# Patient Record
Sex: MALE | Race: WHITE | Employment: UNEMPLOYED | URBAN - METROPOLITAN AREA
[De-identification: names, ages, dates, MRNs, and addresses within clinical notes are randomized per-mention and may not be internally consistent; named-entity substitution may affect disease eponyms.]

---

## 2017-01-05 ENCOUNTER — HOSPITAL ENCOUNTER (INPATIENT)
Facility: CLINIC | Age: 77
LOS: 7 days | Discharge: NURSING, REHAB OR LONG-TERM CARE | DRG: 190 | End: 2017-01-12
Attending: EMERGENCY MEDICINE | Admitting: INTERNAL MEDICINE
Payer: MEDICARE

## 2017-01-05 ENCOUNTER — APPOINTMENT (OUTPATIENT)
Dept: GENERAL RADIOLOGY | Facility: CLINIC | Age: 77
DRG: 190 | End: 2017-01-05
Attending: EMERGENCY MEDICINE
Payer: MEDICARE

## 2017-01-05 DIAGNOSIS — R06.02 SOB (SHORTNESS OF BREATH): ICD-10-CM

## 2017-01-05 DIAGNOSIS — F17.210 CIGARETTE NICOTINE DEPENDENCE WITHOUT COMPLICATION: ICD-10-CM

## 2017-01-05 DIAGNOSIS — G89.29 OTHER CHRONIC PAIN: ICD-10-CM

## 2017-01-05 DIAGNOSIS — J44.1 COPD EXACERBATION (H): Primary | ICD-10-CM

## 2017-01-05 LAB
ALBUMIN SERPL-MCNC: 3.4 G/DL (ref 3.4–5)
ALP SERPL-CCNC: 97 U/L (ref 40–150)
ALT SERPL W P-5'-P-CCNC: 38 U/L (ref 0–70)
ANION GAP SERPL CALCULATED.3IONS-SCNC: 10 MMOL/L (ref 3–14)
AST SERPL W P-5'-P-CCNC: 46 U/L (ref 0–45)
BASE EXCESS BLDV CALC-SCNC: 8.9 MMOL/L
BASOPHILS # BLD AUTO: 0 10E9/L (ref 0–0.2)
BASOPHILS NFR BLD AUTO: 0.4 %
BILIRUB SERPL-MCNC: 0.8 MG/DL (ref 0.2–1.3)
BUN SERPL-MCNC: 14 MG/DL (ref 7–30)
CALCIUM SERPL-MCNC: 9 MG/DL (ref 8.5–10.1)
CHLORIDE SERPL-SCNC: 98 MMOL/L (ref 94–109)
CO2 SERPL-SCNC: 28 MMOL/L (ref 20–32)
CREAT SERPL-MCNC: 0.81 MG/DL (ref 0.66–1.25)
DIFFERENTIAL METHOD BLD: ABNORMAL
EOSINOPHIL # BLD AUTO: 0.1 10E9/L (ref 0–0.7)
EOSINOPHIL NFR BLD AUTO: 0.9 %
ERYTHROCYTE [DISTWIDTH] IN BLOOD BY AUTOMATED COUNT: 13.6 % (ref 10–15)
FLUAV+FLUBV AG SPEC QL: NEGATIVE
FLUAV+FLUBV AG SPEC QL: NORMAL
GFR SERPL CREATININE-BSD FRML MDRD: ABNORMAL ML/MIN/1.7M2
GLUCOSE BLDC GLUCOMTR-MCNC: 182 MG/DL (ref 70–99)
GLUCOSE BLDC GLUCOMTR-MCNC: 310 MG/DL (ref 70–99)
GLUCOSE SERPL-MCNC: 151 MG/DL (ref 70–99)
HCO3 BLDV-SCNC: 35 MMOL/L (ref 21–28)
HCT VFR BLD AUTO: 43.6 % (ref 40–53)
HGB BLD-MCNC: 15.6 G/DL (ref 13.3–17.7)
IMM GRANULOCYTES # BLD: 0 10E9/L (ref 0–0.4)
IMM GRANULOCYTES NFR BLD: 0.3 %
INTERPRETATION ECG - MUSE: NORMAL
LACTATE BLD-SCNC: 2.2 MMOL/L (ref 0.7–2.1)
LYMPHOCYTES # BLD AUTO: 1.1 10E9/L (ref 0.8–5.3)
LYMPHOCYTES NFR BLD AUTO: 15.9 %
MCH RBC QN AUTO: 33.3 PG (ref 26.5–33)
MCHC RBC AUTO-ENTMCNC: 35.8 G/DL (ref 31.5–36.5)
MCV RBC AUTO: 93 FL (ref 78–100)
MONOCYTES # BLD AUTO: 1.1 10E9/L (ref 0–1.3)
MONOCYTES NFR BLD AUTO: 16 %
NEUTROPHILS # BLD AUTO: 4.5 10E9/L (ref 1.6–8.3)
NEUTROPHILS NFR BLD AUTO: 66.5 %
NRBC # BLD AUTO: 0 10*3/UL
NRBC BLD AUTO-RTO: 0 /100
NT-PROBNP SERPL-MCNC: 580 PG/ML (ref 0–900)
O2/TOTAL GAS SETTING VFR VENT: ABNORMAL %
PCO2 BLDV: 54 MM HG (ref 40–50)
PH BLDV: 7.42 PH (ref 7.32–7.43)
PLATELET # BLD AUTO: 120 10E9/L (ref 150–450)
PO2 BLDV: 36 MM HG (ref 25–47)
POTASSIUM SERPL-SCNC: 3.4 MMOL/L (ref 3.4–5.3)
PROCALCITONIN SERPL-MCNC: NORMAL NG/ML
PROT SERPL-MCNC: 7.8 G/DL (ref 6.8–8.8)
RBC # BLD AUTO: 4.68 10E12/L (ref 4.4–5.9)
SODIUM SERPL-SCNC: 136 MMOL/L (ref 133–144)
SPECIMEN SOURCE: NORMAL
TROPONIN I SERPL-MCNC: NORMAL UG/L (ref 0–0.04)
WBC # BLD AUTO: 6.8 10E9/L (ref 4–11)

## 2017-01-05 PROCEDURE — 99223 1ST HOSP IP/OBS HIGH 75: CPT | Mod: AI | Performed by: INTERNAL MEDICINE

## 2017-01-05 PROCEDURE — 94640 AIRWAY INHALATION TREATMENT: CPT

## 2017-01-05 PROCEDURE — 40000275 ZZH STATISTIC RCP TIME EA 10 MIN

## 2017-01-05 PROCEDURE — 84145 PROCALCITONIN (PCT): CPT | Performed by: EMERGENCY MEDICINE

## 2017-01-05 PROCEDURE — 25000132 ZZH RX MED GY IP 250 OP 250 PS 637: Mod: GY | Performed by: INTERNAL MEDICINE

## 2017-01-05 PROCEDURE — 25000308 HC RX OP HPI UCR WEL MED 250 IP 250: Performed by: EMERGENCY MEDICINE

## 2017-01-05 PROCEDURE — 87804 INFLUENZA ASSAY W/OPTIC: CPT | Performed by: EMERGENCY MEDICINE

## 2017-01-05 PROCEDURE — 25000125 ZZHC RX 250: Performed by: EMERGENCY MEDICINE

## 2017-01-05 PROCEDURE — 83880 ASSAY OF NATRIURETIC PEPTIDE: CPT | Performed by: EMERGENCY MEDICINE

## 2017-01-05 PROCEDURE — 99285 EMERGENCY DEPT VISIT HI MDM: CPT | Mod: 25

## 2017-01-05 PROCEDURE — A9270 NON-COVERED ITEM OR SERVICE: HCPCS | Mod: GY | Performed by: INTERNAL MEDICINE

## 2017-01-05 PROCEDURE — 25000128 H RX IP 250 OP 636: Performed by: INTERNAL MEDICINE

## 2017-01-05 PROCEDURE — 25000125 ZZHC RX 250: Performed by: INTERNAL MEDICINE

## 2017-01-05 PROCEDURE — 80053 COMPREHEN METABOLIC PANEL: CPT | Performed by: EMERGENCY MEDICINE

## 2017-01-05 PROCEDURE — 83605 ASSAY OF LACTIC ACID: CPT | Performed by: INTERNAL MEDICINE

## 2017-01-05 PROCEDURE — 93005 ELECTROCARDIOGRAM TRACING: CPT

## 2017-01-05 PROCEDURE — 00000146 ZZHCL STATISTIC GLUCOSE BY METER IP

## 2017-01-05 PROCEDURE — 71020 XR CHEST 2 VW: CPT

## 2017-01-05 PROCEDURE — 96374 THER/PROPH/DIAG INJ IV PUSH: CPT

## 2017-01-05 PROCEDURE — 36415 COLL VENOUS BLD VENIPUNCTURE: CPT | Performed by: INTERNAL MEDICINE

## 2017-01-05 PROCEDURE — 94640 AIRWAY INHALATION TREATMENT: CPT | Mod: 76

## 2017-01-05 PROCEDURE — 85025 COMPLETE CBC W/AUTO DIFF WBC: CPT | Performed by: EMERGENCY MEDICINE

## 2017-01-05 PROCEDURE — 12000000 ZZH R&B MED SURG/OB

## 2017-01-05 PROCEDURE — 84484 ASSAY OF TROPONIN QUANT: CPT | Performed by: EMERGENCY MEDICINE

## 2017-01-05 PROCEDURE — 82803 BLOOD GASES ANY COMBINATION: CPT | Performed by: EMERGENCY MEDICINE

## 2017-01-05 RX ORDER — METHYLPREDNISOLONE SODIUM SUCCINATE 125 MG/2ML
60 INJECTION, POWDER, LYOPHILIZED, FOR SOLUTION INTRAMUSCULAR; INTRAVENOUS EVERY 12 HOURS
Status: DISCONTINUED | OUTPATIENT
Start: 2017-01-05 | End: 2017-01-07

## 2017-01-05 RX ORDER — IPRATROPIUM BROMIDE AND ALBUTEROL SULFATE 2.5; .5 MG/3ML; MG/3ML
3 SOLUTION RESPIRATORY (INHALATION) EVERY 4 HOURS
Status: DISCONTINUED | OUTPATIENT
Start: 2017-01-05 | End: 2017-01-10

## 2017-01-05 RX ORDER — IPRATROPIUM BROMIDE AND ALBUTEROL SULFATE 2.5; .5 MG/3ML; MG/3ML
3 SOLUTION RESPIRATORY (INHALATION)
Status: COMPLETED | OUTPATIENT
Start: 2017-01-05 | End: 2017-01-05

## 2017-01-05 RX ORDER — NALOXONE HYDROCHLORIDE 0.4 MG/ML
.1-.4 INJECTION, SOLUTION INTRAMUSCULAR; INTRAVENOUS; SUBCUTANEOUS
Status: DISCONTINUED | OUTPATIENT
Start: 2017-01-05 | End: 2017-01-05

## 2017-01-05 RX ORDER — HYDROCHLOROTHIAZIDE 25 MG/1
25 TABLET ORAL DAILY
Status: DISCONTINUED | OUTPATIENT
Start: 2017-01-06 | End: 2017-01-12 | Stop reason: HOSPADM

## 2017-01-05 RX ORDER — TRAMADOL HYDROCHLORIDE 50 MG/1
50 TABLET ORAL 3 TIMES DAILY PRN
Status: DISCONTINUED | OUTPATIENT
Start: 2017-01-05 | End: 2017-01-05

## 2017-01-05 RX ORDER — NICOTINE POLACRILEX 4 MG
15-30 LOZENGE BUCCAL
Status: DISCONTINUED | OUTPATIENT
Start: 2017-01-05 | End: 2017-01-12 | Stop reason: HOSPADM

## 2017-01-05 RX ORDER — NICOTINE 21 MG/24HR
1 PATCH, TRANSDERMAL 24 HOURS TRANSDERMAL DAILY
Status: DISCONTINUED | OUTPATIENT
Start: 2017-01-05 | End: 2017-01-12 | Stop reason: HOSPADM

## 2017-01-05 RX ORDER — DEXTROSE MONOHYDRATE 25 G/50ML
25-50 INJECTION, SOLUTION INTRAVENOUS
Status: DISCONTINUED | OUTPATIENT
Start: 2017-01-05 | End: 2017-01-12 | Stop reason: HOSPADM

## 2017-01-05 RX ORDER — AZITHROMYCIN 250 MG/1
250 TABLET, FILM COATED ORAL DAILY
Status: COMPLETED | OUTPATIENT
Start: 2017-01-06 | End: 2017-01-09

## 2017-01-05 RX ORDER — ALBUTEROL SULFATE 0.83 MG/ML
3 SOLUTION RESPIRATORY (INHALATION)
Status: DISCONTINUED | OUTPATIENT
Start: 2017-01-05 | End: 2017-01-12 | Stop reason: HOSPADM

## 2017-01-05 RX ORDER — TRAMADOL HYDROCHLORIDE 50 MG/1
50 TABLET ORAL EVERY 4 HOURS PRN
Status: DISCONTINUED | OUTPATIENT
Start: 2017-01-05 | End: 2017-01-12 | Stop reason: HOSPADM

## 2017-01-05 RX ORDER — METHYLPREDNISOLONE SODIUM SUCCINATE 125 MG/2ML
125 INJECTION, POWDER, LYOPHILIZED, FOR SOLUTION INTRAMUSCULAR; INTRAVENOUS ONCE
Status: COMPLETED | OUTPATIENT
Start: 2017-01-05 | End: 2017-01-05

## 2017-01-05 RX ORDER — ONDANSETRON 2 MG/ML
4 INJECTION INTRAMUSCULAR; INTRAVENOUS EVERY 6 HOURS PRN
Status: DISCONTINUED | OUTPATIENT
Start: 2017-01-05 | End: 2017-01-12 | Stop reason: HOSPADM

## 2017-01-05 RX ORDER — ALBUTEROL SULFATE 0.83 MG/ML
5 SOLUTION RESPIRATORY (INHALATION) EVERY 6 HOURS PRN
Status: DISCONTINUED | OUTPATIENT
Start: 2017-01-05 | End: 2017-01-07

## 2017-01-05 RX ORDER — ONDANSETRON 4 MG/1
4 TABLET, ORALLY DISINTEGRATING ORAL EVERY 6 HOURS PRN
Status: DISCONTINUED | OUTPATIENT
Start: 2017-01-05 | End: 2017-01-12 | Stop reason: HOSPADM

## 2017-01-05 RX ORDER — NALOXONE HYDROCHLORIDE 0.4 MG/ML
.1-.4 INJECTION, SOLUTION INTRAMUSCULAR; INTRAVENOUS; SUBCUTANEOUS
Status: DISCONTINUED | OUTPATIENT
Start: 2017-01-05 | End: 2017-01-12 | Stop reason: HOSPADM

## 2017-01-05 RX ORDER — ACETAMINOPHEN 325 MG/1
650 TABLET ORAL EVERY 4 HOURS PRN
Status: DISCONTINUED | OUTPATIENT
Start: 2017-01-05 | End: 2017-01-12 | Stop reason: HOSPADM

## 2017-01-05 RX ORDER — AMOXICILLIN 250 MG
1-2 CAPSULE ORAL 2 TIMES DAILY PRN
Status: DISCONTINUED | OUTPATIENT
Start: 2017-01-05 | End: 2017-01-12 | Stop reason: HOSPADM

## 2017-01-05 RX ORDER — ATENOLOL 25 MG/1
25 TABLET ORAL DAILY
Status: DISCONTINUED | OUTPATIENT
Start: 2017-01-06 | End: 2017-01-12 | Stop reason: HOSPADM

## 2017-01-05 RX ADMIN — METHYLPREDNISOLONE SODIUM SUCCINATE 62.5 MG: 125 INJECTION, POWDER, FOR SOLUTION INTRAMUSCULAR; INTRAVENOUS at 20:01

## 2017-01-05 RX ADMIN — INSULIN ASPART 1 UNITS: 100 INJECTION, SOLUTION INTRAVENOUS; SUBCUTANEOUS at 18:42

## 2017-01-05 RX ADMIN — TRAMADOL HYDROCHLORIDE 50 MG: 50 TABLET, COATED ORAL at 15:47

## 2017-01-05 RX ADMIN — IPRATROPIUM BROMIDE AND ALBUTEROL SULFATE 3 ML: .5; 3 SOLUTION RESPIRATORY (INHALATION) at 19:51

## 2017-01-05 RX ADMIN — TRAMADOL HYDROCHLORIDE 50 MG: 50 TABLET, COATED ORAL at 20:24

## 2017-01-05 RX ADMIN — FLUTICASONE PROPIONATE 1 PUFF: 250 POWDER, METERED RESPIRATORY (INHALATION) at 20:05

## 2017-01-05 RX ADMIN — IPRATROPIUM BROMIDE AND ALBUTEROL SULFATE 3 ML: .5; 3 SOLUTION RESPIRATORY (INHALATION) at 12:21

## 2017-01-05 RX ADMIN — AZITHROMYCIN MONOHYDRATE 500 MG: 500 INJECTION, POWDER, LYOPHILIZED, FOR SOLUTION INTRAVENOUS at 15:47

## 2017-01-05 RX ADMIN — ALBUTEROL SULFATE 5 MG: 2.5 SOLUTION RESPIRATORY (INHALATION) at 13:52

## 2017-01-05 RX ADMIN — IPRATROPIUM BROMIDE AND ALBUTEROL SULFATE 3 ML: .5; 3 SOLUTION RESPIRATORY (INHALATION) at 23:10

## 2017-01-05 RX ADMIN — IPRATROPIUM BROMIDE AND ALBUTEROL SULFATE 3 ML: .5; 3 SOLUTION RESPIRATORY (INHALATION) at 11:39

## 2017-01-05 RX ADMIN — NICOTINE 1 PATCH: 21 PATCH, EXTENDED RELEASE TRANSDERMAL at 18:42

## 2017-01-05 RX ADMIN — IPRATROPIUM BROMIDE AND ALBUTEROL SULFATE 3 ML: .5; 3 SOLUTION RESPIRATORY (INHALATION) at 15:44

## 2017-01-05 RX ADMIN — IPRATROPIUM BROMIDE AND ALBUTEROL SULFATE 3 ML: .5; 3 SOLUTION RESPIRATORY (INHALATION) at 12:55

## 2017-01-05 RX ADMIN — SALINE NASAL SPRAY 2 SPRAY: 1.5 SOLUTION NASAL at 22:51

## 2017-01-05 RX ADMIN — METHYLPREDNISOLONE SODIUM SUCCINATE 125 MG: 125 INJECTION, POWDER, FOR SOLUTION INTRAMUSCULAR; INTRAVENOUS at 11:38

## 2017-01-05 ASSESSMENT — ENCOUNTER SYMPTOMS
SORE THROAT: 0
SHORTNESS OF BREATH: 1
UNEXPECTED WEIGHT CHANGE: 0
FEVER: 0
CHEST TIGHTNESS: 0
WHEEZING: 1
ABDOMINAL PAIN: 0
VOMITING: 0

## 2017-01-05 ASSESSMENT — ACTIVITIES OF DAILY LIVING (ADL)
RETIRED_COMMUNICATION: 0-->UNDERSTANDS/COMMUNICATES WITHOUT DIFFICULTY
RETIRED_EATING: 0-->INDEPENDENT
WHICH_OF_THE_ABOVE_FUNCTIONAL_RISKS_HAD_A_RECENT_ONSET_OR_CHANGE?: AMBULATION
BATHING: 0-->INDEPENDENT
AMBULATION: 0-->INDEPENDENT
FALL_HISTORY_WITHIN_LAST_SIX_MONTHS: YES
DRESS: 0-->INDEPENDENT
TOILETING: 0-->INDEPENDENT
TRANSFERRING: 0-->INDEPENDENT
SWALLOWING: 0-->SWALLOWS FOODS/LIQUIDS WITHOUT DIFFICULTY
COGNITION: 0 - NO COGNITION ISSUES REPORTED
NUMBER_OF_TIMES_PATIENT_HAS_FALLEN_WITHIN_LAST_SIX_MONTHS: 1

## 2017-01-05 NOTE — ED NOTES
"[facility]  ED Nurse Handoff Report    ED Chief complaint: Shortness of Breath      ED Diagnosis:   Final diagnoses:   COPD exacerbation (H)   SOB (shortness of breath)       Code Status: not addressed in ED    Allergies:   Allergies   Allergen Reactions     Ammonium Lactate Other (See Comments)     Burning to skin     Doxycycline Visual Disturbance     Monosodium Glutamate Other (See Comments)     Pt states it feels like his \"head swells, visual disturbances, and he can't think striaght\"     Penicillins Rash       Activity level:  Independent     Needed?: No    Isolation: No  Infection: Not Applicable    Bariatric?: No      Vital Signs:   Filed Vitals:    01/05/17 1144 01/05/17 1215 01/05/17 1230 01/05/17 1245   BP:  123/63 126/57 119/62   Pulse:       Temp:       TempSrc:       Resp: 17 17 19 17   Height:       Weight:       SpO2: 98% 98% 100% 99%       Cardiac Rhythm: ,        Pain level: 0-10 Pain Scale: 0    Is this patient confused?: No    Patient Report: Initial Complaint:Sob, states has been for years but with in lst 2 days increased sputum, unable to do activityies  Of daily living  With out rest periods, can't talk with activity, homeless, lives in a basement and can't climb stairs to BR, does not have Home O2, medications expensive, lives around ill people   Focused Assessment: decreased lung lunds, yellow sputum, SOB with any activity   Tests Performed: labs, cxr, ecg   Abnormal Results: see results   Treatments provided: 3 duonebs O2 2 liter NC     Family Comments: none here     OBS brochure/video discussed/provided to patient: No    ED Medications:   Medications   albuterol neb solution 5 mg (not administered)   methylPREDNISolone sodium succinate (solu-MEDROL) injection 125 mg (125 mg Intravenous Given 1/5/17 1138)   ipratropium - albuterol 0.5 mg/2.5 mg/3 mL (DUONEB) neb solution 3 mL (3 mLs Nebulization Given 1/5/17 1255)       Drips infusing?:  No      ED NURSE PHONE " NUMBER: [phone]

## 2017-01-05 NOTE — H&P
PRIMARY CARE PROVIDER:  Pranay Reid MD      DATE OF ADMISSION:  01/05/2017      CHIEF COMPLAINT:  Shortness of breath.      HISTORY OF PRESENT ILLNESS:  Mr. Chuck Lopez is a 76-year-old male with a known history of COPD, non-oxygen dependent, hypertension, chronic hepatitis C, chronic thrombocytopenia, diabetes mellitus type 2 diet controlled, hypertension, ongoing tobacco use who presents to the emergency room with progressive shortness of breath that started 3 days ago.  The patient is currently homeless and is living in a basement with his friends.  Reportedly, some of his friends had upper respiratory tract symptoms 2 weeks ago and are recovering from that.  About 3 days ago he noticed that he was getting more short of breath than normal.  He has been having increasing cough since that time with production of whitish to gray sputum which has increased in volume.  He denies any fever or chills.  He has obviously been more short of breath with all the symptoms and was really breathing hard earlier this morning.  No nausea, vomiting or diarrhea.  No dysuria, urinary urgency or frequency.  No orthopnea or PND.  Denies any lower extremity swelling.  No prolonged immobilization.  The patient does continue to smoke about half pack of cigarettes per day.      In the emergency room the patient was evaluated by Dr. Stallings.  He was found to be in acute exacerbation of COPD.  Basic lab work was fairly unremarkable.  His venous pH was 7.42 with a pCO2 of 54, pO2 of 36, bicarbonate of 35.  Chest x-ray did not show any pneumonia.  He was started on IV steroids and nebulizers with which he feels slightly better.      PAST MEDICAL HISTORY:   1.  Chronic hepatitis C.   2.  COPD, not on home oxygen.   3.  Tobacco use disorder.   4.  Chronic thrombocytopenia.   5.  Hypertension.   6.  Diet-controlled diabetes mellitus type 2.      ALLERGIES:  Ammonium lactate, doxycycline, penicillin.      SOCIAL AND PERSONAL HISTORY:  He  lives with his friends.  He is currently homeless.  Smokes about half pack cigarettes per day.  Denies alcohol use.      FAMILY HISTORY:  Reviewed and is noncontributory.      HOME MEDICATIONS:   1.  Albuterol 2 puffs every 6 hours as needed.   2.  QVAR 18 mcg 2 puffs 2 times daily.   3.  Tramadol 50 mg 1-2 tablets daily as needed for pain.   4.  Spiriva 1 capsule inhalation daily.   5.  Atenolol 25 mg daily.   6.  Hydrochlorothiazide 25 mg daily.      REVIEW OF SYSTEMS:  A complete review of system was done and was negative for anything else other than that mentioned in the HPI.      PHYSICAL EXAMINATION:   GENERAL:  Mr. Chuck Lopez is a 76-year-old male.  He is not in any obvious distress.  He is tachypneic though.   VITAL SIGNS:  Temperature 98.3, blood pressure 138/95, pulse 76, respiration rate 25, O2 sat is 99% on 3 liters nasal cannula.   HEENT:  Atraumatic, normocephalic, no pallor or icterus.   NECK:  Supple, with good range of motion.   RESPIRATORY:  Diminished air entry bilaterally with expiratory wheezes on all the lung fields, he is tachypneic.   CARDIOVASCULAR:  Regular rate and rhythm.  There is no murmur.   ABDOMEN:  Soft, nontender, nondistended, bowel sounds normoactive.   EXTREMITIES:  No edema, cyanosis or clubbing.   SKIN:  Warm and dry.   NEUROLOGIC:  Awake, alert, oriented.  Cranial nerves II-XII intact.      LABORATORY AND DIAGNOSTIC DATA:  Sodium is 136, potassium is 3.4, normal BUN and creatinine.  BNP is normal.  Troponin is negative.  Blood glucose is 151.  Platelet is 120.  White cell count is normal.  Flu swab is negative.  Chest x-ray does not show any pneumonia.      A 12-lead EKG shows normal sinus rhythm with some nonspecific T-wave changes.      ASSESSMENT AND PLAN:  Mr. Lopez is a 76-year-old male with known history of COPD, hypertension, ongoing tobacco use, chronic hepatitis C, chronic thrombocytopenia, hypertension and diet-controlled diabetes mellitus who presents to the  emergency room with worsening shortness of breath.   1.  Chronic obstructive pulmonary disease acute exacerbation; acute hypoxic respiratory failure due to chronic obstructive pulmonary disease acute exacerbation.  The patient presented with worsening symptoms for the past 3 days.  He has got increased sputum production with change in color and quantity.  Moreover, he had roommates who had respiratory tract infection 2 weeks ago.  At this time, it appears like this is an infectious exacerbation of his chronic obstructive pulmonary disease.  I will start him on azithromycin.  There is no pneumonia but more than likely this is acute bronchitis.  We will continue intravenous Solu-Medrol started in the emergency room along with albuterol and DuoNeb.  Wean oxygen as tolerated.   2.  Chronic hepatitis C with chronic thrombocytopenia.  He does have a history of chronic hepatitis C.  Unsure if he has ever received treatment for that.  He could not tell me that.  His platelets are stable at the moment, just follow it intermittently.   3.  Hypertension.  Prior to admission, he was on atenolol and hydrochlorothiazide.  Will continue both of that.   4.  Diabetes mellitus type 2, diet controlled.  His blood glucose was 151.  I anticipate that will worsen with intravenous Solu-Medrol.  Will put him on a low dose sliding scale for now.   5.  Ongoing tobacco use.  He was counseled about tobacco cessation extensively and its impact on his worsening chronic obstructive pulmonary disease symptoms.      Anticipated length of stay more than 2 midnights.      CODE STATUS:  Full code.         HAO MTZ MD             D: 2017 14:28   T: 2017 14:50   MT:       Name:     TIMI MAK   MRN:      [id number]        Account:      [id number]   :      1940           Admitted:     [id number]      Document: [id number]       cc: Pranay Reid MD

## 2017-01-05 NOTE — PHARMACY-ADMISSION MEDICATION HISTORY
Admission medication history interview status for the 1/5/2017  admission is complete. See EPIC admission navigator for prior to admission medications     Medication history source reliability:Good    Actions taken by pharmacist (provider contacted, etc): Interviewed patient.      Additional medication history information not noted on PTA med list :None    Medication reconciliation/reorder completed by provider prior to medication history? No    Time spent in this activity: 20 minutes    Prior to Admission medications    Medication Sig Last Dose Taking? Auth Provider   albuterol (PROAIR HFA/PROVENTIL HFA/VENTOLIN HFA) 108 (90 BASE) MCG/ACT Inhaler Inhale 2 puffs into the lungs every 6 hours as needed for shortness of breath / dyspnea or wheezing 1/5/2017 at AM Yes Pranay Reid MD   beclomethasone (QVAR) 80 MCG/ACT Inhaler Inhale 2 puffs into the lungs 2 times daily 1/5/2017 at AM Yes Parnay Reid MD   traMADol (ULTRAM) 50 MG tablet Take one or two tabs three times daily maximum  per day as needed for pain 1/5/2017 at AM Yes Pranay Reid MD   albuterol (2.5 MG/3ML) 0.083% neb solution Take 1 vial (2.5 mg) by nebulization every 4 hours as needed for shortness of breath / dyspnea or wheezing 1/5/2017 at 1000 Yes Pranay Reid MD   tiotropium (SPIRIVA HANDIHALER) 18 MCG inhalation capsule Inhale contents of one capsule daily. 1/5/2017 at AM Yes Marley Huerta MD   ATENOLOL PO Take 25 mg by mouth daily 1/5/2017 at AM Yes Unknown, Entered By History   HYDROCHLOROTHIAZIDE PO Take 25 mg by mouth daily 1/5/2017 at AM Yes Unknown, Entered By History   Multiple Vitamins-Minerals (V-C FORTE) CAPS Take 1 capsule by mouth daily 1/5/2017 at AM Yes Pranay Reid MD   order for DME Equipment being ordered: Other: neb machine  Treatment Diagnosis: COPD exacerbation   Marley Huerta MD Sharon Chandler, PharmD, BCPS

## 2017-01-05 NOTE — IP AVS SNAPSHOT
"` `           [place] [address] [facility]: [phone]                                              INTERAGENCY TRANSFER FORM - NURSING   2017                    Hospital Admission Date: 2017  TIMI MAK   : 1940  Sex: Male        Attending Provider: Jean Paul Latif MD     Allergies:  Ammonium Lactate, Doxycycline, Monosodium Glutamate, Penicillins    Infection:  None   Service:  INTERMEDIATE    Ht:  1.651 m (5' 5\")   Wt:  47.265 kg (104 lb 3.2 oz)   Admission Wt:  51.256 kg (113 lb)    BMI:  17.34 kg/m 2   BSA:  1.47 m 2            Patient PCP Information     Provider PCP Type    Pranay Reid MD General      Current Code Status     Date Active Code Status Order ID Comments User Context       Prior      Code Status History     Date Active Date Inactive Code Status Order ID Comments User Context    2017  9:49 AM  Full Code [id number]  Joe Syed MD Outpatient    2017  2:42 PM 2017  9:49 AM Full Code [id number]  Jean Paul Latif MD Inpatient    2016 10:47 AM 2017  2:42 PM Full Code [id number]  Marley Huerta MD Outpatient    2016  4:29 AM 2016 10:47 AM Full Code [id number]  Anthony Rosa MD Inpatient      Advance Directives        Does patient have a scanned Advance Directive/ACP document in EPIC?           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)   2010    CARDIOVASCULAR SCREENING; LDL GOAL LESS THAN 100   2011    CKD (chronic kidney disease) stage 2, GFR 60-89 ml/min   2012    Advanced directives, counseling/discussion   2012    COPD (chronic obstructive pulmonary disease) (H)   Unknown "    Hypertension goal BP (blood pressure) < 140/90 Medium  9/16/2013    Chronic hepatitis C (H) Medium  3/11/2014    Other chronic pain   9/16/2015    Chronic obstructive airway disease with asthma (H) Medium  10/10/2016    Acute hypoxemic respiratory failure (H) Medium  11/22/2016      Immunizations     Name Date      Hepatitis B 05/02/01     Hepatitis B 03/16/00     Hepatitis B 04/22/99     Influenza (H1N1) 03/05/10     Influenza (High Dose) 3 valent vaccine 12/21/16     Influenza (High Dose) 3 valent vaccine 12/13/12     Influenza (IIV3) 09/08/09     Influenza (IIV3) 01/12/07     Influenza (IIV3) 12/22/05     Influenza (IIV3) 12/22/03     Influenza Vaccine IM 3yrs+ 4 Valent IIV4 09/10/14     Influenza Vaccine IM 3yrs+ 4 Valent IIV4 10/15/13     Pneumococcal (PCV 13) 12/21/16     Pneumococcal 23 valent 12/22/05     Pneumococcal 23 valent 03/25/99     TD (ADULT, 7+) 09/08/09     TD (ADULT, 7+) 05/20/99          END      ASSESSMENT     Discharge Profile Flowsheet     EXPECTED DISCHARGE     Passing flatus  yes 01/10/17 2048    Expected Discharge Date  01/11/17 (TCU?) 01/09/17 0858   COMMUNICATION ASSESSMENT      DISCHARGE NEEDS ASSESSMENT     Patient's communication style  spoken language (English or Bilingual) 01/05/17 1101    Concerns To Be Addressed  all concerns addressed in this encounter 11/26/16 1212   SKIN      Equipment Currently Used at Home  none 01/07/17 0945   Inspection  Full 01/12/17 0948    Transportation Available  family or friend will provide 01/07/17 0945   Skin WDL  ex 01/12/17 0948    # of Referrals Placed by CTS  Post Acute Facilities;Transportation 01/12/17 1024   Skin Moisture  dry 01/09/17 0105    FUNCTIONAL LEVEL CURRENT     Skin areas NOT inspected  Sacrum;Coccyx 01/10/17 1034    Change in Functional Status Since Onset of Current Illness/Injury  no 11/26/16 1212   Skin Integrity  bruise(s) 01/12/17 0948    GASTROINTESTINAL (ADULT,PEDIATRIC,OB)     SAFETY      GI WDL  WDL 01/12/17 0948    "Safety WDL  WDL 01/12/17 0948    Last Bowel Movement  11/22/16 11/24/16 0059                      Assessment WDL (Within Defined Limits) Definitions           Safety WDL     Effective: 09/28/15    Row Information: <b>WDL Definition:</b> Bed in low position, wheels locked; call light in reach; upper side rails up x 2; ID band on<br> <font color=\"gray\"><i>Item=AS safety wdl>>List=AS safety wdl>>Version=F14</i></font>      Skin WDL     Effective: 09/28/15    Row Information: <b>WDL Definition:</b> Warm; dry; intact; elastic; without discoloration; pressure points without redness<br> <font color=\"gray\"><i>Item=AS skin wdl>>List=AS skin wdl>>Version=F14</i></font>      Vitals     Vital Signs Flowsheet     VITAL SIGNS     Pain Management Interventions  analgesia administered 01/09/17 0056    Temp  98.3  F (36.8  C) 01/12/17 0803   Pain Intervention(s)  Medication (See eMAR) 01/12/17 0945    Temp src  Oral 01/12/17 0803   Response to Interventions  Relief 01/12/17 0945    Resp  18 01/12/17 0945   ANALGESIA SIDE EFFECTS MONITORING      Pulse  79 01/12/17 0341   Side Effects Monitoring: Respiratory Quality  R 01/12/17 0945    Heart Rate  82 01/12/17 0803   Side Effects Monitoring: Respiratory Depth  N 01/12/17 0945    Pulse/Heart Rate Source  Monitor 01/12/17 0803   Side Effects Monitoring: Sedation Level  1 01/12/17 0945    BP  161/81 mmHg 01/12/17 0803   HEIGHT AND WEIGHT      BP Location  Left arm 01/12/17 0803   Height  1.651 m (5' 5\") 01/05/17 1110    OXYGEN THERAPY     Height Method  Stated 01/05/17 1110    SpO2  92 % 01/12/17 1233   Weight  47.265 kg (104 lb 3.2 oz) 01/12/17 0525    O2 Device  None (Room air) 01/12/17 1233   BSA (Calculated - sq m)  1.53 01/05/17 1110    FiO2 (%)  40 % 01/08/17 0331   BMI (Calculated)  18.84 01/05/17 1110    Oxygen Delivery  1 LPM 01/12/17 0341   POSITIONING      PAIN/COMFORT     Body Position  independently positioning 01/12/17 0304    Patient Currently in Pain  sleeping: patient not " able to self report 01/12/17 0455   Head of Bed (HOB)  HOB at 20-30 degrees 01/10/17 2048    Preferred Pain Scale  number (Numeric Rating Pain Scale) 01/11/17 0431   Positioning/Transfer Devices  pillows 01/10/17 0006    0-10 Pain Scale  1 01/12/17 1306   DAILY CARE      FACES Pain Rating  0-->no hurt 01/05/17 1406   Activity Type  activity encouraged 01/12/17 0948    Pain Location  Back 01/12/17 0945   Activity Level of Assistance  independent 01/12/17 0948    Pain Orientation  Posterior 01/10/17 2047   Activity Assistive Device  gait belt 01/11/17 1702    Pain Descriptors  Aching 01/09/17 0056                 Patient Lines/Drains/Airways Status    Active LINES/DRAINS/AIRWAYS     Name: Placement date: Placement time: Site: Days: Last dressing change:    Peripheral IV 01/10/17 Left;Dorsal Hand;Lower forearm 01/10/17    Hand;Lower forearm  2             Patient Lines/Drains/Airways Status    Active PICC/CVC     **None**            Intake/Output Detail Report     Date Intake     Output Net    Shift P.O. I.V. IV Piggyback Total Urine Total       Noc 01/10/17 2300 - 01/11/17 0659 -- -- -- -- 200 200 -200    Day 01/11/17 0700 - 01/11/17 1459 800 -- -- 800 500 500 300    Marlene 01/11/17 1500 - 01/11/17 2259 -- -- -- -- 275 275 -275    Noc 01/11/17 2300 - 01/12/17 0659 -- -- -- -- -- -- 0    Day 01/12/17 0700 - 01/12/17 1459 480 -- -- 480 -- -- 480      Last Void/BM       Most Recent Value    Urine Occurrence 1 at 01/10/2017 1400    Stool Occurrence       Case Management/Discharge Planning     Case Management/Discharge Planning Flowsheet     REFERRAL INFORMATION     Description of Support System  Supportive;Involved 01/06/17 1601    Did the Initial Social Work Assessment result in a Social Work Case?  Yes 01/06/17 1601   COPING/STRESS      Admission Type  inpatient 01/06/17 1601   Major Change/Loss/Stressor  none 01/05/17 1617    Referral Source  physician 01/06/17 1601   EXPECTED DISCHARGE      # of Referrals Placed by CTS   Post Acute Facilities;Transportation 01/12/17 1024   Expected Discharge Date  01/11/17 (TCU?) 01/09/17 0858    Reason For Consult  discharge planning 01/06/17 1601   ASSESSMENT/CONCERNS TO BE ADDRESSED      Record Reviewed  medical record 01/06/17 1601   Concerns To Be Addressed  all concerns addressed in this encounter 11/26/16 1212     Assigned to Case  Mary Grace Montoya 01/12/17 1024   DISCHARGE PLANNING      Referral Information Comments  [facility] 01/05/17 1424   Transportation Available  family or friend will provide 01/07/17 0945    [facility] Name  [facility] 11/25/16 1145   FINAL RESOURCES      Primary Care MD Name  dr keita 11/25/16 1145   Equipment Currently Used at Home  none 01/07/17 0945    LIVING ENVIRONMENT     ABUSE RISK SCREEN      Lives With  other (see comments) (friend) 01/07/17 0945   QUESTION TO PATIENT:  Has a member of your family or a partner(now or in the past) intimidated, hurt, manipulated, or controlled you in any way?  no 01/05/17 1614    Living Arrangements  house 01/07/17 0945   QUESTION TO PATIENT: Do you feel safe going back to the place where you are living?  yes 01/05/17 1614    Provides Primary Care For  no one 01/06/17 1601   OBSERVATION: Is there reason to believe there has been maltreatment of a vulnerable adult (ie. Physical/Sexual/Emotional abuse, self neglect, lack of adequate food, shelter, medical care, or financial exploitation)?  no 01/05/17 1614    Living Arrangement Comments  Pt lives with his friend, Kwasi 01/06/17 1601   (R) MENTAL HEALTH SUICIDE RISK      ASSESSMENT OF FAMILY/SOCIAL SUPPORT     Are you depressed or being treated for depression?  No 01/05/17 1615    Who is your support system?  Other (specify) 01/06/17 1601

## 2017-01-05 NOTE — PROVIDER NOTIFICATION
3:55 PM Paged Dr. Latif in regards to lactic acid 2.2 after sepsis BPA fired. Will await return call. Respiratory rate triggered BPA. Other VS stable, afebrile. Will await return call.     3:57 PM Spoke with Dr. Latif, patient is on antibiotics. He requests I do not accept BPA again for 24 hours.

## 2017-01-05 NOTE — ED PROVIDER NOTES
History     Chief Complaint:  Shortness of Breath    HPI   Chuck Lopez is a 76 year old, homeless smoker male with a history of COPD, non-O2 dependent, who presents with increased SOB. The patient reports that the symptoms started a few weeks ago and have been increasing, particularly this last couple of days when he also noticed increased sputum.  He reports concomitant wheezing, but denies chest discomfort, vomiting, sore throat, fevers or abdominal pain. He reports having lost >50# although this is over the course of approximately 1 year.  Denies night sweats.  The patient is currently homeless, living in a basement with multiple ppl, some with with upper respiratory infection symtpoms for he last 2 weeks. He denies alcohol or drug use for the last 7 yrs.  He has had prior admissions for COPD.  He reports using his inhalers at home, without significant improvement in symptoms.  He continues to smoke, though notes only 1 cigarette today.    Allergies:  Ammonium lactate  Doxycycline  Monosodium glutamate  Penicillins     Medications:    Ventolin HFA  Beclomethasone inh  Tramadol  Tiotropium inh  nicoderm CQ  Atenolol  HCTz  MVI     Problem List:    Acute hypoxic respiratory failure due to COPD exacerbation  CAP  Tobacco use disorder  Thrombocytopenia  Chronic Hepatitis C  Chronic kidney disease stage 2  Hypertension  Diabetes Mellitus, type 2- diet controlled  Chronic back/neck pain- on chronic Tramadol  Alcohol abuse (non-active)     Past Medical History:    COPD exacerbation  CAP  Thrombocytopenia  Chronic Hepatitis C  Hypertension  Diabetes Mellitus, type 2- diet controlled  Chronic back/neck pain- on chronic Tramadol    Past Surgical History:    Appendectomy  EGD and colonoscopy     Family History:    No family history on file.    Social History:  Relationship status:   Tobacco use: former per patient   Alcohol use: former, per patient   The patient is currently homeless, living in a basement.    "    Review of Systems   Constitutional: Negative for fever and unexpected weight change.        Negative for night sweats.   HENT: Negative for sore throat.    Respiratory: Positive for shortness of breath and wheezing. Negative for chest tightness.         Positive for increased sputum.   Gastrointestinal: Negative for vomiting and abdominal pain.   All other systems reviewed and are negative.      Physical Exam     Patient Vitals for the past 24 hrs:   BP Temp Temp src Pulse Heart Rate Resp SpO2 Height Weight   01/05/17 1551 122/58 mmHg 96  F (35.6  C) Axillary - 89 - 95 % - -   01/05/17 1547 - - - - - 26 - - -   01/05/17 1544 - - - - - - 94 % - -   01/05/17 1506 110/61 mmHg 98  F (36.7  C) - - 99 (!) 32 93 % - -   01/05/17 1437 147/54 mmHg 98.5  F (36.9  C) - - 106 28 91 % - -   01/05/17 1415 148/74 mmHg - - - 99 (!) 34 - - -   01/05/17 1405 (!) 113/95 mmHg - - - 97 25 - - -   01/05/17 1400 (!) 113/95 mmHg - - - 95 (!) 40 - - -   01/05/17 1345 124/77 mmHg - - - 93 (!) 32 - - -   01/05/17 1330 135/61 mmHg - - - - - - - -   01/05/17 1315 (!) 127/109 mmHg - - - 87 (!) 32 - - -   01/05/17 1300 120/72 mmHg - - - 78 30 - - -   01/05/17 1245 119/62 mmHg - - - 73 17 99 % - -   01/05/17 1230 126/57 mmHg - - - 62 19 100 % - -   01/05/17 1215 123/63 mmHg - - - 64 17 98 % - -   01/05/17 1144 - - - - 71 17 98 % - -   01/05/17 1130 - - - - 73 21 96 % - -   01/05/17 1112 150/89 mmHg - - - - - - - -   01/05/17 1109 119/72 mmHg 98.3  F (36.8  C) Oral 76 - 16 93 % 1.651 m (5' 5\") 51.256 kg (113 lb)       Physical Exam  General:   Well-nourished   Speaking in full sentences   Cachectic appearing elderly male  Eyes:   Conjunctiva without injection or scleral icterus   PERRL  ENT:   Moist mucous membranes   Posterior oropharynx clear without erythema or exudate   TM translucent and gray bilaterally w/out air/fluid level or erythema   Nares patent   Pinnae normal  Neck:   Full ROM   No stiffness appreciated  Resp:   Minimal air " movement throughout   Accessory muscle use for respirations   No focal crackles  CV:    Normal rate, regular rhythm   S1 and S2 present   No murmur, gallop or rub  GI:   BS present   Abdomen soft without distention   Non-tender to light and deep palpation   No guarding or rebound tenderness  Skin:   Warm, dry, well perfused   No rashes or open wounds on exposed skin  MSK:   Moves all extremities   No focal deformities or swelling   No calf swelling or tenderness  Neuro:   Alert   Answers questions appropriately   Moves all extremities equally   Gait stable  Psych:   Normal affect, normal mood      Emergency Department Course     ECG (11:33:41):  Indication: SOB.   Rate 72 bpm. MD interval 150 ms. QRS duration 80 ms. QT/QTc 404/442 ms. R axis 130.   Interpretation: Normal sinus rythm. Right axis deviation. Normal ECG.  Agree with computer interpretation.  ECG 11/22/2016: Sinus tachycardia. RA enlargement. Left axis deviation. Pulmonary disease pattern. Nonspecific ST and T wave abnormality. Abnormal ECG[place]  [place] [address] by Dr. Stallings.     Imaging:  Radiology findings were communicated with the patient who voiced understanding of the findings.  Chest XR, PA and LAT, per radiology:   Cardiac silhouette and pulmonary vasculature are within normal limits. No focal airspace disease, pleural effusion or pneumothorax.    Laboratory:  Laboratory findings were communicated with the patient who voiced understanding of the findings.  Venous Blood Gas result:  pH 7.42;  HCO3 35 on 2 L O2.  CBC: , o/w WNL (WBC 6.8, HGB 15.6)  CMP: AST 46, o/w WNL (Creatinine 0.81)  1124: Troponin I: <0.015  BNP: 580  Calcitonin: <0.05  Lactic Acid: 2.2    Interventions:  1138 SoluMedrol 125 IV  1255 Duoneb sol 3 ml neb  1352: Albuterol 5 mg neb    Emergency Department Course:  Nursing notes and vitals reviewed.  I performed an exam of the patient as documented above.   IV was inserted and blood was drawn for laboratory testing,  results above.  The patient was sent for a CXR while in the emergency department, results above.   A nasal swab sample was obtained for Influenza testing, results above.    At 1210 the patient was rechecked, he reported feeling better. On exam he had increaed air movement and audible expiratory wheezing. He updated on the results of his laboratory studies.     At 1310 the patient was rechecked and was updated on the results of his laboratory and imaging studies.     I discussed the treatment plan with the patient. They expressed understanding of this plan and consented to admission. I discussed the patient with Dr. Latif, who will admit the patient to a monitored bed for further evaluation and treatment.    I personally reviewed the laboratory and imaging results with the patient and answered all related questions prior to admission.    Impression & Plan      Medical Decision Making:  Chuck Lopez is a 76 year old male with a past medical history significant for COPD, HTN and hep C, who presents to the ED for evaluation of SOB. Vital signs on presentation: SpO2 at 88% on room air. History exam and ED course as above. Symptoms are most consistent with COPD exacerbation. He notes sick contacts including 2 individuals with upper respiratory infection symptoms at his homeless shelter. Pulmonary exam reveals minimal air movement bilaterally, although following serial neb treatment, increased air movement was noted with audible expiratory wheezing. Evaluation in the ED included laboratory and imaging studies. CXR w/o acute infiltrate. Labs revealed VBG with hypercarbia with a PCO2 of 44, pH of 7.42 (suggests degree of chronicity). CBC is unremarkable, aside from thrombocytopenia which does appear to be chronic. ECG demonstrates sinus rhythm with right axis deviation. Troponin is undetectable. In light of patient improvement with bronchod treatment I have low suspicion for PE and feel further advanced imaging can  be deferred at this time. Nonetheless given patient's ongoing wheezing and diminished air movement, he will be registered to the hospital service under the care of Dr. Latif for further treatment and care. Patient updated and agreeable. All questions answered prior to admission.    Diagnosis:    ICD-10-CM    1. COPD exacerbation (H) J44.1 Influenza A/B antigen   2. SOB (shortness of breath) R06.02        Disposition:   Admit to medical bed    Scribe Disclosure:  Lindsay FLORES, am serving as a scribe at 11:18 AM on 1/5/2017 to document services personally performed by Nayan Stallings MD, based on my observations and the provider's statements to me.          Nayan Stallings MD  01/05/17 1802    Nayan Stallings MD  01/05/17 1800

## 2017-01-05 NOTE — IP AVS SNAPSHOT
MRN:[id number]                      After Visit Summary   1/5/2017    Chuck Lopez    MRN: [id number]           Thank you!     Thank you for choosing [facility] for your care. Our goal is always to provide you with excellent care. Hearing back from our patients is one way we can continue to improve our services. Please take a few minutes to complete the written survey that you may receive in the mail after you visit with us. Thank you!        Patient Information     Date Of Birth          1940        About your hospital stay     You were admitted on:  January 5, 2017 You last received care in the:  [facility] [address] [facility]    You were discharged on:  January 12, 2017        Reason for your hospital stay       You were admitted for shortness of breath due to COPD exacerbation.                  Who to Call     For medical emergencies, please call 911.  For non-urgent questions about your medical care, please call your primary care provider or clinic, [phone]          Attending Provider     Provider    Nayna Stallings MD Bhattarai, Nimesh, MD       Primary Care Provider Office Phone # Fax #    Pranay Frank Reid [phone]       [address] [address]        After Care Instructions     Activity - Up with nursing assistance           Advance Diet as Tolerated       Follow this diet upon discharge:   Snacks/Supplements Adult: Magic Cup; Between Meals  Combination Diet Regular Diet Adult; 4264-8103 Calories: Moderate Consistent CHO (4-6 CHO units/meal)            General info for SNF       Length of Stay Estimate: Short Term Care: Estimated # of Days <30  Condition at Discharge: Improving  Level of care:skilled   Rehabilitation Potential: Good  Admission H&P remains valid and up-to-date: Yes  Recent Chemotherapy: N/A  Use Nursing Home Standing Orders: N/A            Glucose monitor nursing POCT    "    Before meals and at bedtime            Mantoux instructions       Give two-step Mantoux (PPD) Per Facility Policy Yes                  Follow-up Appointments     Follow Up and recommended labs and tests       Follow up with Nursing home physician.  No follow up labs or test are needed.  Follow up with Dr. Torre of [facility] in 4 weeks.  No follow up labs or test are needed.                  Your next 10 appointments already scheduled     Jan 13, 2017  7:30 AM   Nursing Home with NAMRATA Morgan CNP   [facility] ([facility])    [address] [id number] [phone]              Additional Services     Occupational Therapy Adult Consult       Evaluate and treat as clinically indicated.    Reason:  deconditioning            Physical Therapy Adult Consult       Evaluate and treat as clinically indicated.    Reason:  deconditioning                  Further instructions from your care team       Paul [facility] phone is [phone]    Pending Results     No orders found from 1/4/2017 to 1/6/2017.            Statement of Approval     Ordered          01/12/17 0950  I have reviewed and agree with all the recommendations and orders detailed in this document.   EFFECTIVE NOW     Comments:  May discharge once insurance coverage for new inhaler is verified.   Approved and electronically signed by:  Joe Syed MD             Admission Information        Department Dept Phone    1/5/2017  [facility] [phone]      Your Vitals Were     Blood Pressure Pulse Temperature    161/81 mmHg 79 98.3  F (36.8  C) (Oral)    Respirations Height Weight    18 1.651 m (5' 5\") 47.265 kg (104 lb 3.2 oz)    BMI (Body Mass Index) Pulse Oximetry       17.34 kg/m2 92%       MyChart Information     [employer] lets you send messages to your doctor, view your test results, renew your prescriptions, schedule appointments and more. To sign up, go to " "[email or web address] . Click on \"Log in\" on the left side of the screen, which will take you to the Welcome page. Then click on \"Sign up Now\" on the right side of the page.     You will be asked to enter the access code listed below, as well as some personal information. Please follow the directions to create your username and password.     Your access code is: [email or web address]  Expires: 2017 11:41 AM     Your access code will  in 90 days. If you need help or a new code, please call your [place] clinic or [phone].        Care EveryWhere ID     This is your Care EveryWhere ID. This could be used by other organizations to access your [facility] medical records  [id number]           Review of your medicines      START taking        Dose / Directions    acetaminophen 325 MG tablet   Commonly known as:  TYLENOL   Used for:  Other chronic pain        Dose:  650 mg   Take 2 tablets (650 mg) by mouth every 4 hours as needed for mild pain or fever   Quantity:  100 tablet   Refills:  0       fluticasone-salmeterol 500-50 MCG/DOSE diskus inhaler   Commonly known as:  ADVAIR   Used for:  COPD exacerbation (H)        Dose:  1 puff   Inhale 1 puff into the lungs 2 times daily   Quantity:  3 Inhaler   Refills:  1       nicotine 21 MG/24HR 24 hr patch   Commonly known as:  NICODERM CQ   Used for:  Cigarette nicotine dependence without complication        Dose:  1 patch   Place 1 patch onto the skin daily   Quantity:  30 patch   Refills:  0       predniSONE 10 MG tablet   Commonly known as:  DELTASONE   Used for:  COPD exacerbation (H)        Start taking on:  2017   Take 4 tabs daily x 1 day, then 3 tabs daily x 4 days, then 2 tabs daily x 4 days, then 1 tab daily x 4 days, then stop.   Quantity:  25 tablet   Refills:  0         CONTINUE these medicines which have NOT CHANGED        Dose / Directions    * albuterol 108 (90 BASE) MCG/ACT Inhaler   Commonly known as:  PROAIR HFA/PROVENTIL HFA/VENTOLIN HFA   Used " for:  Chronic obstructive airway disease with asthma (H)        Dose:  2 puff   Inhale 2 puffs into the lungs every 6 hours as needed for shortness of breath / dyspnea or wheezing   Quantity:  1 Inhaler   Refills:  0       * albuterol (2.5 MG/3ML) 0.083% neb solution   Used for:  COPD exacerbation (H)        Dose:  3 mL   Take 1 vial (2.5 mg) by nebulization every 4 hours as needed for shortness of breath / dyspnea or wheezing   Quantity:  360 mL   Refills:  3       ATENOLOL PO        Dose:  25 mg   Take 25 mg by mouth daily   Refills:  0       HYDROCHLOROTHIAZIDE PO        Dose:  25 mg   Take 25 mg by mouth daily   Refills:  0       order for DME   Used for:  COPD exacerbation (H)        Equipment being ordered: Other: neb machine Treatment Diagnosis: COPD exacerbation   Quantity:  1 Device   Refills:  0       tiotropium 18 MCG capsule   Commonly known as:  SPIRIVA HANDIHALER   Used for:  Centrilobular emphysema (H)        Inhale contents of one capsule daily.   Quantity:  30 capsule   Refills:  0       V-C FORTE Caps   Used for:  Hepatitis C        Dose:  1 capsule   Take 1 capsule by mouth daily   Quantity:  90 capsule   Refills:  3       * Notice:  This list has 2 medication(s) that are the same as other medications prescribed for you. Read the directions carefully, and ask your doctor or other care provider to review them with you.      STOP taking     beclomethasone 80 MCG/ACT Inhaler   Commonly known as:  QVAR           traMADol 50 MG tablet   Commonly known as:  ULTRAM                Where to get your medicines      Some of these will need a paper prescription and others can be bought over the counter. Ask your nurse if you have questions.     You don't need a prescription for these medications    - acetaminophen 325 MG tablet  - fluticasone-salmeterol 500-50 MCG/DOSE diskus inhaler  - nicotine 21 MG/24HR 24 hr patch  - predniSONE 10 MG tablet             Protect others around you: Learn how to safely use,  store and throw away your medicines at www.disposemymeds.org.             Medication List: This is a list of all your medications and when to take them. Check marks below indicate your daily home schedule. Keep this list as a reference.      Medications           Morning Afternoon Evening Bedtime As Needed    acetaminophen 325 MG tablet   Commonly known as:  TYLENOL   Take 2 tablets (650 mg) by mouth every 4 hours as needed for mild pain or fever                                * albuterol 108 (90 BASE) MCG/ACT Inhaler   Commonly known as:  PROAIR HFA/PROVENTIL HFA/VENTOLIN HFA   Inhale 2 puffs into the lungs every 6 hours as needed for shortness of breath / dyspnea or wheezing                                * albuterol (2.5 MG/3ML) 0.083% neb solution   Take 1 vial (2.5 mg) by nebulization every 4 hours as needed for shortness of breath / dyspnea or wheezing   Last time this was given:  2.5 mg on 1/12/2017 12:30 PM                                ATENOLOL PO   Take 25 mg by mouth daily   Last time this was given:  25 mg on 1/12/2017  8:16 AM                                fluticasone-salmeterol 500-50 MCG/DOSE diskus inhaler   Commonly known as:  ADVAIR   Inhale 1 puff into the lungs 2 times daily                                HYDROCHLOROTHIAZIDE PO   Take 25 mg by mouth daily   Last time this was given:  25 mg on 1/12/2017  8:16 AM                                nicotine 21 MG/24HR 24 hr patch   Commonly known as:  NICODERM CQ   Place 1 patch onto the skin daily   Last time this was given:  1 patch on 1/12/2017  8:16 AM                                order for DME   Equipment being ordered: Other: neb machine Treatment Diagnosis: COPD exacerbation                                predniSONE 10 MG tablet   Commonly known as:  DELTASONE   Take 4 tabs daily x 1 day, then 3 tabs daily x 4 days, then 2 tabs daily x 4 days, then 1 tab daily x 4 days, then stop.   Start taking on:  1/13/2017   Last time this was given:  40  mg on 1/12/2017  8:16 AM                                tiotropium 18 MCG capsule   Commonly known as:  SPIRIVA HANDIHALER   Inhale contents of one capsule daily.                                V-C FORTE Caps   Take 1 capsule by mouth daily                                * Notice:  This list has 2 medication(s) that are the same as other medications prescribed for you. Read the directions carefully, and ask your doctor or other care provider to review them with you.

## 2017-01-05 NOTE — IP AVS SNAPSHOT
` ` Patient Information     Patient Name Sex Chuck Armstrong ([id number]) Male 1940       Room Bed    29 6629-01      Patient Demographics     Address Phone    [address]  [facility] [address] [phone] (Home)  none (Work)  [phone] (Mobile)      Patient Ethnicity & Race     Ethnic Group Patient Race    American White      Emergency Contact(s)     Name Relation Home Work Mobile    Tatyana Kim Roommate [phone]    No name specified Other   [phone]      Documents on File        Status Date Received Description       Documents for the Patient    Face Sheet Received 09     Privacy Notice - [place] Received 09     Insurance Card Received 09     External Medication Information Consent Accepted 09     Face Sheet Received 09     ZOILA Face Sheet Received 09     Privacy Notice - [place]  09     Other  09 medicare questions    Insurance Card Received 09 Medicare A & B    Consent for Services - Hospital/Clinic Received 11 NEW    External Medication Information Consent Accepted 11     External Medication Information Consent Accepted 04/10/12     Privacy Notice - [place] Received 04/10/12     Consent for Services - Hospital/Clinic Received 04/10/12     Consent for EHR Access  13 Copied from existing Consent for services - C/HOD collected on 04/10/2012    [facility] Specified Other       Consent for Services - Hospital/Clinic Received 13     External Medication Information Consent Patient Refused 13     Consent for Services - Hospital/Clinic Received 14     Consent for Services - Hospital/Clinic Received 09/16/15     Consent for Services/Privacy Notice - Hospital/Clinic Received 16     Insurance Card Received 16 Medicare    Patient ID Received 16 [place]    [place] Received 10/06/16 [place]       Documents for the Encounter    CMS IM for Patient  Signature Received 01/07/17 1MM    CMS IM for Patient Signature Received 01/12/17 2MM      Admission Information     Attending Provider Admitting Provider Admission Type Admission Date/Time    Jean Paul Latif MD Bhattarai, Nimesh, MD Emergency 01/05/17  1102    Discharge Date Hospital Service Auth/Cert Status Service Area     [facility] SERVICES    Unit Room/Bed Admission Status     [facility] SPEC UNIT 6629/6629-01 Admission (Confirmed)            Admission     Complaint    Obstructive chronic bronchitis with exacerbation (H)      Hospital Account     Name Acct ID Class Status Primary Coverage    Chuck Lopez [id number] Inpatient Open MEDICARE - MEDICARE            Guarantor Account (for Hospital Account #[id number])     Name Relation to Pt Service Area Active? Acct Type    Chuck Lopez Self FCS Yes Personal/Family    Address Phone          [address]  [place] [address] [phone](H)  none(O)              Coverage Information (for Hospital Account #[id number])     1. MEDICARE/MEDICARE     F/O Payor/Plan Precert #    MEDICARE/MEDICARE     Subscriber Subscriber #    Chuck Lopez [id number]    Address Phone    [employer]  [address]  [place] [address] [phone]          2. [place]     F/[place] Payor/Plan Precert #    [place]     Subscriber Subscriber #    Chuck Lopez [id number]    Address Phone    [address] [address]  [place] [address] [phone]

## 2017-01-05 NOTE — PROGRESS NOTES
"I was asked to talk with this pt's friend Kwasi who this pt lives with.  I asked Kwasi to come into this pt's room and have the pt give the okay for me to talk to Kwasi.  This pt agreed that Kwasi can talk to me in the hallway.  Kwasi is this pt's friend of 20 years and while this pt has family he doesn't have contact with them. He is concerned about this pt returning to his house as there are \"10 steps\" for this pt to climb to come upstairs. Kwasi said this pt is losing weight and has a hard time breathing and he doesn't know what to do.  I gave him the Sr. Housing Directory and I encouraged Kwasi to start looking for LTC arrangements. I explained to Kwasi that there will be a SW and CC on the unit this pt goes to that will also be able to assist this pt with discharge needs.  Kwasi was grateful for the information. I updated the bedside RN of the above.  "

## 2017-01-05 NOTE — IP AVS SNAPSHOT
` `           [place] [address] [facility]: [phone]                 INTERAGENCY TRANSFER FORM - NOTES (H&P, Discharge Summary, Consults, Procedures, Therapies)   2017                    Hospital Admission Date: 2017  CHUCK LOPEZ   : 1940  Sex: Male        Patient PCP Information     Provider PCP Type    Pranay Reid MD General      History & Physicals     No notes of this type exist for this encounter.         Discharge Summaries      Discharge Summaries by Joe Syed MD at 2017  9:50 AM     Author:  Joe Syed MD Service:  Hospitalist Author Type:  Physician    Filed:  2017  9:57 AM Note Time:  2017  9:50 AM Status:  Signed    :  Joe Syed MD (Physician)           [facility]    Discharge Summary  Hospitalist    Date of Admission:  2017  Date of Discharge:  2017  Discharging Provider: Joe Syed  Date of Service (when I saw the patient): 2017    Discharge Diagnoses  Acute hypoxic and hypercapneic respiratory failure  Acute COPD exacerbation  Nicotine dependence  HTN  DM II  Severe malnutrition    History of Present Illness  Chuck Lopez is an 76 year old male who presented with shortness of breath due to COPD exacerbation.    Hospital Course  Chuck Lopez was admitted on 2017.  The following problems were addressed during his hospitalization:    Acute hypoxic and hypercapneic respiratory failure due to COPD exacerbation likely secondary to URI:  Admission CXR clear, no leukocytosis, negative procalcitonin and flu swab.  Recent exposure to sick contacts.  Admission ABG with mild CO2 retention.  Initiated on azithromycin on solumedrol at admission with gradual improvement.  Transiently on Bipap -.  Completed course of azithro .  Pulmonology was consulted and following, recommend triple therapy with Advair and Spiriva, continue prednisone taper and follow up in Pulmonology  clinic in 4 weeks.  He weaned from oxygen prior to discharge.    Nicotine dependence:  Continue nicotine patch.  Has been counseled on importance of tobacco cessation.    HTN:  Continue PTA hctz and atenolol.    Type 2 DM: Diet controlled, A1C 5.3%.  Required very minimal sliding scale insulin while on prednisone and do not feel this is needed going forward.    Severe malnutrition:  Marked subcutaneous fat loss and diffuse muscle wasting.  Reports losing 50 pounds over the past year, BMI 17.    Joe Syed    Significant Results and Procedures  See imaging below    Pending Results  These results will be followed up by: N/A  Unresulted Labs Ordered in the Past 30 Days of this Admission     No orders found from 11/7/2016 to 1/6/2017.          Code Status  Full Code       Primary Care Physician  Pranay Reid    Constitutional: Well developed, cachectic appearing male in no acute distress  Respiratory: poor air movement bilaterally, mild end-expiratory wheezes diffusely, no crackles, no tachypnea  Cardiovascular: Regular rate and rhythm, S1/S2 without murmur, rubs or gallops  GI: Abdomen soft, non-tender, non-distended, normal bowel sounds  Lymph/Hematologic: No edema  Musculoskeletal: Extremities warm and well perfused  Neurologic: Cranial nerves grossly intact, gross motor movements intact, alert and appropriate  Psychiatric: normal affect    Discharge Disposition  Discharged to nursing home  Condition at discharge: Stable    Consultations This Hospital Stay  CARE COORDINATOR IP CONSULT  RESPIRATORY CARE IP CONSULT  SMOKING CESSATION PROGRAM IP CONSULT  PHYSICAL THERAPY ADULT IP CONSULT  OCCUPATIONAL THERAPY ADULT IP CONSULT  PULMONARY IP CONSULT  OCCUPATIONAL THERAPY ADULT IP CONSULT  PHYSICAL THERAPY ADULT IP CONSULT    Time Spent on This Encounter  Joe FLORES, personally saw the patient today and spent greater than 30 minutes discharging this patient.    Discharge Orders    General info for SNF    Length of Stay Estimate: Short Term Care: Estimated # of Days <30  Condition at Discharge: Improving  Level of care:skilled   Rehabilitation Potential: Good  Admission H&P remains valid and up-to-date: Yes  Recent Chemotherapy: N/A  Use Nursing Home Standing Orders: N/A     Mantoux instructions   Give two-step Mantoux (PPD) Per Facility Policy Yes     Reason for your hospital stay   You were admitted for shortness of breath due to COPD exacerbation.     Glucose monitor nursing POCT   Before meals and at bedtime     Follow Up and recommended labs and tests   Follow up with Nursing home physician.  No follow up labs or test are needed.  Follow up with Dr. Torre of [facility] in 4 weeks.  No follow up labs or test are needed.     Activity - Up with nursing assistance     Full Code     Occupational Therapy Adult Consult   Evaluate and treat as clinically indicated.    Reason:  deconditioning     Physical Therapy Adult Consult   Evaluate and treat as clinically indicated.    Reason:  deconditioning     Advance Diet as Tolerated   Follow this diet upon discharge:   Snacks/Supplements Adult: Magic Cup; Between Meals  Combination Diet Regular Diet Adult; 2064-1118 Calories: Moderate Consistent CHO (4-6 CHO units/meal)       Discharge Medications  Current Discharge Medication List      START taking these medications    Details   acetaminophen (TYLENOL) 325 MG tablet Take 2 tablets (650 mg) by mouth every 4 hours as needed for mild pain or fever  Qty: 100 tablet, Refills: 0    Associated Diagnoses: Other chronic pain      nicotine (NICODERM CQ) 21 MG/24HR 24 hr patch Place 1 patch onto the skin daily  Qty: 30 patch    Associated Diagnoses: Cigarette nicotine dependence without complication      predniSONE (DELTASONE) 10 MG tablet Take 4 tabs daily x 1 day, then 3 tabs daily x 4 days, then 2 tabs daily x 4 days, then 1 tab daily x 4 days, then stop.  Qty: 25 tablet, Refills: 0    Associated Diagnoses: COPD  exacerbation (H)      fluticasone-salmeterol (ADVAIR) 500-50 MCG/DOSE diskus inhaler Inhale 1 puff into the lungs 2 times daily  Qty: 3 Inhaler, Refills: 1    Associated Diagnoses: COPD exacerbation (H)         CONTINUE these medications which have CHANGED    Details   traMADol (ULTRAM) 50 MG tablet Take one or two tabs three times daily maximum  per day as needed for pain  Qty: 30 tablet, Refills: 0    Associated Diagnoses: Other chronic pain         CONTINUE these medications which have NOT CHANGED    Details   albuterol (PROAIR HFA/PROVENTIL HFA/VENTOLIN HFA) 108 (90 BASE) MCG/ACT Inhaler Inhale 2 puffs into the lungs every 6 hours as needed for shortness of breath / dyspnea or wheezing  Qty: 1 Inhaler, Refills: 0    Associated Diagnoses: Chronic obstructive airway disease with asthma (H)      albuterol (2.5 MG/3ML) 0.083% neb solution Take 1 vial (2.5 mg) by nebulization every 4 hours as needed for shortness of breath / dyspnea or wheezing  Qty: 360 mL, Refills: 3    Comments: Adding refills to prior script  Associated Diagnoses: COPD exacerbation (H)      tiotropium (SPIRIVA HANDIHALER) 18 MCG inhalation capsule Inhale contents of one capsule daily.  Qty: 30 capsule, Refills: 0    Associated Diagnoses: Centrilobular emphysema (H)      ATENOLOL PO Take 25 mg by mouth daily      HYDROCHLOROTHIAZIDE PO Take 25 mg by mouth daily      Multiple Vitamins-Minerals (V-C FORTE) CAPS Take 1 capsule by mouth daily  Qty: 90 capsule, Refills: 3    Associated Diagnoses: Hepatitis C      order for DME Equipment being ordered: Other: neb machine  Treatment Diagnosis: COPD exacerbation  Qty: 1 Device, Refills: 0    Associated Diagnoses: COPD exacerbation (H)         STOP taking these medications       beclomethasone (QVAR) 80 MCG/ACT Inhaler Comments:   Reason for Stopping:             Allergies  Allergies   Allergen Reactions     Ammonium Lactate Other (See Comments)     Burning to skin     Doxycycline Visual Disturbance      "Monosodium Glutamate Other (See Comments)     Pt states it feels like his \"head swells, visual disturbances, and he can't think striaght\"     Penicillins Rash     Data  Most Recent 3 CBC's:  Recent Labs   Lab Test  01/05/17   1124  11/25/16   0720  11/22/16   0505   WBC  6.8  11.1*  12.2*   HGB  15.6  14.5  14.2   MCV  93  95  94   PLT  120*  117*  54*      Most Recent 3 BMP's:  Recent Labs   Lab Test  01/05/17   1124  11/25/16   0720  11/23/16   0759  11/22/16   2150   11/22/16   0505  11/22/16   0125   NA  136   --    --    --    --   137  135   POTASSIUM  3.4   --   4.7  3.7   < >  3.0*  3.1*   CHLORIDE  98   --    --    --    --   99  95   CO2  28   --    --    --    --   27  30   BUN  14   --    --    --    --   11  12   CR  0.81  0.80   --    --    --   0.70  0.72   ANIONGAP  10   --    --    --    --   11  10   MATTHEW  9.0   --    --    --    --   7.7*  8.7   GLC  151*   --    --    --    --   161*  104*    < > = values in this interval not displayed.     Most Recent 2 LFT's:  Recent Labs   Lab Test  01/05/17   1124  08/16/16   0932   AST  46*  71*   ALT  38  88*   ALKPHOS  97  99   BILITOTAL  0.8  0.7     Most Recent 3 Troponin's:  Recent Labs   Lab Test  01/05/17   1124  11/22/16   1820  11/22/16   1405   TROPI  <0.015  The 99th percentile for upper reference range is 0.045 ug/L.  Troponin values in   the range of 0.045 - 0.120 ug/L may be associated with risks of adverse   clinical events.    0.050*  0.050*     Most Recent TSH, T4 and A1c Labs:  Recent Labs   Lab Test  01/06/17   0701   01/14/13   1127   TSH   --    --   1.26   A1C  5.3   < >   --     < > = values in this interval not displayed.     Results for orders placed or performed during the hospital encounter of 01/05/17   XR Chest 2 Views    Narrative    XR CHEST 2 VW 1/5/2017 11:56 AM    COMPARISON: 11/22/2016    HISTORY: Shortness of breath      Impression    IMPRESSION: Cardiac silhouette and pulmonary vasculature are within  normal limits. No " focal airspace disease, pleural effusion or  pneumothorax.    ECTOR FLOWERS   XR Chest 2 Views    Narrative    CHEST TWO VIEW   1/7/2017 8:12 PM     HISTORY: Worsening of shortness of breath.    COMPARISON: 1/5/2017      Impression    IMPRESSION: Marked hyperinflated lung zones are present consistent  with chronic obstructive pulmonary disease. There is no evidence for  any acute process. No infiltrates are present. Heart size is small due  to the hyperinflated lungs. Pulmonary vasculature appears normal. No  pleural effusions.    KATLYN LOPEZ MD                                   Consult Notes      Consults by Awais Torre MD at 1/9/2017  4:18 PM     Author:  Awais Torre MD Service:  Pulmonology Author Type:  Physician    Filed:  1/9/2017  4:24 PM Note Time:  1/9/2017  4:18 PM Status:  Signed    :  Awais Torre MD (Physician)       Consult Orders:    1. Pulmonology IP Consult: Patient to be seen: Routine - within 24 hours; sever COPD; Consultant may enter orders: Yes [[id number]] ordered by Oneyda Mcdonald MD at 01/09/17 1356                  Pulmonary Medicine Consultation      Date of Admission: 1/5/2017  Primary Attending:  Jean Paul Latif MD  Consulting Physician: Awais Torre MD    History:    Mr. Chuck Lopez is a 76-year-old male with a known history of COPD, non-oxygen dependent, hypertension, chronic hepatitis C, chronic thrombocytopenia, diabetes mellitus type 2 diet controlled, hypertension, ongoing tobacco use who presents to the emergency room with progressive shortness of breath. Continues to smoke, somewhat interested in quitting. Poor exercise tolerance, able to walk to bathroom. Lives in a basement and steps are challenging. Compliant with qvar and spiriva along with rescue inhaler or nebs at least qid. Thinks he has lost ~50 pounds in the last 5 years. No hemoptysis.      Review of Systems - A 10-system ROS is negative except for items mentioned above and  in HPI.       Prior medical history:  Past Medical History   Diagnosis Date     COPD (chronic obstructive pulmonary disease) (H)      Diabetes (H)      Hypertension      Hepatitis C        Past Surgical History   Procedure Laterality Date     Esophagoscopy, gastroscopy, duodenoscopy (egd), combined  4/16/2014     Procedure: ESOPHAGOSCOPY, GASTROSCOPY, DUODENOSCOPY (EGD) & COLONOSCOPY with polypectomy by hot snare;  Surgeon: Dlilon Vázquez MD;  Location:  GI     Appendectomy         Patient Active Problem List   Diagnosis     Pulmonary emphysema (H)     Elevated liver function tests     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)     CARDIOVASCULAR SCREENING; LDL GOAL LESS THAN 100     CKD (chronic kidney disease) stage 2, GFR 60-89 ml/min     Advanced directives, counseling/discussion     COPD (chronic obstructive pulmonary disease) (H)     Hypertension goal BP (blood pressure) < 140/90     Chronic hepatitis C (H)     Other chronic pain     Chronic obstructive airway disease with asthma (H)     Acute hypoxemic respiratory failure (H)     Obstructive chronic bronchitis with exacerbation (H)       Social History     Social History     Social History     Marital Status:      Spouse Name: N/A     Number of Children: N/A     Years of Education: N/A     Occupational History     Not on file.     Social History Main Topics     Smoking status: Current Every Day Smoker -- 0.50 packs/day     Types: Cigarettes     Smokeless tobacco: Never Used      Comment: 10 cigs a day     Alcohol Use: No     Drug Use: No      Comment: quit marijuanna 2010     Sexual Activity: No     Other Topics Concern     Parent/Sibling W/ Cabg, Mi Or Angioplasty Before 65f 55m? No     Social History Narrative         Family History  No family history on file.      Medications  No current outpatient prescriptions on file.     Current Facility-Administered Medications Ordered in Epic   Medication Dose Route Frequency Last Rate Last Dose      methylPREDNISolone sodium succinate (solu-MEDROL) injection 62.5 mg  62.5 mg Intravenous Q8H   62.5 mg at 01/09/17 1310     naloxone (NARCAN) injection 0.1-0.4 mg  0.1-0.4 mg Intravenous Q2 Min PRN         albuterol neb solution 2.5 mg  3 mL Nebulization Q2H PRN   2.5 mg at 01/09/17 1253     ipratropium - albuterol 0.5 mg/2.5 mg/3 mL (DUONEB) neb solution 3 mL  3 mL Nebulization Q4H   3 mL at 01/09/17 1512     acetaminophen (TYLENOL) tablet 650 mg  650 mg Oral Q4H PRN         senna-docusate (SENOKOT-S;PERICOLACE) 8.6-50 MG per tablet 1-2 tablet  1-2 tablet Oral BID PRN         ondansetron (ZOFRAN-ODT) ODT tab 4 mg  4 mg Oral Q6H PRN        Or     ondansetron (ZOFRAN) injection 4 mg  4 mg Intravenous Q6H PRN         glucose 40 % gel 15-30 g  15-30 g Oral Q15 Min PRN        Or     dextrose 50 % injection 25-50 mL  25-50 mL Intravenous Q15 Min PRN        Or     glucagon injection 1 mg  1 mg Subcutaneous Q15 Min PRN         insulin aspart (NovoLOG) inj (RAPID ACTING)  1-3 Units Subcutaneous TID AC   2 Units at 01/09/17 1221     insulin aspart (NovoLOG) inj (RAPID ACTING)  1-3 Units Subcutaneous At Bedtime   1 Units at 01/07/17 2156     atenolol (TENORMIN) tablet 25 mg  25 mg Oral Daily   25 mg at 01/09/17 0902     fluticasone (FLOVENT DISKUS) 250 mcg/puff inhaler 1 puff  1 puff Inhalation BID   1 puff at 01/09/17 0906     hydrochlorothiazide (HYDRODIURIL) tablet 25 mg  25 mg Oral Daily   25 mg at 01/09/17 0902     nicotine Patch in Place   Transdermal Q8H         nicotine patch REMOVAL   Transdermal Daily         nicotine (NICODERM CQ) 21 MG/24HR 24 hr patch 1 patch  1 patch Transdermal Daily   1 patch at 01/09/17 0907     traMADol (ULTRAM) tablet 50 mg  50 mg Oral Q4H PRN   50 mg at 01/09/17 1315     sodium chloride (OCEAN) 0.65 % nasal spray 1-2 spray  1-2 spray Nasal Q1H PRN   2 spray at 01/05/17 9691     No current Epic-ordered outpatient prescriptions on file.       Allergies   Allergen Reactions     Ammonium  "Lactate Other (See Comments)     Burning to skin     Doxycycline Visual Disturbance     Monosodium Glutamate Other (See Comments)     Pt states it feels like his \"head swells, visual disturbances, and he can't think striaght\"     Penicillins Rash         Physical Examination:   Filed Vitals:    17 1253 17 1315 17 1512 17 1556   BP:    163/81   Pulse:       Temp:    98.5  F (36.9  C)   TempSrc:    Oral   Resp:  20  18   Height:       Weight:       SpO2: 96%  96% 94%     Body mass index is 16.95 kg/(m^2).  Temp (24hrs), Av.1  F (36.7  C), Min:97.7  F (36.5  C), Max:98.5  F (36.9  C)        Constitutional:  Appears comfortable. Frail  HENT:  mucous membranes moist.  Eyes: PERRLA, no icterus, no pallor.   Neck: No lymphadenopathy  Cardiovascular: Regular rate and rythym,  Respiratory/Chest: No use of accessory muscle, dec lungs sounds, minimal wheezing.  Gastrointestinal: Abdomen was soft  Musculoskeletal: No cyanosis  Neurological: No focal motor or sensory deficits.  Skin: No skin rash, hives, petechiae, or breakdown.      CMP  Recent Labs  Lab 17  1124      POTASSIUM 3.4   CHLORIDE 98   CO2 28   ANIONGAP 10   *   BUN 14   CR 0.81   GFRESTIMATED >90Non  GFR Calc   GFRESTBLACK >90African American GFR Calc   MATTHEW 9.0   PROTTOTAL 7.8   ALBUMIN 3.4   BILITOTAL 0.8   ALKPHOS 97   AST 46*   ALT 38     CBC  Recent Labs  Lab 17  1124   WBC 6.8   RBC 4.68   HGB 15.6   HCT 43.6   MCV 93   MCH 33.3*   MCHC 35.8   RDW 13.6   *     INRNo lab results found in last 7 days.  Arterial Blood Gas  Recent Labs  Lab 17  1013 17  1137   PH 7.44  --    PCO2 51*  --    PO2 119*  --    HCO3 34*  --    O2PER  --  2L     No results for input(s): CULT in the last 168 hours.    Diagnostic Studies:  Chest Radiology:     CT Chest 16  1. A small region of minimal patchy and linear opacities in the  lingula could represent atelectasis or pneumonia.  2. No " other evidence of active pulmonary disease.  3. Moderate to severe emphysematous changes in the lungs.  4. No visualized pulmonary embolus.    cxr 1/7/17- copd, hyperinflation, no acute infiltrate         Assessment/Plan:     Mr. Chuck Lopez is a 76-year-old male with a known history of COPD, non-oxygen dependent (FEV1 ~40% in 2011) hypertension, chronic hepatitis C, chronic thrombocytopenia, diabetes mellitus type 2 diet controlled, hypertension, ongoing tobacco use who presents to the emergency room with progressive shortness of breath.     Continues to smoke, somewhat interested in quitting. Poor exercise tolerance, able to walk to bathroom. Lives in a basement and steps are challenging. Thinks he has lost ~50 pounds in the last 5 years. No hemoptysis. Improving and may be near his baseline.      Continue bronchodilators, suggest triple inhaler therapy with spiriva and Breo/Advair/Symbicort/Dulera on discharge    Wean o2 as tolerated, may need o2 on discharge    Suggest 40mg/day of prednisone with taper by 10mg every 4 days    Smoking cessation    May benefit from TCU on d/c?    Needs outpt pulmonary followup in 4 weeks    Continue abx    Will follow    Awais Torre MD  Pulmonary, Critical Care and Sleep Medicine  [facility]   Pager: [phone]  Office:[phone]                  Progress Notes - Physician (Notes from 01/09/17 through 01/12/17)      Progress Notes by Mary Grace Montoya LSW at 1/12/2017 10:23 AM     Author:  Mary Grace Montoya LSW Service:  Social Work Author Type:      Filed:  1/12/2017 10:24 AM Note Time:  1/12/2017 10:23 AM Status:  Signed    :  Mary Grace Montoya LSW ()           D: [facility] following for DC planning.   I: Pt will DC today to [facility] via  WC at 1430. Orders faxed and facility updated. Pt updated and in agreement.   P: DC today.     Mary Grace  HUMAIRA Montoya  [phone]         Progress Notes by Ney Cano RT at 1/11/2017  5:19 PM     Author:  Ney Cano RT Service:  Respiratory Therapy Author Type:  Respiratory Therapist    Filed:  1/11/2017  5:21 PM Note Time:  1/11/2017  5:19 PM Status:  Signed    :  Ney Cano RT (Respiratory Therapist)           Patient is on 2L NC and sating 94%. BBS diminished. Scheduled neb tx given and tolerated well. Will continue to follow.  1/11/2017  Ney Cano         Progress Notes by Leroy Rivero at 1/11/2017  2:12 PM     Author:  Leroy Rivero Service:  Social Work Author Type:      Filed:  1/11/2017  2:21 PM Note Time:  1/11/2017  2:12 PM Status:  Signed    :  Leroy Rivero ()           PAS-RR    D: Per DHS regulation, SW completed and submitted PAS-RR to [place] [employer] Direct Connect via the [facility].  PAS-RR confirmation # is : [id number]    I: SW spoke with patient and they are aware a PAS-RR has been submitted.  SW reviewed with patient that they may be contacted for a follow up appointment within 10 days of hospital discharge if their SNF stay is < 30 days.  Contact information for [facility] was also provided.    A: patient verbalized understanding.    P: Further questions may be directed to [facility] at #[phone], option #4 for PAS-RR staff.  Patient has been accepted at [facility] and at [facility]. Offered facility choice to patient and he prefers [facility]. Explained at this facility he must agree NOT to smoke. Patient states he is not going to smoke again.   Patient requests transport. He was informed his MA would lkely cover this charge, though we can not guarantee this.  Accepted the bed at [facility] and cancelled the bed at [facility].  Called [facility] and spoke with Kandy, to update her on the facility choice and the anticipated D/C date.       Progress Notes  by Joe Syed MD at 1/11/2017 11:40 AM     Author:  Joe Syed MD Service:  Hospitalist Author Type:  Physician    Filed:  1/11/2017 11:41 AM Note Time:  1/11/2017 11:40 AM Status:  Signed    :  Joe Syed MD (Physician)           [facility]    Hospitalist Progress Note    Date of Service (when I saw the patient): 01/11/2017    Assessment and Plan  Chuck Lopez is a 76 year old male who was admitted on 1/5/2017.  Past history of COPD, non-oxygen dependent, hypertension, chronic hepatitis C, chronic thrombocytopenia, diabetes mellitus type 2 diet controlled, ongoing tobacco use who presents to the emergency room with progressive shortness of breath.    Acute hypoxic and hypercapneic respiratory failure: Due to COPD exacerbation with suspect URI.   Admission CXR clear, no leukocytosis, negative procalcitonin and flu swab.  Admission ABG with mild CO2 retention.  Initiated on azithromycin on solumedrol at admission.  Transiently on Bipap 1/8-1/9.  Completed course of azithro 1/9.  - continue prednisone 40 mg daily, taper by 10 mg every 4 days  - continue scheduled Duonebs  - resume Spiriva at discharge  - will discuss with discharge pharmacy insurance coverage options for Dulera/Advair/Breo/Symbicort  - follow up with Pulmonology in 4 weeks    Nicotine dependence:  Continue nicotine patch.  Has been counseled on importance of tobacco cessation.    HTN:  Continue PTA hctz and atenolol.    Type 2 DM: Diet controlled, A1C 5.3%.  Continue SSI.    Severe malnutrition:  Marked subcutaneous fat loss and diffuse muscle wasting.  Reports losing 50 pounds over the past year, BMI 17.    DVT Prophylaxis: Pneumatic Compression Devices  Code Status: Full Code    Disposition: Expected discharge to TCU tomorrow.    Joe Syed    Interval History  Feels dyspnea is again better today.  Mild non-productive cough, unchanged.  No fever/chills, no diarrhea or other complaints.    -Data reviewed  today: I reviewed all new labs and imaging results over the last 24 hours. I personally reviewed no images or EKG's today.    Physical Exam  Temp: 98  F (36.7  C) Temp src: Oral BP: 138/75 mmHg   Heart Rate: 87 Resp: 18 SpO2: 99 % O2 Device: Nasal cannula Oxygen Delivery: 2 LPM  Filed Vitals:    01/08/17 0552 01/09/17 0628 01/11/17 0546   Weight: 47.9 kg (105 lb 9.6 oz) 46.2 kg (101 lb 13.6 oz) 49.1 kg (108 lb 3.9 oz)     Vital Signs with Ranges  Temp:  [98  F (36.7  C)-98.4  F (36.9  C)] 98  F (36.7  C)  Heart Rate:  [87-96] 87  Resp:  [18] 18  BP: (138-158)/(75-93) 138/75 mmHg  SpO2:  [93 %-99 %] 99 %  I/O last 3 completed shifts:  In: 1080 [P.O.:1080]  Out: 1450 [Urine:1450]    Constitutional: Well developed, cachectic male in no acute distress  Respiratory: Improved aeration today, no wheezing, no crackles or tachypnea  Cardiovascular: Regular rate and rhythm, S1/S2 without murmur, rubs or gallops  GI: Abdomen soft, normal bowel sounds  Skin/Integumen:   Other:  alert and appropriate, cranial nerves grossly intact      Medications       umeclidinium  62.5 mcg Inhalation Daily     predniSONE  40 mg Oral Daily     albuterol  2.5 mg Nebulization Q4H     insulin aspart  1-3 Units Subcutaneous TID AC     insulin aspart  1-3 Units Subcutaneous At Bedtime     atenolol (TENORMIN) tablet 25 mg  25 mg Oral Daily     fluticasone  1 puff Inhalation BID     hydrochlorothiazide (HYDRODIURIL) tablet 25 mg  25 mg Oral Daily     nicotine   Transdermal Q8H     nicotine   Transdermal Daily     nicotine  1 patch Transdermal Daily       Data    Recent Labs  Lab 01/05/17  1124   WBC 6.8   HGB 15.6   MCV 93   *      POTASSIUM 3.4   CHLORIDE 98   CO2 28   BUN 14   CR 0.81   ANIONGAP 10   MATTHEW 9.0   *   ALBUMIN 3.4   PROTTOTAL 7.8   BILITOTAL 0.8   ALKPHOS 97   ALT 38   AST 46*   TROPI <0.015The 99th percentile for upper reference range is 0.045 ug/L.  Troponin values in the range of 0.045 - 0.120 ug/L may be  associated with risks of adverse clinical events.       No results found for this or any previous visit (from the past 24 hour(s)).             Progress Notes by Sarah Dixon RT at 1/10/2017 11:14 PM     Author:  Sarah Dixon RT Service:  (none) Author Type:  Respiratory Therapist    Filed:  1/10/2017 11:16 PM Note Time:  1/10/2017 11:14 PM Status:  Signed    :  Sarah Dixon RT (Respiratory Therapist)           Pt continues on 2 liters NC. Sat. 96% BS dim t/o. Pt refused Bipap over noc.  Will continue to monitor and treat.       Progress Notes by Joe Syed MD at 1/10/2017  2:09 PM     Author:  Joe Syed MD Service:  Hospitalist Author Type:  Physician    Filed:  1/10/2017  4:21 PM Note Time:  1/10/2017  2:09 PM Status:  Addendum    :  Joe Syed MD (Physician)      Related Notes: Original Note by Joe Syed MD (Physician) filed at 1/10/2017  2:55 PM         [facility]    Hospitalist Progress Note    Date of Service (when I saw the patient): 01/10/2017    Assessment and Plan  Chuck Lopez is a 76 year old male who was admitted on 1/5/2017.  Past history of COPD, non-oxygen dependent, hypertension, chronic hepatitis C, chronic thrombocytopenia, diabetes mellitus type 2 diet controlled, ongoing tobacco use who presents to the emergency room with progressive shortness of breath.    Acute hypoxic and hypercapneic respiratory failure: Due to COPD exacerbation with suspect URI.   Admission CXR clear, no leukocytosis, negative procalcitonin and flu swab.  Admission ABG with mild CO2 retention.  Initiated on azithromycin on solumedrol at admission.  Transiently on Bipap 1/8-1/9.  Completed course of azithro 1/9.  - stop solumedrol, start prednisone 40 mg daily, taper by 10 mg every 4 days  - continue scheduled Duonebs  - resume Spiriva at discharge  - will discuss with discharge pharmacy insurance coverage options for Dulera/Advair/Breo/Symbicort  - follow up with  Pulmonology in 4 weeks    Nicotine dependence:  Continue nicotine patch.  Has been counseled on importance of tobacco cessation.    HTN:  Continue PTA hctz and atenolol.    Type 2 DM: Diet controlled, A1C 5.3%.  Continue SSI.    Severe malnutrition:  Marked subcutaneous fat loss and diffuse muscle wasting.  Reports losing 50 pounds over the past year, BMI 17.    DVT Prophylaxis: Pneumatic Compression Devices  Code Status: Full Code    Disposition: Expected discharge in 2 days once stable on oral prednisone.  Recommendation is TCU.    Joe Yahaira    Interval History  Reports this is the first day where he is not struggling to breathe.  Has a mild non-productive cough.  No fever/chills, diarrhea.  Poor appetite.     -Data reviewed today: I reviewed all new labs and imaging results over the last 24 hours. I personally reviewed no images or EKG's today.    Physical Exam  Temp: 98  F (36.7  C) Temp src: Oral BP: 141/87 mmHg   Heart Rate: 87 Resp: 18 SpO2: 98 % O2 Device: Nasal cannula Oxygen Delivery: 2 LPM  Filed Vitals:    01/07/17 0509 01/08/17 0552 01/09/17 0628   Weight: 48.2 kg (106 lb 4.2 oz) 47.9 kg (105 lb 9.6 oz) 46.2 kg (101 lb 13.6 oz)     Vital Signs with Ranges  Temp:  [98  F (36.7  C)-98.7  F (37.1  C)] 98  F (36.7  C)  Heart Rate:  [86-88] 87  Resp:  [15-20] 18  BP: (141-163)/(81-87) 141/87 mmHg  SpO2:  [94 %-98 %] 98 %  I/O last 3 completed shifts:  In: 200 [P.O.:200]  Out: 1785 [Urine:1785]    Constitutional: Well developed, cachectic male in no acute distress  Respiratory: Poor air movement, scattered end-expiratory wheezing, no crackles  Cardiovascular: Regular rate and rhythm, S1/S2 without murmur, rubs or gallops  GI: Abdomen soft, non-tender, non-distended, normal bowel sounds  Skin/Integumen: scattered bruising over upper extremities  Other:  alert and appropriate, cranial nerves grossly intact      Medications       tiotropium  18 mcg Inhalation Daily     predniSONE  40 mg Oral Daily      ipratropium - albuterol 0.5 mg/2.5 mg/3 mL  3 mL Nebulization Q4H     insulin aspart  1-3 Units Subcutaneous TID AC     insulin aspart  1-3 Units Subcutaneous At Bedtime     atenolol (TENORMIN) tablet 25 mg  25 mg Oral Daily     fluticasone  1 puff Inhalation BID     hydrochlorothiazide (HYDRODIURIL) tablet 25 mg  25 mg Oral Daily     nicotine   Transdermal Q8H     nicotine   Transdermal Daily     nicotine  1 patch Transdermal Daily       Data    Recent Labs  Lab 01/05/17  1124   WBC 6.8   HGB 15.6   MCV 93   *      POTASSIUM 3.4   CHLORIDE 98   CO2 28   BUN 14   CR 0.81   ANIONGAP 10   MATTHEW 9.0   *   ALBUMIN 3.4   PROTTOTAL 7.8   BILITOTAL 0.8   ALKPHOS 97   ALT 38   AST 46*   TROPI <0.015The 99th percentile for upper reference range is 0.045 ug/L.  Troponin values in the range of 0.045 - 0.120 ug/L may be associated with risks of adverse clinical events.       No results found for this or any previous visit (from the past 24 hour(s)).             Progress Notes by Leroy Rivero at 1/10/2017  4:06 PM     Author:  Leroy Rivero Service:  Social Work Author Type:      Filed:  1/10/2017  4:09 PM Note Time:  1/10/2017  4:06 PM Status:  Signed    :  Leroy Rivero ()           SW  D) TCU stay is recommended by therapies. Patient's anticipated D/C is in 1-2 days.  I) Met with patient and reviewed Medicare guidelines for coverage of a TCU. He agrees to referrals as close to [place] as possible, including [facility], [facility] and [facility]. These were sent via the DOD process.   A) Patient has no permanent housing, which may make placement more difficult.   P) Following.       Progress Notes by Awais Torre MD at 1/10/2017 10:12 AM     Author:  Awais Torre MD Service:  Pulmonology Author Type:  Physician    Filed:  1/10/2017 10:15 AM Note Time:  1/10/2017 10:12 AM Status:  Signed    :  Awais Torre MD  "(Physician)           PULMONARY PROGRESS NOTE          Interval History:      Slowly improving, up in chair, remains on o2.         Physical Exam:      Blood pressure 141/87, pulse 78, temperature 98  F (36.7  C), temperature source Oral, resp. rate 16, height 1.651 m (5' 5\"), weight 46.2 kg (101 lb 13.6 oz), SpO2 96 %.  Filed Vitals:    01/07/17 0509 01/08/17 0552 01/09/17 0628   Weight: 48.2 kg (106 lb 4.2 oz) 47.9 kg (105 lb 9.6 oz) 46.2 kg (101 lb 13.6 oz)     Vital Signs with Ranges  Temp:  [98  F (36.7  C)-98.7  F (37.1  C)] 98  F (36.7  C)  Heart Rate:  [86-88] 87  Resp:  [15-20] 16  BP: (141-163)/(81-87) 141/87 mmHg  SpO2:  [94 %-98 %] 96 %  I/O's Last 24 hours  I/O last 3 completed shifts:  In: 200 [P.O.:200]  Out: 1785 [Urine:1785]    Lungs: Dec, minimal wheezing   Cardiovascular: rrr   Other: abd soft               Medications:          methylPREDNISolone  62.5 mg Intravenous Q8H     ipratropium - albuterol 0.5 mg/2.5 mg/3 mL  3 mL Nebulization Q4H     insulin aspart  1-3 Units Subcutaneous TID AC     insulin aspart  1-3 Units Subcutaneous At Bedtime     atenolol (TENORMIN) tablet 25 mg  25 mg Oral Daily     fluticasone  1 puff Inhalation BID     hydrochlorothiazide (HYDRODIURIL) tablet 25 mg  25 mg Oral Daily     nicotine   Transdermal Q8H     nicotine   Transdermal Daily     nicotine  1 patch Transdermal Daily            Data:      All new lab and imaging data was reviewed.   Recent Labs   Lab Test  01/05/17   1124  11/25/16   0720  11/22/16   0505  03/31/14 09/29/09   WBC  6.8  11.1*  12.2*   < >   --    < >   --    HGB  15.6  14.5  14.2   < >   --    < >   --    MCV  93  95  94   < >   --    < >   --    PLT  120*  117*  54*   < >   --    < >   --    INR   --    --    --    --   1.1   --   1.0    < > = values in this interval not displayed.      Recent Labs   Lab Test  01/05/17   1124  11/25/16   0720  11/23/16   0759  11/22/16   2150   11/22/16   0505  11/22/16   0125   NA  136   --    --    --    -- "   137  135   POTASSIUM  3.4   --   4.7  3.7   < >  3.0*  3.1*   CHLORIDE  98   --    --    --    --   99  95   CO2  28   --    --    --    --   27  30   BUN  14   --    --    --    --   11  12   CR  0.81  0.80   --    --    --   0.70  0.72   ANIONGAP  10   --    --    --    --   11  10   MATTHEW  9.0   --    --    --    --   7.7*  8.7   GLC  151*   --    --    --    --   161*  104*    < > = values in this interval not displayed.        I reviewed the patient's new clinical lab test results.       I reviewed the patient's new imaging test results.     CT Chest 11/22/16  1. A small region of minimal patchy and linear opacities in the  lingula could represent atelectasis or pneumonia.  2. No other evidence of active pulmonary disease.  3. Moderate to severe emphysematous changes in the lungs.  4. No visualized pulmonary embolus.    cxr 1/7/17- copd, hyperinflation, no acute infiltrate     Active Problems:    Obstructive chronic bronchitis with exacerbation (H)           Assessment and Plan:      Mr. Chuck Lopez is a 76-year-old male with a known history of COPD, non-oxygen dependent (FEV1 ~40% in 2011) hypertension, chronic hepatitis C, chronic thrombocytopenia, diabetes mellitus type 2 diet controlled, hypertension, ongoing tobacco use who presents to the emergency room with progressive shortness of breath.     Continues to smoke, somewhat interested in quitting. Poor exercise tolerance, able to walk to bathroom. Lives in a basement and steps are challenging. Thinks he has lost ~50 pounds in the last 5 years. No hemoptysis. Improving and may be near his baseline.        Continue bronchodilators, suggest triple inhaler therapy with spiriva and Breo/Advair/Symbicort/Dulera on discharge    Wean o2 as tolerated, suspect that he will need o2 on discharge    Suggest 40mg/day of prednisone with taper by 10mg every 4 days    Smoking cessation    May benefit from TCU on d/c?    Needs outpt pulmonary followup in 4 weeks    No  additional suggestions, will sign off. He should see me in clinic in 4 weeks. [phone].    Awais Torre MD    [facility] / [facility]  [phone] (pager)  [phone] (office)           Progress Notes by Alem Harris RT at 1/10/2017  2:57 AM     Author:  Alem Harris RT Service:  (none) Author Type:  Respiratory Therapist    Filed:  1/10/2017  2:58 AM Note Time:  1/10/2017  2:57 AM Status:  Signed    :  Alem Harris RT (Respiratory Therapist)           Patient is on 2L nasal cannula with SPO2 of 96%, diminished BBS, nebs tx is given, will continue to monitor.    Alem Harris  1/10/2017         Progress Notes by Ney Cano RT at 1/9/2017  6:03 PM     Author:  Ney Cano RT Service:  Respiratory Therapy Author Type:  Respiratory Therapist    Filed:  1/9/2017  6:04 PM Note Time:  1/9/2017  6:03 PM Status:  Signed    :  Ney Cano RT (Respiratory Therapist)           Patient is on 2L NC and sating mid 90`s. BBS diminished. Scheduled and prn neb given. Will continue to follow.  1/9/2017  Ney Cano         Progress Notes by Oneyda Mcdonald MD at 1/9/2017  1:53 PM     Author:  Oneyda Mcdonald MD Service:  Hospitalist Author Type:  Physician    Filed:  1/9/2017  1:56 PM Note Time:  1/9/2017  1:53 PM Status:  Signed    :  Oneyda Mcdonald MD (Physician)           [facility]  Hospitalist Progress Note        Oneyda Mcdonald MD  01/09/2017        Interval History:      Patient seen and assessed. He report no much improvement. Still labored breathing.            Assessment and Plan:      Mr. Chuck Lopez is a 76-year-old male with a known history of COPD, non-oxygen dependent, hypertension, chronic hepatitis C, chronic thrombocytopenia, diabetes mellitus type 2 diet controlled, hypertension, ongoing tobacco use who presents to the emergency room with progressive shortness of  "breath:  Acute hypoxic respiratory failure: Due to COPD exacerbation with suspect URI.   --- Placed in IMC and BiPAP yesterday. Feel better and off Bipap  ---- ABG stat was done and unremarkable.  ---- D/Johnny IMC and BiPAP-->Ok to transfer to the floor.  --- On IV solumedrol 60 mg TID and will change to BID and monitor. Likely to po prednisone tomorrow  --- Continue bronchodilators  --- Continue azithromycin  ---- Supplement oxygen to keep saturation 88-89%  --- strongly advised to stop smoking  --- Continue nicotine patch  --- Pulmonology consult  HTN: PTA hctz and atenolol  --- Continue the same  Type 2 DM: Diet controlled  --- Continue [facility]    Disposition: 2-3 days    Bettie Mcdonald MD  Internal medicine Hospitalist:   Pager [phone]    1/9/2017                     Physical Exam:      Heart Rate: 83, Blood pressure 154/93, pulse 78, temperature 97.7  F (36.5  C), temperature source Oral, resp. rate 20, height 1.651 m (5' 5\"), weight 46.2 kg (101 lb 13.6 oz), SpO2 96 %.  Filed Vitals:    01/07/17 0509 01/08/17 0552 01/09/17 0628   Weight: 48.2 kg (106 lb 4.2 oz) 47.9 kg (105 lb 9.6 oz) 46.2 kg (101 lb 13.6 oz)     Vital Signs with Ranges  Temp:  [97.7  F (36.5  C)-98.7  F (37.1  C)] 97.7  F (36.5  C)  Heart Rate:  [82-90] 83  Resp:  [15-20] 20  BP: (129-170)/(70-93) 154/93 mmHg  SpO2:  [93 %-97 %] 96 %  I/O's Last 24 hours  I/O last 3 completed shifts:  In: 680 [P.O.:680]  Out: 1200 [Urine:1200]    Constitutional:  Labored breathing but not in distress   Psych: Good mood and affect   Neuro: AAOX 3. No focal neurological deficit. CN 11-XII grossly intact.   Eyes: Saige conjunctiva. Pupils RRR.   ENT: Nose and throat normal.   Lymph: Not palpable.   Cardiovascular: HR regular rhythm. S1, S2 normal. No murmur.   Lungs: Poor air entry bilaterally and few wheeze.    Abdomen: Soft. Not tender. +BS.   Skin: Normal. No rash or ulcer.   Musculoskeletal: No deformities   Other: Lower extremities edema: None.           "    Medications:          methylPREDNISolone  62.5 mg Intravenous Q8H     ipratropium - albuterol 0.5 mg/2.5 mg/3 mL  3 mL Nebulization Q4H     insulin aspart  1-3 Units Subcutaneous TID AC     insulin aspart  1-3 Units Subcutaneous At Bedtime     atenolol (TENORMIN) tablet 25 mg  25 mg Oral Daily     fluticasone  1 puff Inhalation BID     hydrochlorothiazide (HYDRODIURIL) tablet 25 mg  25 mg Oral Daily     nicotine   Transdermal Q8H     nicotine   Transdermal Daily     nicotine  1 patch Transdermal Daily     PRN Meds: naloxone, albuterol, acetaminophen, senna-docusate, ondansetron **OR** ondansetron, glucose **OR** dextrose **OR** glucagon, traMADol, sodium chloride         Data:      All new lab and imaging data was reviewed.   Recent Labs   Lab Test  01/05/17   1124  11/25/16   0720  11/22/16   0505  03/31/14 09/29/09   WBC  6.8  11.1*  12.2*   < >   --    < >   --    HGB  15.6  14.5  14.2   < >   --    < >   --    MCV  93  95  94   < >   --    < >   --    PLT  120*  117*  54*   < >   --    < >   --    INR   --    --    --    --   1.1   --   1.0    < > = values in this interval not displayed.      Recent Labs   Lab Test  01/05/17   1124  11/25/16   0720  11/23/16   0759  11/22/16   2150   11/22/16   0505  11/22/16   0125   NA  136   --    --    --    --   137  135   POTASSIUM  3.4   --   4.7  3.7   < >  3.0*  3.1*   CHLORIDE  98   --    --    --    --   99  95   CO2  28   --    --    --    --   27  30   BUN  14   --    --    --    --   11  12   CR  0.81  0.80   --    --    --   0.70  0.72   ANIONGAP  10   --    --    --    --   11  10   MATTHEW  9.0   --    --    --    --   7.7*  8.7   GLC  151*   --    --    --    --   161*  104*    < > = values in this interval not displayed.     Recent Labs   Lab Test  01/05/17   1124  11/22/16   1820  11/22/16   1405   TROPI  <0.015  The 99th percentile for upper reference range is 0.045 ug/L.  Troponin values in   the range of 0.045 - 0.120 ug/L may be associated with risks of  adverse   clinical events.    0.050*  0.050*                   Progress Notes by Summer Nam RD, LD at 1/9/2017 11:53 AM     Author:  Summer Nam RD, LD Service:  Nutrition Author Type:  Registered Dietitian    Filed:  1/9/2017 12:01 PM Note Time:  1/9/2017 11:53 AM Status:  Signed    :  Summer Nam RD, LD (Registered Dietitian)           CLINICAL NUTRITION SERVICES - REASSESSMENT NOTE      EVALUATION OF PROGRESS TOWARD GOALS   Diet:  Mod carb. Sending Plus2 Shakes BID between meals and 4 oz regular shakes with meals as desired.    Intake:  Pt is eating 100% of meals and shakes.   He states he's having some problems with eating/swallowing with breathing, he's ordering soft foods.  Pt likes his sweets, orders a dessert with all meals but since he needs calories, will not discourage.      NEW FINDINGS:   Filed Vitals:    01/05/17 1109 01/06/17 0641 01/07/17 0509 01/08/17 0552   Weight: 51.256 kg (113 lb) 47.9 kg (105 lb 9.6 oz) 48.2 kg (106 lb 4.2 oz) 47.9 kg (105 lb 9.6 oz)    01/09/17 0628   Weight: 46.2 kg (101 lb 13.6 oz)         Previous Goals:   Pt to eat at least 75% of assessed nutrition needs (1260 kcals, 45 g PRO) daily, through meals, snacks, and supplements.  Evaluation: Met    Previous Nutrition Diagnosis:   Inadequate oral intake related to  as evidenced by low BMI (17.6), cachectic appearance  Evaluation: Improving      CURRENT NUTRITION DIAGNOSIS  No nutrition diagnosis identified at this time     INTERVENTIONS  Recommendations / Nutrition Prescription  Continue current diet and supplements    Implementation  Composition of Meals and Snacks - encouraged good intake    Goals  Pt will continue to consume at least 75% of meals and supplements    MONITORING AND EVALUATION:  Progress towards goals will be monitored and evaluated per protocol and Practice Guidelines    Summer Nam RD  Pager [phone] (M-F)            [phone] (W/E & Hol)                     Procedure Notes     No  notes of this type exist for this encounter.         Progress Notes - Therapies (Notes from 01/09/17 through 01/12/17)      Progress Notes by Ney Cano RT at 1/11/2017  5:19 PM     Author:  Ney Cano RT Service:  Respiratory Therapy Author Type:  Respiratory Therapist    Filed:  1/11/2017  5:21 PM Note Time:  1/11/2017  5:19 PM Status:  Signed    :  Ney Cano RT (Respiratory Therapist)           Patient is on 2L NC and sating 94%. BBS diminished. Scheduled neb tx given and tolerated well. Will continue to follow.  1/11/2017  Ney Cano         Progress Notes by Sarah Dixon RT at 1/10/2017 11:14 PM     Author:  Sarah Dixon RT Service:  (none) Author Type:  Respiratory Therapist    Filed:  1/10/2017 11:16 PM Note Time:  1/10/2017 11:14 PM Status:  Signed    :  Sarah Dixon RT (Respiratory Therapist)           Pt continues on 2 liters NC. Sat. 96% BS dim t/o. Pt refused Bipap over noc.  Will continue to monitor and treat.       Progress Notes by Alem Harris RT at 1/10/2017  2:57 AM     Author:  Alem Harris RT Service:  (none) Author Type:  Respiratory Therapist    Filed:  1/10/2017  2:58 AM Note Time:  1/10/2017  2:57 AM Status:  Signed    :  Alem Harris RT (Respiratory Therapist)           Patient is on 2L nasal cannula with SPO2 of 96%, diminished BBS, nebs tx is given, will continue to monitor.    Alem Harris  1/10/2017         Progress Notes by Ney Cano RT at 1/9/2017  6:03 PM     Author:  Ney Cano RT Service:  Respiratory Therapy Author Type:  Respiratory Therapist    Filed:  1/9/2017  6:04 PM Note Time:  1/9/2017  6:03 PM Status:  Signed    :  Ney Cano RT (Respiratory Therapist)           Patient is on 2L NC and sating mid 90`s. BBS diminished. Scheduled and prn neb given. Will continue to follow.  1/9/2017  Ney Cano

## 2017-01-05 NOTE — IP AVS SNAPSHOT
` `     [place] [address] [facility]: [phone]            Medication Administration Report for Chuck Lopez as of 01/12/17 1402   Legend:    Given Hold Not Given Due Canceled Entry Other Actions    Time Time (Time) Time  Time-Action       Inactive    Active    Linked        Medications 01/06/17 01/07/17 01/08/17 01/09/17 01/10/17 01/11/17 01/12/17    acetaminophen (TYLENOL) tablet 650 mg  Dose: 650 mg Freq: EVERY 4 HOURS PRN Route: PO  PRN Reason: mild pain  Start: 01/05/17 1442   Admin Instructions: Alternate ibuprofen (if ordered) with acetaminophen.  Maximum acetaminophen dose from all sources = 75 mg/kg/day not to exceed 4 grams/day.               albuterol neb solution 2.5 mg  Dose: 2.5 mg Freq: EVERY 4 HOURS Route: NEBULIZATION  Start: 01/10/17 1600   Admin Instructions: Use Albuterol for Duonebs for pt on Incruse Ellipta         1618 (2.5 mg)-Given       1945 (2.5 mg)-Given       2310 (2.5 mg)-Given        0544 (2.5 mg)-Given       0850 (2.5 mg)-Given       1259 (2.5 mg)-Given       1557 (2.5 mg)-Given       1856 (2.5 mg)-Given       2235 (2.5 mg)-Given        (0315)-Not Given [C]       [ ] 0400       (0800)-Not Given       1230 (2.5 mg)-Given       [ ] 1500       [ ] 1900       [ ] 2300           albuterol neb solution 2.5 mg  Dose: 3 mL Freq: EVERY 2 HOURS PRN Route: NEBULIZATION  PRN Reason: shortness of breath / dyspnea  Start: 01/05/17 1442     1016 ( )-Given [C]         1253 (2.5 mg)-Given              atenolol (TENORMIN) tablet 25 mg  Dose: 25 mg Freq: DAILY Route: PO  Start: 01/06/17 0900    0855 (25 mg)-Given        0940 (25 mg)-Given        0812 (25 mg)-Given        0902 (25 mg)-Given        0753 (25 mg)-Given               0748 (25 mg)-Given               0816 (25 mg)-Given           fluticasone (FLOVENT DISKUS) 250 mcg/puff inhaler 1 puff  Dose: 1 puff Freq: 2 TIMES DAILY Route: IN  Start: 01/05/17 2100   Admin Instructions: Therapeutic interchange for beclomethasone  (QVAR) 80 MCG/ACT Inhaler 2 puff.       0855 (1 puff)-Given       2126 (1 puff)-Given        0948 (1 puff)-Given       2110 (1 puff)-Given        0824 (1 puff)-Given       2103 (1 puff)-Given        0906 (1 puff)-Given       2145 (1 puff)-Given        0752 (1 puff)-Given              2035 (1 puff)-Given        0747 (1 puff)-Given              2005 (1 puff)-Given        0818 (1 puff)-Given       [ ] 2100           glucose 40 % gel 15-30 g  Dose: 15-30 g Freq: EVERY 15 MIN PRN Route: PO  PRN Reason: low blood sugar  Start: 01/05/17 1442   Admin Instructions: Give 15 g for BG 51 to 69 mg/dL IF patient is conscious and able to swallow. Give 30 g for BG less than or equal to 50 mg/dL IF patient is conscious and able to swallow. Do NOT give glucose gel via enteral tube.  IF patient has enteral tube: give apple juice 120 mL (4 oz or 15 g of CHO) via enteral tube for BG 51 to 69 mg/dL.  Give apple juice 240 mL (8 oz or 30 g of CHO) via enteral tube for BG less than or equal to 50 mg/dL.              Or  dextrose 50 % injection 25-50 mL  Dose: 25-50 mL Freq: EVERY 15 MIN PRN Route: IV  PRN Reason: low blood sugar  Start: 01/05/17 1442   Admin Instructions: Use if have IV access, BG less than 70 mg/dL and meet dose criteria below:  Dose if conscious and alert (or disorientated) and NPO = 25 mL  Dose if unconscious / not alert = 50 mL              Or  glucagon injection 1 mg  Dose: 1 mg Freq: EVERY 15 MIN PRN Route: SC  PRN Reason: low blood sugar  PRN Comment: May repeat x 1 only  Start: 01/05/17 1442   Admin Instructions: May give SQ or IM. IF BG less than or equal to 50 mg/dL and no IV access.  ONLY use glucagon IF patient has NO IV access AND is UNABLE to swallow.               hydrochlorothiazide (HYDRODIURIL) tablet 25 mg  Dose: 25 mg Freq: DAILY Route: PO  Start: 01/06/17 0900    0855 (25 mg)-Given        0940 (25 mg)-Given        0812 (25 mg)-Given        0902 (25 mg)-Given        0752 (25 mg)-Given                       0748 (25 mg)-Given               0816 (25 mg)-Given           insulin aspart (NovoLOG) inj (RAPID ACTING)  Dose: 1-3 Units Freq: AT BEDTIME Route: SC  Start: 01/05/17 2200   Admin Instructions: LOW INSULIN RESISTANCE DOSING    Do Not give Bedtime Correction Insulin if BG less than 200.   For  - 299 give 1 unit.   For  - 399 give 2 units   For BG greater than or equal 400 give 3 units.   Notify provider if glucose greater than or equal to 350 mg/dL after administration of correction dose.  If given at mealtime, must be administered 5 min before meal or immediately after.     (2129)-Not Given        2156 (1 Units)-Given [C]        (2107)-Not Given        (2136)-Not Given        (2216)-Not Given        (2217)-Not Given        [ ] 2200           insulin aspart (NovoLOG) inj (RAPID ACTING)  Dose: 1-3 Units Freq: 3 TIMES DAILY BEFORE MEALS Route: SC  Start: 01/05/17 1700   Admin Instructions: Correction Scale - LOW INSULIN RESISTANCE DOSING     Do Not give Correction Insulin if Pre-Meal BG less than 140.   For Pre-Meal  - 239 give 1 unit.   For Pre-Meal  - 339 give 2 units.   For Pre-Meal BG greater than or equal to 340 give 3 units.   To be given with prandial insulin, and based on pre-meal blood glucose.   Notify provider if glucose greater than or equal to 350 mg/dL after administration of correction dose.  If given at mealtime, must be administered 5 min before meal or immediately after.     (0849)-Not Given       1246 (1 Units)-Given       (1818)-Not Given [C]        (0818)-Not Given       (1300)-Not Given       (1842)-Not Given        (0753)-Not Given       1239 (1 Units)-Given [C]       (1749)-Not Given        (0908)-Not Given [C]       1221 (2 Units)-Given       1732 (1 Units)-Given        (0853)-Not Given       1241 (1 Units)-Given       1837 (1 Units)-Given        (0754)-Not Given       1213 (1 Units)-Given       (1835)-Not Given        (1003)-Not Given       (1306)-Not Given       [ ]  1700           naloxone (NARCAN) injection 0.1-0.4 mg  Dose: 0.1-0.4 mg Freq: EVERY 2 MIN PRN Route: IV  PRN Reason: opioid reversal  Start: 01/05/17 1442   Admin Instructions: For respiratory rate LESS than or EQUAL to 8.  Partial reversal dose:  0.1 mg titrated q 2 minutes for Analgesia Side Effects Monitoring Sedation Level of 3 (frequently drowsy, arousable, drifts to sleep during conversation).Full reversal dose:  0.4 mg bolus for Analgesia Side Effects Monitoring Sedation Level of 4 (somnolent, minimal or no response to stimulation).               nicotine (NICODERM CQ) 21 MG/24HR 24 hr patch 1 patch  Dose: 1 patch Freq: DAILY Route: TD  Start: 01/05/17 1700    0901 (1 patch)-Given        0939 (1 patch)-Given        0812 (1 patch)-Given        0907 (1 patch)-Given        0754 (1 patch)-Given               0748 (1 patch)-Given               0816 (1 patch)-Given           nicotine Patch in Place  Freq: EVERY 8 HOURS Route: TD  Start: 01/05/17 1700   Admin Instructions: Chart every shift, confirming that patch is still in place on patient (no barcode scan needed). See patch order for dose information.     0045 ( )-Patch in Place       0902 ( )-Patch in Place       1722 ( )-Patch in Place        0100 ( )-Patch in Place       0946 ( )-Patch in Place       1843 ( )-Patch in Place        0320 ( )-Patch in Place [C]       1036 ( )-Patch in Place       1749 ( )-Patch in Place        0106 ( )-Patch in Place       0908 ( )-Patch in Place       1616 ( )-Patch in Place [C]        0001 ( )-Patch in Place              1716 ( )-Patch in Place        0224 ( )-Patch in Place       1406 ( )-Patch in Place       1617 ( )-Patch in Place        0128 ( )-Patch in Place       0818 ( )-Given       [ ] 1700           nicotine patch REMOVAL  Freq: DAILY Route: TD  Start: 01/06/17 0900   Admin Instructions: Remove patch when new patch is applied or patch is discontinued.     0857 ( )-Patch Removed        0930 ( )-Patch Removed         0826 ( )-Patch Removed        0908 ( )-Patch Removed        0900 ( )-Patch Removed        0900 ( )-Patch Removed        0818 ( )-Patch Removed           ondansetron (ZOFRAN-ODT) ODT tab 4 mg  Dose: 4 mg Freq: EVERY 6 HOURS PRN Route: PO  PRN Reason: nausea  Start: 01/05/17 1442   Admin Instructions: This is Step 1 of nausea and vomiting management.  If nausea not resolved in 15 minutes, go to Step 2 prochlorperazine (COMPAZINE). Do not push through foil backing. Peel back foil and gently remove. Place on tongue immediately. Administration with liquid unnecessary              Or  ondansetron (ZOFRAN) injection 4 mg  Dose: 4 mg Freq: EVERY 6 HOURS PRN Route: IV  PRN Reasons: nausea,vomiting  Start: 01/05/17 1442   Admin Instructions: This is Step 1 of nausea and vomiting management.  If nausea not resolved in 15 minutes, go to Step 2 prochlorperazine (COMPAZINE).               predniSONE (DELTASONE) tablet 40 mg  Dose: 40 mg Freq: DAILY Route: PO  Start: 01/10/17 1415        1714 (40 mg)-Given        0747 (40 mg)-Given               0816 (40 mg)-Given           senna-docusate (SENOKOT-S;PERICOLACE) 8.6-50 MG per tablet 1-2 tablet  Dose: 1-2 tablet Freq: 2 TIMES DAILY PRN Route: PO  PRN Comment: constipation   Start: 01/05/17 1442   Admin Instructions: If no bowel movement in 24 hours, increase to 2 tablets PO BID.  Hold for loose stools.   This is the first step of a three step constipation treatment protocol.               sodium chloride (OCEAN) 0.65 % nasal spray 1-2 spray  Dose: 1-2 spray Freq: EVERY 1 HOUR PRN Route: NA  PRN Reason: other  PRN Comment: dry nasal passages  Start: 01/05/17 2158   Admin Instructions: To affected nostril(s)               traMADol (ULTRAM) tablet 50 mg  Dose: 50 mg Freq: EVERY 4 HOURS PRN Route: PO  PRN Reason: moderate pain  Start: 01/05/17 2030    0059 (50 mg)-Given       0501 (50 mg)-Given       0907 (50 mg)-Given       1246 (50 mg)-Given       1721 (50 mg)-Given       2117 (50  mg)-Given        0115 (50 mg)-Given       0648 (50 mg)-Given       1147 (50 mg)-Given       1842 (50 mg)-Given       2309 (50 mg)-Given        0318 (50 mg)-Given       0812 (50 mg)-Given       1237 (50 mg)-Given       1701 (50 mg)-Given       2103 (50 mg)-Given        0051 (50 mg)-Given       0516 (50 mg)-Given       0906 (50 mg)-Given       1315 (50 mg)-Given       1731 (50 mg)-Given       2144 (50 mg)-Given        0139 (50 mg)-Given       0541 (50 mg)-Given       0951 (50 mg)-Given       1429 (50 mg)-Given       1842 (50 mg)-Given       2254 (50 mg)-Given        0302 (50 mg)-Given       0747 (50 mg)-Given       1151 (50 mg)-Given       1553 (50 mg)-Given       2004 (50 mg)-Given        0345 (50 mg)-Given       0815 (50 mg)-Given       1306 (50 mg)-Given           umeclidinium (INCRUSE ELLIPTA) oral inhaler 62.5 mcg  Dose: 62.5 mcg Freq: DAILY Route: IN  Start: 01/10/17 1415   Admin Instructions: Use only ONE capsule. To ensure the full dose is administered, TWO inhalations should be performed at a rate sufficient to hear or feel the capsule vibrate.    Formulary alternate for  Spiriva         1837 (62.5 mcg)-Given        0747 (62.5 mcg)-Given        0818 (62.5 mcg)-Given          Completed Medications  Medications 01/06/17 01/07/17 01/08/17 01/09/17 01/10/17 01/11/17 01/12/17         Dose: 500 mL Freq: ONCE Route: IV  Last Dose: 500 mL (01/10/17 1714)  Start: 01/10/17 1500   End: 01/10/17 2114        1714 (500 mL)-New Bag            Discontinued Medications  Medications 01/06/17 01/07/17 01/08/17 01/09/17 01/10/17 01/11/17 01/12/17         Dose: 3 mL Freq: EVERY 4 HOURS Route: NEBULIZATION  Start: 01/05/17 1445   End: 01/10/17 1456    0340 (3 mL)-Given       0734 (3 mL)-Given       1059 (3 mL)-Given       1506 (3 mL)-Given       1936 (3 mL)-Given       2318 (3 mL)-Given        0305 (3 mL)-Given       0752 (3 mL)-Given              1526 (3 mL)-Given       2017 (3 mL)-Given       (2342)-Not Given        0305 (3  mL)-Given       0720 (3 mL)-Given       1152 (3 mL)-Given       1506 (3 mL)-Given       2006 (3 mL)-Given       2311 (3 mL)-Given        (0316)-Not Given       0748 (3 mL)-Given       1037 (3 mL)-Given       1512 (3 mL)-Given       1952 (3 mL)-Given        0057 (3 mL)-Given       0252 (3 mL)-Given       0842 (3 mL)-Given       1132 (3 mL)-Given       1456-Med Discontinued           Dose: 62.5 mg Freq: EVERY 8 HOURS Route: IV  Start: 01/07/17 1739   End: 01/10/17 1408   Admin Instructions: Doses greater than 125 mg need to be in at least 50 mL IVPB      2116 (62.5 mg)-Given        0543 (62.5 mg)-Given       1244 (62.5 mg)-Given       2103 (62.5 mg)-Given        0511 (62.5 mg)-Given       1310 (62.5 mg)-Given       (2145)-Not Given [C]       2350 (62.5 mg)-Given [C]        0752 (62.5 mg)-Given       1408-Med Discontinued

## 2017-01-05 NOTE — IP AVS SNAPSHOT
"    [place] [address] [facility]: [phone]                                              INTERAGENCY TRANSFER FORM - PHYSICIAN ORDERS   2017                    Hospital Admission Date: 2017  TIMI MAK   : 1940  Sex: Male        Attending Provider: Jean Paul Latif MD     Allergies:  Ammonium Lactate, Doxycycline, Monosodium Glutamate, Penicillins    Infection:  None   Service:  INTERMEDIATE    Ht:  1.651 m (5' 5\")   Wt:  47.265 kg (104 lb 3.2 oz)   Admission Wt:  51.256 kg (113 lb)    BMI:  17.34 kg/m 2   BSA:  1.47 m 2            Patient PCP Information     Provider PCP Type    Pranay Reid MD General      ED Clinical Impression     Diagnosis Description Comment Added By Time Added    COPD exacerbation (H) [J44.1] COPD exacerbation (H) [J44.1]  Nayan Stallings MD 2017  1:13 PM    SOB (shortness of breath) [R06.02] SOB (shortness of breath) [R06.02]  Nayan Stallings MD 2017  1:13 PM      Hospital Problems as of 2017              Priority Class Noted POA    Obstructive chronic bronchitis with exacerbation (H) Medium  2017 Yes      Non-Hospital Problems as of 2017              Priority Class Noted    Pulmonary emphysema (H)   2009    Elevated liver function tests   2010    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)   2010    CARDIOVASCULAR SCREENING; LDL GOAL LESS THAN 100   2011    CKD (chronic kidney disease) stage 2, GFR 60-89 ml/min   2012    Advanced directives, counseling/discussion   2012    COPD (chronic obstructive pulmonary disease) (H)   Unknown    Hypertension goal BP (blood pressure) < 140/90 Medium  2013    Chronic hepatitis C (H) Medium  3/11/2014    Other chronic pain   2015    Chronic obstructive airway disease with asthma (H) Medium  10/10/2016    Acute hypoxemic respiratory failure (H) Medium  2016      Code Status History     Date Active Date Inactive Code Status Order ID Comments " User Context    1/12/2017  9:49 AM  Full Code [id number]  Joe Syed MD Outpatient    1/5/2017  2:42 PM 1/12/2017  9:49 AM Full Code [id number]  Jean Paul Latif MD Inpatient    11/26/2016 10:47 AM 1/5/2017  2:42 PM Full Code [id number]  Marley Huerta MD Outpatient    11/22/2016  4:29 AM 11/26/2016 10:47 AM Full Code [id number]  Anthony Rosa MD Inpatient         Medication Review      START taking        Dose / Directions Comments    acetaminophen 325 MG tablet   Commonly known as:  TYLENOL   Used for:  Other chronic pain        Dose:  650 mg   Take 2 tablets (650 mg) by mouth every 4 hours as needed for mild pain or fever   Quantity:  100 tablet   Refills:  0        fluticasone-salmeterol 500-50 MCG/DOSE diskus inhaler   Commonly known as:  ADVAIR   Used for:  COPD exacerbation (H)        Dose:  1 puff   Inhale 1 puff into the lungs 2 times daily   Quantity:  3 Inhaler   Refills:  1        nicotine 21 MG/24HR 24 hr patch   Commonly known as:  NICODERM CQ   Used for:  Cigarette nicotine dependence without complication        Dose:  1 patch   Place 1 patch onto the skin daily   Quantity:  30 patch   Refills:  0        predniSONE 10 MG tablet   Commonly known as:  DELTASONE   Used for:  COPD exacerbation (H)        Start taking on:  1/13/2017   Take 4 tabs daily x 1 day, then 3 tabs daily x 4 days, then 2 tabs daily x 4 days, then 1 tab daily x 4 days, then stop.   Quantity:  25 tablet   Refills:  0          CONTINUE these medications which have NOT CHANGED        Dose / Directions Comments    * albuterol 108 (90 BASE) MCG/ACT Inhaler   Commonly known as:  PROAIR HFA/PROVENTIL HFA/VENTOLIN HFA   Used for:  Chronic obstructive airway disease with asthma (H)        Dose:  2 puff   Inhale 2 puffs into the lungs every 6 hours as needed for shortness of breath / dyspnea or wheezing   Quantity:  1 Inhaler   Refills:  0        * albuterol (2.5 MG/3ML) 0.083% neb solution   Used for:  COPD exacerbation (H)         Dose:  3 mL   Take 1 vial (2.5 mg) by nebulization every 4 hours as needed for shortness of breath / dyspnea or wheezing   Quantity:  360 mL   Refills:  3    Adding refills to prior script       ATENOLOL PO        Dose:  25 mg   Take 25 mg by mouth daily   Refills:  0        HYDROCHLOROTHIAZIDE PO        Dose:  25 mg   Take 25 mg by mouth daily   Refills:  0        order for DME   Used for:  COPD exacerbation (H)        Equipment being ordered: Other: neb machine Treatment Diagnosis: COPD exacerbation   Quantity:  1 Device   Refills:  0        tiotropium 18 MCG capsule   Commonly known as:  SPIRIVA HANDIHALER   Used for:  Centrilobular emphysema (H)        Inhale contents of one capsule daily.   Quantity:  30 capsule   Refills:  0        traMADol 50 MG tablet   Commonly known as:  ULTRAM   Used for:  Other chronic pain        Take one or two tabs three times daily maximum  per day as needed for pain   Quantity:  30 tablet   Refills:  0        V-C FORTE Caps   Used for:  Hepatitis C        Dose:  1 capsule   Take 1 capsule by mouth daily   Quantity:  90 capsule   Refills:  3        * Notice:  This list has 2 medication(s) that are the same as other medications prescribed for you. Read the directions carefully, and ask your doctor or other care provider to review them with you.      STOP taking     beclomethasone 80 MCG/ACT Inhaler   Commonly known as:  QVAR                     Further instructions from your care team       Paul barros Ambric's phone is [phone]    Summary of Visit     Reason for your hospital stay       You were admitted for shortness of breath due to COPD exacerbation.             After Care     Activity - Up with nursing assistance           Advance Diet as Tolerated       Follow this diet upon discharge:   Snacks/Supplements Adult: Magic Cup; Between Meals  Combination Diet Regular Diet Adult; [id number] Calories: Moderate Consistent CHO (4-6 CHO units/meal)       General info for  SNF       Length of Stay Estimate: Short Term Care: Estimated # of Days <30  Condition at Discharge: Improving  Level of care:skilled   Rehabilitation Potential: Good  Admission H&P remains valid and up-to-date: Yes  Recent Chemotherapy: N/A  Use Nursing Home Standing Orders: N/A       Glucose monitor nursing POCT       Before meals and at bedtime       Mantoux instructions       Give two-step Mantoux (PPD) Per Facility Policy Yes             Referrals     Occupational Therapy Adult Consult       Evaluate and treat as clinically indicated.    Reason:  deconditioning       Physical Therapy Adult Consult       Evaluate and treat as clinically indicated.    Reason:  deconditioning             Follow-Up Appointment Instructions     Future Labs/Procedures    Follow Up and recommended labs and tests     Comments:    Follow up with Nursing home physician.  No follow up labs or test are needed.  Follow up with Dr. Torre of [facility] in 4 weeks.  No follow up labs or test are needed.      Follow-Up Appointment Instructions     Follow Up and recommended labs and tests       Follow up with Nursing home physician.  No follow up labs or test are needed.  Follow up with Dr. Torre of [facility] in 4 weeks.  No follow up labs or test are needed.             Statement of Approval     Ordered          01/12/17 0950  I have reviewed and agree with all the recommendations and orders detailed in this document.   EFFECTIVE NOW     Comments:  May discharge once insurance coverage for new inhaler is verified.   Approved and electronically signed by:  Joe Syed MD

## 2017-01-05 NOTE — ED NOTES
Bed: ED05  Expected date:   Expected time:   Means of arrival:   Comments:  Triage sob. Here a month ago

## 2017-01-05 NOTE — PROGRESS NOTES
[facility]    Sepsis Evaluation Progress Note    Date of Service: 01/05/2017    I was called to see Chuck Lopez due to abnormal vital signs triggering the Sepsis SIRS screening alert. He is known to have an infection.     Physical Exam    Vital Signs:  Temp: 96  F (35.6  C) Temp src: Axillary BP: 122/58 mmHg Pulse: 76 Heart Rate: 89 Resp: 26 SpO2: 95 % O2 Device: Nasal cannula Oxygen Delivery: 2 LPM    Lab:  LACTIC ACID   Date Value Ref Range Status   01/05/2017 2.2* 0.7 - 2.1 mmol/L Final       The patient is at baseline mental status.      Assessment and Plan    The SIRS and exam findings are likely due to COPD, there is no sign of sepsis at this time.    Disposition: The patient will remain on the current unit. We will continue to monitor this patient closely.    Jean Paul Latif MD

## 2017-01-05 NOTE — IP AVS SNAPSHOT
"[place] [address] [facility]: [phone]                                              INTERAGENCY TRANSFER FORM - LAB / IMAGING / EKG / EMG RESULTS   2017                    Hospital Admission Date: 2017  TIMI MAK   : 1940  Sex: Male        Attending Provider: Jean Paul Latif MD     Allergies:  Ammonium Lactate, Doxycycline, Monosodium Glutamate, Penicillins    Infection:  None   Service:  INTERMEDIATE    Ht:  1.651 m (5' 5\")   Wt:  47.265 kg (104 lb 3.2 oz)   Admission Wt:  51.256 kg (113 lb)    BMI:  17.34 kg/m 2   BSA:  1.47 m 2            Patient PCP Information     Provider PCP Type    Pranay Reid MD General         Lab Results - 3 Days (17 - 17)      Glucose by meter [[id number]] (Abnormal)  Resulted: 17 1311, Result status: Final result    Ordering provider: Jean Paul Latif MD  17 1220 Resulting lab: POINT OF CARE TEST, GLUCOSE    Specimen Information    Type Source Collected On     17 1220          Components       Value Reference Range Flag Lab   Glucose 147 70 - 99 mg/dL H 170   Comment:  Patient Treated            Glucose by meter [[id number]]  Resulted: 17 1051, Result status: Final result    Ordering provider: Jean Paul Latif MD  17 0800 Resulting lab: POINT OF CARE TEST, GLUCOSE    Specimen Information    Type Source Collected On     17 0800          Components       Value Reference Range Flag Lab   Glucose 84 70 - 99 mg/dL  170            Glucose by meter [[id number]]  Resulted: 17 0211, Result status: Final result    Ordering provider: Jean Paul Latif MD  17 0205 Resulting lab: POINT OF CARE TEST, GLUCOSE    Specimen Information    Type Source Collected On     17 0205          Components       Value Reference Range Flag Lab   Glucose 81 70 - 99 mg/dL  170            Glucose by meter [[id number]] (Abnormal)  Resulted: 17 2336, Result status: Final result    " Ordering provider: Jean Paul Latif MD  01/11/17 2209 Resulting lab: POINT OF CARE TEST, GLUCOSE    Specimen Information    Type Source Collected On     01/11/17 2209          Components       Value Reference Range Flag Lab   Glucose 127 70 - 99 mg/dL H 170            Glucose by meter [[id number]] (Abnormal)  Resulted: 01/11/17 1741, Result status: Final result    Ordering provider: Jean Paul Latif MD  01/11/17 1737 Resulting lab: POINT OF CARE TEST, GLUCOSE    Specimen Information    Type Source Collected On     01/11/17 1737          Components       Value Reference Range Flag Lab   Glucose 101 70 - 99 mg/dL H 170            Glucose by meter [[id number]] (Abnormal)  Resulted: 01/11/17 1126, Result status: Final result    Ordering provider: Jean Paul Latif MD  01/11/17 1118 Resulting lab: POINT OF CARE TEST, GLUCOSE    Specimen Information    Type Source Collected On     01/11/17 1118          Components       Value Reference Range Flag Lab   Glucose 166 70 - 99 mg/dL H 170            Glucose by meter [[id number]]  Resulted: 01/11/17 0801, Result status: Final result    Ordering provider: Jean Paul Latif MD  01/11/17 0753 Resulting lab: POINT OF CARE TEST, GLUCOSE    Specimen Information    Type Source Collected On     01/11/17 0753          Components       Value Reference Range Flag Lab   Glucose 90 70 - 99 mg/dL  170            Glucose by meter [[id number]] (Abnormal)  Resulted: 01/11/17 0206, Result status: Final result    Ordering provider: Jean Paul Latif MD  01/11/17 0200 Resulting lab: POINT OF CARE TEST, GLUCOSE    Specimen Information    Type Source Collected On     01/11/17 0200          Components       Value Reference Range Flag Lab   Glucose 141 70 - 99 mg/dL H 170            Glucose by meter [[id number]] (Abnormal)  Resulted: 01/10/17 2127, Result status: Final result    Ordering provider: Jean Paul Latif MD  01/10/17 2108 Resulting lab: POINT OF CARE TEST, GLUCOSE    Specimen Information     Type Source Collected On     01/10/17 2108          Components       Value Reference Range Flag Lab   Glucose 126 70 - 99 mg/dL H 170            Glucose by meter [[id number]] (Abnormal)  Resulted: 01/10/17 1716, Result status: Final result    Ordering provider: Jean Paul Latif MD  01/10/17 1702 Resulting lab: POINT OF CARE TEST, GLUCOSE    Specimen Information    Type Source Collected On     01/10/17 1702          Components       Value Reference Range Flag Lab   Glucose 185 70 - 99 mg/dL H 170            Glucose by meter [[id number]] (Abnormal)  Resulted: 01/10/17 1227, Result status: Final result    Ordering provider: Jean Paul Latif MD  01/10/17 1207 Resulting lab: POINT OF CARE TEST, GLUCOSE    Specimen Information    Type Source Collected On     01/10/17 1207          Components       Value Reference Range Flag Lab   Glucose 152 70 - 99 mg/dL H 170            Glucose by meter [[id number]] (Abnormal)  Resulted: 01/10/17 0751, Result status: Final result    Ordering provider: Jean Paul Latif MD  01/10/17 0747 Resulting lab: POINT OF CARE TEST, GLUCOSE    Specimen Information    Type Source Collected On     01/10/17 0747          Components       Value Reference Range Flag Lab   Glucose 109 70 - 99 mg/dL H 170            Glucose by meter [[id number]] (Abnormal)  Resulted: 01/10/17 0251, Result status: Final result    Ordering provider: Jean Paul Latif MD  01/10/17 0247 Resulting lab: POINT OF CARE TEST, GLUCOSE    Specimen Information    Type Source Collected On     01/10/17 0247          Components       Value Reference Range Flag Lab   Glucose 128 70 - 99 mg/dL H 170            Glucose by meter [[id number]] (Abnormal)  Resulted: 01/09/17 2121, Result status: Final result    Ordering provider: Jean Paul Latif MD  01/09/17 2113 Resulting lab: POINT OF CARE TEST, GLUCOSE    Specimen Information    Type Source Collected On     01/09/17 2113          Components       Value Reference Range Flag Lab    Glucose 134 70 - 99 mg/dL H 170            Glucose by meter [[id number]] (Abnormal)  Resulted: 01/09/17 1746, Result status: Final result    Ordering provider: Jean Paul Latif MD  01/09/17 1714 Resulting lab: POINT OF CARE TEST, GLUCOSE    Specimen Information    Type Source Collected On     01/09/17 1714          Components       Value Reference Range Flag Lab   Glucose 172 70 - 99 mg/dL H 170            Glucose by meter [[id number]] (Abnormal)  Resulted: 01/09/17 1227, Result status: Final result    Ordering provider: Jean Paul Latif MD  01/09/17 1219 Resulting lab: POINT OF CARE TEST, GLUCOSE    Specimen Information    Type Source Collected On     01/09/17 1219          Components       Value Reference Range Flag Lab   Glucose 299 70 - 99 mg/dL H 170            Glucose by meter [[id number]] (Abnormal)  Resulted: 01/09/17 0311, Result status: Final result    Ordering provider: Jean Paul Latif MD  01/09/17 0251 Resulting lab: POINT OF CARE TEST, GLUCOSE    Specimen Information    Type Source Collected On     01/09/17 0251          Components       Value Reference Range Flag Lab   Glucose 150 70 - 99 mg/dL H 170            Testing Performed By     Lab - Abbreviation Name Director Address Valid Date Range    170 - Unknown POINT OF CARE TEST, GLUCOSE Unknown Unknown 10/31/11 1114 - Present            Unresulted Labs     None      Encounter-Level Documents:     There are no encounter-level documents.      Order-Level Documents:     There are no order-level documents.

## 2017-01-06 LAB
GLUCOSE BLDC GLUCOMTR-MCNC: 123 MG/DL (ref 70–99)
GLUCOSE BLDC GLUCOMTR-MCNC: 129 MG/DL (ref 70–99)
GLUCOSE BLDC GLUCOMTR-MCNC: 133 MG/DL (ref 70–99)
GLUCOSE BLDC GLUCOMTR-MCNC: 144 MG/DL (ref 70–99)
GLUCOSE BLDC GLUCOMTR-MCNC: 177 MG/DL (ref 70–99)
HBA1C MFR BLD: 5.3 % (ref 4.3–6)

## 2017-01-06 PROCEDURE — 25000132 ZZH RX MED GY IP 250 OP 250 PS 637: Mod: GY | Performed by: INTERNAL MEDICINE

## 2017-01-06 PROCEDURE — 94640 AIRWAY INHALATION TREATMENT: CPT | Mod: 76

## 2017-01-06 PROCEDURE — 25000125 ZZHC RX 250: Performed by: INTERNAL MEDICINE

## 2017-01-06 PROCEDURE — 00000146 ZZHCL STATISTIC GLUCOSE BY METER IP

## 2017-01-06 PROCEDURE — A9270 NON-COVERED ITEM OR SERVICE: HCPCS | Mod: GY | Performed by: INTERNAL MEDICINE

## 2017-01-06 PROCEDURE — 12000000 ZZH R&B MED SURG/OB

## 2017-01-06 PROCEDURE — 94640 AIRWAY INHALATION TREATMENT: CPT

## 2017-01-06 PROCEDURE — 99232 SBSQ HOSP IP/OBS MODERATE 35: CPT | Performed by: INTERNAL MEDICINE

## 2017-01-06 PROCEDURE — 83036 HEMOGLOBIN GLYCOSYLATED A1C: CPT | Performed by: INTERNAL MEDICINE

## 2017-01-06 PROCEDURE — 40000275 ZZH STATISTIC RCP TIME EA 10 MIN

## 2017-01-06 PROCEDURE — 36415 COLL VENOUS BLD VENIPUNCTURE: CPT | Performed by: INTERNAL MEDICINE

## 2017-01-06 RX ADMIN — IPRATROPIUM BROMIDE AND ALBUTEROL SULFATE 3 ML: .5; 3 SOLUTION RESPIRATORY (INHALATION) at 07:34

## 2017-01-06 RX ADMIN — TRAMADOL HYDROCHLORIDE 50 MG: 50 TABLET, COATED ORAL at 17:21

## 2017-01-06 RX ADMIN — TRAMADOL HYDROCHLORIDE 50 MG: 50 TABLET, COATED ORAL at 05:01

## 2017-01-06 RX ADMIN — IPRATROPIUM BROMIDE AND ALBUTEROL SULFATE 3 ML: .5; 3 SOLUTION RESPIRATORY (INHALATION) at 15:06

## 2017-01-06 RX ADMIN — IPRATROPIUM BROMIDE AND ALBUTEROL SULFATE 3 ML: .5; 3 SOLUTION RESPIRATORY (INHALATION) at 03:40

## 2017-01-06 RX ADMIN — IPRATROPIUM BROMIDE AND ALBUTEROL SULFATE 3 ML: .5; 3 SOLUTION RESPIRATORY (INHALATION) at 10:59

## 2017-01-06 RX ADMIN — METHYLPREDNISOLONE SODIUM SUCCINATE 62.5 MG: 125 INJECTION, POWDER, FOR SOLUTION INTRAMUSCULAR; INTRAVENOUS at 21:25

## 2017-01-06 RX ADMIN — TRAMADOL HYDROCHLORIDE 50 MG: 50 TABLET, COATED ORAL at 12:46

## 2017-01-06 RX ADMIN — IPRATROPIUM BROMIDE AND ALBUTEROL SULFATE 3 ML: .5; 3 SOLUTION RESPIRATORY (INHALATION) at 19:36

## 2017-01-06 RX ADMIN — TRAMADOL HYDROCHLORIDE 50 MG: 50 TABLET, COATED ORAL at 00:59

## 2017-01-06 RX ADMIN — NICOTINE 1 PATCH: 21 PATCH, EXTENDED RELEASE TRANSDERMAL at 09:01

## 2017-01-06 RX ADMIN — AZITHROMYCIN 250 MG: 250 TABLET, FILM COATED ORAL at 08:55

## 2017-01-06 RX ADMIN — FLUTICASONE PROPIONATE 1 PUFF: 250 POWDER, METERED RESPIRATORY (INHALATION) at 08:55

## 2017-01-06 RX ADMIN — IPRATROPIUM BROMIDE AND ALBUTEROL SULFATE 3 ML: .5; 3 SOLUTION RESPIRATORY (INHALATION) at 23:18

## 2017-01-06 RX ADMIN — INSULIN ASPART 1 UNITS: 100 INJECTION, SOLUTION INTRAVENOUS; SUBCUTANEOUS at 12:46

## 2017-01-06 RX ADMIN — ATENOLOL 25 MG: 25 TABLET ORAL at 08:55

## 2017-01-06 RX ADMIN — FLUTICASONE PROPIONATE 1 PUFF: 250 POWDER, METERED RESPIRATORY (INHALATION) at 21:26

## 2017-01-06 RX ADMIN — HYDROCHLOROTHIAZIDE 25 MG: 25 TABLET ORAL at 08:55

## 2017-01-06 RX ADMIN — TRAMADOL HYDROCHLORIDE 50 MG: 50 TABLET, COATED ORAL at 09:07

## 2017-01-06 RX ADMIN — METHYLPREDNISOLONE SODIUM SUCCINATE 62.5 MG: 125 INJECTION, POWDER, FOR SOLUTION INTRAMUSCULAR; INTRAVENOUS at 08:54

## 2017-01-06 RX ADMIN — TRAMADOL HYDROCHLORIDE 50 MG: 50 TABLET, COATED ORAL at 21:17

## 2017-01-06 NOTE — PROGRESS NOTES
Patient on 2L NC, SpO2 95%.  Lung sounds diminished.  LEVINE.  Neb treatments given as ordered.  Will continue to follow.

## 2017-01-06 NOTE — PROVIDER NOTIFICATION
8:14 PM Paged on call hospitalist Dr. Mclaughlin in regards to pain medication. Will await return call.

## 2017-01-06 NOTE — PLAN OF CARE
Problem: Discharge Planning  Goal: Discharge Planning (Adult, OB, Behavioral, Peds)  CM: Anticipate goal for safe discharge plan -NF

## 2017-01-06 NOTE — PLAN OF CARE
Problem: Goal Outcome Summary  Goal: Goal Outcome Summary  Outcome: No Change  Patient is A&OX4, VSS on 2L of oxygen, RR in mid 20s, CMS intact. Up with SBA, dyspneic on exertion.   Voiding in bathroom or urinal. Regular Diet fair appetite. Generalized Pain controlled with home dose of ultram. Lung sounds diminished.   Will continue to monitor.

## 2017-01-06 NOTE — PROGRESS NOTES
"Care Transition Initial Assessment - [facility]  Reason For Consult: discharge planning  Met with: Patient's roommate, Kwasi   Active Problems:    Obstructive chronic bronchitis with exacerbation (H)         DATA  Lives With: other (see comments): Lives with Kwasi at Kwasi's house in [place]   Living Arrangements: house  Description of Support System: Supportive, Involved  Who is your support system?: Other (specify)  Identified issues/concerns regarding health management: Need for possible rehab therapy prior to returning home.      SW approached by patient's friend, Kwasi, regarding discharge plan. SW reviewed chart as SW had not yet been consulted. Patient is a 76-year-old male admitted on 1/5/17 with Bilateral Pleural Effusion. Per conversation with Kwasi, patient lives with him in a house with many stairs. Kwasi expressed concern regarding patient's ability to move about Kwasi's home and inquired if physical therapy would be assessing pt. SW confirmed that both PT and OT have been consulted to evaluate patient and give their discharge recommendations. SW noted that according to patient's MD's note from this AM, discharge is estimated in \"2-3 days\". Kwasi requests to be updated with any discharge planning, as patient does not have any family or other friends. SW agreed to keep Kwasi updated and confirmed the phone numbers on patient's face sheet are updated; Kwasi works this weekend but is available to return phone calls. Kwasi confirmed patient can move back to his home with him if he is medically stable and strong enough to ambulate without assistance.     ASSESSMENT  Concerns to be addressed: Discharge disposition    PLAN  Patient Goals and Preferences: Unknown at this time  Patient anticipates discharging to:  Unknown at this time    SW awaits discharge recommendations from PT/OT. If TCU is recommended, SW will reach out to both patient and Kwasi to gather referral choices.     KEN Mosquera, LGSW  Ph: " [phone]

## 2017-01-06 NOTE — PROGRESS NOTES
"CLINICAL NUTRITION SERVICES  -  ASSESSMENT NOTE      RECOMMENDATIONS FOR MD/PROVIDER TO ORDER:   Recommend SLP consult, as pt is reporting difficulty swallowing solids.   Recommendations Ordered by Registered Dietitian (RD):   1) Room Service with Assist  2) Plus2 shake BID between meals  3) 4 oz traditional shake with meals TID   Future/Additional Recommendations: None at this time.  Non-nutrition related: Pt told me he is missing his reading glasses, and would appreciate help in replacing them. Unsure of the Rx.   Malnutrition: Severe malnutrition in the context of acute and chronic disease       REASON FOR ASSESSMENT  Chuck Lopez is a 76 year old male seen by Registered Dietitian for Admission Nutrition Risk Screen - Reduced oral intake over the last month      NUTRITION HISTORY  Information obtained from pt:  - Patient is on no special diet at home  - Typical food intake is via grazing, no set meal times  - Diet history: black coffee, crackers, olive crackers, cookies, candy, candy bars, oatmeal, applesauce, cheeseburger x1 daily, turkey; pt states he is allergic to MSG      CURRENT NUTRITION ORDERS  Diet Order:     Regular     Current Intake/Tolerance:  Per nsg flowsheet, pt consuming 75% of meals with a good appetite. Per pt report, he wants to eat/is hungry but is finding it difficult due to choking on solid food; states he is able to handle liquids just fine.      PHYSICAL FINDINGS  Observed  Muscle Wasting: moderate  Subcutaneous fat loss: severe  Difficulty swallowing solid food, per pt; liquids ok  Obtained from Chart/Interdisciplinary Team  Cachectic appearance - per ED provider note, 1/5  No edema noted    ANTHROPOMETRICS  Height: 5' 5\" - 165.1 cm  Weight: 105 lbs 9.61 oz - 47.9 kg  Body mass index is 17.57 kg/(m^2).  Weight Status:  Underweight BMI <18.5  IBW: 62 kg  % IBW: 77%  Weight History: Pt reports having lost >50 pounds although this is over the course of approximately 1 year, per ED " provider note on 1/5/17. Per our data, we have him as having lost about 15 pounds over the last year, though he has lost about 25 pounds since 2014.  Filed Vitals:    01/05/17 1109 01/06/17 0641   Weight: 51.256 kg (113 lb) 47.9 kg (105 lb 9.6 oz)     Wt Readings from Last 15 Encounters:   01/06/17 47.9 kg (105 lb 9.6 oz)   12/21/16 51.256 kg (113 lb)   11/26/16 53.9 kg (118 lb 13.3 oz)   10/06/16 49.442 kg (109 lb)   08/16/16 49.442 kg (109 lb)   07/18/16 51.71 kg (114 lb)   01/05/16 54.885 kg (121 lb)   09/16/15 53.978 kg (119 lb)   02/12/15 50.803 kg (112 lb)   09/02/14 51.483 kg (113 lb 8 oz)   04/16/14 54.432 kg (120 lb)   02/28/14 58.968 kg (130 lb)   12/12/13 55.792 kg (123 lb)   07/08/13 56.246 kg (124 lb)   06/26/13 56.7 kg (125 lb)       LABS  Labs reviewed    MEDICATIONS  Medications reviewed    Dosing Weight 48 kg (actual body weight 1/6/17)    ASSESSED NUTRITION NEEDS:  Estimated Energy Needs: 0426-7295  kcals (35-40 Kcal/Kg)  Justification: repletion and underweight  Estimated Protein Needs: 58-72 grams protein (1.2-1.5 g pro/Kg)  Justification: Repletion and preservation of lean body mass  Estimated Fluid Needs: 1 mL/Kcal  Justification: maintenance and per provider pending fluid status    MALNUTRITION:  % Weight Loss:  11% in 1 month (severe malnutrition)  % Intake:  Decreased intake does not meet criteria for malnutrition, but watch for change (pt endorses difficulty swallowing solid foods)  Subcutaneous Fat Loss: Severe globally  Muscle Loss:  Moderate globally  Fluid Retention:  None noted    Malnutrition Diagnosis: Severe malnutrition  In Context of:  Acute and Chronic illness or disease    NUTRITION DIAGNOSIS:  Inadequate oral intake related to  as evidenced by low BMI (17.6), cachectic appearance    NUTRITION INTERVENTIONS  Recommendations / Nutrition Prescription  Recommend nutritional supplements    Implementation  Nutrition education: Per Provider order if indicated   1) Room Service with  Assist  2) Plus2 shake at 10am and 2pm  3) 4 oz traditional shake with meals    Nutrition Goals  Pt to eat at least 75% of assessed nutrition needs (1260 kcals, 45 g PRO) daily, through meals, snacks, and supplements.    MONITORING AND EVALUATION:  Progress towards goals will be monitored and evaluated per protocol and Practice Guidelines      Non-nutrition Note: Pt told me he is missing his reading glasses, and would appreciate help in replacing them. Unsure of the Rx.    -----------------------------  Sylvie Patiño  Dietetic Intern  [facility]  pager: [phone]

## 2017-01-06 NOTE — PROGRESS NOTES
"[facility]  Hospitalist Progress Note        Luismgary Mcdonald MD  01/06/2017        Interval History:      Patient seen and assessed. He report feeling slightly better, but report no energy.           Assessment and Plan:      Mr. Chuck Lopez is a 76-year-old male with a known history of COPD, non-oxygen dependent, hypertension, chronic hepatitis C, chronic thrombocytopenia, diabetes mellitus type 2 diet controlled, hypertension, ongoing tobacco use who presents to the emergency room with progressive shortness of breath:  Acute hypoxic respiratory failure: Due to COPD exacerbation with suspect URI. Slightly feeling better.   --- Continue IV solumedrol for now  --- Continue bronchodilators  --- Continue azithromycin  ---- Supplement oxygen to keep saturation 88-89%  --- strongly advised to stop smoking  --- Continue nicotine patch  HTN: PTA hctz and atenolol  --- Continue the same  Type 2 DM: Diet controlled  --- Continue [facility]    Disposition: 2-3 days                   Physical Exam:      Heart Rate: 99, Blood pressure 160/72, pulse 95, temperature 96.4  F (35.8  C), temperature source Oral, resp. rate 18, height 1.651 m (5' 5\"), weight 47.9 kg (105 lb 9.6 oz), SpO2 94 %.  Filed Vitals:    01/05/17 1109 01/06/17 0641   Weight: 51.256 kg (113 lb) 47.9 kg (105 lb 9.6 oz)     Vital Signs with Ranges  Temp:  [96  F (35.6  C)-98.5  F (36.9  C)] 96.4  F (35.8  C)  Pulse:  [76-95] 95  Heart Rate:  [] 99  Resp:  [16-40] 18  BP: (110-160)/() 160/72 mmHg  SpO2:  [91 %-100 %] 94 %  I/O's Last 24 hours  I/O last 3 completed shifts:  In: 480 [P.O.:480]  Out: 600 [Urine:600]    Constitutional: Awake and alert not in any distress.   Psych: Good mood and affect   Neuro: AAOX 3. No focal neurological deficit. CN 11-XII grossly intact.   Eyes: Saige conjunctiva. Pupils RRR.   ENT: Nose and throat normal.   Lymph: Not palpable.   Cardiovascular: HR regular rhythm. S1, S2 normal. No murmur. "   Lungs: Decrease air entry bilaterally with scattered wheeze.    Abdomen: Soft. Not tender. +BS.   Skin: Normal. No rash or ulcer.   Musculoskeletal: No deformities   Other: Lower extremities edema: None.              Medications:          ipratropium - albuterol 0.5 mg/2.5 mg/3 mL  3 mL Nebulization Q4H     methylPREDNISolone  62.5 mg Intravenous Q12H     azithromycin  250 mg Oral Daily     insulin aspart  1-3 Units Subcutaneous TID AC     insulin aspart  1-3 Units Subcutaneous At Bedtime     atenolol (TENORMIN) tablet 25 mg  25 mg Oral Daily     fluticasone  1 puff Inhalation BID     hydrochlorothiazide (HYDRODIURIL) tablet 25 mg  25 mg Oral Daily     nicotine   Transdermal Q8H     nicotine   Transdermal Daily     nicotine  1 patch Transdermal Daily     PRN Meds: albuterol, naloxone, albuterol, acetaminophen, senna-docusate, ondansetron **OR** ondansetron, glucose **OR** dextrose **OR** glucagon, traMADol, sodium chloride         Data:      All new lab and imaging data was reviewed.   Recent Labs   Lab Test  01/05/17   1124  11/25/16   0720  11/22/16   0505  03/31/14 09/29/09   WBC  6.8  11.1*  12.2*   < >   --    < >   --    HGB  15.6  14.5  14.2   < >   --    < >   --    MCV  93  95  94   < >   --    < >   --    PLT  120*  117*  54*   < >   --    < >   --    INR   --    --    --    --   1.1   --   1.0    < > = values in this interval not displayed.      Recent Labs   Lab Test  01/05/17   1124  11/25/16   0720  11/23/16   0759  11/22/16   2150   11/22/16   0505  11/22/16   0125   NA  136   --    --    --    --   137  135   POTASSIUM  3.4   --   4.7  3.7   < >  3.0*  3.1*   CHLORIDE  98   --    --    --    --   99  95   CO2  28   --    --    --    --   27  30   BUN  14   --    --    --    --   11  12   CR  0.81  0.80   --    --    --   0.70  0.72   ANIONGAP  10   --    --    --    --   11  10   MATTHEW  9.0   --    --    --    --   7.7*  8.7   GLC  151*   --    --    --    --   161*  104*    < > = values in this  interval not displayed.     Recent Labs   Lab Test  01/05/17   1124  11/22/16   1820  11/22/16   1405   TROPI  <0.015  The 99th percentile for upper reference range is 0.045 ug/L.  Troponin values in   the range of 0.045 - 0.120 ug/L may be associated with risks of adverse   clinical events.    0.050*  0.050*          Bettie Mcdonald MD  Internal medicine Hospitalist:   Pager [phone]    January 6, 2017

## 2017-01-06 NOTE — PLAN OF CARE
Problem: Goal Outcome Summary  Goal: Goal Outcome Summary  Outcome: No Change  VSS on 2 LPM NS, added humidification per request for throat discomfort. LEVINE, LS diminished. Up to BR with SBA. Plan to continue with steroids and nebs.

## 2017-01-07 ENCOUNTER — APPOINTMENT (OUTPATIENT)
Dept: PHYSICAL THERAPY | Facility: CLINIC | Age: 77
DRG: 190 | End: 2017-01-07
Attending: INTERNAL MEDICINE
Payer: MEDICARE

## 2017-01-07 ENCOUNTER — APPOINTMENT (OUTPATIENT)
Dept: GENERAL RADIOLOGY | Facility: CLINIC | Age: 77
DRG: 190 | End: 2017-01-07
Attending: INTERNAL MEDICINE
Payer: MEDICARE

## 2017-01-07 LAB
BASE EXCESS BLDA CALC-SCNC: 8.9 MMOL/L
GLUCOSE BLDC GLUCOMTR-MCNC: 120 MG/DL (ref 70–99)
GLUCOSE BLDC GLUCOMTR-MCNC: 123 MG/DL (ref 70–99)
GLUCOSE BLDC GLUCOMTR-MCNC: 153 MG/DL (ref 70–99)
GLUCOSE BLDC GLUCOMTR-MCNC: 242 MG/DL (ref 70–99)
HCO3 BLD-SCNC: 34 MMOL/L (ref 21–28)
LACTATE BLD-SCNC: 2.6 MMOL/L (ref 0.7–2.1)
OXYHGB MFR BLD: 99 % (ref 92–100)
PCO2 BLD: 51 MM HG (ref 35–45)
PH BLD: 7.44 PH (ref 7.35–7.45)
PO2 BLD: 119 MM HG (ref 80–105)

## 2017-01-07 PROCEDURE — 82805 BLOOD GASES W/O2 SATURATION: CPT | Performed by: INTERNAL MEDICINE

## 2017-01-07 PROCEDURE — 40000885 ZZH STATISTIC STEP DOWN HRS EVENING

## 2017-01-07 PROCEDURE — 25000132 ZZH RX MED GY IP 250 OP 250 PS 637: Mod: GY | Performed by: INTERNAL MEDICINE

## 2017-01-07 PROCEDURE — 94640 AIRWAY INHALATION TREATMENT: CPT

## 2017-01-07 PROCEDURE — 94640 AIRWAY INHALATION TREATMENT: CPT | Mod: 76

## 2017-01-07 PROCEDURE — 27210339 ZZH CIRCUIT HUMIDITY W/CPAP BIP

## 2017-01-07 PROCEDURE — 36600 WITHDRAWAL OF ARTERIAL BLOOD: CPT

## 2017-01-07 PROCEDURE — 40000275 ZZH STATISTIC RCP TIME EA 10 MIN

## 2017-01-07 PROCEDURE — 21400005 ZZH R&B CCU CICU INTERMEDIATE

## 2017-01-07 PROCEDURE — 97530 THERAPEUTIC ACTIVITIES: CPT | Mod: GP | Performed by: PHYSICAL THERAPIST

## 2017-01-07 PROCEDURE — 94660 CPAP INITIATION&MGMT: CPT

## 2017-01-07 PROCEDURE — 27210338 ZZH CIRCUIT HUMID FACE/TRACH MSK

## 2017-01-07 PROCEDURE — 40000886 ZZH STATISTIC STEP DOWN HRS DAY

## 2017-01-07 PROCEDURE — 97162 PT EVAL MOD COMPLEX 30 MIN: CPT | Mod: GP | Performed by: PHYSICAL THERAPIST

## 2017-01-07 PROCEDURE — 83605 ASSAY OF LACTIC ACID: CPT | Performed by: INTERNAL MEDICINE

## 2017-01-07 PROCEDURE — A9270 NON-COVERED ITEM OR SERVICE: HCPCS | Mod: GY | Performed by: INTERNAL MEDICINE

## 2017-01-07 PROCEDURE — 25000308 HC RX OP HPI UCR WEL MED 250 IP 250: Performed by: INTERNAL MEDICINE

## 2017-01-07 PROCEDURE — 40000193 ZZH STATISTIC PT WARD VISIT: Performed by: PHYSICAL THERAPIST

## 2017-01-07 PROCEDURE — 00000146 ZZHCL STATISTIC GLUCOSE BY METER IP

## 2017-01-07 PROCEDURE — 25000125 ZZHC RX 250: Performed by: INTERNAL MEDICINE

## 2017-01-07 PROCEDURE — 99232 SBSQ HOSP IP/OBS MODERATE 35: CPT | Performed by: INTERNAL MEDICINE

## 2017-01-07 PROCEDURE — 71020 XR CHEST 2 VW: CPT

## 2017-01-07 RX ORDER — METHYLPREDNISOLONE SODIUM SUCCINATE 125 MG/2ML
60 INJECTION, POWDER, LYOPHILIZED, FOR SOLUTION INTRAMUSCULAR; INTRAVENOUS EVERY 8 HOURS
Status: DISCONTINUED | OUTPATIENT
Start: 2017-01-07 | End: 2017-01-10

## 2017-01-07 RX ORDER — LIDOCAINE 40 MG/G
CREAM TOPICAL
Status: DISCONTINUED | OUTPATIENT
Start: 2017-01-07 | End: 2017-01-08

## 2017-01-07 RX ADMIN — IPRATROPIUM BROMIDE AND ALBUTEROL SULFATE 3 ML: .5; 3 SOLUTION RESPIRATORY (INHALATION) at 07:52

## 2017-01-07 RX ADMIN — METHYLPREDNISOLONE SODIUM SUCCINATE 62.5 MG: 125 INJECTION, POWDER, FOR SOLUTION INTRAMUSCULAR; INTRAVENOUS at 21:16

## 2017-01-07 RX ADMIN — FLUTICASONE PROPIONATE 1 PUFF: 250 POWDER, METERED RESPIRATORY (INHALATION) at 21:10

## 2017-01-07 RX ADMIN — TRAMADOL HYDROCHLORIDE 50 MG: 50 TABLET, COATED ORAL at 18:42

## 2017-01-07 RX ADMIN — NICOTINE 1 PATCH: 21 PATCH, EXTENDED RELEASE TRANSDERMAL at 09:39

## 2017-01-07 RX ADMIN — HYDROCHLOROTHIAZIDE 25 MG: 25 TABLET ORAL at 09:40

## 2017-01-07 RX ADMIN — IPRATROPIUM BROMIDE AND ALBUTEROL SULFATE 3 ML: .5; 3 SOLUTION RESPIRATORY (INHALATION) at 03:05

## 2017-01-07 RX ADMIN — IPRATROPIUM BROMIDE AND ALBUTEROL SULFATE 3 ML: .5; 3 SOLUTION RESPIRATORY (INHALATION) at 20:17

## 2017-01-07 RX ADMIN — METHYLPREDNISOLONE SODIUM SUCCINATE 62.5 MG: 125 INJECTION, POWDER, FOR SOLUTION INTRAMUSCULAR; INTRAVENOUS at 09:39

## 2017-01-07 RX ADMIN — TRAMADOL HYDROCHLORIDE 50 MG: 50 TABLET, COATED ORAL at 11:47

## 2017-01-07 RX ADMIN — ATENOLOL 25 MG: 25 TABLET ORAL at 09:40

## 2017-01-07 RX ADMIN — AZITHROMYCIN 250 MG: 250 TABLET, FILM COATED ORAL at 09:40

## 2017-01-07 RX ADMIN — TRAMADOL HYDROCHLORIDE 50 MG: 50 TABLET, COATED ORAL at 23:09

## 2017-01-07 RX ADMIN — TRAMADOL HYDROCHLORIDE 50 MG: 50 TABLET, COATED ORAL at 01:15

## 2017-01-07 RX ADMIN — TRAMADOL HYDROCHLORIDE 50 MG: 50 TABLET, COATED ORAL at 06:48

## 2017-01-07 RX ADMIN — IPRATROPIUM BROMIDE AND ALBUTEROL SULFATE 3 ML: .5; 3 SOLUTION RESPIRATORY (INHALATION) at 15:26

## 2017-01-07 RX ADMIN — FLUTICASONE PROPIONATE 1 PUFF: 250 POWDER, METERED RESPIRATORY (INHALATION) at 09:48

## 2017-01-07 RX ADMIN — ALBUTEROL SULFATE: 2.5 SOLUTION RESPIRATORY (INHALATION) at 10:16

## 2017-01-07 ASSESSMENT — PAIN DESCRIPTION - DESCRIPTORS: DESCRIPTORS: ACHING

## 2017-01-07 NOTE — PLAN OF CARE
Problem: Goal Outcome Summary  Goal: Goal Outcome Summary  Outcome: Therapy, progress toward functional goals is gradual  PT: At baseline, pt. Lives is homeless, living in basement of friends home with several other roommates living in the same house. Pt. Lives in basement, accessible with one railing, with bathroom on first floor. Pt. Stated was hosp. With [facility] 1 month ago, though no record of this noted in EPIC documentation this hospitalization. Pt. States he was amb. Within the house without AD with some difficulty due to SOB, reports amb. Up stairs was difficult, with pt. Needing to stop ascending stairs due to SOB. Pt. States had amb. In [employer] twice in the last  Month, leaning on grocery cart for assistance. Pt. Does not cook but states his diet consists of olive crackers, cookies, oatmeal and apple sauce. Pt. Was irritable this PT treatment session, stating his roommate does not want him home until he is stronger. Currently, pt. Amb. 10 ft. Twice using FWW, SBA, becoming mod. SOB post. Amb. Using PLB to decrease RR. O2 sats on 2LNC dropped from 95% in sitting prior to amb. To 89% post. Amb., increasing to 95% in 3 min. Of seated rest break. Pt. Reports he just has no energy and does not see how he will manage at home. Agree with pt. Pt. Does not appear to have much support from roommates at home, has basement in lower level needing to ascend steps to use toilet on main level of home, pt. Has poor nutrition intake at home. Recommend TCU upon DC.

## 2017-01-07 NOTE — PROVIDER NOTIFICATION
MD Notification    Notified Person:  MD    Notified Persons Name: Dr. Mcdonald    Notification Date/Time: 1/7/2017 10:04 AM     Notification Interaction:  Paged Physician    Purpose of Notification: FYI Patient being placed on BiPAP per respiratory recommendation. Lactic being drawn as well.    9:50 AM - Patient increasing SOB, face reddened, extremities reddened, saturation 91-95% 0.5L, tripod position, RT present placing patient on BiPAP    Orders Received: Transfer to C    Comments:

## 2017-01-07 NOTE — PROGRESS NOTES
I found Pt in distress and not able to make full sentences, set him om BiPAP, ABG sent, 2 neb given inline. HR stable. BBS diminished bilateral, Spo2 96. Pt feel better. Symptoms subsides. Will follow up with ABG results.1/7/2017  Hali Emerson

## 2017-01-07 NOTE — PROVIDER NOTIFICATION
MD Notification    Notified Person:  MD    Notified Persons Name: Dr. Mcdonald    Notification Date/Time: 1/7/2017 10:24 AM     Notification Interaction:  Talked with Physician    Purpose of Notification: Lactic at 2.6    Orders Received:

## 2017-01-07 NOTE — PLAN OF CARE
Problem: Goal Outcome Summary  Goal: Goal Outcome Summary  OT: Will hold therapy as pt has been transferred to CICU, reschedule when appropriate.

## 2017-01-07 NOTE — PLAN OF CARE
Problem: Goal Outcome Summary  Goal: Goal Outcome Summary  Outcome: No Change  A/O, VSS on 2L of oxygen, RR in mid 20s, Up with SBA, dyspneic on exertion.   Voiding per urinal. Regular Diet fair appetite. Generalized Pain controlled with home dose of ultram. Lung sounds diminished. BG monitored, insulin as needed.    Will continue to monitor.

## 2017-01-07 NOTE — PROGRESS NOTES
"[facility]  Hospitalist Progress Note        Luismgary Mcdonald MD  01/07/2017        Interval History:      Patient seen and assessed. He report feeling more short of breath since this morning.            Assessment and Plan:      Mr. Chuck Lopez is a 76-year-old male with a known history of COPD, non-oxygen dependent, hypertension, chronic hepatitis C, chronic thrombocytopenia, diabetes mellitus type 2 diet controlled, hypertension, ongoing tobacco use who presents to the emergency room with progressive shortness of breath:  Acute hypoxic respiratory failure: Due to COPD exacerbation with suspect URI.   ---Breathing seems more labored today. Using accessory respiratory muscles.   --- will place him IMC and try BiPAP as needed  ---- ABG stat and bronchodilators  --- Continue IV solumedrol   --- Continue bronchodilators  --- Continue azithromycin  ---- Supplement oxygen to keep saturation 88-89%  --- strongly advised to stop smoking  --- Continue nicotine patch  HTN: PTA hctz and atenolol  --- Continue the same  Type 2 DM: Diet controlled  --- Continue ISSC    Disposition: 2-3 days    Bettie Mcdonald MD  Internal medicine Hospitalist:   Pager [phone]    1/7/2017                     Physical Exam:      Heart Rate: 104, Blood pressure 168/65, pulse 85, temperature 96.8  F (36  C), temperature source Oral, resp. rate 18, height 1.651 m (5' 5\"), weight 48.2 kg (106 lb 4.2 oz), SpO2 93 %.  Filed Vitals:    01/05/17 1109 01/06/17 0641 01/07/17 0509   Weight: 51.256 kg (113 lb) 47.9 kg (105 lb 9.6 oz) 48.2 kg (106 lb 4.2 oz)     Vital Signs with Ranges  Temp:  [96.8  F (36  C)-97.5  F (36.4  C)] 96.8  F (36  C)  Pulse:  [85] 85  Heart Rate:  [] 104  Resp:  [18-24] 18  BP: (129-168)/(63-73) 168/65 mmHg  SpO2:  [93 %-98 %] 93 %  I/O's Last 24 hours  I/O last 3 completed shifts:  In: 520 [P.O.:520]  Out: 1225 [Urine:1225]    Constitutional:  in distress due to Labored breathing   Psych: " Good mood and affect   Neuro: AAOX 3. No focal neurological deficit. CN 11-XII grossly intact.   Eyes: Saige conjunctiva. Pupils RRR.   ENT: Nose and throat normal.   Lymph: Not palpable.   Cardiovascular: HR regular rhythm. S1, S2 normal. No murmur.   Lungs: Decrease air entry bilaterally with scattered wheeze.    Abdomen: Soft. Not tender. +BS.   Skin: Normal. No rash or ulcer.   Musculoskeletal: No deformities   Other: Lower extremities edema: None.              Medications:          ipratropium - albuterol 0.5 mg/2.5 mg/3 mL  3 mL Nebulization Q4H     methylPREDNISolone  62.5 mg Intravenous Q12H     azithromycin  250 mg Oral Daily     insulin aspart  1-3 Units Subcutaneous TID AC     insulin aspart  1-3 Units Subcutaneous At Bedtime     atenolol (TENORMIN) tablet 25 mg  25 mg Oral Daily     fluticasone  1 puff Inhalation BID     hydrochlorothiazide (HYDRODIURIL) tablet 25 mg  25 mg Oral Daily     nicotine   Transdermal Q8H     nicotine   Transdermal Daily     nicotine  1 patch Transdermal Daily     PRN Meds: naloxone, albuterol, acetaminophen, senna-docusate, ondansetron **OR** ondansetron, glucose **OR** dextrose **OR** glucagon, traMADol, sodium chloride         Data:      All new lab and imaging data was reviewed.   Recent Labs   Lab Test  01/05/17   1124  11/25/16   0720  11/22/16   0505  03/31/14 09/29/09   WBC  6.8  11.1*  12.2*   < >   --    < >   --    HGB  15.6  14.5  14.2   < >   --    < >   --    MCV  93  95  94   < >   --    < >   --    PLT  120*  117*  54*   < >   --    < >   --    INR   --    --    --    --   1.1   --   1.0    < > = values in this interval not displayed.      Recent Labs   Lab Test  01/05/17   1124  11/25/16   0720  11/23/16   0759  11/22/16   2150   11/22/16   0505  11/22/16   0125   NA  136   --    --    --    --   137  135   POTASSIUM  3.4   --   4.7  3.7   < >  3.0*  3.1*   CHLORIDE  98   --    --    --    --   99  95   CO2  28   --    --    --    --   27  30   BUN  14   --     --    --    --   11  12   CR  0.81  0.80   --    --    --   0.70  0.72   ANIONGAP  10   --    --    --    --   11  10   MATTHEW  9.0   --    --    --    --   7.7*  8.7   GLC  151*   --    --    --    --   161*  104*    < > = values in this interval not displayed.     Recent Labs   Lab Test  01/05/17   1124  11/22/16   1820  11/22/16   1405   TROPI  <0.015  The 99th percentile for upper reference range is 0.045 ug/L.  Troponin values in   the range of 0.045 - 0.120 ug/L may be associated with risks of adverse   clinical events.    0.050*  0.050*

## 2017-01-07 NOTE — PLAN OF CARE
Problem: Goal Outcome Summary  Goal: Goal Outcome Summary  Outcome: Improving  VSS. On 2LPM NC sating 98%, pt did not want RN to wean overnight. LEVINE. Infrequent cough. LS dim. Tramadol given for neck pain x1. Slept well. Using urinal at bedside. Continues on azithromycin, nebs, and solumedrol. Needs PT/OT evals.

## 2017-01-07 NOTE — PLAN OF CARE
Problem: Goal Outcome Summary  Goal: Goal Outcome Summary  Outcome: Declining  Patient transferred to CICU with RT/aide/flying squad RN in stable condition. VSS. BiPAP removed per patient request, nasal cannula 3L. Report given to RN. Meds sent w/patient.

## 2017-01-08 LAB
GLUCOSE BLDC GLUCOMTR-MCNC: 119 MG/DL (ref 70–99)
GLUCOSE BLDC GLUCOMTR-MCNC: 121 MG/DL (ref 70–99)
GLUCOSE BLDC GLUCOMTR-MCNC: 123 MG/DL (ref 70–99)
GLUCOSE BLDC GLUCOMTR-MCNC: 146 MG/DL (ref 70–99)
GLUCOSE BLDC GLUCOMTR-MCNC: 177 MG/DL (ref 70–99)

## 2017-01-08 PROCEDURE — 25000132 ZZH RX MED GY IP 250 OP 250 PS 637: Mod: GY | Performed by: INTERNAL MEDICINE

## 2017-01-08 PROCEDURE — 25000125 ZZHC RX 250: Performed by: INTERNAL MEDICINE

## 2017-01-08 PROCEDURE — 99232 SBSQ HOSP IP/OBS MODERATE 35: CPT | Performed by: INTERNAL MEDICINE

## 2017-01-08 PROCEDURE — A9270 NON-COVERED ITEM OR SERVICE: HCPCS | Mod: GY | Performed by: INTERNAL MEDICINE

## 2017-01-08 PROCEDURE — 94640 AIRWAY INHALATION TREATMENT: CPT

## 2017-01-08 PROCEDURE — 40000275 ZZH STATISTIC RCP TIME EA 10 MIN

## 2017-01-08 PROCEDURE — 40000886 ZZH STATISTIC STEP DOWN HRS DAY

## 2017-01-08 PROCEDURE — 00000146 ZZHCL STATISTIC GLUCOSE BY METER IP

## 2017-01-08 PROCEDURE — 12000000 ZZH R&B MED SURG/OB

## 2017-01-08 PROCEDURE — 94640 AIRWAY INHALATION TREATMENT: CPT | Mod: 76

## 2017-01-08 RX ADMIN — TRAMADOL HYDROCHLORIDE 50 MG: 50 TABLET, COATED ORAL at 12:37

## 2017-01-08 RX ADMIN — METHYLPREDNISOLONE SODIUM SUCCINATE 62.5 MG: 125 INJECTION, POWDER, FOR SOLUTION INTRAMUSCULAR; INTRAVENOUS at 05:43

## 2017-01-08 RX ADMIN — AZITHROMYCIN 250 MG: 250 TABLET, FILM COATED ORAL at 08:12

## 2017-01-08 RX ADMIN — IPRATROPIUM BROMIDE AND ALBUTEROL SULFATE 3 ML: .5; 3 SOLUTION RESPIRATORY (INHALATION) at 23:11

## 2017-01-08 RX ADMIN — IPRATROPIUM BROMIDE AND ALBUTEROL SULFATE 3 ML: .5; 3 SOLUTION RESPIRATORY (INHALATION) at 20:06

## 2017-01-08 RX ADMIN — IPRATROPIUM BROMIDE AND ALBUTEROL SULFATE 3 ML: .5; 3 SOLUTION RESPIRATORY (INHALATION) at 03:05

## 2017-01-08 RX ADMIN — TRAMADOL HYDROCHLORIDE 50 MG: 50 TABLET, COATED ORAL at 03:18

## 2017-01-08 RX ADMIN — METHYLPREDNISOLONE SODIUM SUCCINATE 62.5 MG: 125 INJECTION, POWDER, FOR SOLUTION INTRAMUSCULAR; INTRAVENOUS at 12:44

## 2017-01-08 RX ADMIN — FLUTICASONE PROPIONATE 1 PUFF: 250 POWDER, METERED RESPIRATORY (INHALATION) at 21:03

## 2017-01-08 RX ADMIN — IPRATROPIUM BROMIDE AND ALBUTEROL SULFATE 3 ML: .5; 3 SOLUTION RESPIRATORY (INHALATION) at 11:52

## 2017-01-08 RX ADMIN — NICOTINE 1 PATCH: 21 PATCH, EXTENDED RELEASE TRANSDERMAL at 08:12

## 2017-01-08 RX ADMIN — FLUTICASONE PROPIONATE 1 PUFF: 250 POWDER, METERED RESPIRATORY (INHALATION) at 08:24

## 2017-01-08 RX ADMIN — HYDROCHLOROTHIAZIDE 25 MG: 25 TABLET ORAL at 08:12

## 2017-01-08 RX ADMIN — INSULIN ASPART 1 UNITS: 100 INJECTION, SOLUTION INTRAVENOUS; SUBCUTANEOUS at 12:39

## 2017-01-08 RX ADMIN — TRAMADOL HYDROCHLORIDE 50 MG: 50 TABLET, COATED ORAL at 08:12

## 2017-01-08 RX ADMIN — IPRATROPIUM BROMIDE AND ALBUTEROL SULFATE 3 ML: .5; 3 SOLUTION RESPIRATORY (INHALATION) at 15:06

## 2017-01-08 RX ADMIN — IPRATROPIUM BROMIDE AND ALBUTEROL SULFATE 3 ML: .5; 3 SOLUTION RESPIRATORY (INHALATION) at 07:20

## 2017-01-08 RX ADMIN — TRAMADOL HYDROCHLORIDE 50 MG: 50 TABLET, COATED ORAL at 21:03

## 2017-01-08 RX ADMIN — ATENOLOL 25 MG: 25 TABLET ORAL at 08:12

## 2017-01-08 RX ADMIN — METHYLPREDNISOLONE SODIUM SUCCINATE 62.5 MG: 125 INJECTION, POWDER, FOR SOLUTION INTRAMUSCULAR; INTRAVENOUS at 21:03

## 2017-01-08 RX ADMIN — TRAMADOL HYDROCHLORIDE 50 MG: 50 TABLET, COATED ORAL at 17:01

## 2017-01-08 ASSESSMENT — PAIN DESCRIPTION - DESCRIPTORS: DESCRIPTORS: ACHING

## 2017-01-08 NOTE — PROGRESS NOTES
"[facility]  Hospitalist Progress Note        Luismgary Mcdonald MD  01/08/2017        Interval History:      Patient seen and assessed. He report feeling much better.            Assessment and Plan:      Mr. Chuck Lopez is a 76-year-old male with a known history of COPD, non-oxygen dependent, hypertension, chronic hepatitis C, chronic thrombocytopenia, diabetes mellitus type 2 diet controlled, hypertension, ongoing tobacco use who presents to the emergency room with progressive shortness of breath:  Acute hypoxic respiratory failure: Due to COPD exacerbation with suspect URI.   --- Placed in IMC and BiPAP yesterday. Feel better and off Bipap  ---- ABG stat was done and unremarkable.  ---- D/C IMC and BiPAP-->Ok to transfer to the floor.  --- On IV solumedrol 60 mg TID and will change to BID and monitor. Likely to po prednisone tomorrow  --- Continue bronchodilators  --- Continue azithromycin  ---- Supplement oxygen to keep saturation 88-89%  --- strongly advised to stop smoking  --- Continue nicotine patch  HTN: PTA hctz and atenolol  --- Continue the same  Type 2 DM: Diet controlled  --- Continue ISSC    Disposition: 2-3 days    Bettie Mcdonald MD  Internal medicine Hospitalist:   Pager [phone]    1/8/2017                     Physical Exam:      Heart Rate: 78, Blood pressure 149/78, pulse 78, temperature 98  F (36.7  C), temperature source Oral, resp. rate 16, height 1.651 m (5' 5\"), weight 47.9 kg (105 lb 9.6 oz), SpO2 98 %.  Filed Vitals:    01/06/17 0641 01/07/17 0509 01/08/17 0552   Weight: 47.9 kg (105 lb 9.6 oz) 48.2 kg (106 lb 4.2 oz) 47.9 kg (105 lb 9.6 oz)     Vital Signs with Ranges  Temp:  [97.9  F (36.6  C)-98.6  F (37  C)] 98  F (36.7  C)  Pulse:  [78] 78  Heart Rate:  [71-91] 78  Resp:  [16-18] 16  BP: (113-168)/(72-92) 149/78 mmHg  FiO2 (%):  [40 %] 40 %  SpO2:  [94 %-98 %] 98 %  I/O's Last 24 hours  I/O last 3 completed shifts:  In: 600 [P.O.:600]  Out: 675 " [Urine:675]    Constitutional:  in distress due to Labored breathing   Psych: Good mood and affect   Neuro: AAOX 3. No focal neurological deficit. CN 11-XII grossly intact.   Eyes: Saige conjunctiva. Pupils RRR.   ENT: Nose and throat normal.   Lymph: Not palpable.   Cardiovascular: HR regular rhythm. S1, S2 normal. No murmur.   Lungs: improved air entry bilaterally and no wheeze.    Abdomen: Soft. Not tender. +BS.   Skin: Normal. No rash or ulcer.   Musculoskeletal: No deformities   Other: Lower extremities edema: None.              Medications:          sodium chloride (PF)  3 mL Intracatheter Q8H     methylPREDNISolone  62.5 mg Intravenous Q8H     ipratropium - albuterol 0.5 mg/2.5 mg/3 mL  3 mL Nebulization Q4H     azithromycin  250 mg Oral Daily     insulin aspart  1-3 Units Subcutaneous TID AC     insulin aspart  1-3 Units Subcutaneous At Bedtime     atenolol (TENORMIN) tablet 25 mg  25 mg Oral Daily     fluticasone  1 puff Inhalation BID     hydrochlorothiazide (HYDRODIURIL) tablet 25 mg  25 mg Oral Daily     nicotine   Transdermal Q8H     nicotine   Transdermal Daily     nicotine  1 patch Transdermal Daily     PRN Meds: lidocaine, lidocaine 4%, sodium chloride (PF), - MEDICATION INSTRUCTIONS -, hypromellose-dextran, HOLD MEDICATION, naloxone, albuterol, acetaminophen, senna-docusate, ondansetron **OR** ondansetron, glucose **OR** dextrose **OR** glucagon, traMADol, sodium chloride         Data:      All new lab and imaging data was reviewed.   Recent Labs   Lab Test  01/05/17   1124  11/25/16   0720  11/22/16   0505  03/31/14 09/29/09   WBC  6.8  11.1*  12.2*   < >   --    < >   --    HGB  15.6  14.5  14.2   < >   --    < >   --    MCV  93  95  94   < >   --    < >   --    PLT  120*  117*  54*   < >   --    < >   --    INR   --    --    --    --   1.1   --   1.0    < > = values in this interval not displayed.      Recent Labs   Lab Test  01/05/17   1124  11/25/16   0720  11/23/16   0759  11/22/16   0345    11/22/16   0505  11/22/16   0125   NA  136   --    --    --    --   137  135   POTASSIUM  3.4   --   4.7  3.7   < >  3.0*  3.1*   CHLORIDE  98   --    --    --    --   99  95   CO2  28   --    --    --    --   27  30   BUN  14   --    --    --    --   11  12   CR  0.81  0.80   --    --    --   0.70  0.72   ANIONGAP  10   --    --    --    --   11  10   MATTHEW  9.0   --    --    --    --   7.7*  8.7   GLC  151*   --    --    --    --   161*  104*    < > = values in this interval not displayed.     Recent Labs   Lab Test  01/05/17   1124  11/22/16   1820  11/22/16   1405   TROPI  <0.015  The 99th percentile for upper reference range is 0.045 ug/L.  Troponin values in   the range of 0.045 - 0.120 ug/L may be associated with risks of adverse   clinical events.    0.050*  0.050*

## 2017-01-08 NOTE — PLAN OF CARE
Problem: Goal Outcome Summary  Goal: Goal Outcome Summary  PT: Attempted PM PT session; pt reports currently being very SOB. Pt with reddened facial skin, struggling to take deeps breaths. Sats on supplemental O2 94%. RN present and aware; will notify RT. Cancel PT this date.

## 2017-01-08 NOTE — PLAN OF CARE
Problem: Goal Outcome Summary  Goal: Goal Outcome Summary  VSS.  Pt requested BiPAP during night, reported increased work of breathing.  O2 sats high 90's.  Pt placed on BiPAP for approximately 2 hours.  Applied O2 at 3 Lpm nc, O2 sats maintaining.  Pt denies SOB at this time.  LS - diminished.  A&O.  Tele SR.  C/o chronic neck pain, relieved with PRN Tramadol.  Up with assist of one.  Will continue to monitor.

## 2017-01-08 NOTE — PLAN OF CARE
Problem: Goal Outcome Summary  Goal: Goal Outcome Summary  Outcome: Improving  Pt doing much better, has been off bipap since 0430 am, on 3 LNC, pain well controlled with PTA Tramadol. He is up with SBA of 1. Voiding well, adequate amts. Plan is to continue current tx, and transfer pt to medical floor. Report called given to JOSHUA Aguilar.

## 2017-01-08 NOTE — PROGRESS NOTES
" 01/07/17 0848   Quick Adds   Type of Visit Initial PT Evaluation       Present no   Living Environment   Lives With other (see comments)  (friend)   Living Arrangements house   Home Accessibility stairs to enter home;stairs within home;bed not on first floor;tub/shower is not walk in   Number of Stairs to Enter Home 2  (without rail)   Number of Stairs Within Home 10   Stair Railings at Home inside, present on right side   Transportation Available family or friend will provide   Living Environment Comment Lives with friends who pt. \"supposes\" would help him if he needed help   Self-Care   Dominant Hand right   Usual Activity Tolerance good   Current Activity Tolerance poor   Regular Exercise no   Equipment Currently Used at Home none   Functional Level Prior   Ambulation 0-->independent   Transferring 0-->independent   Toileting 0-->independent   Bathing 0-->independent   Dressing 0-->independent   Eating 0-->independent   Communication 0-->understands/communicates without difficulty   Swallowing 0-->swallows foods/liquids without difficulty   Cognition 0 - no cognition issues reported   Fall history within last six months yes   Number of times patient has fallen within last six months 1   Which of the above functional risks had a recent onset or change? ambulation   Prior Functional Level Comment Following his hosp. for pneumonia 1 month ago, pt. states was amb. indep. at home without AD. He had been to [employer] twice and leaned on grocery cart for amb. Pt. stated amb. up steps from basement bedroom to first floor was difficult and pt. needed to stop ascending steps due to SOB. Pt. states he eats olive crackers, apple sauce, oatmeal, and cookies. One of his roommates works at a bar so he can order a hamburger from him when he returns from work.   General Information   Onset of Illness/Injury or Date of Surgery - Date 01/05/17   Referring Physician Wily Mcdonald MD   Pertinent History of " Current Problem (include personal factors and/or comorbidities that impact the POC) Pt. is homeless, living in basement of friend's home, with several other people. Likely aquired resp. illness from roommates who have been sick. DX: COPD exacerbation. PMHX: COPD, CAP, diet-controlled DM, tobacco use disorder, chronic hepatitis c with chronic thrombocytopenia, HPTN.   Precautions/Limitations fall precautions;oxygen therapy device and L/min   Weight-Bearing Status - LLE full weight-bearing   Weight-Bearing Status - RLE full weight-bearing   Heart Disease Risk Factors High blood pressure;Diabetes;Smoking;Medical history;Gender;Age   General Observations Pt. is lying in bed, minimally SOB. Pt. is irritable as he feels like he has no energy and his friend who he lives with has just told him he cannot return home until he is stronger.   Cognitive Status Examination   Orientation orientation to person, place and time   Level of Consciousness alert   Follows Commands and Answers Questions 100% of the time   Personal Safety and Judgment intact   Memory intact   Pain Assessment   Patient Currently in Pain No   Integumentary/Edema   Integumentary/Edema no deficits were identifed   Posture    Posture Forward head position;Protracted shoulders   Range of Motion (ROM)   ROM Comment LEs AROMs appear WFLs in sitting.   Strength   Strength Comments 4/5 strenth throughout   Bed Mobility   Bed Mobility Comments Indep. with bed mobility   Transfer Skills   Transfer Comments Sit<>stand with SBA using FWW   Gait   Gait Comments Pt. amb. 10 ft. with FWW, MIN A 1 and another for equipment. O2 sats decreased to 89% on 2LNC post. amb., increasing to 95% post. 3 min.   Balance   Balance no deficits were identified   Sensory Examination   Sensory Perception no deficits were identified   Coordination   Coordination no deficits were identified   Muscle Tone   Muscle Tone no deficits were identified   General Therapy Interventions   Planned  "Therapy Interventions progressive activity/exercise;strengthening;gait training   Clinical Impression   Criteria for Skilled Therapeutic Intervention yes, treatment indicated   PT Diagnosis Pt. becomes very SOB with mobility, decreased O2 saturation following mobility   Influenced by the following impairments COPD exacerbation, CAP one month ago   Functional limitations due to impairments Decreased amb. distance, dypnea upon exertion   Clinical Presentation Evolving/Changing   Clinical Presentation Rationale Worsening dypnea upon exertion with mobility   Clinical Decision Making (Complexity) Moderate complexity   Therapy Frequency` daily   Predicted Duration of Therapy Intervention (days/wks) 3-4 days   Anticipated Equipment Needs at Discharge front wheeled walker   Anticipated Discharge Disposition Transitional Care Facility   Risk & Benefits of therapy have been explained Yes   Patient, Family & other staff in agreement with plan of care Yes   Clinical Impression Comments Pt. will benefit from PT to promote indep. with mobility and ADLs.   [facility] TM \"6 Clicks\"   2016, Trustees of [facility], under license to [email or web address].  All rights reserved.   6 Clicks Short Forms Basic Mobility Inpatient Short Form   [facility]  \"6 Clicks\" V.2 Basic Mobility Inpatient Short Form   1. Turning from your back to your side while in a flat bed without using bedrails? 4 - None   2. Moving from lying on your back to sitting on the side of a flat bed without using bedrails? 4 - None   3. Moving to and from a bed to a chair (including a wheelchair)? 3 - A Little   4. Standing up from a chair using your arms (e.g., wheelchair, or bedside chair)? 3 - A Little   5. To walk in hospital room? 3 - A Little   6. Climbing 3-5 steps with a railing? 2 - A Lot   Basic Mobility Raw Score (Score out of 24.Lower scores equate to lower levels of function) 19   Total Evaluation Time   Total Evaluation Time " (Minutes) 15

## 2017-01-08 NOTE — PLAN OF CARE
Problem: Goal Outcome Summary  Goal: Goal Outcome Summary  Outcome: Improving  Pt transferred from station 88, was on bipap but has been off since about 4pm and tolerated bipap  Off with 3 Lnc. Will cont to monitor, SR on the monitor. Voiding adequately. Occasional arthritic pain per pt. Tramadol prn.

## 2017-01-09 LAB
GLUCOSE BLDC GLUCOMTR-MCNC: 134 MG/DL (ref 70–99)
GLUCOSE BLDC GLUCOMTR-MCNC: 150 MG/DL (ref 70–99)
GLUCOSE BLDC GLUCOMTR-MCNC: 172 MG/DL (ref 70–99)
GLUCOSE BLDC GLUCOMTR-MCNC: 299 MG/DL (ref 70–99)

## 2017-01-09 PROCEDURE — A9270 NON-COVERED ITEM OR SERVICE: HCPCS | Mod: GY | Performed by: INTERNAL MEDICINE

## 2017-01-09 PROCEDURE — 25000125 ZZHC RX 250: Performed by: INTERNAL MEDICINE

## 2017-01-09 PROCEDURE — 25000132 ZZH RX MED GY IP 250 OP 250 PS 637: Mod: GY | Performed by: INTERNAL MEDICINE

## 2017-01-09 PROCEDURE — 25000308 HC RX OP HPI UCR WEL MED 250 IP 250: Performed by: INTERNAL MEDICINE

## 2017-01-09 PROCEDURE — 12000000 ZZH R&B MED SURG/OB

## 2017-01-09 PROCEDURE — 40000894 ZZH STATISTIC OT IP EVAL DEFER

## 2017-01-09 PROCEDURE — 99232 SBSQ HOSP IP/OBS MODERATE 35: CPT | Performed by: INTERNAL MEDICINE

## 2017-01-09 PROCEDURE — 40000275 ZZH STATISTIC RCP TIME EA 10 MIN

## 2017-01-09 PROCEDURE — 94640 AIRWAY INHALATION TREATMENT: CPT

## 2017-01-09 PROCEDURE — 94640 AIRWAY INHALATION TREATMENT: CPT | Mod: 76

## 2017-01-09 PROCEDURE — 00000146 ZZHCL STATISTIC GLUCOSE BY METER IP

## 2017-01-09 RX ADMIN — FLUTICASONE PROPIONATE 1 PUFF: 250 POWDER, METERED RESPIRATORY (INHALATION) at 21:45

## 2017-01-09 RX ADMIN — INSULIN ASPART 1 UNITS: 100 INJECTION, SOLUTION INTRAVENOUS; SUBCUTANEOUS at 17:32

## 2017-01-09 RX ADMIN — TRAMADOL HYDROCHLORIDE 50 MG: 50 TABLET, COATED ORAL at 17:31

## 2017-01-09 RX ADMIN — IPRATROPIUM BROMIDE AND ALBUTEROL SULFATE 3 ML: .5; 3 SOLUTION RESPIRATORY (INHALATION) at 15:12

## 2017-01-09 RX ADMIN — TRAMADOL HYDROCHLORIDE 50 MG: 50 TABLET, COATED ORAL at 13:15

## 2017-01-09 RX ADMIN — METHYLPREDNISOLONE SODIUM SUCCINATE 62.5 MG: 125 INJECTION, POWDER, FOR SOLUTION INTRAMUSCULAR; INTRAVENOUS at 05:11

## 2017-01-09 RX ADMIN — INSULIN ASPART 2 UNITS: 100 INJECTION, SOLUTION INTRAVENOUS; SUBCUTANEOUS at 12:21

## 2017-01-09 RX ADMIN — TRAMADOL HYDROCHLORIDE 50 MG: 50 TABLET, COATED ORAL at 05:16

## 2017-01-09 RX ADMIN — IPRATROPIUM BROMIDE AND ALBUTEROL SULFATE 3 ML: .5; 3 SOLUTION RESPIRATORY (INHALATION) at 19:52

## 2017-01-09 RX ADMIN — NICOTINE 1 PATCH: 21 PATCH, EXTENDED RELEASE TRANSDERMAL at 09:07

## 2017-01-09 RX ADMIN — ALBUTEROL SULFATE 2.5 MG: 2.5 SOLUTION RESPIRATORY (INHALATION) at 12:53

## 2017-01-09 RX ADMIN — HYDROCHLOROTHIAZIDE 25 MG: 25 TABLET ORAL at 09:02

## 2017-01-09 RX ADMIN — METHYLPREDNISOLONE SODIUM SUCCINATE 62.5 MG: 125 INJECTION, POWDER, FOR SOLUTION INTRAMUSCULAR; INTRAVENOUS at 13:10

## 2017-01-09 RX ADMIN — METHYLPREDNISOLONE SODIUM SUCCINATE 62.5 MG: 125 INJECTION, POWDER, FOR SOLUTION INTRAMUSCULAR; INTRAVENOUS at 23:50

## 2017-01-09 RX ADMIN — IPRATROPIUM BROMIDE AND ALBUTEROL SULFATE 3 ML: .5; 3 SOLUTION RESPIRATORY (INHALATION) at 07:48

## 2017-01-09 RX ADMIN — ATENOLOL 25 MG: 25 TABLET ORAL at 09:02

## 2017-01-09 RX ADMIN — TRAMADOL HYDROCHLORIDE 50 MG: 50 TABLET, COATED ORAL at 21:44

## 2017-01-09 RX ADMIN — AZITHROMYCIN 250 MG: 250 TABLET, FILM COATED ORAL at 09:02

## 2017-01-09 RX ADMIN — FLUTICASONE PROPIONATE 1 PUFF: 250 POWDER, METERED RESPIRATORY (INHALATION) at 09:06

## 2017-01-09 RX ADMIN — TRAMADOL HYDROCHLORIDE 50 MG: 50 TABLET, COATED ORAL at 00:51

## 2017-01-09 RX ADMIN — IPRATROPIUM BROMIDE AND ALBUTEROL SULFATE 3 ML: .5; 3 SOLUTION RESPIRATORY (INHALATION) at 10:37

## 2017-01-09 RX ADMIN — TRAMADOL HYDROCHLORIDE 50 MG: 50 TABLET, COATED ORAL at 09:06

## 2017-01-09 NOTE — PROGRESS NOTES
"[facility]  Hospitalist Progress Note        Luismgary Mcdonald MD  01/09/2017        Interval History:      Patient seen and assessed. He report no much improvement. Still labored breathing.            Assessment and Plan:      Mr. Chuck Lopez is a 76-year-old male with a known history of COPD, non-oxygen dependent, hypertension, chronic hepatitis C, chronic thrombocytopenia, diabetes mellitus type 2 diet controlled, hypertension, ongoing tobacco use who presents to the emergency room with progressive shortness of breath:  Acute hypoxic respiratory failure: Due to COPD exacerbation with suspect URI.   --- Placed in IMC and BiPAP yesterday. Feel better and off Bipap  ---- ABG stat was done and unremarkable.  ---- D/Johnny IMC and BiPAP-->Ok to transfer to the floor.  --- On IV solumedrol 60 mg TID and will change to BID and monitor. Likely to po prednisone tomorrow  --- Continue bronchodilators  --- Continue azithromycin  ---- Supplement oxygen to keep saturation 88-89%  --- strongly advised to stop smoking  --- Continue nicotine patch  --- Pulmonology consult  HTN: PTA hctz and atenolol  --- Continue the same  Type 2 DM: Diet controlled  --- Continue [facility]    Disposition: 2-3 days    Bettie Mcdonald MD  Internal medicine Hospitalist:   Pager [phone]    1/9/2017                     Physical Exam:      Heart Rate: 83, Blood pressure 154/93, pulse 78, temperature 97.7  F (36.5  C), temperature source Oral, resp. rate 20, height 1.651 m (5' 5\"), weight 46.2 kg (101 lb 13.6 oz), SpO2 96 %.  Filed Vitals:    01/07/17 0509 01/08/17 0552 01/09/17 0628   Weight: 48.2 kg (106 lb 4.2 oz) 47.9 kg (105 lb 9.6 oz) 46.2 kg (101 lb 13.6 oz)     Vital Signs with Ranges  Temp:  [97.7  F (36.5  C)-98.7  F (37.1  C)] 97.7  F (36.5  C)  Heart Rate:  [82-90] 83  Resp:  [15-20] 20  BP: (129-170)/(70-93) 154/93 mmHg  SpO2:  [93 %-97 %] 96 %  I/O's Last 24 hours  I/O last 3 completed shifts:  In: 680 " [P.O.:680]  Out: 1200 [Urine:1200]    Constitutional:  Labored breathing but not in distress   Psych: Good mood and affect   Neuro: AAOX 3. No focal neurological deficit. CN 11-XII grossly intact.   Eyes: Saige conjunctiva. Pupils RRR.   ENT: Nose and throat normal.   Lymph: Not palpable.   Cardiovascular: HR regular rhythm. S1, S2 normal. No murmur.   Lungs: Poor air entry bilaterally and few wheeze.    Abdomen: Soft. Not tender. +BS.   Skin: Normal. No rash or ulcer.   Musculoskeletal: No deformities   Other: Lower extremities edema: None.              Medications:          methylPREDNISolone  62.5 mg Intravenous Q8H     ipratropium - albuterol 0.5 mg/2.5 mg/3 mL  3 mL Nebulization Q4H     insulin aspart  1-3 Units Subcutaneous TID AC     insulin aspart  1-3 Units Subcutaneous At Bedtime     atenolol (TENORMIN) tablet 25 mg  25 mg Oral Daily     fluticasone  1 puff Inhalation BID     hydrochlorothiazide (HYDRODIURIL) tablet 25 mg  25 mg Oral Daily     nicotine   Transdermal Q8H     nicotine   Transdermal Daily     nicotine  1 patch Transdermal Daily     PRN Meds: naloxone, albuterol, acetaminophen, senna-docusate, ondansetron **OR** ondansetron, glucose **OR** dextrose **OR** glucagon, traMADol, sodium chloride         Data:      All new lab and imaging data was reviewed.   Recent Labs   Lab Test  01/05/17   1124  11/25/16   0720  11/22/16   0505  03/31/14 09/29/09   WBC  6.8  11.1*  12.2*   < >   --    < >   --    HGB  15.6  14.5  14.2   < >   --    < >   --    MCV  93  95  94   < >   --    < >   --    PLT  120*  117*  54*   < >   --    < >   --    INR   --    --    --    --   1.1   --   1.0    < > = values in this interval not displayed.      Recent Labs   Lab Test  01/05/17   1124  11/25/16   0720  11/23/16   0759  11/22/16   2150   11/22/16   0505  11/22/16   0125   NA  136   --    --    --    --   137  135   POTASSIUM  3.4   --   4.7  3.7   < >  3.0*  3.1*   CHLORIDE  98   --    --    --    --   99  95   CO2   28   --    --    --    --   27  30   BUN  14   --    --    --    --   11  12   CR  0.81  0.80   --    --    --   0.70  0.72   ANIONGAP  10   --    --    --    --   11  10   MATTHEW  9.0   --    --    --    --   7.7*  8.7   GLC  151*   --    --    --    --   161*  104*    < > = values in this interval not displayed.     Recent Labs   Lab Test  01/05/17   1124  11/22/16   1820  11/22/16   1405   TROPI  <0.015  The 99th percentile for upper reference range is 0.045 ug/L.  Troponin values in   the range of 0.045 - 0.120 ug/L may be associated with risks of adverse   clinical events.    0.050*  0.050*

## 2017-01-09 NOTE — PLAN OF CARE
"Problem: Goal Outcome Summary  Goal: Goal Outcome Summary  Outcome: No Change  Tramadol given for pain-effecitve. \"I feel like its hard to breath anytime I move\". Voice sounds raspy. On 2L NC in the mid 90's. LEVINE. Incentive spirometer given per pt requested. Patient knowledgeable regarding IS use. Fall duwv-xhy-mrwy urine. Good appetite. Lunch blood sugars was 299-covered.           "

## 2017-01-09 NOTE — PLAN OF CARE
Problem: Goal Outcome Summary  Goal: Goal Outcome Summary  OT: Patient likely DC'ing to TCU soon. OT deferring eval to TCU at this time. Please reconsult if patient is going home and has needs. Per PT, he is SB A with ADLs.

## 2017-01-09 NOTE — PLAN OF CARE
"Problem: Goal Outcome Summary  Goal: Goal Outcome Summary  PT: Cancel. Patient declining PT at this time d/t \"I have to fight to breathe if I even move\" despite max encouragement and education regarding mobility for activity tolerance. Encouraged to continue mobility with nursing.         "

## 2017-01-09 NOTE — PROGRESS NOTES
CLINICAL NUTRITION SERVICES - REASSESSMENT NOTE      EVALUATION OF PROGRESS TOWARD GOALS   Diet:  Mod carb. Sending Plus2 Shakes BID between meals and 4 oz regular shakes with meals as desired.    Intake:  Pt is eating 100% of meals and shakes.   He states he's having some problems with eating/swallowing with breathing, he's ordering soft foods.  Pt likes his sweets, orders a dessert with all meals but since he needs calories, will not discourage.      NEW FINDINGS:   Filed Vitals:    01/05/17 1109 01/06/17 0641 01/07/17 0509 01/08/17 0552   Weight: 51.256 kg (113 lb) 47.9 kg (105 lb 9.6 oz) 48.2 kg (106 lb 4.2 oz) 47.9 kg (105 lb 9.6 oz)    01/09/17 0628   Weight: 46.2 kg (101 lb 13.6 oz)         Previous Goals:   Pt to eat at least 75% of assessed nutrition needs (1260 kcals, 45 g PRO) daily, through meals, snacks, and supplements.  Evaluation: Met    Previous Nutrition Diagnosis:   Inadequate oral intake related to  as evidenced by low BMI (17.6), cachectic appearance  Evaluation: Improving      CURRENT NUTRITION DIAGNOSIS  No nutrition diagnosis identified at this time     INTERVENTIONS  Recommendations / Nutrition Prescription  Continue current diet and supplements    Implementation  Composition of Meals and Snacks - encouraged good intake    Goals  Pt will continue to consume at least 75% of meals and supplements    MONITORING AND EVALUATION:  Progress towards goals will be monitored and evaluated per protocol and Practice Guidelines    Summer Nam RD  Pager [phone] (M-F)            [phone] (W/E & Hol)

## 2017-01-09 NOTE — PLAN OF CARE
Problem: Goal Outcome Summary  Goal: Goal Outcome Summary  Outcome: Improving  Pt is very LEVINE, ok at rest but still has labored breathing even at rest. Oxygen between 2-3L sats usually in the low 90's. Voiding in the urinal.

## 2017-01-09 NOTE — PLAN OF CARE
Problem: Goal Outcome Summary  Goal: Goal Outcome Summary  Pt A&Ox4. SOB and labored breathing. Afibrile. VSS, 2-3 LNC. LS dim. On Iv steroids. C/o pain; tramadol x1. Mod carb diet. . SBA.

## 2017-01-09 NOTE — CONSULTS
Pulmonary Medicine Consultation      Date of Admission: 1/5/2017  Primary Attending:  Jean Paul Latif MD  Consulting Physician: Awais Torre MD    History:    Mr. Chuck Lopez is a 76-year-old male with a known history of COPD, non-oxygen dependent, hypertension, chronic hepatitis C, chronic thrombocytopenia, diabetes mellitus type 2 diet controlled, hypertension, ongoing tobacco use who presents to the emergency room with progressive shortness of breath. Continues to smoke, somewhat interested in quitting. Poor exercise tolerance, able to walk to bathroom. Lives in a basement and steps are challenging. Compliant with qvar and spiriva along with rescue inhaler or nebs at least qid. Thinks he has lost ~50 pounds in the last 5 years. No hemoptysis.      Review of Systems - A 10-system ROS is negative except for items mentioned above and in HPI.       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)     CARDIOVASCULAR SCREENING; LDL GOAL LESS THAN 100     CKD (chronic kidney disease) stage 2, GFR 60-89 ml/min     Advanced directives, counseling/discussion     COPD (chronic obstructive pulmonary disease) (H)     Hypertension goal BP (blood pressure) < 140/90     Chronic hepatitis C (H)     Other chronic pain     Chronic obstructive airway disease with asthma (H)     Acute hypoxemic respiratory failure (H)     Obstructive chronic bronchitis with  exacerbation (H)       Social History     Social History     Social History     Marital Status:      Spouse Name: N/A     Number of Children: N/A     Years of Education: N/A     Occupational History     Not on file.     Social History Main Topics     Smoking status: Current Every Day Smoker -- 0.50 packs/day     Types: Cigarettes     Smokeless tobacco: Never Used      Comment: 10 cigs a day     Alcohol Use: No     Drug Use: No      Comment: quit marijuanna 2010     Sexual Activity: No     Other Topics Concern     Parent/Sibling W/ Cabg, Mi Or Angioplasty Before 65f 55m? No     Social History Narrative         Family History  No family history on file.      Medications  No current outpatient prescriptions on file.     Current Facility-Administered Medications Ordered in Epic   Medication Dose Route Frequency Last Rate Last Dose     methylPREDNISolone sodium succinate (solu-MEDROL) injection 62.5 mg  62.5 mg Intravenous Q8H   62.5 mg at 01/09/17 1310     naloxone (NARCAN) injection 0.1-0.4 mg  0.1-0.4 mg Intravenous Q2 Min PRN         albuterol neb solution 2.5 mg  3 mL Nebulization Q2H PRN   2.5 mg at 01/09/17 1253     ipratropium - albuterol 0.5 mg/2.5 mg/3 mL (DUONEB) neb solution 3 mL  3 mL Nebulization Q4H   3 mL at 01/09/17 1512     acetaminophen (TYLENOL) tablet 650 mg  650 mg Oral Q4H PRN         senna-docusate (SENOKOT-S;PERICOLACE) 8.6-50 MG per tablet 1-2 tablet  1-2 tablet Oral BID PRN         ondansetron (ZOFRAN-ODT) ODT tab 4 mg  4 mg Oral Q6H PRN        Or     ondansetron (ZOFRAN) injection 4 mg  4 mg Intravenous Q6H PRN         glucose 40 % gel 15-30 g  15-30 g Oral Q15 Min PRN        Or     dextrose 50 % injection 25-50 mL  25-50 mL Intravenous Q15 Min PRN        Or     glucagon injection 1 mg  1 mg Subcutaneous Q15 Min PRN         insulin aspart (NovoLOG) inj (RAPID ACTING)  1-3 Units Subcutaneous TID AC   2 Units at 01/09/17 1221     insulin aspart (NovoLOG) inj (RAPID ACTING)  1-3 Units  "Subcutaneous At Bedtime   1 Units at 17 2156     atenolol (TENORMIN) tablet 25 mg  25 mg Oral Daily   25 mg at 17 09     fluticasone (FLOVENT DISKUS) 250 mcg/puff inhaler 1 puff  1 puff Inhalation BID   1 puff at 17 0906     hydrochlorothiazide (HYDRODIURIL) tablet 25 mg  25 mg Oral Daily   25 mg at 17 09     nicotine Patch in Place   Transdermal Q8H         nicotine patch REMOVAL   Transdermal Daily         nicotine (NICODERM CQ) 21 MG/24HR 24 hr patch 1 patch  1 patch Transdermal Daily   1 patch at 17 0907     traMADol (ULTRAM) tablet 50 mg  50 mg Oral Q4H PRN   50 mg at 17 1315     sodium chloride (OCEAN) 0.65 % nasal spray 1-2 spray  1-2 spray Nasal Q1H PRN   2 spray at 17 2251     No current Epic-ordered outpatient prescriptions on file.       Allergies   Allergen Reactions     Ammonium Lactate Other (See Comments)     Burning to skin     Doxycycline Visual Disturbance     Monosodium Glutamate Other (See Comments)     Pt states it feels like his \"head swells, visual disturbances, and he can't think striaght\"     Penicillins Rash         Physical Examination:   Filed Vitals:    17 1253 17 1315 17 1512 17 1556   BP:    163/81   Pulse:       Temp:    98.5  F (36.9  C)   TempSrc:    Oral   Resp:  20  18   Height:       Weight:       SpO2: 96%  96% 94%     Body mass index is 16.95 kg/(m^2).  Temp (24hrs), Av.1  F (36.7  C), Min:97.7  F (36.5  C), Max:98.5  F (36.9  C)        Constitutional:  Appears comfortable. Frail  HENT:  mucous membranes moist.  Eyes: PERRLA, no icterus, no pallor.   Neck: No lymphadenopathy  Cardiovascular: Regular rate and rythym,  Respiratory/Chest: No use of accessory muscle, dec lungs sounds, minimal wheezing.  Gastrointestinal: Abdomen was soft  Musculoskeletal: No cyanosis  Neurological: No focal motor or sensory deficits.  Skin: No skin rash, hives, petechiae, or breakdown.      CMP  Recent Labs  Lab " 01/05/17  1124      POTASSIUM 3.4   CHLORIDE 98   CO2 28   ANIONGAP 10   *   BUN 14   CR 0.81   GFRESTIMATED >90Non  GFR Calc   GFRESTBLACK >90African American GFR Calc   MATTHEW 9.0   PROTTOTAL 7.8   ALBUMIN 3.4   BILITOTAL 0.8   ALKPHOS 97   AST 46*   ALT 38     CBC  Recent Labs  Lab 01/05/17  1124   WBC 6.8   RBC 4.68   HGB 15.6   HCT 43.6   MCV 93   MCH 33.3*   MCHC 35.8   RDW 13.6   *     INRNo lab results found in last 7 days.  Arterial Blood Gas  Recent Labs  Lab 01/07/17  1013 01/05/17  1137   PH 7.44  --    PCO2 51*  --    PO2 119*  --    HCO3 34*  --    O2PER  --  2L     No results for input(s): CULT in the last 168 hours.    Diagnostic Studies:  Chest Radiology:     CT Chest 11/22/16  1. A small region of minimal patchy and linear opacities in the  lingula could represent atelectasis or pneumonia.  2. No other evidence of active pulmonary disease.  3. Moderate to severe emphysematous changes in the lungs.  4. No visualized pulmonary embolus.    cxr 1/7/17- copd, hyperinflation, no acute infiltrate         Assessment/Plan:     Mr. Chuck Lopez is a 76-year-old male with a known history of COPD, non-oxygen dependent (FEV1 ~40% in 2011) hypertension, chronic hepatitis C, chronic thrombocytopenia, diabetes mellitus type 2 diet controlled, hypertension, ongoing tobacco use who presents to the emergency room with progressive shortness of breath.     Continues to smoke, somewhat interested in quitting. Poor exercise tolerance, able to walk to bathroom. Lives in a basement and steps are challenging. Thinks he has lost ~50 pounds in the last 5 years. No hemoptysis. Improving and may be near his baseline.      Continue bronchodilators, suggest triple inhaler therapy with spiriva and Breo/Advair/Symbicort/Dulera on discharge    Wean o2 as tolerated, may need o2 on discharge    Suggest 40mg/day of prednisone with taper by 10mg every 4 days    Smoking cessation    May benefit from  TCU on d/c?    Needs outpt pulmonary followup in 4 weeks    Continue abx    Will follow    Awais Torre MD  Pulmonary, Critical Care and Sleep Medicine  [facility]   Pager: [phone]  Office:[phone]

## 2017-01-10 ENCOUNTER — APPOINTMENT (OUTPATIENT)
Dept: PHYSICAL THERAPY | Facility: CLINIC | Age: 77
DRG: 190 | End: 2017-01-10
Payer: MEDICARE

## 2017-01-10 LAB
GLUCOSE BLDC GLUCOMTR-MCNC: 109 MG/DL (ref 70–99)
GLUCOSE BLDC GLUCOMTR-MCNC: 126 MG/DL (ref 70–99)
GLUCOSE BLDC GLUCOMTR-MCNC: 128 MG/DL (ref 70–99)
GLUCOSE BLDC GLUCOMTR-MCNC: 152 MG/DL (ref 70–99)
GLUCOSE BLDC GLUCOMTR-MCNC: 185 MG/DL (ref 70–99)

## 2017-01-10 PROCEDURE — 40000193 ZZH STATISTIC PT WARD VISIT: Performed by: PHYSICAL THERAPIST

## 2017-01-10 PROCEDURE — 25000125 ZZHC RX 250: Performed by: INTERNAL MEDICINE

## 2017-01-10 PROCEDURE — 25000132 ZZH RX MED GY IP 250 OP 250 PS 637: Mod: GY | Performed by: INTERNAL MEDICINE

## 2017-01-10 PROCEDURE — 97116 GAIT TRAINING THERAPY: CPT | Mod: GP | Performed by: PHYSICAL THERAPIST

## 2017-01-10 PROCEDURE — 25000128 H RX IP 250 OP 636: Performed by: HOSPITALIST

## 2017-01-10 PROCEDURE — 25000125 ZZHC RX 250: Performed by: HOSPITALIST

## 2017-01-10 PROCEDURE — 12000000 ZZH R&B MED SURG/OB

## 2017-01-10 PROCEDURE — 25000308 HC RX OP HPI UCR WEL MED 250 IP 250: Performed by: INTERNAL MEDICINE

## 2017-01-10 PROCEDURE — 94640 AIRWAY INHALATION TREATMENT: CPT | Mod: 76

## 2017-01-10 PROCEDURE — 40000275 ZZH STATISTIC RCP TIME EA 10 MIN

## 2017-01-10 PROCEDURE — 97530 THERAPEUTIC ACTIVITIES: CPT | Mod: GP | Performed by: PHYSICAL THERAPIST

## 2017-01-10 PROCEDURE — 25000132 ZZH RX MED GY IP 250 OP 250 PS 637: Mod: GY | Performed by: HOSPITALIST

## 2017-01-10 PROCEDURE — 94640 AIRWAY INHALATION TREATMENT: CPT

## 2017-01-10 PROCEDURE — A9270 NON-COVERED ITEM OR SERVICE: HCPCS | Mod: GY | Performed by: INTERNAL MEDICINE

## 2017-01-10 PROCEDURE — 99232 SBSQ HOSP IP/OBS MODERATE 35: CPT | Performed by: HOSPITALIST

## 2017-01-10 PROCEDURE — 00000146 ZZHCL STATISTIC GLUCOSE BY METER IP

## 2017-01-10 RX ORDER — ALBUTEROL SULFATE 0.83 MG/ML
2.5 SOLUTION RESPIRATORY (INHALATION) EVERY 4 HOURS
Status: DISCONTINUED | OUTPATIENT
Start: 2017-01-10 | End: 2017-01-12 | Stop reason: HOSPADM

## 2017-01-10 RX ORDER — PREDNISONE 20 MG/1
40 TABLET ORAL DAILY
Status: DISCONTINUED | OUTPATIENT
Start: 2017-01-10 | End: 2017-01-12 | Stop reason: HOSPADM

## 2017-01-10 RX ADMIN — FLUTICASONE PROPIONATE 1 PUFF: 250 POWDER, METERED RESPIRATORY (INHALATION) at 20:35

## 2017-01-10 RX ADMIN — ALBUTEROL SULFATE 2.5 MG: 2.5 SOLUTION RESPIRATORY (INHALATION) at 23:10

## 2017-01-10 RX ADMIN — INSULIN ASPART 1 UNITS: 100 INJECTION, SOLUTION INTRAVENOUS; SUBCUTANEOUS at 12:41

## 2017-01-10 RX ADMIN — IPRATROPIUM BROMIDE AND ALBUTEROL SULFATE 3 ML: .5; 3 SOLUTION RESPIRATORY (INHALATION) at 11:32

## 2017-01-10 RX ADMIN — FLUTICASONE PROPIONATE 1 PUFF: 250 POWDER, METERED RESPIRATORY (INHALATION) at 07:52

## 2017-01-10 RX ADMIN — TRAMADOL HYDROCHLORIDE 50 MG: 50 TABLET, COATED ORAL at 01:39

## 2017-01-10 RX ADMIN — IPRATROPIUM BROMIDE AND ALBUTEROL SULFATE 3 ML: .5; 3 SOLUTION RESPIRATORY (INHALATION) at 00:57

## 2017-01-10 RX ADMIN — HYDROCHLOROTHIAZIDE 25 MG: 25 TABLET ORAL at 07:52

## 2017-01-10 RX ADMIN — IPRATROPIUM BROMIDE AND ALBUTEROL SULFATE 3 ML: .5; 3 SOLUTION RESPIRATORY (INHALATION) at 08:42

## 2017-01-10 RX ADMIN — TRAMADOL HYDROCHLORIDE 50 MG: 50 TABLET, COATED ORAL at 09:51

## 2017-01-10 RX ADMIN — TRAMADOL HYDROCHLORIDE 50 MG: 50 TABLET, COATED ORAL at 05:41

## 2017-01-10 RX ADMIN — TRAMADOL HYDROCHLORIDE 50 MG: 50 TABLET, COATED ORAL at 14:29

## 2017-01-10 RX ADMIN — PREDNISONE 40 MG: 20 TABLET ORAL at 17:14

## 2017-01-10 RX ADMIN — SODIUM CHLORIDE 500 ML: 9 INJECTION, SOLUTION INTRAVENOUS at 17:14

## 2017-01-10 RX ADMIN — ALBUTEROL SULFATE 2.5 MG: 2.5 SOLUTION RESPIRATORY (INHALATION) at 16:18

## 2017-01-10 RX ADMIN — TRAMADOL HYDROCHLORIDE 50 MG: 50 TABLET, COATED ORAL at 22:54

## 2017-01-10 RX ADMIN — ALBUTEROL SULFATE 2.5 MG: 2.5 SOLUTION RESPIRATORY (INHALATION) at 19:45

## 2017-01-10 RX ADMIN — INSULIN ASPART 1 UNITS: 100 INJECTION, SOLUTION INTRAVENOUS; SUBCUTANEOUS at 18:37

## 2017-01-10 RX ADMIN — IPRATROPIUM BROMIDE AND ALBUTEROL SULFATE 3 ML: .5; 3 SOLUTION RESPIRATORY (INHALATION) at 02:52

## 2017-01-10 RX ADMIN — TRAMADOL HYDROCHLORIDE 50 MG: 50 TABLET, COATED ORAL at 18:42

## 2017-01-10 RX ADMIN — ATENOLOL 25 MG: 25 TABLET ORAL at 07:53

## 2017-01-10 RX ADMIN — METHYLPREDNISOLONE SODIUM SUCCINATE 62.5 MG: 125 INJECTION, POWDER, FOR SOLUTION INTRAMUSCULAR; INTRAVENOUS at 07:52

## 2017-01-10 RX ADMIN — UMECLIDINIUM 62.5 MCG: 62.5 AEROSOL, POWDER ORAL at 18:37

## 2017-01-10 RX ADMIN — NICOTINE 1 PATCH: 21 PATCH, EXTENDED RELEASE TRANSDERMAL at 07:54

## 2017-01-10 NOTE — PLAN OF CARE
Problem: Goal Outcome Summary  Goal: Goal Outcome Summary  Outcome: No Change  A&Ox4.  Up with SBA. Up in the chair for dinner.  Very LEVINE.  VSS on 2 L of O2.  LS diminished.  Tramadol given x 2 for back pain.  Tolerating Mod CHO diet; appetite good.  and 134.  Patient is receiving scheduled nebs and IV Solumedrol.  Continue to monitor.

## 2017-01-10 NOTE — PLAN OF CARE
Problem: Goal Outcome Summary  Goal: Goal Outcome Summary  PT: Pt sitting in chair using 2L O2. Pt reports SOB but agreeable to ambulate in room to bathroom. Pt was fitted for walker and educated on benefits of energy conservation and balance using walker. Pt amb to bathroom, stood for a few minutes and walked back while using 2L O2. O2 sats stayed between 93-97%. Pt complained of dyspnea. PT recommendation: TCU

## 2017-01-10 NOTE — PROGRESS NOTES
SW  D) TCU stay is recommended by therapies. Patient's anticipated D/C is in 1-2 days.  I) Met with patient and reviewed Medicare guidelines for coverage of a TCU. He agrees to referrals as close to [place] as possible, including [facility], [facility] and [facility]. These were sent via the DOD process.   A) Patient has no permanent housing, which may make placement more difficult.   P) Following.

## 2017-01-10 NOTE — PLAN OF CARE
Problem: Goal Outcome Summary  Goal: Goal Outcome Summary  Outcome: Improving  Afebrile.tramadol given q 4 hrs per pt request for chronic back pain. Diminished lung sounds, nonproductive cough. LEVINE. On 2 L NC in the mid 90's. Using incentive spirometer frequently. Receiving nebs and steroids. Steady with transfers to bed and chair. Uses urinal. Good appetite. Blood sugar 188 at lunch-covered. Fall risk-sba.

## 2017-01-10 NOTE — PROGRESS NOTES
[facility]    Hospitalist Progress Note    Date of Service (when I saw the patient): 01/10/2017    Assessment and Plan  Chuck Lopez is a 76 year old male who was admitted on 1/5/2017.  Past history of COPD, non-oxygen dependent, hypertension, chronic hepatitis C, chronic thrombocytopenia, diabetes mellitus type 2 diet controlled, ongoing tobacco use who presents to the emergency room with progressive shortness of breath.    Acute hypoxic and hypercapneic respiratory failure: Due to COPD exacerbation with suspect URI.   Admission CXR clear, no leukocytosis, negative procalcitonin and flu swab.  Admission ABG with mild CO2 retention.  Initiated on azithromycin on solumedrol at admission.  Transiently on Bipap 1/8-1/9.  Completed course of azithro 1/9.  - stop solumedrol, start prednisone 40 mg daily, taper by 10 mg every 4 days  - continue scheduled Duonebs  - resume Spiriva at discharge  - will discuss with discharge pharmacy insurance coverage options for Dulera/Advair/Breo/Symbicort  - follow up with Pulmonology in 4 weeks    Nicotine dependence:  Continue nicotine patch.  Has been counseled on importance of tobacco cessation.    HTN:  Continue PTA hctz and atenolol.    Type 2 DM: Diet controlled, A1C 5.3%.  Continue SSI.    Severe malnutrition:  Marked subcutaneous fat loss and diffuse muscle wasting.  Reports losing 50 pounds over the past year, BMI 17.    DVT Prophylaxis: Pneumatic Compression Devices  Code Status: Full Code    Disposition: Expected discharge in 2 days once stable on oral prednisone.  Recommendation is TCU.    Joe Syed    Interval History  Reports this is the first day where he is not struggling to breathe.  Has a mild non-productive cough.  No fever/chills, diarrhea.  Poor appetite.     -Data reviewed today: I reviewed all new labs and imaging results over the last 24 hours. I personally reviewed no images or EKG's today.    Physical Exam  Temp: 98  F (36.7  C) Temp  src: Oral BP: 141/87 mmHg   Heart Rate: 87 Resp: 18 SpO2: 98 % O2 Device: Nasal cannula Oxygen Delivery: 2 LPM  Filed Vitals:    01/07/17 0509 01/08/17 0552 01/09/17 0628   Weight: 48.2 kg (106 lb 4.2 oz) 47.9 kg (105 lb 9.6 oz) 46.2 kg (101 lb 13.6 oz)     Vital Signs with Ranges  Temp:  [98  F (36.7  C)-98.7  F (37.1  C)] 98  F (36.7  C)  Heart Rate:  [86-88] 87  Resp:  [15-20] 18  BP: (141-163)/(81-87) 141/87 mmHg  SpO2:  [94 %-98 %] 98 %  I/O last 3 completed shifts:  In: 200 [P.O.:200]  Out: 1785 [Urine:1785]    Constitutional: Well developed, cachectic male in no acute distress  Respiratory: Poor air movement, scattered end-expiratory wheezing, no crackles  Cardiovascular: Regular rate and rhythm, S1/S2 without murmur, rubs or gallops  GI: Abdomen soft, non-tender, non-distended, normal bowel sounds  Skin/Integumen: scattered bruising over upper extremities  Other:  alert and appropriate, cranial nerves grossly intact      Medications       tiotropium  18 mcg Inhalation Daily     predniSONE  40 mg Oral Daily     ipratropium - albuterol 0.5 mg/2.5 mg/3 mL  3 mL Nebulization Q4H     insulin aspart  1-3 Units Subcutaneous TID AC     insulin aspart  1-3 Units Subcutaneous At Bedtime     atenolol (TENORMIN) tablet 25 mg  25 mg Oral Daily     fluticasone  1 puff Inhalation BID     hydrochlorothiazide (HYDRODIURIL) tablet 25 mg  25 mg Oral Daily     nicotine   Transdermal Q8H     nicotine   Transdermal Daily     nicotine  1 patch Transdermal Daily       Data    Recent Labs  Lab 01/05/17  1124   WBC 6.8   HGB 15.6   MCV 93   *      POTASSIUM 3.4   CHLORIDE 98   CO2 28   BUN 14   CR 0.81   ANIONGAP 10   MATTHEW 9.0   *   ALBUMIN 3.4   PROTTOTAL 7.8   BILITOTAL 0.8   ALKPHOS 97   ALT 38   AST 46*   TROPI <0.015The 99th percentile for upper reference range is 0.045 ug/L.  Troponin values in the range of 0.045 - 0.120 ug/L may be associated with risks of adverse clinical events.       No results found for  this or any previous visit (from the past 24 hour(s)).

## 2017-01-10 NOTE — PROGRESS NOTES
Patient is on 2L nasal cannula with SPO2 of 96%, diminished BBS, nebs tx is given, will continue to monitor.    Alem Harris  1/10/2017

## 2017-01-10 NOTE — PROGRESS NOTES
"PULMONARY PROGRESS NOTE          Interval History:      Slowly improving, up in chair, remains on o2.         Physical Exam:      Blood pressure 141/87, pulse 78, temperature 98  F (36.7  C), temperature source Oral, resp. rate 16, height 1.651 m (5' 5\"), weight 46.2 kg (101 lb 13.6 oz), SpO2 96 %.  Filed Vitals:    01/07/17 0509 01/08/17 0552 01/09/17 0628   Weight: 48.2 kg (106 lb 4.2 oz) 47.9 kg (105 lb 9.6 oz) 46.2 kg (101 lb 13.6 oz)     Vital Signs with Ranges  Temp:  [98  F (36.7  C)-98.7  F (37.1  C)] 98  F (36.7  C)  Heart Rate:  [86-88] 87  Resp:  [15-20] 16  BP: (141-163)/(81-87) 141/87 mmHg  SpO2:  [94 %-98 %] 96 %  I/O's Last 24 hours  I/O last 3 completed shifts:  In: 200 [P.O.:200]  Out: 1785 [Urine:1785]    Lungs: Dec, minimal wheezing   Cardiovascular: rrr   Other: abd soft               Medications:          methylPREDNISolone  62.5 mg Intravenous Q8H     ipratropium - albuterol 0.5 mg/2.5 mg/3 mL  3 mL Nebulization Q4H     insulin aspart  1-3 Units Subcutaneous TID AC     insulin aspart  1-3 Units Subcutaneous At Bedtime     atenolol (TENORMIN) tablet 25 mg  25 mg Oral Daily     fluticasone  1 puff Inhalation BID     hydrochlorothiazide (HYDRODIURIL) tablet 25 mg  25 mg Oral Daily     nicotine   Transdermal Q8H     nicotine   Transdermal Daily     nicotine  1 patch Transdermal Daily            Data:      All new lab and imaging data was reviewed.   Recent Labs   Lab Test  01/05/17   1124  11/25/16   0720  11/22/16   0505  03/31/14 09/29/09   WBC  6.8  11.1*  12.2*   < >   --    < >   --    HGB  15.6  14.5  14.2   < >   --    < >   --    MCV  93  95  94   < >   --    < >   --    PLT  120*  117*  54*   < >   --    < >   --    INR   --    --    --    --   1.1   --   1.0    < > = values in this interval not displayed.      Recent Labs   Lab Test  01/05/17   1124  11/25/16   0720  11/23/16   0759  11/22/16   2150   11/22/16   0505  11/22/16   0125   NA  136   --    --    --    --   137  135 "   POTASSIUM  3.4   --   4.7  3.7   < >  3.0*  3.1*   CHLORIDE  98   --    --    --    --   99  95   CO2  28   --    --    --    --   27  30   BUN  14   --    --    --    --   11  12   CR  0.81  0.80   --    --    --   0.70  0.72   ANIONGAP  10   --    --    --    --   11  10   MATTHEW  9.0   --    --    --    --   7.7*  8.7   GLC  151*   --    --    --    --   161*  104*    < > = values in this interval not displayed.        I reviewed the patient's new clinical lab test results.       I reviewed the patient's new imaging test results.     CT Chest 11/22/16  1. A small region of minimal patchy and linear opacities in the  lingula could represent atelectasis or pneumonia.  2. No other evidence of active pulmonary disease.  3. Moderate to severe emphysematous changes in the lungs.  4. No visualized pulmonary embolus.    cxr 1/7/17- copd, hyperinflation, no acute infiltrate     Active Problems:    Obstructive chronic bronchitis with exacerbation (H)           Assessment and Plan:      Mr. Chuck Lopez is a 76-year-old male with a known history of COPD, non-oxygen dependent (FEV1 ~40% in 2011) hypertension, chronic hepatitis C, chronic thrombocytopenia, diabetes mellitus type 2 diet controlled, hypertension, ongoing tobacco use who presents to the emergency room with progressive shortness of breath.     Continues to smoke, somewhat interested in quitting. Poor exercise tolerance, able to walk to bathroom. Lives in a basement and steps are challenging. Thinks he has lost ~50 pounds in the last 5 years. No hemoptysis. Improving and may be near his baseline.        Continue bronchodilators, suggest triple inhaler therapy with spiriva and Breo/Advair/Symbicort/Dulera on discharge    Wean o2 as tolerated, suspect that he will need o2 on discharge    Suggest 40mg/day of prednisone with taper by 10mg every 4 days    Smoking cessation    May benefit from TCU on d/c?    Needs outpt pulmonary followup in 4 weeks    No additional  suggestions, will sign off. He should see me in clinic in 4 weeks. [phone].    Awais Torre MD    [facility]  [phone] (pager)  [phone] (office)

## 2017-01-10 NOTE — PROGRESS NOTES
Patient is on 2L NC and sating mid 90`s. BBS diminished. Scheduled and prn neb given. Will continue to follow.  1/9/2017  Ney Cano

## 2017-01-10 NOTE — PLAN OF CARE
Problem: Goal Outcome Summary  Goal: Goal Outcome Summary  Pt A&O x4. SOB and labored breathing, at times. 2-3 LNC. LS dim. C/o pain; tramadol given. On scheduled nebs and IV Solumedrol. Mod carb diet. . SBA. Frequent use of urinal.

## 2017-01-11 LAB
GLUCOSE BLDC GLUCOMTR-MCNC: 101 MG/DL (ref 70–99)
GLUCOSE BLDC GLUCOMTR-MCNC: 127 MG/DL (ref 70–99)
GLUCOSE BLDC GLUCOMTR-MCNC: 141 MG/DL (ref 70–99)
GLUCOSE BLDC GLUCOMTR-MCNC: 166 MG/DL (ref 70–99)
GLUCOSE BLDC GLUCOMTR-MCNC: 90 MG/DL (ref 70–99)

## 2017-01-11 PROCEDURE — 40000275 ZZH STATISTIC RCP TIME EA 10 MIN

## 2017-01-11 PROCEDURE — 25000308 HC RX OP HPI UCR WEL MED 250 IP 250: Performed by: INTERNAL MEDICINE

## 2017-01-11 PROCEDURE — 99231 SBSQ HOSP IP/OBS SF/LOW 25: CPT | Performed by: HOSPITALIST

## 2017-01-11 PROCEDURE — 94640 AIRWAY INHALATION TREATMENT: CPT

## 2017-01-11 PROCEDURE — 25000132 ZZH RX MED GY IP 250 OP 250 PS 637: Mod: GY | Performed by: INTERNAL MEDICINE

## 2017-01-11 PROCEDURE — 25000125 ZZHC RX 250: Performed by: HOSPITALIST

## 2017-01-11 PROCEDURE — 94640 AIRWAY INHALATION TREATMENT: CPT | Mod: 76

## 2017-01-11 PROCEDURE — A9270 NON-COVERED ITEM OR SERVICE: HCPCS | Mod: GY | Performed by: INTERNAL MEDICINE

## 2017-01-11 PROCEDURE — 12000000 ZZH R&B MED SURG/OB

## 2017-01-11 PROCEDURE — 00000146 ZZHCL STATISTIC GLUCOSE BY METER IP

## 2017-01-11 RX ADMIN — HYDROCHLOROTHIAZIDE 25 MG: 25 TABLET ORAL at 07:48

## 2017-01-11 RX ADMIN — ALBUTEROL SULFATE 2.5 MG: 2.5 SOLUTION RESPIRATORY (INHALATION) at 22:35

## 2017-01-11 RX ADMIN — PREDNISONE 40 MG: 20 TABLET ORAL at 07:47

## 2017-01-11 RX ADMIN — TRAMADOL HYDROCHLORIDE 50 MG: 50 TABLET, COATED ORAL at 15:53

## 2017-01-11 RX ADMIN — UMECLIDINIUM 62.5 MCG: 62.5 AEROSOL, POWDER ORAL at 07:47

## 2017-01-11 RX ADMIN — FLUTICASONE PROPIONATE 1 PUFF: 250 POWDER, METERED RESPIRATORY (INHALATION) at 20:05

## 2017-01-11 RX ADMIN — ATENOLOL 25 MG: 25 TABLET ORAL at 07:48

## 2017-01-11 RX ADMIN — INSULIN ASPART 1 UNITS: 100 INJECTION, SOLUTION INTRAVENOUS; SUBCUTANEOUS at 12:13

## 2017-01-11 RX ADMIN — TRAMADOL HYDROCHLORIDE 50 MG: 50 TABLET, COATED ORAL at 07:47

## 2017-01-11 RX ADMIN — TRAMADOL HYDROCHLORIDE 50 MG: 50 TABLET, COATED ORAL at 20:04

## 2017-01-11 RX ADMIN — FLUTICASONE PROPIONATE 1 PUFF: 250 POWDER, METERED RESPIRATORY (INHALATION) at 07:47

## 2017-01-11 RX ADMIN — NICOTINE 1 PATCH: 21 PATCH, EXTENDED RELEASE TRANSDERMAL at 07:48

## 2017-01-11 RX ADMIN — TRAMADOL HYDROCHLORIDE 50 MG: 50 TABLET, COATED ORAL at 11:51

## 2017-01-11 RX ADMIN — ALBUTEROL SULFATE 2.5 MG: 2.5 SOLUTION RESPIRATORY (INHALATION) at 05:44

## 2017-01-11 RX ADMIN — ALBUTEROL SULFATE 2.5 MG: 2.5 SOLUTION RESPIRATORY (INHALATION) at 08:50

## 2017-01-11 RX ADMIN — ALBUTEROL SULFATE 2.5 MG: 2.5 SOLUTION RESPIRATORY (INHALATION) at 12:59

## 2017-01-11 RX ADMIN — ALBUTEROL SULFATE 2.5 MG: 2.5 SOLUTION RESPIRATORY (INHALATION) at 18:56

## 2017-01-11 RX ADMIN — ALBUTEROL SULFATE 2.5 MG: 2.5 SOLUTION RESPIRATORY (INHALATION) at 15:57

## 2017-01-11 RX ADMIN — TRAMADOL HYDROCHLORIDE 50 MG: 50 TABLET, COATED ORAL at 03:02

## 2017-01-11 NOTE — PROGRESS NOTES
Pt continues on 2 liters NC. Sat. 96% BS dim t/o. Pt refused Bipap over noc.  Will continue to monitor and treat.

## 2017-01-11 NOTE — PROGRESS NOTES
Patient is on 2L NC and sating 94%. BBS diminished. Scheduled neb tx given and tolerated well. Will continue to follow.  1/11/2017  Ney Cano

## 2017-01-11 NOTE — PROGRESS NOTES
PAS-RR    D: Per DHS regulation, SW completed and submitted PAS-RR to [place] [employer] via the Senior [employer] Line.  PAS-RR confirmation # is : [id number]    I: SW spoke with patient and they are aware a PAS-RR has been submitted.  SW reviewed with patient that they may be contacted for a follow up appointment within 10 days of hospital discharge if their SNF stay is < 30 days.  Contact information for Senior [employer] Line was also provided.    A: patient verbalized understanding.    P: Further questions may be directed to [facility] at #[phone], option #4 for PAS-RR staff.  Patient has been accepted at [facility] and at [facility]. Offered facility choice to patient and he prefers [facility]. Explained at this facility he must agree NOT to smoke. Patient states he is not going to smoke again.   Patient requests transport. He was informed his MA would lkely cover this charge, though we can not guarantee this.  Accepted the bed at [facility] and cancelled the bed at [facility].  Called [facility] and spoke with Kandy, to update her on the facility choice and the anticipated D/C date.

## 2017-01-11 NOTE — PLAN OF CARE
Problem: Goal Outcome Summary  Goal: Goal Outcome Summary  Outcome: Improving  Pt A/Ox4. SBA, fall risk. VSS on 2L NC. Complaints of chronic back pain, relief with Tramadol. Lung sounds diminished with infrequent nonproductive cough. Using urinal at bedside. /141. D/C pending progress.

## 2017-01-11 NOTE — PROGRESS NOTES
[facility]    Hospitalist Progress Note    Date of Service (when I saw the patient): 01/11/2017    Assessment and Plan  Chuck Lopez is a 76 year old male who was admitted on 1/5/2017.  Past history of COPD, non-oxygen dependent, hypertension, chronic hepatitis C, chronic thrombocytopenia, diabetes mellitus type 2 diet controlled, ongoing tobacco use who presents to the emergency room with progressive shortness of breath.    Acute hypoxic and hypercapneic respiratory failure: Due to COPD exacerbation with suspect URI.   Admission CXR clear, no leukocytosis, negative procalcitonin and flu swab.  Admission ABG with mild CO2 retention.  Initiated on azithromycin on solumedrol at admission.  Transiently on Bipap 1/8-1/9.  Completed course of azithro 1/9.  - continue prednisone 40 mg daily, taper by 10 mg every 4 days  - continue scheduled Duonebs  - resume Spiriva at discharge  - will discuss with discharge pharmacy insurance coverage options for Dulera/Advair/Breo/Symbicort  - follow up with Pulmonology in 4 weeks    Nicotine dependence:  Continue nicotine patch.  Has been counseled on importance of tobacco cessation.    HTN:  Continue PTA hctz and atenolol.    Type 2 DM: Diet controlled, A1C 5.3%.  Continue SSI.    Severe malnutrition:  Marked subcutaneous fat loss and diffuse muscle wasting.  Reports losing 50 pounds over the past year, BMI 17.    DVT Prophylaxis: Pneumatic Compression Devices  Code Status: Full Code    Disposition: Expected discharge to TCU tomorrow.    Joe Syed    Interval History  Feels dyspnea is again better today.  Mild non-productive cough, unchanged.  No fever/chills, no diarrhea or other complaints.    -Data reviewed today: I reviewed all new labs and imaging results over the last 24 hours. I personally reviewed no images or EKG's today.    Physical Exam  Temp: 98  F (36.7  C) Temp src: Oral BP: 138/75 mmHg   Heart Rate: 87 Resp: 18 SpO2: 99 % O2 Device: Nasal  cannula Oxygen Delivery: 2 LPM  Filed Vitals:    01/08/17 0552 01/09/17 0628 01/11/17 0546   Weight: 47.9 kg (105 lb 9.6 oz) 46.2 kg (101 lb 13.6 oz) 49.1 kg (108 lb 3.9 oz)     Vital Signs with Ranges  Temp:  [98  F (36.7  C)-98.4  F (36.9  C)] 98  F (36.7  C)  Heart Rate:  [87-96] 87  Resp:  [18] 18  BP: (138-158)/(75-93) 138/75 mmHg  SpO2:  [93 %-99 %] 99 %  I/O last 3 completed shifts:  In: 1080 [P.O.:1080]  Out: 1450 [Urine:1450]    Constitutional: Well developed, cachectic male in no acute distress  Respiratory: Improved aeration today, no wheezing, no crackles or tachypnea  Cardiovascular: Regular rate and rhythm, S1/S2 without murmur, rubs or gallops  GI: Abdomen soft, normal bowel sounds  Skin/Integumen:   Other:  alert and appropriate, cranial nerves grossly intact      Medications       umeclidinium  62.5 mcg Inhalation Daily     predniSONE  40 mg Oral Daily     albuterol  2.5 mg Nebulization Q4H     insulin aspart  1-3 Units Subcutaneous TID AC     insulin aspart  1-3 Units Subcutaneous At Bedtime     atenolol (TENORMIN) tablet 25 mg  25 mg Oral Daily     fluticasone  1 puff Inhalation BID     hydrochlorothiazide (HYDRODIURIL) tablet 25 mg  25 mg Oral Daily     nicotine   Transdermal Q8H     nicotine   Transdermal Daily     nicotine  1 patch Transdermal Daily       Data    Recent Labs  Lab 01/05/17  1124   WBC 6.8   HGB 15.6   MCV 93   *      POTASSIUM 3.4   CHLORIDE 98   CO2 28   BUN 14   CR 0.81   ANIONGAP 10   MATTHEW 9.0   *   ALBUMIN 3.4   PROTTOTAL 7.8   BILITOTAL 0.8   ALKPHOS 97   ALT 38   AST 46*   TROPI <0.015The 99th percentile for upper reference range is 0.045 ug/L.  Troponin values in the range of 0.045 - 0.120 ug/L may be associated with risks of adverse clinical events.       No results found for this or any previous visit (from the past 24 hour(s)).

## 2017-01-11 NOTE — PLAN OF CARE
Problem: Goal Outcome Summary  Goal: Goal Outcome Summary  PT: Attempted AM PT session; pt reports feeling better today, however, requesting a little more time to rest prior to participating with PT. Asks that PT return later as able; educated pt that if PT unable to return or when pt ready, he can request assist from nursing staff to mobilize. Pt in agreement.

## 2017-01-12 VITALS
DIASTOLIC BLOOD PRESSURE: 81 MMHG | SYSTOLIC BLOOD PRESSURE: 161 MMHG | WEIGHT: 104.2 LBS | BODY MASS INDEX: 17.36 KG/M2 | HEART RATE: 79 BPM | OXYGEN SATURATION: 92 % | RESPIRATION RATE: 18 BRPM | HEIGHT: 65 IN | TEMPERATURE: 98.3 F

## 2017-01-12 LAB
GLUCOSE BLDC GLUCOMTR-MCNC: 147 MG/DL (ref 70–99)
GLUCOSE BLDC GLUCOMTR-MCNC: 81 MG/DL (ref 70–99)
GLUCOSE BLDC GLUCOMTR-MCNC: 84 MG/DL (ref 70–99)

## 2017-01-12 PROCEDURE — 40000275 ZZH STATISTIC RCP TIME EA 10 MIN

## 2017-01-12 PROCEDURE — A9270 NON-COVERED ITEM OR SERVICE: HCPCS | Mod: GY | Performed by: INTERNAL MEDICINE

## 2017-01-12 PROCEDURE — 25000125 ZZHC RX 250: Performed by: HOSPITALIST

## 2017-01-12 PROCEDURE — 25000308 HC RX OP HPI UCR WEL MED 250 IP 250: Performed by: INTERNAL MEDICINE

## 2017-01-12 PROCEDURE — 94640 AIRWAY INHALATION TREATMENT: CPT

## 2017-01-12 PROCEDURE — 99239 HOSP IP/OBS DSCHRG MGMT >30: CPT | Performed by: HOSPITALIST

## 2017-01-12 PROCEDURE — 25000132 ZZH RX MED GY IP 250 OP 250 PS 637: Mod: GY | Performed by: INTERNAL MEDICINE

## 2017-01-12 PROCEDURE — 00000146 ZZHCL STATISTIC GLUCOSE BY METER IP

## 2017-01-12 RX ORDER — TRAMADOL HYDROCHLORIDE 50 MG/1
TABLET ORAL
Qty: 30 TABLET | Refills: 0 | Status: SHIPPED | DISCHARGE
Start: 2017-01-12 | End: 2017-01-12

## 2017-01-12 RX ORDER — TRAMADOL HYDROCHLORIDE 50 MG/1
50-100 TABLET ORAL 3 TIMES DAILY PRN
Qty: 20 TABLET | Refills: 0 | Status: SHIPPED | OUTPATIENT
Start: 2017-01-12 | End: 2017-01-17 | Stop reason: DRUGHIGH

## 2017-01-12 RX ORDER — PREDNISONE 10 MG/1
TABLET ORAL
Qty: 25 TABLET | Refills: 0 | Status: ON HOLD | DISCHARGE
Start: 2017-01-13 | End: 2017-03-28

## 2017-01-12 RX ORDER — ACETAMINOPHEN 325 MG/1
650 TABLET ORAL EVERY 4 HOURS PRN
Qty: 100 TABLET | Refills: 0 | DISCHARGE
Start: 2017-01-12 | End: 2017-01-17

## 2017-01-12 RX ORDER — NICOTINE 21 MG/24HR
1 PATCH, TRANSDERMAL 24 HOURS TRANSDERMAL DAILY
Qty: 30 PATCH | Status: ON HOLD | DISCHARGE
Start: 2017-01-12 | End: 2017-03-28

## 2017-01-12 RX ADMIN — FLUTICASONE PROPIONATE 1 PUFF: 250 POWDER, METERED RESPIRATORY (INHALATION) at 08:18

## 2017-01-12 RX ADMIN — ATENOLOL 25 MG: 25 TABLET ORAL at 08:16

## 2017-01-12 RX ADMIN — TRAMADOL HYDROCHLORIDE 50 MG: 50 TABLET, COATED ORAL at 03:45

## 2017-01-12 RX ADMIN — TRAMADOL HYDROCHLORIDE 50 MG: 50 TABLET, COATED ORAL at 08:15

## 2017-01-12 RX ADMIN — HYDROCHLOROTHIAZIDE 25 MG: 25 TABLET ORAL at 08:16

## 2017-01-12 RX ADMIN — NICOTINE 1 PATCH: 21 PATCH, EXTENDED RELEASE TRANSDERMAL at 08:16

## 2017-01-12 RX ADMIN — TRAMADOL HYDROCHLORIDE 50 MG: 50 TABLET, COATED ORAL at 13:06

## 2017-01-12 RX ADMIN — PREDNISONE 40 MG: 20 TABLET ORAL at 08:16

## 2017-01-12 RX ADMIN — UMECLIDINIUM 62.5 MCG: 62.5 AEROSOL, POWDER ORAL at 08:18

## 2017-01-12 RX ADMIN — ALBUTEROL SULFATE 2.5 MG: 2.5 SOLUTION RESPIRATORY (INHALATION) at 12:30

## 2017-01-12 NOTE — PLAN OF CARE
Problem: Goal Outcome Summary  Goal: Goal Outcome Summary  PT: Pt discharged to TCU prior to being seen by PT 1/12.    PT goals not met.

## 2017-01-12 NOTE — PLAN OF CARE
Problem: Goal Outcome Summary  Goal: Goal Outcome Summary  Outcome: Improving  A&O x4. VSS on O2 @ 1L per nasal cannula. Up independently in room. Voiding adequately per urinal. Chronic back pain managed with PRN tramadol. Lung sounds diminished, dyspnea on exertion. Plan to discharge to TCU today.

## 2017-01-12 NOTE — DISCHARGE SUMMARY
[facility]    Discharge Summary  Hospitalist    Date of Admission:  1/5/2017  Date of Discharge:  1/12/2017  Discharging Provider: Joe Syed  Date of Service (when I saw the patient): 01/12/2017    Discharge Diagnoses  Acute hypoxic and hypercapneic respiratory failure  Acute COPD exacerbation  Nicotine dependence  HTN  DM II  Severe malnutrition    History of Present Illness  Chuck Lopez is an 76 year old male who presented with shortness of breath due to COPD exacerbation.    Hospital Course  Chuck Lopez was admitted on 1/5/2017.  The following problems were addressed during his hospitalization:    Acute hypoxic and hypercapneic respiratory failure due to COPD exacerbation likely secondary to URI:  Admission CXR clear, no leukocytosis, negative procalcitonin and flu swab.  Recent exposure to sick contacts.  Admission ABG with mild CO2 retention.  Initiated on azithromycin on solumedrol at admission with gradual improvement.  Transiently on Bipap 1/8-1/9.  Completed course of azithro 1/9.  Pulmonology was consulted and following, recommend triple therapy with Advair and Spiriva, continue prednisone taper and follow up in Pulmonology clinic in 4 weeks.  He weaned from oxygen prior to discharge.    Nicotine dependence:  Continue nicotine patch.  Has been counseled on importance of tobacco cessation.    HTN:  Continue PTA hctz and atenolol.    Type 2 DM: Diet controlled, A1C 5.3%.  Required very minimal sliding scale insulin while on prednisone and do not feel this is needed going forward.    Severe malnutrition:  Marked subcutaneous fat loss and diffuse muscle wasting.  Reports losing 50 pounds over the past year, BMI 17.    Joe Syed    Significant Results and Procedures  See imaging below    Pending Results  These results will be followed up by: N/A  Unresulted Labs Ordered in the Past 30 Days of this Admission     No orders found from 11/7/2016 to 1/6/2017.          Code  Status  Full Code       Primary Care Physician  Pranay Reid    Constitutional: Well developed, cachectic appearing male in no acute distress  Respiratory: poor air movement bilaterally, mild end-expiratory wheezes diffusely, no crackles, no tachypnea  Cardiovascular: Regular rate and rhythm, S1/S2 without murmur, rubs or gallops  GI: Abdomen soft, non-tender, non-distended, normal bowel sounds  Lymph/Hematologic: No edema  Musculoskeletal: Extremities warm and well perfused  Neurologic: Cranial nerves grossly intact, gross motor movements intact, alert and appropriate  Psychiatric: normal affect    Discharge Disposition  Discharged to nursing home  Condition at discharge: Stable    Consultations This Hospital Stay  CARE COORDINATOR IP CONSULT  RESPIRATORY CARE IP CONSULT  SMOKING CESSATION PROGRAM IP CONSULT  PHYSICAL THERAPY ADULT IP CONSULT  OCCUPATIONAL THERAPY ADULT IP CONSULT  PULMONARY IP CONSULT  OCCUPATIONAL THERAPY ADULT IP CONSULT  PHYSICAL THERAPY ADULT IP CONSULT    Time Spent on This Encounter  IJoe, personally saw the patient today and spent greater than 30 minutes discharging this patient.    Discharge Orders    General info for SNF   Length of Stay Estimate: Short Term Care: Estimated # of Days <30  Condition at Discharge: Improving  Level of care:skilled   Rehabilitation Potential: Good  Admission H&P remains valid and up-to-date: Yes  Recent Chemotherapy: N/A  Use Nursing Home Standing Orders: N/A     Mantoux instructions   Give two-step Mantoux (PPD) Per Facility Policy Yes     Reason for your hospital stay   You were admitted for shortness of breath due to COPD exacerbation.     Glucose monitor nursing POCT   Before meals and at bedtime     Follow Up and recommended labs and tests   Follow up with Nursing home physician.  No follow up labs or test are needed.  Follow up with Dr. Torre of [facility] in 4 weeks.  No follow up labs or test are needed.     Activity -  Up with nursing assistance     Full Code     Occupational Therapy Adult Consult   Evaluate and treat as clinically indicated.    Reason:  deconditioning     Physical Therapy Adult Consult   Evaluate and treat as clinically indicated.    Reason:  deconditioning     Advance Diet as Tolerated   Follow this diet upon discharge:   Snacks/Supplements Adult: Magic Cup; Between Meals  Combination Diet Regular Diet Adult; 5977-5042 Calories: Moderate Consistent CHO (4-6 CHO units/meal)       Discharge Medications  Current Discharge Medication List      START taking these medications    Details   acetaminophen (TYLENOL) 325 MG tablet Take 2 tablets (650 mg) by mouth every 4 hours as needed for mild pain or fever  Qty: 100 tablet, Refills: 0    Associated Diagnoses: Other chronic pain      nicotine (NICODERM CQ) 21 MG/24HR 24 hr patch Place 1 patch onto the skin daily  Qty: 30 patch    Associated Diagnoses: Cigarette nicotine dependence without complication      predniSONE (DELTASONE) 10 MG tablet Take 4 tabs daily x 1 day, then 3 tabs daily x 4 days, then 2 tabs daily x 4 days, then 1 tab daily x 4 days, then stop.  Qty: 25 tablet, Refills: 0    Associated Diagnoses: COPD exacerbation (H)      fluticasone-salmeterol (ADVAIR) 500-50 MCG/DOSE diskus inhaler Inhale 1 puff into the lungs 2 times daily  Qty: 3 Inhaler, Refills: 1    Associated Diagnoses: COPD exacerbation (H)         CONTINUE these medications which have CHANGED    Details   traMADol (ULTRAM) 50 MG tablet Take one or two tabs three times daily maximum  per day as needed for pain  Qty: 30 tablet, Refills: 0    Associated Diagnoses: Other chronic pain         CONTINUE these medications which have NOT CHANGED    Details   albuterol (PROAIR HFA/PROVENTIL HFA/VENTOLIN HFA) 108 (90 BASE) MCG/ACT Inhaler Inhale 2 puffs into the lungs every 6 hours as needed for shortness of breath / dyspnea or wheezing  Qty: 1 Inhaler, Refills: 0    Associated Diagnoses: Chronic  "obstructive airway disease with asthma (H)      albuterol (2.5 MG/3ML) 0.083% neb solution Take 1 vial (2.5 mg) by nebulization every 4 hours as needed for shortness of breath / dyspnea or wheezing  Qty: 360 mL, Refills: 3    Comments: Adding refills to prior script  Associated Diagnoses: COPD exacerbation (H)      tiotropium (SPIRIVA HANDIHALER) 18 MCG inhalation capsule Inhale contents of one capsule daily.  Qty: 30 capsule, Refills: 0    Associated Diagnoses: Centrilobular emphysema (H)      ATENOLOL PO Take 25 mg by mouth daily      HYDROCHLOROTHIAZIDE PO Take 25 mg by mouth daily      Multiple Vitamins-Minerals (V-C FORTE) CAPS Take 1 capsule by mouth daily  Qty: 90 capsule, Refills: 3    Associated Diagnoses: Hepatitis C      order for DME Equipment being ordered: Other: neb machine  Treatment Diagnosis: COPD exacerbation  Qty: 1 Device, Refills: 0    Associated Diagnoses: COPD exacerbation (H)         STOP taking these medications       beclomethasone (QVAR) 80 MCG/ACT Inhaler Comments:   Reason for Stopping:             Allergies  Allergies   Allergen Reactions     Ammonium Lactate Other (See Comments)     Burning to skin     Doxycycline Visual Disturbance     Monosodium Glutamate Other (See Comments)     Pt states it feels like his \"head swells, visual disturbances, and he can't think striaght\"     Penicillins Rash     Data  Most Recent 3 CBC's:  Recent Labs   Lab Test  01/05/17   1124  11/25/16   0720  11/22/16   0505   WBC  6.8  11.1*  12.2*   HGB  15.6  14.5  14.2   MCV  93  95  94   PLT  120*  117*  54*      Most Recent 3 BMP's:  Recent Labs   Lab Test  01/05/17   1124  11/25/16   0720  11/23/16   0759  11/22/16   2150   11/22/16   0505  11/22/16   0125   NA  136   --    --    --    --   137  135   POTASSIUM  3.4   --   4.7  3.7   < >  3.0*  3.1*   CHLORIDE  98   --    --    --    --   99  95   CO2  28   --    --    --    --   27  30   BUN  14   --    --    --    --   11  12   CR  0.81  0.80   --    --  "   --   0.70  0.72   ANIONGAP  10   --    --    --    --   11  10   MATTHEW  9.0   --    --    --    --   7.7*  8.7   GLC  151*   --    --    --    --   161*  104*    < > = values in this interval not displayed.     Most Recent 2 LFT's:  Recent Labs   Lab Test  01/05/17   1124  08/16/16   0932   AST  46*  71*   ALT  38  88*   ALKPHOS  97  99   BILITOTAL  0.8  0.7     Most Recent 3 Troponin's:  Recent Labs   Lab Test  01/05/17   1124  11/22/16   1820  11/22/16   1405   TROPI  <0.015  The 99th percentile for upper reference range is 0.045 ug/L.  Troponin values in   the range of 0.045 - 0.120 ug/L may be associated with risks of adverse   clinical events.    0.050*  0.050*     Most Recent TSH, T4 and A1c Labs:  Recent Labs   Lab Test  01/06/17   0701   01/14/13   1127   TSH   --    --   1.26   A1C  5.3   < >   --     < > = values in this interval not displayed.     Results for orders placed or performed during the hospital encounter of 01/05/17   XR Chest 2 Views    Narrative    XR CHEST 2 VW 1/5/2017 11:56 AM    COMPARISON: 11/22/2016    HISTORY: Shortness of breath      Impression    IMPRESSION: Cardiac silhouette and pulmonary vasculature are within  normal limits. No focal airspace disease, pleural effusion or  pneumothorax.    ECTOR FLOWERS   XR Chest 2 Views    Narrative    CHEST TWO VIEW   1/7/2017 8:12 PM     HISTORY: Worsening of shortness of breath.    COMPARISON: 1/5/2017      Impression    IMPRESSION: Marked hyperinflated lung zones are present consistent  with chronic obstructive pulmonary disease. There is no evidence for  any acute process. No infiltrates are present. Heart size is small due  to the hyperinflated lungs. Pulmonary vasculature appears normal. No  pleural effusions.    KATLYN LOPEZ MD

## 2017-01-12 NOTE — PLAN OF CARE
Problem: Goal Outcome Summary  Goal: Goal Outcome Summary  Outcome: Adequate for Discharge Date Met:  01/12/17  Discharged to TCU via [facility]. On room air. Belongings accounted for. Packet sent with HE.

## 2017-01-12 NOTE — PROGRESS NOTES
D: ARNOL following for DC planning.   I: Pt will DC today to [facility] via  [facility] at 1430. Orders faxed and facility updated. Pt updated and in agreement.   P: DC today.     HUMAIRA Garcia  [id number]

## 2017-01-12 NOTE — PHARMACY
Patient has Part D  Qvar too soon - can fill 1-13-17  Advair $3.70  Ventolin $3.70    Thank you,  Ludin Silverio CPhT  [facility] Liaison  [phone]

## 2017-01-13 ENCOUNTER — NURSING HOME VISIT (OUTPATIENT)
Dept: GERIATRICS | Facility: CLINIC | Age: 77
End: 2017-01-13
Payer: MEDICARE

## 2017-01-13 VITALS
SYSTOLIC BLOOD PRESSURE: 120 MMHG | OXYGEN SATURATION: 93 % | TEMPERATURE: 98.8 F | RESPIRATION RATE: 14 BRPM | HEART RATE: 80 BPM | DIASTOLIC BLOOD PRESSURE: 69 MMHG

## 2017-01-13 DIAGNOSIS — N18.2 CKD (CHRONIC KIDNEY DISEASE) STAGE 2, GFR 60-89 ML/MIN: ICD-10-CM

## 2017-01-13 DIAGNOSIS — F10.21 ALCOHOLISM IN REMISSION (H): ICD-10-CM

## 2017-01-13 DIAGNOSIS — F17.219 CIGARETTE NICOTINE DEPENDENCE WITH NICOTINE-INDUCED DISORDER: ICD-10-CM

## 2017-01-13 DIAGNOSIS — J44.1 OBSTRUCTIVE CHRONIC BRONCHITIS WITH EXACERBATION (H): Primary | ICD-10-CM

## 2017-01-13 DIAGNOSIS — I10 HYPERTENSION GOAL BP (BLOOD PRESSURE) < 140/90: ICD-10-CM

## 2017-01-13 DIAGNOSIS — B18.2 CHRONIC HEPATITIS C WITHOUT HEPATIC COMA (H): ICD-10-CM

## 2017-01-13 DIAGNOSIS — J96.01 ACUTE HYPOXEMIC RESPIRATORY FAILURE (H): ICD-10-CM

## 2017-01-13 DIAGNOSIS — E46 MALNUTRITION (H): ICD-10-CM

## 2017-01-13 DIAGNOSIS — G89.29 OTHER CHRONIC PAIN: ICD-10-CM

## 2017-01-13 DIAGNOSIS — R53.81 PHYSICAL DECONDITIONING: ICD-10-CM

## 2017-01-13 DIAGNOSIS — E11.8 TYPE 2 DIABETES MELLITUS WITH COMPLICATION, WITHOUT LONG-TERM CURRENT USE OF INSULIN (H): ICD-10-CM

## 2017-01-13 PROCEDURE — 99310 SBSQ NF CARE HIGH MDM 45: CPT | Performed by: NURSE PRACTITIONER

## 2017-01-13 PROCEDURE — 99207 ZZC CDG-CORRECTLY CODED, REVIEWED AND AGREE: CPT | Performed by: NURSE PRACTITIONER
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
## 2017-01-14 PROBLEM — R53.81 PHYSICAL DECONDITIONING: Status: ACTIVE | Noted: 2017-01-14

## 2017-01-14 PROBLEM — E46 MALNUTRITION (H): Status: ACTIVE | Noted: 2017-01-14

## 2017-01-17 ENCOUNTER — DISCHARGE SUMMARY NURSING HOME (OUTPATIENT)
Dept: GERIATRICS | Facility: CLINIC | Age: 77
End: 2017-01-17
Payer: MEDICARE

## 2017-01-17 ENCOUNTER — TRANSFERRED RECORDS (OUTPATIENT)
Dept: HEALTH INFORMATION MANAGEMENT | Facility: CLINIC | Age: 77
End: 2017-01-17

## 2017-01-17 VITALS
DIASTOLIC BLOOD PRESSURE: 78 MMHG | HEIGHT: 65 IN | TEMPERATURE: 99.7 F | HEART RATE: 73 BPM | RESPIRATION RATE: 16 BRPM | BODY MASS INDEX: 18.19 KG/M2 | WEIGHT: 109.2 LBS | SYSTOLIC BLOOD PRESSURE: 154 MMHG | OXYGEN SATURATION: 94 %

## 2017-01-17 DIAGNOSIS — R53.81 PHYSICAL DECONDITIONING: ICD-10-CM

## 2017-01-17 DIAGNOSIS — J96.01 ACUTE HYPOXEMIC RESPIRATORY FAILURE (H): ICD-10-CM

## 2017-01-17 DIAGNOSIS — I10 HYPERTENSION GOAL BP (BLOOD PRESSURE) < 140/90: ICD-10-CM

## 2017-01-17 DIAGNOSIS — E46 MALNUTRITION (H): ICD-10-CM

## 2017-01-17 DIAGNOSIS — F17.219 CIGARETTE NICOTINE DEPENDENCE WITH NICOTINE-INDUCED DISORDER: ICD-10-CM

## 2017-01-17 DIAGNOSIS — E11.9 CONTROLLED TYPE 2 DIABETES MELLITUS WITHOUT COMPLICATION, WITHOUT LONG-TERM CURRENT USE OF INSULIN (H): ICD-10-CM

## 2017-01-17 DIAGNOSIS — B18.2 CHRONIC HEPATITIS C WITHOUT HEPATIC COMA (H): ICD-10-CM

## 2017-01-17 DIAGNOSIS — N18.2 CKD (CHRONIC KIDNEY DISEASE) STAGE 2, GFR 60-89 ML/MIN: ICD-10-CM

## 2017-01-17 DIAGNOSIS — J44.89 CHRONIC OBSTRUCTIVE AIRWAY DISEASE WITH ASTHMA (H): Primary | ICD-10-CM

## 2017-01-17 DIAGNOSIS — G89.29 OTHER CHRONIC PAIN: ICD-10-CM

## 2017-01-17 LAB
ANION GAP SERPL CALCULATED.3IONS-SCNC: 10 MMOL/L (ref 5–18)
BUN SERPL-MCNC: 26 MG/DL (ref 8–28)
CALCIUM SERPL-MCNC: 8.9 MG/DL (ref 8.5–10.5)
CHLORIDE SERPLBLD-SCNC: 98 MMOL/L (ref 98–107)
CO2 SERPL-SCNC: 33 MMOL/L (ref 22–31)
CREAT SERPL-MCNC: 0.76 MG/DL (ref 0.7–1.3)
ERYTHROCYTE [DISTWIDTH] IN BLOOD BY AUTOMATED COUNT: 13.8 % (ref 11–14.5)
GFR SERPL CREATININE-BSD FRML MDRD: >60 ML/MIN/1.73M2
GLUCOSE SERPL-MCNC: 78 MG/DL (ref 70–125)
HCT VFR BLD AUTO: 47.6 % (ref 40–54)
HEMOGLOBIN: 16.4 G/DL (ref 14–18)
MCH RBC QN AUTO: 33.1 PG (ref 27–34)
MCHC RBC AUTO-ENTMCNC: 34.5 G/DL (ref 32–36)
MCV RBC AUTO: 96 FL (ref 80–100)
PLATELET # BLD AUTO: ABNORMAL 10^9/L
POTASSIUM SERPL-SCNC: 3.4 MMOL/L (ref 3.5–5)
RBC # BLD AUTO: 4.95 MILL/UL (ref 4.4–6.2)
SODIUM SERPL-SCNC: 141 MMOL/L (ref 136–145)
WBC # BLD AUTO: 16.2 THOU/UL (ref 4–11)

## 2017-01-17 PROCEDURE — 99207 ZZC CDG-CORRECTLY CODED, REVIEWED AND AGREE: CPT | Performed by: NURSE PRACTITIONER

## 2017-01-17 PROCEDURE — 99316 NF DSCHRG MGMT 30 MIN+: CPT | Performed by: NURSE PRACTITIONER

## 2017-01-17 RX ORDER — ALBUTEROL SULFATE 0.83 MG/ML
1 SOLUTION RESPIRATORY (INHALATION) 4 TIMES DAILY
COMMUNITY
End: 2017-04-18

## 2017-01-17 RX ORDER — TRAMADOL HYDROCHLORIDE 50 MG/1
50 TABLET ORAL EVERY 4 HOURS PRN
COMMUNITY
End: 2017-03-14
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
## 2017-01-20 ENCOUNTER — TELEPHONE (OUTPATIENT)
Dept: FAMILY MEDICINE | Facility: CLINIC | Age: 77
End: 2017-01-20

## 2017-01-20 NOTE — TELEPHONE ENCOUNTER
Chief Complaint   Patient presents with     ER F/U     TCU Discharge on 1/19/2017 Chronic Obstructive Airway Disease With Asthma (H)       Yanira Richard/

## 2017-01-23 NOTE — TELEPHONE ENCOUNTER
"ED / Discharge Outreach Protocol    Patient Contact    Attempt # 2    Was call answered?  No.  Unable to leave message, was \"suites number\"    DISCHARGE PLAN:  None, patient refused  Patient instructed to follow-up with:  PCP in 7 days    Follow up with pulmonology in 3-4 weeks    Juana Montano RN, BSN  Message handled by Nurse Triage.    "

## 2017-01-24 ENCOUNTER — OFFICE VISIT (OUTPATIENT)
Dept: FAMILY MEDICINE | Facility: CLINIC | Age: 77
End: 2017-01-24
Payer: MEDICARE

## 2017-01-24 VITALS
BODY MASS INDEX: 18.77 KG/M2 | TEMPERATURE: 98.3 F | HEART RATE: 80 BPM | DIASTOLIC BLOOD PRESSURE: 64 MMHG | WEIGHT: 112.8 LBS | OXYGEN SATURATION: 95 % | SYSTOLIC BLOOD PRESSURE: 111 MMHG | RESPIRATION RATE: 12 BRPM

## 2017-01-24 DIAGNOSIS — M54.40 ACUTE MIDLINE LOW BACK PAIN WITH SCIATICA, SCIATICA LATERALITY UNSPECIFIED: Primary | ICD-10-CM

## 2017-01-24 PROCEDURE — 99214 OFFICE O/P EST MOD 30 MIN: CPT | Performed by: FAMILY MEDICINE

## 2017-01-24 RX ORDER — TRAMADOL HYDROCHLORIDE 50 MG/1
50 TABLET ORAL EVERY 6 HOURS PRN
Qty: 130 TABLET | Refills: 0 | Status: SHIPPED | OUTPATIENT
Start: 2017-01-24 | End: 2017-02-14

## 2017-01-24 NOTE — NURSING NOTE
"Chief Complaint   Patient presents with     Hospital F/U       Initial /64 mmHg  Pulse 80  Temp(Src) 98.3  F (36.8  C) (Oral)  Resp 12  Wt 112 lb 12.8 oz (51.166 kg)  SpO2 95% Estimated body mass index is 18.77 kg/(m^2) as calculated from the following:    Height as of 1/17/17: 5' 5\" (1.651 m).    Weight as of this encounter: 112 lb 12.8 oz (51.166 kg).  BP completed using cuff size: regular  Michael Be CMA       "

## 2017-01-24 NOTE — PROGRESS NOTES
SUBJECTIVE:                                                    Chuck Lopez is a 76 year old male who presents to clinic today for the following health issues:          Hospital Follow-up Visit:    Hospital/Nursing Home/IP Rehab Facility: [facility]  Date of Admission: 1/5/17  Date of Discharge: 1/12/17  Reason(s) for Admission: COPD            Problems taking medications regularly:  None       Medication changes since discharge: None       Problems adhering to non-medication therapy:  None    Summary of hospitalization:  [facility] discharge summary reviewed  Diagnostic Tests/Treatments reviewed.  Follow up needed: none  Other Healthcare Providers Involved in Patient s Care:         Care Coordination  Update since discharge: feels he breathes and moves better     Post Discharge Medication Reconciliation: discharge medications reconciled, continue medications without change.  Plan of care communicated with patient     Coding guidelines for this visit:  Type of Medical   Decision Making Face-to-Face Visit       within 7 Days of discharge Face-to-Face Visit        within 14 days of discharge   Moderate Complexity [id number]   High Complexity [id number]          Past Medical History   Diagnosis Date     COPD (chronic obstructive pulmonary disease) (H)      Diabetes (H)      Hypertension      Hepatitis C        Past Surgical History   Procedure Laterality Date     Esophagoscopy, gastroscopy, duodenoscopy (egd), combined  4/16/2014     Procedure: ESOPHAGOSCOPY, GASTROSCOPY, DUODENOSCOPY (EGD) & COLONOSCOPY with polypectomy by hot snare;  Surgeon: Dillon Vázquez MD;  Location:  GI     Appendectomy         History reviewed. No pertinent family history.    Social History   Substance Use Topics     Smoking status: Current Every Day Smoker -- 0.50 packs/day     Types: Cigarettes     Smokeless tobacco: Never Used      Comment: 10 cigs a day     Alcohol Use: No      REVIEW OF  "SYSTEMS    Generally has been started  feeling well until this episode. No problems with vision, hearing, dental or neck pain.Has hph tobacco and  airborne or ingestion allergy  No chest pain, palpitations, dyspnea, change in bowel habits, blood  in stool or dyspepsia.  No rashes, changing moles, weakness, lassitude or back problems.  No chronic issues . No dysuria  Patient no longer  a smoker. No problems with significant headaches.\    On exam the vital signs are stable  Weight is Body mass index is 18.77 kg/(m^2).per Eyes show bryant  No neck masses or thyromegaly.Ear nose and throat shows normal   No bruits, murmers, rubs or extrasounds. No cardiomegaly or chest wall tenderness. Lungs clear, no abdominal masses or organomegaly. No CVA tenderness.  Skin eval no rash  No hernias, good range of motion neck, back and extremities. No abnormal skin lesions. Normal genitalia. Good peripheral pulses. No adenopathy.  Normal gait and stance. Neck is supple.  Back exam shows good rom     (M54.40) Acute midline low back pain with sciatica, sciatica laterality unspecified  (primary encounter diagnosis)  Comment:   Plan: traMADol (ULTRAM) 50 MG tablet          COPD back on inhalers again   \"I didn't dream I could feel this good\" has insurance challenges and is currently homeless.     Discussed resources and care coordination.          "

## 2017-02-09 DIAGNOSIS — J43.2 CENTRILOBULAR EMPHYSEMA (H): Primary | ICD-10-CM

## 2017-02-09 RX ORDER — TIOTROPIUM BROMIDE 18 UG/1
CAPSULE ORAL; RESPIRATORY (INHALATION)
Qty: 30 CAPSULE | Refills: 0 | Status: SHIPPED | OUTPATIENT
Start: 2017-02-09 | End: 2017-03-14

## 2017-02-09 NOTE — TELEPHONE ENCOUNTER
Last prescribed from Dr. Huerta (hospitalist) would you like to continue therapy?       Last Written Prescription Date: 11/26/16  Last Fill Quantity: 30, # refills: 0    Last Office Visit with G, P or [facility] prescribing provider:  1/24/17   Future Office Visit:       Date of Last Asthma Action Plan Letter:   There are no preventive care reminders to display for this patient.   Asthma Control Test: No flowsheet data found.    Date of Last Spirometry Test:   No results found for this or any previous visit.    Charlotte Allen, Pharmacy [facility]

## 2017-02-14 DIAGNOSIS — J44.1 COPD EXACERBATION (H): ICD-10-CM

## 2017-02-14 DIAGNOSIS — M54.40 ACUTE MIDLINE LOW BACK PAIN WITH SCIATICA, SCIATICA LATERALITY UNSPECIFIED: ICD-10-CM

## 2017-02-14 RX ORDER — TRAMADOL HYDROCHLORIDE 50 MG/1
50 TABLET ORAL EVERY 6 HOURS PRN
Qty: 130 TABLET | Refills: 0 | Status: ON HOLD | OUTPATIENT
Start: 2017-02-14 | End: 2017-03-28

## 2017-02-14 NOTE — TELEPHONE ENCOUNTER
Pt got Advair from his last hospital visit and has been using the ones they gave him; he is now out.  Would you like to continue therapy with the Advair?    Controlled Substance Refill Request for Tramadol  Problem List Complete:  Yes  Patient is followed by JENNA PADILLA for ongoing prescription of pain medication.  All refills should be approved by this provider, or covering partner.    Medication(s): traMADol (ULTRAM) 50 MG tablet.   Maximum quantity per month: 150  Clinic visit frequency required: Q 4 months     Controlled substance agreement on file: Yes       Date(s): 9/16/15    Pain Clinic evaluation in the past: No    DIRE Total Score(s):  No flowsheet data found.    Last [facility] website verification:  4/28/16  MP checked in past 6 months?  No, route to JOSHUA Allen, Pharmacy [facility]

## 2017-03-14 DIAGNOSIS — J44.0 CHRONIC OBSTRUCTIVE PULMONARY DISEASE WITH ACUTE LOWER RESPIRATORY INFECTION (H): ICD-10-CM

## 2017-03-14 DIAGNOSIS — G89.29 OTHER CHRONIC PAIN: ICD-10-CM

## 2017-03-14 DIAGNOSIS — J43.2 CENTRILOBULAR EMPHYSEMA (H): ICD-10-CM

## 2017-03-14 RX ORDER — TIOTROPIUM BROMIDE 18 UG/1
CAPSULE ORAL; RESPIRATORY (INHALATION)
Qty: 30 CAPSULE | Refills: 11 | Status: ON HOLD | OUTPATIENT
Start: 2017-03-14 | End: 2017-04-24

## 2017-03-14 RX ORDER — TRAMADOL HYDROCHLORIDE 50 MG/1
50 TABLET ORAL EVERY 4 HOURS PRN
Qty: 28 TABLET | Refills: 1 | Status: ON HOLD | OUTPATIENT
Start: 2017-03-14 | End: 2017-03-28

## 2017-03-14 NOTE — TELEPHONE ENCOUNTER
Pharmacy informs pt coming today at 1, has to arrange transportation, advise if needs f/u appointment, ok for extra refills on spiriva and qvar?    Last OV: 1/241/7  Date last filled: 2/17/17    Controlled Substance Refill Request for Tramadol  Problem List Complete: Yes  Patient is followed by JENNA PADILLA for ongoing prescription of pain medication. All refills should be approved by this provider, or covering partner.     Medication(s): traMADol (ULTRAM) 50 MG tablet.   Maximum quantity per month: 150  Clinic visit frequency required: Q 4 months      Controlled substance agreement on file: Yes  Date(s): 9/16/15     Pain Clinic evaluation in the past: No     DIRE Total Score(s):  No flowsheet data found.     Last [facility] website verification: 4/28/16   checked in past 6 months? 3/14/17 at [facility]

## 2017-03-27 ENCOUNTER — APPOINTMENT (OUTPATIENT)
Dept: GENERAL RADIOLOGY | Facility: CLINIC | Age: 77
DRG: 190 | End: 2017-03-27
Attending: EMERGENCY MEDICINE
Payer: MEDICARE

## 2017-03-27 ENCOUNTER — HOSPITAL ENCOUNTER (INPATIENT)
Facility: CLINIC | Age: 77
LOS: 7 days | Discharge: HOME | DRG: 190 | End: 2017-04-04
Attending: EMERGENCY MEDICINE | Admitting: INTERNAL MEDICINE
Payer: MEDICARE

## 2017-03-27 DIAGNOSIS — G47.00 INSOMNIA, UNSPECIFIED TYPE: Primary | ICD-10-CM

## 2017-03-27 DIAGNOSIS — J44.1 CHRONIC OBSTRUCTIVE PULMONARY DISEASE WITH ACUTE EXACERBATION (H): ICD-10-CM

## 2017-03-27 DIAGNOSIS — G43.919 INTRACTABLE MIGRAINE, UNSPECIFIED MIGRAINE TYPE: ICD-10-CM

## 2017-03-27 DIAGNOSIS — F41.1 GENERALIZED ANXIETY DISORDER: ICD-10-CM

## 2017-03-27 PROCEDURE — 87081 CULTURE SCREEN ONLY: CPT | Performed by: EMERGENCY MEDICINE

## 2017-03-27 PROCEDURE — A9270 NON-COVERED ITEM OR SERVICE: HCPCS | Mod: GY | Performed by: EMERGENCY MEDICINE

## 2017-03-27 PROCEDURE — 85025 COMPLETE CBC W/AUTO DIFF WBC: CPT | Performed by: EMERGENCY MEDICINE

## 2017-03-27 PROCEDURE — 83880 ASSAY OF NATRIURETIC PEPTIDE: CPT | Performed by: EMERGENCY MEDICINE

## 2017-03-27 PROCEDURE — 94640 AIRWAY INHALATION TREATMENT: CPT

## 2017-03-27 PROCEDURE — 99285 EMERGENCY DEPT VISIT HI MDM: CPT | Mod: 25

## 2017-03-27 PROCEDURE — 82803 BLOOD GASES ANY COMBINATION: CPT | Performed by: EMERGENCY MEDICINE

## 2017-03-27 PROCEDURE — 71020 XR CHEST 2 VW: CPT

## 2017-03-27 PROCEDURE — 93005 ELECTROCARDIOGRAM TRACING: CPT

## 2017-03-27 PROCEDURE — 83605 ASSAY OF LACTIC ACID: CPT | Performed by: EMERGENCY MEDICINE

## 2017-03-27 PROCEDURE — 87804 INFLUENZA ASSAY W/OPTIC: CPT | Performed by: EMERGENCY MEDICINE

## 2017-03-27 PROCEDURE — 25000125 ZZHC RX 250: Performed by: EMERGENCY MEDICINE

## 2017-03-27 PROCEDURE — 87880 STREP A ASSAY W/OPTIC: CPT | Performed by: EMERGENCY MEDICINE

## 2017-03-27 PROCEDURE — 80048 BASIC METABOLIC PNL TOTAL CA: CPT | Performed by: EMERGENCY MEDICINE

## 2017-03-27 PROCEDURE — 84484 ASSAY OF TROPONIN QUANT: CPT | Performed by: EMERGENCY MEDICINE

## 2017-03-27 PROCEDURE — 25000132 ZZH RX MED GY IP 250 OP 250 PS 637: Mod: GY | Performed by: EMERGENCY MEDICINE

## 2017-03-27 RX ORDER — METHYLPREDNISOLONE SODIUM SUCCINATE 125 MG/2ML
125 INJECTION, POWDER, LYOPHILIZED, FOR SOLUTION INTRAMUSCULAR; INTRAVENOUS ONCE
Status: COMPLETED | OUTPATIENT
Start: 2017-03-27 | End: 2017-03-28

## 2017-03-27 RX ORDER — IPRATROPIUM BROMIDE AND ALBUTEROL SULFATE 2.5; .5 MG/3ML; MG/3ML
3 SOLUTION RESPIRATORY (INHALATION)
Status: COMPLETED | OUTPATIENT
Start: 2017-03-27 | End: 2017-03-28

## 2017-03-27 RX ORDER — IBUPROFEN 600 MG/1
600 TABLET, FILM COATED ORAL ONCE
Status: COMPLETED | OUTPATIENT
Start: 2017-03-27 | End: 2017-03-27

## 2017-03-27 RX ADMIN — IPRATROPIUM BROMIDE AND ALBUTEROL SULFATE 3 ML: .5; 3 SOLUTION RESPIRATORY (INHALATION) at 23:59

## 2017-03-27 RX ADMIN — IBUPROFEN 600 MG: 600 TABLET ORAL at 23:57

## 2017-03-27 NOTE — IP AVS SNAPSHOT
[address], [address]    [facility] [address]    Phone:  [phone]                                       After Visit Summary   3/27/2017    Chuck Lopez    MRN: [id number]           After Visit Summary Signature Page     I have received my discharge instructions, and my questions have been answered. I have discussed any challenges I see with this plan with the nurse or doctor.    ..........................................................................................................................................  Patient/Patient Representative Signature      ..........................................................................................................................................  Patient Representative Print Name and Relationship to Patient    ..................................................               ................................................  Date                                            Time    ..........................................................................................................................................  Reviewed by Signature/Title    ...................................................              ..............................................  Date                                                            Time

## 2017-03-27 NOTE — IP AVS SNAPSHOT
` [place] [address] ONCOLOGY: [phone]            Medication Administration Report for Chuck Lopez as of 04/02/17 0476   Legend:    Given Hold Not Given Due Canceled Entry Other Actions    Time Time (Time) Time  Time-Action       Inactive    Active    Linked        Medications 03/27/17 03/28/17 03/29/17 03/30/17 03/31/17 04/01/17 04/02/17    acetaminophen (TYLENOL) Suppository 650 mg  Dose: 650 mg Freq: EVERY 4 HOURS PRN Route: RE  PRN Reason: mild pain  Start: 03/28/17 0335   Admin Instructions: Alternate ibuprofen (if ordered) with acetaminophen.  Maximum acetaminophen dose from all sources = 75 mg/kg/day not to exceed 4 grams/day.               acetaminophen (TYLENOL) tablet 650 mg  Dose: 650 mg Freq: EVERY 4 HOURS PRN Route: PO  PRN Reason: mild pain  Start: 03/28/17 0335   Admin Instructions: Alternate ibuprofen (if ordered) with acetaminophen.  Maximum acetaminophen dose from all sources = 75 mg/kg/day not to exceed 4 grams/day.      1301 (650 mg)-Given       1943 (650 mg)-Given         2003 (650 mg)-Given          0007 (650 mg)-Given           atenolol (TENORMIN) tablet 25 mg  Dose: 25 mg Freq: DAILY Route: PO  Start: 03/29/17 0945      1035 (25 mg)-Given        0809 (25 mg)-Given        0808 (25 mg)-Given        0841 (25 mg)-Given        0813 (25 mg)-Given           bisacodyl (DULCOLAX) Suppository 10 mg  Dose: 10 mg Freq: DAILY PRN Route: RE  PRN Reason: constipation  Start: 03/28/17 0335   Admin Instructions: Hold for loose stools.  This is the third step of a three step constipation treatment protocol.               enoxaparin (LOVENOX) injection 40 mg  Dose: 40 mg Freq: EVERY 24 HOURS Route: SC  Start: 03/29/17 1800      1819 (40 mg)-Given        1837 (40 mg)-Given        1851 (40 mg)-Given        1842 (40 mg)-Given        [ ] 1800           HOLD: All Oral Medications  Freq: HOLD Route: XX  Start: 03/29/17 1323   Admin Instructions: I (provider) have reviewed/adjusted the medications  and it is okay to hold all oral medication doses while on BIPAP until patient is able to be off BIPAP for 2 hours with patient off BIPAP for at least 30 minutes before and 30 minutes after taking the medication.  No lozenges or gum should be given while patient on BIPAP.               hydrALAZINE (APRESOLINE) injection 10 mg  Dose: 10 mg Freq: EVERY 4 HOURS PRN Route: IV  PRN Reason: high blood pressure  PRN Comment: give for SBP > 180  Start: 03/31/17 1219        1256 (10 mg)-Given             hydrochlorothiazide (HYDRODIURIL) tablet 25 mg  Dose: 25 mg Freq: DAILY Route: PO  Start: 03/29/17 0945      1035 (25 mg)-Given        0809 (25 mg)-Given        0808 (25 mg)-Given        0841 (25 mg)-Given        0813 (25 mg)-Given           hypromellose-dextran (ARTIFICAL TEARS) ophthalmic solution 1 drop  Dose: 1 drop Freq: EVERY 1 HOUR PRN Route: Both Eyes  PRN Reason: dry eyes  Start: 03/29/17 1322              ipratropium - albuterol 0.5 mg/2.5 mg/3 mL (DUONEB) neb solution 3 mL  Dose: 3 mL Freq: EVERY 2 HOURS PRN Route: NEBULIZATION  PRN Reason: wheezing  Start: 03/28/17 0335      1305 (3 mL)-Given               ipratropium - albuterol 0.5 mg/2.5 mg/3 mL (DUONEB) neb solution 3 mL  Dose: 3 mL Freq: EVERY 4 HOURS WHILE AWAKE Route: NEBULIZATION  Start: 03/28/17 0600     [ ] 0600       1023 (3 mL)-Given       1546 (3 mL)-Given       1907 (3 mL)-Given        0020 (3 mL)-Given       0735 (3 mL)-Given       (1131)-Not Given       1503 (3 mL)-Given       1840 (3 mL)-Given       2205 (3 mL)-Given        0706 (3 mL)-Given       1140 (3 mL)-Given       1505 (3 mL)-Given       1902 (3 mL)-Given       2309 (3 mL)-Given        0724 (3 mL)-Given       1103 (3 mL)-Given       1433 (3 mL)-Given       1856 (3 mL)-Given        0003 (3 mL)-Given       0659 (3 mL)-Given       1020 (3 mL)-Given       1406 (3 mL)-Given       1929 (3 mL)-Given       2342 (3 mL)-Given        0712 (3 mL)-Given       1041 (3 mL)-Given       [ ] 1500       [ ]  "1900       [ ] 2300           lidocaine (LMX4) cream  Freq: EVERY 1 HOUR PRN Route: Top  PRN Reason: mild pain  PRN Comment: with VAD insertion or accessing implanted port,  Start: 03/30/17 1532   Admin Instructions: Do NOT give if patient has a history of allergy to any local anesthetic or any \"julissa\" product.   Apply 30 min prior to VAD insertion or port access.  MAX Dose:  2.5 gm (  of 5 gm tube)               lidocaine 1 % 1 mL  Dose: 1 mL Freq: EVERY 1 HOUR PRN Route: OTHER  PRN Comment: mild pain with VAD insertion or accessing implanted port,  Start: 03/30/17 1532   Admin Instructions: Do NOT give if patient has a history of allergy to any local anesthetic or any \"julissa\" product. MAX dose 1 mL subcutaneous OR intradermal in divided doses.               LORazepam (ATIVAN) tablet 0.5 mg  Dose: 0.5 mg Freq: EVERY 4 HOURS PRN Route: PO  PRN Reason: anxiety  Start: 03/31/17 1607        1642 (0.5 mg)-Given       2130 (0.5 mg)-Given        1011 (0.5 mg)-Given       1440 (0.5 mg)-Given            magnesium sulfate 4 g in 100 mL sterile water (premade)  Dose: 4 g Freq: EVERY 4 HOURS PRN Route: IV  PRN Reason: magnesium supplementation  Start: 03/28/17 0335   Admin Instructions: For serum Mg++ less than 1.6 mg/dL  Give 4 g and recheck magnesium level 2 hours after dose, and next AM.               melatonin tablet 3 mg  Dose: 3 mg Freq: AT BEDTIME PRN Route: PO  PRN Reasons: sleep,Insomnia  Start: 03/28/17 2056     2201 (3 mg)-Given        2325 (3 mg)-Given        2240 (3 mg)-Given        2218 (3 mg)-Given             multivitamin, therapeutic with minerals (THERA-VIT-M) tablet 1 tablet  Dose: 1 tablet Freq: DAILY Route: PO  Start: 03/28/17 0900     1000 (1 tablet)-Given        0804 (1 tablet)-Given        0809 (1 tablet)-Given        0808 (1 tablet)-Given        0841 (1 tablet)-Given        0813 (1 tablet)-Given           naloxone (NARCAN) injection 0.1-0.4 mg  Dose: 0.1-0.4 mg Freq: EVERY 2 MIN PRN Route: IV  PRN " Reason: opioid reversal  Start: 03/28/17 0335   Admin Instructions: For respiratory rate LESS than or EQUAL to 8.  Partial reversal dose:  0.1 mg titrated q 2 minutes for Analgesia Side Effects Monitoring Sedation Level of 3 (frequently drowsy, arousable, drifts to sleep during conversation).Full reversal dose:  0.4 mg bolus for Analgesia Side Effects Monitoring Sedation Level of 4 (somnolent, minimal or no response to stimulation).               nicotine (NICODERM CQ) 14 MG/24HR 24 hr patch 1 patch  Dose: 1 patch Freq: DAILY Route: TD  Start: 03/28/17 1615     1701 (1 patch)-Given        0808 (1 patch)-Given        0817 (1 patch)-Given        0903 (1 patch)-Given        0846 (1 patch)-Given        0812 (1 patch)-Given           nicotine Patch in Place  Freq: EVERY 8 HOURS Route: TD  Start: 03/28/17 1615   Admin Instructions: Chart every shift, confirming that patch is still in place on patient (no barcode scan needed). See patch order for dose information.      1702 ( )-Given        0059 ( )-Patch in Place       0758 ( )-Given       1800 ( )-Patch in Place       2325 ( )-Patch in Place        0758 ( )-Given       1628 ( )-Patch in Place        0451 ( )-Given       0748 ( )-Patch in Place       1623 ( )-Patch in Place       2357 ( )-Patch in Place        0848 ( )-Patch in Place       1819 ( )-Patch in Place        0018 ( )-Patch in Place       0814 ( )-Patch in Place       [ ] 1615           nicotine patch REMOVAL  Freq: DAILY Route: TD  Start: 03/29/17 0900   Admin Instructions: Remove patch when new patch is applied or patch is discontinued.       0805 ( )-Patch Removed        0810 ( )-Patch Removed        0900 ( )-Patch Removed        0848 ( )-Patch Removed        0811 ( )-Patch Removed           nitroglycerin (NITROSTAT) sublingual tablet 0.4 mg  Dose: 0.4 mg Freq: EVERY 5 MIN PRN Route: SL  PRN Reason: chest pain  Start: 03/30/17 1532   Admin Instructions: Maximum 3 doses in 15 minutes.  Notify provider if no  relief after 3 doses.    Do NOT give nitroglycerin SLIF the patient has taken avanafil (STENDRA), sildenafil (VIAGRA) or (REVATIO) within the last 8 hours, vardenafil (LEVITRA) or (STAXYN) within the last 18 hours, tadalafil (CIALIS) or (ADCIRCA) within the last 36 hours. Inform provider if patient has taken one of these medications.  If patient is still having acute angina requiring treatment, an alternative treatment option may be used such as: IV beta-blocker [2.5 mg - 5 mg metoprolol (LOPRESSOR)] if ordered by a provider.               No lozenges or gum should be given while patient on BIPAP  Freq: CONTINUOUS PRN Route: XX  Start: 03/29/17 1322              ondansetron (ZOFRAN-ODT) ODT tab 4 mg  Dose: 4 mg Freq: EVERY 6 HOURS PRN Route: PO  PRN Reason: nausea  Start: 03/28/17 0335   Admin Instructions: This is Step 1 of nausea and vomiting management.  If nausea not resolved in 15 minutes, go to Step 2 prochlorperazine (COMPAZINE). Do not push through foil backing. Peel back foil and gently remove. Place on tongue immediately. Administration with liquid unnecessary              Or  ondansetron (ZOFRAN) injection 4 mg  Dose: 4 mg Freq: EVERY 6 HOURS PRN Route: IV  PRN Reasons: nausea,vomiting  Start: 03/28/17 0335   Admin Instructions: This is Step 1 of nausea and vomiting management.  If nausea not resolved in 15 minutes, go to Step 2 prochlorperazine (COMPAZINE).  Irritant.               polyethylene glycol (MIRALAX/GLYCOLAX) Packet 17 g  Dose: 17 g Freq: DAILY PRN Route: PO  PRN Reason: constipation  Start: 03/28/17 0335   Admin Instructions: Give in 8oz of  water, juice, or soda. Hold for loose stools.  This is the second step of a three step constipation treatment protocol.  1 Packet = 17 grams. Mixed prescribed dose in 8 ounces of water. Follow with 8 oz. of water.               potassium chloride (KLOR-CON) Packet 20-40 mEq  Dose: 20-40 mEq Freq: EVERY 2 HOURS PRN Route: ORAL OR FEED  PRN Reason:  potassium supplementation  Start: 03/28/17 0335   Admin Instructions: Use if unable to tolerate tablets.  If Serum K+ 3.0-3.3, dose = 60 mEq po total dose (40 mEq x1 followed in 2 hours by 20 mEq x1). Recheck K+ level 4 hours after dose and the next AM.  If Serum K+ 2.5-2.9, dose = 80 mEq po total dose (40 mEq Q2H x2). Recheck K+ level 4 hours after dose and the next AM.  If Serum K+ less than 2.5, See IV order.  Dissolve packet contents in 4-8 ounces of cold water or juice.      0407 (20 mEq)-Given                potassium chloride 10 mEq in 100 mL intermittent infusion  Dose: 10 mEq Freq: EVERY 1 HOUR PRN Route: IV  PRN Reason: potassium supplementation  Start: 03/28/17 0335   Admin Instructions: Infuse via PERIPHERAL LINE or CENTRAL LINE. Use for central line replacement if patient weight less than 65 kg, if patient is on TPN with high potassium content or if unit does not stock 20 mEq bags.   If Serum K+ 3.0-3.3, dose = 10 mEq/hr x4 doses (40 mEq IV total dose). Recheck K+ level 2 hours after dose and the next AM.   If Serum K+ less than 3.0, dose = 10 mEq/hr x6 doses (60 mEq IV total dose). Recheck K+ level 2 hours after dose and the next AM.               potassium chloride 10 mEq in 100 mL intermittent infusion with 10 mg lidocaine  Dose: 10 mEq Freq: EVERY 1 HOUR PRN Route: IV  PRN Reason: potassium supplementation  Start: 03/28/17 0335   Admin Instructions: Infuse via PERIPHERAL LINE. Use potassium with lidocaine for pain with peripheral administration.  If Serum K+ 3.0-3.3, dose = 10 mEq/hr x4 doses (40 mEq IV total dose). Recheck K+ level 2 hours after dose and the next AM.  If Serum K+ less than 3.0, dose = 10 mEq/hr x6 doses (60 mEq IV total dose). Recheck K+ level 2 hours after dose and the next AM.               potassium chloride 20 mEq in 50 mL intermittent infusion  Dose: 20 mEq Freq: EVERY 2 HOURS PRN Route: IV  PRN Reason: potassium supplementation  Start: 03/28/17 0338   Admin Instructions:  Infuse via CENTRAL LINE Only. May need EKG if less than 65 kg or on TPN - Max rate is 0.3 mEq/kg/hr for patients not on EKG monitoring.   If Serum K+ 3.0-3.3, dose = 20 mEq/hr x2 doses (40 mEq IV total dose). Recheck K+ level 2 hours after dose and the next AM.  If Serum K+ less than 3.0, dose = 20 mEq/hr x3 doses (60 mEq IV total dose). Recheck K+ level 2 hours after dose and the next AM.               potassium chloride SA (K-DUR/KLOR-CON M) CR tablet 20-40 mEq  Dose: 20-40 mEq Freq: EVERY 2 HOURS PRN Route: PO  PRN Reason: potassium supplementation  Start: 03/28/17 0335   Admin Instructions: Use if able to take PO.   If Serum K+ 3.0-3.3, dose = 60 mEq po total dose (40 mEq x1 followed in 2 hours by 20 mEq x1). Recheck K+ level 4 hours after dose and the next AM.  If Serum K+ 2.5-2.9, dose = 80 mEq po total dose (40 mEq Q2H x2). Recheck K+ level 4 hours after dose and the next AM.  If Serum K+ less than 2.5, See IV order.  DO NOT CRUSH      0403 (20 mEq)-Given [C]                predniSONE (DELTASONE) tablet 40 mg  Dose: 40 mg Freq: DAILY Route: PO  Start: 04/01/17 0900         (0911)-Not Given [C]        0813 (40 mg)-Given           prochlorperazine (COMPAZINE) injection 5 mg  Dose: 5 mg Freq: EVERY 6 HOURS PRN Route: IV  PRN Reasons: nausea,vomiting  Start: 03/28/17 0335   Admin Instructions: This is Step 2 of nausea and vomiting management.   If nausea not resolved in 15 minutes, give metoclopramide (REGLAN) if ordered (step 3 of nausea and vomiting management)              Or  prochlorperazine (COMPAZINE) tablet 5 mg  Dose: 5 mg Freq: EVERY 6 HOURS PRN Route: PO  PRN Reason: vomiting  Start: 03/28/17 0335   Admin Instructions: This is Step 2 of nausea and vomiting management.   If nausea not resolved in 15 minutes, give metoclopramide (REGLAN) if ordered (step 3 of nausea and vomiting management)              Or  prochlorperazine (COMPAZINE) Suppository 12.5 mg  Dose: 12.5 mg Freq: EVERY 12 HOURS PRN Route:  RE  PRN Reasons: nausea,vomiting  Start: 03/28/17 0335   Admin Instructions: This is Step 2 of nausea and vomiting management.   If nausea not resolved in 15 minutes, give metoclopramide (REGLAN) if ordered (step 3 of nausea and vomiting management)               senna-docusate (SENOKOT-S;PERICOLACE) 8.6-50 MG per tablet 1-2 tablet  Dose: 1-2 tablet Freq: 2 TIMES DAILY PRN Route: PO  PRN Comment: constipation   Start: 03/28/17 0335   Admin Instructions: If no bowel movement in 24 hours, increase to 2 tablets PO BID.  Hold for loose stools.   This is the first step of a three step constipation treatment protocol.               sodium chloride (PF) 0.9% PF flush 3 mL  Dose: 3 mL Freq: EVERY 8 HOURS Route: IK  Start: 03/30/17 1532   Admin Instructions: And Q1H PRN, to lock peripheral IV dormant line.        1628 (3 mL)-Given       2241 (3 mL)-Given               0808 (3 mL)-Given       1701 (3 mL)-Given       2357 (3 mL)-Given        0849 (3 mL)-Given       1843 (3 mL)-Given        0008 (3 mL)-Given       0812 (3 mL)-Given       [ ] 1545       [ ] 2345           sodium chloride (PF) 0.9% PF flush 3 mL  Dose: 3 mL Freq: EVERY 1 HOUR PRN Route: IK  PRN Reasons: line flush,post meds or blood draw  Start: 03/30/17 1532   Admin Instructions: for peripheral IV flush post IV meds               sulfamethoxazole-trimethoprim (BACTRIM DS/SEPTRA DS) 800-160 MG per tablet 1 tablet  Dose: 1 tablet Freq: 2 TIMES DAILY Route: PO  Indications Comment: bronchitis  Start: 04/01/17 0930         1440 (1 tablet)-Given       2216 (1 tablet)-Given        0813 (1 tablet)-Given       [ ] 2100           traMADol (ULTRAM) tablet 50 mg  Dose: 50 mg Freq: EVERY 6 HOURS PRN Route: PO  PRN Reasons: moderate pain,headaches  Start: 03/28/17 0335     0403 (50 mg)-Given       1004 (50 mg)-Given       1539 (50 mg)-Given       2139 (50 mg)-Given        0509 (50 mg)-Given       1250 (50 mg)-Given       1956 (50 mg)-Given        0340 (50 mg)-Given      "  1027 (50 mg)-Given       1628 (50 mg)-Given       2240 (50 mg)-Given        0451 (50 mg)-Given [C]       1132 (50 mg)-Given       1909 (50 mg)-Given        0121 (50 mg)-Given       0841 (50 mg)-Given       1440 (50 mg)-Given       2124 (50 mg)-Given        0324 (50 mg)-Given       1018 (50 mg)-Given          Completed Medications  Medications 03/27/17 03/28/17 03/29/17 03/30/17 03/31/17 04/01/17 04/02/17         Dose: 250 mg Freq: AT BEDTIME Route: PO  Indications of Use: COMMUNITY ACQUIRED PNEUMONIA  Start: 03/28/17 2200   End: 03/31/17 2130     2139 (250 mg)-Given        2107 (250 mg)-Given        2238 (250 mg)-Given        2130 (250 mg)-Given            Discontinued Medications  Medications 03/27/17 03/28/17 03/29/17 03/30/17 03/31/17 04/01/17 04/02/17         Dose: 2 g Freq: EVERY 24 HOURS Route: IV  Indications of Use: COMMUNITY ACQUIRED PNEUMONIA  Last Dose: 2 g (04/01/17 0858)  Start: 03/29/17 0945   End: 04/01/17 0915      1044 (2 g)-New Bag        1036 (2 g)-New Bag        0903 (2 g)-New Bag        0858 (2 g)-New Bag       0915-Med Discontinued          Freq: EVERY 1 HOUR PRN Route: Top  PRN Reason: pain  PRN Comment: with VAD insertion or accessing implanted port.  Start: 03/28/17 0335   End: 03/30/17 1831   Admin Instructions: Do NOT give if patient has a history of allergy to any local anesthetic or any \"julissa\" product.   Apply 30 minutes prior to VAD insertion or port access.  MAX Dose:  2.5 g (  of 5 g tube)        1831-Med Discontinued            Dose: 1 mL Freq: EVERY 1 HOUR PRN Route: OTHER  PRN Comment: mild pain with VAD insertion or accessing implanted port  Start: 03/28/17 0335   End: 03/30/17 1831   Admin Instructions: Do NOT give if patient has a history of allergy to any local anesthetic or any \"julissa\" product. MAX dose 1 mL subcutaneous OR intradermal in divided doses.        1831-Med Discontinued            Dose: 62.5 mg Freq: EVERY 12 HOURS Route: IV  Start: 03/29/17 2000   End: 04/01/17 " 0854   Admin Instructions: Doses greater than 125 mg need to be in at least 50 mL IVPB       2107 (62.5 mg)-Given        0809 (62.5 mg)-Given       2001 (62.5 mg)-Given        0807 (62.5 mg)-Given       2054 (62.5 mg)-Given        0842 (62.5 mg)-Given       0854-Med Discontinued          Dose: 3 mL Freq: EVERY 8 HOURS Route: IK  Start: 03/28/17 0345   End: 03/30/17 1831   Admin Instructions: And Q1H PRN, to lock peripheral IV dormant line.      0409 (3 mL)-Given       1220 (3 mL)-Given       1938 (3 mL)-Given        0504 (3 mL)-Given       1054 (3 mL)-Given       1822 (3 mL)-Given               0340 (3 mL)-Given       1219 (3 mL)-Given       1831-Med Discontinued            Dose: 3 mL Freq: EVERY 1 HOUR PRN Route: IK  PRN Reason: line flush  PRN Comment: for peripheral IV flush post IV meds  Start: 03/28/17 0335   End: 03/30/17 1831      1043 (3 mL)-Given        0813 (3 mL)-Given       1034 (3 mL)-Given       1831-Med Discontinued       Medications 03/27/17 03/28/17 03/29/17 03/30/17 03/31/17 04/01/17 04/02/17

## 2017-03-27 NOTE — IP AVS SNAPSHOT
MRN:[id number]                      After Visit Summary   3/27/2017    Chuck Lopez    MRN: [id number]           Thank you!     Thank you for choosing [facility] for your care. Our goal is always to provide you with excellent care. Hearing back from our patients is one way we can continue to improve our services. Please take a few minutes to complete the written survey that you may receive in the mail after you visit with us. Thank you!        Patient Information     Date Of Birth          1940        About your hospital stay     You were admitted on:  March 28, 2017 You last received care in the:  [facility] [address] [facility]    You were discharged on:  April 4, 2017        Reason for your hospital stay       Acute respiratory failure                  Who to Call     For medical emergencies, please call 911.  For non-urgent questions about your medical care, please call your primary care provider or clinic, [phone]          Attending Provider     Provider Specialty    Nayan Stallings MD Emergency Medicine    Deyvi, Alice Mejias MD Internal Medicine       Primary Care Provider Office Phone # Fax #    Pranay Reid [phone]       [address] [address]        After Care Instructions     Activity       Your activity upon discharge: activity as tolerated            Diet       Follow this diet upon discharge: Orders Placed This Encounter      Snacks/Supplements Adult: Other - Please comment; PLUS2 shake at 10-2 (silva-straw)   RD; Between Meals      Regular Diet Adult                  Follow-up Appointments     Follow-up and recommended labs and tests        Follow up with primary care provider, Pranay Reid, within 7 days for hospital follow- up.  No follow up labs or test are needed.                  Your next 10 appointments already scheduled     Apr 06, 2017 11:00 AM CDT   Office Visit with  "[facility]   [facility]    [address] [facility] [address] [phone]           Bring a current list of meds and any records pertaining to this visit.  For Physicals, please bring immunization records and any forms needing to be filled out.  Please arrive 10 minutes early to complete paperwork.              Additional Services     MED THERAPY MANAGE REFERRAL       Patient has diagnosis of Diabetes, Heart Failure, COPD, or AMI and is on more than 5 medications            Pulmonary Rehab Referral                 Pending Results     No orders found from 3/25/2017 to 3/28/2017.            Statement of Approval     Ordered          04/04/17 0851  I have reviewed and agree with all the recommendations and orders detailed in this document.  EFFECTIVE NOW     Approved and electronically signed by:  Oneyda Mcdonald MD             Admission Information     Date & Time Provider Department Dept. Phone    3/27/2017 Alice Carnes MD [place] [address] [facility] [phone]      Your Vitals Were     Blood Pressure Pulse Temperature Respirations Height Weight    126/68 (BP Location: Right arm) 98 97.9  F (36.6  C) (Oral) 18 1.651 m (5' 5\") 50.8 kg (111 lb 15.9 oz)    Pulse Oximetry BMI (Body Mass Index)                90% 18.64 kg/m2          MyChart Information     [facility] lets you send messages to your doctor, view your test results, renew your prescriptions, schedule appointments and more. To sign up, go to [email or web address] . Click on \"Log in\" on the left side of the screen, which will take you to the Welcome page. Then click on \"Sign up Now\" on the right side of the page.     You will be asked to enter the access code listed below, as well as some personal information. Please follow the directions to create your username and password.     Your access code is: [email or web address]  Expires: 7/1/2017  3:56 PM     Your " access code will  in 90 days. If you need help or a new code, please call your [facility] clinic or [phone].        Care EveryWhere ID     This is your Care EveryWhere ID. This could be used by other organizations to access your [facility] medical records  [id number]           Review of your medicines      START taking        Dose / Directions    butalbital-APAP-caffeine-codeine -07-30 MG per capsule   Commonly known as:  FIORICET WITH codeine   Used for:  Intractable migraine, unspecified migraine type        Dose:  1 capsule   Take 1 capsule by mouth every 4 hours as needed for headaches   Quantity:  30 capsule   Refills:  0       LORazepam 0.5 MG tablet   Commonly known as:  ATIVAN   Used for:  Generalized anxiety disorder        Dose:  0.5 mg   Take 1 tablet (0.5 mg) by mouth every 4 hours as needed for anxiety   Quantity:  30 tablet   Refills:  0       melatonin 3 MG tablet   Used for:  Insomnia, unspecified type        Dose:  3 mg   Take 1 tablet (3 mg) by mouth nightly as needed   Quantity:  30 tablet   Refills:  0       predniSONE 10 MG tablet   Commonly known as:  DELTASONE   Used for:  Chronic obstructive pulmonary disease with acute exacerbation (H)        Prednisone in AM 10mg 2 tabs daily x 4 days then one tab daily   Quantity:  60 tablet   Refills:  0       sulfamethoxazole-trimethoprim 800-160 MG per tablet   Commonly known as:  BACTRIM DS/SEPTRA DS   Indication:  bronchitis   Used for:  Chronic obstructive pulmonary disease with acute exacerbation (H)        Dose:  1 tablet   Take 1 tablet by mouth 2 times daily for 5 days   Quantity:  10 tablet   Refills:  0         CONTINUE these medicines which may have CHANGED, or have new prescriptions. If we are uncertain of the size of tablets/capsules you have at home, strength may be listed as something that might have changed.        Dose / Directions    * order for DME   This may have changed:  Another medication with the same name was  added. Make sure you understand how and when to take each.   Used for:  COPD exacerbation (H)        Equipment being ordered: Other: neb machine Treatment Diagnosis: COPD exacerbation   Quantity:  1 Device   Refills:  0       * order for DME   This may have changed:  You were already taking a medication with the same name, and this prescription was added. Make sure you understand how and when to take each.   Used for:  Chronic obstructive pulmonary disease with acute exacerbation (H)        Equipment being ordered: Oxygen   Quantity:  1 Container   Refills:  0       * Notice:  This list has 2 medication(s) that are the same as other medications prescribed for you. Read the directions carefully, and ask your doctor or other care provider to review them with you.      CONTINUE these medicines which have NOT CHANGED        Dose / Directions    * albuterol (2.5 MG/3ML) 0.083% neb solution        Dose:  1 vial   Take 1 vial by nebulization 4 times daily   Refills:  0       * albuterol 108 (90 BASE) MCG/ACT Inhaler   Commonly known as:  PROAIR HFA/PROVENTIL HFA/VENTOLIN HFA   Used for:  Chronic obstructive airway disease with asthma (H)        Dose:  2 puff   Inhale 2 puffs into the lungs every 6 hours as needed for shortness of breath / dyspnea or wheezing   Quantity:  1 Inhaler   Refills:  0       * albuterol (2.5 MG/3ML) 0.083% neb solution   Used for:  COPD exacerbation (H)        Dose:  3 mL   Take 1 vial (2.5 mg) by nebulization every 4 hours as needed for shortness of breath / dyspnea or wheezing   Quantity:  360 mL   Refills:  3       ATENOLOL PO        Dose:  25 mg   Take 25 mg by mouth daily   Refills:  0       fluticasone-salmeterol 500-50 MCG/DOSE diskus inhaler   Commonly known as:  ADVAIR   Used for:  COPD exacerbation (H)        Dose:  1 puff   Inhale 1 puff into the lungs 2 times daily   Quantity:  3 Inhaler   Refills:  1       HYDROCHLOROTHIAZIDE PO        Dose:  25 mg   Take 25 mg by mouth daily    Refills:  0       QVAR 80 MCG/ACT Inhaler   Used for:  Chronic obstructive pulmonary disease with acute lower respiratory infection (H)   Generic drug:  beclomethasone        INHALE TWO PUFFS BY MOUTH TWICE A DAY   Quantity:  8.7 g   Refills:  11       SPIRIVA HANDIHALER 18 MCG capsule   Used for:  Centrilobular emphysema (H)   Generic drug:  tiotropium        INHALE ONE CAPSULE BY MOUTH EVERY DAY   Quantity:  30 capsule   Refills:  11       TRAMADOL HCL PO        Dose:  50 mg   Take 50 mg by mouth 4 times daily   Refills:  0       V-C FORTE Caps   Used for:  Hepatitis C        Dose:  1 capsule   Take 1 capsule by mouth daily   Quantity:  90 capsule   Refills:  3       * Notice:  This list has 3 medication(s) that are the same as other medications prescribed for you. Read the directions carefully, and ask your doctor or other care provider to review them with you.         Where to get your medicines      These medications were sent to [facility] - [address]  [address], [place] [address]     Phone:  [phone]     melatonin 3 MG tablet    predniSONE 10 MG tablet    sulfamethoxazole-trimethoprim 800-160 MG per tablet         Some of these will need a paper prescription and others can be bought over the counter. Ask your nurse if you have questions.     Bring a paper prescription for each of these medications     butalbital-APAP-caffeine-codeine -86-30 MG per capsule    LORazepam 0.5 MG tablet    order for DME                Protect others around you: Learn how to safely use, store and throw away your medicines at www.disposemymeds.org.             Medication List: This is a list of all your medications and when to take them. Check marks below indicate your daily home schedule. Keep this list as a reference.      Medications           Morning Afternoon Evening Bedtime As Needed    * albuterol (2.5 MG/3ML) 0.083% neb solution   Take 1 vial by nebulization 4  times daily   Next Dose Due:  Due this evening                                            * albuterol 108 (90 BASE) MCG/ACT Inhaler   Commonly known as:  PROAIR HFA/PROVENTIL HFA/VENTOLIN HFA   Inhale 2 puffs into the lungs every 6 hours as needed for shortness of breath / dyspnea or wheezing   Next Dose Due:  Available when needed                                     * albuterol (2.5 MG/3ML) 0.083% neb solution   Take 1 vial (2.5 mg) by nebulization every 4 hours as needed for shortness of breath / dyspnea or wheezing   Next Dose Due:  Available when needed                                   ATENOLOL PO   Take 25 mg by mouth daily   Last time this was given:  25 mg on 4/4/2017  9:23 AM   Next Dose Due:  Due tomorrow 4/5                                   butalbital-APAP-caffeine-codeine -61-30 MG per capsule   Commonly known as:  FIORICET WITH codeine   Take 1 capsule by mouth every 4 hours as needed for headaches   Last time this was given:  1 capsule on 4/4/2017  9:28 AM   Last time this was given:  Available after 1:30pm                                     fluticasone-salmeterol 500-50 MCG/DOSE diskus inhaler   Commonly known as:  ADVAIR   Inhale 1 puff into the lungs 2 times daily   Next Dose Due:  Due tonight 4/4                                      HYDROCHLOROTHIAZIDE PO   Take 25 mg by mouth daily   Last time this was given:  25 mg on 4/4/2017  9:23 AM   Next Dose Due:  Due tomorrow 4/5                                   LORazepam 0.5 MG tablet   Commonly known as:  ATIVAN   Take 1 tablet (0.5 mg) by mouth every 4 hours as needed for anxiety   Last time this was given:  0.5 mg on 4/1/2017  2:40 PM   Next Dose Due:  Available when needed                                   melatonin 3 MG tablet   Take 1 tablet (3 mg) by mouth nightly as needed   Last time this was given:  3 mg on 4/3/2017 10:24 PM   Next Dose Due:  Available at night when needed                                   * order for DME   Equipment  being ordered: Other: neb machine Treatment Diagnosis: COPD exacerbation                                * order for DME   Equipment being ordered: Oxygen                                predniSONE 10 MG tablet   Commonly known as:  DELTASONE   Prednisone in AM 10mg 2 tabs daily x 4 days then one tab daily   Last time this was given:  40 mg on 4/4/2017  9:23 AM   Next Dose Due:  Due tomorrow 4/5                                   QVAR 80 MCG/ACT Inhaler   INHALE TWO PUFFS BY MOUTH TWICE A DAY   Generic drug:  beclomethasone   Next Dose Due:  Due tonight 4/4                                      SPIRIVA HANDIHALER 18 MCG capsule   INHALE ONE CAPSULE BY MOUTH EVERY DAY   Generic drug:  tiotropium   Next Dose Due:  Due tomorrow 4/5                                   sulfamethoxazole-trimethoprim 800-160 MG per tablet   Commonly known as:  BACTRIM DS/SEPTRA DS   Take 1 tablet by mouth 2 times daily for 5 days   Last time this was given:  1 tablet on 4/4/2017  9:23 AM   Next Dose Due:  Due tonight 4/5                                      TRAMADOL HCL PO   Take 50 mg by mouth 4 times daily   Last time this was given:  50 mg on 4/4/2017  1:09 PM   Next Dose Due:  Available after 1pm                                   V-C FORTE Caps   Take 1 capsule by mouth daily   Next Dose Due:  Due tomorrow 4/5                                   * Notice:  This list has 5 medication(s) that are the same as other medications prescribed for you. Read the directions carefully, and ask your doctor or other care provider to review them with you.

## 2017-03-27 NOTE — LETTER
Transition Communication Hand-off for Care Transitions to Next Level of Care Provider    Name: Chuck Lopez  MRN #: [id number]  Primary Care Provider: Pranay Reid  Primary Care MD Name: Pranay Reid  Primary Clinic: [facility] [address] [facility] [address]  Primary Care Clinic Name: [facility]  Reason for Hospitalization:  Chronic obstructive pulmonary disease with acute exacerbation (H) [J44.1]  Admit Date/Time: 3/27/2017 11:21 PM  Discharge Date: 4/4/17         Reason for Communication Hand-off Referral: Fragility  No support or lacking a support system    Discharge Plan:       Concern for non-adherence with plan of care:   Y/N y    Discharge Needs Assessment:  Needs       Most Recent Value    Equipment Currently Used at Home none    Transportation Available family or friend will provide [has used public transportation in past, to ED by EMS]    # of Referrals Placed by CTS MTM, Homecare, Durable Medical Equipment (DME), Scheduled Follow-up appointments          Follow-up plan:  Future Appointments  Date Time Provider Department Center   4/6/2017 11:00 AM Pranay Reid MD [facility]       Any outstanding tests or procedures:        Referrals     Future Labs/Procedures    MED THERAPY MANAGE REFERRAL     Comments:    Patient has diagnosis of Diabetes, Heart Failure, COPD, or AMI and is on more than 5 medications    Pulmonary Rehab Referral             Key Recommendations:      Lauren Crowder    AVS/Discharge Summary is the source of truth; this is a helpful guide for improved communication of patient story

## 2017-03-28 PROBLEM — J44.1 ACUTE EXACERBATION OF COPD WITH ASTHMA (H): Status: ACTIVE | Noted: 2017-03-28

## 2017-03-28 LAB
ANION GAP SERPL CALCULATED.3IONS-SCNC: 10 MMOL/L (ref 3–14)
ANION GAP SERPL CALCULATED.3IONS-SCNC: 9 MMOL/L (ref 3–14)
BASE EXCESS BLDV CALC-SCNC: 7.6 MMOL/L
BASOPHILS # BLD AUTO: 0.1 10E9/L (ref 0–0.2)
BASOPHILS NFR BLD AUTO: 0.6 %
BUN SERPL-MCNC: 14 MG/DL (ref 7–30)
BUN SERPL-MCNC: 19 MG/DL (ref 7–30)
CALCIUM SERPL-MCNC: 8.7 MG/DL (ref 8.5–10.1)
CALCIUM SERPL-MCNC: 8.9 MG/DL (ref 8.5–10.1)
CHLORIDE SERPL-SCNC: 96 MMOL/L (ref 94–109)
CHLORIDE SERPL-SCNC: 97 MMOL/L (ref 94–109)
CO2 SERPL-SCNC: 29 MMOL/L (ref 20–32)
CO2 SERPL-SCNC: 32 MMOL/L (ref 20–32)
CREAT SERPL-MCNC: 0.74 MG/DL (ref 0.66–1.25)
CREAT SERPL-MCNC: 0.92 MG/DL (ref 0.66–1.25)
DEPRECATED S PYO AG THROAT QL EIA: NORMAL
DIFFERENTIAL METHOD BLD: ABNORMAL
EOSINOPHIL # BLD AUTO: 0.3 10E9/L (ref 0–0.7)
EOSINOPHIL NFR BLD AUTO: 2.9 %
ERYTHROCYTE [DISTWIDTH] IN BLOOD BY AUTOMATED COUNT: 14 % (ref 10–15)
ERYTHROCYTE [DISTWIDTH] IN BLOOD BY AUTOMATED COUNT: 14.2 % (ref 10–15)
FLUAV+FLUBV AG SPEC QL: NEGATIVE
FLUAV+FLUBV AG SPEC QL: NORMAL
GFR SERPL CREATININE-BSD FRML MDRD: 80 ML/MIN/1.7M2
GFR SERPL CREATININE-BSD FRML MDRD: ABNORMAL ML/MIN/1.7M2
GLUCOSE SERPL-MCNC: 101 MG/DL (ref 70–99)
GLUCOSE SERPL-MCNC: 162 MG/DL (ref 70–99)
HCO3 BLDV-SCNC: 34 MMOL/L (ref 21–28)
HCT VFR BLD AUTO: 43.8 % (ref 40–53)
HCT VFR BLD AUTO: 46.7 % (ref 40–53)
HGB BLD-MCNC: 15.4 G/DL (ref 13.3–17.7)
HGB BLD-MCNC: 16.5 G/DL (ref 13.3–17.7)
IMM GRANULOCYTES # BLD: 0 10E9/L (ref 0–0.4)
IMM GRANULOCYTES NFR BLD: 0.5 %
INTERPRETATION ECG - MUSE: NORMAL
LACTATE BLD-SCNC: 1.5 MMOL/L (ref 0.7–2.1)
LYMPHOCYTES # BLD AUTO: 0.9 10E9/L (ref 0.8–5.3)
LYMPHOCYTES NFR BLD AUTO: 10.1 %
MAGNESIUM SERPL-MCNC: 2.3 MG/DL (ref 1.6–2.3)
MCH RBC QN AUTO: 32.9 PG (ref 26.5–33)
MCH RBC QN AUTO: 33.1 PG (ref 26.5–33)
MCHC RBC AUTO-ENTMCNC: 35.2 G/DL (ref 31.5–36.5)
MCHC RBC AUTO-ENTMCNC: 35.3 G/DL (ref 31.5–36.5)
MCV RBC AUTO: 94 FL (ref 78–100)
MCV RBC AUTO: 94 FL (ref 78–100)
MICRO REPORT STATUS: NORMAL
MONOCYTES # BLD AUTO: 1.3 10E9/L (ref 0–1.3)
MONOCYTES NFR BLD AUTO: 14.9 %
NEUTROPHILS # BLD AUTO: 6 10E9/L (ref 1.6–8.3)
NEUTROPHILS NFR BLD AUTO: 71 %
NRBC # BLD AUTO: 0 10*3/UL
NRBC BLD AUTO-RTO: 0 /100
NT-PROBNP SERPL-MCNC: 340 PG/ML (ref 0–900)
PCO2 BLDV: 52 MM HG (ref 40–50)
PH BLDV: 7.42 PH (ref 7.32–7.43)
PLATELET # BLD AUTO: 63 10E9/L (ref 150–450)
PLATELET # BLD AUTO: NORMAL 10E9/L (ref 150–450)
PO2 BLDV: 27 MM HG (ref 25–47)
POTASSIUM SERPL-SCNC: 3.2 MMOL/L (ref 3.4–5.3)
POTASSIUM SERPL-SCNC: 3.8 MMOL/L (ref 3.4–5.3)
RBC # BLD AUTO: 4.68 10E12/L (ref 4.4–5.9)
RBC # BLD AUTO: 4.99 10E12/L (ref 4.4–5.9)
SODIUM SERPL-SCNC: 135 MMOL/L (ref 133–144)
SODIUM SERPL-SCNC: 138 MMOL/L (ref 133–144)
SPECIMEN SOURCE: NORMAL
SPECIMEN SOURCE: NORMAL
TROPONIN I SERPL-MCNC: NORMAL UG/L (ref 0–0.04)
WBC # BLD AUTO: 6 10E9/L (ref 4–11)
WBC # BLD AUTO: 8.5 10E9/L (ref 4–11)

## 2017-03-28 PROCEDURE — 94640 AIRWAY INHALATION TREATMENT: CPT

## 2017-03-28 PROCEDURE — 94640 AIRWAY INHALATION TREATMENT: CPT | Mod: 76

## 2017-03-28 PROCEDURE — 36415 COLL VENOUS BLD VENIPUNCTURE: CPT | Performed by: INTERNAL MEDICINE

## 2017-03-28 PROCEDURE — 25000132 ZZH RX MED GY IP 250 OP 250 PS 637: Mod: GY | Performed by: HOSPITALIST

## 2017-03-28 PROCEDURE — 83735 ASSAY OF MAGNESIUM: CPT | Performed by: INTERNAL MEDICINE

## 2017-03-28 PROCEDURE — 96365 THER/PROPH/DIAG IV INF INIT: CPT

## 2017-03-28 PROCEDURE — 25000132 ZZH RX MED GY IP 250 OP 250 PS 637: Mod: GY | Performed by: INTERNAL MEDICINE

## 2017-03-28 PROCEDURE — A9270 NON-COVERED ITEM OR SERVICE: HCPCS | Mod: GY | Performed by: HOSPITALIST

## 2017-03-28 PROCEDURE — 80048 BASIC METABOLIC PNL TOTAL CA: CPT | Performed by: INTERNAL MEDICINE

## 2017-03-28 PROCEDURE — 25000125 ZZHC RX 250: Performed by: INTERNAL MEDICINE

## 2017-03-28 PROCEDURE — 12000000 ZZH R&B MED SURG/OB

## 2017-03-28 PROCEDURE — A9270 NON-COVERED ITEM OR SERVICE: HCPCS | Mod: GY | Performed by: INTERNAL MEDICINE

## 2017-03-28 PROCEDURE — 25000125 ZZHC RX 250: Performed by: EMERGENCY MEDICINE

## 2017-03-28 PROCEDURE — 40000275 ZZH STATISTIC RCP TIME EA 10 MIN

## 2017-03-28 PROCEDURE — 96375 TX/PRO/DX INJ NEW DRUG ADDON: CPT

## 2017-03-28 PROCEDURE — 99223 1ST HOSP IP/OBS HIGH 75: CPT | Mod: AI | Performed by: INTERNAL MEDICINE

## 2017-03-28 PROCEDURE — 25000128 H RX IP 250 OP 636: Performed by: EMERGENCY MEDICINE

## 2017-03-28 PROCEDURE — 85027 COMPLETE CBC AUTOMATED: CPT | Performed by: INTERNAL MEDICINE

## 2017-03-28 PROCEDURE — 87040 BLOOD CULTURE FOR BACTERIA: CPT | Performed by: EMERGENCY MEDICINE

## 2017-03-28 RX ORDER — ACETAMINOPHEN 325 MG/1
650 TABLET ORAL EVERY 4 HOURS PRN
Status: DISCONTINUED | OUTPATIENT
Start: 2017-03-28 | End: 2017-04-04 | Stop reason: HOSPADM

## 2017-03-28 RX ORDER — AZITHROMYCIN 250 MG/1
250 TABLET, FILM COATED ORAL AT BEDTIME
Status: COMPLETED | OUTPATIENT
Start: 2017-03-28 | End: 2017-03-31

## 2017-03-28 RX ORDER — IPRATROPIUM BROMIDE AND ALBUTEROL SULFATE 2.5; .5 MG/3ML; MG/3ML
3 SOLUTION RESPIRATORY (INHALATION)
Status: DISCONTINUED | OUTPATIENT
Start: 2017-03-28 | End: 2017-04-04 | Stop reason: HOSPADM

## 2017-03-28 RX ORDER — MAGNESIUM SULFATE HEPTAHYDRATE 40 MG/ML
4 INJECTION, SOLUTION INTRAVENOUS EVERY 4 HOURS PRN
Status: DISCONTINUED | OUTPATIENT
Start: 2017-03-28 | End: 2017-04-04 | Stop reason: HOSPADM

## 2017-03-28 RX ORDER — POTASSIUM CHLORIDE 29.8 MG/ML
20 INJECTION INTRAVENOUS
Status: DISCONTINUED | OUTPATIENT
Start: 2017-03-28 | End: 2017-04-04 | Stop reason: HOSPADM

## 2017-03-28 RX ORDER — LIDOCAINE 40 MG/G
CREAM TOPICAL
Status: DISCONTINUED | OUTPATIENT
Start: 2017-03-28 | End: 2017-03-30

## 2017-03-28 RX ORDER — PREDNISONE 20 MG/1
40 TABLET ORAL DAILY
Status: DISCONTINUED | OUTPATIENT
Start: 2017-03-28 | End: 2017-03-29

## 2017-03-28 RX ORDER — AZITHROMYCIN 250 MG/1
250 TABLET, FILM COATED ORAL AT BEDTIME
Status: DISCONTINUED | OUTPATIENT
Start: 2017-03-28 | End: 2017-03-28

## 2017-03-28 RX ORDER — PROCHLORPERAZINE 25 MG
12.5 SUPPOSITORY, RECTAL RECTAL EVERY 12 HOURS PRN
Status: DISCONTINUED | OUTPATIENT
Start: 2017-03-28 | End: 2017-04-04 | Stop reason: HOSPADM

## 2017-03-28 RX ORDER — ONDANSETRON 2 MG/ML
4 INJECTION INTRAMUSCULAR; INTRAVENOUS EVERY 6 HOURS PRN
Status: DISCONTINUED | OUTPATIENT
Start: 2017-03-28 | End: 2017-04-04 | Stop reason: HOSPADM

## 2017-03-28 RX ORDER — POTASSIUM CHLORIDE 7.45 MG/ML
10 INJECTION INTRAVENOUS
Status: DISCONTINUED | OUTPATIENT
Start: 2017-03-28 | End: 2017-04-04 | Stop reason: HOSPADM

## 2017-03-28 RX ORDER — ACETAMINOPHEN 650 MG/1
650 SUPPOSITORY RECTAL EVERY 4 HOURS PRN
Status: DISCONTINUED | OUTPATIENT
Start: 2017-03-28 | End: 2017-04-04 | Stop reason: HOSPADM

## 2017-03-28 RX ORDER — POTASSIUM CHLORIDE 1500 MG/1
20-40 TABLET, EXTENDED RELEASE ORAL
Status: DISCONTINUED | OUTPATIENT
Start: 2017-03-28 | End: 2017-04-04 | Stop reason: HOSPADM

## 2017-03-28 RX ORDER — BISACODYL 10 MG
10 SUPPOSITORY, RECTAL RECTAL DAILY PRN
Status: DISCONTINUED | OUTPATIENT
Start: 2017-03-28 | End: 2017-04-04 | Stop reason: HOSPADM

## 2017-03-28 RX ORDER — NALOXONE HYDROCHLORIDE 0.4 MG/ML
.1-.4 INJECTION, SOLUTION INTRAMUSCULAR; INTRAVENOUS; SUBCUTANEOUS
Status: DISCONTINUED | OUTPATIENT
Start: 2017-03-28 | End: 2017-04-04 | Stop reason: HOSPADM

## 2017-03-28 RX ORDER — ONDANSETRON 4 MG/1
4 TABLET, ORALLY DISINTEGRATING ORAL EVERY 6 HOURS PRN
Status: DISCONTINUED | OUTPATIENT
Start: 2017-03-28 | End: 2017-04-04 | Stop reason: HOSPADM

## 2017-03-28 RX ORDER — MULTIPLE VITAMINS W/ MINERALS TAB 9MG-400MCG
1 TAB ORAL DAILY
Status: DISCONTINUED | OUTPATIENT
Start: 2017-03-28 | End: 2017-04-04 | Stop reason: HOSPADM

## 2017-03-28 RX ORDER — AMOXICILLIN 250 MG
1-2 CAPSULE ORAL 2 TIMES DAILY PRN
Status: DISCONTINUED | OUTPATIENT
Start: 2017-03-28 | End: 2017-04-04 | Stop reason: HOSPADM

## 2017-03-28 RX ORDER — TRAMADOL HYDROCHLORIDE 50 MG/1
50 TABLET ORAL EVERY 6 HOURS PRN
Status: DISCONTINUED | OUTPATIENT
Start: 2017-03-28 | End: 2017-04-04 | Stop reason: HOSPADM

## 2017-03-28 RX ORDER — POLYETHYLENE GLYCOL 3350 17 G/17G
17 POWDER, FOR SOLUTION ORAL DAILY PRN
Status: DISCONTINUED | OUTPATIENT
Start: 2017-03-28 | End: 2017-04-04 | Stop reason: HOSPADM

## 2017-03-28 RX ORDER — POTASSIUM CHLORIDE 1.5 G/1.58G
20-40 POWDER, FOR SOLUTION ORAL
Status: DISCONTINUED | OUTPATIENT
Start: 2017-03-28 | End: 2017-04-04 | Stop reason: HOSPADM

## 2017-03-28 RX ORDER — PROCHLORPERAZINE MALEATE 5 MG
5 TABLET ORAL EVERY 6 HOURS PRN
Status: DISCONTINUED | OUTPATIENT
Start: 2017-03-28 | End: 2017-04-04 | Stop reason: HOSPADM

## 2017-03-28 RX ORDER — NICOTINE 21 MG/24HR
1 PATCH, TRANSDERMAL 24 HOURS TRANSDERMAL DAILY
Status: DISCONTINUED | OUTPATIENT
Start: 2017-03-28 | End: 2017-04-04 | Stop reason: HOSPADM

## 2017-03-28 RX ADMIN — TRAMADOL HYDROCHLORIDE 50 MG: 50 TABLET, COATED ORAL at 15:39

## 2017-03-28 RX ADMIN — IPRATROPIUM BROMIDE AND ALBUTEROL SULFATE 3 ML: .5; 3 SOLUTION RESPIRATORY (INHALATION) at 00:33

## 2017-03-28 RX ADMIN — TRAMADOL HYDROCHLORIDE 50 MG: 50 TABLET, COATED ORAL at 10:04

## 2017-03-28 RX ADMIN — Medication 3 MG: at 22:01

## 2017-03-28 RX ADMIN — TRAMADOL HYDROCHLORIDE 50 MG: 50 TABLET, COATED ORAL at 21:39

## 2017-03-28 RX ADMIN — ACETAMINOPHEN 650 MG: 325 TABLET, FILM COATED ORAL at 13:01

## 2017-03-28 RX ADMIN — AZITHROMYCIN 250 MG: 250 TABLET, FILM COATED ORAL at 21:39

## 2017-03-28 RX ADMIN — NICOTINE 1 PATCH: 14 PATCH, EXTENDED RELEASE TRANSDERMAL at 17:01

## 2017-03-28 RX ADMIN — TRAMADOL HYDROCHLORIDE 50 MG: 50 TABLET, COATED ORAL at 04:03

## 2017-03-28 RX ADMIN — POTASSIUM CHLORIDE 20 MEQ: 1500 TABLET, EXTENDED RELEASE ORAL at 04:03

## 2017-03-28 RX ADMIN — ACETAMINOPHEN 650 MG: 325 TABLET, FILM COATED ORAL at 19:43

## 2017-03-28 RX ADMIN — PREDNISONE 40 MG: 20 TABLET ORAL at 10:00

## 2017-03-28 RX ADMIN — IPRATROPIUM BROMIDE AND ALBUTEROL SULFATE 3 ML: .5; 3 SOLUTION RESPIRATORY (INHALATION) at 01:13

## 2017-03-28 RX ADMIN — IPRATROPIUM BROMIDE AND ALBUTEROL SULFATE 3 ML: .5; 3 SOLUTION RESPIRATORY (INHALATION) at 19:07

## 2017-03-28 RX ADMIN — POTASSIUM CHLORIDE 20 MEQ: 1.5 POWDER, FOR SOLUTION ORAL at 04:07

## 2017-03-28 RX ADMIN — IPRATROPIUM BROMIDE AND ALBUTEROL SULFATE 3 ML: .5; 3 SOLUTION RESPIRATORY (INHALATION) at 10:23

## 2017-03-28 RX ADMIN — MULTIPLE VITAMINS W/ MINERALS TAB 1 TABLET: TAB at 10:00

## 2017-03-28 RX ADMIN — AZITHROMYCIN MONOHYDRATE 500 MG: 500 INJECTION, POWDER, LYOPHILIZED, FOR SOLUTION INTRAVENOUS at 02:03

## 2017-03-28 RX ADMIN — METHYLPREDNISOLONE SODIUM SUCCINATE 125 MG: 125 INJECTION, POWDER, FOR SOLUTION INTRAMUSCULAR; INTRAVENOUS at 00:09

## 2017-03-28 RX ADMIN — IPRATROPIUM BROMIDE AND ALBUTEROL SULFATE 3 ML: .5; 3 SOLUTION RESPIRATORY (INHALATION) at 15:46

## 2017-03-28 ASSESSMENT — ENCOUNTER SYMPTOMS
WHEEZING: 0
ABDOMINAL PAIN: 0
FEVER: 1
LIGHT-HEADEDNESS: 0
SORE THROAT: 1
COUGH: 1
SHORTNESS OF BREATH: 1

## 2017-03-28 ASSESSMENT — PAIN DESCRIPTION - DESCRIPTORS
DESCRIPTORS: ACHING

## 2017-03-28 NOTE — ED NOTES
Bed: ED23  Expected date:   Expected time:   Means of arrival:   Comments:  Jarrett 427 SOB COPD 76 male

## 2017-03-28 NOTE — PROGRESS NOTES
Pt stable on 3L NC, SpO2 >92%. BBS diminished. Neb treatment given as ordered. RT will continue to follow.  3/28/2017  Rayray Mcdonald

## 2017-03-28 NOTE — PLAN OF CARE
Problem: Goal Outcome Summary  Goal: Goal Outcome Summary  Outcome: No Change  Admit from ED at 0330. VSS. Sats 95% on 3L O2. A&O x4. C/o chronic neck pain, tramadol given with relief. Pt currently resting. Lungs diminished. Started on zithromax, prednisone and scheduled nebs. Up with assist of 1. K+ replaced per protocol.

## 2017-03-28 NOTE — PROGRESS NOTES
Brief Hospitalist Progress Note    No charge note, admitted early this AM by Dr. Carnes.  Patient reports improvement in respiratory status since admission.  No other complaints.  Lungs with diminished air sounds but no significant wheezing or crackles.  Will continue with plan as documented in H&P.

## 2017-03-28 NOTE — PLAN OF CARE
"Problem: Goal Outcome Summary  Goal: Goal Outcome Summary  Outcome: Improving  Reason for Admission: Chronic obstructive pulmonary disease with acute exacerbation (H) [J44.1]  Pertinent Medical History:      Plans for DC: lives with friends     DO NOT REMOVE ANY FIELDS BELOW - TYPE N/A OR WNL IF NO INFO TO ADD  Procedure/POD: NA  Neuro/Psych/Sleep: A&O x4  CV/Tele/Abnormal VS: /65 (BP Location: Right arm)  Temp 97.8  F (36.6  C) (Oral)  Resp 16  Ht 1.651 m (5' 5\")  Wt 52.5 kg (115 lb 11.9 oz)  SpO2 95%  BMI 19.26 kg/m2  +1 edema in ankles  Resp: diminished, RA   Skin/Wound: NA  /GI/Diet: regular diet  IV: SL  Pain: chronic neck pain, tramadol q6hr  Chemo/Radiation: NA  Abnormal Labs/Glucose: K+ 3.8  Activity/Safety: up SBA/ FR  Consults: NA  Education:NA             "

## 2017-03-28 NOTE — ED NOTES
Road test for patient. Was able to walk half way down the otero, then became short of breath. After a couple minutes was able to walk back to room. His o2 was 88% on room air.

## 2017-03-28 NOTE — ED NOTES
"[facility]  ED Nurse Handoff Report    ED Chief complaint: Shortness of Breath (c/o SOB, sore throat, and productive cough. Sats 88% on RA with hx of COPD)      ED Diagnosis:   Final diagnoses:   Chronic obstructive pulmonary disease with acute exacerbation (H)       Code Status: Full Code    Allergies:   Allergies   Allergen Reactions     Ammonium Lactate Other (See Comments)     Burning to skin     Doxycycline Visual Disturbance     Monosodium Glutamate Other (See Comments)     Pt states it feels like his \"head swells, visual disturbances, and he can't think striaght\"     Penicillins Rash       Activity level:  Independent     Needed?: No    Isolation: No  Infection: Not Applicable    Bariatric?: No      Vital Signs:   Vitals:    03/28/17 0105 03/28/17 0125 03/28/17 0139 03/28/17 0205   BP:  104/64  115/90   Resp: 21 22 19   Temp:   98.9  F (37.2  C)    TempSrc:   Oral    SpO2: 91% 94%  92%   Weight:       Height:           Cardiac Rhythm: ,        Pain level: 0-10 Pain Scale: 0    Is this patient confused?: No    Patient Report: Initial Complaint: Pt presents to ED with SOB, productive cough, sore throat, and left thigh pain. Pt tried nebs at home w/o relief. Pt became SOB when walking up stairs at home. Sats 88% when EMS came to  pt. Pt feels improved breathing on O2. Placed on 3L NC. Given 3 nebs with relief. Pt ambulates in hallway and becomes SOB, sats 86-88% when returning to bed with noticeable SOB. Resting comfortably at this time on 3L NC.   Focused Assessment: diminished breath sounds jt. AOx3. Skin w/d. tachypnic on arrival, but wnl at this time.  Tests Performed: CXR, labs  Abnormal Results: elevated bicarb, K 3.2  Treatments provided: 3 duonebs, 125 solumedrol IV, 600mg Ibuprofen    Family Comments: no family at bedside    OBS brochure/video discussed/provided to patient: NO    ED Medications:   Medications   azithromycin (ZITHROMAX) 500 mg in NaCl 0.9 % 250 mL " intermittent infusion (500 mg Intravenous New Bag 3/28/17 0203)   ipratropium - albuterol 0.5 mg/2.5 mg/3 mL (DUONEB) neb solution 3 mL (3 mLs Nebulization Given 3/28/17 0113)   methylPREDNISolone sodium succinate (solu-MEDROL) injection 125 mg (125 mg Intravenous Given 3/28/17 0009)   ibuprofen (ADVIL/MOTRIN) tablet 600 mg (600 mg Oral Given 3/27/17 4489)       Drips infusing?:  Yes      ED NURSE PHONE NUMBER: [phone]

## 2017-03-28 NOTE — ED PROVIDER NOTES
History     Chief Complaint:  Shortness of Breath    HPI   Chuck Lopez is a 76 year old homeless smoker male with a history of COPD, non-O2 dependent, who presents to the emergency department today for evaluation of shortness of breath. EMS reports that the patient is living in his friend's basement and called EMS after feeling extremely short of breath while sitting down. He continues to smoke cigarettes daily and notes that he has been feeling more short of breath for the past few days and also notes a sore throat and on/off chest pain a few days ago. EMS reports that he was sitting up when they arrived and had O2 sats around 88% on room air. He was brought into the ambulance and was put on 3 liters of O2 and had immediate improvement to 95%. He states that this does feel similar to his COPD, but states the productive cough and sore throat are relatively new. He also notes a subjective fever as well. He denies any wheezing, lightheadedness, abdominal pain, or other symptoms at this time.      Allergies:  Ammonium lactate  Doxycycline  Monosodium glutamate  Penicillins      Medications:    Ventolin HFA  Beclomethasone inh  Tramadol  Tiotropium inh  Nicoderm CQ  Atenolol  HCTz  MVI      Problem List:   Acute hypoxic respiratory failure due to COPD exacerbation  CAP  Tobacco use disorder  Thrombocytopenia  Chronic Hepatitis C  Chronic kidney disease stage 2  Hypertension  Diabetes Mellitus, type 2- diet controlled  Chronic back/neck pain- on chronic Tramadol  Alcohol abuse (non-active)      Past Medical History:    COPD exacerbation  CAP  Thrombocytopenia  Chronic Hepatitis C  Hypertension  Diabetes Mellitus, type 2- diet controlled  Chronic back/neck pain- on chronic Tramadol     Past Surgical History:   Appendectomy  EGD and colonoscopy      Family History:   No family history on file.     Social History:  Relationship status:   Tobacco use: former per patient   Alcohol use: former, per patient  "  The patient is currently homeless, living in a basement.      Review of Systems   Constitutional: Positive for fever.   HENT: Positive for congestion and sore throat.    Respiratory: Positive for cough and shortness of breath. Negative for wheezing.    Cardiovascular: Positive for chest pain.   Gastrointestinal: Negative for abdominal pain.   Neurological: Negative for light-headedness.   All other systems reviewed and are negative.    Physical Exam   Vitals:  Patient Vitals for the past 24 hrs:   BP Temp Temp src Heart Rate Resp SpO2 Height Weight   03/28/17 0234 - - - 105 - - - -   03/28/17 0205 115/90 - - 115 19 92 % - -   03/28/17 0139 - 98.9  F (37.2  C) Oral - - - - -   03/28/17 0125 104/64 - - 115 22 94 % - -   03/28/17 0105 - - - 109 21 91 % - -   03/28/17 0040 112/74 - - 100 20 97 % - -   03/28/17 0035 112/74 - - 99 17 95 % - -   03/28/17 0015 133/78 - - 104 22 96 % - -   03/27/17 2329 (!) 174/100 101.5  F (38.6  C) Oral 106 22 94 % 1.651 m (5' 5\") 52.2 kg (115 lb)         Physical Exam  General:  Well-nourished  Speaking in full sentences  Eyes:  Conjunctiva without injection or scleral icterus  PERRL  ENT:  Moist mucous membranes  Posterior oropharynx clear without erythema or exudate  Nares patent  Pinnae normal  Neck:  Full ROM  No stiffness appreciated  Resp:  Accessory muscles of respiration  Diminished air movement throughout  CV:   Tachycardic rate, regular rhythm  S1 and S2 present  No murmur, gallop or rub  GI:  BS present  Abdomen soft without distention  Non-tender to light and deep palpation  No guarding or rebound tenderness  Skin:  Warm, dry, well perfused  No rashes or open wounds on exposed skin  MSK:  Moves all extremities  No focal deformities or swelling  No calf tenderness or asymmetry  Neuro:  Alert  Answers questions appropriately  Moves all extremities equally  Gait stable  Psych:  Normal affect, normal mood    Emergency Department Course     ECG:  ECG taken at 0007, ECG read at " 0007  Sinus rhythm  Possible Left atrial enlargement  Left axis deviation  Anterior infarct , age undetermined  Abnormal ECG  Rate 98 bpm. MA interval 144. QRS duration 76. QT/QTc 336/428. P-R-T axes 84 -71 67.    Imaging:  Radiology findings were communicated with the patient who voiced understanding of the findings.    Chest x-ray, 2 views:  1. No evidence of active cardiopulmonary disease.  2. Prominent hyperinflated and hyperlucent lungs again noted,  consistent with chronic obstructive pulmonary disease.  Reading per radiology    Laboratory:  Laboratory findings were communicated with the patient and her family who voiced understanding of the findings.    Lactic acid: 1.5  Influenza A/B antigen: Negative  Rapid strep screen: Negative  CBC: PLT: 63 (L) o/w WNL (WBC 8.5, HGB 16.5)   BMP: Glucose: 101 (H), Potassium: 3.2 (L) (Creatinine 0.74)  Troponin (Collected 2344): <0.015  BNP: 340    Venous Blood Gas    Recent Labs  Lab 03/27/17  2344   PHV 7.42   PCO2V 52*   PO2V 27   HCO3V 34*   NIKKI 7.6       Beta strep group A culture: Pending  Blood Cultures x2: Pending    Interventions:  2357 Ibuprofen 600 mg PO  2359 Duoneb 3 mL Nebulization  0009 Solu-medrol 125 mg IV  0033 Duoneb 3 mL Nebulization   0113 Duoneb 3 mL Nebulization   0203 Azithromycin 500 mg IV    Emergency Department Course:  Nursing notes and vitals reviewed.  I performed an exam of the patient as documented above.   The patient was sent for a chest x-ray while in the emergency department, results above.   IV was inserted and blood was drawn for laboratory testing, results above.  At 0034 the patient was rechecked and was updated on the results of his laboratory and imaging studies. He reports feeling improved after the 1st nebulizer treatment.  At 0145 the patient was rechecked. He was quite dyspneic with a short trial of ambulation.   0157: I spoke with Dr. Carnes of the Hospitalist service regarding patient's presentation, findings, and plan of  care.  I discussed the treatment plan with the patient. They expressed understanding of this plan and consented to admission. I discussed the patient with Dr. Carnes, who will admit the patient to a monitored bed for further evaluation and treatment.  I personally reviewed the laboratory and imaging results with the patient and answered all related questions prior to admission.    Impression & Plan      Medical Decision Making:  Chuck Lopez is a 76 year old male with a past medical history significant for COPD who presents to the emergency department via EMS today for evaluation of shortness of breath. Vital signs on presentation reveal a temperature of temperature of 101.5, elevated blood pressure and heart rate, as well as SPO2 of 90% on room air. History, exam, and ED course as above. Symptoms are most consistent with acute COPD exacerbation. He does exhibit minimal air movement on initial exam which improved following 3 nebulizer treatments and Solu-Medrol. He describes this as similar to prior flares of COPD and acknowledges 2 known sick contacts with whom he is living as well as a cough productive of sputum. His chest x-ray is negative for clear infiltrate, although given the changes and increase in sputum character, we will begin Azithromycin. On attempt at ambulation the patient became quite dyspneic. I considered other causes for shortness of breath including PE; although presently feel this to be unlikely. Patient notes his symptoms feel similar to prior COPD exacerbations with associated symptoms of fever and pharyngitis. Influenza and rapid strep testing are negative. Labs reveal evidence of CO2 retention with a PCO2 of 52. Lactate is normal at 1.5. Blood cultures x2 are obtained and presently pending. Patient will be admitted to the Hospitalist service under the care of Dr. Carnes. All questions answered prior to admission.     Diagnosis:    ICD-10-CM    1. Chronic obstructive pulmonary  disease with acute exacerbation (H) J44.1        Scribe Disclosure:  I, Ash Mo, am serving as a scribe at 11:26 PM on 3/27/2017 to document services personally performed by Nayan Stallings MD, based on my observations and the provider's statements to me.    3/27/2017    EMERGENCY DEPARTMENT       Nayan Stallings MD  03/28/17 0339

## 2017-03-28 NOTE — H&P
[facility]    History and Physical  Hospitalist       Date of Admission:  3/27/2017  Date of Service (when I saw the patient): 03/28/17    Assessment & Plan   Chuck Lopez is a 76 year old homeless male with hx of COPD, ongoing tobacco use, HTN, and chronic Hep C who presents with progressive shortness of breath and URI symptoms, and is being admitted for acute COPD exacerbation    Acute COPD exacerbation with acute hypoxic respiratory failure:   Presented with progressive shortness of breath in the setting of viral URI. On arrival to the ED, he was noted to be febrile to 101.5, tachycardic to 110s, and hypoxic to mid 80s on room air. CXR on arrival showed no acute infiltrates. In the ED, he was started on IV steroids, duonebs, and azithromycin with improvement  - On 3 lpm/NC, wean as tolerated  - Continues on azithromycin to complete 5 day course  - Continues on prednisone 40 mg daily  - Continues on duonebs q4h while awake and prn  - Hold PTA inhalers while on scheduled nebs    Tobacco use disorder  Longstanding history of tobacco use. Currently smoking 0.5 ppd   - Nicotine replacement was offered and declined    Essential hypertension  [PTA: atenolol 25 mg daily, HCTZ 25 mg daily]  - -115 systolic. Hold PTA atenolol and HCTZ for now, resume if/when indicated    Chronic Hep C  LFTs normal. No active issues.    Chronic pain syndrome  Continues on home Tramadol prn    FEN: Regular diet  DVT Prophylaxis: Pneumatic Compression Devices  Code Status: Full Code    Disposition: Expected discharge in 2-3 days once on room air.    Alice Carnes    Primary Care Physician   Pranay Reid    Chief Complaint   Shortness of breath    History is obtained from the patient and medical records    History of Present Illness   Chuck Lopez is a 76 year old homeless male with hx of COPD, ongoing tobacco use, HTN, and chronic Hep C who presents with progressive shortness of breath. The  patient reports 4-5 day history of fevers/chills, sore throat, and non-productive cough. He has been using his maintenance inhalers and scheduled bronchodilators as prescribed, but his short of breath continued to worsen, prompting him to go to the ED today. He endorses numerous sick contacts who have also had URI symptoms. He also continues to smoke. He endorses associated diffuse chest pain with coughing, and denies cp currently. He denies dizziness/lightheadedness, weakness, or recent falls. He has been eating and drinking well. He is homeless, but has been staying with friends in [place].    In the ED, he was noted to be hypoxic to mid 80s on room air as well as tachycardic to 110s on arrival. CXR on arrival showed no acute infiltrates. He was placed on oxygen, given IV methylprednisolone and a duoneb with improvement in his shortness of breath. His only complaint at this time is neck pain which is chronic    Past Medical History    I have reviewed this patient's medical history and updated the medical record  Past Medical History:   Diagnosis Date     Chronic neck pain      COPD (chronic obstructive pulmonary disease) (H)      Diabetes (H)      Hepatitis C      Hypertension        Past Surgical History   I have reviewed this patient's surgical history and updated the medical record  Past Surgical History:   Procedure Laterality Date     APPENDECTOMY       ESOPHAGOSCOPY, GASTROSCOPY, DUODENOSCOPY (EGD), COMBINED  4/16/2014    Procedure: ESOPHAGOSCOPY, GASTROSCOPY, DUODENOSCOPY (EGD) & COLONOSCOPY with polypectomy by hot snare;  Surgeon: Dillon Vázquez MD;  Location:  GI       Prior to Admission Medications   Prior to Admission Medications   Prescriptions Last Dose Informant Patient Reported? Taking?   ATENOLOL PO  Self Yes No   Sig: Take 25 mg by mouth daily   HYDROCHLOROTHIAZIDE PO  Self Yes No   Sig: Take 25 mg by mouth daily   Multiple Vitamins-Minerals (V-C FORTE) CAPS  Self No No   Sig: Take 1  "capsule by mouth daily   QVAR 80 MCG/ACT Inhaler   No No   Sig: INHALE TWO PUFFS BY MOUTH TWICE A DAY   SPIRIVA HANDIHALER 18 MCG capsule   No No   Sig: INHALE ONE CAPSULE BY MOUTH EVERY DAY   albuterol (2.5 MG/3ML) 0.083% neb solution  Self No No   Sig: Take 1 vial (2.5 mg) by nebulization every 4 hours as needed for shortness of breath / dyspnea or wheezing   albuterol (2.5 MG/3ML) 0.083% neb solution   Yes No   Sig: Take 1 vial by nebulization 4 times daily   albuterol (PROAIR HFA/PROVENTIL HFA/VENTOLIN HFA) 108 (90 BASE) MCG/ACT Inhaler  Self No No   Sig: Inhale 2 puffs into the lungs every 6 hours as needed for shortness of breath / dyspnea or wheezing   fluticasone-salmeterol (ADVAIR) 500-50 MCG/DOSE diskus inhaler   No No   Sig: Inhale 1 puff into the lungs 2 times daily   nicotine (NICODERM CQ) 21 MG/24HR 24 hr patch   No No   Sig: Place 1 patch onto the skin daily   order for DME   No No   Sig: Equipment being ordered: Other: neb machine  Treatment Diagnosis: COPD exacerbation   predniSONE (DELTASONE) 10 MG tablet   No No   Sig: Take 4 tabs daily x 1 day, then 3 tabs daily x 4 days, then 2 tabs daily x 4 days, then 1 tab daily x 4 days, then stop.   traMADol (ULTRAM) 50 MG tablet   No No   Sig: Take 1 tablet (50 mg) by mouth every 6 hours as needed for pain maximum 5 tablet(s) per day   traMADol (ULTRAM) 50 MG tablet   No No   Sig: Take 1 tablet (50 mg) by mouth every 4 hours as needed for moderate pain      Facility-Administered Medications: None     Allergies   Allergies   Allergen Reactions     Ammonium Lactate Other (See Comments)     Burning to skin     Doxycycline Visual Disturbance     Monosodium Glutamate Other (See Comments)     Pt states it feels like his \"head swells, visual disturbances, and he can't think striaght\"     Penicillins Rash       Social History   - Longstanding history of smoking, continues to smoke 0.5 ppd  - He quit drinking alcohol 8 years ago  - He is homeless, but has been " staying in [place] with some friends    Family History   Family history was reviewed and non-contributory    Review of Systems   The 10 point Review of Systems is negative other than noted in the HPI    Physical Exam   Temp: 98.9  F (37.2  C) Temp src: Oral BP: 115/90   Heart Rate: 115 Resp: 19 SpO2: 92 % O2 Device: Nasal cannula Oxygen Delivery: 3 LPM  Vital Signs with Ranges  Temp:  [98.9  F (37.2  C)-101.5  F (38.6  C)] 98.9  F (37.2  C)  Heart Rate:  [] 115  Resp:  [17-22] 19  BP: (104-174)/() 115/90  SpO2:  [91 %-97 %] 92 %  115 lbs 0 oz    Constitutional: Chronically ill-appearing, NAD  HEENT: NC/AT, sclera white, conjunctiva clear, EOMI, MMM  Respiratory: Breathing non-labored. Diminished breath sounds bilaterally  Cardiovascular: Heart sounds distant, tachycardic. No pedal edema.   GI: +BS. Abd soft/NT  Skin: Warm and dry. Jesus hands  Musculoskeletal: Normal muscle bulk and tone  Neurologic: Alert and appropriate. RENAE  Psychiatric: Normal affect    Data   Data reviewed today:  I personally reviewed the chest x-ray image(s) showing no acute infiltrates.    Recent Labs  Lab 03/27/17  2344   WBC 8.5   HGB 16.5   MCV 94   PLT 63*      POTASSIUM 3.2*   CHLORIDE 97   CO2 32   BUN 14   CR 0.74   ANIONGAP 9   MATTHEW 8.9   *   TROPI <0.015The 99th percentile for upper reference range is 0.045 ug/L.  Troponin values in the range of 0.045 - 0.120 ug/L may be associated with risks of adverse clinical events.       Recent Results (from the past 24 hour(s))   XR Chest 2 Views    Narrative    CHEST 2 VIEWS  3/27/2017 11:51 PM     HISTORY: Shortness of breath.    COMPARISON: 1/7/2017.    FINDINGS: Prominent hyperinflated and hyperlucent lungs again noted.  The lungs are clear. Normal-sized cardiac silhouette. Atherosclerotic  calcification in the thoracic aorta.      Impression    IMPRESSION:   1. No evidence of active cardiopulmonary disease.  2. Prominent hyperinflated and hyperlucent lungs again  noted,  consistent with chronic obstructive pulmonary disease.    BRET SOLANO MD

## 2017-03-29 ENCOUNTER — ANESTHESIA EVENT (OUTPATIENT)
Dept: ONCOLOGY | Facility: CLINIC | Age: 77
DRG: 190 | End: 2017-03-29
Payer: MEDICARE

## 2017-03-29 ENCOUNTER — APPOINTMENT (OUTPATIENT)
Dept: CT IMAGING | Facility: CLINIC | Age: 77
DRG: 190 | End: 2017-03-29
Attending: HOSPITALIST
Payer: MEDICARE

## 2017-03-29 ENCOUNTER — APPOINTMENT (OUTPATIENT)
Dept: GENERAL RADIOLOGY | Facility: CLINIC | Age: 77
DRG: 190 | End: 2017-03-29
Attending: HOSPITALIST
Payer: MEDICARE

## 2017-03-29 ENCOUNTER — ANESTHESIA (OUTPATIENT)
Dept: ONCOLOGY | Facility: CLINIC | Age: 77
DRG: 190 | End: 2017-03-29
Payer: MEDICARE

## 2017-03-29 LAB
ANION GAP SERPL CALCULATED.3IONS-SCNC: 5 MMOL/L (ref 3–14)
BASE EXCESS BLDA CALC-SCNC: 6.8 MMOL/L
BUN SERPL-MCNC: 18 MG/DL (ref 7–30)
CALCIUM SERPL-MCNC: 8.9 MG/DL (ref 8.5–10.1)
CHLORIDE SERPL-SCNC: 101 MMOL/L (ref 94–109)
CO2 SERPL-SCNC: 33 MMOL/L (ref 20–32)
CREAT SERPL-MCNC: 0.77 MG/DL (ref 0.66–1.25)
D DIMER PPP FEU-MCNC: 1.6 UG/ML FEU (ref 0–0.5)
ERYTHROCYTE [DISTWIDTH] IN BLOOD BY AUTOMATED COUNT: 14.2 % (ref 10–15)
ERYTHROCYTE [DISTWIDTH] IN BLOOD BY AUTOMATED COUNT: NORMAL % (ref 10–15)
GFR SERPL CREATININE-BSD FRML MDRD: ABNORMAL ML/MIN/1.7M2
GLUCOSE SERPL-MCNC: 122 MG/DL (ref 70–99)
HCO3 BLD-SCNC: 31 MMOL/L (ref 21–28)
HCT VFR BLD AUTO: 46.4 % (ref 40–53)
HCT VFR BLD AUTO: NORMAL % (ref 40–53)
HGB BLD-MCNC: 16.2 G/DL (ref 13.3–17.7)
HGB BLD-MCNC: NORMAL G/DL (ref 13.3–17.7)
LACTATE BLD-SCNC: 1.6 MMOL/L (ref 0.7–2.1)
MCH RBC QN AUTO: 32.8 PG (ref 26.5–33)
MCH RBC QN AUTO: NORMAL PG (ref 26.5–33)
MCHC RBC AUTO-ENTMCNC: 34.9 G/DL (ref 31.5–36.5)
MCHC RBC AUTO-ENTMCNC: NORMAL G/DL (ref 31.5–36.5)
MCV RBC AUTO: 94 FL (ref 78–100)
MCV RBC AUTO: NORMAL FL (ref 78–100)
NT-PROBNP SERPL-MCNC: 485 PG/ML (ref 0–900)
O2/TOTAL GAS SETTING VFR VENT: ABNORMAL %
OXYHGB MFR BLD: 97 % (ref 92–100)
PCO2 BLD: 42 MM HG (ref 35–45)
PH BLD: 7.47 PH (ref 7.35–7.45)
PLATELET # BLD AUTO: 90 10E9/L (ref 150–450)
PLATELET # BLD AUTO: NORMAL 10E9/L (ref 150–450)
PO2 BLD: 81 MM HG (ref 80–105)
POTASSIUM SERPL-SCNC: 3.7 MMOL/L (ref 3.4–5.3)
POTASSIUM SERPL-SCNC: 4.2 MMOL/L (ref 3.4–5.3)
PROCALCITONIN SERPL-MCNC: 0.13 NG/ML
RBC # BLD AUTO: 4.94 10E12/L (ref 4.4–5.9)
RBC # BLD AUTO: NORMAL 10E12/L (ref 4.4–5.9)
SODIUM SERPL-SCNC: 139 MMOL/L (ref 133–144)
TROPONIN I SERPL-MCNC: 0.02 UG/L (ref 0–0.04)
WBC # BLD AUTO: 14.1 10E9/L (ref 4–11)
WBC # BLD AUTO: NORMAL 10E9/L (ref 4–11)

## 2017-03-29 PROCEDURE — A9270 NON-COVERED ITEM OR SERVICE: HCPCS | Mod: GY | Performed by: INTERNAL MEDICINE

## 2017-03-29 PROCEDURE — 85027 COMPLETE CBC AUTOMATED: CPT | Performed by: INTERNAL MEDICINE

## 2017-03-29 PROCEDURE — 36415 COLL VENOUS BLD VENIPUNCTURE: CPT | Performed by: HOSPITALIST

## 2017-03-29 PROCEDURE — 82805 BLOOD GASES W/O2 SATURATION: CPT | Performed by: HOSPITALIST

## 2017-03-29 PROCEDURE — 84484 ASSAY OF TROPONIN QUANT: CPT | Performed by: INTERNAL MEDICINE

## 2017-03-29 PROCEDURE — A9270 NON-COVERED ITEM OR SERVICE: HCPCS | Mod: GY | Performed by: HOSPITALIST

## 2017-03-29 PROCEDURE — 25000125 ZZHC RX 250: Performed by: INTERNAL MEDICINE

## 2017-03-29 PROCEDURE — 25000128 H RX IP 250 OP 636: Performed by: INTERNAL MEDICINE

## 2017-03-29 PROCEDURE — 40000275 ZZH STATISTIC RCP TIME EA 10 MIN

## 2017-03-29 PROCEDURE — 37000011 ZZH ANESTHESIA WARD SERVICE: Performed by: REGISTERED NURSE

## 2017-03-29 PROCEDURE — 94640 AIRWAY INHALATION TREATMENT: CPT | Mod: 76

## 2017-03-29 PROCEDURE — 99233 SBSQ HOSP IP/OBS HIGH 50: CPT | Performed by: HOSPITALIST

## 2017-03-29 PROCEDURE — 25000132 ZZH RX MED GY IP 250 OP 250 PS 637: Mod: GY | Performed by: INTERNAL MEDICINE

## 2017-03-29 PROCEDURE — 83880 ASSAY OF NATRIURETIC PEPTIDE: CPT | Performed by: INTERNAL MEDICINE

## 2017-03-29 PROCEDURE — 40000671 ZZH STATISTIC ANESTHESIA CASE

## 2017-03-29 PROCEDURE — 94660 CPAP INITIATION&MGMT: CPT

## 2017-03-29 PROCEDURE — 84132 ASSAY OF SERUM POTASSIUM: CPT | Performed by: HOSPITALIST

## 2017-03-29 PROCEDURE — 83605 ASSAY OF LACTIC ACID: CPT | Performed by: HOSPITALIST

## 2017-03-29 PROCEDURE — 93005 ELECTROCARDIOGRAM TRACING: CPT

## 2017-03-29 PROCEDURE — 71260 CT THORAX DX C+: CPT

## 2017-03-29 PROCEDURE — 25500064 ZZH RX 255 OP 636: Performed by: INTERNAL MEDICINE

## 2017-03-29 PROCEDURE — 71010 XR CHEST PORT 1 VW: CPT

## 2017-03-29 PROCEDURE — 25000128 H RX IP 250 OP 636: Performed by: HOSPITALIST

## 2017-03-29 PROCEDURE — 12000000 ZZH R&B MED SURG/OB

## 2017-03-29 PROCEDURE — 84145 PROCALCITONIN (PCT): CPT | Performed by: HOSPITALIST

## 2017-03-29 PROCEDURE — 80048 BASIC METABOLIC PNL TOTAL CA: CPT | Performed by: INTERNAL MEDICINE

## 2017-03-29 PROCEDURE — 94640 AIRWAY INHALATION TREATMENT: CPT

## 2017-03-29 PROCEDURE — 93010 ELECTROCARDIOGRAM REPORT: CPT | Performed by: INTERNAL MEDICINE

## 2017-03-29 PROCEDURE — 85379 FIBRIN DEGRADATION QUANT: CPT | Performed by: INTERNAL MEDICINE

## 2017-03-29 PROCEDURE — 36415 COLL VENOUS BLD VENIPUNCTURE: CPT | Performed by: INTERNAL MEDICINE

## 2017-03-29 PROCEDURE — 87633 RESP VIRUS 12-25 TARGETS: CPT | Performed by: HOSPITALIST

## 2017-03-29 PROCEDURE — 25000132 ZZH RX MED GY IP 250 OP 250 PS 637: Mod: GY | Performed by: HOSPITALIST

## 2017-03-29 RX ORDER — ATENOLOL 25 MG/1
25 TABLET ORAL DAILY
Status: DISCONTINUED | OUTPATIENT
Start: 2017-03-29 | End: 2017-04-04 | Stop reason: HOSPADM

## 2017-03-29 RX ORDER — METHYLPREDNISOLONE SODIUM SUCCINATE 125 MG/2ML
60 INJECTION, POWDER, LYOPHILIZED, FOR SOLUTION INTRAMUSCULAR; INTRAVENOUS EVERY 12 HOURS
Status: DISCONTINUED | OUTPATIENT
Start: 2017-03-29 | End: 2017-04-01

## 2017-03-29 RX ORDER — IOPAMIDOL 755 MG/ML
56 INJECTION, SOLUTION INTRAVASCULAR ONCE
Status: COMPLETED | OUTPATIENT
Start: 2017-03-29 | End: 2017-03-29

## 2017-03-29 RX ORDER — CEFTRIAXONE 2 G/1
2 INJECTION, POWDER, FOR SOLUTION INTRAMUSCULAR; INTRAVENOUS EVERY 24 HOURS
Status: DISCONTINUED | OUTPATIENT
Start: 2017-03-29 | End: 2017-04-01

## 2017-03-29 RX ORDER — METHYLPREDNISOLONE SODIUM SUCCINATE 125 MG/2ML
60 INJECTION, POWDER, LYOPHILIZED, FOR SOLUTION INTRAMUSCULAR; INTRAVENOUS EVERY 12 HOURS
Status: DISCONTINUED | OUTPATIENT
Start: 2017-03-29 | End: 2017-03-29

## 2017-03-29 RX ORDER — HYDROCHLOROTHIAZIDE 25 MG/1
25 TABLET ORAL DAILY
Status: DISCONTINUED | OUTPATIENT
Start: 2017-03-29 | End: 2017-04-04 | Stop reason: HOSPADM

## 2017-03-29 RX ADMIN — TRAMADOL HYDROCHLORIDE 50 MG: 50 TABLET, COATED ORAL at 12:50

## 2017-03-29 RX ADMIN — IPRATROPIUM BROMIDE AND ALBUTEROL SULFATE 3 ML: .5; 3 SOLUTION RESPIRATORY (INHALATION) at 00:20

## 2017-03-29 RX ADMIN — CEFTRIAXONE 2 G: 2 INJECTION, POWDER, FOR SOLUTION INTRAMUSCULAR; INTRAVENOUS at 10:44

## 2017-03-29 RX ADMIN — PREDNISONE 40 MG: 20 TABLET ORAL at 08:04

## 2017-03-29 RX ADMIN — IPRATROPIUM BROMIDE AND ALBUTEROL SULFATE 3 ML: .5; 3 SOLUTION RESPIRATORY (INHALATION) at 22:05

## 2017-03-29 RX ADMIN — ATENOLOL 25 MG: 25 TABLET ORAL at 10:35

## 2017-03-29 RX ADMIN — IPRATROPIUM BROMIDE AND ALBUTEROL SULFATE 3 ML: .5; 3 SOLUTION RESPIRATORY (INHALATION) at 07:35

## 2017-03-29 RX ADMIN — ENOXAPARIN SODIUM 40 MG: 40 INJECTION SUBCUTANEOUS at 18:19

## 2017-03-29 RX ADMIN — SODIUM CHLORIDE 83 ML: 9 INJECTION, SOLUTION INTRAVENOUS at 19:20

## 2017-03-29 RX ADMIN — Medication 3 MG: at 23:25

## 2017-03-29 RX ADMIN — MULTIPLE VITAMINS W/ MINERALS TAB 1 TABLET: TAB at 08:04

## 2017-03-29 RX ADMIN — IPRATROPIUM BROMIDE AND ALBUTEROL SULFATE 3 ML: .5; 3 SOLUTION RESPIRATORY (INHALATION) at 15:03

## 2017-03-29 RX ADMIN — NICOTINE 1 PATCH: 14 PATCH, EXTENDED RELEASE TRANSDERMAL at 08:08

## 2017-03-29 RX ADMIN — TRAMADOL HYDROCHLORIDE 50 MG: 50 TABLET, COATED ORAL at 19:56

## 2017-03-29 RX ADMIN — HYDROCHLOROTHIAZIDE 25 MG: 25 TABLET ORAL at 10:35

## 2017-03-29 RX ADMIN — METHYLPREDNISOLONE SODIUM SUCCINATE 62.5 MG: 125 INJECTION, POWDER, FOR SOLUTION INTRAMUSCULAR; INTRAVENOUS at 21:07

## 2017-03-29 RX ADMIN — IPRATROPIUM BROMIDE AND ALBUTEROL SULFATE 3 ML: .5; 3 SOLUTION RESPIRATORY (INHALATION) at 18:40

## 2017-03-29 RX ADMIN — AZITHROMYCIN 250 MG: 250 TABLET, FILM COATED ORAL at 21:07

## 2017-03-29 RX ADMIN — TRAMADOL HYDROCHLORIDE 50 MG: 50 TABLET, COATED ORAL at 05:09

## 2017-03-29 RX ADMIN — IOPAMIDOL 56 ML: 755 INJECTION, SOLUTION INTRAVENOUS at 19:20

## 2017-03-29 RX ADMIN — IPRATROPIUM BROMIDE AND ALBUTEROL SULFATE 3 ML: .5; 3 SOLUTION RESPIRATORY (INHALATION) at 13:05

## 2017-03-29 NOTE — PLAN OF CARE
Problem: Goal Outcome Summary  Goal: Goal Outcome Summary  Outcome: No Change  Reason for Admission: Chronic obstructive pulmonary disease with acute exacerbation (H) [J44.1]  Pertinent Medical History:      Plans for DC: lives with friends     DO NOT REMOVE ANY FIELDS BELOW - TYPE N/A OR WNL IF NO INFO TO ADD  Procedure/POD: NA  Neuro/Psych/Sleep: A&O x4  CV/Tele/Abnormal VS: VSS on 2.5L O2, sating at 93-95%  Resp: diminished, 2.5L O2  Skin/Wound: generalized: red, ariana, bruising  /GI/Diet: regular diet  IV: SL  Pain: chronic neck pain, tramadol x1 given  Chemo/Radiation: NA  Abnormal Labs/Glucose:   Activity/Safety: up SBA  Consults: CHRIS  Education:NA     Other:  Nicotine patch right arm

## 2017-03-29 NOTE — PROGRESS NOTES
Patient is on a 3L NC with SpO2 96%. BS diminished. All nebs were given as ordered.  Will cont to follow.  3/29/2017  Maya Berumen RRT

## 2017-03-29 NOTE — PLAN OF CARE
Problem: Goal Outcome Summary  Goal: Goal Outcome Summary  Outcome: Declining  Reason for Admission: Chronic obstructive pulmonary disease with acute exacerbation (H) [J44.1]  Procedure/POD: NA  Neuro/Psych/Sleep: A&O x4  CV/Tele/Abnormal VS: Tachypnic, tachycardic.  Tele SR.  Resp: diminished, expiratory wheezes. Placed on BiPAP at 1400, will transfer to [facility]  Skin/Wound: generalized: red, ariana, bruising, flushed  /GI/Diet: regular diet  IV: SL  Pain: chronic neck pain, tramadol x1 given.  Chemo/Radiation: NA  Abnormal Labs/Glucose:   Activity/Safety: up SBA  Consults: NA  Education:NA  Plan: Transfer to [facility] d/t biPAP Dependence     Other:  Nicotine patch right arm

## 2017-03-29 NOTE — PROVIDER NOTIFICATION
MD Notification    Person notified: Primary hospitalist    Person Name: Dr. Syed    Date/Time: 3/29/17 at 1608    Interaction: web-based page    Purpose of Notification: Please call reguarding IMC status and improved resp status.  Pt is now (and has been for 20 mins) on 3L NC with sats at 95% and RR of 16-20.  Would you still like pt to tx to IMC?    Orders Received:    Comments:

## 2017-03-29 NOTE — PROGRESS NOTES
[facility]    Hospitalist Progress Note      Assessment & Plan   Chuck Lopez is a 76 year old male who was admitted on 3/27/2017.  Past hx of COPD, ongoing tobacco use, HTN, and chronic Hep C who presents with progressive shortness of breath and URI symptoms, and is being admitted for acute COPD exacerbation     Acute COPD exacerbation with acute hypoxic respiratory failure:   Presented with progressive shortness of breath in the setting of viral URI. On arrival to the ED, he was noted to be febrile to 101.5, tachycardic to 110s, and hypoxic to mid 80s on room air. CXR on arrival showed no acute infiltrates.  - reports increased dyspnea today with subjective chills and sweats overnight  - will add ceftriaxone to azithromycin, stop prednisone and start solumdrol 60 mg IV bid  - reporting productive cough now, will check sputum gram stain and culture  - check CBC and procalcitonin  - stable on 3L, wean as tolerated   - Hold PTA inhalers while on scheduled nebs    ADDENDUM:  Paged this afternoon regarding acute respiratory decompensation, patient requiring Bipap.  He was assessed and reported he had gradual progression of dyspnea over the morning, but RN reports a more sudden change.  He denies any chest pain/pressure, lightheadedness, nausea.  No fever/chills.  STAT CXR was clear, EKG with NSR, troponin negative, Pro-BNP wnl (though slightly up from admission), procal low.  Has new leukocytosis - ?steroids.  D-dimer is elevated and ordered CT pulm angio, but having difficulty obtaining adequate IV access.  If unable to obtain, will get non-con CT to assess for infiltrates and V/Q scan in the AM.  He was weaned from Bipap after about 1 hour, and is currently stable.  Will not transfer to [facility] as initially planned.     Tobacco use disorder  Longstanding history of tobacco use. Currently smoking 0.5 ppd   - nicotine patch     Essential hypertension  [PTA: atenolol 25 mg daily, HCTZ 25 mg daily]  -  now hypertensive, will resume PTA atenolol and HCTZ 3/29     Chronic Hep C  LFTs normal. No active issues.     Chronic pain syndrome  Continues on home Tramadol prn    DVT Prophylaxis: Enoxaprain (Lovenox) SQ  Code Status: Full Code    Disposition: Expected discharge in 3 days once respiratory status improved.    Joe Syed    Interval History   Reports increased dyspnea with subjective chills and sweats overnight.  Now producing sputum.  No significant increase in wheezing.  No chest pain/pressure.  No diarrhea or other complaints.    -Data reviewed today: I reviewed all new labs and imaging results over the last 24 hours. I personally reviewed no images or EKG's today.    Physical Exam   Temp: 96.5  F (35.8  C) Temp src: Oral BP: 169/76 Pulse: 99 Heart Rate: 78 Resp: 26 SpO2: 96 % O2 Device: Nasal cannula Oxygen Delivery: 3 LPM  Vitals:    03/27/17 2329 03/28/17 0334 03/29/17 0617   Weight: 52.2 kg (115 lb) 52.5 kg (115 lb 11.9 oz) 54.6 kg (120 lb 5.9 oz)     Vital Signs with Ranges  Temp:  [96.5  F (35.8  C)-97.8  F (36.6  C)] 96.5  F (35.8  C)  Pulse:  [84-99] 99  Heart Rate:  [70-93] 78  Resp:  [12-26] 26  BP: (127-169)/(57-76) 169/76  SpO2:  [93 %-97 %] 96 %  I/O last 3 completed shifts:  In: 240 [P.O.:240]  Out: 1000 [Urine:1000]    Constitutional: Well developed, well nourished male in no acute distress  Respiratory: Poor air movement bilaterally, no crackles or wheezes, no tachypnea  Cardiovascular: regular rate and rhythm, normal S1/S2 without murmur, rubs or gallops, no peripheral edema  GI: abdomen soft, reports diffuse tenderness to palpation (chronic per patient), non-distended, normal bowel sounds  Skin/Integumen:  No rash or bruising  Other:  alert and appropriate, cranial nerves grossly intact    Medications        cefTRIAXone  2 g Intravenous Q24H     methylPREDNISolone  62.5 mg Intravenous Q12H     multivitamin, therapeutic with minerals  1 tablet Oral Daily     sodium chloride (PF)  3 mL  Intracatheter Q8H     ipratropium - albuterol 0.5 mg/2.5 mg/3 mL  3 mL Nebulization Q4H While awake     azithromycin  250 mg Oral At Bedtime     nicotine   Transdermal Q8H     nicotine   Transdermal Daily     nicotine  1 patch Transdermal Daily       Data     Recent Labs  Lab 03/29/17  0745 03/28/17  0715 03/27/17  2344   WBC  --  6.0 8.5   HGB  --  15.4 16.5   MCV  --  94 94   PLT  --  Platelets clumped, platelet count unavailable 63*   NA  --  135 138   POTASSIUM 3.7 3.8 3.2*   CHLORIDE  --  96 97   CO2  --  29 32   BUN  --  19 14   CR  --  0.92 0.74   ANIONGAP  --  10 9   MATTHEW  --  8.7 8.9   GLC  --  162* 101*   TROPI  --   --  <0.015The 99th percentile for upper reference range is 0.045 ug/L.  Troponin values in the range of 0.045 - 0.120 ug/L may be associated with risks of adverse clinical events.       No results found for this or any previous visit (from the past 24 hour(s)).

## 2017-03-29 NOTE — PLAN OF CARE
"Problem: Goal Outcome Summary  Goal: Goal Outcome Summary  Outcome: Improving  Reason for Admission: Chronic obstructive pulmonary disease with acute exacerbation (H) [J44.1]  Pertinent Medical History:      Plans for DC: lives with friends     DO NOT REMOVE ANY FIELDS BELOW - TYPE N/A OR WNL IF NO INFO TO ADD  Procedure/POD: NA  Neuro/Psych/Sleep: A&O x4  CV/Tele/Abnormal VS: /61 (BP Location: Left arm)  Temp 97.4  F (36.3  C) (Oral)  Resp 16  Ht 1.651 m (5' 5\")  Wt 52.5 kg (115 lb 11.9 oz)  SpO2 93%  BMI 19.26 kg/m2  +1 edema in ankles  Resp: diminished, 3L O2  Skin/Wound: generalized: red, ariana, bruising  /GI/Diet: regular diet  IV: SL  Pain: chronic neck pain, tramadol q6h, Tylenol q4h  Chemo/Radiation: NA  Abnormal Labs/Glucose: K+ 3.8  Activity/Safety: up SBA  Consults: NA  Education:NA     Other:  Nicotine patch right arm        "

## 2017-03-30 LAB
ANION GAP SERPL CALCULATED.3IONS-SCNC: 6 MMOL/L (ref 3–14)
BACTERIA SPEC CULT: NORMAL
BUN SERPL-MCNC: 18 MG/DL (ref 7–30)
CALCIUM SERPL-MCNC: 9 MG/DL (ref 8.5–10.1)
CHLORIDE SERPL-SCNC: 99 MMOL/L (ref 94–109)
CO2 SERPL-SCNC: 35 MMOL/L (ref 20–32)
CREAT SERPL-MCNC: 0.72 MG/DL (ref 0.66–1.25)
ERYTHROCYTE [DISTWIDTH] IN BLOOD BY AUTOMATED COUNT: 14.5 % (ref 10–15)
FLUAV H1 2009 PAND RNA SPEC QL NAA+PROBE: NEGATIVE
FLUAV H1 RNA SPEC QL NAA+PROBE: NEGATIVE
FLUAV H3 RNA SPEC QL NAA+PROBE: NEGATIVE
FLUAV RNA SPEC QL NAA+PROBE: NEGATIVE
FLUBV RNA SPEC QL NAA+PROBE: NEGATIVE
GFR SERPL CREATININE-BSD FRML MDRD: ABNORMAL ML/MIN/1.7M2
GLUCOSE SERPL-MCNC: 126 MG/DL (ref 70–99)
GRAM STN SPEC: ABNORMAL
HADV DNA SPEC QL NAA+PROBE: NEGATIVE
HADV DNA SPEC QL NAA+PROBE: NEGATIVE
HCT VFR BLD AUTO: 46.9 % (ref 40–53)
HGB BLD-MCNC: 15.9 G/DL (ref 13.3–17.7)
HMPV RNA SPEC QL NAA+PROBE: NEGATIVE
HPIV1 RNA SPEC QL NAA+PROBE: NEGATIVE
HPIV2 RNA SPEC QL NAA+PROBE: NEGATIVE
HPIV3 RNA SPEC QL NAA+PROBE: NEGATIVE
INTERPRETATION ECG - MUSE: NORMAL
Lab: ABNORMAL
MCH RBC QN AUTO: 32.3 PG (ref 26.5–33)
MCHC RBC AUTO-ENTMCNC: 33.9 G/DL (ref 31.5–36.5)
MCV RBC AUTO: 95 FL (ref 78–100)
MICRO REPORT STATUS: ABNORMAL
MICRO REPORT STATUS: NORMAL
MICROBIOLOGIST REVIEW: NORMAL
PLATELET # BLD AUTO: 139 10E9/L (ref 150–450)
POTASSIUM SERPL-SCNC: 4 MMOL/L (ref 3.4–5.3)
RBC # BLD AUTO: 4.92 10E12/L (ref 4.4–5.9)
RHINOVIRUS RNA SPEC QL NAA+PROBE: NEGATIVE
RSV RNA SPEC QL NAA+PROBE: NEGATIVE
RSV RNA SPEC QL NAA+PROBE: NEGATIVE
SODIUM SERPL-SCNC: 140 MMOL/L (ref 133–144)
SPECIMEN SOURCE: ABNORMAL
SPECIMEN SOURCE: NORMAL
SPECIMEN SOURCE: NORMAL
WBC # BLD AUTO: 11.4 10E9/L (ref 4–11)

## 2017-03-30 PROCEDURE — 25000125 ZZHC RX 250: Performed by: INTERNAL MEDICINE

## 2017-03-30 PROCEDURE — 99232 SBSQ HOSP IP/OBS MODERATE 35: CPT | Performed by: INTERNAL MEDICINE

## 2017-03-30 PROCEDURE — 40000275 ZZH STATISTIC RCP TIME EA 10 MIN

## 2017-03-30 PROCEDURE — 87186 SC STD MICRODIL/AGAR DIL: CPT | Performed by: HOSPITALIST

## 2017-03-30 PROCEDURE — 25000132 ZZH RX MED GY IP 250 OP 250 PS 637: Mod: GY | Performed by: INTERNAL MEDICINE

## 2017-03-30 PROCEDURE — 87077 CULTURE AEROBIC IDENTIFY: CPT | Performed by: HOSPITALIST

## 2017-03-30 PROCEDURE — A9270 NON-COVERED ITEM OR SERVICE: HCPCS | Mod: GY | Performed by: INTERNAL MEDICINE

## 2017-03-30 PROCEDURE — 12000000 ZZH R&B MED SURG/OB

## 2017-03-30 PROCEDURE — 94640 AIRWAY INHALATION TREATMENT: CPT

## 2017-03-30 PROCEDURE — 25000132 ZZH RX MED GY IP 250 OP 250 PS 637: Mod: GY | Performed by: HOSPITALIST

## 2017-03-30 PROCEDURE — 36415 COLL VENOUS BLD VENIPUNCTURE: CPT | Performed by: HOSPITALIST

## 2017-03-30 PROCEDURE — 87070 CULTURE OTHR SPECIMN AEROBIC: CPT | Performed by: HOSPITALIST

## 2017-03-30 PROCEDURE — 80048 BASIC METABOLIC PNL TOTAL CA: CPT | Performed by: HOSPITALIST

## 2017-03-30 PROCEDURE — 94640 AIRWAY INHALATION TREATMENT: CPT | Mod: 76

## 2017-03-30 PROCEDURE — 87205 SMEAR GRAM STAIN: CPT | Performed by: HOSPITALIST

## 2017-03-30 PROCEDURE — A9270 NON-COVERED ITEM OR SERVICE: HCPCS | Mod: GY | Performed by: HOSPITALIST

## 2017-03-30 PROCEDURE — 25000128 H RX IP 250 OP 636: Performed by: INTERNAL MEDICINE

## 2017-03-30 PROCEDURE — 85027 COMPLETE CBC AUTOMATED: CPT | Performed by: HOSPITALIST

## 2017-03-30 PROCEDURE — 25000128 H RX IP 250 OP 636: Performed by: HOSPITALIST

## 2017-03-30 RX ORDER — LIDOCAINE 40 MG/G
CREAM TOPICAL
Status: DISCONTINUED | OUTPATIENT
Start: 2017-03-30 | End: 2017-04-04 | Stop reason: HOSPADM

## 2017-03-30 RX ORDER — NITROGLYCERIN 0.4 MG/1
0.4 TABLET SUBLINGUAL EVERY 5 MIN PRN
Status: DISCONTINUED | OUTPATIENT
Start: 2017-03-30 | End: 2017-04-04 | Stop reason: HOSPADM

## 2017-03-30 RX ADMIN — ATENOLOL 25 MG: 25 TABLET ORAL at 08:09

## 2017-03-30 RX ADMIN — METHYLPREDNISOLONE SODIUM SUCCINATE 62.5 MG: 125 INJECTION, POWDER, FOR SOLUTION INTRAMUSCULAR; INTRAVENOUS at 20:01

## 2017-03-30 RX ADMIN — TRAMADOL HYDROCHLORIDE 50 MG: 50 TABLET, COATED ORAL at 03:40

## 2017-03-30 RX ADMIN — Medication 3 MG: at 22:40

## 2017-03-30 RX ADMIN — IPRATROPIUM BROMIDE AND ALBUTEROL SULFATE 3 ML: .5; 3 SOLUTION RESPIRATORY (INHALATION) at 15:05

## 2017-03-30 RX ADMIN — IPRATROPIUM BROMIDE AND ALBUTEROL SULFATE 3 ML: .5; 3 SOLUTION RESPIRATORY (INHALATION) at 11:40

## 2017-03-30 RX ADMIN — IPRATROPIUM BROMIDE AND ALBUTEROL SULFATE 3 ML: .5; 3 SOLUTION RESPIRATORY (INHALATION) at 07:06

## 2017-03-30 RX ADMIN — METHYLPREDNISOLONE SODIUM SUCCINATE 62.5 MG: 125 INJECTION, POWDER, FOR SOLUTION INTRAMUSCULAR; INTRAVENOUS at 08:09

## 2017-03-30 RX ADMIN — IPRATROPIUM BROMIDE AND ALBUTEROL SULFATE 3 ML: .5; 3 SOLUTION RESPIRATORY (INHALATION) at 19:02

## 2017-03-30 RX ADMIN — ACETAMINOPHEN 650 MG: 325 TABLET, FILM COATED ORAL at 20:03

## 2017-03-30 RX ADMIN — TRAMADOL HYDROCHLORIDE 50 MG: 50 TABLET, COATED ORAL at 10:27

## 2017-03-30 RX ADMIN — HYDROCHLOROTHIAZIDE 25 MG: 25 TABLET ORAL at 08:09

## 2017-03-30 RX ADMIN — TRAMADOL HYDROCHLORIDE 50 MG: 50 TABLET, COATED ORAL at 16:28

## 2017-03-30 RX ADMIN — TRAMADOL HYDROCHLORIDE 50 MG: 50 TABLET, COATED ORAL at 22:40

## 2017-03-30 RX ADMIN — AZITHROMYCIN 250 MG: 250 TABLET, FILM COATED ORAL at 22:38

## 2017-03-30 RX ADMIN — NICOTINE 1 PATCH: 14 PATCH, EXTENDED RELEASE TRANSDERMAL at 08:17

## 2017-03-30 RX ADMIN — IPRATROPIUM BROMIDE AND ALBUTEROL SULFATE 3 ML: .5; 3 SOLUTION RESPIRATORY (INHALATION) at 23:09

## 2017-03-30 RX ADMIN — CEFTRIAXONE 2 G: 2 INJECTION, POWDER, FOR SOLUTION INTRAMUSCULAR; INTRAVENOUS at 10:36

## 2017-03-30 RX ADMIN — MULTIPLE VITAMINS W/ MINERALS TAB 1 TABLET: TAB at 08:09

## 2017-03-30 RX ADMIN — ENOXAPARIN SODIUM 40 MG: 40 INJECTION SUBCUTANEOUS at 18:37

## 2017-03-30 ASSESSMENT — PAIN DESCRIPTION - DESCRIPTORS: DESCRIPTORS: ACHING

## 2017-03-30 NOTE — PROGRESS NOTES
Patient on 3L NC. LS diminished. Spo2 in the mid 90's. Neb tx given as ordered. Will continue to follow.    Nakia SALGADO

## 2017-03-30 NOTE — PLAN OF CARE
Problem: Goal Outcome Summary  Goal: Goal Outcome Summary  Outcome: Improving  Reason for Admission: Chronic obstructive pulmonary disease with acute exacerbation (H) [J44.1]  Pertinent Medical History: chronic neck pain, COPD, DM, Hep C, HTN     Plans for DC: lives with friends     DO NOT REMOVE ANY FIELDS BELOW - TYPE N/A OR WNL IF NO INFO TO ADD  Procedure/POD: NA  Neuro/Psych/Sleep: A&O x4  CV/Tele/Abnormal VS: WDL on 3L NC.  Tele SR.  Resp: diminished, expiratory wheezes. Previously on BiPAP at 1400, then stabilized and pt remained on unit  Skin/Wound: generalized: red, ariana, bruising, flushed  /GI/Diet: regular diet  IV: SL  Pain: chronic neck pain, tramadol x1 given.  Chemo/Radiation: NA  Abnormal Labs/Glucose: CT to rule out PE: prelimonary results neg  Activity/Safety: up SBA  Consults: NA  Education:NA  Plan: Continue to monitor on unit for about 3 days- dc pending resp status     Other:  Nicotine patch right arm       [facility] [address], [place], [place]    Authorization for Surgical Operation and Procedure                               I hereby authorize Yrn Das MD, my physician and his/her assistants (if applicable), which may include medical students, residents, and/or fellows, to perform the following surgical operation/ procedure and administer such anesthesia as may be determined necessary by my physician: Operation/Procedure name (s) ESOPHAGOGASTRODUODENOSCOPY  WITH POSSIBLE DILATION on Mackenzie Dogar   2.   I recognize that during the surgical operation/procedure, unforeseen conditions may necessitate additional or different procedures than those listed above.  I, therefore, further authorize and request that the above-named surgeon, assistants, or designees perform such procedures as are, in their judgment, necessary and desirable.    3.   My surgeon/physician has discussed prior to my surgery the potential benefits, risks and side effects of this procedure; the likelihood of achieving goals; and potential problems that might occur during recuperation.  They also discussed reasonable alternatives to the procedure, including risks, benefits, and side effects related to the alternatives and risks related to not receiving this procedure.  I have had all my questions answered and I acknowledge that no guarantee has been made as to the result that may be obtained.    4.   Should the need arise during my operation/procedure, which includes change of level of care prior to discharge, I also consent to the administration of blood and/or blood products.  Further, I understand that despite careful testing and screening of blood or blood products by collecting agencies, I may still be subject to ill effects as a result of receiving a blood transfusion and/or blood products.  The following are some, but not all, of the potential risks that can occur: fever and allergic reactions, hemolytic reactions,  transmission of diseases such as Hepatitis, AIDS and Cytomegalovirus (CMV) and fluid overload.  In the event that I wish to have an autologous transfusion of my own blood, or a directed donor transfusion, I will discuss this with my physician.  Check only if Refusing Blood or Blood Products  I understand refusal of blood or blood products as deemed necessary by my physician may have serious consequences to my condition to include possible death. I hereby assume responsibility for my refusal and release the hospital, its personnel, and my physicians from any responsibility for the consequences of my refusal.    o  Refuse   5.   I authorize the use of any specimen, organs, tissues, body parts or foreign objects that may be removed from my body during the operation/procedure for diagnosis, research or teaching purposes and their subsequent disposal by hospital authorities.  I also authorize the release of specimen test results and/or written reports to my treating physician on the hospital medical staff or other referring or consulting physicians involved in my care, at the discretion of the Pathologist or my treating physician.    6.   I consent to the photographing or videotaping of the operations or procedures to be performed, including appropriate portions of my body for medical, scientific, or educational purposes, provided my identity is not revealed by the pictures or by descriptive texts accompanying them.  If the procedure has been photographed/videotaped, the surgeon will obtain the original picture, image, videotape or CD.  The hospital will not be responsible for storage, release or maintenance of the picture, image, tape or CD.    7.   I consent to the presence of a  or observers in the operating room as deemed necessary by my physician or their designees.    8.   I recognize that in the event my procedure results in extended X-Ray/fluoroscopy time, I may develop a skin reaction.    9. If  I have a Do Not Attempt Resuscitation (DNAR) order in place, that status will be suspended while in the operating room, procedural suite, and during the recovery period unless otherwise explicitly stated by me (or a person authorized to consent on my behalf). The surgeon or my attending physician will determine when the applicable recovery period ends for purposes of reinstating the DNAR order.  10. Patients having a sterilization procedure: I understand that if the procedure is successful the results will be permanent and it will therefore be impossible for me to inseminate, conceive, or bear children.  I also understand that the procedure is intended to result in sterility, although the result has not been guaranteed.   11. I acknowledge that my physician has explained sedation/analgesia administration to me including the risk and benefits I consent to the administration of sedation/analgesia as may be necessary or desirable in the judgment of my physician.    I CERTIFY THAT I HAVE READ AND FULLY UNDERSTAND THE ABOVE CONSENT TO OPERATION and/or OTHER PROCEDURE.     ____________________________________  _________________________________        ______________________________  Signature of Patient    Signature of Responsible Person                Printed Name of Responsible Person                                      ____________________________________  _____________________________                ________________________________  Signature of Witness        Date  Time         Relationship to Patient    STATEMENT OF PHYSICIAN My signature below affirms that prior to the time of the procedure; I have explained to the patient and/or his/her legal representative, the risks and benefits involved in the proposed treatment and any reasonable alternative to the proposed treatment. I have also explained the risks and benefits involved in refusal of the proposed treatment and alternatives to the proposed treatment and have  answered the patient's questions. If I have a significant financial interest in a co-management agreement or a significant financial interest in any product or implant, or other significant relationship used in this procedure/surgery, I have disclosed this and had a discussion with my patient.     _____________________________________________________              _____________________________  (Signature of Physician)                                                                                         (Date)                                   (Time)  Patient Name: Mackenzie Nelson      : 3/9/1954      Printed: 10/30/2024     Medical Record #: [id number]                                      Page 1 of 1

## 2017-03-30 NOTE — PROVIDER NOTIFICATION
MD Notification    Notified Person:  MD    Notified Persons Name: Harry     Notification Date/Time: 3/30 10:20 a.m.    Notification Interaction:  Talked with Physician    Purpose of Notification: Asking for PT consult for disposition planning     Orders Received:    Comments:

## 2017-03-30 NOTE — PLAN OF CARE
Problem: Goal Outcome Summary  Goal: Goal Outcome Summary  Outcome: No Change  Reason for Admission: Chronic obstructive pulmonary disease with acute exacerbation (H) [J44.1]  Pertinent Medical History: chronic neck pain, COPD, DM, Hep C, HTN     Procedure/POD: NA  Neuro/Psych/Sleep: A&O x4  CV/Tele/Abnormal VS: WDL on 2L NC.  Tele NSR.   Resp: diminished, expiratory wheezes. Previously on BiPAP at 1400 3/29, then stabilized and pt remained on unit. Pt stable on 2L O2  Skin/Wound: generalized: red, ariana, bruising, flushed  /GI/Diet: regular diet, voiding at bedside per urinal, BM x 1 today  IV: SL  Pain: chronic neck pain, tramadol x1 given.  Chemo/Radiation: NA  Abnormal Labs/Glucose:   Activity/Safety: up SBA  Consults: NA  Education: Smoking cessation  Plan: Continue to monitor on unit for about 3 days- dc pending resp status  Other:  Nicotine patch right arm

## 2017-03-30 NOTE — PROGRESS NOTES
[facility]    Hospitalist Progress Note      Assessment & Plan   Chuck Lopez is a 76 year old male who was admitted on 3/27/2017.  Past hx of COPD, ongoing tobacco use, HTN, and chronic Hep C who presents with progressive shortness of breath and URI symptoms, and is being admitted for acute COPD exacerbation     Acute COPD exacerbation with acute hypoxic respiratory failure:   Presented with progressive shortness of breath in the setting of viral URI. On arrival to the ED, he was noted to be febrile to 101.5, tachycardic to 110s, and hypoxic to mid 80s on room air. CXR on arrival showed no acute infiltrates.  - worsening of work of breathing  - CT pulmonary angio negative for PE  -- On solumedrol and will continue the same  - On ceftriaxone and azithromycin  - Keep oxygen saturation 88-89% avoid overcorrection    Tobacco use disorder  Longstanding history of tobacco use. Currently smoking 0.5 ppd   - nicotine patch  - Re discussed about smoking cessation      Essential hypertension  [PTA: atenolol 25 mg daily, HCTZ 25 mg daily]  - resume PTA atenolol and HCTZ 3/29     Chronic Hep C  LFTs normal. No active issues.     Chronic pain syndrome  Continues on home Tramadol prn    DVT Prophylaxis: Enoxaprain (Lovenox) SQ  Code Status: Full Code    Disposition: Expected discharge in 3 days once respiratory status improved.    Oneyda Mcdonald    Interval History   Patient seen and assessed. Still having labored breathing. Off BiPAP and saturating 98% on 4L NC    -Data reviewed today: I reviewed all new labs and imaging results over the last 24 hours. I personally reviewed no images or EKG's today.    Physical Exam   Temp: 97.3  F (36.3  C) Temp src: Oral BP: 154/70   Heart Rate: 82 Resp: 24 SpO2: 97 % O2 Device: Nasal cannula Oxygen Delivery: 3 LPM  Vitals:    03/28/17 0334 03/29/17 0617 03/30/17 0420   Weight: 52.5 kg (115 lb 11.9 oz) 54.6 kg (120 lb 5.9 oz) 54.9 kg (121 lb 1.6 oz)     Vital  Signs with Ranges  Temp:  [97.3  F (36.3  C)-99.1  F (37.3  C)] 97.3  F (36.3  C)  Heart Rate:  [82-97] 82  Resp:  [16-28] 24  BP: (129-156)/(69-84) 154/70  FiO2 (%):  [30 %] 30 %  SpO2:  [94 %-97 %] 97 %  I/O last 3 completed shifts:  In: 400 [P.O.:400]  Out: 1205 [Urine:1205]    Constitutional: Well developed, well nourished male in no acute distress  Respiratory: Poor air movement bilaterally, no crackles or wheezes, no tachypnea  Cardiovascular: regular rate and rhythm, normal S1/S2 without murmur, rubs or gallops, no peripheral edema  GI: abdomen soft, reports diffuse tenderness to palpation (chronic per patient), non-distended, normal bowel sounds  Skin/Integumen:  No rash or bruising  Other:  alert and appropriate, cranial nerves grossly intact    Medications     - MEDICATION INSTRUCTIONS -         cefTRIAXone  2 g Intravenous Q24H     atenolol (TENORMIN) tablet 25 mg  25 mg Oral Daily     hydrochlorothiazide (HYDRODIURIL) tablet 25 mg  25 mg Oral Daily     enoxaparin  40 mg Subcutaneous Q24H     methylPREDNISolone  62.5 mg Intravenous Q12H     multivitamin, therapeutic with minerals  1 tablet Oral Daily     sodium chloride (PF)  3 mL Intracatheter Q8H     ipratropium - albuterol 0.5 mg/2.5 mg/3 mL  3 mL Nebulization Q4H While awake     azithromycin  250 mg Oral At Bedtime     nicotine   Transdermal Q8H     nicotine   Transdermal Daily     nicotine  1 patch Transdermal Daily       Data     Recent Labs  Lab 03/30/17  0700 03/29/17  1400 03/29/17  1305 03/29/17  0745 03/28/17  0715 03/27/17  2344   WBC 11.4* 14.1* Canceled, Test credited Unsatisfactory specimen - tube underfilled  --  6.0 8.5   HGB 15.9 16.2 Canceled, Test credited Unsatisfactory specimen - tube underfilled  --  15.4 16.5   MCV 95 94 Canceled, Test credited Unsatisfactory specimen - tube underfilled  --  94 94   * 90* Canceled, Test credited Unsatisfactory specimen - tube underfilled  --  Platelets clumped, platelet count unavailable 63*     139  --   --  135 138   POTASSIUM 4.0 4.2  --  3.7 3.8 3.2*   CHLORIDE 99 101  --   --  96 97   CO2 35* 33*  --   --  29 32   BUN 18 18  --   --  19 14   CR 0.72 0.77  --   --  0.92 0.74   ANIONGAP 6 5  --   --  10 9   MATTHEW 9.0 8.9  --   --  8.7 8.9   * 122*  --   --  162* 101*   TROPI  --  0.016  --   --   --  <0.015The 99th percentile for upper reference range is 0.045 ug/L.  Troponin values in the range of 0.045 - 0.120 ug/L may be associated with risks of adverse clinical events.       Recent Results (from the past 24 hour(s))   CT Chest Pulmonary Embolism w Contrast    Narrative    CT CHEST PULMONARY EMBOLISM WITH CONTRAST 3/29/2017 7:25 PM     HISTORY: Rule out pulmonary embolism.    CONTRAST DOSE: 56 mL [id number].    COMPARISON: 1/22/2016    Radiation dose for this scan was reduced using automated exposure  control, adjustment of the mA and/or kV according to patient size, or  iterative reconstruction technique. There are no pulmonary arterial  filling defects to indicate pulmonary embolism. Contrast enhancement  within the aorta is less optimal. While there is scattered  atherosclerotic disease, no aortic dissection is demonstrated.  Coronary artery calcification is noted. The mediastinum and lyndsey  appear within normal limits. Pulmonary emphysematous changes are  noted. However, the lungs otherwise appear essentially clear. No  pleural effusion or pneumothorax is demonstrated.      Impression    IMPRESSION: No CT evidence of pulmonary embolism. COPD.    ANAYA SINGH MD

## 2017-03-30 NOTE — PLAN OF CARE
Problem: Goal Outcome Summary  Goal: Goal Outcome Summary  Outcome: No Change  Reason for Admission: Chronic obstructive pulmonary disease with acute exacerbation (H) [J44.1]  Pertinent Medical History: chronic neck pain, COPD, DM, Hep C, HTN     Plans for DC: lives with friends     DO NOT REMOVE ANY FIELDS BELOW - TYPE N/A OR WNL IF NO INFO TO ADD  Procedure/POD: NA  Neuro/Psych/Sleep: A&O x4  CV/Tele/Abnormal VS: WDL on 3L NC.  Tele NSR.  Resp: diminished, expiratory wheezes. Previously on BiPAP at 1400 3/29, then stabilized and pt remained on unit. Pt stable on 3L O2 overnight, sating at 94%  Skin/Wound: generalized: red, ariana, bruising, flushed  /GI/Diet: regular diet, voiding at bedside per urinal.  IV: SL  Pain: chronic neck pain, tramadol x1 given.  Chemo/Radiation: NA  Abnormal Labs/Glucose: CT to rule out PE: prelimonary results neg  Activity/Safety: up SBA  Consults: NA  Education:NA  Plan: Continue to monitor on unit for about 3 days- dc pending resp status     Other:  Nicotine patch left arm

## 2017-03-31 PROCEDURE — A9270 NON-COVERED ITEM OR SERVICE: HCPCS | Mod: GY | Performed by: HOSPITALIST

## 2017-03-31 PROCEDURE — 94640 AIRWAY INHALATION TREATMENT: CPT | Mod: 76

## 2017-03-31 PROCEDURE — A9270 NON-COVERED ITEM OR SERVICE: HCPCS | Mod: GY | Performed by: INTERNAL MEDICINE

## 2017-03-31 PROCEDURE — 25000128 H RX IP 250 OP 636: Performed by: INTERNAL MEDICINE

## 2017-03-31 PROCEDURE — 25000125 ZZHC RX 250: Performed by: INTERNAL MEDICINE

## 2017-03-31 PROCEDURE — 99232 SBSQ HOSP IP/OBS MODERATE 35: CPT | Performed by: INTERNAL MEDICINE

## 2017-03-31 PROCEDURE — 25000132 ZZH RX MED GY IP 250 OP 250 PS 637: Mod: GY | Performed by: HOSPITALIST

## 2017-03-31 PROCEDURE — 25000132 ZZH RX MED GY IP 250 OP 250 PS 637: Mod: GY | Performed by: INTERNAL MEDICINE

## 2017-03-31 PROCEDURE — 25000128 H RX IP 250 OP 636: Performed by: HOSPITALIST

## 2017-03-31 PROCEDURE — 40000275 ZZH STATISTIC RCP TIME EA 10 MIN

## 2017-03-31 PROCEDURE — 12000000 ZZH R&B MED SURG/OB

## 2017-03-31 RX ORDER — HYDRALAZINE HYDROCHLORIDE 20 MG/ML
10 INJECTION INTRAMUSCULAR; INTRAVENOUS EVERY 4 HOURS PRN
Status: DISCONTINUED | OUTPATIENT
Start: 2017-03-31 | End: 2017-04-04 | Stop reason: HOSPADM

## 2017-03-31 RX ORDER — LORAZEPAM 0.5 MG/1
0.5 TABLET ORAL EVERY 4 HOURS PRN
Status: DISCONTINUED | OUTPATIENT
Start: 2017-03-31 | End: 2017-04-04 | Stop reason: HOSPADM

## 2017-03-31 RX ADMIN — METHYLPREDNISOLONE SODIUM SUCCINATE 62.5 MG: 125 INJECTION, POWDER, FOR SOLUTION INTRAMUSCULAR; INTRAVENOUS at 20:54

## 2017-03-31 RX ADMIN — TRAMADOL HYDROCHLORIDE 50 MG: 50 TABLET, COATED ORAL at 11:32

## 2017-03-31 RX ADMIN — IPRATROPIUM BROMIDE AND ALBUTEROL SULFATE 3 ML: .5; 3 SOLUTION RESPIRATORY (INHALATION) at 11:03

## 2017-03-31 RX ADMIN — IPRATROPIUM BROMIDE AND ALBUTEROL SULFATE 3 ML: .5; 3 SOLUTION RESPIRATORY (INHALATION) at 18:56

## 2017-03-31 RX ADMIN — AZITHROMYCIN 250 MG: 250 TABLET, FILM COATED ORAL at 21:30

## 2017-03-31 RX ADMIN — HYDRALAZINE HYDROCHLORIDE 10 MG: 20 INJECTION INTRAMUSCULAR; INTRAVENOUS at 12:56

## 2017-03-31 RX ADMIN — IPRATROPIUM BROMIDE AND ALBUTEROL SULFATE 3 ML: .5; 3 SOLUTION RESPIRATORY (INHALATION) at 07:24

## 2017-03-31 RX ADMIN — HYDROCHLOROTHIAZIDE 25 MG: 25 TABLET ORAL at 08:08

## 2017-03-31 RX ADMIN — TRAMADOL HYDROCHLORIDE 50 MG: 50 TABLET, COATED ORAL at 19:09

## 2017-03-31 RX ADMIN — NICOTINE 1 PATCH: 14 PATCH, EXTENDED RELEASE TRANSDERMAL at 09:03

## 2017-03-31 RX ADMIN — LORAZEPAM 0.5 MG: 0.5 TABLET ORAL at 16:42

## 2017-03-31 RX ADMIN — ATENOLOL 25 MG: 25 TABLET ORAL at 08:08

## 2017-03-31 RX ADMIN — METHYLPREDNISOLONE SODIUM SUCCINATE 62.5 MG: 125 INJECTION, POWDER, FOR SOLUTION INTRAMUSCULAR; INTRAVENOUS at 08:07

## 2017-03-31 RX ADMIN — TRAMADOL HYDROCHLORIDE 50 MG: 50 TABLET, COATED ORAL at 04:51

## 2017-03-31 RX ADMIN — LORAZEPAM 0.5 MG: 0.5 TABLET ORAL at 21:30

## 2017-03-31 RX ADMIN — Medication 3 MG: at 22:18

## 2017-03-31 RX ADMIN — CEFTRIAXONE 2 G: 2 INJECTION, POWDER, FOR SOLUTION INTRAMUSCULAR; INTRAVENOUS at 09:03

## 2017-03-31 RX ADMIN — IPRATROPIUM BROMIDE AND ALBUTEROL SULFATE 3 ML: .5; 3 SOLUTION RESPIRATORY (INHALATION) at 14:33

## 2017-03-31 RX ADMIN — MULTIPLE VITAMINS W/ MINERALS TAB 1 TABLET: TAB at 08:08

## 2017-03-31 RX ADMIN — ENOXAPARIN SODIUM 40 MG: 40 INJECTION SUBCUTANEOUS at 18:51

## 2017-03-31 NOTE — PROGRESS NOTES
Patient on 1L NC. LS clear and diminished. Spo2 in the mid 90's. Neb tx given as ordered. BiPAP in the room. Will continue to follow.    Nakia Wasserman RT

## 2017-03-31 NOTE — PLAN OF CARE
Problem: Goal Outcome Summary  Goal: Goal Outcome Summary  PT: Orders received, chart reviewed. Patient adamantly refused to work with PT. Education on role of PT in the hospital and in discharge planning. Continued to decline.

## 2017-03-31 NOTE — PLAN OF CARE
Problem: Goal Outcome Summary  Goal: Goal Outcome Summary  Outcome: No Change  A&O. VSS on 2LPM NC. Up SBA, ambulated in otero today. Regular diet. Reported feeling SOB at times, sats between 90-92. Tramadol given x2 for chronic neck pain. Nicotine patch on right arm. 2 IV's SL, one in each arm. Will continue to monitor.

## 2017-03-31 NOTE — PROGRESS NOTES
[facility]    Hospitalist Progress Note      Assessment & Plan   Chuck Lopez is a 76 year old male who was admitted on 3/27/2017.  Past hx of COPD, ongoing tobacco use, HTN, and chronic Hep C who presents with progressive shortness of breath and URI symptoms, and is being admitted for acute COPD exacerbation     Acute COPD exacerbation with acute hypoxic respiratory failure:   Presented with progressive shortness of breath in the setting of viral URI. On arrival to the ED, he was noted to be febrile to 101.5, tachycardic to 110s, and hypoxic to mid 80s on room air. CXR on arrival showed no acute infiltrates.  - worsening of work of breathing  - CT pulmonary angio negative for PE  -- On solumedrol and will continue the same  - On ceftriaxone and azithromycin  - Keep oxygen saturation 88-89% avoid overcorrection    Tobacco use disorder  Longstanding history of tobacco use. Currently smoking 0.5 ppd   - nicotine patch  - Re discussed about smoking cessation      Essential hypertension  [PTA: atenolol 25 mg daily, HCTZ 25 mg daily]  - resume PTA atenolol and HCTZ 3/29     Chronic Hep C  LFTs normal. No active issues.     Chronic pain syndrome  Continues on home Tramadol prn    DVT Prophylaxis: Enoxaprain (Lovenox) SQ  Code Status: Full Code    Disposition: Expected discharge in 2-3 days once respiratory status improved.    Oneyda Mcdonald    Interval History   Patient seen and assessed. Report slight improvement in his SOB. Less labored breathing today    -Data reviewed today: I reviewed all new labs and imaging results over the last 24 hours. I personally reviewed no images or EKG's today.    Physical Exam   Temp: 97.4  F (36.3  C) Temp src: Oral BP: 196/84   Heart Rate: 87 Resp: 20 SpO2: 92 % O2 Device: Nasal cannula Oxygen Delivery: 1 LPM  Vitals:    03/29/17 0617 03/30/17 0420 03/31/17 0634   Weight: 54.6 kg (120 lb 5.9 oz) 54.9 kg (121 lb 1.6 oz) 53.6 kg (118 lb 2.7 oz)     Vital  Signs with Ranges  Temp:  [97.4  F (36.3  C)-98.2  F (36.8  C)] 97.4  F (36.3  C)  Heart Rate:  [69-99] 87  Resp:  [16-20] 20  BP: (129-196)/(59-88) 196/84  SpO2:  [87 %-95 %] 92 %  I/O last 3 completed shifts:  In: -   Out: 650 [Urine:650]    Constitutional: Well developed, well nourished male in no acute distress  Respiratory: Poor air movement bilaterally, no crackles or wheezes, no tachypnea  Cardiovascular: regular rate and rhythm, normal S1/S2 without murmur, rubs or gallops, no peripheral edema  GI: abdomen soft, reports diffuse tenderness to palpation (chronic per patient), non-distended, normal bowel sounds  Skin/Integumen:  No rash or bruising  Other:  alert and appropriate, cranial nerves grossly intact    Medications     - MEDICATION INSTRUCTIONS -         sodium chloride (PF)  3 mL Intracatheter Q8H     cefTRIAXone  2 g Intravenous Q24H     atenolol (TENORMIN) tablet 25 mg  25 mg Oral Daily     hydrochlorothiazide (HYDRODIURIL) tablet 25 mg  25 mg Oral Daily     enoxaparin  40 mg Subcutaneous Q24H     methylPREDNISolone  62.5 mg Intravenous Q12H     multivitamin, therapeutic with minerals  1 tablet Oral Daily     ipratropium - albuterol 0.5 mg/2.5 mg/3 mL  3 mL Nebulization Q4H While awake     azithromycin  250 mg Oral At Bedtime     nicotine   Transdermal Q8H     nicotine   Transdermal Daily     nicotine  1 patch Transdermal Daily       Data     Recent Labs  Lab 03/30/17  0700 03/29/17  1400 03/29/17  1305 03/29/17  0745 03/28/17  0715 03/27/17  2344   WBC 11.4* 14.1* Canceled, Test credited Unsatisfactory specimen - tube underfilled  --  6.0 8.5   HGB 15.9 16.2 Canceled, Test credited Unsatisfactory specimen - tube underfilled  --  15.4 16.5   MCV 95 94 Canceled, Test credited Unsatisfactory specimen - tube underfilled  --  94 94   * 90* Canceled, Test credited Unsatisfactory specimen - tube underfilled  --  Platelets clumped, platelet count unavailable 63*    139  --   --  135 138    POTASSIUM 4.0 4.2  --  3.7 3.8 3.2*   CHLORIDE 99 101  --   --  96 97   CO2 35* 33*  --   --  29 32   BUN 18 18  --   --  19 14   CR 0.72 0.77  --   --  0.92 0.74   ANIONGAP 6 5  --   --  10 9   MATTHEW 9.0 8.9  --   --  8.7 8.9   * 122*  --   --  162* 101*   TROPI  --  0.016  --   --   --  <0.015The 99th percentile for upper reference range is 0.045 ug/L.  Troponin values in the range of 0.045 - 0.120 ug/L may be associated with risks of adverse clinical events.       No results found for this or any previous visit (from the past 24 hour(s)).

## 2017-03-31 NOTE — PLAN OF CARE
Problem: Goal Outcome Summary  Goal: Goal Outcome Summary  Outcome: No Change  Pt A/O. Elevated BP, given prn hydralazine. Other VSS on 1LPM NC. C/o neck pain, given prn tramadol. Up SBA, ambulated in otero this shift x2. Regular diet, states poor appetite. Nicotine patch on left arm. 2 PIV's SL, one in each arm. Continue to monitor respiratory status.

## 2017-03-31 NOTE — PROGRESS NOTES
Met with the patient at the bedside regarding d/c planning.  Patient currently is staying with a friend in the basement with 10 stairs.  He has been ambulating with bedside RN and still feeling short of breath.  He feels comfortable with d/c plan going forward to continue with O2 support and f/up with PCP.  PT still planning on consulting for d/c planning as the patient was at a TCU in 1/17 for previous COPD exacerbation.  -scheduled PCP f/up in [place].

## 2017-03-31 NOTE — PLAN OF CARE
"Problem: Goal Outcome Summary  Goal: Goal Outcome Summary  Outcome: No Change  Neuro/Psych/Sleep: A&O x4  CV/Tele/Abnormal VS: WDL on 2L NC.  Tele NSR. MD says ok to let O2 sat in 88-90's./65 (BP Location: Right arm)  Pulse 99  Temp 98.1  F (36.7  C) (Oral)  Resp 20  Ht 1.651 m (5' 5\")  Wt 54.9 kg (121 lb 1.6 oz)  SpO2 93%  BMI 20.15 kg/m2  Resp: diminished, expiratory/inspiratory wheezes. 93% 1LPM  Skin/Wound: generalized: red, ariana, bruising, flushed  /GI/Diet: regular diet, voiding at bedside per urinal  IV: SL  Pain: chronic neck pain, tramadol x1  Chemo/Radiation: NA  Abnormal Labs/Glucose:   Activity/Safety: up SBA  Consults: NA  Education:NA  Plan: Continue to monitor on unit for about 3 days- dc pending resp status     Other:  Nicotine patch right arm          "

## 2017-04-01 ENCOUNTER — APPOINTMENT (OUTPATIENT)
Dept: PHYSICAL THERAPY | Facility: CLINIC | Age: 77
DRG: 190 | End: 2017-04-01
Attending: INTERNAL MEDICINE
Payer: MEDICARE

## 2017-04-01 PROCEDURE — 12000000 ZZH R&B MED SURG/OB

## 2017-04-01 PROCEDURE — 25000128 H RX IP 250 OP 636: Performed by: INTERNAL MEDICINE

## 2017-04-01 PROCEDURE — 97161 PT EVAL LOW COMPLEX 20 MIN: CPT | Mod: GP

## 2017-04-01 PROCEDURE — 25000132 ZZH RX MED GY IP 250 OP 250 PS 637: Mod: GY | Performed by: HOSPITALIST

## 2017-04-01 PROCEDURE — A9270 NON-COVERED ITEM OR SERVICE: HCPCS | Mod: GY | Performed by: INTERNAL MEDICINE

## 2017-04-01 PROCEDURE — 25000128 H RX IP 250 OP 636: Performed by: HOSPITALIST

## 2017-04-01 PROCEDURE — 25000125 ZZHC RX 250: Performed by: INTERNAL MEDICINE

## 2017-04-01 PROCEDURE — 25000132 ZZH RX MED GY IP 250 OP 250 PS 637: Mod: GY | Performed by: INTERNAL MEDICINE

## 2017-04-01 PROCEDURE — 94640 AIRWAY INHALATION TREATMENT: CPT

## 2017-04-01 PROCEDURE — 40000275 ZZH STATISTIC RCP TIME EA 10 MIN

## 2017-04-01 PROCEDURE — 99232 SBSQ HOSP IP/OBS MODERATE 35: CPT | Performed by: INTERNAL MEDICINE

## 2017-04-01 PROCEDURE — 97116 GAIT TRAINING THERAPY: CPT | Mod: GP

## 2017-04-01 PROCEDURE — 40000193 ZZH STATISTIC PT WARD VISIT

## 2017-04-01 PROCEDURE — A9270 NON-COVERED ITEM OR SERVICE: HCPCS | Mod: GY | Performed by: HOSPITALIST

## 2017-04-01 PROCEDURE — 94640 AIRWAY INHALATION TREATMENT: CPT | Mod: 76

## 2017-04-01 RX ORDER — PREDNISONE 20 MG/1
40 TABLET ORAL DAILY
Status: DISCONTINUED | OUTPATIENT
Start: 2017-04-01 | End: 2017-04-04 | Stop reason: HOSPADM

## 2017-04-01 RX ORDER — SULFAMETHOXAZOLE/TRIMETHOPRIM 800-160 MG
1 TABLET ORAL 2 TIMES DAILY
Status: DISCONTINUED | OUTPATIENT
Start: 2017-04-01 | End: 2017-04-04 | Stop reason: HOSPADM

## 2017-04-01 RX ADMIN — HYDROCHLOROTHIAZIDE 25 MG: 25 TABLET ORAL at 08:41

## 2017-04-01 RX ADMIN — NICOTINE 1 PATCH: 14 PATCH, EXTENDED RELEASE TRANSDERMAL at 08:46

## 2017-04-01 RX ADMIN — ATENOLOL 25 MG: 25 TABLET ORAL at 08:41

## 2017-04-01 RX ADMIN — IPRATROPIUM BROMIDE AND ALBUTEROL SULFATE 3 ML: .5; 3 SOLUTION RESPIRATORY (INHALATION) at 10:20

## 2017-04-01 RX ADMIN — TRAMADOL HYDROCHLORIDE 50 MG: 50 TABLET, COATED ORAL at 08:41

## 2017-04-01 RX ADMIN — LORAZEPAM 0.5 MG: 0.5 TABLET ORAL at 10:11

## 2017-04-01 RX ADMIN — TRAMADOL HYDROCHLORIDE 50 MG: 50 TABLET, COATED ORAL at 01:21

## 2017-04-01 RX ADMIN — CEFTRIAXONE 2 G: 2 INJECTION, POWDER, FOR SOLUTION INTRAMUSCULAR; INTRAVENOUS at 08:58

## 2017-04-01 RX ADMIN — METHYLPREDNISOLONE SODIUM SUCCINATE 62.5 MG: 125 INJECTION, POWDER, FOR SOLUTION INTRAMUSCULAR; INTRAVENOUS at 08:42

## 2017-04-01 RX ADMIN — SULFAMETHOXAZOLE AND TRIMETHOPRIM 1 TABLET: 800; 160 TABLET ORAL at 22:16

## 2017-04-01 RX ADMIN — LORAZEPAM 0.5 MG: 0.5 TABLET ORAL at 14:40

## 2017-04-01 RX ADMIN — TRAMADOL HYDROCHLORIDE 50 MG: 50 TABLET, COATED ORAL at 14:40

## 2017-04-01 RX ADMIN — SULFAMETHOXAZOLE AND TRIMETHOPRIM 1 TABLET: 800; 160 TABLET ORAL at 14:40

## 2017-04-01 RX ADMIN — IPRATROPIUM BROMIDE AND ALBUTEROL SULFATE 3 ML: .5; 3 SOLUTION RESPIRATORY (INHALATION) at 14:06

## 2017-04-01 RX ADMIN — MULTIPLE VITAMINS W/ MINERALS TAB 1 TABLET: TAB at 08:41

## 2017-04-01 RX ADMIN — IPRATROPIUM BROMIDE AND ALBUTEROL SULFATE 3 ML: .5; 3 SOLUTION RESPIRATORY (INHALATION) at 00:03

## 2017-04-01 RX ADMIN — IPRATROPIUM BROMIDE AND ALBUTEROL SULFATE 3 ML: .5; 3 SOLUTION RESPIRATORY (INHALATION) at 23:42

## 2017-04-01 RX ADMIN — ENOXAPARIN SODIUM 40 MG: 40 INJECTION SUBCUTANEOUS at 18:42

## 2017-04-01 RX ADMIN — TRAMADOL HYDROCHLORIDE 50 MG: 50 TABLET, COATED ORAL at 21:24

## 2017-04-01 RX ADMIN — IPRATROPIUM BROMIDE AND ALBUTEROL SULFATE 3 ML: .5; 3 SOLUTION RESPIRATORY (INHALATION) at 06:59

## 2017-04-01 RX ADMIN — IPRATROPIUM BROMIDE AND ALBUTEROL SULFATE 3 ML: .5; 3 SOLUTION RESPIRATORY (INHALATION) at 19:29

## 2017-04-01 NOTE — PROGRESS NOTES
04/01/17 1100   Quick Adds   Type of Visit Initial PT Evaluation   Living Environment   Lives With other (see comments);friend(s)  (currently living with friends; homeless)   Living Arrangements house;homeless   Home Accessibility stairs to enter home;stairs within home;bed not on first floor;tub/shower is not walk in   Number of Stairs to Enter Home 2  (no rail)   Number of Stairs Within Home 10  (to basement)   Stair Railings at Home inside, present on right side   Transportation Available family or friend will provide  (has used public transportation in past, to ED by EMS)   Living Environment Comment Pt currently living in a friend's basement with 10 stairs descending to access, pt also reports being homeless. Unclear if pt will be able to return to friend's house at discharge or will need to look for another place to stay.   Self-Care   Dominant Hand right   Usual Activity Tolerance good   Current Activity Tolerance fair   Regular Exercise no   Equipment Currently Used at Home none   Activity/Exercise/Self-Care Comment Previous TCU stay 1/2017 at [facility] for similar hospitalization and symptoms, was using FWW for energy conservation technique   Functional Level Prior   Ambulation 0-->independent   Transferring 0-->independent   Toileting 0-->independent   Bathing 0-->independent   Dressing 0-->independent   Eating 0-->independent   Communication 0-->understands/communicates without difficulty   Swallowing 0-->swallows foods/liquids without difficulty   Cognition 0 - no cognition issues reported   Fall history within last six months yes   Number of times patient has fallen within last six months 1   Which of the above functional risks had a recent onset or change? none   Prior Functional Level Comment Following his hosp. for pneumonia 1 month ago, pt. states was amb. indep. at home without AD. He had been to [employer] twice and leaned on grocery cart for amb. Pt. stated amb. up steps from  basement bedroom to first floor was difficult and pt. needed to stop ascending steps due to SOB. Pt. states he eats olive crackers, apple sauce, oatmeal, and cookies. One of his roommates works at a bar so he can order a hamburger from him when he returns from work.   General Information   Onset of Illness/Injury or Date of Surgery - Date 03/27/17  (PT order received 3/31/17)   Referring Physician Oneyda Mcdonald MD   Patient/Family Goals Statement pt wanting to return to friends, somewhat resistant to TCU at this time   Pertinent History of Current Problem (include personal factors and/or comorbidities that impact the POC) 77 y/o male, presented to the ED 3/27/17 for evaluation of shortness of breath, admitted for acute COPD exacerbation. Pt is a daily smoker (0.5ppd) with PMH significant for COPD and chronic Hep C, is non-O2 dependent at baseline.    Precautions/Limitations fall precautions;oxygen therapy device and L/min  (1L O2)   General Observations Pt is pleasant and cooperative, requesting to ambulate with PT   General Info Comments Activity: up ad roya   Cognitive Status Examination   Orientation orientation to person, place and time   Level of Consciousness alert   Follows Commands and Answers Questions 100% of the time;able to follow multistep instructions   Personal Safety and Judgment intact   Memory intact   Pain Assessment   Patient Currently in Pain No   Integumentary/Edema   Integumentary/Edema Comments pt has bruising/skin color changes to B UEs/LEs; using nicotine patch   Posture    Posture Forward head position;Protracted shoulders  (hunched shoulders)   Posture Comments strong accessory muscle use demonstration with breathing   Range of Motion (ROM)   ROM Quick Adds No deficits were identified   ROM Comment B LEs WFL against ravity   Strength   Manual Muscle Testing Quick Adds No deficits were identified   Strength Comments pt with decreased activity tolerance, B LE WFL with functional  "mobility   Bed Mobility   Bed Mobility Comments pt IND with supine <>sit   Transfer Skills   Transfer Comments pt IND with sit<>stand   Gait   Gait Comments SBA x10' with assist for O2 tank   Balance   Balance Comments Pt with good static standing balance, passed Romberg EO/EC, fair static balance no overt LOB with gait   Sensory Examination   Sensory Perception no deficits were identified   Sensory Perception Comments pt reports no numbness or tingling in B hands or feet   General Therapy Interventions   Planned Therapy Interventions balance training;gait training;home program guidelines;progressive activity/exercise   Clinical Impression   Criteria for Skilled Therapeutic Intervention yes, treatment indicated   PT Diagnosis difficulty with gait   Influenced by the following impairments decreased dynamic balance, decreased respiratory status   Functional limitations due to impairments difficulty with gait and stairs   Clinical Presentation Evolving/Changing   Clinical Presentation Rationale weaning O2, acute COPD exacerbation   Clinical Decision Making (Complexity) Low complexity   Therapy Frequency` 5 times/week   Predicted Duration of Therapy Intervention (days/wks) 1 week   Anticipated Discharge Disposition Transitional Care Facility  (vs home with OP pulm)   Risk & Benefits of therapy have been explained Yes   Patient, Family & other staff in agreement with plan of care Yes   [facility] [employer] TM \"6 Clicks\"   2016, Trustees of [facility], under license to [employer].  All rights reserved.   6 Clicks Short Forms Basic Mobility Inpatient Short Form   [facility]  \"6 Clicks\" V.2 Basic Mobility Inpatient Short Form   1. Turning from your back to your side while in a flat bed without using bedrails? 4 - None   2. Moving from lying on your back to sitting on the side of a flat bed without using bedrails? 4 - None   3. Moving to and from a bed to a chair (including a wheelchair)? 4 - None "   4. Standing up from a chair using your arms (e.g., wheelchair, or bedside chair)? 4 - None   5. To walk in hospital room? 3 - A Little   6. Climbing 3-5 steps with a railing? 3 - A Little   Basic Mobility Raw Score (Score out of 24.Lower scores equate to lower levels of function) 22   Total Evaluation Time   Total Evaluation Time (Minutes) 10

## 2017-04-01 NOTE — PLAN OF CARE
"Problem: Goal Outcome Summary  Goal: Goal Outcome Summary  PT: Orders received, chart reviewed, eval completed and treatment initiated. Pt is a 75 y/o male, presented to the ED 3/27/17 for evaluation of shortness of breath, admitted for acute COPD exacerbation. Pt is a daily smoker (0.5ppd) with PMH significant for COPD and chronic Hep C, is non-O2 dependent at baseline. Per discussion with RN/chart review pt is homeless/in-between living in a friend's basement. At baseline pt is IND with mobility and daily activities. Currently pt is on 1L O2, O2 stats 95% at baseline, 88-89% after ambulation returning to>90% after seated rest 30-45 seconds. Pt IND with supine<>sit and sit<>stand, declined trial of stairs at this time. Pt is most limited by decreased activity tolerance 2/2 decreased respiratory status/increasing SOB with exertion. Pt participated in ~15 minutes of mobility, ambulated x83' and required a 6 minute seated rest break. Pt declined trial of stairs at this time. Discussed increasing progressive mobility and discharge recommendations. Pt would likely benefit from daily therapy and discharge to TCU, however pt not agreeable at this time reports \"last time all I did was lay in bed for a few weeks, then go home\". Also discussed with pt that Pulmonary Rehab as an OP at discharge would be beneficial d/t current respiratory status, pt agreeable and demonstrated understanding. Discussed above with pt's RN. Pt remains appropriate for IP PT during stay to assess stairs and progress mobility. PT recommendation: TCU with daily therapy for progressive mobility, if pt continues to decline unclear discharge destination unclear if able to return to friend's basement or not. However pt would benefit from OP Pulm Rehab at discharge as well for progressive activity tolerance and education.      Recommend ambulation in otero with nursing staff 4-5x/day during rest of acute stay.           "

## 2017-04-01 NOTE — PLAN OF CARE
Problem: Goal Outcome Summary  Goal: Goal Outcome Summary  Outcome: No Change  A&O. VSS on 1LPM NC. Anxious at beginning of shift, isatu MAXWELL, PRN ativan ordered. Effective. C/o SOB and LEVINE. Showered this evening. Tele NSR. Ambulated x1 this evening. Nicotine patch on left arm. Using urinal at bedside. Will continue to monitor.

## 2017-04-01 NOTE — PLAN OF CARE
Reason for Admission: Chronic obstructive pulmonary disease with acute exacerbation (H) [J44.1]  Pertinent Medical History: chronic neck pain, COPD, DM, Hep C, HTN     Plans for DC: lives with friends     DO NOT REMOVE ANY FIELDS BELOW - TYPE N/A OR WNL IF NO INFO TO ADD  Procedure/POD: NA  Neuro/Psych/Sleep: A&O x4, some restlessness with pain.  CV/Tele/Abnormal VS: /76. Tele NSR. MD says ok to let O2 sat in 88-90's.   Resp: diminished. RR labored w/pain  Skin/Wound: generalized: red, ariana, bruising, flushed  /GI/Diet: regular diet, using bedside urinal  IV: PIV x2 - SL  Pain: chronic neck pain, tramadol x1.  Chemo/Radiation: NA  Abnormal Labs/Glucose: N/A  Activity/Safety: Up SBA. Pt calls appropriately  Consults: NA  Education:NA  Plan: Continue to monitor, DC pending resp status improvement.      Other:  Nicotine patch left arm

## 2017-04-01 NOTE — PROGRESS NOTES
[facility]    Hospitalist Progress Note      Assessment & Plan   Chuck Lopez is a 76 year old male who was admitted on 3/27/2017.  Past hx of COPD, ongoing tobacco use, HTN, and chronic Hep C who presents with progressive shortness of breath and URI symptoms, and is being admitted for acute COPD exacerbation     Acute COPD exacerbation with acute hypoxic respiratory failure:   Presented with progressive shortness of breath in the setting of viral URI. On arrival to the ED, he was noted to be febrile to 101.5, tachycardic to 110s, and hypoxic to mid 80s on room air. CXR on arrival showed no acute infiltrates.  - worsening of work of breathing  - CT pulmonary angio negative for PE  -- On solumedrol and will change to po starting tomorrow  - On ceftriaxone and completed course of azithromycin. Will discontinue IV antibiotics.  - PO Doxy  - Keep oxygen saturation 88-89% avoid overcorrection    Tobacco use disorder  Longstanding history of tobacco use. Currently smoking 0.5 ppd   - nicotine patch  - Re discussed about smoking cessation      Essential hypertension  [PTA: atenolol 25 mg daily, HCTZ 25 mg daily]  - resume PTA atenolol and HCTZ 3/29     Chronic Hep C  LFTs normal. No active issues.     Chronic pain syndrome  Continues on home Tramadol prn    DVT Prophylaxis: Enoxaprain (Lovenox) SQ  Code Status: Full Code    Disposition: Very slow improvement.  Expected discharge in 2-3 days once respiratory status improved.    Luisgary Roca [facility]    Interval History   Patient seen and assessed. Report slight improvement in his SOB. Still labored breathing    -Data reviewed today: I reviewed all new labs and imaging results over the last 24 hours. I personally reviewed no images or EKG's today.    Physical Exam   Temp: 97.6  F (36.4  C) Temp src: Oral BP: 138/78   Heart Rate: 93 Resp: 16 SpO2: 96 % O2 Device: Nasal cannula Oxygen Delivery: 1 LPM  Vitals:    03/30/17 0420 03/31/17 0634 04/01/17 0659    Weight: 54.9 kg (121 lb 1.6 oz) 53.6 kg (118 lb 2.7 oz) 51.7 kg (113 lb 15.7 oz)     Vital Signs with Ranges  Temp:  [97  F (36.1  C)-97.6  F (36.4  C)] 97.6  F (36.4  C)  Heart Rate:  [] 93  Resp:  [16-20] 16  BP: (123-196)/(58-86) 138/78  SpO2:  [87 %-97 %] 96 %  I/O last 3 completed shifts:  In: -   Out: 725 [Urine:725]    Constitutional:  NAD  Respiratory: Poor air movement bilaterally, no crackles or wheezes, no tachypnea  Cardiovascular: regular rate and rhythm, normal S1/S2 without murmur, rubs or gallops, no peripheral edema  GI: abdomen soft, reports diffuse tenderness to palpation (chronic per patient), non-distended, normal bowel sounds  Skin/Integumen:  Multiple reena's and ecchymosis on his hands  Other:  alert and appropriate, cranial nerves grossly intact    Medications     - MEDICATION INSTRUCTIONS -         predniSONE  40 mg Oral Daily     sodium chloride (PF)  3 mL Intracatheter Q8H     cefTRIAXone  2 g Intravenous Q24H     atenolol (TENORMIN) tablet 25 mg  25 mg Oral Daily     hydrochlorothiazide (HYDRODIURIL) tablet 25 mg  25 mg Oral Daily     enoxaparin  40 mg Subcutaneous Q24H     multivitamin, therapeutic with minerals  1 tablet Oral Daily     ipratropium - albuterol 0.5 mg/2.5 mg/3 mL  3 mL Nebulization Q4H While awake     nicotine   Transdermal Q8H     nicotine   Transdermal Daily     nicotine  1 patch Transdermal Daily       Data     Recent Labs  Lab 03/30/17  0700 03/29/17  1400 03/29/17  1305 03/29/17  0745 03/28/17  0715 03/27/17  2344   WBC 11.4* 14.1* Canceled, Test credited Unsatisfactory specimen - tube underfilled  --  6.0 8.5   HGB 15.9 16.2 Canceled, Test credited Unsatisfactory specimen - tube underfilled  --  15.4 16.5   MCV 95 94 Canceled, Test credited Unsatisfactory specimen - tube underfilled  --  94 94   * 90* Canceled, Test credited Unsatisfactory specimen - tube underfilled  --  Platelets clumped, platelet count unavailable 63*    139  --   --  135  138   POTASSIUM 4.0 4.2  --  3.7 3.8 3.2*   CHLORIDE 99 101  --   --  96 97   CO2 35* 33*  --   --  29 32   BUN 18 18  --   --  19 14   CR 0.72 0.77  --   --  0.92 0.74   ANIONGAP 6 5  --   --  10 9   MATTHEW 9.0 8.9  --   --  8.7 8.9   * 122*  --   --  162* 101*   TROPI  --  0.016  --   --   --  <0.015The 99th percentile for upper reference range is 0.045 ug/L.  Troponin values in the range of 0.045 - 0.120 ug/L may be associated with risks of adverse clinical events.       No results found for this or any previous visit (from the past 24 hour(s)).

## 2017-04-02 LAB
BACTERIA SPEC CULT: ABNORMAL
CREAT SERPL-MCNC: 0.83 MG/DL (ref 0.66–1.25)
GFR SERPL CREATININE-BSD FRML MDRD: NORMAL ML/MIN/1.7M2
MAGNESIUM SERPL-MCNC: 2.6 MG/DL (ref 1.6–2.3)
MICRO REPORT STATUS: ABNORMAL
MICROORGANISM SPEC CULT: ABNORMAL
PLATELET # BLD AUTO: 111 10E9/L (ref 150–450)
SPECIMEN SOURCE: ABNORMAL

## 2017-04-02 PROCEDURE — A9270 NON-COVERED ITEM OR SERVICE: HCPCS | Mod: GY | Performed by: INTERNAL MEDICINE

## 2017-04-02 PROCEDURE — 94640 AIRWAY INHALATION TREATMENT: CPT | Mod: 76

## 2017-04-02 PROCEDURE — 40000275 ZZH STATISTIC RCP TIME EA 10 MIN

## 2017-04-02 PROCEDURE — 85049 AUTOMATED PLATELET COUNT: CPT | Performed by: INTERNAL MEDICINE

## 2017-04-02 PROCEDURE — 25000132 ZZH RX MED GY IP 250 OP 250 PS 637: Mod: GY | Performed by: HOSPITALIST

## 2017-04-02 PROCEDURE — 83735 ASSAY OF MAGNESIUM: CPT | Performed by: INTERNAL MEDICINE

## 2017-04-02 PROCEDURE — 99232 SBSQ HOSP IP/OBS MODERATE 35: CPT | Performed by: INTERNAL MEDICINE

## 2017-04-02 PROCEDURE — 12000000 ZZH R&B MED SURG/OB

## 2017-04-02 PROCEDURE — 25000132 ZZH RX MED GY IP 250 OP 250 PS 637: Mod: GY | Performed by: INTERNAL MEDICINE

## 2017-04-02 PROCEDURE — 25000128 H RX IP 250 OP 636: Performed by: HOSPITALIST

## 2017-04-02 PROCEDURE — 25000125 ZZHC RX 250: Performed by: INTERNAL MEDICINE

## 2017-04-02 PROCEDURE — 82565 ASSAY OF CREATININE: CPT | Performed by: INTERNAL MEDICINE

## 2017-04-02 PROCEDURE — 36415 COLL VENOUS BLD VENIPUNCTURE: CPT | Performed by: INTERNAL MEDICINE

## 2017-04-02 PROCEDURE — A9270 NON-COVERED ITEM OR SERVICE: HCPCS | Mod: GY | Performed by: HOSPITALIST

## 2017-04-02 PROCEDURE — 94640 AIRWAY INHALATION TREATMENT: CPT

## 2017-04-02 RX ADMIN — NICOTINE 1 PATCH: 14 PATCH, EXTENDED RELEASE TRANSDERMAL at 08:12

## 2017-04-02 RX ADMIN — TRAMADOL HYDROCHLORIDE 50 MG: 50 TABLET, COATED ORAL at 10:18

## 2017-04-02 RX ADMIN — PREDNISONE 40 MG: 20 TABLET ORAL at 08:13

## 2017-04-02 RX ADMIN — SULFAMETHOXAZOLE AND TRIMETHOPRIM 1 TABLET: 800; 160 TABLET ORAL at 08:13

## 2017-04-02 RX ADMIN — TRAMADOL HYDROCHLORIDE 50 MG: 50 TABLET, COATED ORAL at 03:24

## 2017-04-02 RX ADMIN — ATENOLOL 25 MG: 25 TABLET ORAL at 08:13

## 2017-04-02 RX ADMIN — IPRATROPIUM BROMIDE AND ALBUTEROL SULFATE 3 ML: .5; 3 SOLUTION RESPIRATORY (INHALATION) at 19:38

## 2017-04-02 RX ADMIN — MULTIPLE VITAMINS W/ MINERALS TAB 1 TABLET: TAB at 08:13

## 2017-04-02 RX ADMIN — IPRATROPIUM BROMIDE AND ALBUTEROL SULFATE 3 ML: .5; 3 SOLUTION RESPIRATORY (INHALATION) at 07:12

## 2017-04-02 RX ADMIN — HYDROCHLOROTHIAZIDE 25 MG: 25 TABLET ORAL at 08:13

## 2017-04-02 RX ADMIN — ACETAMINOPHEN 650 MG: 325 TABLET, FILM COATED ORAL at 00:07

## 2017-04-02 RX ADMIN — TRAMADOL HYDROCHLORIDE 50 MG: 50 TABLET, COATED ORAL at 22:27

## 2017-04-02 RX ADMIN — IPRATROPIUM BROMIDE AND ALBUTEROL SULFATE 3 ML: .5; 3 SOLUTION RESPIRATORY (INHALATION) at 10:41

## 2017-04-02 RX ADMIN — SULFAMETHOXAZOLE AND TRIMETHOPRIM 1 TABLET: 800; 160 TABLET ORAL at 21:49

## 2017-04-02 RX ADMIN — TRAMADOL HYDROCHLORIDE 50 MG: 50 TABLET, COATED ORAL at 16:19

## 2017-04-02 RX ADMIN — ENOXAPARIN SODIUM 40 MG: 40 INJECTION SUBCUTANEOUS at 18:00

## 2017-04-02 NOTE — PROGRESS NOTES
[facility]    Hospitalist Progress Note      Assessment & Plan   Chuck Lopez is a 76 year old male who was admitted on 3/27/2017.  Past hx of COPD, ongoing tobacco use, HTN, and chronic Hep C who presents with progressive shortness of breath and URI symptoms, and is being admitted for acute COPD exacerbation     Acute COPD exacerbation with acute hypoxic respiratory failure:   Presented with progressive shortness of breath in the setting of viral URI. On arrival to the ED, he was noted to be febrile to 101.5, tachycardic to 110s, and hypoxic to mid 80s on room air. CXR on arrival showed no acute infiltrates.  - worsening of work of breathing  - CT pulmonary angio negative for PE  -- On solumedrol and changed to po prednisone 40 mg daily  - On ceftriaxone and completed course of azithromycin. Will discontinue IV antibiotics.  - PO Bactrim  - Keep oxygen saturation 88-89% avoid overcorrection    Tobacco use disorder  Longstanding history of tobacco use. Currently smoking 0.5 ppd   - nicotine patch  - Re discussed about smoking cessation      Essential hypertension  [PTA: atenolol 25 mg daily, HCTZ 25 mg daily]  - resume PTA atenolol and HCTZ 3/29     Chronic Hep C  LFTs normal. No active issues.     Chronic pain syndrome  Continues on home Tramadol prn    DVT Prophylaxis: Enoxaprain (Lovenox) SQ  Code Status: Full Code    Disposition: Very slow improvement.  Expected discharge in 2-3 days once respiratory status improved.    Oneyda Marquezu    Interval History   Patient seen and assessed. Report slight improvement in his SOB.     -Data reviewed today: I reviewed all new labs and imaging results over the last 24 hours. I personally reviewed no images or EKG's today.    Physical Exam   Temp: 97.8  F (36.6  C) Temp src: Oral BP: 161/87 Pulse: 98 Heart Rate: 99 Resp: 20 SpO2: 95 % O2 Device: Nasal cannula Oxygen Delivery: 1 LPM  Vitals:    03/31/17 0634 04/01/17 0659 04/02/17 0734   Weight:  53.6 kg (118 lb 2.7 oz) 51.7 kg (113 lb 15.7 oz) 53.1 kg (117 lb 1 oz)     Vital Signs with Ranges  Temp:  [97  F (36.1  C)-97.9  F (36.6  C)] 97.8  F (36.6  C)  Pulse:  [98] 98  Heart Rate:  [90-99] 99  Resp:  [18-20] 20  BP: (145-169)/(73-88) 161/87  SpO2:  [93 %-97 %] 95 %  I/O last 3 completed shifts:  In: -   Out: 725 [Urine:725]    Constitutional:  NAD  Respiratory: Poor air movement bilaterally, no crackles or wheezes, no tachypnea  Cardiovascular: regular rate and rhythm, normal S1/S2 without murmur, rubs or gallops, no peripheral edema  GI: abdomen soft, reports diffuse tenderness to palpation (chronic per patient), non-distended, normal bowel sounds  Skin/Integumen:  Multiple reena's and ecchymosis on his hands  Other:  alert and appropriate, cranial nerves grossly intact    Medications     - MEDICATION INSTRUCTIONS -         predniSONE  40 mg Oral Daily     sulfamethoxazole-trimethoprim  1 tablet Oral BID     sodium chloride (PF)  3 mL Intracatheter Q8H     atenolol (TENORMIN) tablet 25 mg  25 mg Oral Daily     hydrochlorothiazide (HYDRODIURIL) tablet 25 mg  25 mg Oral Daily     enoxaparin  40 mg Subcutaneous Q24H     multivitamin, therapeutic with minerals  1 tablet Oral Daily     ipratropium - albuterol 0.5 mg/2.5 mg/3 mL  3 mL Nebulization Q4H While awake     nicotine   Transdermal Q8H     nicotine   Transdermal Daily     nicotine  1 patch Transdermal Daily       Data     Recent Labs  Lab 04/02/17  0730 03/30/17  0700 03/29/17  1400 03/29/17  1305 03/29/17  0745 03/28/17  0715 03/27/17  2344   WBC  --  11.4* 14.1* Canceled, Test credited Unsatisfactory specimen - tube underfilled  --  6.0 8.5   HGB  --  15.9 16.2 Canceled, Test credited Unsatisfactory specimen - tube underfilled  --  15.4 16.5   MCV  --  95 94 Canceled, Test credited Unsatisfactory specimen - tube underfilled  --  94 94   * 139* 90* Canceled, Test credited Unsatisfactory specimen - tube underfilled  --  Platelets clumped,  platelet count unavailable 63*   NA  --  140 139  --   --  135 138   POTASSIUM  --  4.0 4.2  --  3.7 3.8 3.2*   CHLORIDE  --  99 101  --   --  96 97   CO2  --  35* 33*  --   --  29 32   BUN  --  18 18  --   --  19 14   CR 0.83 0.72 0.77  --   --  0.92 0.74   ANIONGAP  --  6 5  --   --  10 9   MATTHEW  --  9.0 8.9  --   --  8.7 8.9   GLC  --  126* 122*  --   --  162* 101*   TROPI  --   --  0.016  --   --   --  <0.015The 99th percentile for upper reference range is 0.045 ug/L.  Troponin values in the range of 0.045 - 0.120 ug/L may be associated with risks of adverse clinical events.       No results found for this or any previous visit (from the past 24 hour(s)).

## 2017-04-02 NOTE — PLAN OF CARE
Problem: Goal Outcome Summary  Goal: Goal Outcome Summary  Outcome: No Change  Plans for DC: lives with friends; will need SW assist to ensure safe d/c     Procedure/POD: NA  Neuro/Psych/Sleep: A&O x4  CV/Tele/Abnormal VS: Sats 96 on 2LNC. All other VSS. Tele NSR  Resp: diminished. LEVINE.  Skin/Wound: generalized: red, ariana, bruising, flushed  /GI/Diet: regular diet, using bedside urinal. Voiding adequately.  IV: PIV x2 - SL   Pain: chronic neck pain, tramadol x1. Effective  Chemo/Radiation: NA  Abnormal Labs/Glucose: N/A  Activity/Safety: Up SBA.   Consults: NA  Education: Plan to wean oxygen as able  Plan: Continue to monitor, DC pending resp status improvement.    Other:  Nicotine patch left arm

## 2017-04-02 NOTE — PLAN OF CARE
Problem: Goal Outcome Summary  Goal: Goal Outcome Summary  Outcome: No Change  Pt A&O x4. VSS on 1LPM NC. ffective. Pain managed with tramadol,  Using urinal at bedside. Will continue to monitor.

## 2017-04-02 NOTE — PROGRESS NOTES
"CLINICAL NUTRITION SERVICES  -  ASSESSMENT NOTE      Recommendations Ordered by Registered Dietitian (RD):     PLUS2 shake at 10am and 2pm (625 yeni and 19 gm pro each)     Malnutrition:     Non-severe malnutrition in the context of chronic dz          REASON FOR ASSESSMENT  Chuck Lopez is a 76 year old male seen by Registered Dietitian for LOS      NUTRITION HISTORY  Visited with pt this afternoon  He tells me that he has been eating well at home  Loves sweets - \"bags of those things as home\"  Eats hamburgers, fruit, peanut butter  Avoids oranges - \"heartburn\"  Needs softer foods (missing teeth)  Drinks water or coffee    Pt is known from previous admit - loved the PLUS2 milkshakes (~600 cals, 19 gm pro)      CURRENT NUTRITION ORDERS  Diet Order:     Regular     Current Intake/Tolerance:  Pt is eating % meals  Lunch today - applesauce, milk, prune atif, 1/2 beans/rice  He would love to get shakes between his meals      PHYSICAL FINDINGS  Observed  Muscle Wasting  - arms, clavicle  Subcutaneous fat loss  - upper arms    Obtained from Chart/Interdisciplinary Team  Chronically ill-appearing  Normal muscle bulk and tone    ANTHROPOMETRICS  Height: 5' 5\"  Weight:(4/2) 53.1 kg / 117 lbs 1.03 oz  Body mass index is 19.48 kg/(m^2).  Weight Status:  Normal BMI  IBW: 61.8 kg  % IBW: 86%  Weight History: Pt states wt has been stable.  Feels muscle in legs is unchanged.  Wt Readings from Last 10 Encounters:   04/02/17 53.1 kg (117 lb 1 oz)   01/24/17 51.2 kg (112 lb 12.8 oz)   01/17/17 49.5 kg (109 lb 3.2 oz)   01/12/17 47.3 kg (104 lb 3.2 oz)   12/21/16 51.3 kg (113 lb)   11/26/16 53.9 kg (118 lb 13.3 oz)   10/06/16 49.4 kg (109 lb)   08/16/16 49.4 kg (109 lb)   07/18/16 51.7 kg (114 lb)   01/05/16 54.9 kg (121 lb)         LABS  Labs reviewed    MEDICATIONS  Medications reviewed    Dosing Weight:53.1 kg (4/2)    ASSESSED NUTRITION NEEDS:  Estimated Energy Needs: 0691-2609 kcals (30-35 Kcal/Kg)  Justification: " underweight  Estimated Protein Needs: 64-80 grams protein (1.2-1.5 g pro/Kg)  Justification: preservation of lean body mass      MALNUTRITION:  % Weight Loss:  None noted  % Intake:  Decreased intake does not meet criteria for malnutrition   Subcutaneous Fat Loss:  Upper arm region  - mild depletion  Muscle Loss:  Arms - mild and Clavicle bone region  - mild depletion  Fluid Retention:  None noted    Malnutrition Diagnosis: Non-Severe malnutrition  In Context of:  Chronic illness or disease    NUTRITION DIAGNOSIS:  No nutrition diagnosis identified at this time r    NUTRITION INTERVENTIONS  Recommendations / Nutrition Prescription  Regular diet  Nutrition supplements  .      Implementation  Nutrition education: Per Provider order if indicated   Reviewed current diet order  Will send a PLUS2 shake at 10am and 2pm (each provides 625 yeni and 19 gm pro)  .      Nutrition Goals  Pt to consume 100% shakes and 50% meals  .      MONITORING AND EVALUATION:  Progress towards goals will be monitored and evaluated per protocol and Practice Guidelines

## 2017-04-02 NOTE — PLAN OF CARE
Problem: Goal Outcome Summary  Goal: Goal Outcome Summary  PT: Pt just got breakfast, requests to hold on PT at this time. States he is feeling much better. Has been walking with nursing and plans to walk more today.

## 2017-04-02 NOTE — PLAN OF CARE
Reason for Admission: Chronic obstructive pulmonary disease with acute exacerbation (H) [J44.1]  Pertinent Medical History: chronic neck pain, COPD, DM, Hep C, HTN     Plans for DC: lives with friends     DO NOT REMOVE ANY FIELDS BELOW - TYPE N/A OR WNL IF NO INFO TO ADD  Procedure/POD: NA  Neuro/Psych/Sleep: A&O x4, slept most of the night.  CV/Tele/Abnormal VS: BP high of 169/85. Pt still on 3L of O2  Resp: diminished. RR labored w/pain  Skin/Wound: generalized: red, ariana, bruising, flushed  /GI/Diet: regular diet, using bedside urinal  IV: PIV x2 - SL   Pain: chronic neck pain, tramadol x1. tyelenol 1x fir headache  Chemo/Radiation: NA  Abnormal Labs/Glucose: N/A  Activity/Safety: Up SBA. Pt calls appropriately  Consults: NA  Education: Medications reviewed. Pain management  Plan: Continue to monitor, DC pending resp status improvement.      Other:  Nicotine patch left arm

## 2017-04-03 ENCOUNTER — APPOINTMENT (OUTPATIENT)
Dept: PHYSICAL THERAPY | Facility: CLINIC | Age: 77
DRG: 190 | End: 2017-04-03
Payer: MEDICARE

## 2017-04-03 LAB
BACTERIA SPEC CULT: NO GROWTH
BACTERIA SPEC CULT: NO GROWTH
Lab: NORMAL
Lab: NORMAL
MAGNESIUM SERPL-MCNC: 2.5 MG/DL (ref 1.6–2.3)
MICRO REPORT STATUS: NORMAL
MICRO REPORT STATUS: NORMAL
POTASSIUM SERPL-SCNC: 4 MMOL/L (ref 3.4–5.3)
SPECIMEN SOURCE: NORMAL
SPECIMEN SOURCE: NORMAL

## 2017-04-03 PROCEDURE — A9270 NON-COVERED ITEM OR SERVICE: HCPCS | Mod: GY | Performed by: INTERNAL MEDICINE

## 2017-04-03 PROCEDURE — 97530 THERAPEUTIC ACTIVITIES: CPT | Mod: GP

## 2017-04-03 PROCEDURE — 84132 ASSAY OF SERUM POTASSIUM: CPT | Performed by: INTERNAL MEDICINE

## 2017-04-03 PROCEDURE — 25000132 ZZH RX MED GY IP 250 OP 250 PS 637: Mod: GY | Performed by: INTERNAL MEDICINE

## 2017-04-03 PROCEDURE — 12000000 ZZH R&B MED SURG/OB

## 2017-04-03 PROCEDURE — 25000132 ZZH RX MED GY IP 250 OP 250 PS 637: Mod: GY | Performed by: HOSPITALIST

## 2017-04-03 PROCEDURE — 99232 SBSQ HOSP IP/OBS MODERATE 35: CPT | Performed by: INTERNAL MEDICINE

## 2017-04-03 PROCEDURE — A9270 NON-COVERED ITEM OR SERVICE: HCPCS | Mod: GY | Performed by: HOSPITALIST

## 2017-04-03 PROCEDURE — 25000125 ZZHC RX 250: Performed by: INTERNAL MEDICINE

## 2017-04-03 PROCEDURE — 36415 COLL VENOUS BLD VENIPUNCTURE: CPT | Performed by: INTERNAL MEDICINE

## 2017-04-03 PROCEDURE — 97116 GAIT TRAINING THERAPY: CPT | Mod: GP

## 2017-04-03 PROCEDURE — 94640 AIRWAY INHALATION TREATMENT: CPT | Mod: 76

## 2017-04-03 PROCEDURE — 40000193 ZZH STATISTIC PT WARD VISIT

## 2017-04-03 PROCEDURE — 40000275 ZZH STATISTIC RCP TIME EA 10 MIN

## 2017-04-03 PROCEDURE — 25000128 H RX IP 250 OP 636: Performed by: HOSPITALIST

## 2017-04-03 PROCEDURE — 83735 ASSAY OF MAGNESIUM: CPT | Performed by: INTERNAL MEDICINE

## 2017-04-03 PROCEDURE — 94640 AIRWAY INHALATION TREATMENT: CPT

## 2017-04-03 RX ORDER — BUTALBITAL, ACETAMINOPHEN, CAFFEINE AND CODEINE PHOSPHATE 50; 325; 40; 30 MG/1; MG/1; MG/1; MG/1
1 CAPSULE ORAL EVERY 4 HOURS PRN
Status: DISCONTINUED | OUTPATIENT
Start: 2017-04-03 | End: 2017-04-04 | Stop reason: HOSPADM

## 2017-04-03 RX ADMIN — IPRATROPIUM BROMIDE AND ALBUTEROL SULFATE 3 ML: .5; 3 SOLUTION RESPIRATORY (INHALATION) at 11:50

## 2017-04-03 RX ADMIN — BUTALBITAL, ACETAMINOPHEN, CAFFEINE, AND CODEINE PHOSPHATE 1 CAPSULE: 30; 50; 40; 325 CAPSULE ORAL at 10:18

## 2017-04-03 RX ADMIN — ATENOLOL 25 MG: 25 TABLET ORAL at 08:27

## 2017-04-03 RX ADMIN — IPRATROPIUM BROMIDE AND ALBUTEROL SULFATE 3 ML: .5; 3 SOLUTION RESPIRATORY (INHALATION) at 19:35

## 2017-04-03 RX ADMIN — BUTALBITAL, ACETAMINOPHEN, CAFFEINE, AND CODEINE PHOSPHATE 1 CAPSULE: 30; 50; 40; 325 CAPSULE ORAL at 13:39

## 2017-04-03 RX ADMIN — SULFAMETHOXAZOLE AND TRIMETHOPRIM 1 TABLET: 800; 160 TABLET ORAL at 08:27

## 2017-04-03 RX ADMIN — MULTIPLE VITAMINS W/ MINERALS TAB 1 TABLET: TAB at 08:27

## 2017-04-03 RX ADMIN — Medication 3 MG: at 22:24

## 2017-04-03 RX ADMIN — PREDNISONE 40 MG: 20 TABLET ORAL at 08:27

## 2017-04-03 RX ADMIN — HYDROCHLOROTHIAZIDE 25 MG: 25 TABLET ORAL at 08:27

## 2017-04-03 RX ADMIN — SULFAMETHOXAZOLE AND TRIMETHOPRIM 1 TABLET: 800; 160 TABLET ORAL at 21:23

## 2017-04-03 RX ADMIN — TRAMADOL HYDROCHLORIDE 50 MG: 50 TABLET, COATED ORAL at 05:57

## 2017-04-03 RX ADMIN — HYDRALAZINE HYDROCHLORIDE 10 MG: 20 INJECTION INTRAMUSCULAR; INTRAVENOUS at 01:22

## 2017-04-03 RX ADMIN — IPRATROPIUM BROMIDE AND ALBUTEROL SULFATE 3 ML: .5; 3 SOLUTION RESPIRATORY (INHALATION) at 07:24

## 2017-04-03 RX ADMIN — NICOTINE 1 PATCH: 14 PATCH, EXTENDED RELEASE TRANSDERMAL at 08:27

## 2017-04-03 RX ADMIN — BUTALBITAL, ACETAMINOPHEN, CAFFEINE, AND CODEINE PHOSPHATE 1 CAPSULE: 30; 50; 40; 325 CAPSULE ORAL at 18:10

## 2017-04-03 RX ADMIN — IPRATROPIUM BROMIDE AND ALBUTEROL SULFATE 3 ML: .5; 3 SOLUTION RESPIRATORY (INHALATION) at 02:36

## 2017-04-03 RX ADMIN — Medication 3 MG: at 01:22

## 2017-04-03 RX ADMIN — ENOXAPARIN SODIUM 40 MG: 40 INJECTION SUBCUTANEOUS at 18:20

## 2017-04-03 RX ADMIN — IPRATROPIUM BROMIDE AND ALBUTEROL SULFATE 3 ML: .5; 3 SOLUTION RESPIRATORY (INHALATION) at 16:01

## 2017-04-03 NOTE — PLAN OF CARE
Problem: Goal Outcome Summary  Goal: Goal Outcome Summary  Outcome: Improving  Plans for DC: Pending on improvement in respiratory status, probably 1-2 days.  Lives with friends, will need SW assist to ensure safe d/c.     Neuro/Psych/Sleep: A&O x4.  PRN Melatonin effective for sleep.  CV/Tele/Abnormal VS: O2 91% on .5L  Hypertensive, PRN hydralazine effective.  Other VSS.  Tele SR with occasional PVC's  Resp: LS clear, but diminished.  LEVINE.  Scheduled nebs effective.  Skin/Wound: Generalized redness, ariana, bruising, flushed  /GI/Diet: Regular diet.  Voiding adequately per urinal at bedside.  IV: PIV x2 - SL   Pain: Tramadol given x2 for headache/neck pain, effective  Abnormal Labs/Glucose: N/A  Activity/Safety: Up SBA  Education: IS  Other:  Nicotine patch left arm

## 2017-04-03 NOTE — PROGRESS NOTES
[facility]  Progress Note  [facility] 88    Pt declined SH services at this time.  He was trying to eat and also appears that talking is difficult for him and causes more shortness of breath.  SH will follow if pt has a request.          Karuna Dye  Chaplain Resident  Pager [phone]

## 2017-04-03 NOTE — PROGRESS NOTES
[facility]    Hospitalist Progress Note      Assessment & Plan   Chuck Lopez is a 76 year old male who was admitted on 3/27/2017.  Past hx of COPD, ongoing tobacco use, HTN, and chronic Hep C who presents with progressive shortness of breath and URI symptoms, and is being admitted for acute COPD exacerbation     Acute COPD exacerbation with acute hypoxic respiratory failure:   Presented with progressive shortness of breath in the setting of viral URI. On arrival to the ED, he was noted to be febrile to 101.5, tachycardic to 110s, and hypoxic to mid 80s on room air. CXR on arrival showed no acute infiltrates.  - worsening of work of breathing  - CT pulmonary angio negative for PE  -- On solumedrol and will change to po starting tomorrow  - On ceftriaxone and completed course of azithromycin. Will discontinue IV antibiotics.  - PO Bactrim for bronchitis  - Keep oxygen saturation 88-89% avoid overcorrection    Tobacco use disorder  Longstanding history of tobacco use. Currently smoking 0.5 ppd   - nicotine patch  - Re discussed about smoking cessation      Essential hypertension  [PTA: atenolol 25 mg daily, HCTZ 25 mg daily]  - resume PTA atenolol and HCTZ 3/29     Chronic Hep C  LFTs normal. No active issues.     Chronic pain syndrome  Continues on home Tramadol prn    DVT Prophylaxis: Enoxaprain (Lovenox) SQ  Code Status: Full Code    Disposition: Likely home tomorrow with oxygen.     [facility]    Interval History   Patient seen and assessed. Report sever headache. Breathing much better today    -Data reviewed today: I reviewed all new labs and imaging results over the last 24 hours. I personally reviewed no images or EKG's today.    Physical Exam   Temp: 97.7  F (36.5  C) Temp src: Oral BP: 135/74   Heart Rate: 90 Resp: 18 SpO2: 93 % O2 Device: Nasal cannula Oxygen Delivery: 1 LPM  Vitals:    04/01/17 0659 04/02/17 0734 04/03/17 0536   Weight: 51.7 kg (113 lb 15.7 oz) 53.1 kg  (117 lb 1 oz) 51.6 kg (113 lb 12.1 oz)     Vital Signs with Ranges  Temp:  [97.7  F (36.5  C)-99.3  F (37.4  C)] 97.7  F (36.5  C)  Heart Rate:  [85-95] 90  Resp:  [18] 18  BP: (113-180)/(64-91) 135/74  SpO2:  [92 %-96 %] 93 %  I/O last 3 completed shifts:  In: 240 [P.O.:240]  Out: 1950 [Urine:1950]    Constitutional:  NAD  Respiratory: Poor air movement bilaterally, no crackles or wheezes, no tachypnea  Cardiovascular: regular rate and rhythm, normal S1/S2 without murmur, rubs or gallops, no peripheral edema  GI: abdomen soft, reports diffuse tenderness to palpation (chronic per patient), non-distended, normal bowel sounds  Skin/Integumen:  Multiple reena's and ecchymosis on his hands  Other:  alert and appropriate, cranial nerves grossly intact    Medications     - MEDICATION INSTRUCTIONS -         predniSONE  40 mg Oral Daily     sulfamethoxazole-trimethoprim  1 tablet Oral BID     sodium chloride (PF)  3 mL Intracatheter Q8H     atenolol (TENORMIN) tablet 25 mg  25 mg Oral Daily     hydrochlorothiazide (HYDRODIURIL) tablet 25 mg  25 mg Oral Daily     enoxaparin  40 mg Subcutaneous Q24H     multivitamin, therapeutic with minerals  1 tablet Oral Daily     ipratropium - albuterol 0.5 mg/2.5 mg/3 mL  3 mL Nebulization Q4H While awake     nicotine   Transdermal Q8H     nicotine   Transdermal Daily     nicotine  1 patch Transdermal Daily       Data     Recent Labs  Lab 04/03/17  0657 04/02/17  0730 03/30/17  0700 03/29/17  1400 03/29/17  1305  03/28/17  0715 03/27/17  2344   WBC  --   --  11.4* 14.1* Canceled, Test credited Unsatisfactory specimen - tube underfilled  --  6.0 8.5   HGB  --   --  15.9 16.2 Canceled, Test credited Unsatisfactory specimen - tube underfilled  --  15.4 16.5   MCV  --   --  95 94 Canceled, Test credited Unsatisfactory specimen - tube underfilled  --  94 94   PLT  --  111* 139* 90* Canceled, Test credited Unsatisfactory specimen - tube underfilled  --  Platelets clumped, platelet count  unavailable 63*   NA  --   --  140 139  --   --  135 138   POTASSIUM 4.0  --  4.0 4.2  --   < > 3.8 3.2*   CHLORIDE  --   --  99 101  --   --  96 97   CO2  --   --  35* 33*  --   --  29 32   BUN  --   --  18 18  --   --  19 14   CR  --  0.83 0.72 0.77  --   --  0.92 0.74   ANIONGAP  --   --  6 5  --   --  10 9   MATTHEW  --   --  9.0 8.9  --   --  8.7 8.9   GLC  --   --  126* 122*  --   --  162* 101*   TROPI  --   --   --  0.016  --   --   --  <0.015The 99th percentile for upper reference range is 0.045 ug/L.  Troponin values in the range of 0.045 - 0.120 ug/L may be associated with risks of adverse clinical events.   < > = values in this interval not displayed.    No results found for this or any previous visit (from the past 24 hour(s)).

## 2017-04-03 NOTE — DOWNTIME EVENT NOTE
The EMR was down for 4 hours on 4/2/2017.    Milana Hernández was responsible for completing the paper charting during this time period.     The following information was re-entered into the system by Milana Hernández: notes, meds     The following information will remain in the paper chart: nothing    Milana Hernández  4/2/2017

## 2017-04-03 NOTE — PLAN OF CARE
Problem: Goal Outcome Summary  Goal: Goal Outcome Summary  PT: Patient ambulates ~200' with no device and SBA. Ambulates ~100' with straight cane and SBA. Improved gait speed with straight cane. SpO2 88-89% on RA with ambulation. Performs 3 stairs with unilateral rail, straight cane and CGA. Anticipate return to friends home with OP pulmonary PT, assist from friend on the stairs and use of straight cane with mobility.

## 2017-04-03 NOTE — PROVIDER NOTIFICATION
MD Notification    Notified Person:  MD    Notified Persons Name: Harry    Notification Date/Time:4/3/2017 at 0830    Notification Interaction:  Text paged physician    Purpose of Notification:Pt is c/o 7/10 sinus pain/headache. Tramadol not effective. Can you please order something stronger.    Orders Received: Fioricet ordered.     Comments:

## 2017-04-03 NOTE — PROGRESS NOTES
Pt A&O. LS diminished with inspiratory wheezes. Weaned to 0.5 LPM NC. Shallow, labored breathing. VSS. Tramadol given for chronic neck pain. Regular diet. SBA. Nicotine patch to L arm. Tele NSR. BUE red/blotching with bruising. D/c pending progress.

## 2017-04-04 VITALS
TEMPERATURE: 97.9 F | HEART RATE: 98 BPM | OXYGEN SATURATION: 90 % | RESPIRATION RATE: 18 BRPM | SYSTOLIC BLOOD PRESSURE: 126 MMHG | WEIGHT: 111.99 LBS | HEIGHT: 65 IN | DIASTOLIC BLOOD PRESSURE: 68 MMHG | BODY MASS INDEX: 18.66 KG/M2

## 2017-04-04 LAB
MAGNESIUM SERPL-MCNC: 2.6 MG/DL (ref 1.6–2.3)
POTASSIUM SERPL-SCNC: 4.5 MMOL/L (ref 3.4–5.3)

## 2017-04-04 PROCEDURE — A9270 NON-COVERED ITEM OR SERVICE: HCPCS | Mod: GY | Performed by: INTERNAL MEDICINE

## 2017-04-04 PROCEDURE — 94640 AIRWAY INHALATION TREATMENT: CPT

## 2017-04-04 PROCEDURE — 83735 ASSAY OF MAGNESIUM: CPT | Performed by: INTERNAL MEDICINE

## 2017-04-04 PROCEDURE — 94640 AIRWAY INHALATION TREATMENT: CPT | Mod: 76

## 2017-04-04 PROCEDURE — 40000275 ZZH STATISTIC RCP TIME EA 10 MIN

## 2017-04-04 PROCEDURE — 25000132 ZZH RX MED GY IP 250 OP 250 PS 637: Mod: GY | Performed by: INTERNAL MEDICINE

## 2017-04-04 PROCEDURE — 25000132 ZZH RX MED GY IP 250 OP 250 PS 637: Mod: GY | Performed by: HOSPITALIST

## 2017-04-04 PROCEDURE — 99239 HOSP IP/OBS DSCHRG MGMT >30: CPT | Performed by: INTERNAL MEDICINE

## 2017-04-04 PROCEDURE — 25000125 ZZHC RX 250: Performed by: INTERNAL MEDICINE

## 2017-04-04 PROCEDURE — A9270 NON-COVERED ITEM OR SERVICE: HCPCS | Mod: GY | Performed by: HOSPITALIST

## 2017-04-04 PROCEDURE — 36415 COLL VENOUS BLD VENIPUNCTURE: CPT | Performed by: INTERNAL MEDICINE

## 2017-04-04 PROCEDURE — 84132 ASSAY OF SERUM POTASSIUM: CPT | Performed by: INTERNAL MEDICINE

## 2017-04-04 RX ORDER — BUTALBITAL, ACETAMINOPHEN, CAFFEINE AND CODEINE PHOSPHATE 50; 325; 40; 30 MG/1; MG/1; MG/1; MG/1
1 CAPSULE ORAL EVERY 4 HOURS PRN
Qty: 30 CAPSULE | Refills: 0 | Status: ON HOLD | OUTPATIENT
Start: 2017-04-04 | End: 2017-04-24

## 2017-04-04 RX ORDER — SULFAMETHOXAZOLE/TRIMETHOPRIM 800-160 MG
1 TABLET ORAL 2 TIMES DAILY
Qty: 10 TABLET | Refills: 0 | Status: SHIPPED | OUTPATIENT
Start: 2017-04-04 | End: 2017-04-09

## 2017-04-04 RX ORDER — LANOLIN ALCOHOL/MO/W.PET/CERES
3 CREAM (GRAM) TOPICAL
Qty: 30 TABLET | Refills: 0 | Status: ON HOLD | OUTPATIENT
Start: 2017-04-04 | End: 2019-01-10

## 2017-04-04 RX ORDER — LORAZEPAM 0.5 MG/1
0.5 TABLET ORAL EVERY 4 HOURS PRN
Qty: 30 TABLET | Refills: 0 | Status: ON HOLD | OUTPATIENT
Start: 2017-04-04 | End: 2017-05-13

## 2017-04-04 RX ORDER — PREDNISONE 10 MG/1
TABLET ORAL
Qty: 60 TABLET | Refills: 0 | Status: SHIPPED | OUTPATIENT
Start: 2017-04-04 | End: 2017-04-18

## 2017-04-04 RX ADMIN — BUTALBITAL, ACETAMINOPHEN, CAFFEINE, AND CODEINE PHOSPHATE 1 CAPSULE: 30; 50; 40; 325 CAPSULE ORAL at 01:59

## 2017-04-04 RX ADMIN — MULTIPLE VITAMINS W/ MINERALS TAB 1 TABLET: TAB at 09:23

## 2017-04-04 RX ADMIN — SULFAMETHOXAZOLE AND TRIMETHOPRIM 1 TABLET: 800; 160 TABLET ORAL at 09:23

## 2017-04-04 RX ADMIN — HYDROCHLOROTHIAZIDE 25 MG: 25 TABLET ORAL at 09:23

## 2017-04-04 RX ADMIN — IPRATROPIUM BROMIDE AND ALBUTEROL SULFATE 3 ML: .5; 3 SOLUTION RESPIRATORY (INHALATION) at 10:40

## 2017-04-04 RX ADMIN — TRAMADOL HYDROCHLORIDE 50 MG: 50 TABLET, COATED ORAL at 07:01

## 2017-04-04 RX ADMIN — IPRATROPIUM BROMIDE AND ALBUTEROL SULFATE 3 ML: .5; 3 SOLUTION RESPIRATORY (INHALATION) at 07:54

## 2017-04-04 RX ADMIN — TRAMADOL HYDROCHLORIDE 50 MG: 50 TABLET, COATED ORAL at 13:09

## 2017-04-04 RX ADMIN — IPRATROPIUM BROMIDE AND ALBUTEROL SULFATE 3 ML: .5; 3 SOLUTION RESPIRATORY (INHALATION) at 00:10

## 2017-04-04 RX ADMIN — NICOTINE 1 PATCH: 14 PATCH, EXTENDED RELEASE TRANSDERMAL at 09:28

## 2017-04-04 RX ADMIN — ATENOLOL 25 MG: 25 TABLET ORAL at 09:23

## 2017-04-04 RX ADMIN — BUTALBITAL, ACETAMINOPHEN, CAFFEINE, AND CODEINE PHOSPHATE 1 CAPSULE: 30; 50; 40; 325 CAPSULE ORAL at 14:40

## 2017-04-04 RX ADMIN — PREDNISONE 40 MG: 20 TABLET ORAL at 09:23

## 2017-04-04 RX ADMIN — TRAMADOL HYDROCHLORIDE 50 MG: 50 TABLET, COATED ORAL at 00:26

## 2017-04-04 RX ADMIN — BUTALBITAL, ACETAMINOPHEN, CAFFEINE, AND CODEINE PHOSPHATE 1 CAPSULE: 30; 50; 40; 325 CAPSULE ORAL at 09:28

## 2017-04-04 NOTE — PLAN OF CARE
Problem: Goal Outcome Summary  Goal: Goal Outcome Summary  Outcome: Improving  Plans for DC: Probably today.  Lives with friends, will need SW assist to ensure safe d/c.     Neuro/Psych/Sleep: A&O x4, anxious at times  CV/Tele/Abnormal VS: O2 92% on room air.  VSS.  Tele NSR.  Resp: LS clear, but diminished.  Dyspneic at times.  Skin/Wound: generalized: red, ariana, bruising, flushed  /GI/Diet: Regular diet.  Voiding adequately per urinal at bedside.  IV: RPIV SL  Pain: Headache - Tramadol given x1 with no relief.  Fioricet x1, effective.  Activity/Safety: Up SBA.   Education: IS  Other:  Nicotine patch right arm

## 2017-04-04 NOTE — PLAN OF CARE
Problem: Goal Outcome Summary  Goal: Goal Outcome Summary  Outcome: Improving  Pt alert and oriented x4, complain pain on face and head sinus pressure rating it 8, Fioricet given, pt stated effectiveness,  stayed off oxygen this shift with O2 89-90% on room air. Nicotine patch on right arm,melatonin given at bed time, Planning to DC to friends [place] tomorrow, .

## 2017-04-04 NOTE — DISCHARGE SUMMARY
[facility]  Discharge Summary        Chuck Lopez MRN# [id number]   YOB: 1940 Age: 76 year old     Date of Admission:  3/27/2017  Date of Discharge:  4/4/2017  Admitting Physician:  Alice Carnes MD  Discharge Physician: Oneyda Mcdonald MD  Discharging Service: Hospitalist     Primary Provider: Pranay Hilliard  Primary Care Physician Phone Number: [phone]         Discharge Diagnoses:         Chronic obstructive pulmonary disease with acute exacerbation (H)  Insomnia, unspecified type  Intractable migraine, unspecified migraine type  Generalized anxiety disorder  Smoking cessation         Problem Oriented Hospital Course (Providers):    Chuck Lopez was admitted on 3/27/2017 by Alice Carnes MD and I would refer you to their history and physical.  The following problems were addressed during his hospitalization:  Acute COPD exacerbation with acute hypoxic respiratory failure:   Presented with progressive shortness of breath in the setting of viral URI. On arrival to the ED, he was noted to be febrile to 101.5, tachycardic to 110s, and hypoxic to mid 80s on room air. CXR on arrival showed no acute infiltrates.  - worsening of work of breathing  - CT pulmonary angio negative for PE  -- started on solumedrol and changed to prednisone and will taper down and advised to continue 10 mg daily until seen by pulmonologist/PCP  - Started on ceftriaxone and completed course of azithromycin.   - PO Bactrim for bronchitis  - Keep oxygen saturation 88-89% avoid overcorrection  - he was able to be weaned off oxygen.  - He was strongly encouraged not to smoke     Tobacco use disorder  Longstanding history of tobacco use. Currently smoking 0.5 ppd   - nicotine patch  - Re discussed about smoking cessation       Essential hypertension  [PTA: atenolol 25 mg daily, HCTZ 25 mg daily]  - resume PTA atenolol and HCTZ 3/29      Chronic Hep C  LFTs normal. No  active issues.      Chronic pain syndrome  Continues on home Tramadol prn    Patient was seen prior to discharge and he was found to be stable. He was advised to follow with pulmonary rehab and PCP        Code Status:      Full Code        Brief Hospital Stay Summary Sent Home With Patient in AVS:        Reason for your hospital stay       Acute respiratory failure                        Important Results:      See below.        Pending Results:        Unresulted Labs Ordered in the Past 30 Days of this Admission     No orders found from 1/26/2017 to 3/28/2017.            Discharge Instructions and Follow-Up:      Follow-up Appointments     Follow-up and recommended labs and tests        Follow up with primary care provider, Pranay Reid, within 7   days for hospital follow- up.  No follow up labs or test are needed.                      Discharge Disposition:      Discharged to home        Discharge Medications:        Current Discharge Medication List      START taking these medications    Details   melatonin 3 MG tablet Take 1 tablet (3 mg) by mouth nightly as needed  Qty: 30 tablet, Refills: 0    Associated Diagnoses: Insomnia, unspecified type      butalbital-APAP-caffeine-codeine (FIORICET WITH CODEINE) -28-30 MG per capsule Take 1 capsule by mouth every 4 hours as needed for headaches  Qty: 30 capsule, Refills: 0    Associated Diagnoses: Intractable migraine, unspecified migraine type      LORazepam (ATIVAN) 0.5 MG tablet Take 1 tablet (0.5 mg) by mouth every 4 hours as needed for anxiety  Qty: 30 tablet, Refills: 0    Associated Diagnoses: Generalized anxiety disorder      predniSONE (DELTASONE) 10 MG tablet Prednisone in AM 10mg 2 tabs daily x 4 days then one tab daily  Qty: 60 tablet, Refills: 0    Associated Diagnoses: Chronic obstructive pulmonary disease with acute exacerbation (H)      sulfamethoxazole-trimethoprim (BACTRIM DS/SEPTRA DS) 800-160 MG per tablet Take 1 tablet by mouth 2  times daily for 5 days  Qty: 10 tablet, Refills: 0    Associated Diagnoses: Chronic obstructive pulmonary disease with acute exacerbation (H)      !! order for DME Equipment being ordered: Oxygen  Qty: 1 Container, Refills: 0    Associated Diagnoses: Chronic obstructive pulmonary disease with acute exacerbation (H)       !! - Potential duplicate medications found. Please discuss with provider.      CONTINUE these medications which have NOT CHANGED    Details   TRAMADOL HCL PO Take 50 mg by mouth 4 times daily      SPIRIVA HANDIHALER 18 MCG capsule INHALE ONE CAPSULE BY MOUTH EVERY DAY  Qty: 30 capsule, Refills: 11    Associated Diagnoses: Centrilobular emphysema (H)      QVAR 80 MCG/ACT Inhaler INHALE TWO PUFFS BY MOUTH TWICE A DAY  Qty: 8.7 g, Refills: 11    Associated Diagnoses: Chronic obstructive pulmonary disease with acute lower respiratory infection (H)      fluticasone-salmeterol (ADVAIR) 500-50 MCG/DOSE diskus inhaler Inhale 1 puff into the lungs 2 times daily  Qty: 3 Inhaler, Refills: 1    Associated Diagnoses: COPD exacerbation (H)      !! albuterol (2.5 MG/3ML) 0.083% neb solution Take 1 vial by nebulization 4 times daily      albuterol (PROAIR HFA/PROVENTIL HFA/VENTOLIN HFA) 108 (90 BASE) MCG/ACT Inhaler Inhale 2 puffs into the lungs every 6 hours as needed for shortness of breath / dyspnea or wheezing  Qty: 1 Inhaler, Refills: 0    Associated Diagnoses: Chronic obstructive airway disease with asthma (H)      !! albuterol (2.5 MG/3ML) 0.083% neb solution Take 1 vial (2.5 mg) by nebulization every 4 hours as needed for shortness of breath / dyspnea or wheezing  Qty: 360 mL, Refills: 3    Comments: Adding refills to prior script  Associated Diagnoses: COPD exacerbation (H)      ATENOLOL PO Take 25 mg by mouth daily      HYDROCHLOROTHIAZIDE PO Take 25 mg by mouth daily      Multiple Vitamins-Minerals (V-C FORTE) CAPS Take 1 capsule by mouth daily  Qty: 90 capsule, Refills: 3    Associated Diagnoses:  "Hepatitis C      !! order for DME Equipment being ordered: Other: neb machine  Treatment Diagnosis: COPD exacerbation  Qty: 1 Device, Refills: 0    Associated Diagnoses: COPD exacerbation (H)       !! - Potential duplicate medications found. Please discuss with provider.            Allergies:         Allergies   Allergen Reactions     Ammonium Lactate Other (See Comments)     Burning to skin     Doxycycline Visual Disturbance     Monosodium Glutamate Other (See Comments)     Pt states it feels like his \"head swells, visual disturbances, and he can't think striaght\"     Penicillins Rash           Consultations This Hospital Stay:      NO Consultation during this admission received        Condition and Physical on Discharge:      Discharge condition: Stable   Vitals: Heart Rate: 79, Blood pressure 141/70, pulse 98, temperature 97.7  F (36.5  C), temperature source Oral, resp. rate 18, height 1.651 m (5' 5\"), weight 50.8 kg (111 lb 15.9 oz), SpO2 92 %.     Constitutional: Awake, alert. No apparent distress   Lungs: Decreased air entry at the base of both lungs without wheezing or crepitations.   Cardiovascular: Regular HR, normal S1 and S2, and no murmur noted   Abdomen: Normal bowel sounds, soft, non-distended, non-tender   Skin: No rashes, no cyanosis.   Other: LE:                  Discharge Time:      Greater than 30 minutes.        Image Results From This Hospital Stay (For Non-EPIC Providers):        Results for orders placed or performed during the hospital encounter of 03/27/17   XR Chest 2 Views    Narrative    CHEST 2 VIEWS  3/27/2017 11:51 PM     HISTORY: Shortness of breath.    COMPARISON: 1/7/2017.    FINDINGS: Prominent hyperinflated and hyperlucent lungs again noted.  The lungs are clear. Normal-sized cardiac silhouette. Atherosclerotic  calcification in the thoracic aorta.      Impression    IMPRESSION:   1. No evidence of active cardiopulmonary disease.  2. Prominent hyperinflated and hyperlucent lungs " again noted,  consistent with chronic obstructive pulmonary disease.    BRET SOLANO MD   XR Chest Port 1 View    Narrative    CHEST PORTABLE ONE VIEW   3/29/2017 1:51 PM     HISTORY: Assess for pulmonary edema.    COMPARISON: 3/27/2017.    FINDINGS: Hyperinflation due to COPD. Nothing acute. No interval  change.      Impression    IMPRESSION: COPD. Nothing acute.    ESTELA SHEARER MD   CT Chest Pulmonary Embolism w Contrast    Narrative    CT CHEST PULMONARY EMBOLISM WITH CONTRAST 3/29/2017 7:25 PM     HISTORY: Rule out pulmonary embolism.    CONTRAST DOSE: 56 mL [id number].    COMPARISON: 1/22/2016    Radiation dose for this scan was reduced using automated exposure  control, adjustment of the mA and/or kV according to patient size, or  iterative reconstruction technique. There are no pulmonary arterial  filling defects to indicate pulmonary embolism. Contrast enhancement  within the aorta is less optimal. While there is scattered  atherosclerotic disease, no aortic dissection is demonstrated.  Coronary artery calcification is noted. The mediastinum and lyndsey  appear within normal limits. Pulmonary emphysematous changes are  noted. However, the lungs otherwise appear essentially clear. No  pleural effusion or pneumothorax is demonstrated.      Impression    IMPRESSION: No CT evidence of pulmonary embolism. COPD.    ANAYA SINGH MD           Most Recent Lab Results In EPIC (For Non-EPIC Providers):    Most Recent 3 CBC's:  Recent Labs   Lab Test  04/02/17   0730  03/30/17   0700  03/29/17   1400  03/29/17   1305   WBC   --   11.4*  14.1*  Canceled, Test credited   Unsatisfactory specimen - tube underfilled     HGB   --   15.9  16.2  Canceled, Test credited   Unsatisfactory specimen - tube underfilled     MCV   --   95  94  Canceled, Test credited   Unsatisfactory specimen - tube underfilled     PLT  111*  139*  90*  Canceled, Test credited   Unsatisfactory specimen - tube underfilled        Most Recent 3  BMP's:  Recent Labs   Lab Test  04/04/17   0656  04/03/17   0657  04/02/17   0730  03/30/17   0700  03/29/17   1400   03/28/17   0715   NA   --    --    --   140  139   --   135   POTASSIUM  4.5  4.0   --   4.0  4.2   < >  3.8   CHLORIDE   --    --    --   99  101   --   96   CO2   --    --    --   35*  33*   --   29   BUN   --    --    --   18  18   --   19   CR   --    --   0.83  0.72  0.77   --   0.92   ANIONGAP   --    --    --   6  5   --   10   MATTHEW   --    --    --   9.0  8.9   --   8.7   GLC   --    --    --   126*  122*   --   162*    < > = values in this interval not displayed.     Most Recent 3 Troponin's:  Recent Labs   Lab Test  03/29/17   1400  03/27/17   2344  01/05/17   1124   TROPI  0.016  <0.015  The 99th percentile for upper reference range is 0.045 ug/L.  Troponin values in   the range of 0.045 - 0.120 ug/L may be associated with risks of adverse   clinical events.    <0.015  The 99th percentile for upper reference range is 0.045 ug/L.  Troponin values in   the range of 0.045 - 0.120 ug/L may be associated with risks of adverse   clinical events.       Most Recent 3 INR's:  Recent Labs   Lab Test 03/31/14 09/29/09   INR  1.1  1.0     Most Recent 2 LFT's:  Recent Labs   Lab Test  01/05/17   1124  08/16/16   0932   AST  46*  71*   ALT  38  88*   ALKPHOS  97  99   BILITOTAL  0.8  0.7     Most Recent Cholesterol Panel:  Recent Labs   Lab Test  08/16/16   0932  06/26/13   1403   CHOL   --   146   LDL  93  86   HDL   --   38*   TRIG   --   115     Most Recent 6 Bacteria Isolates From Any Culture (See EPIC Reports for Culture Details):  Recent Labs   Lab Test  03/30/17   0800  03/28/17   0037  03/28/17   0011  03/27/17   2344   CULT  Moderate growth Normal edith  Light growth Serratia marcescens  *  No growth  No growth  No Beta Streptococcus isolated     Most Recent TSH, T4 and HgbA1c:   Recent Labs   Lab Test  01/06/17   0701   01/14/13   1127   TSH   --    --   1.26   A1C  5.3   < >   --     < > =  values in this interval not displayed.            Bettie Mcdonald MD  Internal medicine Hospitalist:   Pager [phone]    4/4/2017

## 2017-04-04 NOTE — PROGRESS NOTES
Patient discharged at 2:45 PM to home.  IV was discontinued. Pain at time of discharge was rated 2/10, Fioricet given. Belongings returned to patient.  Discharge instructions and medications reviewed with patient.  Pt stated he does not read so instructions and medications were reviewed in great detail verbally. Patient verbalized understanding and all questions were answered.  Prescriptions given to patient.  At time of discharge, patient condition was stable and left the unit in a wheelchair escorted by transport aide.

## 2017-04-04 NOTE — PROVIDER NOTIFICATION
MD Notification    Notified Person:  MD    Notified Persons Name: Harry    Notification Date/Time: 4/4/17 10:25 a.m.    Notification Interaction:  Talked with Physician    Purpose of Notification: Asked if O2 order should remain patient on RA 92-94 88-89 with ambulation (COPD) also asked for Outpatient pulmonary rehab order.    Orders Received: O2 order removed and pulmonary rehab ordered    Comments:

## 2017-04-05 ENCOUNTER — TELEPHONE (OUTPATIENT)
Dept: FAMILY MEDICINE | Facility: CLINIC | Age: 77
End: 2017-04-05

## 2017-04-05 ENCOUNTER — TELEPHONE (OUTPATIENT)
Dept: PHARMACY | Facility: OTHER | Age: 77
End: 2017-04-05

## 2017-04-05 ENCOUNTER — CARE COORDINATION (OUTPATIENT)
Dept: CARE COORDINATION | Facility: CLINIC | Age: 77
End: 2017-04-05

## 2017-04-05 NOTE — TELEPHONE ENCOUNTER
ED / Discharge Outreach Protocol    Patient Contact    Attempt # 1    Was call answered?  No.  Unable to leave message.    Halima Lieberman RN, BS  Clinical Nurse Triage.

## 2017-04-05 NOTE — TELEPHONE ENCOUNTER
Chief Complaint   Patient presents with     Hospital F/U     Inpatient discharge from [facility] on 4/4/2017 Insomnia, Unspecified Type, Chronic Obstructive Pulmonary Disease With Acute Exacerbation (H)       Yanira Richard/

## 2017-04-05 NOTE — PROGRESS NOTES
Clinic Care Coordination Contact     Received CTS handoff from inpatient staff   Clinical Data: Care Coordinator Outreach Post Hospital follow up - Patient admitted from 3/27 - 4/4 with COPD exacerbation   MTM and pulmonary rehab ordered at discharge   Pcp f/u appt 4/6/17     Plan: Care Coordinator will reach out to patient later today     Maryam Amador Care Coordinator RN  [facility]  [phone]  April 5, 2017

## 2017-04-05 NOTE — PROGRESS NOTES
"Clinic Care Coordination Contact  [facility]/Voicemail    Referral Source: [facility]  Clinical Data: Care Coordinator Outreach Post Hospital Follow Up - admission for COPD exacerbation 3/27-4/4    Outreach attempted x 1.  Home/Cell numbers listed on chart state that it is \"in house suites and that you need to have room number to leave message for patient if he is still there\"?   Per hospital notes patient is living with roommate in his basement   Plan: Care Coordinator will try to reach patient again in 1-2 business days.    Patient has appt. In clinic with pcp tomorrow 4/6/17 CCRN placed note on chart to verify address/phone     Maryam Amador Care Coordinator RN  [facility] and [facility]  [phone]  April 5, 2017             "

## 2017-04-05 NOTE — LETTER
[place] CARE COORDINATION  [address] [address]  [phone]      April 7, 2017      Chuck Lopez  [address]   [facility] [address]    Dear Chuck,  I am the Clinic Care Coordinator that works with your primary care provider's clinic. I wanted to introduce myself and provide you with my contact information for you to be able to call me with any questions or concerns. I wanted to thank you for spending the time to talk with me.  I have been trying to reach you recently to introduce Clinic Care Coordination and to see if there was anything I could assist you with.  Below is a description of what Clinic Care Coordination is and how I can further assist you.     The Clinic Care Coordinator role is a Registered Nurse and/or  who understands the health care system. The goal of Clinic Care Coordination is to help you manage your health and improve access to the [facility] system in the most efficient manner.  The Registered Nurse can assist you in meeting your health care goals by providing education, coordinating services, and strengthening the communication among your providers. The  can assist you with financial, behavioral, psychosocial, and chemical dependency and counseling/psychiatric resources.    Please feel free to keep this letter and contact information to contact me at [phone] with any further questions or concerns that may arise. We at [facility] are focused on providing you with the highest-quality healthcare experience possible and that all starts with you.     Sincerely,     Maryam Amador Care Coordinator RN  [facility]  [phone]  April 7, 2017

## 2017-04-05 NOTE — TELEPHONE ENCOUNTER
MTM referral from: Transitions of Care (recent hospital discharge or ED visit)    MTM referral outreach attempt #1 on April 5, 2017 at 10:51 AM      Outcome: Unable to leave message for patient at number, tried calling the  and they arent open till11 will try calling back after 11. Currently set up apt for tomorrow with Lukas haney to MD montejo, will verify before day is over    Mckayla Larson, MTM Coordinator

## 2017-04-06 NOTE — PROGRESS NOTES
Clinic Care Coordination Contact  [facility]/Voicemail    Referral Source: [facility]  Clinical Data: Care Coordinator Outreach - post hospital follow up COPD exacerbation   Outreach attempted x 2.  Both numbers listed are for inhouse suites in [facility] unable to leave message   Patient has an appt. Scheduled with pcp today at 11:00   Plan: . Care Coordinator will try to reach patient again in 1-2 business days.    Maryam Amador Care Coordinator RN  [facility]  [phone]  April 6, 2017

## 2017-04-07 NOTE — PROGRESS NOTES
Clinic Care Coordination Contact    Referral Source: [facility]  Clinical Data: Care Coordinator Outreach - Post Hospital follow up COPD exacerbation     Per chart review patient did not attend appointments in clinic with MTM or MD yesterday as scheduled - MTM has also been unable to get ahold of patient    Plan: Care Coordinator will mail out care coordination introduction letter with care coordinator contact information and explanation of care coordination services. Care Coordinator will mail unable to contact letter to address on file - uncertain if patient will obtain at that address. CCRN will f/u in 7-10 days after letter mailed     Maryam Amador Care Coordinator RN  [facility]  [phone]  April 7, 2017

## 2017-04-07 NOTE — TELEPHONE ENCOUNTER
ED / Discharge Outreach Protocol    Patient Contact    Attempt # 2    Was call answered?  No.  Unable to leave message.  Encounter closed after second attempt  Juana Montano RN, BSN  Message handled by Nurse Triage.

## 2017-04-11 ENCOUNTER — TELEPHONE (OUTPATIENT)
Dept: FAMILY MEDICINE | Facility: CLINIC | Age: 77
End: 2017-04-11

## 2017-04-11 DIAGNOSIS — G89.29 CHRONIC MIDLINE LOW BACK PAIN WITH SCIATICA, SCIATICA LATERALITY UNSPECIFIED: Primary | ICD-10-CM

## 2017-04-11 DIAGNOSIS — M54.40 CHRONIC MIDLINE LOW BACK PAIN WITH SCIATICA, SCIATICA LATERALITY UNSPECIFIED: Primary | ICD-10-CM

## 2017-04-11 RX ORDER — TRAMADOL HYDROCHLORIDE 50 MG/1
50-100 TABLET ORAL EVERY 6 HOURS PRN
Qty: 60 TABLET | Refills: 0 | Status: ON HOLD | OUTPATIENT
Start: 2017-04-11 | End: 2017-04-24

## 2017-04-11 NOTE — TELEPHONE ENCOUNTER
Chuck is here today looking for a refill of Tramadol, currently on med list as pt reported.  He is hoping to get it while he waits.  Last filled here 3/24; qty 28 for 5 days    Charlotte Allen, Pharmacy [facility]

## 2017-04-18 ENCOUNTER — TELEPHONE (OUTPATIENT)
Dept: FAMILY MEDICINE | Facility: CLINIC | Age: 77
End: 2017-04-18

## 2017-04-18 ENCOUNTER — HOSPITAL ENCOUNTER (INPATIENT)
Facility: CLINIC | Age: 77
LOS: 6 days | Discharge: HOME | DRG: 190 | End: 2017-04-24
Attending: EMERGENCY MEDICINE | Admitting: INTERNAL MEDICINE
Payer: MEDICARE

## 2017-04-18 ENCOUNTER — APPOINTMENT (OUTPATIENT)
Dept: GENERAL RADIOLOGY | Facility: CLINIC | Age: 77
DRG: 190 | End: 2017-04-18
Attending: EMERGENCY MEDICINE
Payer: MEDICARE

## 2017-04-18 DIAGNOSIS — G89.29 CHRONIC MIDLINE LOW BACK PAIN WITH SCIATICA, SCIATICA LATERALITY UNSPECIFIED: ICD-10-CM

## 2017-04-18 DIAGNOSIS — G43.919 INTRACTABLE MIGRAINE, UNSPECIFIED MIGRAINE TYPE: ICD-10-CM

## 2017-04-18 DIAGNOSIS — M54.40 CHRONIC MIDLINE LOW BACK PAIN WITH SCIATICA, SCIATICA LATERALITY UNSPECIFIED: ICD-10-CM

## 2017-04-18 DIAGNOSIS — J96.01 ACUTE HYPOXEMIC RESPIRATORY FAILURE (H): ICD-10-CM

## 2017-04-18 DIAGNOSIS — F17.200 TOBACCO USE DISORDER: Primary | ICD-10-CM

## 2017-04-18 DIAGNOSIS — J44.0 CHRONIC OBSTRUCTIVE PULMONARY DISEASE WITH ACUTE LOWER RESPIRATORY INFECTION (H): ICD-10-CM

## 2017-04-18 DIAGNOSIS — J43.2 CENTRILOBULAR EMPHYSEMA (H): ICD-10-CM

## 2017-04-18 DIAGNOSIS — J44.1 COPD EXACERBATION (H): ICD-10-CM

## 2017-04-18 DIAGNOSIS — J44.89 CHRONIC OBSTRUCTIVE AIRWAY DISEASE WITH ASTHMA (H): ICD-10-CM

## 2017-04-18 PROBLEM — J96.00 ACUTE RESPIRATORY FAILURE (H): Status: ACTIVE | Noted: 2017-04-18

## 2017-04-18 LAB
ALBUMIN SERPL-MCNC: 3.2 G/DL (ref 3.4–5)
ALP SERPL-CCNC: 90 U/L (ref 40–150)
ALT SERPL W P-5'-P-CCNC: 280 U/L (ref 0–70)
ANION GAP SERPL CALCULATED.3IONS-SCNC: 7 MMOL/L (ref 3–14)
AST SERPL W P-5'-P-CCNC: 155 U/L (ref 0–45)
BASOPHILS # BLD AUTO: 0 10E9/L (ref 0–0.2)
BASOPHILS NFR BLD AUTO: 0.2 %
BILIRUB SERPL-MCNC: 0.7 MG/DL (ref 0.2–1.3)
BUN SERPL-MCNC: 15 MG/DL (ref 7–30)
CALCIUM SERPL-MCNC: 8.9 MG/DL (ref 8.5–10.1)
CHLORIDE SERPL-SCNC: 100 MMOL/L (ref 94–109)
CO2 SERPL-SCNC: 31 MMOL/L (ref 20–32)
CREAT SERPL-MCNC: 0.67 MG/DL (ref 0.66–1.25)
DIFFERENTIAL METHOD BLD: ABNORMAL
EOSINOPHIL # BLD AUTO: 0 10E9/L (ref 0–0.7)
EOSINOPHIL NFR BLD AUTO: 0.4 %
ERYTHROCYTE [DISTWIDTH] IN BLOOD BY AUTOMATED COUNT: 13.9 % (ref 10–15)
FLUAV+FLUBV AG SPEC QL: NEGATIVE
FLUAV+FLUBV AG SPEC QL: NORMAL
GFR SERPL CREATININE-BSD FRML MDRD: ABNORMAL ML/MIN/1.7M2
GLUCOSE BLDC GLUCOMTR-MCNC: 212 MG/DL (ref 70–99)
GLUCOSE SERPL-MCNC: 100 MG/DL (ref 70–99)
HCT VFR BLD AUTO: 44.9 % (ref 40–53)
HGB BLD-MCNC: 15.1 G/DL (ref 13.3–17.7)
IMM GRANULOCYTES # BLD: 0.1 10E9/L (ref 0–0.4)
IMM GRANULOCYTES NFR BLD: 0.7 %
LACTATE SERPL-SCNC: 1 MMOL/L (ref 0.4–2)
LYMPHOCYTES # BLD AUTO: 0.6 10E9/L (ref 0.8–5.3)
LYMPHOCYTES NFR BLD AUTO: 7.4 %
MCH RBC QN AUTO: 31.7 PG (ref 26.5–33)
MCHC RBC AUTO-ENTMCNC: 33.6 G/DL (ref 31.5–36.5)
MCV RBC AUTO: 94 FL (ref 78–100)
MONOCYTES # BLD AUTO: 0.7 10E9/L (ref 0–1.3)
MONOCYTES NFR BLD AUTO: 8.2 %
NEUTROPHILS # BLD AUTO: 7 10E9/L (ref 1.6–8.3)
NEUTROPHILS NFR BLD AUTO: 83.1 %
NRBC # BLD AUTO: 0 10*3/UL
NRBC BLD AUTO-RTO: 0 /100
NT-PROBNP SERPL-MCNC: 728 PG/ML (ref 0–900)
PLATELET # BLD AUTO: 92 10E9/L (ref 150–450)
POTASSIUM SERPL-SCNC: 3.7 MMOL/L (ref 3.4–5.3)
PROT SERPL-MCNC: 6.8 G/DL (ref 6.8–8.8)
RBC # BLD AUTO: 4.76 10E12/L (ref 4.4–5.9)
SODIUM SERPL-SCNC: 138 MMOL/L (ref 133–144)
SPECIMEN SOURCE: NORMAL
WBC # BLD AUTO: 8.4 10E9/L (ref 4–11)

## 2017-04-18 PROCEDURE — 94640 AIRWAY INHALATION TREATMENT: CPT

## 2017-04-18 PROCEDURE — 87804 INFLUENZA ASSAY W/OPTIC: CPT | Performed by: EMERGENCY MEDICINE

## 2017-04-18 PROCEDURE — 99207 ZZC CDG-MDM COMPONENT: MEETS MODERATE - UP CODED: CPT | Performed by: INTERNAL MEDICINE

## 2017-04-18 PROCEDURE — 25000131 ZZH RX MED GY IP 250 OP 636 PS 637: Mod: GY | Performed by: INTERNAL MEDICINE

## 2017-04-18 PROCEDURE — 94640 AIRWAY INHALATION TREATMENT: CPT | Mod: 76

## 2017-04-18 PROCEDURE — 40000275 ZZH STATISTIC RCP TIME EA 10 MIN

## 2017-04-18 PROCEDURE — 25000128 H RX IP 250 OP 636: Performed by: EMERGENCY MEDICINE

## 2017-04-18 PROCEDURE — 85025 COMPLETE CBC W/AUTO DIFF WBC: CPT | Performed by: EMERGENCY MEDICINE

## 2017-04-18 PROCEDURE — 71020 XR CHEST 2 VW: CPT

## 2017-04-18 PROCEDURE — 40000274 ZZH STATISTIC RCP CONSULT EA 30 MIN

## 2017-04-18 PROCEDURE — 80053 COMPREHEN METABOLIC PANEL: CPT | Performed by: EMERGENCY MEDICINE

## 2017-04-18 PROCEDURE — 25000132 ZZH RX MED GY IP 250 OP 250 PS 637: Mod: GY | Performed by: EMERGENCY MEDICINE

## 2017-04-18 PROCEDURE — A9270 NON-COVERED ITEM OR SERVICE: HCPCS | Mod: GY | Performed by: INTERNAL MEDICINE

## 2017-04-18 PROCEDURE — 83605 ASSAY OF LACTIC ACID: CPT | Performed by: EMERGENCY MEDICINE

## 2017-04-18 PROCEDURE — 83880 ASSAY OF NATRIURETIC PEPTIDE: CPT | Performed by: EMERGENCY MEDICINE

## 2017-04-18 PROCEDURE — 25000125 ZZHC RX 250: Performed by: INTERNAL MEDICINE

## 2017-04-18 PROCEDURE — 25000125 ZZHC RX 250: Performed by: EMERGENCY MEDICINE

## 2017-04-18 PROCEDURE — A9270 NON-COVERED ITEM OR SERVICE: HCPCS | Mod: GY | Performed by: EMERGENCY MEDICINE

## 2017-04-18 PROCEDURE — 12000000 ZZH R&B MED SURG/OB

## 2017-04-18 PROCEDURE — 99223 1ST HOSP IP/OBS HIGH 75: CPT | Mod: AI | Performed by: INTERNAL MEDICINE

## 2017-04-18 PROCEDURE — 96374 THER/PROPH/DIAG INJ IV PUSH: CPT

## 2017-04-18 PROCEDURE — 25000132 ZZH RX MED GY IP 250 OP 250 PS 637: Mod: GY | Performed by: INTERNAL MEDICINE

## 2017-04-18 PROCEDURE — 93005 ELECTROCARDIOGRAM TRACING: CPT

## 2017-04-18 PROCEDURE — 99285 EMERGENCY DEPT VISIT HI MDM: CPT | Mod: 25

## 2017-04-18 PROCEDURE — 00000146 ZZHCL STATISTIC GLUCOSE BY METER IP

## 2017-04-18 PROCEDURE — 96361 HYDRATE IV INFUSION ADD-ON: CPT

## 2017-04-18 RX ORDER — ALBUTEROL SULFATE 0.83 MG/ML
2.5 SOLUTION RESPIRATORY (INHALATION) EVERY 4 HOURS PRN
Status: DISCONTINUED | OUTPATIENT
Start: 2017-04-18 | End: 2017-04-24 | Stop reason: HOSPADM

## 2017-04-18 RX ORDER — AZITHROMYCIN 250 MG/1
500 TABLET, FILM COATED ORAL ONCE
Status: COMPLETED | OUTPATIENT
Start: 2017-04-18 | End: 2017-04-18

## 2017-04-18 RX ORDER — METHYLPREDNISOLONE SODIUM SUCCINATE 125 MG/2ML
125 INJECTION, POWDER, LYOPHILIZED, FOR SOLUTION INTRAMUSCULAR; INTRAVENOUS ONCE
Status: COMPLETED | OUTPATIENT
Start: 2017-04-18 | End: 2017-04-18

## 2017-04-18 RX ORDER — TRAMADOL HYDROCHLORIDE 50 MG/1
50 TABLET ORAL ONCE
Status: COMPLETED | OUTPATIENT
Start: 2017-04-18 | End: 2017-04-18

## 2017-04-18 RX ORDER — METHYLPREDNISOLONE SODIUM SUCCINATE 40 MG/ML
40 INJECTION, POWDER, LYOPHILIZED, FOR SOLUTION INTRAMUSCULAR; INTRAVENOUS EVERY 24 HOURS
Status: DISCONTINUED | OUTPATIENT
Start: 2017-04-19 | End: 2017-04-20

## 2017-04-18 RX ORDER — ATENOLOL 25 MG/1
25 TABLET ORAL DAILY
Status: DISCONTINUED | OUTPATIENT
Start: 2017-04-19 | End: 2017-04-24 | Stop reason: HOSPADM

## 2017-04-18 RX ORDER — IPRATROPIUM BROMIDE AND ALBUTEROL SULFATE 2.5; .5 MG/3ML; MG/3ML
3 SOLUTION RESPIRATORY (INHALATION) ONCE
Status: COMPLETED | OUTPATIENT
Start: 2017-04-18 | End: 2017-04-18

## 2017-04-18 RX ORDER — HYDROCHLOROTHIAZIDE 25 MG/1
25 TABLET ORAL DAILY
Status: DISCONTINUED | OUTPATIENT
Start: 2017-04-19 | End: 2017-04-24 | Stop reason: HOSPADM

## 2017-04-18 RX ORDER — TRAMADOL HYDROCHLORIDE 50 MG/1
50-100 TABLET ORAL EVERY 6 HOURS PRN
Status: DISCONTINUED | OUTPATIENT
Start: 2017-04-18 | End: 2017-04-24 | Stop reason: HOSPADM

## 2017-04-18 RX ORDER — LORAZEPAM 0.5 MG/1
0.5 TABLET ORAL EVERY 4 HOURS PRN
Status: DISCONTINUED | OUTPATIENT
Start: 2017-04-18 | End: 2017-04-24 | Stop reason: HOSPADM

## 2017-04-18 RX ORDER — IPRATROPIUM BROMIDE AND ALBUTEROL SULFATE 2.5; .5 MG/3ML; MG/3ML
3 SOLUTION RESPIRATORY (INHALATION) EVERY 4 HOURS
Status: DISCONTINUED | OUTPATIENT
Start: 2017-04-18 | End: 2017-04-18

## 2017-04-18 RX ORDER — PREDNISONE 20 MG/1
TABLET ORAL
Qty: 20 TABLET | Refills: 0 | Status: SHIPPED | OUTPATIENT
Start: 2017-04-18 | End: 2017-04-24

## 2017-04-18 RX ORDER — IPRATROPIUM BROMIDE AND ALBUTEROL SULFATE 2.5; .5 MG/3ML; MG/3ML
3 SOLUTION RESPIRATORY (INHALATION)
Status: DISCONTINUED | OUTPATIENT
Start: 2017-04-19 | End: 2017-04-24 | Stop reason: HOSPADM

## 2017-04-18 RX ORDER — ONDANSETRON 4 MG/1
4 TABLET, ORALLY DISINTEGRATING ORAL EVERY 6 HOURS PRN
Status: DISCONTINUED | OUTPATIENT
Start: 2017-04-18 | End: 2017-04-24 | Stop reason: HOSPADM

## 2017-04-18 RX ORDER — ONDANSETRON 2 MG/ML
4 INJECTION INTRAMUSCULAR; INTRAVENOUS EVERY 6 HOURS PRN
Status: DISCONTINUED | OUTPATIENT
Start: 2017-04-18 | End: 2017-04-24 | Stop reason: HOSPADM

## 2017-04-18 RX ORDER — IPRATROPIUM BROMIDE AND ALBUTEROL SULFATE 2.5; .5 MG/3ML; MG/3ML
6 SOLUTION RESPIRATORY (INHALATION) ONCE
Status: COMPLETED | OUTPATIENT
Start: 2017-04-18 | End: 2017-04-18

## 2017-04-18 RX ORDER — LANOLIN ALCOHOL/MO/W.PET/CERES
3 CREAM (GRAM) TOPICAL
Status: DISCONTINUED | OUTPATIENT
Start: 2017-04-18 | End: 2017-04-24 | Stop reason: HOSPADM

## 2017-04-18 RX ORDER — NALOXONE HYDROCHLORIDE 0.4 MG/ML
.1-.4 INJECTION, SOLUTION INTRAMUSCULAR; INTRAVENOUS; SUBCUTANEOUS
Status: DISCONTINUED | OUTPATIENT
Start: 2017-04-18 | End: 2017-04-24 | Stop reason: HOSPADM

## 2017-04-18 RX ORDER — DEXTROSE MONOHYDRATE 25 G/50ML
25-50 INJECTION, SOLUTION INTRAVENOUS
Status: DISCONTINUED | OUTPATIENT
Start: 2017-04-18 | End: 2017-04-24 | Stop reason: HOSPADM

## 2017-04-18 RX ORDER — MULTIPLE VITAMINS W/ MINERALS TAB 9MG-400MCG
1 TAB ORAL DAILY
Status: DISCONTINUED | OUTPATIENT
Start: 2017-04-19 | End: 2017-04-24 | Stop reason: HOSPADM

## 2017-04-18 RX ORDER — BUTALBITAL, ACETAMINOPHEN, CAFFEINE AND CODEINE PHOSPHATE 50; 325; 40; 30 MG/1; MG/1; MG/1; MG/1
1 CAPSULE ORAL EVERY 4 HOURS PRN
Status: DISCONTINUED | OUTPATIENT
Start: 2017-04-18 | End: 2017-04-24 | Stop reason: HOSPADM

## 2017-04-18 RX ORDER — NICOTINE POLACRILEX 4 MG
15-30 LOZENGE BUCCAL
Status: DISCONTINUED | OUTPATIENT
Start: 2017-04-18 | End: 2017-04-24 | Stop reason: HOSPADM

## 2017-04-18 RX ORDER — AZITHROMYCIN 250 MG/1
TABLET, FILM COATED ORAL
Qty: 6 TABLET | Refills: 0 | Status: SHIPPED | OUTPATIENT
Start: 2017-04-18 | End: 2017-04-24

## 2017-04-18 RX ADMIN — IPRATROPIUM BROMIDE AND ALBUTEROL SULFATE 6 ML: .5; 3 SOLUTION RESPIRATORY (INHALATION) at 14:42

## 2017-04-18 RX ADMIN — AZITHROMYCIN 500 MG: 250 TABLET, FILM COATED ORAL at 18:23

## 2017-04-18 RX ADMIN — SODIUM CHLORIDE 1000 ML: 9 INJECTION, SOLUTION INTRAVENOUS at 13:56

## 2017-04-18 RX ADMIN — TRAMADOL HYDROCHLORIDE 50 MG: 50 TABLET, COATED ORAL at 16:43

## 2017-04-18 RX ADMIN — BUTALBITAL, ACETAMINOPHEN, CAFFEINE, AND CODEINE PHOSPHATE 1 CAPSULE: 30; 50; 40; 325 CAPSULE ORAL at 20:50

## 2017-04-18 RX ADMIN — METHYLPREDNISOLONE SODIUM SUCCINATE 125 MG: 125 INJECTION, POWDER, FOR SOLUTION INTRAMUSCULAR; INTRAVENOUS at 18:24

## 2017-04-18 RX ADMIN — INSULIN ASPART 1 UNITS: 100 INJECTION, SOLUTION INTRAVENOUS; SUBCUTANEOUS at 21:42

## 2017-04-18 RX ADMIN — IPRATROPIUM BROMIDE AND ALBUTEROL SULFATE 3 ML: .5; 3 SOLUTION RESPIRATORY (INHALATION) at 17:48

## 2017-04-18 RX ADMIN — IPRATROPIUM BROMIDE AND ALBUTEROL SULFATE 3 ML: .5; 3 SOLUTION RESPIRATORY (INHALATION) at 21:32

## 2017-04-18 ASSESSMENT — ENCOUNTER SYMPTOMS
SHORTNESS OF BREATH: 1
COUGH: 1

## 2017-04-18 NOTE — ED NOTES
Pt reports [facility] had a bag of his home medications including prednisone and multiple inhalers.  [facility] contacted [facility] and [facility] states no medications in Ambulance

## 2017-04-18 NOTE — ED NOTES
Bed: ED29  Expected date: 4/18/17  Expected time: 1:06 PM  Means of arrival: Ambulance  Comments:  [facility] 435

## 2017-04-18 NOTE — TELEPHONE ENCOUNTER
"Pt. Is at a friends house, [place]; Calling he is concerned pt. Is not able to make it to his PCP OV this afternoon. ([phone]); ([phone]).    Per friend he reports pt. \"does not look good at all\", \"pale and ill\", he can't breath. Has tried his albuterol inhaler but it doesn't seem to work and not sure what to do\", \"He looks like he is not able to get a full breath in\".  Triage was able to hear pt. In [place] confirm his address in a soft tone. When asked to speak with the patient, Pt. Is overheard as very SOB with little ability to speak very few words. Pt. Reported he is 'unable to stand without becoming dizzy and weak'.     Triage asked for friend to get back on the phone. Advised pt. Friend to call 911 to have him evaluated sooner (Friend had wanted to take pt. To [facility]).     Per pt. Friend he will call 911.    Shaina Rosenberg, RN, BSN, PHN      "

## 2017-04-18 NOTE — ED AVS SNAPSHOT
[facility] Emergency Department    [address]    [facility] [address]    Phone:  [phone]    Fax:  [phone]                                       Chuck Lopez   MRN: [id number]    Department:  [facility] Emergency Department   Date of Visit:  4/18/2017           Patient Information     Date Of Birth          1940        Your diagnoses for this visit were:     COPD exacerbation (H)        You were seen by Greg Noble MD.      Follow-up Information     Follow up with Pranay Reid MD.    Specialty:  Family Practice    Contact information:    [facility]  [id number] [place] [address] [phone]          Follow up with [facility] Emergency Department.    Specialty:  EMERGENCY MEDICINE    Why:  As needed, If symptoms worsen    Contact information:    [address] [facility] [id number] [phone]      Discharge References/Attachments     COPD FLARE (ENGLISH)      24 Hour Appointment Hotline       To make an appointment at any [facility], call [phone] ([phone]). If you don't have a family doctor or clinic, we will help you find one. [place] clinics are conveniently located to serve the needs of you and your family.             Review of your medicines      START taking        Dose / Directions Last dose taken    azithromycin 250 MG tablet   Commonly known as:  ZITHROMAX   Quantity:  6 tablet        Two tablets first day, then one tablet daily for four days.   Refills:  0          CONTINUE these medicines which may have CHANGED, or have new prescriptions. If we are uncertain of the size of tablets/capsules you have at home, strength may be listed as something that might have changed.        Dose / Directions Last dose taken    predniSONE 20 MG tablet   Commonly known as:  DELTASONE   What changed:    - medication strength  - additional instructions   Quantity:   20 tablet        3 tab daily for 3 days, then 2 tab daily for 3 days, then 1 tab daily for 3 days, then 0.5 tab daily for 4 days.   Refills:  0          Our records show that you are taking the medicines listed below. If these are incorrect, please call your family doctor or clinic.        Dose / Directions Last dose taken    * albuterol 108 (90 BASE) MCG/ACT Inhaler   Commonly known as:  PROAIR HFA/PROVENTIL HFA/VENTOLIN HFA   Dose:  2 puff   Quantity:  1 Inhaler        Inhale 2 puffs into the lungs every 6 hours as needed for shortness of breath / dyspnea or wheezing   Refills:  0        * albuterol (2.5 MG/3ML) 0.083% neb solution   Dose:  3 mL   Quantity:  360 mL        Take 1 vial (2.5 mg) by nebulization every 4 hours as needed for shortness of breath / dyspnea or wheezing   Refills:  3        ATENOLOL PO   Dose:  25 mg        Take 25 mg by mouth daily   Refills:  0        butalbital-APAP-caffeine-codeine -55-30 MG per capsule   Commonly known as:  FIORICET WITH codeine   Dose:  1 capsule   Quantity:  30 capsule        Take 1 capsule by mouth every 4 hours as needed for headaches   Refills:  0        fluticasone-salmeterol 500-50 MCG/DOSE diskus inhaler   Commonly known as:  ADVAIR   Dose:  1 puff   Quantity:  3 Inhaler        Inhale 1 puff into the lungs 2 times daily   Refills:  1        HYDROCHLOROTHIAZIDE PO   Dose:  25 mg        Take 25 mg by mouth daily   Refills:  0        LORazepam 0.5 MG tablet   Commonly known as:  ATIVAN   Dose:  0.5 mg   Quantity:  30 tablet        Take 1 tablet (0.5 mg) by mouth every 4 hours as needed for anxiety   Refills:  0        melatonin 3 MG tablet   Dose:  3 mg   Quantity:  30 tablet        Take 1 tablet (3 mg) by mouth nightly as needed   Refills:  0        QVAR 80 MCG/ACT Inhaler   Quantity:  8.7 g   Generic drug:  beclomethasone        INHALE TWO PUFFS BY MOUTH TWICE A DAY   Refills:  11        SPIRIVA HANDIHALER 18 MCG capsule   Quantity:  30 capsule   Generic  drug:  tiotropium        INHALE ONE CAPSULE BY MOUTH EVERY DAY   Refills:  11        traMADol 50 MG tablet   Commonly known as:  ULTRAM   Dose:   mg   Quantity:  60 tablet        Take 1-2 tablets ( mg) by mouth every 6 hours as needed for pain maximum 4 tablet(s) per day SEE DR 2 WEEKS   Refills:  0        V-C FORTE Caps   Dose:  1 capsule   Quantity:  90 capsule        Take 1 capsule by mouth daily   Refills:  3        * Notice:  This list has 2 medication(s) that are the same as other medications prescribed for you. Read the directions carefully, and ask your doctor or other care provider to review them with you.            Prescriptions were sent or printed at these locations (2 Prescriptions)                   Other Prescriptions                Printed at Department/Unit printer (2 of 2)         predniSONE (DELTASONE) 20 MG tablet               azithromycin (ZITHROMAX) 250 MG tablet                Procedures and tests performed during your visit     BNP    CBC with platelets differential    Chest XR,  PA & LAT    Comprehensive metabolic panel    EKG 12 lead    Influenza A/B antigen    Lactic acid    Peripheral IV catheter      Orders Needing Specimen Collection     None      Pending Results     Date and Time Order Name Status Description    4/18/2017 1325 EKG 12 lead Preliminary             Pending Culture Results     No orders found from 4/16/2017 to 4/19/2017.            Test Results From Your Hospital Stay        4/18/2017  2:30 PM      Component Results     Component Value Ref Range & Units Status    WBC 8.4 4.0 - 11.0 10e9/L Final    RBC Count 4.76 4.4 - 5.9 10e12/L Final    Hemoglobin 15.1 13.3 - 17.7 g/dL Final    Hematocrit 44.9 40.0 - 53.0 % Final    MCV 94 78 - 100 fl Final    MCH 31.7 26.5 - 33.0 pg Final    MCHC 33.6 31.5 - 36.5 g/dL Final    RDW 13.9 10.0 - 15.0 % Final    Platelet Count 92 (L) 150 - 450 10e9/L Final    Diff Method Automated Method  Final    % Neutrophils 83.1 % Final     % Lymphocytes 7.4 % Final    % Monocytes 8.2 % Final    % Eosinophils 0.4 % Final    % Basophils 0.2 % Final    % Immature Granulocytes 0.7 % Final    Nucleated RBCs 0 0 /100 Final    Absolute Neutrophil 7.0 1.6 - 8.3 10e9/L Final    Absolute Lymphocytes 0.6 (L) 0.8 - 5.3 10e9/L Final    Absolute Monocytes 0.7 0.0 - 1.3 10e9/L Final    Absolute Eosinophils 0.0 0.0 - 0.7 10e9/L Final    Absolute Basophils 0.0 0.0 - 0.2 10e9/L Final    Abs Immature Granulocytes 0.1 0 - 0.4 10e9/L Final    Absolute Nucleated RBC 0.0  Final         4/18/2017  2:39 PM      Component Results     Component Value Ref Range & Units Status    Sodium 138 133 - 144 mmol/L Final    Potassium 3.7 3.4 - 5.3 mmol/L Final    Chloride 100 94 - 109 mmol/L Final    Carbon Dioxide 31 20 - 32 mmol/L Final    Anion Gap 7 3 - 14 mmol/L Final    Glucose 100 (H) 70 - 99 mg/dL Final    Urea Nitrogen 15 7 - 30 mg/dL Final    Creatinine 0.67 0.66 - 1.25 mg/dL Final    GFR Estimate >90  Non  GFR Calc   >60 mL/min/1.7m2 Final    GFR Estimate If Black >90   GFR Calc   >60 mL/min/1.7m2 Final    Calcium 8.9 8.5 - 10.1 mg/dL Final    Bilirubin Total 0.7 0.2 - 1.3 mg/dL Final    Albumin 3.2 (L) 3.4 - 5.0 g/dL Final    Protein Total 6.8 6.8 - 8.8 g/dL Final    Alkaline Phosphatase 90 40 - 150 U/L Final     (H) 0 - 70 U/L Final     (H) 0 - 45 U/L Final         4/18/2017  2:39 PM      Component Results     Component Value Ref Range & Units Status    N-Terminal Pro BNP Inpatient 728 0 - 900 pg/mL Final    Reference range shown and results flagged as abnormal are suggested inpatient   cut points for confirming diagnosis if CHF in an acute setting. Establishing   a   baseline value for each individual patient is useful for follow-up. An   inpatient or emergency department NT-proPBNP <300 pg/mL effectively rules out   acute CHF, with 99% negative predictive value.  The outpatient non-acute reference range for ruling out CHF is:    0-125 pg/mL (age 18 to less than 75)   0-450 pg/mL (age 75 yrs and older)           4/18/2017  2:18 PM      Narrative     XR CHEST 2 VW  4/18/2017 2:11 PM    HISTORY:  dyspnea, cough    COMPARISON:  3/29/2017        Impression     IMPRESSION:  Hyperinflation consistent with emphysematous changes. No  infiltrates to suggest pneumonia. Mild stable vertebral body height  loss of a midthoracic vertebral body. Otherwise negative.     MADISON BLANC MD         4/18/2017  2:47 PM      Component Results     Component Value Ref Range & Units Status    Influenza A/B Agn Specimen Nasopharyngeal  Final    Influenza A Negative NEG Final    Influenza B  NEG Final    Negative   Test results must be correlated with clinical data. If necessary, results   should be confirmed by a molecular assay or viral culture.           4/18/2017  2:39 PM      Component Results     Component Value Ref Range & Units Status    Lactic Acid 1.0 0.4 - 2.0 mmol/L Final                Clinical Quality Measure: Blood Pressure Screening     Your blood pressure was checked while you were in the emergency department today. The last reading we obtained was  BP: 133/62 . Please read the guidelines below about what these numbers mean and what you should do about them.  If your systolic blood pressure (the top number) is less than 120 and your diastolic blood pressure (the bottom number) is less than 80, then your blood pressure is normal. There is nothing more that you need to do about it.  If your systolic blood pressure (the top number) is 120-139 or your diastolic blood pressure (the bottom number) is 80-89, your blood pressure may be higher than it should be. You should have your blood pressure rechecked within a year by a primary care provider.  If your systolic blood pressure (the top number) is 140 or greater or your diastolic blood pressure (the bottom number) is 90 or greater, you may have high blood pressure. High blood pressure is treatable, but if  "left untreated over time it can put you at risk for heart attack, stroke, or kidney failure. You should have your blood pressure rechecked by a primary care provider within the next 4 weeks.  If your provider in the emergency department today gave you specific instructions to follow-up with your doctor or provider even sooner than that, you should follow that instruction and not wait for up to 4 weeks for your follow-up visit.        Thank you for choosing [facility]       Thank you for choosing [facility] for your care. Our goal is always to provide you with excellent care. Hearing back from our patients is one way we can continue to improve our services. Please take a few minutes to complete the written survey that you may receive in the mail after you visit with us. Thank you!        [employer] Information     [employer] lets you send messages to your doctor, view your test results, renew your prescriptions, schedule appointments and more. To sign up, go to [email or web address] . Click on \"Log in\" on the left side of the screen, which will take you to the Welcome page. Then click on \"Sign up Now\" on the right side of the page.     You will be asked to enter the access code listed below, as well as some personal information. Please follow the directions to create your username and password.     Your access code is: [email or web address]  Expires: 2017  3:56 PM     Your access code will  in 90 days. If you need help or a new code, please call your [facility] clinic or [phone].        Care EveryWhere ID     This is your Care EveryWhere ID. This could be used by other organizations to access your [facility] medical records  [id number]        After Visit Summary       This is your record. Keep this with you and show to your community pharmacist(s) and doctor(s) at your next visit.                  "

## 2017-04-18 NOTE — ED NOTES
A/O. VSS. PIV removed. Pt verbalized understanding of d/c instructions and taken to lobby in w/c. Homeless resources given.   Script rx x2 given to pt at time of d/c

## 2017-04-18 NOTE — LETTER
Transition Communication Hand-off for Care Transitions to Next Level of Care Provider    Name: Chuck Lopez  : 1940  MRN #: [id number]  Reason for Hospitalization:  COPD exacerbation (H) [J44.1]  Acute hypoxemic respiratory failure (H) [J96.01]  Admit Date/Time: 2017  1:23 PM  Discharge Date:  2017    Reason for Communication Hand-off Referral: Admission diagnoses: COPD    Discharge Plan:  Discharged to: Home with support                   Patient agreeable to post-hospital support suggestions:  Yes  Discharge Plan:  Home with support           Patient is on new medications:   Yes           MTM follow up recommended: No           Tel-Ass[employer] program:  Ineligible           Follow-up specialty is recommended: No      Follow-up plan:  No future appointments.    Any outstanding tests or procedures:  No.      Key Recommendations:  Patient was discharged on Prednisone taper. Please assess compliance with completing medication. He was still a smoker at time of admission. He would greatly benefit from ongoing recommendations to quit smoking. Prescription at discharge for Nicotrol inhaler to assist in this. He told CM that he needs a cane but declined therapy evaluation during admission.     Communicated handoff via EPIC Comm Mgt to Dr. Pranay Reid's CC at [facility].      Elina Vázquez RN, BSN, CTS  [facility]  [phone]    AVS/Discharge Summary is the source of truth; this is a helpful guide for improved communication of patient story

## 2017-04-18 NOTE — ED AVS SNAPSHOT
[facility] Emergency Department    [address]    [facility] [address]    Phone:  [phone]    Fax:  [phone]                                       Chuck Lopez   MRN: [id number]    Department:  [facility] Emergency Department   Date of Visit:  4/18/2017           After Visit Summary Signature Page     I have received my discharge instructions, and my questions have been answered. I have discussed any challenges I see with this plan with the nurse or doctor.    ..........................................................................................................................................  Patient/Patient Representative Signature      ..........................................................................................................................................  Patient Representative Print Name and Relationship to Patient    ..................................................               ................................................  Date                                            Time    ..........................................................................................................................................  Reviewed by Signature/Title    ...................................................              ..............................................  Date                                                            Time

## 2017-04-18 NOTE — ED NOTES
Pt reports recent hospitalization for PNA. Pt states increased SOB and loose cough 2-3 days. EMS state 88% RA. 2LPM initiated. Denies CP. 20 PIV R arm. BS 92. Pt homeless.

## 2017-04-18 NOTE — IP AVS SNAPSHOT
[place] [address] [facility]    [address]    [facility] [address]    Phone:  [phone]    Fax:  [phone]                                       After Visit Summary   4/18/2017    Chuck Lopez    MRN: [id number]           After Visit Summary Signature Page     I have received my discharge instructions, and my questions have been answered. I have discussed any challenges I see with this plan with the nurse or doctor.    ..........................................................................................................................................  Patient/Patient Representative Signature      ..........................................................................................................................................  Patient Representative Print Name and Relationship to Patient    ..................................................               ................................................  Date                                            Time    ..........................................................................................................................................  Reviewed by Signature/Title    ...................................................              ..............................................  Date                                                            Time

## 2017-04-18 NOTE — ED NOTES
Pt got dressed and ambulated in room. Pt called nurse due to complains of difficulty breathing. Pt O2 83% RA. 2 LPM initiated. MD Noble notified.

## 2017-04-18 NOTE — H&P
[facility]    History and Physical  Hospitalist       Date of Admission:  4/18/2017  Date of Service (when I saw the patient): 04/18/17    Assessment & Plan   Chuck Lopez is a 76 year old male patient with past medical history of chronic neck pain, COPD, diabetes mellitus type 2, hepatitis C, hypertension, came to emergency room for evaluation for shortness of breath. Patient stated that his shortness of breath started yesterday and gradually got worse. He has associated wheezing.In the ER, his O2 sat dropped to low 80's while he was awaiting discharge. He was admitted for further management.     1. Acute hypoxic respiratory failure secondary to COPD exacerbation: He is not oxygen or steroid dependent. He has no fever. CXR  Shows emphysematous changes but no acute infiltrates. White cell count is normal.  --Will put him on scheduled  duoNeb q4 hours.   --He was given Solumedrol 125 mg IV in the ER. Will put him on solumedrol 40 mg IV q12 hours. Will resume Spiriva.   --Continue oxygen supplement. Needs evaluation for need for home oxygen prior to discharge.     2. Tobacco use disorder. He is actively smoking. Counseled for smoking cessation. Will put him on nicotine patch    3. Hypertension: Blood pressure is stable. Will resume atenolol and hydrochlorothiazide at home dose.     4. Diabetes mellitus type 2, diet controlled. BS controlled. Will put him on insulin slding scale while on steroids.     5. History of hepatitis C with elevated LFT's : needs Outpatient follow up. No further workup here.     Will admit to medical floor as inpatient      DVT Prophylaxis: Pneumatic Compression Devices    Code Status: Full Code    Disposition: Expected discharge in 1-2 days    Kyleigh Dey MD    Primary Care Physician   Pranay Reid    Chief Complaint   Chest pain     History is obtained from the patient    History of Present Illness   Chuck Lopez is a 76 year old male patient with past  medical history of chronic neck pain, COPD, diabetes mellitus type 2, hepatitis C, hypertension, came to emergency room for evaluation for shortness of breath. Patient stated that his shortness of breath started yesterday and gradually got worse. He has associated wheezing. He denies cough or fever. He also denies chest pain. He has no leg swelling. He has history of COPD for which he takes inhalers. He is active smoker. He denies history of CHF. He stated that he was recently admitted to [facility] for COPD exacerbation.   On arrival to emergency room, his vital were blood pressure 128/74, pulse 85, temperature 98.9. Lab workup showed normal white cell count. Chest x-ray showed Hyperinflation consistent with emphysematous changes no evidence of acute infiltrates. He was given neb treatment and IV solumedrol. His oxygen saturation dropped to low 80's while he was in emergency room. He was put on oxygen and admitted for further management.     Past Medical History    I have reviewed this patient's medical history and updated it with pertinent information if needed.   Past Medical History:   Diagnosis Date     Chronic neck pain      COPD (chronic obstructive pulmonary disease) (H)      Diabetes (H)      Hepatitis C      Hypertension        Past Surgical History   I have reviewed this patient's surgical history and updated it with pertinent information if needed.  Past Surgical History:   Procedure Laterality Date     APPENDECTOMY       ESOPHAGOSCOPY, GASTROSCOPY, DUODENOSCOPY (EGD), COMBINED  4/16/2014    Procedure: ESOPHAGOSCOPY, GASTROSCOPY, DUODENOSCOPY (EGD) & COLONOSCOPY with polypectomy by hot snare;  Surgeon: Dillon Vázquez MD;  Location:  GI       Prior to Admission Medications   Prior to Admission Medications   Prescriptions Last Dose Informant Patient Reported? Taking?   ATENOLOL PO 4/18/2017 at Unknown time Self Yes Yes   Sig: Take 25 mg by mouth daily   HYDROCHLOROTHIAZIDE PO 4/18/2017 at Unknown time  "Self Yes Yes   Sig: Take 25 mg by mouth daily   LORazepam (ATIVAN) 0.5 MG tablet 4/18/2017 at Unknown time  No Yes   Sig: Take 1 tablet (0.5 mg) by mouth every 4 hours as needed for anxiety   Multiple Vitamins-Minerals (V-C FORTE) CAPS 4/18/2017 at Unknown time Self No Yes   Sig: Take 1 capsule by mouth daily   QVAR 80 MCG/ACT Inhaler 4/18/2017 at Unknown time  No Yes   Sig: INHALE TWO PUFFS BY MOUTH TWICE A DAY   SPIRIVA HANDIHALER 18 MCG capsule 4/18/2017 at Unknown time  No Yes   Sig: INHALE ONE CAPSULE BY MOUTH EVERY DAY   albuterol (2.5 MG/3ML) 0.083% neb solution 4/18/2017 at Unknown time Self No Yes   Sig: Take 1 vial (2.5 mg) by nebulization every 4 hours as needed for shortness of breath / dyspnea or wheezing   albuterol (PROAIR HFA/PROVENTIL HFA/VENTOLIN HFA) 108 (90 BASE) MCG/ACT Inhaler 4/18/2017 at Unknown time Self No Yes   Sig: Inhale 2 puffs into the lungs every 6 hours as needed for shortness of breath / dyspnea or wheezing   butalbital-APAP-caffeine-codeine (FIORICET WITH CODEINE) -92-30 MG per capsule 4/18/2017 at Unknown time  No Yes   Sig: Take 1 capsule by mouth every 4 hours as needed for headaches   fluticasone-salmeterol (ADVAIR) 500-50 MCG/DOSE diskus inhaler 4/18/2017 at Unknown time  No Yes   Sig: Inhale 1 puff into the lungs 2 times daily   melatonin 3 MG tablet More than a month at Unknown time  No No   Sig: Take 1 tablet (3 mg) by mouth nightly as needed   traMADol (ULTRAM) 50 MG tablet 4/18/2017 at Unknown time  No Yes   Sig: Take 1-2 tablets ( mg) by mouth every 6 hours as needed for pain maximum 4 tablet(s) per day SEE  2 WEEKS      Facility-Administered Medications: None     Allergies   Allergies   Allergen Reactions     Ammonium Lactate Other (See Comments)     Burning to skin     Doxycycline Visual Disturbance     Monosodium Glutamate Other (See Comments)     Pt states it feels like his \"head swells, visual disturbances, and he can't think striaght\"     Penicillins " Rash       Social History   I have reviewed this patient's social history and updated it with pertinent information if needed. Chuck Lopez  reports that he has been smoking Cigarettes.  He has been smoking about 0.25 packs per day. He has never used smokeless tobacco. He reports that he does not drink alcohol or use illicit drugs.    Family History   I have reviewed this patient's family history and updated it with pertinent information if needed.   No family history on file.    Review of Systems   The 10 point Review of Systems is negative other than noted in the HPI or here. Shortness of breath and wheezing    Physical Exam   Temp: 98.9  F (37.2  C) Temp src: Oral BP: 133/62 Pulse: 85 Heart Rate: 78 Resp: 16 SpO2: 98 % O2 Device: Nasal cannula Oxygen Delivery: 2 LPM  Vital Signs with Ranges  Temp:  [98.9  F (37.2  C)] 98.9  F (37.2  C)  Pulse:  [85] 85  Heart Rate:  [72-85] 78  Resp:  [16-18] 16  BP: (121-139)/(51-75) 133/62  SpO2:  [83 %-99 %] 98 %  110 lbs 0 oz    GEN:  Alert, oriented x 3, uncomfortable, NAD.  HEENT:  Normocephalic/atraumatic, no scleral icterus, no nasal discharge, mouth moist.  CV:  Regular rate and rhythm, no murmur or JVD.  S1 + S2 noted, no S3 or S4.  LUNGS: has bilateral wheezes, no crackles.  Symmetric chest rise on inhalation noted.  ABD:  Active bowel sounds, soft, non-tender/non-distended.  No rebound/guarding/rigidity.  EXT:  No edema or cyanosis.  Hands/feet warm to touch with good signs of peripheral perfusion.  No joint synovitis noted.  SKIN:  Dry to touch, no exanthems noted in the visualized areas.  NEURO:  Symmetric muscle strength, sensation to touch grossly intact.  No new focal deficits appreciated.    Data   Data reviewed today:  I personally reviewed no images or EKG's today.    Recent Labs  Lab 04/18/17  1353   WBC 8.4   HGB 15.1   MCV 94   PLT 92*      POTASSIUM 3.7   CHLORIDE 100   CO2 31   BUN 15   CR 0.67   ANIONGAP 7   MATTHEW 8.9   *   ALBUMIN 3.2*    PROTTOTAL 6.8   BILITOTAL 0.7   ALKPHOS 90   *   *       Recent Results (from the past 24 hour(s))   Chest XR,  PA & LAT    Narrative    XR CHEST 2 VW  4/18/2017 2:11 PM    HISTORY:  dyspnea, cough    COMPARISON:  3/29/2017      Impression    IMPRESSION:  Hyperinflation consistent with emphysematous changes. No  infiltrates to suggest pneumonia. Mild stable vertebral body height  loss of a midthoracic vertebral body. Otherwise negative.     MADISON BLANC MD

## 2017-04-18 NOTE — IP AVS SNAPSHOT
MRN:[id number]                      After Visit Summary   4/18/2017    Chuck Lopez    MRN: [id number]           Thank you!     Thank you for choosing [facility] for your care. Our goal is always to provide you with excellent care. Hearing back from our patients is one way we can continue to improve our services. Please take a few minutes to complete the written survey that you may receive in the mail after you visit. If you would like to speak to someone directly about your visit please contact Patient Relations at [phone]. Thank you!          Patient Information     Date Of Birth          1940        Designated Caregiver       Most Recent Value    Caregiver    Will someone help with your care after discharge? no      About your hospital stay     You were admitted on:  April 18, 2017 You last received care in the:  [place] [address] [facility]    You were discharged on:  April 24, 2017       Who to Call     For medical emergencies, please call 911.  For non-urgent questions about your medical care, please call your primary care provider or clinic, [phone]          Attending Provider     Provider Specialty    Greg Noble MD Emergency Medicine    [facility], Kyleigh Glass MD Internal Medicine       Primary Care Provider Office Phone # Fax #    Pranay Frank Reid [phone]       [address] [address]        After Care Instructions     Activity       Your activity upon discharge: activity as tolerated            Diet       Follow this diet upon discharge: regular                  Follow-up Appointments     Follow-up and recommended labs and tests        See primary MD within 5-7 days for routine follow up after hospitalization                  Pending Results     No orders found from 4/16/2017 to 4/19/2017.            Statement of Approval     Ordered          04/24/17 0850  I have  "reviewed and agree with all the recommendations and orders detailed in this document.  EFFECTIVE NOW     Approved and electronically signed by:  Erik Fish MD             Admission Information     Date & Time Provider Department Dept. Phone    2017 Kyleigh Dey MD [place] [address] [facility] [phone]      Your Vitals Were     Blood Pressure Pulse Temperature Respirations Height Weight    149/53 (BP Location: Right arm) 87 98.1  F (36.7  C) (Oral) 16 1.651 m (5' 5\") 51.8 kg (114 lb 2 oz)    Pulse Oximetry BMI (Body Mass Index)                91% 18.99 kg/m2          MyChart Information     [employer] lets you send messages to your doctor, view your test results, renew your prescriptions, schedule appointments and more. To sign up, go to [email or web address] . Click on \"Log in\" on the left side of the screen, which will take you to the Welcome page. Then click on \"Sign up Now\" on the right side of the page.     You will be asked to enter the access code listed below, as well as some personal information. Please follow the directions to create your username and password.     Your access code is: [email or web address]  Expires: 2017  3:56 PM     Your access code will  in 90 days. If you need help or a new code, please call your [facility] clinic or [phone].        Care EveryWhere ID     This is your Care EveryWhere ID. This could be used by other organizations to access your [facility] medical records  [id number]           Review of your medicines      START taking        Dose / Directions    nicotine 10 MG Inhaler   Commonly known as:  NICOTROL   Used for:  Tobacco use disorder        Dose:  6-16 cartridge   Inhale 6-16 cartridge into the lungs daily as needed for smoking cessation   Quantity:  180 each   Refills:  1         CONTINUE these medicines which may have CHANGED, or have new prescriptions. If we are uncertain of the size of tablets/capsules you have at home, strength " may be listed as something that might have changed.        Dose / Directions    * albuterol (2.5 MG/3ML) 0.083% neb solution   This may have changed:  Another medication with the same name was changed. Make sure you understand how and when to take each.   Used for:  COPD exacerbation (H)        Dose:  3 mL   Take 1 vial (2.5 mg) by nebulization every 4 hours as needed for shortness of breath / dyspnea or wheezing   Quantity:  360 mL   Refills:  3       * albuterol 108 (90 BASE) MCG/ACT Inhaler   Commonly known as:  PROAIR HFA/PROVENTIL HFA/VENTOLIN HFA   This may have changed:  how much to take   Used for:  Chronic obstructive airway disease with asthma (H)        Dose:  1-2 puff   Inhale 1-2 puffs into the lungs every 6 hours as needed for shortness of breath / dyspnea or wheezing   Quantity:  2 Inhaler   Refills:  3       beclomethasone 80 MCG/ACT Inhaler   Commonly known as:  QVAR   This may have changed:  See the new instructions.   Used for:  Chronic obstructive pulmonary disease with acute lower respiratory infection (H)        Dose:  2 puff   Inhale 2 puffs into the lungs 2 times daily   Quantity:  8.7 g   Refills:  11       predniSONE 20 MG tablet   Commonly known as:  DELTASONE   This may have changed:    - medication strength  - additional instructions   Used for:  COPD exacerbation (H)   Notes to Patient:  Tues, Wednesday and Thurs (40 mg)   Friday, Saturday, Sunday (20mg)  Monday, Tuesday, Wednesday (10mg= 1/2 TABLET)          Take 2 tablets (40 mg) daily for 3 days, then 1 tablet (20 mg) daily for 3 days, then 0.5 tablet (10 mg) daily for 3 days, then stop   Quantity:  11 tablet   Refills:  0       tiotropium 18 MCG capsule   Commonly known as:  SPIRIVA HANDIHALER   This may have changed:  See the new instructions.   Used for:  Centrilobular emphysema (H)        INHALE ONE CAPSULE BY MOUTH EVERY DAY   Quantity:  30 capsule   Refills:  11       * Notice:  This list has 2 medication(s) that are the same  as other medications prescribed for you. Read the directions carefully, and ask your doctor or other care provider to review them with you.      CONTINUE these medicines which have NOT CHANGED        Dose / Directions    ATENOLOL PO        Dose:  25 mg   Take 25 mg by mouth daily   Refills:  0       butalbital-APAP-caffeine-codeine -75-30 MG per capsule   Commonly known as:  FIORICET WITH codeine   Used for:  Intractable migraine, unspecified migraine type        Dose:  1 capsule   Take 1 capsule by mouth every 4 hours as needed for headaches   Quantity:  10 capsule   Refills:  0       fluticasone-salmeterol 500-50 MCG/DOSE diskus inhaler   Commonly known as:  ADVAIR   Used for:  COPD exacerbation (H)        Dose:  1 puff   Inhale 1 puff into the lungs 2 times daily   Quantity:  3 Inhaler   Refills:  1       HYDROCHLOROTHIAZIDE PO        Dose:  25 mg   Take 25 mg by mouth daily   Refills:  0       LORazepam 0.5 MG tablet   Commonly known as:  ATIVAN   Used for:  Generalized anxiety disorder        Dose:  0.5 mg   Take 1 tablet (0.5 mg) by mouth every 4 hours as needed for anxiety   Quantity:  30 tablet   Refills:  0       melatonin 3 MG tablet   Used for:  Insomnia, unspecified type        Dose:  3 mg   Take 1 tablet (3 mg) by mouth nightly as needed   Quantity:  30 tablet   Refills:  0       traMADol 50 MG tablet   Commonly known as:  ULTRAM   Used for:  Chronic midline low back pain with sciatica, sciatica laterality unspecified        Dose:   mg   Take 1-2 tablets ( mg) by mouth every 6 hours as needed for pain maximum 4 tablet(s) per day SEE  2 WEEKS   Quantity:  10 tablet   Refills:  0       V-C FORTE Caps   Used for:  Hepatitis C        Dose:  1 capsule   Take 1 capsule by mouth daily   Quantity:  90 capsule   Refills:  3            Where to get your medicines      These medications were sent to [place], [place] - [address] [facility]  [address] [address],  [facility] [address]     Phone:  [phone]     albuterol (2.5 MG/3ML) 0.083% neb solution    albuterol 108 (90 BASE) MCG/ACT Inhaler    beclomethasone 80 MCG/ACT Inhaler    fluticasone-salmeterol 500-50 MCG/DOSE diskus inhaler    nicotine 10 MG Inhaler    predniSONE 20 MG tablet    tiotropium 18 MCG capsule         Some of these will need a paper prescription and others can be bought over the counter. Ask your nurse if you have questions.     Bring a paper prescription for each of these medications     butalbital-APAP-caffeine-codeine -95-30 MG per capsule    traMADol 50 MG tablet                Protect others around you: Learn how to safely use, store and throw away your medicines at www.disposemymeds.org.             Medication List: This is a list of all your medications and when to take them. Check marks below indicate your daily home schedule. Keep this list as a reference.      Medications           Morning Afternoon Evening Bedtime As Needed    * albuterol (2.5 MG/3ML) 0.083% neb solution   Take 1 vial (2.5 mg) by nebulization every 4 hours as needed for shortness of breath / dyspnea or wheezing   Last time this was given:  2.5 mg on 4/19/2017  5:45 AM                                   * albuterol 108 (90 BASE) MCG/ACT Inhaler   Commonly known as:  PROAIR HFA/PROVENTIL HFA/VENTOLIN HFA   Inhale 1-2 puffs into the lungs every 6 hours as needed for shortness of breath / dyspnea or wheezing                                   ATENOLOL PO   Take 25 mg by mouth daily   Last time this was given:  25 mg on 4/24/2017  7:53 AM   Next Dose Due:  Tomorrow 4/25                                   beclomethasone 80 MCG/ACT Inhaler   Commonly known as:  QVAR   Inhale 2 puffs into the lungs 2 times daily                                butalbital-APAP-caffeine-codeine -40-30 MG per capsule   Commonly known as:  FIORICET WITH codeine   Take 1 capsule by mouth every 4 hours as needed for headaches   Last time this  was given:  1 capsule on 4/24/2017  9:51 AM                                   fluticasone-salmeterol 500-50 MCG/DOSE diskus inhaler   Commonly known as:  ADVAIR   Inhale 1 puff into the lungs 2 times daily                                HYDROCHLOROTHIAZIDE PO   Take 25 mg by mouth daily   Last time this was given:  25 mg on 4/24/2017  7:53 AM   Next Dose Due:  Tomorrow 4/25                                LORazepam 0.5 MG tablet   Commonly known as:  ATIVAN   Take 1 tablet (0.5 mg) by mouth every 4 hours as needed for anxiety                                   melatonin 3 MG tablet   Take 1 tablet (3 mg) by mouth nightly as needed                                   nicotine 10 MG Inhaler   Commonly known as:  NICOTROL   Inhale 6-16 cartridge into the lungs daily as needed for smoking cessation   Last time this was given:  1 cartridge on 4/21/2017  5:55 PM                                   predniSONE 20 MG tablet   Commonly known as:  DELTASONE   Take 2 tablets (40 mg) daily for 3 days, then 1 tablet (20 mg) daily for 3 days, then 0.5 tablet (10 mg) daily for 3 days, then stop   Last time this was given:  40 mg on 4/24/2017  7:53 AM   Next Dose Due:  Tomorrow 4/25   Notes to Patient:  Tues, Wednesday and Thurs (40 mg)   Friday, Saturday, Sunday (20mg)  Monday, Tuesday, Wednesday (10mg= 1/2 TABLET)                                     tiotropium 18 MCG capsule   Commonly known as:  SPIRIVA HANDIHALER   INHALE ONE CAPSULE BY MOUTH EVERY DAY                                traMADol 50 MG tablet   Commonly known as:  ULTRAM   Take 1-2 tablets ( mg) by mouth every 6 hours as needed for pain maximum 4 tablet(s) per day SEE  2 WEEKS   Last time this was given:  100 mg on 4/24/2017  9:51 AM                                V-C FORTE Caps   Take 1 capsule by mouth daily                                * Notice:  This list has 2 medication(s) that are the same as other medications prescribed for you. Read the directions  carefully, and ask your doctor or other care provider to review them with you.              More Information        COPD Flare    You have had a flare-up of your COPD.  COPD, or chronic obstructive pulmonary disease, is a common lung disease. It causes your airways to become irritated and narrower. This makes it harder for you to breathe. Emphysema and chronic bronchitis are both types of COPD. This is a chronic condition, which means you always have it. Sometimes it gets worse. When this happens, it is called a flare-up.  Symptoms of COPD  People with COPD may have symptoms most of the time. In a flare-up, your symptoms get worse. These symptoms may mean you are having a flare-up:    Shortness of breath, shallow or rapid breathing, or wheezing that gets worse    Lung infection    Cough that gets worse    More mucus, thicker mucus or mucus of a different color    Tiredness, decreased energy, or trouble doing your usual activities    Fever    Chest tightness    Your symptoms don t get better even when you use your usual medicines, inhalers, and nebulizer    Trouble talking    You feel confused  Causes of flare-ups  Unfortunately, a flare-up can happen even though you did everything right, and you followed your doctor s instructions. Some causes of flare-ups are:    Smoking or secondhand smoke    Colds, the flu, or respiratory infections    Air pollution    Sudden change in the weather    Dust, irritating chemicals, or strong fumes    Not taking your medicines as prescribed  Home care  Here are some things you can do at home to treat a flare-up:    Try not to panic. This makes it harder to breathe, and keeps you from doing the right things.    Don t smoke or be around others who are smoking.    Try to drink more fluids than usual during a flare-up, unless your doctor has told you not to because of heart and kidney problems. More fluids can help loosen the mucus.    Use your inhalers and nebulizer, if you have one, as  you have been told to.    If you were given antibiotics, take them until they are used up or your doctor tells you to stop. It s important to finish the antibiotics, even though you feel better. This will make sure the infection has cleared.    If you were given prednisone or another steroid, finish it even if you feel better.  Preventing a flare-up  Even though flare-ups happen, the best way to treat one is to prevent it before it starts. Here are some pointers:    Don t smoke or be around others who are smoking.    Take your medicines as you have been told.    Talk with your doctor about getting a flu shot every year. Also find out if you need a pneumonia shot.    If there is a weather advisory warning to stay indoors, try to stay inside when possible.    Try to eat healthy and get plenty of sleep.    Try to avoid things that usually set you off, like dust, chemical fumes, hairsprays, or strong perfumes.  Follow-up care  Follow up with your healthcare provider.  If a culture was done, you will be told if your treatment needs to be changed. You can call as directed for the results.  If X-rays were done, and a radiologist had not seen them while you were there, they will be reviewed. You will be told if there is a change in the reading, especially if it affects your treatment.  Call 911  Call 911 if any of these occur:    You have trouble breathing    You feel confused or it s difficult to wake you up    You faint or lose consciousness    You have a rapid heart rate    You have new pain in your chest, arm, shoulder, neck or upper back  When to seek medical advice  Call your healthcare provider right away if any of these occur:    Wheezing or shortness of breath gets worse    You need to use your inhalers more often than usual without relief    Fever of 100.4 F (38 C) or higher, or as directed    Coughing up lots of dark-colored or bloody sputum (mucus)    Chest pain with each breath    You do not start to get better  within 24 hours    Swelling or your ankles gets worse    Dizziness or weakness       7541-1492 [employer]. [address] [place] [address], [place] [address]. All rights reserved. This information is not intended as a substitute for professional medical care. Always follow your healthcare professional's instructions.                Chronic Lung Disease: Tips for Quitting Smoking  Cigarette smoke damages lung tissue and irritates airways, which makes breathing harder. Smoking also damages cilia (tiny hairs) in the airways, so the cilia can t do their job of clearing mucus, dirt, and germs from the lungs. It s never too late to quit smoking. Your health will start to improve on the same day you stub out your last cigarette.    You don t have to quit alone  You may be more likely to quit for good if you seek support from others.    Talk with your doctor about your plans to quit. Ask about medicines that can help. Some contain nicotine and some do not. Some are available by prescription. You can buy others over-the-counter. These medicines help control the desire to use tobacco and the uncomfortable symptoms people have when they try to quit. Others gradually lessen the level of nicotine in the body. Your healthcare provider can tell you about all the choices available including:    Oral medicines such as bupropion or varenicline    Nicotine replacement therapy such as gum, lozenge, a patch, inhaler, or nasal spray    Join a support group or get advice from an ex-smoker.    Ask other smokers in your household to quit with you.   Tips for quitting smoking  There isn t one right way to stop smoking. Everyone quits in his or her own way. Some of these tips may help:    Make a list of reasons you want to quit. Keep this list and read it often.    Pick a date to quit smoking. Then stick to it.    List the things that make you want to smoke. Think of ways to avoid these triggers.    Set goals for yourself, such as going  for a week without smoking. Reward yourself when you meet your goals.    If you don t quit the first time, keep trying! Many people have to try more than once before they stop smoking for good.     For more information    smokefree.gov/bxtp-qi-gi-expert    [employer] [place] Smoking Quitline: 877-44U-QUIT (232-423-4454)     3263-2783 [employer]. [address] [place] [address], [place] [address]. All rights reserved. This information is not intended as a substitute for professional medical care. Always follow your healthcare professional's instructions.

## 2017-04-18 NOTE — ED PROVIDER NOTES
"  History     Chief Complaint:  Cough    HPI   Chuck Lopez is a 76 year old male who presents with a cough. The patient has been hospitalized for both pneumonia and URI's over the past few months. He currently feels heavily congested, and says he is constantly short of breath. Walking down stairs today caused bad shortness of breath. He was at 88% on room air when EMS arrived. He states he used an inhaler and nebulizer today, which did not help. He has been on steroids since being discharged.     Cardiac Risk Factors:  CAD:    Neg  Hypertension:   Pos  Hyperlipidemia:  Neg  Diabetes:   Neg  Tobacco use:   Pos  Gender:   M  Age:    76  Familial Hx of CAD:  Neg    Allergies:  Ammonium lactate  Doxycycline  Monosodium glutamate  Penicillins     Medications:    Ultram  Fioricet with codeine  Ativan  Deltasone  Spiriva  Qvar  Advair  Albuterol  Atenolol  Hydrochlorothiazide  Melatonin     Past Medical History:    Chronic neck pain  COPD  Diabetes  Hepatitis C  HTN  CKD  Pulmonary emphysema    Past Surgical History:    Appendectomy  EGD    Family History:    History reviewed.  No significant family history.    Social History:  Relationship status:   Tobacco use: Positive (0.25 ppd)  Alcohol use: Negative  The patient presents via EMS.     Review of Systems   HENT: Positive for congestion.    Respiratory: Positive for cough and shortness of breath.    All other systems reviewed and are negative.      Physical Exam   First Vitals:  BP: 129/51  Pulse: 85  Heart Rate: 85  Temp: 98.9  F (37.2  C)  Resp: 18  Height: 165.1 cm (5' 5\")  Weight: 49.9 kg (110 lb)  SpO2: 93 %    Physical Exam  Nursing note and vitals reviewed.  Constitutional: Cooperative.   HENT:   Mouth/Throat: Moist mucous membranes.   Eyes: EOMI, nonicteric sclera  Cardiovascular: Normal rate, regular rhythm, no murmurs, rubs, or gallops  Pulmonary/Chest: Effort normal. Bilateral wheezing. No respiratory distress. No rales.   Abdominal: Soft. " Nontender, nondistended, no guarding or rigidity. BS present.   Musculoskeletal: Normal range of motion.   Neurological: Alert. Moves all extremities spontaneously.   Skin: Skin is warm and dry. No rash noted.   Psychiatric: Normal mood and affect.       Emergency Department Course   ECG (13:27:38):  Indication: Cough.   Rate 86 bpm. AZ interval 144. QRS duration 74. QT/QTc 366/437. P-R-T axes -86.   Interpretation: Normal sinus rhythm. Left axis deviation. Pulmonary disease pattern. Septal infarct, age undetermined.  Agree with computer interpretation.  No significant change compared to EKG dated 3/29/17.   Interpreted at 1420 by Dr. Noble.    Imaging:  Radiographic findings were communicated with the patient who voiced understanding of the findings.    Chest XR, PA and LAT, per radiology:   Hyperinflation consistent with emphysematous changes. No infiltrates to suggest pneumonia. Mild stable vertebral body height loss of a midthoracic vertebral body. Otherwise negative.    Laboratory:  CBC: WBC 8.4, HGB 15.1, PLT 92 (L)  CMP: Glucose 100 (H), Albumin 3.2 (L),  (H),  (H), o/w WNL (Creatinine 0.67)  BNP: 728  Lactate: 1.0  Influenza A/B antigen: A:Negative B:Negative     Interventions:  1356: Normal Saline, 1000 mL, IV  1442: Duoneb, 6 mL, Nebulization     Emergency Department Course:  Nursing notes and vitals reviewed.  I performed an exam of the patient as documented above.  The above workup was undertaken.  1630: I rechecked the patient and discussed results.  1850: Pt desatted to 83% on room air. Contacting hospitalist for admission.       Impression & Plan      Medical Decision Making:  Chuck Lopez is a 76 year old male with a history of COPD who presents for evaluation of shortness of breath.   Signs and symptoms are consistent with COPD exacerbation.  A broad differential was considered including foreign body, COPD, viral induced reactive airway disease, pneumothorax, cardiac  equivalent, allergic phenomena, pneumonia, bronchitis, etc.  Patient feels improved after interventions here in ED.   No indication for hospitalization at this time including no hypoxia, no marked increase in respiratory rate, minimal to no retractions.   Supportive outpatient management is indicated, medications for discharge noted above.  Close followup with primary care physician.  Return if increased wheezing, progressive shortness of breath, develops fever greater than 102.  Given increased sputum production, antibiotics indicated at this time.      Addendum:   Following discharge, pt called nursing into room complaining of dyspnea and gasping for air. His O2 sats were checked and pt was found to be hypoxic at 83% with a good waveform. Sats didn't come up with deep breathing. NC O2 applied with improvement in O2 sats. Called hospitalist who accepts pt for observation status given hypoxic resp failure.     Diagnosis:    ICD-10-CM   1. COPD exacerbation (H) J44.1   2. Hypoxic resp failure.     Disposition:  Admit to Alfonso Roberts, nick serving as a scribe on 4/18/2017 at 1:39 PM to personally document services performed by Greg Noble MD, based on my observations and the provider's statements to me.    [facility] EMERGENCY DEPARTMENT       Greg Noble MD  04/18/17 4845

## 2017-04-19 LAB
ANION GAP SERPL CALCULATED.3IONS-SCNC: 7 MMOL/L (ref 3–14)
BASOPHILS # BLD AUTO: 0 10E9/L (ref 0–0.2)
BASOPHILS NFR BLD AUTO: 0.1 %
BUN SERPL-MCNC: 20 MG/DL (ref 7–30)
CALCIUM SERPL-MCNC: 8.7 MG/DL (ref 8.5–10.1)
CHLORIDE SERPL-SCNC: 101 MMOL/L (ref 94–109)
CO2 SERPL-SCNC: 32 MMOL/L (ref 20–32)
CREAT SERPL-MCNC: 0.67 MG/DL (ref 0.66–1.25)
DIFFERENTIAL METHOD BLD: ABNORMAL
EOSINOPHIL # BLD AUTO: 0 10E9/L (ref 0–0.7)
EOSINOPHIL NFR BLD AUTO: 0 %
ERYTHROCYTE [DISTWIDTH] IN BLOOD BY AUTOMATED COUNT: 14 % (ref 10–15)
GFR SERPL CREATININE-BSD FRML MDRD: ABNORMAL ML/MIN/1.7M2
GLUCOSE BLDC GLUCOMTR-MCNC: 103 MG/DL (ref 70–99)
GLUCOSE BLDC GLUCOMTR-MCNC: 104 MG/DL (ref 70–99)
GLUCOSE BLDC GLUCOMTR-MCNC: 124 MG/DL (ref 70–99)
GLUCOSE BLDC GLUCOMTR-MCNC: 80 MG/DL (ref 70–99)
GLUCOSE SERPL-MCNC: 119 MG/DL (ref 70–99)
HBA1C MFR BLD: 5.7 % (ref 4.3–6)
HCT VFR BLD AUTO: 45.9 % (ref 40–53)
HGB BLD-MCNC: 15.3 G/DL (ref 13.3–17.7)
IMM GRANULOCYTES # BLD: 0 10E9/L (ref 0–0.4)
IMM GRANULOCYTES NFR BLD: 0.5 %
INTERPRETATION ECG - MUSE: NORMAL
LYMPHOCYTES # BLD AUTO: 0.6 10E9/L (ref 0.8–5.3)
LYMPHOCYTES NFR BLD AUTO: 8.3 %
MCH RBC QN AUTO: 31.7 PG (ref 26.5–33)
MCHC RBC AUTO-ENTMCNC: 33.3 G/DL (ref 31.5–36.5)
MCV RBC AUTO: 95 FL (ref 78–100)
MONOCYTES # BLD AUTO: 0.5 10E9/L (ref 0–1.3)
MONOCYTES NFR BLD AUTO: 6.8 %
NEUTROPHILS # BLD AUTO: 6.4 10E9/L (ref 1.6–8.3)
NEUTROPHILS NFR BLD AUTO: 84.3 %
NRBC # BLD AUTO: 0 10*3/UL
NRBC BLD AUTO-RTO: 0 /100
PLATELET # BLD AUTO: 101 10E9/L (ref 150–450)
POTASSIUM SERPL-SCNC: 4 MMOL/L (ref 3.4–5.3)
RBC # BLD AUTO: 4.82 10E12/L (ref 4.4–5.9)
SODIUM SERPL-SCNC: 140 MMOL/L (ref 133–144)
WBC # BLD AUTO: 7.6 10E9/L (ref 4–11)

## 2017-04-19 PROCEDURE — 83036 HEMOGLOBIN GLYCOSYLATED A1C: CPT | Performed by: INTERNAL MEDICINE

## 2017-04-19 PROCEDURE — 25900017 H RX MED GY IP 259 OP 259 PS 637: Mod: GY | Performed by: INTERNAL MEDICINE

## 2017-04-19 PROCEDURE — 00000146 ZZHCL STATISTIC GLUCOSE BY METER IP

## 2017-04-19 PROCEDURE — 36415 COLL VENOUS BLD VENIPUNCTURE: CPT | Performed by: INTERNAL MEDICINE

## 2017-04-19 PROCEDURE — 25000125 ZZHC RX 250: Performed by: INTERNAL MEDICINE

## 2017-04-19 PROCEDURE — 94640 AIRWAY INHALATION TREATMENT: CPT | Mod: 76

## 2017-04-19 PROCEDURE — 94640 AIRWAY INHALATION TREATMENT: CPT

## 2017-04-19 PROCEDURE — 25000128 H RX IP 250 OP 636: Performed by: INTERNAL MEDICINE

## 2017-04-19 PROCEDURE — 25000132 ZZH RX MED GY IP 250 OP 250 PS 637: Mod: GY | Performed by: INTERNAL MEDICINE

## 2017-04-19 PROCEDURE — 85025 COMPLETE CBC W/AUTO DIFF WBC: CPT | Performed by: INTERNAL MEDICINE

## 2017-04-19 PROCEDURE — 40000275 ZZH STATISTIC RCP TIME EA 10 MIN

## 2017-04-19 PROCEDURE — A9270 NON-COVERED ITEM OR SERVICE: HCPCS | Mod: GY | Performed by: INTERNAL MEDICINE

## 2017-04-19 PROCEDURE — 80048 BASIC METABOLIC PNL TOTAL CA: CPT | Performed by: INTERNAL MEDICINE

## 2017-04-19 PROCEDURE — 99233 SBSQ HOSP IP/OBS HIGH 50: CPT | Performed by: INTERNAL MEDICINE

## 2017-04-19 PROCEDURE — 12000000 ZZH R&B MED SURG/OB

## 2017-04-19 RX ORDER — AZITHROMYCIN 250 MG/1
250 TABLET, FILM COATED ORAL DAILY
Status: COMPLETED | OUTPATIENT
Start: 2017-04-19 | End: 2017-04-22

## 2017-04-19 RX ORDER — METHOCARBAMOL 500 MG/1
500 TABLET, FILM COATED ORAL 3 TIMES DAILY PRN
Status: DISCONTINUED | OUTPATIENT
Start: 2017-04-19 | End: 2017-04-24 | Stop reason: HOSPADM

## 2017-04-19 RX ADMIN — MULTIPLE VITAMINS W/ MINERALS TAB 1 TABLET: TAB at 08:15

## 2017-04-19 RX ADMIN — METHYLPREDNISOLONE SODIUM SUCCINATE 40 MG: 40 INJECTION, POWDER, FOR SOLUTION INTRAMUSCULAR; INTRAVENOUS at 18:13

## 2017-04-19 RX ADMIN — BUTALBITAL, ACETAMINOPHEN, CAFFEINE, AND CODEINE PHOSPHATE 1 CAPSULE: 30; 50; 40; 325 CAPSULE ORAL at 03:53

## 2017-04-19 RX ADMIN — IPRATROPIUM BROMIDE AND ALBUTEROL SULFATE 3 ML: .5; 3 SOLUTION RESPIRATORY (INHALATION) at 19:15

## 2017-04-19 RX ADMIN — ATENOLOL 25 MG: 25 TABLET ORAL at 08:15

## 2017-04-19 RX ADMIN — IPRATROPIUM BROMIDE AND ALBUTEROL SULFATE 3 ML: .5; 3 SOLUTION RESPIRATORY (INHALATION) at 15:25

## 2017-04-19 RX ADMIN — TRAMADOL HYDROCHLORIDE 100 MG: 50 TABLET, COATED ORAL at 11:53

## 2017-04-19 RX ADMIN — BUTALBITAL, ACETAMINOPHEN, CAFFEINE, AND CODEINE PHOSPHATE 1 CAPSULE: 30; 50; 40; 325 CAPSULE ORAL at 16:12

## 2017-04-19 RX ADMIN — ALBUTEROL SULFATE 2.5 MG: 2.5 SOLUTION RESPIRATORY (INHALATION) at 05:45

## 2017-04-19 RX ADMIN — HYDROCHLOROTHIAZIDE 25 MG: 25 TABLET ORAL at 08:15

## 2017-04-19 RX ADMIN — BUTALBITAL, ACETAMINOPHEN, CAFFEINE, AND CODEINE PHOSPHATE 1 CAPSULE: 30; 50; 40; 325 CAPSULE ORAL at 08:15

## 2017-04-19 RX ADMIN — FLUTICASONE FUROATE AND VILANTEROL TRIFENATATE 1 PUFF: 200; 25 POWDER RESPIRATORY (INHALATION) at 07:17

## 2017-04-19 RX ADMIN — TRAMADOL HYDROCHLORIDE 100 MG: 50 TABLET, COATED ORAL at 20:13

## 2017-04-19 RX ADMIN — IPRATROPIUM BROMIDE AND ALBUTEROL SULFATE 3 ML: .5; 3 SOLUTION RESPIRATORY (INHALATION) at 07:17

## 2017-04-19 RX ADMIN — AZITHROMYCIN 250 MG: 250 TABLET, FILM COATED ORAL at 11:27

## 2017-04-19 RX ADMIN — UMECLIDINIUM 62.5 MCG: 62.5 AEROSOL, POWDER ORAL at 07:17

## 2017-04-19 RX ADMIN — FLUTICASONE FUROATE 1 PUFF: 100 POWDER RESPIRATORY (INHALATION) at 07:18

## 2017-04-19 RX ADMIN — IPRATROPIUM BROMIDE AND ALBUTEROL SULFATE 3 ML: .5; 3 SOLUTION RESPIRATORY (INHALATION) at 11:24

## 2017-04-19 RX ADMIN — METHOCARBAMOL 500 MG: 500 TABLET ORAL at 20:13

## 2017-04-19 NOTE — CONSULTS
"Care Transition Initial Assessment - RN    Reason For Consult: care coordination/care conference, discharge planning   Met with: Patient.    DATA   Active Problems:    Acute respiratory failure (H)       Cognitive Status: awake, alert and oriented.  Primary Care Clinic Name: [facility]  Primary Care MD Name: Dr. Pranay Reid  Contact information and PCP information verified: Yes    Lives With: friend(s)  Living Arrangements: house         Who is your support system?: Other (specify) (friends)         Insurance concerns: No Insurance issues identified    ASSESSMENT  Patient currently receives the following services:  None. He currently lives with friends on a \"very temporary nature\". He has not had home oxygen due to this situation.         Identified issues/concerns regarding health management: Patient needs home oxygen. He lives in friends' basement and has to climb 10+ steps. This is an issue for him. He does not have a cane but states he needs one. Explained that PT could evaluate him for assistive equipment needs. He refused stating \"that would be too expensive. I can  a cane at the store\".    PLAN  Patient given options and choices for discharge No. Discussed housing needs; he is currently receiving social security $900/month w/a food card for $20/month.  He no longer has a car and traveling by bus is hard with his oxygen needs.   Patient/family is agreeable to the plan?  Yes: Further resources to be supplied re: housing, EW or CADI waiver applications.     Patient anticipates discharging back to friend's house.     Patient anticipates needs for home equipment: Yes, he would like a cane.    Plan/Disposition: Discharge back to friend's house pending additional applications for assistance.      Appointments: CM offered to make f/u appointment. Patient refused stating he would need to coodinate with friends for transportation.     CM will continue to follow patient until discharge for any " additional needs.     Elina Vázquez RN, BSN, CTS  [facility]  [phone]

## 2017-04-19 NOTE — PHARMACY-ADMISSION MEDICATION HISTORY
Admission medication history interview status for this patient is complete. See [facility] admission navigator for allergy information, prior to admission medications and immunization status.     Medication history interview source(s):Patient  Medication history resources (including written lists, pill bottles, clinic record):[facility] med list  Primary pharmacy[facility]    Changes made to [facility] medication list:  Added: none  Deleted: none  Changed: none    Actions taken by pharmacist (provider contacted, etc):None     Additional medication history information:None    Medication reconciliation/reorder completed by provider prior to medication history? No    For patients on insulin therapy: NO (Yes/No)  Lantus/levemir/NPH/Mix 70/30 dose:  _____   in AM/PM  or twice daily   Sliding scale Novolog Y/N  If Yes, do you have a baseline novolog pre-meal dose:  ______units with meals   Patients eat three meals a day:   Y/N     Any Barriers to therapy:  cost of medications/comfortable with giving injections (if applicable)/ comfortable and confident with current diabetes regimen       Prior to Admission medications    Medication Sig Last Dose Taking? Auth Provider   predniSONE (DELTASONE) 20 MG tablet 3 tab daily for 3 days, then 2 tab daily for 3 days, then 1 tab daily for 3 days, then 0.5 tab daily for 4 days.  Yes Greg Noble MD   azithromycin (ZITHROMAX) 250 MG tablet Two tablets first day, then one tablet daily for four days.  Yes Greg Noble MD   traMADol (ULTRAM) 50 MG tablet Take 1-2 tablets ( mg) by mouth every 6 hours as needed for pain maximum 4 tablet(s) per day SEE  2 WEEKS 4/17/2017 at pm Yes Pranay Reid MD   melatonin 3 MG tablet Take 1 tablet (3 mg) by mouth nightly as needed 4/18/2017 at Unknown time Yes Oneyda Mcdonald MD   butalbital-APAP-caffeine-codeine (FIORICET WITH CODEINE) -65-30 MG per capsule Take 1 capsule by mouth every 4 hours as needed for headaches  4/16/2017 at Unknown time Yes Oneyda Mcdonald MD   LORazepam (ATIVAN) 0.5 MG tablet Take 1 tablet (0.5 mg) by mouth every 4 hours as needed for anxiety Past Month at Unknown time Yes Oneyda Mcdonald MD   SPIRIVA HANDIHALER 18 MCG capsule INHALE ONE CAPSULE BY MOUTH EVERY DAY 4/17/2017 at pm Yes Pranay Reid MD   QVAR 80 MCG/ACT Inhaler INHALE TWO PUFFS BY MOUTH TWICE A DAY 4/18/2017 at am Yes Pranay Reid MD   fluticasone-salmeterol (ADVAIR) 500-50 MCG/DOSE diskus inhaler Inhale 1 puff into the lungs 2 times daily 4/18/2017 at am Yes Pranay Reid MD   albuterol (PROAIR HFA/PROVENTIL HFA/VENTOLIN HFA) 108 (90 BASE) MCG/ACT Inhaler Inhale 2 puffs into the lungs every 6 hours as needed for shortness of breath / dyspnea or wheezing 4/18/2017 at 1200 Yes Pranay Reid MD   albuterol (2.5 MG/3ML) 0.083% neb solution Take 1 vial (2.5 mg) by nebulization every 4 hours as needed for shortness of breath / dyspnea or wheezing 4/18/2017 at 2 times Yes Pranay Reid MD   ATENOLOL PO Take 25 mg by mouth daily 4/18/2017 at am Yes Unknown, Entered By History   HYDROCHLOROTHIAZIDE PO Take 25 mg by mouth daily 4/18/2017 at am Yes Unknown, Entered By History   Multiple Vitamins-Minerals (V-C FORTE) CAPS Take 1 capsule by mouth daily 4/18/2017 at am Yes Pranay Reid MD

## 2017-04-19 NOTE — PLAN OF CARE
Problem: Goal Outcome Summary  Goal: Goal Outcome Summary  Outcome: Improving  VSS, afebrile. 95% on 2L. AAOx4, forgetful at times. Reports headache/neck pain 3/10, Fioricet given x2 this shift with relief. Up SBA, generalized weakness. Tolerates diet, denies nausea.  Non tele.  BG-212,103.  PIV SL.  Voiding well, continent. Active bowel sounds.  LS- diminished, dyspnea on exertion.  Infrequent loose cough.  Skin intact, multiple bruises.  Blanchable redness to hands, toes, and heels.  Pt reports tingling to toes.  Pt is homeless, upon admission the pt stated he brought his medications and a wallet.  Per ED the belongings never arrived to the hospital with him as they contacted EMS services to confirm and they were not located.  Continue to monitor POC.

## 2017-04-19 NOTE — PROGRESS NOTES
"Formerly [facility], [facility] RCAT     Date: 4/18/17    Admission Dx: 4/18/17    Pulmonary History: COPD    Home Nebulizer/MDI Use: Advair, Spiriva, Qvar, Albuterol    Home Oxygen: None    Acuity Level (RCAT flow sheet): 3    Aerosol Therapy initiated: Duoneb QID, Albuterol prn     Pulmonary Hygiene initiated: cough and deep breathing techniques     Volume Expansion initiated: Incentive Spirometry     Current Oxygen Requirements: 2 LPM nasal cannula    Current SpO2: 96%    Re-evaluation date: 4/21/17    Patient Education: Patient educated on medications. Does nebulizers and inhalers at home.     See \"RT Assessments\" flow sheet for patient assessment scoring and Acuity Level Details.                    "

## 2017-04-19 NOTE — PROGRESS NOTES
"[facility]  Hospitalist Progress Note  Gee Gaona MD, MD 04/19/2017  (Text Page)  Reason for Stay (Diagnosis): COPD         Assessment and Plan:      Summary of Stay: Chuck Lopez is a 76 year old male known active smoker, COPD emphysema type, DM type 2, hypertension, recently discharged from [facility] earlier this month from similar clinical presentation and was  admitted on 4/18/2017 with increasing SOB    Problem List:   1. Acute hypoxic respiratory failure secondary to COPD emphysema type in exacerbation  2. Active smoker, room mate also a heavy smoker  3. Hypertension  4. DM type 2, diet controlled PTA  5. Hx of hepatitis C    Continue inpatient care.  On IV steroids for today, taper to oral in AM if clinical picture permitting, RTC duonebs and prn albuterol.  Continue with zithromax, allergy to doxycycline  Resumed home HCTZ, atenolol.  PT/OT for deconditioning  Complete smoking cessation    DVT Prophylaxis: Pneumatic Compression Devices  Code Status: Full Code  Discharge Dispo: home  Estimated Disch Date / # of Days until Disch: likely 2 days        Interval History (Subjective):      Assumed care today  Seen and examined.  Nhan is sitting comfortably in the bed.  He is awake, cooperative and interactive.  Stated that he still feels short of breath during ambulation. Still with intermittent cough but happy that this is getting more productive.                  Physical Exam:      Last Vital Signs:  /66  Pulse 85  Temp 99.3  F (37.4  C) (Oral)  Resp 16  Ht 1.651 m (5' 5\")  Wt 51.8 kg (114 lb 2 oz)  SpO2 94%  BMI 18.99 kg/m2    I/O last 3 completed shifts:  In: -   Out: 200 [Urine:200]  Wt Readings from Last 1 Encounters:   04/18/17 51.8 kg (114 lb 2 oz)     Vitals:    04/18/17 1325 04/18/17 1928   Weight: 49.9 kg (110 lb) 51.8 kg (114 lb 2 oz)       Constitutional: Awake, alert, cooperative, no apparent distress   Respiratory: tight toFair air entry, mild scattered wheezes, " no crackles   Cardiovascular: Regular rate and rhythm, normal S1 and S2, and no murmur noted   Abdomen: Normal bowel sounds, soft, non-distended, non-tender   Skin: No rashes, no cyanosis, dry to touch   Neuro: Alert and oriented x3, no weakness, spontaneous and coherent speech   Extremities: No edema, normal range of motion   Other(s): Euthymic mood, not agitated       All other systems: Negative          Medications:      All current medications were reviewed with changes reflected in problem list.         Data:      All new lab and imaging data was reviewed.   Labs:  No results for input(s): CULT in the last 168 hours.  No results for input(s): PH, PHARTERIAL, PO2, [id number], SAT, PCO2, HCO3, BASEEXCESS, KRISTEN, BEB in the last 168 hours.    Invalid input(s): [id number]    Recent Labs  Lab 04/19/17  0714 04/18/17  1353    138   POTASSIUM 4.0 3.7   CHLORIDE 101 100   CO2 32 31   ANIONGAP 7 7   * 100*   BUN 20 15   CR 0.67 0.67   GFRESTIMATED >90Non  GFR Calc >90Non  GFR Calc   GFRESTBLACK >90African American GFR Calc >90African American GFR Calc   MATTHEW 8.7 8.9       Recent Labs  Lab 04/19/17  0714 04/18/17  1353   WBC 7.6 8.4   HGB 15.3 15.1   HCT 45.9 44.9   MCV 95 94   * 92*       Recent Labs  Lab 04/19/17  0714 04/19/17  0328 04/18/17  2141 04/18/17  1353   *  --   --  100*   BGM  --  103* 212*  --      No results for input(s): INR in the last 168 hours.  No results for input(s): TROPONIN, TROPI, TROPR in the last 168 hours.    Invalid input(s): TROP, TROPONINIES   Imaging:   Recent Results (from the past 48 hour(s))   Chest XR,  PA & LAT    Narrative    XR CHEST 2 VW  4/18/2017 2:11 PM    HISTORY:  dyspnea, cough    COMPARISON:  3/29/2017      Impression    IMPRESSION:  Hyperinflation consistent with emphysematous changes. No  infiltrates to suggest pneumonia. Mild stable vertebral body height  loss of a midthoracic vertebral body. Otherwise negative.      MADISON BLANC MD

## 2017-04-19 NOTE — PLAN OF CARE
"Problem: Goal Outcome Summary  Goal: Goal Outcome Summary  Outcome: Improving  Pt. Reports, \" I feel much better today.  Still have shortness of breath, but not as bad as yesterday.\"  Pt. Unable to tolerate activity longer than just in the room.  Gets short of breath and HR increase.  Now on 2L.  Lungs are diminished throughout, but clear.  Up in chair for meals.  Eating and voiding without difficulty.  OT, PT, CC.      "

## 2017-04-19 NOTE — PROGRESS NOTES
Provided patient with information on senior living. Patient is currently staying with friend in [place], [place] which is [facility]. Patient has his mail sent to the government center in [place], [place], which is [facility]. Provided patient with information on elderly waiver for both [facility]. Instructed him to contact [facility] first since his mail is sent there. He stated he couldn't read any information since he doesn't have his glasses here. Another patient care unit has extra reading glasses for patients without theirs. Provided pt w/a pair of reading glasses. Gave him all previous information (Action Plan, senior housing & waiver info for both [facility]) off of shelf so he could read and ask questions while here. Patient requested some coffee, saltines & salt water to gargle with. Asked primary RN for permission. Gave patient items. No additional needs at this time.     CM will continue to follow patient until discharge for any additional needs.     Elina Vázquez, RN, BSN, CTS  [facility]  [phone]

## 2017-04-20 ENCOUNTER — APPOINTMENT (OUTPATIENT)
Dept: OCCUPATIONAL THERAPY | Facility: CLINIC | Age: 77
DRG: 190 | End: 2017-04-20
Attending: INTERNAL MEDICINE
Payer: MEDICARE

## 2017-04-20 ENCOUNTER — APPOINTMENT (OUTPATIENT)
Dept: PHYSICAL THERAPY | Facility: CLINIC | Age: 77
DRG: 190 | End: 2017-04-20
Attending: INTERNAL MEDICINE
Payer: MEDICARE

## 2017-04-20 LAB
GLUCOSE BLDC GLUCOMTR-MCNC: 125 MG/DL (ref 70–99)
GLUCOSE BLDC GLUCOMTR-MCNC: 127 MG/DL (ref 70–99)
GLUCOSE BLDC GLUCOMTR-MCNC: 149 MG/DL (ref 70–99)
GLUCOSE BLDC GLUCOMTR-MCNC: 209 MG/DL (ref 70–99)
GLUCOSE BLDC GLUCOMTR-MCNC: 79 MG/DL (ref 70–99)

## 2017-04-20 PROCEDURE — A9270 NON-COVERED ITEM OR SERVICE: HCPCS | Mod: GY | Performed by: INTERNAL MEDICINE

## 2017-04-20 PROCEDURE — 40000193 ZZH STATISTIC PT WARD VISIT: Performed by: PHYSICAL THERAPIST

## 2017-04-20 PROCEDURE — 40000275 ZZH STATISTIC RCP TIME EA 10 MIN

## 2017-04-20 PROCEDURE — 97535 SELF CARE MNGMENT TRAINING: CPT | Mod: GO | Performed by: REHABILITATION PRACTITIONER

## 2017-04-20 PROCEDURE — 40000894 ZZH STATISTIC OT IP EVAL DEFER: Performed by: REHABILITATION PRACTITIONER

## 2017-04-20 PROCEDURE — 25000125 ZZHC RX 250: Performed by: INTERNAL MEDICINE

## 2017-04-20 PROCEDURE — 25000132 ZZH RX MED GY IP 250 OP 250 PS 637: Mod: GY | Performed by: INTERNAL MEDICINE

## 2017-04-20 PROCEDURE — 40000133 ZZH STATISTIC OT WARD VISIT: Performed by: REHABILITATION PRACTITIONER

## 2017-04-20 PROCEDURE — 97165 OT EVAL LOW COMPLEX 30 MIN: CPT | Mod: GO | Performed by: REHABILITATION PRACTITIONER

## 2017-04-20 PROCEDURE — 12000000 ZZH R&B MED SURG/OB

## 2017-04-20 PROCEDURE — 94640 AIRWAY INHALATION TREATMENT: CPT | Mod: 76

## 2017-04-20 PROCEDURE — 00000146 ZZHCL STATISTIC GLUCOSE BY METER IP

## 2017-04-20 PROCEDURE — 97116 GAIT TRAINING THERAPY: CPT | Mod: GP | Performed by: PHYSICAL THERAPIST

## 2017-04-20 PROCEDURE — 94640 AIRWAY INHALATION TREATMENT: CPT

## 2017-04-20 PROCEDURE — 99232 SBSQ HOSP IP/OBS MODERATE 35: CPT | Performed by: INTERNAL MEDICINE

## 2017-04-20 PROCEDURE — 97161 PT EVAL LOW COMPLEX 20 MIN: CPT | Mod: GP | Performed by: PHYSICAL THERAPIST

## 2017-04-20 RX ORDER — PREDNISONE 20 MG/1
40 TABLET ORAL DAILY
Status: DISCONTINUED | OUTPATIENT
Start: 2017-04-20 | End: 2017-04-24 | Stop reason: HOSPADM

## 2017-04-20 RX ORDER — NICOTINE 21 MG/24HR
1 PATCH, TRANSDERMAL 24 HOURS TRANSDERMAL DAILY
Status: DISCONTINUED | OUTPATIENT
Start: 2017-04-20 | End: 2017-04-24 | Stop reason: HOSPADM

## 2017-04-20 RX ADMIN — BUTALBITAL, ACETAMINOPHEN, CAFFEINE, AND CODEINE PHOSPHATE 1 CAPSULE: 30; 50; 40; 325 CAPSULE ORAL at 21:03

## 2017-04-20 RX ADMIN — AZITHROMYCIN 250 MG: 250 TABLET, FILM COATED ORAL at 08:28

## 2017-04-20 RX ADMIN — IPRATROPIUM BROMIDE AND ALBUTEROL SULFATE 3 ML: .5; 3 SOLUTION RESPIRATORY (INHALATION) at 19:05

## 2017-04-20 RX ADMIN — PREDNISONE 40 MG: 20 TABLET ORAL at 08:28

## 2017-04-20 RX ADMIN — TRAMADOL HYDROCHLORIDE 100 MG: 50 TABLET, COATED ORAL at 17:04

## 2017-04-20 RX ADMIN — FLUTICASONE FUROATE 1 PUFF: 100 POWDER RESPIRATORY (INHALATION) at 07:54

## 2017-04-20 RX ADMIN — UMECLIDINIUM 62.5 MCG: 62.5 AEROSOL, POWDER ORAL at 07:54

## 2017-04-20 RX ADMIN — ATENOLOL 25 MG: 25 TABLET ORAL at 08:28

## 2017-04-20 RX ADMIN — HYDROCHLOROTHIAZIDE 25 MG: 25 TABLET ORAL at 08:28

## 2017-04-20 RX ADMIN — NICOTINE 1 CARTRIDGE.: 4 INHALANT RESPIRATORY (INHALATION) at 21:14

## 2017-04-20 RX ADMIN — IPRATROPIUM BROMIDE AND ALBUTEROL SULFATE 3 ML: .5; 3 SOLUTION RESPIRATORY (INHALATION) at 12:07

## 2017-04-20 RX ADMIN — BUTALBITAL, ACETAMINOPHEN, CAFFEINE, AND CODEINE PHOSPHATE 1 CAPSULE: 30; 50; 40; 325 CAPSULE ORAL at 04:06

## 2017-04-20 RX ADMIN — BUTALBITAL, ACETAMINOPHEN, CAFFEINE, AND CODEINE PHOSPHATE 1 CAPSULE: 30; 50; 40; 325 CAPSULE ORAL at 12:50

## 2017-04-20 RX ADMIN — IPRATROPIUM BROMIDE AND ALBUTEROL SULFATE 3 ML: .5; 3 SOLUTION RESPIRATORY (INHALATION) at 15:14

## 2017-04-20 RX ADMIN — FLUTICASONE FUROATE AND VILANTEROL TRIFENATATE 1 PUFF: 200; 25 POWDER RESPIRATORY (INHALATION) at 07:54

## 2017-04-20 RX ADMIN — MULTIPLE VITAMINS W/ MINERALS TAB 1 TABLET: TAB at 08:28

## 2017-04-20 RX ADMIN — IPRATROPIUM BROMIDE AND ALBUTEROL SULFATE 3 ML: .5; 3 SOLUTION RESPIRATORY (INHALATION) at 07:54

## 2017-04-20 RX ADMIN — BUTALBITAL, ACETAMINOPHEN, CAFFEINE, AND CODEINE PHOSPHATE 1 CAPSULE: 30; 50; 40; 325 CAPSULE ORAL at 00:06

## 2017-04-20 RX ADMIN — BUTALBITAL, ACETAMINOPHEN, CAFFEINE, AND CODEINE PHOSPHATE 1 CAPSULE: 30; 50; 40; 325 CAPSULE ORAL at 08:34

## 2017-04-20 ASSESSMENT — ACTIVITIES OF DAILY LIVING (ADL): PREVIOUS_RESPONSIBILITIES: MEAL PREP;MEDICATION MANAGEMENT;FINANCES

## 2017-04-20 NOTE — PROGRESS NOTES
[facility]  Hospitalist Progress Note  Rosy Caba MD     Date of Service (when I saw the patient): 04/20/2017    Reason for Stay (Diagnosis): Hypoxic Respiratory Distress         Assessment and Plan:      Summary of Stay: Chuck Lopez is a 76 year old male with past medical history of chronic neck pain, COPD (not on oxygen), DM2, HCV and HTN who was admitted on 4/18/2017 with progressive SOB and cough and was found to have acute hypoxic respiratory failure due to COPD exacerbation.    Problem List/Assessment and Plan:     1. Acute Hypoxic Respiratory Failure 2/2 COPD Exacerbation: Presented with worsening SOB and cough and was found to have hypoxia.  He had never been on oxygen before and is not steroid dependent.  CXR showed emphysematous changes without infiltrates.  He was started on IV Solumedrol and Azithromycin.  He has been improving, will transition to PO steroids today.  Encouraged patient to ambulate today as he has not really been out of bed yet.  - Stop Solumedrol and start Prednisone 40 mg QD  - Continue nebs  - Continue Spiriva  - Continue supplemental oxygen    2. Tobacco Use Disorder: Had been smoking PTA.  Nicotine patch ordered and cessation encouraged.    3. HTN: Stable, continue PTA Atenolol and HCTZ.    4. DM2: Diet controlled PTA.  SSI while here due to steroid induced hyperglycemia.    5. HCV with Transaminitis: Needs outpatient follow up.    6. Chronic Neck Pain: Continue PRN Tramadol.    DVT Prophylaxis: Pneumatic Compression Devices  Code Status: Full Code  Discharge Dispo: Home  Estimated Disch Date / # of Days until Disch: 1-2 days pending stable respiratory status, needs to ambulate today        Interval History (Subjective):      Patient is overall feeling better than admission.  He feels that he can take a deep breath and could not do this before.  Able to cough more and cough is productive of tan sputum.  Had chest pain when he was having difficulty breathing  "but this has resolved.  Eating well.  Has not been ambulating other than to the bathroom and is agreeable to doing this today.                  Physical Exam:      Last Vital Signs:  /60  Pulse 87  Temp 96.3  F (35.7  C) (Oral)  Resp 18  Ht 1.651 m (5' 5\")  Wt 51.8 kg (114 lb 2 oz)  SpO2 94%  BMI 18.99 kg/m2    General: Alert, awake, no acute distress.  Sitting up in bed eating breakfast  HEENT: Normocephalic and atraumatic, eyes anicteric and without scleral injection, EOMI, face symmetric, MMM.  Cardiac: RRR, normal S1, S2. No m/g/r, no LE edema.  Pulmonary: Normal chest rise, normal work of breathing.  Decreased breath sounds diffusely, faint expiratory wheezing but no crackles  Abdomen: soft, non-tender, non-distended.  Normoactive bowel sounds, no guarding or rebound tenderness.  Extremities: no deformities.  Warm, well perfused.  Skin: no rashes or lesions.  Warm and Dry.  Neuro: No focal deficits.  Speech clear.  Spontaneously moving all extremities  Psych: Alert and oriented x3. Appropriate affect.         Medications:      All current medications were reviewed with changes reflected in problem list.         Data:      All new lab and imaging data was reviewed.   Labs:    Recent Labs  Lab 04/19/17  0714 04/18/17  1353    138   POTASSIUM 4.0 3.7   CHLORIDE 101 100   CO2 32 31   ANIONGAP 7 7   * 100*   BUN 20 15   CR 0.67 0.67   GFRESTIMATED >90Non  GFR Calc >90Non  GFR Calc   GFRESTBLACK >90African American GFR Calc >90African American GFR Calc   MATTHEW 8.7 8.9       Recent Labs  Lab 04/19/17  0714 04/18/17  1353   WBC 7.6 8.4   HGB 15.3 15.1   HCT 45.9 44.9   MCV 95 94   * 92*      Imaging:   No results found for this or any previous visit (from the past 24 hour(s)).    Rosy Caba MD.           "

## 2017-04-20 NOTE — PLAN OF CARE
Problem: Goal Outcome Summary  Goal: Goal Outcome Summary     PT- Eval completed.  Pt currently is below baseline with activity tolerance.  Pt walked 100 feet, 130 feet with WW and CGA/SBA.  Pt walked on RA with O2 sats as low as 87%.  Pt encouraged to try to walk 3x/day.  Anticipate pt will be able to return home at discharge.  Barriers to home included flight of stairs, currently limited activity tolerance on RA.

## 2017-04-20 NOTE — PROGRESS NOTES
Pt is a/o x4. VSS, afebrile, O2 stats mid 90's on 2 liters via NC., c/o chronic headache and pain in neck. PRN meds given with relief-See MAR. Up with assist of 1. Frequent productive cough with small amount of tan colored secretions. Lung sounds diminished. All questions and concerns addressed.

## 2017-04-20 NOTE — PLAN OF CARE
Problem: Goal Outcome Summary  Goal: Goal Outcome Summary  OT:  eval complete and treatment initiated.  Pt is a 76 year old male admitted with acute hypoxic respiratory failure secondary to COPD emphysema type exacerbation, HTN, DM, hepatitis C, heavy smoker.  He reported to be independent in ADLs and mobility while living in lower level of house with 4 other roommates PTA.  Activity greatly limited to climbing stairs 2x/day secondary to SOB.  Friends bring him deli food or meat to make sandwiches.  On this date Pt alert, oriented and able to follow commands.  Pt talkative with delayed processing at times.  He completed supine<>sit with SBA.  Sit<>stand, functional mobility in room and toilet transfer completed with CGA.  Pt able to tolerate standing at sink for grooming/hygiene with set-up and CGA.  Pt with complaints of increased SOB, but able to maintain O2 sats at 94-95% on 2L.  OT provided education in importance of EC/WS and PLB techniques.  Pt able to verbalize good understanding.  Anticipate he will be able to D/C home with assist.  May benefit from use of shower chair to maximize safety during bathing.

## 2017-04-20 NOTE — PROGRESS NOTES
04/20/17 1200   Quick Adds   Type of Visit Initial PT Evaluation   Living Environment   Lives With friend(s)   Living Arrangements house   Home Accessibility stairs to enter home;stairs within home;bed and bath on same level   Number of Stairs to Enter Home 2   Number of Stairs Within Home 12   Stair Railings at Home inside, present on right side   Transportation Available public transportation   Living Environment Comment Pt reported to rent a room in lower level of house with 4 other roommates.   Self-Care   Dominant Hand right   Usual Activity Tolerance moderate   Current Activity Tolerance fair   Regular Exercise no   Equipment Currently Used at Home none   Activity/Exercise/Self-Care Comment Pt reported to be independent in ADLs at baseline. Activity limited by SOB. Climbs stairs 2x/day to use the bathroom. Friends bring him groceries.   Pt does not have AD- states he won't get one from hospital because he can buy it cheaper elsewhere   Functional Level Prior   Ambulation 0-->independent   Transferring 0-->independent   Toileting 0-->independent   Bathing 0-->independent   Dressing 0-->independent   Eating 0-->independent   Communication 0-->understands/communicates without difficulty   Swallowing 0-->swallows foods/liquids without difficulty   Cognition 0 - no cognition issues reported   Fall history within last six months no   Prior Functional Level Comment pt has difficulty with flight of stairs at home due to SOB   General Information   Onset of Illness/Injury or Date of Surgery - Date 04/18/17   Referring Physician Dr. Gaona   Patient/Family Goals Statement return home   Pertinent History of Current Problem (include personal factors and/or comorbidities that impact the POC) Chuck Lopez is a 76 year old male with past medical history of chronic neck pain, COPD (not on oxygen), DM2, HCV and HTN who was admitted on 4/18/2017 with progressive SOB and cough and was found to have acute hypoxic  respiratory failure due to COPD exacerbation.   Precautions/Limitations oxygen therapy device and L/min  (RA - 2 L O2 during session)   General Observations pt up in chair, agreeable to PT   General Info Comments pt states he needs to be tested out in the elements (outside with humidity) to know how he is really doing   Cognitive Status Examination   Orientation orientation to person, place and time   Level of Consciousness alert   Follows Commands and Answers Questions 100% of the time   Personal Safety and Judgment intact   Memory intact   Pain Assessment   Patient Currently in Pain Yes, see Vital Sign flowsheet   Integumentary/Edema   Integumentary/Edema no deficits were identifed   Posture    Posture Forward head position   Range of Motion (ROM)   ROM Comment LE ROM WFL   Strength   Strength Comments anti-gravity LE strength   Bed Mobility   Bed Mobility Comments not tested   Transfer Skills   Transfer Comments SBA   Gait   Gait Comments pt walked 100 feet, 130 feet with WW and CGA/SBA.  Pt on RA with gait, O2 sats as low as 87% on RA.  Pt educated in benefit of walker for energy conservation, use of pursed lip breathing with activity.     Balance   Balance Comments UE support of walker as pt tends to furniture walk in room per OT report   Sensory Examination   Sensory Perception no deficits were identified   Coordination   Coordination no deficits were identified   Muscle Tone   Muscle Tone no deficits were identified   General Therapy Interventions   Planned Therapy Interventions balance training;gait training;strengthening;transfer training   Clinical Impression   Criteria for Skilled Therapeutic Intervention yes, treatment indicated   PT Diagnosis decreased activity tolerance   Influenced by the following impairments shortness of breath, hypoxia, impaired balance   Functional limitations due to impairments impaired gait, decreased activity tolerance   Clinical Presentation Stable/Uncomplicated   Clinical  "Presentation Rationale pt with no major complications with current COPD exacerbation   Clinical Decision Making (Complexity) Low complexity   Therapy Frequency` daily   Predicted Duration of Therapy Intervention (days/wks) 4 days   Anticipated Equipment Needs at Discharge walker   Anticipated Discharge Disposition Home;Home with Home Therapy   Risk & Benefits of therapy have been explained Yes   Patient, Family & other staff in agreement with plan of care Yes   [facility] TM \"6 Clicks\"   2016, Trustees of [facility], under license to [employer].  All rights reserved.   6 Clicks Short Forms Basic Mobility Inpatient Short Form   [facility]  \"6 Clicks\" V.2 Basic Mobility Inpatient Short Form   1. Turning from your back to your side while in a flat bed without using bedrails? 4 - None   2. Moving from lying on your back to sitting on the side of a flat bed without using bedrails? 4 - None   3. Moving to and from a bed to a chair (including a wheelchair)? 3 - A Little   4. Standing up from a chair using your arms (e.g., wheelchair, or bedside chair)? 4 - None   5. To walk in hospital room? 3 - A Little   6. Climbing 3-5 steps with a railing? 3 - A Little   Basic Mobility Raw Score (Score out of 24.Lower scores equate to lower levels of function) 21   Total Evaluation Time   Total Evaluation Time (Minutes) 5     "

## 2017-04-20 NOTE — CONSULTS
Care Transition Initial Assessment -   Reason For Consult: care coordination/care conference, discharge planning  Met with: Patient    Active Problems:    Acute respiratory failure (H)         DATA  Lives With: friend(s)- Pt has been living with his friend since he became homeless.  Pt does need to ambulate step from the basement to the upper level of the home.   Living Arrangements: house. PT stated he is looking for a place to live but able to return to his current situation.      Who is your support system?: Other (specify) (friends)    Identified issues/concerns regarding health management: PT admitted due to Hypoxia. Pt has NOT been on home o2  In the past but open to his if needed.    Patient feels that they have adequate support @ home?  Yes:               Transportation Available: car, family or friend will provide  PT does not drive at all. He depends on his friend Julio     ASSESSMENT  Cognitive Status:  awake, alert and oriented  Concerns to be addressed: PT has been trying for several years to try and find housing. PT has a limited income . He has $700 month in  income.  $180 Cash support from the [facility] and $20 month in food stamps.  PT does not have a cell phone and used the [facility] to collect his mail..   SS just received phone call Melanie who is CM through [facility] [phone]. She just called pt in his room to try and set up an eval for EW support of PCA services. SWS will speak with pt to encourage this assessment and will reach out to friend to see if we can give out his cell phone for [employer].        PLAN  Following for possible new home oxygen and possible HC support ???

## 2017-04-20 NOTE — PLAN OF CARE
Problem: COPD, Chronic Bronchitis/Emphysema (Adult)  Goal: Signs and Symptoms of Listed Potential Problems Will be Absent or Manageable (COPD, Chronic Bronchitis/Emphysema)  Signs and symptoms of listed potential problems will be absent or manageable by discharge/transition of care (reference COPD, Chronic Bronchitis/Emphysema (Adult) CPG).  Outcome: No Change  VSS, afebrile and O2 sats low-mid 90's on 2L/nc.  Chronic neck/back pain, prn pain meds given with some relief.  Up to BR with a of 1.  Frequent cough, prod of sm amts tan secretions.  Doing well with IS, Lungs diminished throughout.  POC reviewed with pt, questions answered.

## 2017-04-20 NOTE — PROGRESS NOTES
04/20/17 1053   Quick Adds   Type of Visit Initial Occupational Therapy Evaluation   Living Environment   Lives With friend(s)   Living Arrangements house   Home Accessibility stairs to enter home;stairs within home;bed and bath on same level   Number of Stairs to Enter Home 2   Number of Stairs Within Home 12   Stair Railings at Home inside, present on right side   Transportation Available public transportation   Living Environment Comment Pt reported to rent a room in lower level of house with 4 other roommates.   Self-Care   Dominant Hand right   Usual Activity Tolerance moderate   Current Activity Tolerance fair   Regular Exercise no   Equipment Currently Used at Home none   Activity/Exercise/Self-Care Comment Pt reported to be independent in ADLs at baseline.  Activity limited by SOB.  Climbs stairs 2x/day to use the bathroom.  Friends bring him groceries.   Functional Level Prior   Ambulation 0-->independent   Transferring 0-->independent   Toileting 0-->independent   Bathing 0-->independent   Dressing 0-->independent   Eating 0-->independent   Communication 0-->understands/communicates without difficulty   Swallowing 0-->swallows foods/liquids without difficulty   Cognition 0 - no cognition issues reported   Fall history within last six months no   Which of the above functional risks had a recent onset or change? none   Prior Functional Level Comment Following his hosp. for pneumonia 1 month ago, pt. states was amb. indep. at home without AD. He had been to [employer] twice and leaned on grocery cart for amb. Pt. stated amb. up steps from basement bedroom to first floor was difficult and pt. needed to stop ascending steps due to SOB. Pt. states he eats olive crackers, apple sauce, oatmeal, and cookies. One of his roommates works at a bar so he can order a hamburger from him when he returns from work.       Present no   General Information   Onset of Illness/Injury or Date of Surgery -  Date 04/18/17   Referring Physician Dr. Ríos   Patient/Family Goals Statement Return to home with decreased SOB   Additional Occupational Profile Info/Pertinent History of Current Problem Pt is a 76 year old male admitted with acute hypoxic respiratory failure secondary to COPD emphysema type exacerbation, HTN, DM, hepatitis C, heavy smoker.   Precautions/Limitations oxygen therapy device and L/min;fall precautions   General Info Comments activity:  up ad roya   Cognitive Status Examination   Orientation orientation to person, place and time   Level of Consciousness alert   Able to Follow Commands WNL/WFL   Personal Safety (Cognitive) WNL/WFL   Memory intact   Attention No deficits were identified   Organization/Problem Solving No deficits were identified   Executive Function No deficits were identified   Cognitive Comment delayed processing   Visual Perception   Visual Perception No deficits were identified   Sensory Examination   Sensory Quick Adds No deficits were identified   Pain Assessment   Patient Currently in Pain Yes, see Vital Sign flowsheet  (chronic neck, upper back and shoulder pain)   Integumentary/Edema   Integumentary/Edema no deficits were identifed   Posture   Posture forward head position   Range of Motion (ROM)   ROM Quick Adds No deficits were identified   ROM Comment B UE AROM WFL   Strength   Manual Muscle Testing Quick Adds Other   Strength Comments B UE strength grossly 4/5   Hand Strength   Hand Strength Comments WFL   Muscle Tone Assessment   Muscle Tone Quick Adds No deficits were identified   Coordination   Upper Extremity Coordination No deficits were identified   Mobility   Bed Mobility Comments SBA   Transfer Skill: Bed to Chair/Chair to Bed   Level of [place]: Bed to Chair contact guard   Physical Assist/Nonphysical Assist: Bed to Chair verbal cues   Transfer Skill: Sit to Stand   Level of [place]: Sit/Stand contact guard   Physical Assist/Nonphysical Assist:  "Sit/Stand verbal cues   Upper Body Dressing   Level of [place]: Dress Upper Body independent   Physical Assist/Nonphysical Assist: Dress Upper Body set-up required   Lower Body Dressing   Level of [place]: Dress Lower Body minimum assist (75% patients effort)   Physical Assist/Nonphysical Assist: Dress Lower Body set-up required   Grooming   Level of [place]: Grooming contact guard   Physical Assist/Nonphysical Assist: Grooming set-up required   Eating/Self Feeding   Level of [place]: Eating independent   Physical Assist/Nonphysical Assist: Eating set-up required   Instrumental Activities of Daily Living (IADL)   Previous Responsibilities meal prep;medication management;finances   Activities of Daily Living Analysis   Impairments Contributing to Impaired Activities of Daily Living balance impaired;pain;strength decreased   General Therapy Interventions   Planned Therapy Interventions ADL retraining;transfer training;strengthening   Clinical Impression   Criteria for Skilled Therapeutic Interventions Met yes, treatment indicated   OT Diagnosis decreased ADL performance   Influenced by the following impairments Pt with decreased strength/endurance and increased SOB with minimal exertion.   Assessment of Occupational Performance 3-5 Performance Deficits   Identified Performance Deficits Pt with decreased ability to manage dressing, toileting, bathing, simple meal prep, household activity and community mobility   Clinical Decision Making (Complexity) Low complexity   Therapy Frequency daily   Predicted Duration of Therapy Intervention (days/wks) 1 week   Anticipated Equipment Needs at Discharge shower chair   Anticipated Discharge Disposition Home with Assist   Risks and Benefits of Treatment have been explained. Yes   Patient, Family & other staff in agreement with plan of care Yes   [facility] AM-PAC TM \"6 Clicks\"   2016, Trustees of [facility], under license to [employer].  All " "rights reserved.   6 Clicks Short Forms Daily Activity Inpatient Short Form   [facility] AM-PAC  \"6 Clicks\" Daily Activity Inpatient Short Form   1. Putting on and taking off regular lower body clothing? 3 - A Little   2. Bathing (including washing, rinsing, drying)? 3 - A Little   3. Toileting, which includes using toilet, bedpan or urinal? 3 - A Little   4. Putting on and taking off regular upper body clothing? 4 - None   5. Taking care of personal grooming such as brushing teeth? 3 - A Little   6. Eating meals? 4 - None   Daily Activity Raw Score (Score out of 24.Lower scores equate to lower levels of function) 20   Total Evaluation Time   Total Evaluation Time (Minutes) 10     "

## 2017-04-20 NOTE — PLAN OF CARE
Problem: COPD, Chronic Bronchitis/Emphysema (Adult)  Goal: Signs and Symptoms of Listed Potential Problems Will be Absent or Manageable (COPD, Chronic Bronchitis/Emphysema)  Signs and symptoms of listed potential problems will be absent or manageable by discharge/transition of care (reference COPD, Chronic Bronchitis/Emphysema (Adult) CPG).   Outcome: Improving  ls dim. Coarse productive cough. On oral prednisone. Vss. sats stable on 2LNC. Ind. Calls for assist. Good appetite. bs 79 and 125. No insulin required.

## 2017-04-21 ENCOUNTER — APPOINTMENT (OUTPATIENT)
Dept: OCCUPATIONAL THERAPY | Facility: CLINIC | Age: 77
DRG: 190 | End: 2017-04-21
Payer: MEDICARE

## 2017-04-21 ENCOUNTER — APPOINTMENT (OUTPATIENT)
Dept: PHYSICAL THERAPY | Facility: CLINIC | Age: 77
DRG: 190 | End: 2017-04-21
Payer: MEDICARE

## 2017-04-21 LAB
GLUCOSE BLDC GLUCOMTR-MCNC: 115 MG/DL (ref 70–99)
GLUCOSE BLDC GLUCOMTR-MCNC: 126 MG/DL (ref 70–99)
GLUCOSE BLDC GLUCOMTR-MCNC: 130 MG/DL (ref 70–99)
GLUCOSE BLDC GLUCOMTR-MCNC: 79 MG/DL (ref 70–99)
GLUCOSE BLDC GLUCOMTR-MCNC: 85 MG/DL (ref 70–99)

## 2017-04-21 PROCEDURE — 97110 THERAPEUTIC EXERCISES: CPT | Mod: GP | Performed by: PHYSICAL THERAPIST

## 2017-04-21 PROCEDURE — 94640 AIRWAY INHALATION TREATMENT: CPT

## 2017-04-21 PROCEDURE — 00000146 ZZHCL STATISTIC GLUCOSE BY METER IP

## 2017-04-21 PROCEDURE — 25000132 ZZH RX MED GY IP 250 OP 250 PS 637: Mod: GY | Performed by: INTERNAL MEDICINE

## 2017-04-21 PROCEDURE — 25000125 ZZHC RX 250: Performed by: INTERNAL MEDICINE

## 2017-04-21 PROCEDURE — 99207 ZZC CDG-MDM COMPONENT: MEETS LOW - DOWN CODED: CPT | Performed by: INTERNAL MEDICINE

## 2017-04-21 PROCEDURE — 12000000 ZZH R&B MED SURG/OB

## 2017-04-21 PROCEDURE — 94640 AIRWAY INHALATION TREATMENT: CPT | Mod: 76

## 2017-04-21 PROCEDURE — A9270 NON-COVERED ITEM OR SERVICE: HCPCS | Mod: GY | Performed by: INTERNAL MEDICINE

## 2017-04-21 PROCEDURE — 40000133 ZZH STATISTIC OT WARD VISIT

## 2017-04-21 PROCEDURE — 40000275 ZZH STATISTIC RCP TIME EA 10 MIN

## 2017-04-21 PROCEDURE — 97110 THERAPEUTIC EXERCISES: CPT | Mod: GO

## 2017-04-21 PROCEDURE — 99232 SBSQ HOSP IP/OBS MODERATE 35: CPT | Performed by: INTERNAL MEDICINE

## 2017-04-21 PROCEDURE — 40000193 ZZH STATISTIC PT WARD VISIT: Performed by: PHYSICAL THERAPIST

## 2017-04-21 PROCEDURE — 97535 SELF CARE MNGMENT TRAINING: CPT | Mod: GO

## 2017-04-21 RX ADMIN — PREDNISONE 40 MG: 20 TABLET ORAL at 08:25

## 2017-04-21 RX ADMIN — BUTALBITAL, ACETAMINOPHEN, CAFFEINE, AND CODEINE PHOSPHATE 1 CAPSULE: 30; 50; 40; 325 CAPSULE ORAL at 01:38

## 2017-04-21 RX ADMIN — IPRATROPIUM BROMIDE AND ALBUTEROL SULFATE 3 ML: .5; 3 SOLUTION RESPIRATORY (INHALATION) at 07:40

## 2017-04-21 RX ADMIN — BUTALBITAL, ACETAMINOPHEN, CAFFEINE, AND CODEINE PHOSPHATE 1 CAPSULE: 30; 50; 40; 325 CAPSULE ORAL at 11:20

## 2017-04-21 RX ADMIN — BUTALBITAL, ACETAMINOPHEN, CAFFEINE, AND CODEINE PHOSPHATE 1 CAPSULE: 30; 50; 40; 325 CAPSULE ORAL at 20:35

## 2017-04-21 RX ADMIN — IPRATROPIUM BROMIDE AND ALBUTEROL SULFATE 3 ML: .5; 3 SOLUTION RESPIRATORY (INHALATION) at 16:18

## 2017-04-21 RX ADMIN — UMECLIDINIUM 62.5 MCG: 62.5 AEROSOL, POWDER ORAL at 07:40

## 2017-04-21 RX ADMIN — AZITHROMYCIN 250 MG: 250 TABLET, FILM COATED ORAL at 08:24

## 2017-04-21 RX ADMIN — BUTALBITAL, ACETAMINOPHEN, CAFFEINE, AND CODEINE PHOSPHATE 1 CAPSULE: 30; 50; 40; 325 CAPSULE ORAL at 15:44

## 2017-04-21 RX ADMIN — FLUTICASONE FUROATE AND VILANTEROL TRIFENATATE 1 PUFF: 200; 25 POWDER RESPIRATORY (INHALATION) at 07:40

## 2017-04-21 RX ADMIN — TRAMADOL HYDROCHLORIDE 100 MG: 50 TABLET, COATED ORAL at 17:06

## 2017-04-21 RX ADMIN — MULTIPLE VITAMINS W/ MINERALS TAB 1 TABLET: TAB at 08:24

## 2017-04-21 RX ADMIN — NICOTINE 1 CARTRIDGE.: 4 INHALANT RESPIRATORY (INHALATION) at 17:55

## 2017-04-21 RX ADMIN — HYDROCHLOROTHIAZIDE 25 MG: 25 TABLET ORAL at 08:24

## 2017-04-21 RX ADMIN — FLUTICASONE FUROATE 1 PUFF: 100 POWDER RESPIRATORY (INHALATION) at 07:40

## 2017-04-21 RX ADMIN — BUTALBITAL, ACETAMINOPHEN, CAFFEINE, AND CODEINE PHOSPHATE 1 CAPSULE: 30; 50; 40; 325 CAPSULE ORAL at 07:00

## 2017-04-21 RX ADMIN — ATENOLOL 25 MG: 25 TABLET ORAL at 08:24

## 2017-04-21 RX ADMIN — IPRATROPIUM BROMIDE AND ALBUTEROL SULFATE 3 ML: .5; 3 SOLUTION RESPIRATORY (INHALATION) at 19:32

## 2017-04-21 RX ADMIN — IPRATROPIUM BROMIDE AND ALBUTEROL SULFATE 3 ML: .5; 3 SOLUTION RESPIRATORY (INHALATION) at 12:19

## 2017-04-21 RX ADMIN — NICOTINE 1 CARTRIDGE.: 4 INHALANT RESPIRATORY (INHALATION) at 08:26

## 2017-04-21 ASSESSMENT — PAIN DESCRIPTION - DESCRIPTORS: DESCRIPTORS: ACHING

## 2017-04-21 NOTE — PLAN OF CARE
Problem: Goal Outcome Summary  Goal: Goal Outcome Summary  Outcome: No Change     Pt is ambulating in room independently, ambulating halls x2 with SBA. O2 desats to 85% on RA with ambulation, currently on 1L O2 - see flowsheet. Low grade temp 99.3. Lungs dim, productive cough with thick, tan sputum. Fioricet given for headache. Will continue to monitor.

## 2017-04-21 NOTE — PLAN OF CARE
Problem: Goal Outcome Summary  Goal: Goal Outcome Summary  Outcome: No Change  VS stable. 93% on 1L 02. LEVINE. Diminished LS with bases being course and a productive cough. Complained of neck pain and pain med's given. Jesus extremities. Slept well throughout the night.

## 2017-04-21 NOTE — PLAN OF CARE
Problem: Goal Outcome Summary  Goal: Goal Outcome Summary  OT: Pt on 1L, 92% at rest. Pt reports being I in room, declined ADLs with OT. Pt reports that he does not recall education on EC/WS, reviewed and provided with handouts. Pt a bit frustrated but became increasingly receptive to OT. Pt does report that he is homeless and has no money to be used for shower chair or AE. Educated on CVC exercises and purpose. Pt reports them being frivolous, but then reported that his arms were begining to fatigue and that he understood the purpose. Pt able to maintain good respiratory rate, no reports of feeling SOB. Pt did have productive cough. Pt able to complete 4 exercises x 1 min in length. Educated on PLB with exercises.  Anticipate back to friend's home. Recommend shower chair and reacher/bath sponge.

## 2017-04-21 NOTE — PROGRESS NOTES
[facility]  Hospitalist Progress Note  Erik Fish MD 04/21/2017    Reason for Stay (Diagnosis): COPD exacerbation         Assessment and Plan:      Summary of Stay: Chuck Lopez is a 76 year old male with past medical history of chronic neck pain, COPD (not on oxygen), DM2, HCV and HTN who was admitted on 4/18/2017 with progressive SOB and cough and was found to have acute hypoxic respiratory failure due to COPD exacerbation.     Problem List/Assessment and Plan:      1. Acute Hypoxic Respiratory Failure 2/2 COPD Exacerbation: Presented with worsening SOB and cough and was found to have hypoxia. He had never been on oxygen before and is not steroid dependent. CXR showed emphysematous changes without infiltrates. He was started on IV Solumedrol and Azithromycin. He has been improving, transitioned to PO steroids yesterday. Encouraged patient to ambulate today tid as has had minimal activity to date.  - continue Prednisone 40 mg QD  - Continue nebs  - Continue Spiriva  - Continue supplemental oxygen - may need home O2 on discharge  - does not yet feel comfortable for discharge      2. Tobacco Use Disorder: Had been smoking PTA. Nicotine patch ordered and cessation encouraged.     3. HTN: Stable, continue PTA Atenolol and HCTZ.     4. DM2: Diet controlled PTA. SSI while here due to steroid induced hyperglycemia.     5. HCV with Transaminitis: Needs outpatient follow up.     6. Chronic Neck Pain: Continue PRN Tramadol.     DVT Prophylaxis: Pneumatic Compression Devices  Code Status: Full Code  Discharge Dispo: Home  Estimated Disch Date / # of Days until Disch: 1-2 days pending stable respiratory status, needs to ambulate today        Interval History (Subjective):      Feels much better, but still does not yet feel ready for discharge.  Has not mobilized in halls                   Physical Exam:      Last Vital Signs:  /54 (BP Location: Right arm)  Pulse 87  Temp 96.6  F (35.9  C)  "(Axillary)  Resp 18  Ht 1.651 m (5' 5\")  Wt 51.8 kg (114 lb 2 oz)  SpO2 94%  BMI 18.99 kg/m2      Intake/Output Summary (Last 24 hours) at 04/21/17 1735  Last data filed at 04/21/17 0900   Gross per 24 hour   Intake             1140 ml   Output             1600 ml   Net             -460 ml       Constitutional: Awake, alert, cooperative, no apparent distress   Respiratory: Distant BS, no crackles or wheezing   Cardiovascular: Regular rate and rhythm, normal S1 and S2, and no murmur noted   Abdomen: Normal bowel sounds, soft, non-distended, non-tender   Skin: No rashes, no cyanosis, dry to touch   Neuro: Alert and oriented x3, no weakness, numbness, memory loss   Extremities: No edema, normal range of motion   Other(s):        All other systems: Negative          Medications:      All current medications were reviewed with changes reflected in problem list.         Data:      All new lab and imaging data was reviewed.   Labs:    Recent Labs  Lab 04/19/17  0714   WBC 7.6   HGB 15.3   HCT 45.9   MCV 95   *      Imaging:   No results found for this or any previous visit (from the past 24 hour(s)).   "

## 2017-04-21 NOTE — PLAN OF CARE
Problem: Goal Outcome Summary  Goal: Goal Outcome Summary  PT: ambulated indep with walker, 86% on RA, 94% on 2L. Pt very adament that he will not discharge today and he will not discharge without O2, pt moving well, he declined to walk without the walker today and refused to do the stairs again today. Reports headache/pain.

## 2017-04-21 NOTE — PLAN OF CARE
Problem: COPD, Chronic Bronchitis/Emphysema (Adult)  Goal: Signs and Symptoms of Listed Potential Problems Will be Absent or Manageable (COPD, Chronic Bronchitis/Emphysema)  Signs and symptoms of listed potential problems will be absent or manageable by discharge/transition of care (reference COPD, Chronic Bronchitis/Emphysema (Adult) CPG).   Outcome: Improving    04/20/17 2053   COPD, Chronic Bronchitis/Emphysema   Problems Assessed (COPD, Chronic Bronchitis/Emphysema) all   Problems Present (COPD, Chronic Bronchitis/Emphysema) dyspnea      VS- 95% on 1L NC, 87% on RA during ambulation. PRN tramadol and Fioricet given for chronic head/neck pain. Continues Zithromax, prednisone. LS dim/coarse. Ambulated in halls x2 this shift. Will continue POC and monitoring.

## 2017-04-22 ENCOUNTER — APPOINTMENT (OUTPATIENT)
Dept: PHYSICAL THERAPY | Facility: CLINIC | Age: 77
DRG: 190 | End: 2017-04-22
Payer: MEDICARE

## 2017-04-22 LAB
BACTERIA SPEC CULT: ABNORMAL
GLUCOSE BLDC GLUCOMTR-MCNC: 124 MG/DL (ref 70–99)
GLUCOSE BLDC GLUCOMTR-MCNC: 141 MG/DL (ref 70–99)
GLUCOSE BLDC GLUCOMTR-MCNC: 146 MG/DL (ref 70–99)
GLUCOSE BLDC GLUCOMTR-MCNC: 78 MG/DL (ref 70–99)
GLUCOSE BLDC GLUCOMTR-MCNC: 98 MG/DL (ref 70–99)
GRAM STN SPEC: ABNORMAL
MICRO REPORT STATUS: ABNORMAL
MICRO REPORT STATUS: ABNORMAL
SPECIMEN SOURCE: ABNORMAL
SPECIMEN SOURCE: ABNORMAL

## 2017-04-22 PROCEDURE — 94640 AIRWAY INHALATION TREATMENT: CPT

## 2017-04-22 PROCEDURE — 25000125 ZZHC RX 250: Performed by: INTERNAL MEDICINE

## 2017-04-22 PROCEDURE — 25000132 ZZH RX MED GY IP 250 OP 250 PS 637: Mod: GY | Performed by: INTERNAL MEDICINE

## 2017-04-22 PROCEDURE — 99232 SBSQ HOSP IP/OBS MODERATE 35: CPT | Performed by: INTERNAL MEDICINE

## 2017-04-22 PROCEDURE — 94640 AIRWAY INHALATION TREATMENT: CPT | Mod: 76

## 2017-04-22 PROCEDURE — 99207 ZZC CDG-MDM COMPONENT: MEETS LOW - DOWN CODED: CPT | Performed by: INTERNAL MEDICINE

## 2017-04-22 PROCEDURE — A9270 NON-COVERED ITEM OR SERVICE: HCPCS | Mod: GY | Performed by: INTERNAL MEDICINE

## 2017-04-22 PROCEDURE — 40000274 ZZH STATISTIC RCP CONSULT EA 30 MIN

## 2017-04-22 PROCEDURE — 97116 GAIT TRAINING THERAPY: CPT | Mod: GP

## 2017-04-22 PROCEDURE — 87205 SMEAR GRAM STAIN: CPT | Performed by: INTERNAL MEDICINE

## 2017-04-22 PROCEDURE — 00000146 ZZHCL STATISTIC GLUCOSE BY METER IP

## 2017-04-22 PROCEDURE — 12000000 ZZH R&B MED SURG/OB

## 2017-04-22 PROCEDURE — 40000193 ZZH STATISTIC PT WARD VISIT

## 2017-04-22 PROCEDURE — 97110 THERAPEUTIC EXERCISES: CPT | Mod: GP

## 2017-04-22 PROCEDURE — 40000275 ZZH STATISTIC RCP TIME EA 10 MIN

## 2017-04-22 RX ORDER — POLYETHYLENE GLYCOL 3350 17 G/17G
17 POWDER, FOR SOLUTION ORAL 2 TIMES DAILY PRN
Status: DISCONTINUED | OUTPATIENT
Start: 2017-04-22 | End: 2017-04-24 | Stop reason: HOSPADM

## 2017-04-22 RX ADMIN — IPRATROPIUM BROMIDE AND ALBUTEROL SULFATE 3 ML: .5; 3 SOLUTION RESPIRATORY (INHALATION) at 15:09

## 2017-04-22 RX ADMIN — TRAMADOL HYDROCHLORIDE 100 MG: 50 TABLET, COATED ORAL at 19:12

## 2017-04-22 RX ADMIN — AZITHROMYCIN 250 MG: 250 TABLET, FILM COATED ORAL at 08:35

## 2017-04-22 RX ADMIN — IPRATROPIUM BROMIDE AND ALBUTEROL SULFATE 3 ML: .5; 3 SOLUTION RESPIRATORY (INHALATION) at 19:05

## 2017-04-22 RX ADMIN — FLUTICASONE FUROATE AND VILANTEROL TRIFENATATE 1 PUFF: 200; 25 POWDER RESPIRATORY (INHALATION) at 09:09

## 2017-04-22 RX ADMIN — PREDNISONE 40 MG: 20 TABLET ORAL at 08:35

## 2017-04-22 RX ADMIN — HYDROCHLOROTHIAZIDE 25 MG: 25 TABLET ORAL at 08:35

## 2017-04-22 RX ADMIN — IPRATROPIUM BROMIDE AND ALBUTEROL SULFATE 3 ML: .5; 3 SOLUTION RESPIRATORY (INHALATION) at 12:30

## 2017-04-22 RX ADMIN — IPRATROPIUM BROMIDE AND ALBUTEROL SULFATE 3 ML: .5; 3 SOLUTION RESPIRATORY (INHALATION) at 09:03

## 2017-04-22 RX ADMIN — MULTIPLE VITAMINS W/ MINERALS TAB 1 TABLET: TAB at 08:35

## 2017-04-22 RX ADMIN — FLUTICASONE FUROATE 1 PUFF: 100 POWDER RESPIRATORY (INHALATION) at 09:07

## 2017-04-22 RX ADMIN — UMECLIDINIUM 62.5 MCG: 62.5 AEROSOL, POWDER ORAL at 09:09

## 2017-04-22 RX ADMIN — BUTALBITAL, ACETAMINOPHEN, CAFFEINE, AND CODEINE PHOSPHATE 1 CAPSULE: 30; 50; 40; 325 CAPSULE ORAL at 06:25

## 2017-04-22 RX ADMIN — POLYETHYLENE GLYCOL 3350 17 G: 17 POWDER, FOR SOLUTION ORAL at 03:24

## 2017-04-22 RX ADMIN — BUTALBITAL, ACETAMINOPHEN, CAFFEINE, AND CODEINE PHOSPHATE 1 CAPSULE: 30; 50; 40; 325 CAPSULE ORAL at 01:56

## 2017-04-22 RX ADMIN — ATENOLOL 25 MG: 25 TABLET ORAL at 08:35

## 2017-04-22 RX ADMIN — BUTALBITAL, ACETAMINOPHEN, CAFFEINE, AND CODEINE PHOSPHATE 1 CAPSULE: 30; 50; 40; 325 CAPSULE ORAL at 10:35

## 2017-04-22 RX ADMIN — BUTALBITAL, ACETAMINOPHEN, CAFFEINE, AND CODEINE PHOSPHATE 1 CAPSULE: 30; 50; 40; 325 CAPSULE ORAL at 14:44

## 2017-04-22 RX ADMIN — BUTALBITAL, ACETAMINOPHEN, CAFFEINE, AND CODEINE PHOSPHATE 1 CAPSULE: 30; 50; 40; 325 CAPSULE ORAL at 19:12

## 2017-04-22 NOTE — PLAN OF CARE
Problem: Goal Outcome Summary  Goal: Goal Outcome Summary  OT: Attempted to see pt this afternoon, pt out of room working with PT. Will check back as schedule allows/ reschedule.

## 2017-04-22 NOTE — PLAN OF CARE
Problem: Goal Outcome Summary  Goal: Goal Outcome Summary  Outcome: Improving     VSS, afebrile. Pt c/o chronic/constant neck pain, improved to tolerable level with PRN Fioricet x2. A/Ox4, ind in room, sba in halls with O2 tank. Ambulated in halls, up to chair for meals. PT/OT following. Pt does not feel ready to d/c, MD aware. Possible d/c 1-2 days. BG 78 and 141

## 2017-04-22 NOTE — PROGRESS NOTES
"  [facility]  Hospitalist Progress Note  Erik Fish MD 04/22/2017    Reason for Stay (Diagnosis): COPD exacerbation         Assessment and Plan:      Summary of Stay: Chuck Lopez is a 76 year old male with past medical history of chronic neck pain, COPD (not on oxygen), DM2, HCV and HTN who was admitted on 4/18/2017 with progressive SOB and cough and was found to have acute hypoxic respiratory failure due to COPD exacerbation.      Problem List/Assessment and Plan:       1. Acute Hypoxic Respiratory Failure 2/2 COPD Exacerbation: Presented with worsening SOB and cough and was found to have hypoxia. He had never been on oxygen before and is not steroid dependent. CXR showed emphysematous changes without infiltrates. He was started on IV Solumedrol and Azithromycin. He has been improving, transitioned to PO steroids. Encouraged patient to ambulate.  - continue Prednisone 40 mg QD  - Continue nebs  - Continue Spiriva  - Continue supplemental oxygen - may need home O2 on discharge pending clinical improvement  - does not yet feel comfortable for discharge and adamant about not leaving \"until I feel ready\"      2. Tobacco Use Disorder: Had been smoking PTA. Nicotine patch ordered and cessation encouraged.      3. HTN: Stable, continue PTA Atenolol and HCTZ.      4. DM2: Diet controlled PTA. SSI while here due to steroid induced hyperglycemia.      5. HCV with Transaminitis: Needs outpatient follow up.      6. Chronic Neck Pain: Continue PRN Tramadol.      DVT Prophylaxis: Pneumatic Compression Devices  Code Status: Full Code  Discharge Dispo: Home  Estimated Disch Date / # of Days until Disch: 1-2 days pending stable respiratory status, needs to ambulate more today        Interval History (Subjective):      Does not feel as good today as he did yesterday                  Physical Exam:      Last Vital Signs:  /44 (BP Location: Left arm)  Pulse 87  Temp 97.1  F (36.2  C) (Oral)  Resp " "20  Ht 1.651 m (5' 5\")  Wt 51.8 kg (114 lb 2 oz)  SpO2 95%  BMI 18.99 kg/m2      Intake/Output Summary (Last 24 hours) at 04/22/17 1414  Last data filed at 04/22/17 1300   Gross per 24 hour   Intake              840 ml   Output                0 ml   Net              840 ml       Constitutional: Awake, alert, cooperative, no apparent distress   Respiratory: Distant BS, no crackles or wheezing   Cardiovascular: Regular rate and rhythm, normal S1 and S2, and no murmur noted   Abdomen: Normal bowel sounds, soft, non-distended, non-tender   Skin: No rashes, no cyanosis, dry to touch   Neuro: Alert and oriented x3, no weakness, numbness, memory loss   Extremities: No edema, normal range of motion   Other(s):        All other systems: Negative          Medications:      All current medications were reviewed with changes reflected in problem list.         Data:      All new lab and imaging data was reviewed.   Labs:    Recent Labs  Lab 04/19/17  0714 04/18/17  1353   WBC 7.6 8.4   HGB 15.3 15.1   HCT 45.9 44.9   MCV 95 94   * 92*      Imaging:   No results found for this or any previous visit (from the past 24 hour(s)).   "

## 2017-04-22 NOTE — PLAN OF CARE
Problem: Goal Outcome Summary  Goal: Goal Outcome Summary  Outcome: No Change  Pt. A&O, VSS, afebrile, O2 1lpm via NC. PRN fioriat administered x2, c/o headache/neck pain. Transfers independently in room. BG 98. Receiving PO Prednisone. Abx Zithromax. Pt. Stated he hasn't had BM since Monday, MD paged and received order for PRN miralax; administered x1. Plan for d/c 1-2 days.

## 2017-04-22 NOTE — PLAN OF CARE
Problem: Goal Outcome Summary  Goal: Goal Outcome Summary  Physical Therapy Discharge Summary     Reason for therapy discharge:    All goals and outcomes met, no further needs identified.     Progress towards therapy goal(s). See goals on Care Plan in [facility] electronic health record for goal details.  Goals met     Therapy recommendation(s):    Continued therapy is recommended.  Rationale/Recommendations:  continue with HEP and outpatient PT as needed.     PT: pt ambulated 130 feet on 2 L of oxygen with no AD. Pt ascended and descended 12 steps with 1 HR (SBA). Pt's oxygen did not drop below 94%. PT goals met and will be D/C from PT. Recommend discharge to home (Pt may required supplemental oxygen at home).

## 2017-04-22 NOTE — PLAN OF CARE
Problem: COPD, Chronic Bronchitis/Emphysema (Adult)  Goal: Signs and Symptoms of Listed Potential Problems Will be Absent or Manageable (COPD, Chronic Bronchitis/Emphysema)  Signs and symptoms of listed potential problems will be absent or manageable by discharge/transition of care (reference COPD, Chronic Bronchitis/Emphysema (Adult) CPG).   Outcome: Improving  VSS, afeb., denies pain. LS are coarse and dim. 94% on 1L. Pt up indep in room, sba x1 in halls. Pt requires 2L 02 to ambulate in halls, able to keep sats at 94% while walking. A & O. Good appetite, eboni mod carb diet. BG's were 126 & 130. No tele.

## 2017-04-23 ENCOUNTER — APPOINTMENT (OUTPATIENT)
Dept: OCCUPATIONAL THERAPY | Facility: CLINIC | Age: 77
DRG: 190 | End: 2017-04-23
Payer: MEDICARE

## 2017-04-23 LAB
GLUCOSE BLDC GLUCOMTR-MCNC: 102 MG/DL (ref 70–99)
GLUCOSE BLDC GLUCOMTR-MCNC: 117 MG/DL (ref 70–99)
GLUCOSE BLDC GLUCOMTR-MCNC: 119 MG/DL (ref 70–99)
GLUCOSE BLDC GLUCOMTR-MCNC: 129 MG/DL (ref 70–99)
GLUCOSE BLDC GLUCOMTR-MCNC: 200 MG/DL (ref 70–99)

## 2017-04-23 PROCEDURE — 94640 AIRWAY INHALATION TREATMENT: CPT

## 2017-04-23 PROCEDURE — 25000132 ZZH RX MED GY IP 250 OP 250 PS 637: Mod: GY | Performed by: INTERNAL MEDICINE

## 2017-04-23 PROCEDURE — 25000125 ZZHC RX 250: Performed by: INTERNAL MEDICINE

## 2017-04-23 PROCEDURE — 40000275 ZZH STATISTIC RCP TIME EA 10 MIN

## 2017-04-23 PROCEDURE — 00000146 ZZHCL STATISTIC GLUCOSE BY METER IP

## 2017-04-23 PROCEDURE — 99232 SBSQ HOSP IP/OBS MODERATE 35: CPT | Performed by: INTERNAL MEDICINE

## 2017-04-23 PROCEDURE — 97535 SELF CARE MNGMENT TRAINING: CPT | Mod: GO

## 2017-04-23 PROCEDURE — 94640 AIRWAY INHALATION TREATMENT: CPT | Mod: 76

## 2017-04-23 PROCEDURE — A9270 NON-COVERED ITEM OR SERVICE: HCPCS | Mod: GY | Performed by: INTERNAL MEDICINE

## 2017-04-23 PROCEDURE — 12000000 ZZH R&B MED SURG/OB

## 2017-04-23 PROCEDURE — 40000133 ZZH STATISTIC OT WARD VISIT

## 2017-04-23 PROCEDURE — 97110 THERAPEUTIC EXERCISES: CPT | Mod: GO

## 2017-04-23 RX ADMIN — BUTALBITAL, ACETAMINOPHEN, CAFFEINE, AND CODEINE PHOSPHATE 1 CAPSULE: 30; 50; 40; 325 CAPSULE ORAL at 18:04

## 2017-04-23 RX ADMIN — IPRATROPIUM BROMIDE AND ALBUTEROL SULFATE 3 ML: .5; 3 SOLUTION RESPIRATORY (INHALATION) at 08:00

## 2017-04-23 RX ADMIN — INSULIN ASPART 1 UNITS: 100 INJECTION, SOLUTION INTRAVENOUS; SUBCUTANEOUS at 21:38

## 2017-04-23 RX ADMIN — BUTALBITAL, ACETAMINOPHEN, CAFFEINE, AND CODEINE PHOSPHATE 1 CAPSULE: 30; 50; 40; 325 CAPSULE ORAL at 05:48

## 2017-04-23 RX ADMIN — ATENOLOL 25 MG: 25 TABLET ORAL at 08:35

## 2017-04-23 RX ADMIN — BUTALBITAL, ACETAMINOPHEN, CAFFEINE, AND CODEINE PHOSPHATE 1 CAPSULE: 30; 50; 40; 325 CAPSULE ORAL at 21:48

## 2017-04-23 RX ADMIN — BUTALBITAL, ACETAMINOPHEN, CAFFEINE, AND CODEINE PHOSPHATE 1 CAPSULE: 30; 50; 40; 325 CAPSULE ORAL at 14:07

## 2017-04-23 RX ADMIN — FLUTICASONE FUROATE 1 PUFF: 100 POWDER RESPIRATORY (INHALATION) at 08:04

## 2017-04-23 RX ADMIN — BUTALBITAL, ACETAMINOPHEN, CAFFEINE, AND CODEINE PHOSPHATE 1 CAPSULE: 30; 50; 40; 325 CAPSULE ORAL at 00:42

## 2017-04-23 RX ADMIN — MULTIPLE VITAMINS W/ MINERALS TAB 1 TABLET: TAB at 08:35

## 2017-04-23 RX ADMIN — FLUTICASONE FUROATE AND VILANTEROL TRIFENATATE 1 PUFF: 200; 25 POWDER RESPIRATORY (INHALATION) at 08:07

## 2017-04-23 RX ADMIN — IPRATROPIUM BROMIDE AND ALBUTEROL SULFATE 3 ML: .5; 3 SOLUTION RESPIRATORY (INHALATION) at 15:14

## 2017-04-23 RX ADMIN — TRAMADOL HYDROCHLORIDE 100 MG: 50 TABLET, COATED ORAL at 10:41

## 2017-04-23 RX ADMIN — PREDNISONE 40 MG: 20 TABLET ORAL at 08:35

## 2017-04-23 RX ADMIN — UMECLIDINIUM 62.5 MCG: 62.5 AEROSOL, POWDER ORAL at 08:09

## 2017-04-23 RX ADMIN — IPRATROPIUM BROMIDE AND ALBUTEROL SULFATE 3 ML: .5; 3 SOLUTION RESPIRATORY (INHALATION) at 19:12

## 2017-04-23 RX ADMIN — HYDROCHLOROTHIAZIDE 25 MG: 25 TABLET ORAL at 08:35

## 2017-04-23 RX ADMIN — IPRATROPIUM BROMIDE AND ALBUTEROL SULFATE 3 ML: .5; 3 SOLUTION RESPIRATORY (INHALATION) at 12:00

## 2017-04-23 NOTE — PLAN OF CARE
"Problem: Goal Outcome Summary  Goal: Goal Outcome Summary  Outcome: No Change  Pt. A&O, VSS, afebrile on RA. PRN fioriat administered x2, c/o headache/neck pain. Transfers independently in room. . Receiving PO Prednisone. Pt. States he's been up ambulating frequently, and \"feeling much, much better.\" Plan for d/c 1-2 days.       "

## 2017-04-23 NOTE — PLAN OF CARE
Problem: Goal Outcome Summary  Goal: Goal Outcome Summary  OT: Pt partcipated in BUE CVC exercises while seated and in stance x4 exercises, pt continues to be limited by fatigue and SOB, O2 sats 86-88% on RA at rest, cold hands, sats decreased to 85-86% with exercise. Pt with noted productive cough. Pt re-educated on PLB technique and EC/WS strategies as pt does not recall from previous session's education. Anticipate back to friend's home. Recommend shower chair and reacher/bath sponge.

## 2017-04-23 NOTE — PROGRESS NOTES
"  [facility]  Hospitalist Progress Note  Erik Fish MD 04/23/2017    Reason for Stay (Diagnosis): COPD exacerbation         Assessment and Plan:      Summary of Stay: Chuck Lopez is a 76 year old male with past medical history of chronic neck pain, COPD (not on oxygen), DM2, HCV and HTN who was admitted on 4/18/2017 with progressive SOB and cough and was found to have acute hypoxic respiratory failure due to COPD exacerbation.  Slowly improving      Problem List/Assessment and Plan:       1. Acute Hypoxic Respiratory Failure 2/2 COPD Exacerbation: Presented with worsening SOB and cough and was found to have hypoxia. He had never been on oxygen before and is not steroid dependent. CXR showed emphysematous changes without infiltrates. He was started on IV Solumedrol and Azithromycin. He has been improving, transitioned to PO steroids. Encouraged patient to ambulate.  - continue Prednisone 40 mg QD  - Continue nebs  - Continue Spiriva  -- does not yet feel comfortable for discharge and adamant about not leaving \"until I feel ready\".  Plans are for discharge tomorrow - is ambulating better, needing less oxygen, and appears to be improving      2. Tobacco Use Disorder: Had been smoking PTA. Nicotine patch ordered and cessation encouraged.      3. HTN: Stable, continue PTA Atenolol and HCTZ.      4. DM2: Diet controlled PTA. SSI while here due to steroid induced hyperglycemia.      5. HCV with Transaminitis: Needs outpatient follow up.      6. Chronic Neck Pain: Continue PRN Tramadol.      DVT Prophylaxis: Pneumatic Compression Devices  Code Status: Full Code  Discharge Dispo: Home  Estimated Disch Date / # of Days until Disch:  tomorrow        Interval History (Subjective):      Ambulated more yesterday.  Coughing up sputum.  Breathing better.  \"I feel better than I've felt in 2 months\"                  Physical Exam:      Last Vital Signs:  /54 (BP Location: Right arm)  Pulse 87  " "Temp 97.3  F (36.3  C) (Axillary)  Resp 20  Ht 1.651 m (5' 5\")  Wt 51.8 kg (114 lb 2 oz)  SpO2 91%  BMI 18.99 kg/m2      Intake/Output Summary (Last 24 hours) at 04/23/17 1104  Last data filed at 04/22/17 1920   Gross per 24 hour   Intake              780 ml   Output                0 ml   Net              780 ml       Constitutional: Awake, alert, cooperative, no apparent distress   Respiratory: Distant BS.  No wheezing.  Sitting up in chair   Cardiovascular: Regular rate and rhythm, normal S1 and S2, and no murmur noted   Abdomen: Normal bowel sounds, soft, non-distended, non-tender   Skin: No rashes, no cyanosis, dry to touch   Neuro: Alert and oriented x3, no weakness, numbness, memory loss   Extremities: No edema, normal range of motion   Other(s):        All other systems: Negative          Medications:      All current medications were reviewed with changes reflected in problem list.         Data:      All new lab and imaging data was reviewed.   Labs:    Recent Labs  Lab 04/19/17  0714 04/18/17  1353   WBC 7.6 8.4   HGB 15.3 15.1   HCT 45.9 44.9   MCV 95 94   * 92*      Imaging:   No results found for this or any previous visit (from the past 24 hour(s)).   "

## 2017-04-23 NOTE — PLAN OF CARE
Problem: Individualization  Goal: Patient Preferences  Outcome: Improving  Lungs diminished and coarse. Oxygen saturation on room air is low 90's, patient's hands cold, need to check sats on ears for accurate results. Complains of pain in head, pain medication given x 2. Up ind.

## 2017-04-23 NOTE — PLAN OF CARE
Problem: COPD, Chronic Bronchitis/Emphysema (Adult)  Goal: Signs and Symptoms of Listed Potential Problems Will be Absent or Manageable (COPD, Chronic Bronchitis/Emphysema)  Signs and symptoms of listed potential problems will be absent or manageable by discharge/transition of care (reference COPD, Chronic Bronchitis/Emphysema (Adult) CPG).   Outcome: Improving  VSS, afeb., denies pain.  LS are dim, 96% on 1L, weaned off 02 this shift, and able to ambulate on RA, with sats staying above 88%. Good appetite, eboni mod carb diet. BG was 124, No Tele.

## 2017-04-24 VITALS
RESPIRATION RATE: 16 BRPM | OXYGEN SATURATION: 91 % | SYSTOLIC BLOOD PRESSURE: 149 MMHG | HEIGHT: 65 IN | TEMPERATURE: 98.1 F | WEIGHT: 114.13 LBS | DIASTOLIC BLOOD PRESSURE: 53 MMHG | BODY MASS INDEX: 19.02 KG/M2 | HEART RATE: 87 BPM

## 2017-04-24 LAB
GLUCOSE BLDC GLUCOMTR-MCNC: 121 MG/DL (ref 70–99)
GLUCOSE BLDC GLUCOMTR-MCNC: 128 MG/DL (ref 70–99)
GLUCOSE BLDC GLUCOMTR-MCNC: 84 MG/DL (ref 70–99)

## 2017-04-24 PROCEDURE — 40000275 ZZH STATISTIC RCP TIME EA 10 MIN

## 2017-04-24 PROCEDURE — 00000146 ZZHCL STATISTIC GLUCOSE BY METER IP

## 2017-04-24 PROCEDURE — A9270 NON-COVERED ITEM OR SERVICE: HCPCS | Mod: GY | Performed by: INTERNAL MEDICINE

## 2017-04-24 PROCEDURE — 25000132 ZZH RX MED GY IP 250 OP 250 PS 637: Mod: GY | Performed by: INTERNAL MEDICINE

## 2017-04-24 PROCEDURE — 25000125 ZZHC RX 250: Performed by: INTERNAL MEDICINE

## 2017-04-24 PROCEDURE — 94640 AIRWAY INHALATION TREATMENT: CPT

## 2017-04-24 PROCEDURE — 99239 HOSP IP/OBS DSCHRG MGMT >30: CPT | Performed by: INTERNAL MEDICINE

## 2017-04-24 RX ORDER — ALBUTEROL SULFATE 90 UG/1
1-2 AEROSOL, METERED RESPIRATORY (INHALATION) EVERY 6 HOURS PRN
Qty: 2 INHALER | Refills: 3 | Status: ON HOLD | OUTPATIENT
Start: 2017-04-24 | End: 2017-09-11

## 2017-04-24 RX ORDER — TIOTROPIUM BROMIDE 18 UG/1
CAPSULE ORAL; RESPIRATORY (INHALATION)
Qty: 30 CAPSULE | Refills: 11 | Status: ON HOLD | OUTPATIENT
Start: 2017-04-24 | End: 2019-01-10

## 2017-04-24 RX ORDER — PREDNISONE 20 MG/1
TABLET ORAL
Qty: 11 TABLET | Refills: 0 | Status: ON HOLD | OUTPATIENT
Start: 2017-04-24 | End: 2017-05-13

## 2017-04-24 RX ORDER — BUTALBITAL, ACETAMINOPHEN, CAFFEINE AND CODEINE PHOSPHATE 50; 325; 40; 30 MG/1; MG/1; MG/1; MG/1
1 CAPSULE ORAL EVERY 4 HOURS PRN
Qty: 10 CAPSULE | Refills: 0 | Status: ON HOLD | OUTPATIENT
Start: 2017-04-24 | End: 2017-05-13

## 2017-04-24 RX ORDER — TRAMADOL HYDROCHLORIDE 50 MG/1
50-100 TABLET ORAL EVERY 6 HOURS PRN
Qty: 10 TABLET | Refills: 0 | Status: SHIPPED | OUTPATIENT
Start: 2017-04-24 | End: 2017-04-28

## 2017-04-24 RX ORDER — ALBUTEROL SULFATE 0.83 MG/ML
3 SOLUTION RESPIRATORY (INHALATION) EVERY 4 HOURS PRN
Qty: 360 ML | Refills: 3 | Status: ON HOLD | OUTPATIENT
Start: 2017-04-24 | End: 2017-09-11

## 2017-04-24 RX ADMIN — TRAMADOL HYDROCHLORIDE 100 MG: 50 TABLET, COATED ORAL at 09:51

## 2017-04-24 RX ADMIN — FLUTICASONE FUROATE AND VILANTEROL TRIFENATATE 1 PUFF: 200; 25 POWDER RESPIRATORY (INHALATION) at 07:58

## 2017-04-24 RX ADMIN — MULTIPLE VITAMINS W/ MINERALS TAB 1 TABLET: TAB at 07:53

## 2017-04-24 RX ADMIN — BUTALBITAL, ACETAMINOPHEN, CAFFEINE, AND CODEINE PHOSPHATE 1 CAPSULE: 30; 50; 40; 325 CAPSULE ORAL at 09:51

## 2017-04-24 RX ADMIN — HYDROCHLOROTHIAZIDE 25 MG: 25 TABLET ORAL at 07:53

## 2017-04-24 RX ADMIN — PREDNISONE 40 MG: 20 TABLET ORAL at 07:53

## 2017-04-24 RX ADMIN — ATENOLOL 25 MG: 25 TABLET ORAL at 07:53

## 2017-04-24 RX ADMIN — UMECLIDINIUM 62.5 MCG: 62.5 AEROSOL, POWDER ORAL at 07:58

## 2017-04-24 RX ADMIN — BUTALBITAL, ACETAMINOPHEN, CAFFEINE, AND CODEINE PHOSPHATE 1 CAPSULE: 30; 50; 40; 325 CAPSULE ORAL at 01:42

## 2017-04-24 RX ADMIN — IPRATROPIUM BROMIDE AND ALBUTEROL SULFATE 3 ML: .5; 3 SOLUTION RESPIRATORY (INHALATION) at 07:58

## 2017-04-24 RX ADMIN — BUTALBITAL, ACETAMINOPHEN, CAFFEINE, AND CODEINE PHOSPHATE 1 CAPSULE: 30; 50; 40; 325 CAPSULE ORAL at 05:55

## 2017-04-24 RX ADMIN — FLUTICASONE FUROATE 1 PUFF: 100 POWDER RESPIRATORY (INHALATION) at 07:58

## 2017-04-24 NOTE — PLAN OF CARE
Problem: Goal Outcome Summary  Goal: Goal Outcome Summary  OT: Met with pt, discussed remaining OT goals, pt stated he has been able to complete all self cares without any concern or difficulty, at this time pt declines a need for OT and states he has discussed with MD, he is discharging home today, will complete OT order     Occupational Therapy Discharge Summary     Reason for therapy discharge:    Discharged to home.     Progress towards therapy goal(s). See goals on Care Plan in [facility] electronic health record for goal details.  Goals met     Therapy recommendation(s):    Recommend shower chair and reacher/bath sponge

## 2017-04-24 NOTE — PLAN OF CARE
Problem: COPD, Chronic Bronchitis/Emphysema (Adult)  Goal: Signs and Symptoms of Listed Potential Problems Will be Absent or Manageable (COPD, Chronic Bronchitis/Emphysema)  Signs and symptoms of listed potential problems will be absent or manageable by discharge/transition of care (reference COPD, Chronic Bronchitis/Emphysema (Adult) CPG).   Outcome: Improving  VSS, afeb. LS are dim, exp wheezes bilat. 90% on RA. Pt up indep in room, and ambulating halls indep. Good appetite, tl mod carb diet, ate 100% for dinner. Receives Fiorecet Q4H for chronic neck pain. BG this shift was 119, no ss insulin.  DC home tomorrow.

## 2017-04-24 NOTE — PLAN OF CARE
"Problem: Goal Outcome Summary  Goal: Goal Outcome Summary  Outcome: No Change  Pt. A&O, VSS, afebrile on RA. PRN fioriat administered x2, c/o headache/neck pain. Transfers independently in room. . Receiving PO Prednisone. Pt. States he's \"feeling better by the minute.\" Plan for d/c today.      "

## 2017-04-24 NOTE — PROGRESS NOTES
"Feels well.  Wants to go home today.  \"I feel better than I have in months\".  Able to walk stairs yesterday      Last 24 hours Vitals signs,imaging,microbiology;laboratory results were reviewed by me in EPIC  GENERAL:  Comfortable.  PSYCH: pleasant, oriented, No acute distress.  HEART:  Normal S1, S2 with no edema.  LUNGS:  Clear to auscultation, normal Respiratory effort.  ABDOMEN:  Soft, no hepatosplenomegaly, normal bowel sounds.  SKIN:  Dry to touch, No rash.     Last Basic Metabolic Panel:  Lab Results   Component Value Date     04/19/2017      Lab Results   Component Value Date    POTASSIUM 4.0 04/19/2017     Lab Results   Component Value Date    CHLORIDE 101 04/19/2017     Lab Results   Component Value Date    MATTHEW 8.7 04/19/2017     Lab Results   Component Value Date    CO2 32 04/19/2017     Lab Results   Component Value Date    BUN 20 04/19/2017     Lab Results   Component Value Date    CR 0.67 04/19/2017     Lab Results   Component Value Date     04/19/2017          A/p:  COPD exacerbation - improved    Home today.  >30 min spent on discharge today  "

## 2017-04-24 NOTE — PROGRESS NOTES
Pt discharging home with transportation provided by friend. IV removed, dressing c/d/i. Discharge instructions reviewed and questions answered. Available medications send home with patient from Discharge pharmacy.     [address] [place] [address] pharmacy did not have Fioricet. Spoke with [facility], pt will be provided w/c transportation to [facility] pharmacy to have prescription filled.

## 2017-04-25 ENCOUNTER — TELEPHONE (OUTPATIENT)
Dept: FAMILY MEDICINE | Facility: CLINIC | Age: 77
End: 2017-04-25

## 2017-04-25 ENCOUNTER — CARE COORDINATION (OUTPATIENT)
Dept: CARE COORDINATION | Facility: CLINIC | Age: 77
End: 2017-04-25

## 2017-04-25 NOTE — TELEPHONE ENCOUNTER
Clinic Care Coordination Contact  Care Team Conversations    See Care Coordination notes.     Ronda Rain, RN  [facility]  Clinic Care Coordinator - [place] and [place] Locations   Direct:  [phone] (voicemail available)   (Covering for BRIAN GargN - Tuesday 04/25/2017)

## 2017-04-25 NOTE — TELEPHONE ENCOUNTER
Chief Complaint   Patient presents with     Hospital F/U     Inpatient discharge from [facility] on 4/24/2017 Copd Exacerbation (H), Tobacco Use Disorder     Yanira Richard/

## 2017-04-26 ENCOUNTER — TELEPHONE (OUTPATIENT)
Dept: FAMILY MEDICINE | Facility: CLINIC | Age: 77
End: 2017-04-26

## 2017-04-26 DIAGNOSIS — J43.1 PANLOBULAR EMPHYSEMA (H): Primary | ICD-10-CM

## 2017-04-26 NOTE — PROGRESS NOTES
Hand-off for Care Transitions to Next Level of Care Provider  Name: Chuck Lopez  : 1940  MRN #: [id number]  Reason for Hospitalization:  COPD exacerbation (H) [J44.1]  Acute hypoxemic respiratory failure (H) [J96.01]  Admit Date/Time: 2017  1:23 PM  Discharge Date:  2017    Reason for Communication Hand-off Referral: Admission diagnoses: COPD    Discharge Plan:  Discharged to: Home with support                   Patient agreeable to post-hospital support suggestions:  Yes  Discharge Plan:  Home with support           Patient is on new medications:   Yes           MTM follow up recommended: No           Tel-Assurance program:  Ineligible           Follow-up specialty is recommended: No      Follow-up plan:  No future appointments.    Any outstanding tests or procedures:  No.      Key Recommendations:  Patient was discharged on Prednisone taper. Please assess compliance with completing medication. He was still a smoker at time of admission. He would greatly benefit from ongoing recommendations to quit smoking. Prescription at discharge for Nicotrol inhaler to assist in this. He told CM that he needs a cane but declined therapy evaluation during admission.     Communicated handoff via EPIC Comm Mgt to Dr. Pranay Reid's CC at  Care Coord.      Elina Vázquez RN, BSN, CTS  [facility]  [phone]

## 2017-04-26 NOTE — PROGRESS NOTES
Clinic Care Coordination Contact  Care Team Conversations    ADDENDUM:   CCRN received late entry CTS today, 04/26/2017, from Care Transitions Team - see letters.     Ronda Rain, RN  [facility]  Clinic Care Coordinator - [place] and [place] Locations   Direct:  [phone] (voicemail available)

## 2017-04-26 NOTE — TELEPHONE ENCOUNTER
Please enter a spirometry test.     Andrea from  outpatient rehab services calling ([phone]).     This will need to be completed prior to pt. participating in pulmonary rehab. Rehab would like to be notified when pt. has actually done these.     Please advise    Shaina Rosenberg RN, BSN, PHN

## 2017-04-26 NOTE — DISCHARGE SUMMARY
DATE OF ADMISSION:  04/18/2017      DATE OF DISCHARGE:  04/24/2017      PRINCIPAL FINAL DIAGNOSES:   1.  Acute hypoxic respiratory failure on chronic respiratory failure secondary to chronic obstructive pulmonary disease exacerbation.  The patient is currently not on chronic prednisone or chronic home oxygen therapy.   2.  Tobacco use, active smoker at time of admission.      PAST MEDICAL HISTORY:   1.  Hypertension.   2.  Diabetes mellitus type 2.   3.  Hepatitis C.   4.  Chronic neck pain.   5.  Chronic obstructive pulmonary disease.   6.  Homelessness, currently staying with a friend.   7.  Appendectomy history.      PRINCIPAL PROCEDURES THIS ADMISSION:   1.  Chest x-ray showing no acute findings.   2.  Sputum culture which showed greater than 10 squamous epithelial cells indicating oral contamination.      REASON FOR ADMISSION:  Please see dictated history and physical by Dr. Dey.  In brief, Mr. Chuck Lopez is a 76-year-old male with a history of COPD, smoker, who presented to the hospital with increasing shortness of breath.      HOSPITAL COURSE:   1.  Chronic obstructive pulmonary disease exacerbation:  The patient's course was one of gradual slow improvement while hospitalized.  He was treated with a Z-JUSTIN which he completed prior to discharge.  He was also treated with steroid therapy.  He will be discharged on oral prednisone taper to complete his therapy.  While hospitalized his shortness of breath did improve considerably.  Initially he was requiring oxygen but by day of discharge he was able to titrate off oxygen and is not requiring oxygen on discharge from the hospital.  He is not currently oxygen or prednisone dependent at baseline.      The patient worked with rehabilitation therapy prior to discharge and was able to ambulate in the hallways and climb stairs adequately prior to discharge from the hospital.      DISCHARGE MEDICATIONS:   1.  Nicotrol inhaler as needed.   2.  Albuterol every 4  hours as needed for shortness of breath.   3.  Qvar 2 puffs inhaled twice a day.   4.  Prednisone taper as follows:  40 mg daily for 3 days, then 20 mg daily for 3 days, then 10 mg daily for 3 days, then stop.   5.  Spiriva inhaler 1 capsule daily.   6.  Atenolol 25 mg daily.   7.  Fioricet as needed for headaches.   8.  Advair 1 puff twice a day.   9.  Hydrochlorothiazide 25 mg daily.   10.  Lorazepam 0.5 mg every 4 hours as needed for anxiety.   11.  Melatonin 3 mg nightly as needed.   12.  Tramadol  mg every 6 hours as needed.   13.  V-C Forte capsules, 1 capsule daily.      FOLLOWUP INSTRUCTIONS:   1.  See primary within 5-7 days for routine followup after hospitalization.      I saw and evaluated this patient on day of discharge.  I spent greater than 30 minutes facilitating discharge of this patient on 2017.         GARCIA MONTAGUE MD             D: 2017 18:51   T: 2017 22:12   MT: SHIRIN#[id number]      Name:     TIMI MAK   MRN:      [id number]        Account:        [id number]   :      1940           Admit Date:     [id number]                                  Discharge Date: 2017      Document: [id number]       cc: Pranay Reid MD

## 2017-04-26 NOTE — PROGRESS NOTES
Clinic Care Coordination Contact  [facility]/Voicemail    Referral Source: IP/TCU Report  Clinical Data: Care Coordinator Outreach  Outreach attempted x 2. Unable to speak with patient - see below for details.   Plan:  Care Coordinator tried x 2 in primary CCRN absence.  [facility] letter was mailed a few weeks ago, 04/07/2017.  Patient currently on ACTIVE status.     Will forward to primary CCRN, Maryam Amador RN for further review and determine how to proceed on this patient.    Ronda Rain RN  [facility] Care Coordinator - [place] and [place] Locations   Direct:  [phone] (voicemail available)   (Covering for ANTONY Garg- Wednesday 04/26/2017)

## 2017-04-28 DIAGNOSIS — M54.40 CHRONIC MIDLINE LOW BACK PAIN WITH SCIATICA, SCIATICA LATERALITY UNSPECIFIED: ICD-10-CM

## 2017-04-28 DIAGNOSIS — G89.29 CHRONIC MIDLINE LOW BACK PAIN WITH SCIATICA, SCIATICA LATERALITY UNSPECIFIED: ICD-10-CM

## 2017-04-28 RX ORDER — TRAMADOL HYDROCHLORIDE 50 MG/1
50-100 TABLET ORAL EVERY 6 HOURS PRN
Qty: 10 TABLET | Refills: 0 | Status: SHIPPED | OUTPATIENT
Start: 2017-04-28 | End: 2017-05-01

## 2017-04-28 NOTE — TELEPHONE ENCOUNTER
Controlled Substance Refill Request for Ultram 50mg  Problem List Complete:  Yes    Patient is followed by JENNA PADILLA for ongoing prescription of pain medication.  All refills should be approved by this provider, or covering partner.    Medication(s): traMADol (ULTRAM) 50 MG tablet.   Maximum quantity per month: 150  Clinic visit frequency required: Q 4 months     Controlled substance agreement on file: Yes       Date(s): 9/16/15    Pain Clinic evaluation in the past: No    DIRE Total Score(s):  No flowsheet data found.    Last [facility] website verification: 3/14/17     checked in past 6 months?  Yes 03/14/2017     Please walk signed prescription to pharmacy.  Thanks!  Yanira Rapp CPhT  [facility]  [phone]

## 2017-05-01 ENCOUNTER — OFFICE VISIT (OUTPATIENT)
Dept: FAMILY MEDICINE | Facility: CLINIC | Age: 77
End: 2017-05-01
Payer: MEDICARE

## 2017-05-01 VITALS
TEMPERATURE: 98.1 F | BODY MASS INDEX: 19.13 KG/M2 | OXYGEN SATURATION: 92 % | SYSTOLIC BLOOD PRESSURE: 138 MMHG | HEART RATE: 70 BPM | WEIGHT: 114.8 LBS | DIASTOLIC BLOOD PRESSURE: 81 MMHG | HEIGHT: 65 IN | RESPIRATION RATE: 18 BRPM

## 2017-05-01 DIAGNOSIS — E11.00 TYPE 2 DIABETES MELLITUS WITH HYPEROSMOLARITY WITHOUT COMA, UNSPECIFIED LONG TERM INSULIN USE STATUS: ICD-10-CM

## 2017-05-01 DIAGNOSIS — M54.40 CHRONIC MIDLINE LOW BACK PAIN WITH SCIATICA, SCIATICA LATERALITY UNSPECIFIED: ICD-10-CM

## 2017-05-01 DIAGNOSIS — I10 ESSENTIAL HYPERTENSION: ICD-10-CM

## 2017-05-01 DIAGNOSIS — G89.29 CHRONIC MIDLINE LOW BACK PAIN WITH SCIATICA, SCIATICA LATERALITY UNSPECIFIED: ICD-10-CM

## 2017-05-01 DIAGNOSIS — R30.0 DYSURIA: Primary | ICD-10-CM

## 2017-05-01 LAB
ALBUMIN UR-MCNC: NEGATIVE MG/DL
APPEARANCE UR: CLEAR
BILIRUB UR QL STRIP: NEGATIVE
COLOR UR AUTO: YELLOW
GLUCOSE UR STRIP-MCNC: NEGATIVE MG/DL
HBA1C MFR BLD: 5.7 % (ref 4.3–6)
HGB UR QL STRIP: NEGATIVE
KETONES UR STRIP-MCNC: NEGATIVE MG/DL
LEUKOCYTE ESTERASE UR QL STRIP: NEGATIVE
NITRATE UR QL: NEGATIVE
PH UR STRIP: 7 PH (ref 5–7)
SP GR UR STRIP: 1.01 (ref 1–1.03)
URN SPEC COLLECT METH UR: NORMAL
UROBILINOGEN UR STRIP-ACNC: 0.2 EU/DL (ref 0.2–1)

## 2017-05-01 PROCEDURE — 82043 UR ALBUMIN QUANTITATIVE: CPT | Performed by: FAMILY MEDICINE

## 2017-05-01 PROCEDURE — 81003 URINALYSIS AUTO W/O SCOPE: CPT | Mod: 59 | Performed by: FAMILY MEDICINE

## 2017-05-01 PROCEDURE — 84443 ASSAY THYROID STIM HORMONE: CPT | Performed by: FAMILY MEDICINE

## 2017-05-01 PROCEDURE — 99000 SPECIMEN HANDLING OFFICE-LAB: CPT | Performed by: FAMILY MEDICINE

## 2017-05-01 PROCEDURE — 83036 HEMOGLOBIN GLYCOSYLATED A1C: CPT | Performed by: FAMILY MEDICINE

## 2017-05-01 PROCEDURE — 99214 OFFICE O/P EST MOD 30 MIN: CPT | Performed by: FAMILY MEDICINE

## 2017-05-01 PROCEDURE — 83721 ASSAY OF BLOOD LIPOPROTEIN: CPT | Performed by: FAMILY MEDICINE

## 2017-05-01 PROCEDURE — 80307 DRUG TEST PRSMV CHEM ANLYZR: CPT | Mod: 90 | Performed by: FAMILY MEDICINE

## 2017-05-01 PROCEDURE — 36415 COLL VENOUS BLD VENIPUNCTURE: CPT | Performed by: FAMILY MEDICINE

## 2017-05-01 RX ORDER — TRAMADOL HYDROCHLORIDE 50 MG/1
50-100 TABLET ORAL EVERY 6 HOURS PRN
Qty: 120 TABLET | Refills: 1 | Status: SHIPPED | OUTPATIENT
Start: 2017-05-01 | End: 2017-06-14

## 2017-05-01 RX ORDER — LOSARTAN POTASSIUM 25 MG/1
25 TABLET ORAL DAILY
Qty: 30 TABLET | Refills: 1 | Status: ON HOLD | OUTPATIENT
Start: 2017-05-01 | End: 2017-05-13

## 2017-05-01 RX ORDER — HYDROCHLOROTHIAZIDE 25 MG/1
25 TABLET ORAL DAILY
Qty: 30 TABLET | Refills: 1 | Status: SHIPPED | OUTPATIENT
Start: 2017-05-01 | End: 2017-07-24

## 2017-05-01 RX ORDER — BUTALBITAL, ACETAMINOPHEN AND CAFFEINE 50; 325; 40 MG/1; MG/1; MG/1
1 TABLET ORAL EVERY 4 HOURS PRN
Qty: 20 TABLET | Refills: 1 | Status: ON HOLD | OUTPATIENT
Start: 2017-05-01 | End: 2017-05-13

## 2017-05-01 RX ORDER — ATENOLOL 25 MG/1
25 TABLET ORAL DAILY
Qty: 30 TABLET | Refills: 11 | Status: ON HOLD | OUTPATIENT
Start: 2017-05-01 | End: 2019-01-10

## 2017-05-01 ASSESSMENT — ANXIETY QUESTIONNAIRES
3. WORRYING TOO MUCH ABOUT DIFFERENT THINGS: NOT AT ALL
5. BEING SO RESTLESS THAT IT IS HARD TO SIT STILL: NOT AT ALL
7. FEELING AFRAID AS IF SOMETHING AWFUL MIGHT HAPPEN: NOT AT ALL
GAD7 TOTAL SCORE: 0
6. BECOMING EASILY ANNOYED OR IRRITABLE: NOT AT ALL
2. NOT BEING ABLE TO STOP OR CONTROL WORRYING: NOT AT ALL
1. FEELING NERVOUS, ANXIOUS, OR ON EDGE: NOT AT ALL
IF YOU CHECKED OFF ANY PROBLEMS ON THIS QUESTIONNAIRE, HOW DIFFICULT HAVE THESE PROBLEMS MADE IT FOR YOU TO DO YOUR WORK, TAKE CARE OF THINGS AT HOME, OR GET ALONG WITH OTHER PEOPLE: NOT DIFFICULT AT ALL

## 2017-05-01 ASSESSMENT — PATIENT HEALTH QUESTIONNAIRE - PHQ9: 5. POOR APPETITE OR OVEREATING: NOT AT ALL

## 2017-05-01 NOTE — LETTER
My Asthma Action Plan  Name: Chuck Lopez   YOB: 1940  Date: 5/1/2017   My doctor: Pranay Reid MD   My clinic: [facility]        My Control Medicine: ADVAIR    RESCUE MEDICATION: ALBUTEROL  My Oral Steroid Medicine: PREDNISONE My Asthma Severity: moderate persistent  Avoid your asthma triggers: mold, strong odors and fumes, exercise or sports and cold air               GREEN ZONE     Good Control    I feel good    No cough or wheeze    Can work, sleep and play without asthma symptoms       Take your asthma control medicine every day.     1. If exercise triggers your asthma, take your rescue medication    15 minutes before exercise or sports, and    During exercise if you have asthma symptoms  2. Spacer to use with inhaler: If you have a spacer, make sure to use it with your inhaler             YELLOW ZONE     Getting Worse  I have ANY of these:    I do not feel good    Cough or wheeze    Chest feels tight    Wake up at night   1. Keep taking your Green Zone medications  2. Start taking your rescue medicine:    every 20 minutes for up to 1 hour. Then every 4 hours for 24-48 hours.  3. If you stay in the Yellow Zone for more than 12-24 hours, contact your doctor.  4. If you do not return to the Green Zone in 12-24 hours or you get worse, start taking your oral steroid medicine if prescribed by your provider.           RED ZONE     Medical Alert - Get Help  I have ANY of these:    I feel awful    Medicine is not helping    Breathing getting harder    Trouble walking or talking    Nose opens wide to breathe       1. Take your rescue medicine NOW  2. If your provider has prescribed an oral steroid medicine, start taking it NOW  3. Call your doctor NOW  4. If you are still in the Red Zone after 20 minutes and you have not reached your doctor:    Take your rescue medicine again and    Call 911 or go to the emergency room right away    See your regular doctor within 2  weeks of an Emergency Room or Urgent Care visit for follow-up treatment.        Electronically signed by: Pranay Reid, May 1, 2017    Annual Reminders:  Meet with Asthma Educator,  Flu Shot in the Fall, consider Pneumonia Vaccination for patients with asthma (aged 19 and older).    Pharmacy:    [id number] [place], [place] - [address]  [place], [place] - [address] [facility] [address] [place] [address] - [address] [place], [place] [address]ACY [facility] [place], [place] - [address]                    Asthma Triggers  How To Control Things That Make Your Asthma Worse    Triggers are things that make your asthma worse.  Look at the list below to help you find your triggers and what you can do about them.  You can help prevent asthma flare-ups by staying away from your triggers.      Trigger                                                          What you can do   Cigarette Smoke  Tobacco smoke can make asthma worse. Do not allow smoking in your home, car or around you.  Be sure no one smokes at a child s day care or school.  If you smoke, ask your health care provider for ways to help you quit.  Ask family members to quit too.  Ask your health care provider for a referral to Quit Plan to help you quit smoking, or call 8-612-000-PLAN.     Colds, Flu, Bronchitis  These are common triggers of asthma. Wash your hands often.  Don t touch your eyes, nose or mouth.  Get a flu shot every year.     Dust Mites  These are tiny bugs that live in cloth or carpet. They are too small to see. Wash sheets and blankets in hot water every week.   Encase pillows and mattress in dust mite proof covers.  Avoid having carpet if you can. If you have carpet, vacuum weekly.   Use a dust mask and HEPA vacuum.   Pollen and Outdoor Mold  Some people are allergic to trees, grass, or weed pollen, or molds. Try to keep your windows closed.  Limit time out doors when pollen  count is high.   Ask you health care provider about taking medicine during allergy season.     Animal Dander  Some people are allergic to skin flakes, urine or saliva from pets with fur or feathers. Keep pets with fur or feathers out of your home.    If you can t keep the pet outdoors, then keep the pet out of your bedroom.  Keep the bedroom door closed.  Keep pets off cloth furniture and away from stuffed toys.     Mice, Rats, and Cockroaches  Some people are allergic to the waste from these pests.   Cover food and garbage.  Clean up spills and food crumbs.  Store grease in the refrigerator.   Keep food out of the bedroom.   Indoor Mold  This can be a trigger if your home has high moisture. Fix leaking faucets, pipes, or other sources of water.   Clean moldy surfaces.  Dehumidify basement if it is damp and smelly.   Smoke, Strong Odors, and Sprays  These can reduce air quality. Stay away from strong odors and sprays, such as perfume, powder, hair spray, paints, smoke incense, paint, cleaning products, candles and new carpet.   Exercise or Sports  Some people with asthma have this trigger. Be active!  Ask your doctor about taking medicine before sports or exercise to prevent symptoms.    Warm up for 5-10 minutes before and after sports or exercise.     Other Triggers of Asthma  Cold air:  Cover your nose and mouth with a scarf.  Sometimes laughing or crying can be a trigger.  Some medicines and food can trigger asthma.

## 2017-05-01 NOTE — NURSING NOTE
"Chief Complaint   Patient presents with     UTI       Initial /81 (BP Location: Left arm, Patient Position: Chair, Cuff Size: Adult Regular)  Pulse 70  Temp 98.1  F (36.7  C) (Oral)  Resp 18  Ht 5' 5\" (1.651 m)  Wt 114 lb 12.8 oz (52.1 kg)  SpO2 92%  BMI 19.1 kg/m2 Estimated body mass index is 19.1 kg/(m^2) as calculated from the following:    Height as of this encounter: 5' 5\" (1.651 m).    Weight as of this encounter: 114 lb 12.8 oz (52.1 kg).  Medication Reconciliation: complete   Michael Be CMA       "

## 2017-05-01 NOTE — MR AVS SNAPSHOT
"              After Visit Summary   5/1/2017    Chuck Lopez    MRN: [id number]           Patient Information     Date Of Birth          1940        Visit Information        Provider Department      5/1/2017 11:00 AM Pranay Reid MD [facility]        Today's Diagnoses     Dysuria    -  1    Chronic midline low back pain with sciatica, sciatica laterality unspecified        Type 2 diabetes mellitus with hyperosmolarity without coma, unspecified long term insulin use status (H)        Essential hypertension           Follow-ups after your visit        Who to contact     If you have questions or need follow up information about today's clinic visit or your schedule please contact [facility] directly at [phone].  Normal or non-critical lab and imaging results will be communicated to you by MyChart, letter or phone within 4 business days after the clinic has received the results. If you do not hear from us within 7 days, please contact the clinic through MyChart or phone. If you have a critical or abnormal lab result, we will notify you by phone as soon as possible.  Submit refill requests through [employer] or call your pharmacy and they will forward the refill request to us. Please allow 3 business days for your refill to be completed.          Additional Information About Your Visit        MyChart Information     [employer] lets you send messages to your doctor, view your test results, renew your prescriptions, schedule appointments and more. To sign up, go to [email or web address] . Click on \"Log in\" on the left side of the screen, which will take you to the Welcome page. Then click on \"Sign up Now\" on the right side of the page.     You will be asked to enter the access code listed below, as well as some personal information. Please follow the directions to create your username and password.     Your access code is: [email or web address]  Expires: 7/1/2017  3:56 " "PM     Your access code will  in 90 days. If you need help or a new code, please call your [facility] clinic or [phone].        Care EveryWhere ID     This is your Care EveryWhere ID. This could be used by other organizations to access your [facility] medical records  [id number]        Your Vitals Were     Pulse Temperature Respirations Height Pulse Oximetry BMI (Body Mass Index)    70 98.1  F (36.7  C) (Oral) 18 5' 5\" (1.651 m) 92% 19.1 kg/m2       Blood Pressure from Last 3 Encounters:   17 138/81   17 149/53   17 126/68    Weight from Last 3 Encounters:   17 114 lb 12.8 oz (52.1 kg)   17 114 lb 2 oz (51.8 kg)   17 111 lb 15.9 oz (50.8 kg)              We Performed the Following     ** Albumin Random Urine Quant FUTURE 1yr     Drug  Screen Comprehensive, Urine w/o Reported Meds (Pain Care Package)     Hemoglobin A1c     LDL cholesterol direct     TSH with free T4 reflex     UA reflex to Microscopic and Culture          Today's Medication Changes          These changes are accurate as of: 17  4:28 PM.  If you have any questions, ask your nurse or doctor.               Start taking these medicines.        Dose/Directions    butalbital-acetaminophen-caffeine -40 MG per tablet   Commonly known as:  FIORICET/ESGIC   Used for:  Chronic midline low back pain with sciatica, sciatica laterality unspecified   Started by:  Pranay Reid MD        Dose:  1 tablet   Take 1 tablet by mouth every 4 hours as needed   Quantity:  20 tablet   Refills:  1       losartan 25 MG tablet   Commonly known as:  COZAAR   Used for:  Type 2 diabetes mellitus with hyperosmolarity without coma, unspecified long term insulin use status (H), Essential hypertension   Started by:  Pranay Reid MD        Dose:  25 mg   Take 1 tablet (25 mg) by mouth daily   Quantity:  30 tablet   Refills:  1         These medicines have changed or have updated prescriptions.        " Dose/Directions    * ATENOLOL PO   This may have changed:  Another medication with the same name was added. Make sure you understand how and when to take each.        Dose:  25 mg   Take 25 mg by mouth daily   Refills:  0       * atenolol 25 MG tablet   Commonly known as:  TENORMIN   This may have changed:  You were already taking a medication with the same name, and this prescription was added. Make sure you understand how and when to take each.   Used for:  Type 2 diabetes mellitus with hyperosmolarity without coma, unspecified long term insulin use status (H)   Changed by:  Pranay Reid MD        Dose:  25 mg   Take 1 tablet (25 mg) by mouth daily   Quantity:  30 tablet   Refills:  11       * HYDROCHLOROTHIAZIDE PO   This may have changed:  Another medication with the same name was added. Make sure you understand how and when to take each.        Dose:  25 mg   Take 25 mg by mouth daily   Refills:  0       * hydrochlorothiazide 25 MG tablet   Commonly known as:  HYDRODIURIL   This may have changed:  You were already taking a medication with the same name, and this prescription was added. Make sure you understand how and when to take each.   Used for:  Type 2 diabetes mellitus with hyperosmolarity without coma, unspecified long term insulin use status (H)   Changed by:  Pranay Reid MD        Dose:  25 mg   Take 1 tablet (25 mg) by mouth daily   Quantity:  30 tablet   Refills:  1       traMADol 50 MG tablet   Commonly known as:  ULTRAM   This may have changed:  additional instructions   Used for:  Chronic midline low back pain with sciatica, sciatica laterality unspecified   Changed by:  Pranay Reid MD        Dose:   mg   Take 1-2 tablets ( mg) by mouth every 6 hours as needed for pain maximum 4 tablet(s) per day   Quantity:  120 tablet   Refills:  1       * Notice:  This list has 4 medication(s) that are the same as other medications prescribed for you. Read the  directions carefully, and ask your doctor or other care provider to review them with you.         Where to get your medicines      These medications were sent to [facility] [id number] [place] [address]  [id number] [place] [address]     Phone:  [phone]     atenolol 25 MG tablet    hydrochlorothiazide 25 MG tablet    losartan 25 MG tablet         Some of these will need a paper prescription and others can be bought over the counter.  Ask your nurse if you have questions.     Bring a paper prescription for each of these medications     butalbital-acetaminophen-caffeine -40 MG per tablet    traMADol 50 MG tablet                Primary Care Provider Office Phone # Fax #    Pranay Reid [phone]       [facility] [id number] [place] [address]        Thank you!     Thank you for choosing [facility]  for your care. Our goal is always to provide you with excellent care. Hearing back from our patients is one way we can continue to improve our services. Please take a few minutes to complete the written survey that you may receive in the mail after your visit with us. Thank you!             Your Updated Medication List - Protect others around you: Learn how to safely use, store and throw away your medicines at www.disposemymeds.org.          This list is accurate as of: 5/1/17  4:28 PM.  Always use your most recent med list.                   Brand Name Dispense Instructions for use    * albuterol (2.5 MG/3ML) 0.083% neb solution     360 mL    Take 1 vial (2.5 mg) by nebulization every 4 hours as needed for shortness of breath / dyspnea or wheezing       * albuterol 108 (90 BASE) MCG/ACT Inhaler    PROAIR HFA/PROVENTIL HFA/VENTOLIN HFA    2 Inhaler    Inhale 1-2 puffs into the lungs every 6 hours as needed for shortness of breath / dyspnea or wheezing       * ATENOLOL PO      Take 25 mg by mouth daily        * atenolol 25 MG tablet    TENORMIN    30 tablet    Take 1 tablet (25 mg) by mouth daily       beclomethasone 80 MCG/ACT Inhaler    QVAR    8.7 g    Inhale 2 puffs into the lungs 2 times daily       butalbital-acetaminophen-caffeine -40 MG per tablet    FIORICET/ESGIC    20 tablet    Take 1 tablet by mouth every 4 hours as needed       butalbital-APAP-caffeine-codeine -41-30 MG per capsule    FIORICET WITH codeine    10 capsule    Take 1 capsule by mouth every 4 hours as needed for headaches       fluticasone-salmeterol 500-50 MCG/DOSE diskus inhaler    ADVAIR    3 Inhaler    Inhale 1 puff into the lungs 2 times daily       * HYDROCHLOROTHIAZIDE PO      Take 25 mg by mouth daily       * hydrochlorothiazide 25 MG tablet    HYDRODIURIL    30 tablet    Take 1 tablet (25 mg) by mouth daily       LORazepam 0.5 MG tablet    ATIVAN    30 tablet    Take 1 tablet (0.5 mg) by mouth every 4 hours as needed for anxiety       losartan 25 MG tablet    COZAAR    30 tablet    Take 1 tablet (25 mg) by mouth daily       melatonin 3 MG tablet     30 tablet    Take 1 tablet (3 mg) by mouth nightly as needed       nicotine 10 MG Inhaler    NICOTROL    180 each    Inhale 6-16 cartridge into the lungs daily as needed for smoking cessation       predniSONE 20 MG tablet    DELTASONE    11 tablet    Take 2 tablets (40 mg) daily for 3 days, then 1 tablet (20 mg) daily for 3 days, then 0.5 tablet (10 mg) daily for 3 days, then stop       tiotropium 18 MCG capsule    SPIRIVA HANDIHALER    30 capsule    INHALE ONE CAPSULE BY MOUTH EVERY DAY       traMADol 50 MG tablet    ULTRAM    120 tablet    Take 1-2 tablets ( mg) by mouth every 6 hours as needed for pain maximum 4 tablet(s) per day       V-C FORTE Caps     90 capsule    Take 1 capsule by mouth daily       * Notice:  This list has 6 medication(s) that are the same as other medications prescribed for you. Read the directions carefully, and ask your doctor or other care  provider to review them with you.

## 2017-05-01 NOTE — PROGRESS NOTES
SUBJECTIVE:                                                    Chuck Lopez is a 76 year old male who presents to clinic today for the following health issues:    SUBJECTIVE:copd recheck and medicare annual wellness exam     Fall risk -none current  Med reconciliation: faithful to his meds now  Cognitive screen 3 objects, fully oriented   Body mass index is 19.1 kg/(m^2).  Add protein to diet     CC: Chuck Lopez is a 76 year old male who presents for copd flare, had [facility] admit, stabilzaion     HPI: years of smoking, has hep c, difficult social life on the street         PROBLEM LIST:                   Patient Active Problem List   Diagnosis     Pulmonary emphysema (H)     Elevated liver function tests     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)     CARDIOVASCULAR SCREENING; LDL GOAL LESS THAN 100     CKD (chronic kidney disease) stage 2, GFR 60-89 ml/min     Advanced directives, counseling/discussion     COPD (chronic obstructive pulmonary disease) (H)     Hypertension goal BP (blood pressure) < 140/90     Chronic hepatitis C (H)     Other chronic pain     Chronic obstructive airway disease with asthma (H)     Acute hypoxemic respiratory failure (H)     Obstructive chronic bronchitis with exacerbation (H)     Physical deconditioning     Malnutrition (H)     Acute exacerbation of COPD with asthma (H)     Acute respiratory failure (H)       PAST MEDICAL HISTORY:                  Past Medical History:   Diagnosis Date     Chronic neck pain      COPD (chronic obstructive pulmonary disease) (H)      Diabetes (H)      Hepatitis C      Hypertension        PAST SURGICAL HISTORY:                  Past Surgical History:   Procedure Laterality Date     APPENDECTOMY       ESOPHAGOSCOPY, GASTROSCOPY, DUODENOSCOPY (EGD), COMBINED  4/16/2014    Procedure: ESOPHAGOSCOPY, GASTROSCOPY, DUODENOSCOPY (EGD) & COLONOSCOPY with polypectomy by hot snare;  Surgeon: Dillon Vázquez MD;  Location:  GI       CURRENT MEDICATIONS:                   Current Outpatient Prescriptions   Medication Sig Dispense Refill     traMADol (ULTRAM) 50 MG tablet Take 1-2 tablets ( mg) by mouth every 6 hours as needed for pain maximum 4 tablet(s) per day SEE  2 WEEKS 10 tablet 0     butalbital-APAP-caffeine-codeine (FIORICET WITH CODEINE) -63-30 MG per capsule Take 1 capsule by mouth every 4 hours as needed for headaches 10 capsule 0     albuterol (2.5 MG/3ML) 0.083% neb solution Take 1 vial (2.5 mg) by nebulization every 4 hours as needed for shortness of breath / dyspnea or wheezing 360 mL 3     albuterol (PROAIR HFA/PROVENTIL HFA/VENTOLIN HFA) 108 (90 BASE) MCG/ACT Inhaler Inhale 1-2 puffs into the lungs every 6 hours as needed for shortness of breath / dyspnea or wheezing 2 Inhaler 3     fluticasone-salmeterol (ADVAIR) 500-50 MCG/DOSE diskus inhaler Inhale 1 puff into the lungs 2 times daily 3 Inhaler 1     beclomethasone (QVAR) 80 MCG/ACT Inhaler Inhale 2 puffs into the lungs 2 times daily 8.7 g 11     tiotropium (SPIRIVA HANDIHALER) 18 MCG capsule INHALE ONE CAPSULE BY MOUTH EVERY DAY 30 capsule 11     predniSONE (DELTASONE) 20 MG tablet Take 2 tablets (40 mg) daily for 3 days, then 1 tablet (20 mg) daily for 3 days, then 0.5 tablet (10 mg) daily for 3 days, then stop 11 tablet 0     nicotine (NICOTROL) 10 MG Inhaler Inhale 6-16 cartridge into the lungs daily as needed for smoking cessation 180 each 1     melatonin 3 MG tablet Take 1 tablet (3 mg) by mouth nightly as needed 30 tablet 0     LORazepam (ATIVAN) 0.5 MG tablet Take 1 tablet (0.5 mg) by mouth every 4 hours as needed for anxiety 30 tablet 0     ATENOLOL PO Take 25 mg by mouth daily       HYDROCHLOROTHIAZIDE PO Take 25 mg by mouth daily       Multiple Vitamins-Minerals (V-C FORTE) CAPS Take 1 capsule by mouth daily 90 capsule 3             FAMILY HISTORY:                 History reviewed. No pertinent family history.    HEALTH MAINTENANCE:                    REVIEW OF OUTSIDE RECORDS:  "NO    REVIEW OF SYSTEMS:  C: NEGATIVE for fever, chills  I: NEGATIVE for worrisome rashes, moles or lesions  E: NEGATIVE for vision changes   R: NEGATIVE for significant cough or SOB  CV: NEGATIVE for chest pain, palpitations   GI: NEGATIVE for nausea, abdominal pain, heartburn, or change in bowel habits  : NEGATIVE for frequency, dysuria, or hematuria  M: NEGATIVE for significant arthralgias or myalgia  N: NEGATIVE for weakness, dizziness or paresthesias or headache  NVS:no headache or balance issus  INTEG:no moles or new rashes  LYMPH:no nodes or night sweats    EXAM:    /81 (BP Location: Left arm, Patient Position: Chair, Cuff Size: Adult Regular)  Pulse 70  Temp 98.1  F (36.7  C) (Oral)  Resp 18  Ht 5' 5\" (1.651 m)  Wt 114 lb 12.8 oz (52.1 kg)  SpO2 92%  BMI 19.1 kg/m2  GENERAL APPEARANCE: healthy, alert and no distress   EXAM:  GENERAL APPEARANCE: healthy, alert and no distress  EYES: EOMI,  PERRL  HENT: ear canals and TM's normal and nose and mouth without ulcers or lesions  RESP: lungs clear to auscultation - no rales, rhonchi or wheezes  CV: regular rates and rhythm, normal S1 S2, no S3 or S4 and no murmur, click or rub -  ABDOMEN:  soft, nontender, no HSM or masses and bowel sounds normal  BACK:no tenderness or pain on straight let raise           ASSESSMENT/PLAN  1. Dysuria         copd stabilzed : on multiple inhalers per Discharge meds   Patient is followed by Pranay Reid MD for ongoing prescription of pain medication.  All refills should only be approved by this provider, or covering partner.    Medication(s): tramadol, esgic.   Maximum quantity per month: 120  Clinic visit frequency required: Q 3 months     Controlled substance agreement:  Encounter-Level CSA - 09/16/2015:                 Controlled Substance Agreement - Scan on 9/17/2015  9:14 AM : CONTROLLED SUBSTANCE AGREEMENT (below)            Pain Clinic evaluation in the past: inpatient    DIRE Total Score(s):  No " flowsheet data found.    Last [facility] website verification:  none   [email or web address]                               I have discussed with patient the risks, benefits, medications, treatment options and modalities.   I have instructed the patient to call or schedule a follow-up appointment if any problems or failure to improve.                         Genitourinary - Male     Onset: 5 years ago    Description:   Dysuria (painful urination): no   Hematuria (blood in urine): no   Frequency: YES  Are you urinating at night : YES  Hesitancy (delay in urine): no   Retention (unable to empty): no   Decrease in urinary flow: no   Incontinence: no     Progression of Symptoms:  worsening    Accompanying Signs & Symptoms:  Fever: no   Back/Flank pain: no   Urethral discharge: no   Testicle lumps/masses/pain: no   Nausea and/or vomiting: no   Abdominal pain: no    History:   History of frequent UTI's: no   History of kidney stones: YES  History of hernias: YES  Personal or Family history of Prostate problems: no  Sexually active: no     Precipitating factors:   no    Alleviating factors:  no         Therapies Tried and outcome: none; Outcome - none

## 2017-05-01 NOTE — LETTER
[facility]    05/01/17    Patient: Chuck Lopez  YOB: 1940  Medical Record Number: [id number]                                                                  Controlled Substance Agreement  I understand that my care provider has prescribed controlled substances (narcotics, tranquilizers, and/or stimulants) to help manage my condition(s).  I am taking this medicine to help me function or work.  I know that this is strong medicine.  It could have serious side effects and even cause a dependency on the drug.  If I stop these medicines suddenly, I could have severe withdrawal symptoms.    The risks, benefits, and side effects of these medication(s) were explained to me.  I agree that:  1. I will take part in other treatments as advised by my provider.  This may be psychiatry or counseling, physical therapy, behavioral therapy, group treatment, or a referral to a pain clinic.  I will reduce or stop my medicine when my provider tells me to do so.   2. I will take my medicines as prescribed.  I will not change the dose or schedule unless my provider tells me to.  There will be no refills if I  run out early.   I may be contacted at any time without warning and asked to complete a drug test or pill count.   3. I will keep all my appointments at the clinic.  If I miss appointments or fail to follow instructions, my provider may stop my medicine.  4. I will not ask other providers to prescribe controlled substances. And I will not accept controlled substances from other people. If I need another prescribed controlled substance for a new reason, I will notify my provider within one business day.  5. If I enroll in the [place] Medical Marijuana program, I will tell my provider.  I will also sign an agreement to share my medical records with my provider.  6. I will use one pharmacy to fill all of my controlled substance prescriptions.  If my prescription is mailed to my pharmacy, it may  take 5 to 7 days for my medicine to be ready.  7. I understand that my provider, clinic care team, and pharmacy can track controlled substance prescriptions from other providers through a central database (prescription monitoring program).  8. I will bring in my list of medications (or my medicine bottles) each time I come to the clinic.  REV- 04/2016                                                                                                                                            Page 1 of 2      [facility]    05/01/17    Patient: Chuck Lopez  YOB: 1940  Medical Record Number: [id number]    9. Refills of controlled substances will be made only during office hours.  It is up to me to make sure that I do not run out of my medicines on weekends or holidays.    10. I am responsible for my prescriptions.  If the medicine is lost or stolen, it will not be replaced.   I also agree not to share these medicines with anyone.  11. I agree to not use ANY illegal or recreational drugs.  This includes marijuana, cocaine, bath salts or other drugs.  I agree not to use alcohol unless my provider says I may.  I agree to give urine samples whenever asked.  If I fail to give a urine sample, the provider may stop my medicine.     12. I will tell my nurse or provider right away if I become pregnant or have a new medical problem treated outside of [facility].  13. I understand that this medicine can affect my thinking and judgment.  It may be unsafe for me to drive, use machinery and do dangerous tasks.  I will not do any of these things until I know how the medicine affects me.  If my dose changes, I will wait to see how it affects me.  I will contact my provider if I have concerns about medicine side effects.  I understand that if I do not follow any of the conditions above, my prescriptions or treatment may be stopped.    I agree that my provider, clinic care team, and pharmacy may  work with any city, state or federal law enforcement agency that investigates the misuse, sale, or other diversion of my controlled medicine. I will allow my provider to discuss my care with or share a copy of this agreement with any other treating provider, pharmacy or emergency room where I receive care.  I agree to give up (waive) any right of privacy or confidentiality with respect to these authorizations.   I have read this agreement and have asked questions about anything I did not understand.   ___________________________________    ___________________________  Patient Signature                                                           Date and Time  ___________________________________     ____________________________  Witness                                                                            Date and Time  ___________________________________  Pranay Reid MD  REV-  04/2016                                                                                                                                                                 Page 2 of 2

## 2017-05-02 LAB
CREAT UR-MCNC: 34 MG/DL
LDLC SERPL DIRECT ASSAY-MCNC: 139 MG/DL
MICROALBUMIN UR-MCNC: 31 MG/L
MICROALBUMIN/CREAT UR: 90 MG/G CR (ref 0–17)
TSH SERPL DL<=0.005 MIU/L-ACNC: 0.68 MU/L (ref 0.4–4)

## 2017-05-02 ASSESSMENT — ANXIETY QUESTIONNAIRES: GAD7 TOTAL SCORE: 0

## 2017-05-04 ENCOUNTER — CARE COORDINATION (OUTPATIENT)
Dept: CARE COORDINATION | Facility: CLINIC | Age: 77
End: 2017-05-04

## 2017-05-04 NOTE — PROGRESS NOTES
Clinic Care Coordination Contact  [facility]/Voicemail    Referral Source: IP/TCU Report  Clinical Data: Care Coordinator Outreach - CCRN was unable to reach patient via phone - unable to contact letter mailed - no contact from patient     Plan: Care Coordinator will close care coordination at this time as unable to contact patient. Care Coordinator will do no further outreaches at this time.    Maryam Amador Care Coordinator RN  [facility]  [phone]  May 4, 2017

## 2017-05-06 LAB — COMPREHEN DRUG ANALYSIS UR: NORMAL

## 2017-05-13 ENCOUNTER — HOSPITAL ENCOUNTER (OUTPATIENT)
Facility: CLINIC | Age: 77
Setting detail: OBSERVATION
Discharge: HOME | End: 2017-05-14
Attending: EMERGENCY MEDICINE | Admitting: INTERNAL MEDICINE
Payer: MEDICARE

## 2017-05-13 DIAGNOSIS — J44.1 COPD EXACERBATION (H): ICD-10-CM

## 2017-05-13 DIAGNOSIS — G43.919 INTRACTABLE MIGRAINE, UNSPECIFIED MIGRAINE TYPE: ICD-10-CM

## 2017-05-13 DIAGNOSIS — J96.01 ACUTE RESPIRATORY FAILURE WITH HYPOXIA (H): ICD-10-CM

## 2017-05-13 DIAGNOSIS — J30.2 SEASONAL ALLERGIC RHINITIS, UNSPECIFIED ALLERGIC RHINITIS TRIGGER: Primary | ICD-10-CM

## 2017-05-13 PROBLEM — J44.9 COPD WITH HYPOXIA (H): Status: ACTIVE | Noted: 2017-05-13

## 2017-05-13 LAB
GLUCOSE BLDC GLUCOMTR-MCNC: 132 MG/DL (ref 70–99)
GLUCOSE BLDC GLUCOMTR-MCNC: 176 MG/DL (ref 70–99)
INTERPRETATION ECG - MUSE: NORMAL
TROPONIN I BLD-MCNC: 0.01 UG/L (ref 0–0.1)

## 2017-05-13 PROCEDURE — 99285 EMERGENCY DEPT VISIT HI MDM: CPT | Mod: 25

## 2017-05-13 PROCEDURE — 25000132 ZZH RX MED GY IP 250 OP 250 PS 637: Mod: GY | Performed by: HOSPITALIST

## 2017-05-13 PROCEDURE — 84484 ASSAY OF TROPONIN QUANT: CPT

## 2017-05-13 PROCEDURE — G0378 HOSPITAL OBSERVATION PER HR: HCPCS

## 2017-05-13 PROCEDURE — 96361 HYDRATE IV INFUSION ADD-ON: CPT

## 2017-05-13 PROCEDURE — 40000274 ZZH STATISTIC RCP CONSULT EA 30 MIN

## 2017-05-13 PROCEDURE — 25000128 H RX IP 250 OP 636: Performed by: HOSPITALIST

## 2017-05-13 PROCEDURE — 40000275 ZZH STATISTIC RCP TIME EA 10 MIN

## 2017-05-13 PROCEDURE — 93005 ELECTROCARDIOGRAM TRACING: CPT

## 2017-05-13 PROCEDURE — A9270 NON-COVERED ITEM OR SERVICE: HCPCS | Mod: GY | Performed by: HOSPITALIST

## 2017-05-13 PROCEDURE — 99220 ZZC INITIAL OBSERVATION CARE,LEVL III: CPT | Mod: AI | Performed by: INTERNAL MEDICINE

## 2017-05-13 PROCEDURE — A9270 NON-COVERED ITEM OR SERVICE: HCPCS | Mod: GY | Performed by: INTERNAL MEDICINE

## 2017-05-13 PROCEDURE — 40000809 ZZH STATISTIC NO DOCUMENTATION TO SUPPORT CHARGE

## 2017-05-13 PROCEDURE — 99207 ZZC APP CREDIT; MD BILLING SHARED VISIT: CPT | Performed by: HOSPITALIST

## 2017-05-13 PROCEDURE — 25000132 ZZH RX MED GY IP 250 OP 250 PS 637: Mod: GY | Performed by: INTERNAL MEDICINE

## 2017-05-13 PROCEDURE — 94640 AIRWAY INHALATION TREATMENT: CPT | Mod: 76

## 2017-05-13 PROCEDURE — 25000125 ZZHC RX 250: Performed by: INTERNAL MEDICINE

## 2017-05-13 PROCEDURE — 25000125 ZZHC RX 250: Performed by: EMERGENCY MEDICINE

## 2017-05-13 PROCEDURE — 96360 HYDRATION IV INFUSION INIT: CPT

## 2017-05-13 PROCEDURE — 94640 AIRWAY INHALATION TREATMENT: CPT

## 2017-05-13 PROCEDURE — 00000146 ZZHCL STATISTIC GLUCOSE BY METER IP

## 2017-05-13 RX ORDER — BISACODYL 5 MG
5-15 TABLET, DELAYED RELEASE (ENTERIC COATED) ORAL DAILY PRN
Status: DISCONTINUED | OUTPATIENT
Start: 2017-05-13 | End: 2017-05-14 | Stop reason: HOSPADM

## 2017-05-13 RX ORDER — NICOTINE 21 MG/24HR
1 PATCH, TRANSDERMAL 24 HOURS TRANSDERMAL DAILY PRN
Status: DISCONTINUED | OUTPATIENT
Start: 2017-05-13 | End: 2017-05-14 | Stop reason: HOSPADM

## 2017-05-13 RX ORDER — ATENOLOL 25 MG/1
25 TABLET ORAL DAILY
Status: DISCONTINUED | OUTPATIENT
Start: 2017-05-13 | End: 2017-05-13

## 2017-05-13 RX ORDER — HYDROCHLOROTHIAZIDE 25 MG/1
25 TABLET ORAL DAILY
Status: DISCONTINUED | OUTPATIENT
Start: 2017-05-13 | End: 2017-05-14 | Stop reason: HOSPADM

## 2017-05-13 RX ORDER — NALOXONE HYDROCHLORIDE 0.4 MG/ML
.1-.4 INJECTION, SOLUTION INTRAMUSCULAR; INTRAVENOUS; SUBCUTANEOUS
Status: DISCONTINUED | OUTPATIENT
Start: 2017-05-13 | End: 2017-05-14 | Stop reason: HOSPADM

## 2017-05-13 RX ORDER — TIOTROPIUM BROMIDE 18 UG/1
18 CAPSULE ORAL; RESPIRATORY (INHALATION)
Status: DISCONTINUED | OUTPATIENT
Start: 2017-05-13 | End: 2017-05-14 | Stop reason: HOSPADM

## 2017-05-13 RX ORDER — BUTALBITAL, ACETAMINOPHEN AND CAFFEINE 50; 325; 40 MG/1; MG/1; MG/1
1 TABLET ORAL EVERY 4 HOURS PRN
Status: DISCONTINUED | OUTPATIENT
Start: 2017-05-13 | End: 2017-05-13

## 2017-05-13 RX ORDER — ATENOLOL 25 MG/1
25 TABLET ORAL DAILY
Status: DISCONTINUED | OUTPATIENT
Start: 2017-05-13 | End: 2017-05-14 | Stop reason: HOSPADM

## 2017-05-13 RX ORDER — LORAZEPAM 0.5 MG/1
0.5 TABLET ORAL EVERY 4 HOURS PRN
Status: DISCONTINUED | OUTPATIENT
Start: 2017-05-13 | End: 2017-05-14 | Stop reason: HOSPADM

## 2017-05-13 RX ORDER — PROCHLORPERAZINE MALEATE 5 MG
5 TABLET ORAL EVERY 6 HOURS PRN
Status: DISCONTINUED | OUTPATIENT
Start: 2017-05-13 | End: 2017-05-14 | Stop reason: HOSPADM

## 2017-05-13 RX ORDER — PREDNISONE 20 MG/1
40 TABLET ORAL ONCE
Status: COMPLETED | OUTPATIENT
Start: 2017-05-13 | End: 2017-05-13

## 2017-05-13 RX ORDER — IPRATROPIUM BROMIDE AND ALBUTEROL SULFATE 2.5; .5 MG/3ML; MG/3ML
3 SOLUTION RESPIRATORY (INHALATION)
Status: DISCONTINUED | OUTPATIENT
Start: 2017-05-13 | End: 2017-05-14 | Stop reason: HOSPADM

## 2017-05-13 RX ORDER — ONDANSETRON 4 MG/1
4 TABLET, ORALLY DISINTEGRATING ORAL EVERY 6 HOURS PRN
Status: DISCONTINUED | OUTPATIENT
Start: 2017-05-13 | End: 2017-05-14 | Stop reason: HOSPADM

## 2017-05-13 RX ORDER — AMOXICILLIN 250 MG
1-2 CAPSULE ORAL 2 TIMES DAILY
Status: DISCONTINUED | OUTPATIENT
Start: 2017-05-13 | End: 2017-05-14 | Stop reason: HOSPADM

## 2017-05-13 RX ORDER — METHYLPREDNISOLONE SODIUM SUCCINATE 125 MG/2ML
60 INJECTION, POWDER, LYOPHILIZED, FOR SOLUTION INTRAMUSCULAR; INTRAVENOUS EVERY 12 HOURS
Status: DISCONTINUED | OUTPATIENT
Start: 2017-05-13 | End: 2017-05-14 | Stop reason: HOSPADM

## 2017-05-13 RX ORDER — PREDNISONE 20 MG/1
40 TABLET ORAL DAILY
Status: DISCONTINUED | OUTPATIENT
Start: 2017-05-13 | End: 2017-05-13

## 2017-05-13 RX ORDER — BUTALBITAL, ACETAMINOPHEN, CAFFEINE AND CODEINE PHOSPHATE 50; 325; 40; 30 MG/1; MG/1; MG/1; MG/1
1 CAPSULE ORAL EVERY 4 HOURS PRN
Status: DISCONTINUED | OUTPATIENT
Start: 2017-05-13 | End: 2017-05-14 | Stop reason: HOSPADM

## 2017-05-13 RX ORDER — TRAMADOL HYDROCHLORIDE 50 MG/1
50-100 TABLET ORAL EVERY 6 HOURS PRN
Status: DISCONTINUED | OUTPATIENT
Start: 2017-05-13 | End: 2017-05-14 | Stop reason: HOSPADM

## 2017-05-13 RX ORDER — FEXOFENADINE HCL 60 MG/1
60 TABLET, FILM COATED ORAL DAILY
Status: DISCONTINUED | OUTPATIENT
Start: 2017-05-13 | End: 2017-05-14 | Stop reason: HOSPADM

## 2017-05-13 RX ORDER — IPRATROPIUM BROMIDE AND ALBUTEROL SULFATE 2.5; .5 MG/3ML; MG/3ML
3 SOLUTION RESPIRATORY (INHALATION)
Status: COMPLETED | OUTPATIENT
Start: 2017-05-13 | End: 2017-05-13

## 2017-05-13 RX ORDER — BUTALBITAL, ACETAMINOPHEN, CAFFEINE AND CODEINE PHOSPHATE 50; 325; 40; 30 MG/1; MG/1; MG/1; MG/1
1 CAPSULE ORAL EVERY 6 HOURS PRN
Qty: 30 CAPSULE | Refills: 0 | Status: SHIPPED | OUTPATIENT
Start: 2017-05-13 | End: 2017-07-13

## 2017-05-13 RX ORDER — PROCHLORPERAZINE 25 MG
12.5 SUPPOSITORY, RECTAL RECTAL EVERY 12 HOURS PRN
Status: DISCONTINUED | OUTPATIENT
Start: 2017-05-13 | End: 2017-05-14 | Stop reason: HOSPADM

## 2017-05-13 RX ORDER — ALBUTEROL SULFATE 90 UG/1
1-2 AEROSOL, METERED RESPIRATORY (INHALATION) EVERY 6 HOURS PRN
Status: DISCONTINUED | OUTPATIENT
Start: 2017-05-13 | End: 2017-05-14 | Stop reason: HOSPADM

## 2017-05-13 RX ORDER — ONDANSETRON 2 MG/ML
4 INJECTION INTRAMUSCULAR; INTRAVENOUS EVERY 6 HOURS PRN
Status: DISCONTINUED | OUTPATIENT
Start: 2017-05-13 | End: 2017-05-14 | Stop reason: HOSPADM

## 2017-05-13 RX ORDER — BISACODYL 10 MG
10 SUPPOSITORY, RECTAL RECTAL DAILY PRN
Status: DISCONTINUED | OUTPATIENT
Start: 2017-05-13 | End: 2017-05-14 | Stop reason: HOSPADM

## 2017-05-13 RX ORDER — ALBUTEROL SULFATE 0.83 MG/ML
3 SOLUTION RESPIRATORY (INHALATION) EVERY 4 HOURS PRN
Status: DISCONTINUED | OUTPATIENT
Start: 2017-05-13 | End: 2017-05-14 | Stop reason: HOSPADM

## 2017-05-13 RX ORDER — ACETAMINOPHEN 325 MG/1
650 TABLET ORAL EVERY 4 HOURS PRN
Status: DISCONTINUED | OUTPATIENT
Start: 2017-05-13 | End: 2017-05-14 | Stop reason: HOSPADM

## 2017-05-13 RX ORDER — PREDNISONE 10 MG/1
TABLET ORAL
Qty: 30 TABLET | Refills: 0 | Status: SHIPPED | OUTPATIENT
Start: 2017-05-13 | End: 2017-09-05

## 2017-05-13 RX ORDER — LOSARTAN POTASSIUM 25 MG/1
25 TABLET ORAL DAILY
Status: DISCONTINUED | OUTPATIENT
Start: 2017-05-13 | End: 2017-05-14 | Stop reason: HOSPADM

## 2017-05-13 RX ADMIN — METHYLPREDNISOLONE SODIUM SUCCINATE 62.5 MG: 125 INJECTION, POWDER, FOR SOLUTION INTRAMUSCULAR; INTRAVENOUS at 12:11

## 2017-05-13 RX ADMIN — FEXOFENADINE 60 MG: 60 TABLET, FILM COATED ORAL at 10:48

## 2017-05-13 RX ADMIN — IPRATROPIUM BROMIDE AND ALBUTEROL SULFATE 3 ML: .5; 3 SOLUTION RESPIRATORY (INHALATION) at 11:14

## 2017-05-13 RX ADMIN — PREDNISONE 40 MG: 20 TABLET ORAL at 01:26

## 2017-05-13 RX ADMIN — SENNOSIDES AND DOCUSATE SODIUM 2 TABLET: 8.6; 5 TABLET ORAL at 10:52

## 2017-05-13 RX ADMIN — BUTALBITAL, ACETAMINOPHEN, CAFFEINE, AND CODEINE PHOSPHATE 1 CAPSULE: 30; 50; 40; 325 CAPSULE ORAL at 20:34

## 2017-05-13 RX ADMIN — IPRATROPIUM BROMIDE AND ALBUTEROL SULFATE 3 ML: .5; 3 SOLUTION RESPIRATORY (INHALATION) at 00:51

## 2017-05-13 RX ADMIN — IPRATROPIUM BROMIDE AND ALBUTEROL SULFATE 3 ML: .5; 3 SOLUTION RESPIRATORY (INHALATION) at 19:47

## 2017-05-13 RX ADMIN — BUTALBITAL, ACETAMINOPHEN, CAFFEINE, AND CODEINE PHOSPHATE 1 CAPSULE: 30; 50; 40; 325 CAPSULE ORAL at 12:11

## 2017-05-13 RX ADMIN — ALBUTEROL SULFATE 2 PUFF: 90 INHALANT RESPIRATORY (INHALATION) at 07:00

## 2017-05-13 RX ADMIN — LOSARTAN POTASSIUM 25 MG: 25 TABLET, FILM COATED ORAL at 10:51

## 2017-05-13 RX ADMIN — BUTALBITAL, ACETAMINOPHEN, CAFFEINE, AND CODEINE PHOSPHATE 1 CAPSULE: 30; 50; 40; 325 CAPSULE ORAL at 16:09

## 2017-05-13 RX ADMIN — METHYLPREDNISOLONE SODIUM SUCCINATE 62.5 MG: 125 INJECTION, POWDER, FOR SOLUTION INTRAMUSCULAR; INTRAVENOUS at 22:46

## 2017-05-13 RX ADMIN — IPRATROPIUM BROMIDE AND ALBUTEROL SULFATE 3 ML: .5; 3 SOLUTION RESPIRATORY (INHALATION) at 15:08

## 2017-05-13 RX ADMIN — AZITHROMYCIN MONOHYDRATE 500 MG: 500 INJECTION, POWDER, LYOPHILIZED, FOR SOLUTION INTRAVENOUS at 10:45

## 2017-05-13 RX ADMIN — ATENOLOL 25 MG: 25 TABLET ORAL at 10:51

## 2017-05-13 RX ADMIN — TRAMADOL HYDROCHLORIDE 100 MG: 50 TABLET, COATED ORAL at 18:32

## 2017-05-13 RX ADMIN — HYDROCHLOROTHIAZIDE 25 MG: 25 TABLET ORAL at 12:11

## 2017-05-13 RX ADMIN — TRAMADOL HYDROCHLORIDE 100 MG: 50 TABLET, COATED ORAL at 06:58

## 2017-05-13 RX ADMIN — IPRATROPIUM BROMIDE AND ALBUTEROL SULFATE 3 ML: .5; 3 SOLUTION RESPIRATORY (INHALATION) at 00:56

## 2017-05-13 RX ADMIN — SENNOSIDES AND DOCUSATE SODIUM 1 TABLET: 8.6; 5 TABLET ORAL at 22:45

## 2017-05-13 RX ADMIN — IPRATROPIUM BROMIDE AND ALBUTEROL SULFATE 3 ML: .5; 3 SOLUTION RESPIRATORY (INHALATION) at 08:46

## 2017-05-13 RX ADMIN — IPRATROPIUM BROMIDE AND ALBUTEROL SULFATE 3 ML: .5; 3 SOLUTION RESPIRATORY (INHALATION) at 01:01

## 2017-05-13 ASSESSMENT — ENCOUNTER SYMPTOMS
CHILLS: 0
COUGH: 0
SHORTNESS OF BREATH: 1
WHEEZING: 1
FEVER: 0

## 2017-05-13 ASSESSMENT — PAIN DESCRIPTION - DESCRIPTORS
DESCRIPTORS: PRESSURE
DESCRIPTORS: HEADACHE

## 2017-05-13 NOTE — CONSULTS
..Care Transition Initial Assessment - [facility]  Reason For Consult: discharge planning  Met with pt to discuss discharge planning. Pt reported that he is currently homeless. He lives at a friend's basement. Pt is currently  and does not have any family around. Pt stated that he plans to live in one of the senior apartments. SW talked to pt about getting into either the Elderly waiver or CADI waiver. Pt reported that he currently receives SSI. Pt will call/go to [facility] office to see if he is qualified.     SW provided list of Shelters in [facility] and [facility] EW/CADI waiver information.    Active Problems:    COPD with hypoxia (H)         DATA  Lives With: friend(s)  Living Arrangements: friends home, pt is currently homeless  Description of Support System: (P) Uninvolved  Who is your support system?: (P) Other (specify) (Friend)  Support Assessment: (P) Lacks adequate emotional support, Lacks adequate physical care.   Identified issues/concerns regarding health management: Homeless           Quality Of Family Relationships: (P) unable to assess. Pt stated that he is disconnected from his family.   Transportation Available: (P) public transportation/ friend    ASSESSMENT  Cognitive Status:  Pt is alert and engaged  Concerns to be addressed: None     PLAN  Financial costs for the patient includes: None  Patient given options and choices for discharge:   Patient/family is agreeable to the plan?  YES  Patient Goals and Preferences: Dc back to home (friends) with O2. Patient anticipates discharging to:  Friends home

## 2017-05-13 NOTE — PLAN OF CARE
"Problem: Discharge Planning  Goal: Discharge Planning (Adult, OB, Behavioral, Peds)  Outcome: No Change  PRIMARY DIAGNOSIS: PULMONARY  OUTPATIENT/OBSERVATION GOALS TO BE MET BEFORE DISCHARGE:     1. Vital signs stable?  YES  2. Labs stable? YES  3. Oxygen saturations at baseline or >/= 92% or at previous home O2 therapy level?  No. Pt not on home 02. Sats at 85% on RA at 0300 in ED before coming to Obs.  4. Short term supplemental O2 needed for use with activity at home?  To be determined  5. Improvement in clinical condition?  Pt still requiring supplemental 02.  6. At or near baseline ADLs?  YES  7. Tolerating adequate PO diet and medications?  YES     Sp02 92% on 2 LPM NC. Denies shortness of breath at rest o/w vitals stable. Pt c/o 3/10 neck pain (chronic). Requesting home med tramadol to be re-ordered. MD paged. Pt states he is currently homeless and staying in a friend's basement where \"the air quality isn't good because we all smoke.\"Pt states he smokes 7-14 cigarettes/day.  Social work to consult. Will continue to monitor and provide supportive cares.  Home meds sent to pharmacy. Yellow copy on chart. Albuterol and Spirva inhalers verified and in pt's home med box.             "

## 2017-05-13 NOTE — PROGRESS NOTES
"Date: 5/13/2017  Admission Dx: COPD with hypoxia  Pulmonary History:COPD  Home Nebulizer/MDI Use: Advair, Spiriva, Qvar, Albuterol   Home Oxygen: None    Acuity Level (RCAT flow sheet): 3    Aerosol Therapy initiated: duoneb QID, Q4 prn    Pulmonary Hygiene initiated: deep breathe and cough    Volume Expansion initiated: IS    Current Oxygen Requirements: 2LPM Nc    Current SpO2: 95%    Re-evaluation date: 16 May    Patient Education: Patient informed of medication benefits and possible side effects.    See \"RT Assessments\" flow sheet for patient assessment scoring and Acuity Level Details.           "

## 2017-05-13 NOTE — PLAN OF CARE
Problem: Discharge Planning  Goal: Discharge Planning (Adult, OB, Behavioral, Peds)  Outcome: No Change  PRIMARY DIAGNOSIS: PULMONARY  OUTPATIENT/OBSERVATION GOALS TO BE MET BEFORE DISCHARGE:      1. Vital signs stable? YES  2. Labs stable? YES  3. Oxygen saturations at baseline or >/= 92% or at previous home O2 therapy level? No  4. Short term supplemental O2 needed for use with activity at home? To be determined  5. Improvement in clinical condition? Pt still requiring supplemental 02.  6. At or near baseline ADLs? YES  7. Tolerating adequate PO diet and medications? YES      VSS, pt up independently in room, tolerating regular diet, LS clear, 1LPM NC, NPC, denies SOB, no nicotine patch in place, IV solumedrol and scheduled nebs, reinforcing IS use and will ambulate halls on RA and with O2 later this evening, will continue to monitor and provide supportive cares.

## 2017-05-13 NOTE — IP AVS SNAPSHOT
[facility] Observation Department    [address]    [facility] [address]    Phone:  [phone]                                       After Visit Summary   5/13/2017    Chuck Lopez    MRN: [id number]           After Visit Summary Signature Page     I have received my discharge instructions, and my questions have been answered. I have discussed any challenges I see with this plan with the nurse or doctor.    ..........................................................................................................................................  Patient/Patient Representative Signature      ..........................................................................................................................................  Patient Representative Print Name and Relationship to Patient    ..................................................               ................................................  Date                                            Time    ..........................................................................................................................................  Reviewed by Signature/Title    ...................................................              ..............................................  Date                                                            Time

## 2017-05-13 NOTE — PROGRESS NOTES
ROOM # 225    Living Situation (if not independent, order SW consult): Homeless . Currently staying in basement of friend's house.  Facility name:  : [facility] [phone]    Activity level at baseline: Ind  Activity level on admit: Ind      Patient registered to observation; given Patient Bill of Rights; given the opportunity to ask questions about observation status and their plan of care.  Patient has been oriented to the observation room, bathroom and call light is in place.    Discussed discharge goals and expectations with patient/family.

## 2017-05-13 NOTE — ED PROVIDER NOTES
History     Chief Complaint:  Shortness of Breath      HPI   Chuck Lopez is a 76 year old male with history of COPD, not normally on oxygen at home but still smoking about 7-14 cigarettes per day, as well as Type II Diabetes, and HTN who presents with increasing shortness of breath over the past three days, worse with exertion.  The patient reports tonight his friends came by to say miley and he was wheezing and severely short of breath with just getting out of bed, noting it took him five minutes to accomplish this, and therefore presents for evaluation.  He states he tried a double nebulizer prior to leaving which slightly improved his breathing.  He states he takes his medications faithfully and is not currently on prednisone.  He denies recent fever, chills, cough, cold, chest pain, or other illnesses.        Allergies:  Ammonium Lactate - burning to skin   Doxycycline - visual disturbance  Monosodium Glutamate - head swelling, confusion  Penicillins - rash      Medications:    Tenormin  Hydrodiuril  Fioricet  Cozaar  Albuterol nebulizer and inhaler  Advair  QVAR  Spiriva  Prednisone  Nicotine  Melatonin  Ativan  Atenolol  Hydrochlorothiazide     Past Medical History:    Chronic neck pain  COPD  Diabetes  Hepatitis C  HTN  Emphysema  Alcoholism  CKD  Acute hypoxemic respiratory failure     Past Surgical History:    Appendectomy  EGD    Family History:  History reviewed.  No significant family history.     Social History:  Relationship status:   The patient is a current smoker. 0.25 pack/day  The patient denies alcohol use.   The patient presents with two male friends.     Review of Systems   Constitutional: Negative for chills and fever.   Respiratory: Positive for shortness of breath and wheezing. Negative for cough.    Cardiovascular: Negative for chest pain.   All other systems reviewed and are negative.      Physical Exam     Patient Vitals for the past 24 hrs:   BP Temp Temp src Pulse Heart  "Rate Resp SpO2 Height Weight   05/13/17 0051 - - - - - - 94 % - -   05/13/17 0020 137/85 98.6  F (37  C) Oral 94 96 20 95 % 1.651 m (5' 5\") 49.4 kg (109 lb)   Initial O2 sats 87%. The above sats reflect patient on oxygen.     Physical Exam  General: Patient is alert and interactive when I enter the room  Head:  The scalp, face, and head appear normal  Eyes:  The pupils are equal, round, and reactive to light    Conjunctivae and sclerae are normal  ENT:    External acoustic canals are normal    The oropharynx is normal without erythema.     Uvula is in the midline  Neck:  Normal range of motion  CV:  Regular rate. S1/S2. No murmurs.   Resp:  Prolonged expiratory phase.  Diffuse fine expiratory wheezes. No distress  GI:  Abdomen is soft, no rigidity, guarding, or rebound    No distension. No tenderness to palpation in any quadrant.     MS:  Normal tone. Joints grossly normal without effusions.     No asymmetric leg swelling, calf or thigh tenderness.      Normal motor assessment of all extremities.  Skin:  No rash or lesions noted. Normal capillary refill noted  Neuro: Speech is normal and fluent. Face is symmetric.     Moving all extremities well.   Psych:  Awake. Alert.  Normal affect.  Appropriate interactions.  Lymph: No anterior cervical lymphadenopathy noted      Emergency Department Course     ECG @ 0112  Indication: Shortness of Breath  Rate 91 bpm.   AZ interval 146 ms.   QRS duration 78 ms.   QT/QTc 360/442 ms.   P-R-T axes -85.  Notes: Sinus rhythm with premature atrial complexes with aberrant conduction.  Left axis deviation.  Pulmonary disease pattern.  Time read 0115    Laboratory:  009: Troponin POCT: 0.01 (WNL)    Interventions:  0101 Duoneb, 3 mL, Nebulization  0126 Prednisone, 40 mg, PO    ED Course:  Nursing notes and past medical history reviewed.   I performed a physical examination of the patient as documented above.  I explained the plan with the patient who consents to this.   The patient was " placed on oxygen per nasal canula.   The patient underwent the workup as described above.   0256 Discussed the patient with Dr. John from the Hospitalist Service.   I personally reviewed the laboratory and imaging results with the Patient and answered all related questions prior to admission.  Findings and plan explained to the Patient who consents to admission. Discussed the patient with Dr. John, who will admit the patient for further monitoring, evaluation, and treatment.     Impression & Plan      Medical Decision Making:  Chuck Lopez is a 76 year old male who presents for evaluation of shortness of breath and wheezing.  Signs and symptoms are consistent with COPD exacerbation.  A broad differential was considered including foreign body, reactive airway disease, pneumothorax, cardiac equivalent/ACS, viral induced wheezing, allergic phenomena, pneumonia, etc.  Patient feels improved after interventions here in ED but continues to have impairment in respiratory function including shortness of breath on oxygen and hypoxia.  Given this, I will hospitalize for further intervention.      There are no signs at this point of any other serious etiologies including those mentioned above especially acute coronary syndrome. I doubt this is ACS given the classic story of COPD exacerbation given by the patient, the marked wheezing without rales and a nonspecific EKG despite three days of symptoms.  No signs of pneumonia.      Diagnosis:    ICD-10-CM   1. COPD exacerbation (H) J44.1   2. Acute respiratory failure with hypoxia (H) J96.01         Disposition:   Admit under the care of Dr. John.       Steven FLORES, am serving as a scribe on 5/13/2017 at 12:31 AM to personally document services performed by Erik Patel MD, based on my observations and the provider's statements to me.       Erik Patel MD  05/13/17 0351

## 2017-05-13 NOTE — ED NOTES
IN TRIAGE airway,breathing and circulation intact, without need for intervention . Alert and interacting appropriately for age and situation. Sob for 3 days took 2 nebs and feels better also came over in airconditioning

## 2017-05-13 NOTE — PROGRESS NOTES
[facility]    Hospitalist Progress Note    Date of Service (when I saw the patient): 05/13/2017    Assessment & Plan   Chuck Lopez is a 76 year old male with COPD (still smokes), HTN, DM (? Diagnosis), Hep C (untreated), chronic neck pain who was admitted on 5/13/2017 with COPD exacerbation    1. Neuro- has chronic neck pain. Requesting Fioricet. Will order and also fill script for him  2. [facility]- HTN- cont home meds  3. Pulm- COPD exacerbation. Not on home oxygen. Symptoms improved today. He lives with friends who get scared when he is SOB and has told him he can't come back unless he has oxygen. No medical indication for oxygen. Will try and treat him more aggressively so that he can improve and go home tomorrow. I will explain to his house-mates. Will change oral steroids to IV (60mg BID) for today. Then will DC with slow taper. Start azithromycin for bronchitis in setting of COPD exacerbation. Cont nebs/inhalers  4. GI- regular diet  5. ID- azith for COPD exacerbation  6. Renal- no issues  7. Endo- has diagnosis of DM. Normal HbA1c.  8. Heme/onc- no issues  9. Musculoskel- ambulating  10. Prophylaxis- start if has prolonged stay  11. Dispo- likely DC to friend's house tomorrow      Code Status: Full Code    Disposition: Expected discharge in hopefully in one day  Ruperto Banks    Interval History   Patient feels a little better. Still very SOB with ambulation. No chest pain, abdo pain, n/v/d    -Data reviewed today: I reviewed all new labs and imaging results over the last 24 hours. I personally reviewed no images or EKG's today.    Physical Exam   Temp: 96  F (35.6  C) Temp src: Axillary BP: 130/65 Pulse: 94 Heart Rate: 98 Resp: 14 SpO2: 91 % O2 Device: None (Room air) Oxygen Delivery: 2 LPM  Vitals:    05/13/17 0020 05/13/17 0411   Weight: 49.4 kg (109 lb) 50 kg (110 lb 3.7 oz)     Vital Signs with Ranges  Temp:  [96  F (35.6  C)-98.6  F (37  C)] 96  F (35.6  C)  Pulse:  [94] 94  Heart Rate:   [92-98] 98  Resp:  [14-20] 14  BP: (130-161)/(65-85) 130/65  SpO2:  [86 %-95 %] 91 %       Constitutional: Awake, alert, cooperative, no apparent distress   Respiratory: Poor-mod air movement. I do not hear any wheezing at this time   Cardiovascular: Regular rate and rhythm, normal S1 and S2, and no murmur noted   Abdomen: Normal bowel sounds, soft, non-distended, non-tender   Skin: No rashes, no cyanosis, dry to touch   Neuro: Alert and oriented x3, no weakness, numbness, memory loss   Extremities: No edema, normal range of motion   Other(s):        All other systems: Negative     Medications        senna-docusate  1-2 tablet Oral BID     ipratropium - albuterol 0.5 mg/2.5 mg/3 mL  3 mL Nebulization 4x daily     fexofenadine  60 mg Oral Daily     beclomethasone  2 puff Inhalation BID     atenolol (TENORMIN) tablet 25 mg  25 mg Oral Daily     losartan  25 mg Oral Daily     hydrochlorothiazide  25 mg Oral Daily     azithromycin  500 mg Intravenous Once     methylPREDNISolone  62.5 mg Intravenous Q12H       Data     Recent Labs  Lab 05/13/17  0049   TROPONIN 0.01       No results found for this or any previous visit (from the past 24 hour(s)).

## 2017-05-13 NOTE — PLAN OF CARE
Problem: Discharge Planning  Goal: Discharge Planning (Adult, OB, Behavioral, Peds)  Outcome: No Change  PRIMARY DIAGNOSIS: PULMONARY  OUTPATIENT/OBSERVATION GOALS TO BE MET BEFORE DISCHARGE:     1. Vital signs stable?  YES  2. Labs stable? YES  3. Oxygen saturations at baseline or >/= 92% or at previous home O2 therapy level?  No  4. Short term supplemental O2 needed for use with activity at home?  To be determined  5. Improvement in clinical condition?  Pt still requiring supplemental 02.  6. At or near baseline ADLs?  YES  7. Tolerating adequate PO diet and medications?  YES     Pt is alert and oriented x4. Vital signs are stable. Patient ambulated in hallway and 02 sats dropped to 88%RA. Pt still requiring supplemental oxygen. Will continue to monitor.

## 2017-05-13 NOTE — PHARMACY-ADMISSION MEDICATION HISTORY
"Admission medication history interview status for this patient is complete. See [facility] admission navigator for allergy information, prior to admission medications and immunization status.     Medication history interview source(s):Patient  Medication history resources (including written lists, pill bottles, clinic record):None  Primary pharmacy: [facility]     Changes made to PTA medication list:  Added: none  Deleted: lorazepam, losartan, nicotine inhaler, prednisone   Changed: none   Patient has been using albuterol inhaler four times a day and albuterol nebulizer 5 times a day regularly, even if he didn't have shortness of breath. He reported \" never miss doses for his nebulizer and inhalers\". Proper use of his inhalers need to be counseled.     Actions taken by pharmacist (provider contacted, etc):None     Additional medication history information:None    Medication reconciliation/reorder completed by provider prior to medication history? No    For patients on insulin therapy: No (Yes/No)  Lantus/levemir/NPH/Mix 70/30 dose:  _____   in AM/PM  or twice daily   Sliding scale Novolog Y/N  If Yes, do you have a baseline novolog pre-meal dose:  ______units with meals   Patients eat three meals a day:   Y/N     Any Barriers to therapy:  cost of medications/comfortable with giving injections (if applicable)/ comfortable and confident with current diabetes regimen       Prior to Admission medications    Medication Sig Last Dose Taking? Auth Provider   atenolol (TENORMIN) 25 MG tablet Take 1 tablet (25 mg) by mouth daily 5/12/2017 at Unknown time Yes Pranay Reid MD   hydrochlorothiazide (HYDRODIURIL) 25 MG tablet Take 1 tablet (25 mg) by mouth daily 5/12/2017 at Unknown time Yes Pranay Reid MD   traMADol (ULTRAM) 50 MG tablet Take 1-2 tablets ( mg) by mouth every 6 hours as needed for pain maximum 4 tablet(s) per day 5/12/2017 at Unknown time Yes Pranay Reid MD "   butalbital-APAP-caffeine-codeine (FIORICET WITH CODEINE) -86-30 MG per capsule Take 1 capsule by mouth every 4 hours as needed for headaches 5/12/2017 at Unknown time Yes Erik Fish MD   albuterol (2.5 MG/3ML) 0.083% neb solution Take 1 vial (2.5 mg) by nebulization every 4 hours as needed for shortness of breath / dyspnea or wheezing 5/12/2017 at Unknown time Yes Erik Fish MD   albuterol (PROAIR HFA/PROVENTIL HFA/VENTOLIN HFA) 108 (90 BASE) MCG/ACT Inhaler Inhale 1-2 puffs into the lungs every 6 hours as needed for shortness of breath / dyspnea or wheezing 5/12/2017 at Unknown time Yes Erik Fish MD   fluticasone-salmeterol (ADVAIR) 500-50 MCG/DOSE diskus inhaler Inhale 1 puff into the lungs 2 times daily 5/12/2017 at Unknown time Yes Erik Fish MD   beclomethasone (QVAR) 80 MCG/ACT Inhaler Inhale 2 puffs into the lungs 2 times daily 5/12/2017 at Unknown time Yes Erik Fish MD   tiotropium (SPIRIVA HANDIHALER) 18 MCG capsule INHALE ONE CAPSULE BY MOUTH EVERY DAY 5/12/2017 at Unknown time Yes Erik Fish MD   melatonin 3 MG tablet Take 1 tablet (3 mg) by mouth nightly as needed Past Month at Unknown time Yes Oneyda Mcdonald MD   Multiple Vitamins-Minerals (V-C FORTE) CAPS Take 1 capsule by mouth daily 5/12/2017 at Unknown time Yes Pranay Reid MD
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
## 2017-05-13 NOTE — PLAN OF CARE
Problem: Discharge Planning  Goal: Discharge Planning (Adult, OB, Behavioral, Peds)  Outcome: No Change  PRIMARY DIAGNOSIS: PULMONARY  OUTPATIENT/OBSERVATION GOALS TO BE MET BEFORE DISCHARGE:     1. Vital signs stable?  YES  2. Labs stable? YES  3. Oxygen saturations at baseline or >/= 92% or at previous home O2 therapy level?  No  4. Short term supplemental O2 needed for use with activity at home?  To be determined  5. Improvement in clinical condition?  Pt still requiring supplemental 02.  6. At or near baseline ADLs?  YES  7. Tolerating adequate PO diet and medications?  YES     Patient to stay for further treatment and possibly discharge tomorrow as per MD.
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
## 2017-05-13 NOTE — ED NOTES
"[facility]  ED Nurse Handoff Report    Chuck Lopez is a 76 year old male   ED Chief complaint: Shortness of Breath  . ED Diagnosis:   Final diagnoses:   COPD exacerbation (H)   Acute respiratory failure with hypoxia (H)     Allergies:   Allergies   Allergen Reactions     Ammonium Lactate Other (See Comments)     Burning to skin     Doxycycline Visual Disturbance     Monosodium Glutamate Other (See Comments)     Pt states it feels like his \"head swells, visual disturbances, and he can't think striaght\"     Penicillins Rash       Code Status: Full Code  Activity level - Baseline/Home:  Stand with Assist. Activity Level - Current:   Stand with Assist. Lift room needed: No. Bariatric: No   Needed: No   Isolation: No. Infection: Not Applicable.     Vital Signs:   Vitals:    05/13/17 0020 05/13/17 0051   BP: 137/85    Pulse: 94    Resp: 20    Temp: 98.6  F (37  C)    TempSrc: Oral    SpO2: 95% 94%   Weight: 49.4 kg (109 lb)    Height: 1.651 m (5' 5\")      Cardiac Rhythm:  ,      Pain level:    Patient confused: No. Patient Falls Risk: Yes.   Elimination Status: Has voided   Patient Report - Initial Complaint: Pt to ER with increased SOB . Focused Assessment: *Pt with low sats in the 80s without out o2 pt doesn't use o2 at home   Tests Performed:  labs. Abnormal Resultssee epic.   Treatments provided:nebs and o2 and oral prednisone  Family Comments:  Friends were here but have gone home  OBS brochure/video discussed/provided to patient:  Yes  ED Medications:   Medications   ipratropium - albuterol 0.5 mg/2.5 mg/3 mL (DUONEB) neb solution 3 mL (3 mLs Nebulization Given 5/13/17 0101)   predniSONE (DELTASONE) tablet 40 mg (40 mg Oral Given 5/13/17 0126)     Drips infusing:  No     ED Nurse Name/Phone Number: Araceli EVGENY Andrade,   2:52 AM  RECEIVING UNIT ED HANDOFF REVIEW    Above ED Nurse Handoff Report was reviewed: Yes  Reviewed by: Myranda Mckinney on May 13, 2017 at 3:33 AM     "

## 2017-05-13 NOTE — PROGRESS NOTES
Discharge medication in lockbox.  Pharmacy unable to fill Fioricet as they do not carry that medication.  Plan to send paper script with patient at discharge.

## 2017-05-13 NOTE — IP AVS SNAPSHOT
MRN:[id number]                      After Visit Summary   5/13/2017    Chuck Lopez    MRN: [id number]           Thank you!     Thank you for choosing [facility] for your care. Our goal is always to provide you with excellent care. Hearing back from our patients is one way we can continue to improve our services. Please take a few minutes to complete the written survey that you may receive in the mail after you visit. If you would like to speak to someone directly about your visit please contact Patient Relations at [phone]. Thank you!          Patient Information     Date Of Birth          1940        About your hospital stay     You were admitted on:  May 13, 2017 You last received care in the:  [facility] Observation Department    You were discharged on:  May 14, 2017       Who to Call     For medical emergencies, please call 911.  For non-urgent questions about your medical care, please call your primary care provider or clinic, [phone]          Attending Provider     Provider Specialty    Erik Patel MD Emergency Medicine    Alvaro, Patsy CANTU MD Internal Medicine       Primary Care Provider Office Phone # Fax #    Pranay Frank Reid [phone]       [address] [address]        After Care Instructions     Oxygen Adult       [facility] [facility] Order 2 liter(s) by nasal cannula with activity with use of portable tank. Expected treatment length is 2 months.. Test on conserving device as applicable.    Patients who qualify for home O2 coverage under the CMS guidelines require ABG tests or O2 sat readings obtained closest to, but no earlier than 2 days prior to the discharge, as evidence of the need for home oxygen therapy. Testing must be performed while patient is in the chronic stable state. See notes for O2 sats.    I certify that this patient, Chuck Lopez has been  "under my care and that I, or a nurse practitioner or physician's assistant working with me, had a face-to-face encounter that meets the face-to-face encounter requirements with this patient on 5/14/2017. The patient, Chuck Lopez was evaluated or treated in whole, or in part, for the following medical condition, which necessitates the use of the ordered oxygen. Treatment Diagnosis: COPD    Attending Provider: Ruperto Bnaks  Physician signature: See electronic signature associated with these discharge orders  Date of Order: May 14, 2017                             Further instructions from your care team       Oxygen Provider:  Arranged through [employer], contact number [phone]. Follow-up call arranged for 05/16/2017. If you have any questions or concerns please call the oxygen company directly.      Pending Results     No orders found for last 3 day(s).            Statement of Approval     Ordered          05/14/17 1027  I have reviewed and agree with all the recommendations and orders detailed in this document.  EFFECTIVE NOW     Approved and electronically signed by:  Ruperto Banks MD             Admission Information     Date & Time Provider Department Dept. Phone    5/13/2017 Patsy John MD [facility] Observation Department [phone]      Your Vitals Were     Blood Pressure Pulse Temperature Respirations Height Weight    95/50 (BP Location: Left arm) 87 98  F (36.7  C) (Oral) 16 1.651 m (5' 5\") 50 kg (110 lb 3.7 oz)    Pulse Oximetry BMI (Body Mass Index)                91% 18.34 kg/m2          MyChart Information     [facility] [place] lets you send messages to your doctor, view your test results, renew your prescriptions, schedule appointments and more. To sign up, go to [email or web address] . Click on \"Log in\" on the left side of the screen, which will take you to the Welcome page. Then click on \"Sign up Now\" on the right side of the page.     You will be " asked to enter the access code listed below, as well as some personal information. Please follow the directions to create your username and password.     Your access code is: [email or web address]  Expires: 2017  3:56 PM     Your access code will  in 90 days. If you need help or a new code, please call your [facility] clinic or [phone].        Care EveryWhere ID     This is your Care EveryWhere ID. This could be used by other organizations to access your [facility] medical records  [id number]           Review of your medicines      START taking        Dose / Directions    azithromycin 250 MG tablet   Commonly known as:  ZITHROMAX   Indication:  bronchitis   Used for:  COPD exacerbation (H)        Dose:  250 mg   Start taking on:  5/15/2017   Take 1 tablet (250 mg) by mouth daily for 3 doses   Quantity:  3 tablet   Refills:  0       fexofenadine 60 MG tablet   Commonly known as:  ALLEGRA   Used for:  Seasonal allergic rhinitis, unspecified allergic rhinitis trigger        Dose:  60 mg   Take 1 tablet (60 mg) by mouth daily   Quantity:  60 tablet   Refills:  0       predniSONE 10 MG tablet   Commonly known as:  DELTASONE   Used for:  COPD exacerbation (H)        4 tabs daily for 3 days, then 3 tabs daily for 3 days, then 2 tabs daily for 3 days, then 1 tab daily for 3 days, then stop   Quantity:  30 tablet   Refills:  0         CONTINUE these medicines which may have CHANGED, or have new prescriptions. If we are uncertain of the size of tablets/capsules you have at home, strength may be listed as something that might have changed.        Dose / Directions    butalbital-APAP-caffeine-codeine -71-30 MG per capsule   Commonly known as:  FIORICET WITH codeine   This may have changed:  when to take this   Used for:  Intractable migraine, unspecified migraine type        Dose:  1 capsule   Take 1 capsule by mouth every 6 hours as needed for headaches   Quantity:  30 capsule   Refills:  0         CONTINUE these  medicines which have NOT CHANGED        Dose / Directions    * albuterol (2.5 MG/3ML) 0.083% neb solution   Used for:  COPD exacerbation (H)        Dose:  3 mL   Take 1 vial (2.5 mg) by nebulization every 4 hours as needed for shortness of breath / dyspnea or wheezing   Quantity:  360 mL   Refills:  3       * albuterol 108 (90 BASE) MCG/ACT Inhaler   Commonly known as:  PROAIR HFA/PROVENTIL HFA/VENTOLIN HFA   Used for:  Chronic obstructive airway disease with asthma (H)        Dose:  1-2 puff   Inhale 1-2 puffs into the lungs every 6 hours as needed for shortness of breath / dyspnea or wheezing   Quantity:  2 Inhaler   Refills:  3       atenolol 25 MG tablet   Commonly known as:  TENORMIN   Used for:  Type 2 diabetes mellitus with hyperosmolarity without coma, unspecified long term insulin use status (H)        Dose:  25 mg   Take 1 tablet (25 mg) by mouth daily   Quantity:  30 tablet   Refills:  11       beclomethasone 80 MCG/ACT Inhaler   Commonly known as:  QVAR   Used for:  Chronic obstructive pulmonary disease with acute lower respiratory infection (H)        Dose:  2 puff   Inhale 2 puffs into the lungs 2 times daily   Quantity:  8.7 g   Refills:  11       fluticasone-salmeterol 500-50 MCG/DOSE diskus inhaler   Commonly known as:  ADVAIR   Used for:  COPD exacerbation (H)        Dose:  1 puff   Inhale 1 puff into the lungs 2 times daily   Quantity:  3 Inhaler   Refills:  1       hydrochlorothiazide 25 MG tablet   Commonly known as:  HYDRODIURIL   Used for:  Type 2 diabetes mellitus with hyperosmolarity without coma, unspecified long term insulin use status (H)        Dose:  25 mg   Take 1 tablet (25 mg) by mouth daily   Quantity:  30 tablet   Refills:  1       melatonin 3 MG tablet   Used for:  Insomnia, unspecified type        Dose:  3 mg   Take 1 tablet (3 mg) by mouth nightly as needed   Quantity:  30 tablet   Refills:  0       tiotropium 18 MCG capsule   Commonly known as:  SPIRIVA HANDIHALER   Used for:   Centrilobular emphysema (H)        INHALE ONE CAPSULE BY MOUTH EVERY DAY   Quantity:  30 capsule   Refills:  11       traMADol 50 MG tablet   Commonly known as:  ULTRAM   Used for:  Chronic midline low back pain with sciatica, sciatica laterality unspecified        Dose:   mg   Take 1-2 tablets ( mg) by mouth every 6 hours as needed for pain maximum 4 tablet(s) per day   Quantity:  120 tablet   Refills:  1       V-C FORTE Caps   Used for:  Hepatitis C        Dose:  1 capsule   Take 1 capsule by mouth daily   Quantity:  90 capsule   Refills:  3       * Notice:  This list has 2 medication(s) that are the same as other medications prescribed for you. Read the directions carefully, and ask your doctor or other care provider to review them with you.         Where to get your medicines      These medications were sent to [place] - [address] [facility]  [address] [facility] [address]     Phone:  [phone]     azithromycin 250 MG tablet    fexofenadine 60 MG tablet    predniSONE 10 MG tablet         Some of these will need a paper prescription and others can be bought over the counter. Ask your nurse if you have questions.     Bring a paper prescription for each of these medications     butalbital-APAP-caffeine-codeine -86-30 MG per capsule                Protect others around you: Learn how to safely use, store and throw away your medicines at www.disposemymeds.org.             Medication List: This is a list of all your medications and when to take them. Check marks below indicate your daily home schedule. Keep this list as a reference.      Medications           Morning Afternoon Evening Bedtime As Needed    * albuterol (2.5 MG/3ML) 0.083% neb solution   Take 1 vial (2.5 mg) by nebulization every 4 hours as needed for shortness of breath / dyspnea or wheezing                                * albuterol 108 (90 BASE) MCG/ACT Inhaler   Commonly known  as:  PROAIR HFA/PROVENTIL HFA/VENTOLIN HFA   Inhale 1-2 puffs into the lungs every 6 hours as needed for shortness of breath / dyspnea or wheezing   Last time this was given:  2 puffs on 5/13/2017  7:00 AM                                atenolol 25 MG tablet   Commonly known as:  TENORMIN   Take 1 tablet (25 mg) by mouth daily   Last time this was given:  25 mg on 5/14/2017  8:00 AM                                azithromycin 250 MG tablet   Commonly known as:  ZITHROMAX   Take 1 tablet (250 mg) by mouth daily for 3 doses   Start taking on:  5/15/2017   Last time this was given:  250 mg on 5/14/2017 11:06 AM                                beclomethasone 80 MCG/ACT Inhaler   Commonly known as:  QVAR   Inhale 2 puffs into the lungs 2 times daily                                butalbital-APAP-caffeine-codeine -12-30 MG per capsule   Commonly known as:  FIORICET WITH codeine   Take 1 capsule by mouth every 6 hours as needed for headaches   Last time this was given:  1 capsule on 5/14/2017  6:26 AM                                fexofenadine 60 MG tablet   Commonly known as:  ALLEGRA   Take 1 tablet (60 mg) by mouth daily   Last time this was given:  60 mg on 5/14/2017  8:01 AM                                fluticasone-salmeterol 500-50 MCG/DOSE diskus inhaler   Commonly known as:  ADVAIR   Inhale 1 puff into the lungs 2 times daily                                hydrochlorothiazide 25 MG tablet   Commonly known as:  HYDRODIURIL   Take 1 tablet (25 mg) by mouth daily   Last time this was given:  25 mg on 5/14/2017  8:01 AM                                melatonin 3 MG tablet   Take 1 tablet (3 mg) by mouth nightly as needed                                predniSONE 10 MG tablet   Commonly known as:  DELTASONE   4 tabs daily for 3 days, then 3 tabs daily for 3 days, then 2 tabs daily for 3 days, then 1 tab daily for 3 days, then stop   Last time this was given:  40 mg on 5/13/2017  1:26 AM                                 tiotropium 18 MCG capsule   Commonly known as:  SPIRIVA HANDIHALER   INHALE ONE CAPSULE BY MOUTH EVERY DAY   Last time this was given:  18 mcg on 5/14/2017 10:06 AM                                traMADol 50 MG tablet   Commonly known as:  ULTRAM   Take 1-2 tablets ( mg) by mouth every 6 hours as needed for pain maximum 4 tablet(s) per day   Last time this was given:  100 mg on 5/14/2017 11:06 AM                                V-C FORTE Caps   Take 1 capsule by mouth daily                                * Notice:  This list has 2 medication(s) that are the same as other medications prescribed for you. Read the directions carefully, and ask your doctor or other care provider to review them with you.

## 2017-05-13 NOTE — LETTER
Transition Communication Hand-off for Care Transitions to Next Level of Care Provider    Name: Chuck Lopez  MRN #: [id number]  Primary Care Provider: Pranay Reid  Primary Care MD Name: Dr. Pranay Reid  Primary Clinic: [facility] [address] [facility] [address]  Primary Care Clinic Name: [facility]  Reason for Hospitalization:  COPD exacerbation (H) [J44.1]  Acute respiratory failure with hypoxia (H) [J96.01]  Admit Date/Time: 5/13/2017 12:31 AM  Discharge Date: 5/14/17  Payor Source: Payor: MEDICARE / Plan: MEDICARE / Product Type: Medicare /     Readmission Assessment Measure (TETO) Risk Score/category: HIGH    Plan of Care Goals/Milestone Events:   Patient Concerns:    Patient Goals:   Medical Goals   Short-term Follow up with PCP in 7 days   Long-term Follow COPD action plan         Reason for Communication Hand-off Referral: Admission diagnoses: COPD    Discharge Plan:  Discharge Plan:       Most Recent Value    Disposition Comments Social work consulted    Concerns To Be Addressed homelessness           Concern for non-adherence with plan of care:   Y/N No  Discharge Needs Assessment:  Needs       Most Recent Value    Anticipated Changes Related to Illness inability to care for self, inability to care for someone else    Transportation Available public transportation    Current Discharge Risk homeless    # of Referrals Placed by CTS Communication hand-offs to next level of Care Providers          Already enrolled in Tele-monitoring program and name of program:  n/a  Follow-up specialty is recommended: No    Follow-up plan:  No future appointments.    Any outstanding tests or procedures:    Procedures     Future Labs/Procedures    Oxygen Adult     Comments:    New Home Oxygen Order 2 liter(s) by nasal cannula with activity with use of portable tank. Expected treatment length is 2 months.. Test on conserving device as applicable.    Patients who qualify for  home O2 coverage under the CMS guidelines require ABG tests or O2 sat readings obtained closest to, but no earlier than 2 days prior to the discharge, as evidence of the need for home oxygen therapy. Testing must be performed while patient is in the chronic stable state. See notes for O2 sats.    I certify that this patient, Chuck Lopez has been under my care and that I, or a nurse practitioner or physician's assistant working with me, had a face-to-face encounter that meets the face-to-face encounter requirements with this patient on 5/14/2017. The patient, Chuck Lopez was evaluated or treated in whole, or in part, for the following medical condition, which necessitates the use of the ordered oxygen. Treatment Diagnosis: COPD    Attending Provider: Ruperto Banks  Physician signature: See electronic signature associated with these discharge orders  Date of Order: May 14, 2017                Key Recommendations:  Pt needs oxygen; however lives with 4 other people who all smoke., and he smokes as well.     Mary Mcleod, RN, BSN, PHN, CTS  Care Transitions Specialist  [facility]      AVS/Discharge Summary is the source of truth; this is a helpful guide for improved communication of patient story

## 2017-05-13 NOTE — PROGRESS NOTES
Patient has been assessed for Home Oxygen needs.  Oxygen readings:   *   RA - at rest  Pulse oximetry SPO2 92 %  *   RA - during activity/with exercise SPO2 88 %  *   O2 at  2 liters/minute (at rest) ...SPO2 95 %  *   O2 at  2 liters/minute (during activity/with exercise) ...SPO2 92 %    Had significant LEVINE with activity.

## 2017-05-14 ENCOUNTER — DOCUMENTATION ONLY (OUTPATIENT)
Dept: SLEEP MEDICINE | Facility: CLINIC | Age: 77
End: 2017-05-14

## 2017-05-14 VITALS
WEIGHT: 110.23 LBS | HEART RATE: 87 BPM | OXYGEN SATURATION: 91 % | HEIGHT: 65 IN | DIASTOLIC BLOOD PRESSURE: 59 MMHG | BODY MASS INDEX: 18.37 KG/M2 | TEMPERATURE: 97.6 F | SYSTOLIC BLOOD PRESSURE: 136 MMHG | RESPIRATION RATE: 18 BRPM

## 2017-05-14 LAB — GLUCOSE BLDC GLUCOMTR-MCNC: 155 MG/DL (ref 70–99)

## 2017-05-14 PROCEDURE — A9270 NON-COVERED ITEM OR SERVICE: HCPCS | Mod: GY | Performed by: INTERNAL MEDICINE

## 2017-05-14 PROCEDURE — 00000146 ZZHCL STATISTIC GLUCOSE BY METER IP

## 2017-05-14 PROCEDURE — 94640 AIRWAY INHALATION TREATMENT: CPT

## 2017-05-14 PROCEDURE — 25000128 H RX IP 250 OP 636: Performed by: HOSPITALIST

## 2017-05-14 PROCEDURE — 99217 ZZC OBSERVATION CARE DISCHARGE: CPT | Performed by: HOSPITALIST

## 2017-05-14 PROCEDURE — 40000275 ZZH STATISTIC RCP TIME EA 10 MIN

## 2017-05-14 PROCEDURE — 25000125 ZZHC RX 250: Performed by: INTERNAL MEDICINE

## 2017-05-14 PROCEDURE — 25000132 ZZH RX MED GY IP 250 OP 250 PS 637: Mod: GY | Performed by: HOSPITALIST

## 2017-05-14 PROCEDURE — 25000132 ZZH RX MED GY IP 250 OP 250 PS 637: Mod: GY | Performed by: INTERNAL MEDICINE

## 2017-05-14 PROCEDURE — 94640 AIRWAY INHALATION TREATMENT: CPT | Mod: 76

## 2017-05-14 PROCEDURE — G0378 HOSPITAL OBSERVATION PER HR: HCPCS

## 2017-05-14 PROCEDURE — 40000809 ZZH STATISTIC NO DOCUMENTATION TO SUPPORT CHARGE

## 2017-05-14 PROCEDURE — A9270 NON-COVERED ITEM OR SERVICE: HCPCS | Mod: GY | Performed by: HOSPITALIST

## 2017-05-14 RX ORDER — FEXOFENADINE HCL 60 MG/1
60 TABLET, FILM COATED ORAL DAILY
Qty: 60 TABLET | Refills: 0 | Status: SHIPPED | OUTPATIENT
Start: 2017-05-14 | End: 2017-09-05

## 2017-05-14 RX ORDER — AZITHROMYCIN 250 MG/1
250 TABLET, FILM COATED ORAL ONCE
Status: COMPLETED | OUTPATIENT
Start: 2017-05-14 | End: 2017-05-14

## 2017-05-14 RX ORDER — AZITHROMYCIN 250 MG/1
250 TABLET, FILM COATED ORAL DAILY
Qty: 3 TABLET | Refills: 0 | Status: SHIPPED | OUTPATIENT
Start: 2017-05-15 | End: 2017-05-18

## 2017-05-14 RX ADMIN — FEXOFENADINE 60 MG: 60 TABLET, FILM COATED ORAL at 08:01

## 2017-05-14 RX ADMIN — LOSARTAN POTASSIUM 25 MG: 25 TABLET, FILM COATED ORAL at 08:00

## 2017-05-14 RX ADMIN — TIOTROPIUM BROMIDE 18 MCG: 18 CAPSULE ORAL; RESPIRATORY (INHALATION) at 10:06

## 2017-05-14 RX ADMIN — BUTALBITAL, ACETAMINOPHEN, CAFFEINE, AND CODEINE PHOSPHATE 1 CAPSULE: 30; 50; 40; 325 CAPSULE ORAL at 06:26

## 2017-05-14 RX ADMIN — SENNOSIDES AND DOCUSATE SODIUM 1 TABLET: 8.6; 5 TABLET ORAL at 08:01

## 2017-05-14 RX ADMIN — BUTALBITAL, ACETAMINOPHEN, CAFFEINE, AND CODEINE PHOSPHATE 1 CAPSULE: 30; 50; 40; 325 CAPSULE ORAL at 00:29

## 2017-05-14 RX ADMIN — METHYLPREDNISOLONE SODIUM SUCCINATE 62.5 MG: 125 INJECTION, POWDER, FOR SOLUTION INTRAMUSCULAR; INTRAVENOUS at 10:07

## 2017-05-14 RX ADMIN — IPRATROPIUM BROMIDE AND ALBUTEROL SULFATE 3 ML: .5; 3 SOLUTION RESPIRATORY (INHALATION) at 11:12

## 2017-05-14 RX ADMIN — ATENOLOL 25 MG: 25 TABLET ORAL at 08:00

## 2017-05-14 RX ADMIN — AZITHROMYCIN 250 MG: 250 TABLET, FILM COATED ORAL at 11:06

## 2017-05-14 RX ADMIN — IPRATROPIUM BROMIDE AND ALBUTEROL SULFATE 3 ML: .5; 3 SOLUTION RESPIRATORY (INHALATION) at 08:08

## 2017-05-14 RX ADMIN — HYDROCHLOROTHIAZIDE 25 MG: 25 TABLET ORAL at 08:01

## 2017-05-14 RX ADMIN — TRAMADOL HYDROCHLORIDE 100 MG: 50 TABLET, COATED ORAL at 11:06

## 2017-05-14 RX ADMIN — TRAMADOL HYDROCHLORIDE 50 MG: 50 TABLET, COATED ORAL at 04:28

## 2017-05-14 NOTE — PROGRESS NOTES
Ambulated patient in the hallway with a SBA while monitoring SPO2. SPO2 levels fluctuated between 89%-84%.

## 2017-05-14 NOTE — PROGRESS NOTES
Patient has been assessed for Home Oxygen needs.  Oxygen readings:   *   RA - at rest  Pulse oximetry SPO2 92 %  *   RA - during activity/with exercise SPO2 84 %  *   O2 at  2 liters/minute (at rest) ...SPO2 93 %  *   O2 at  2 liters/minute (during activity/with exercise) ...SPO2 92 %

## 2017-05-14 NOTE — PLAN OF CARE
Problem: Discharge Planning  Goal: Discharge Planning (Adult, OB, Behavioral, Peds)  Outcome: Adequate for Discharge Date Met:  05/14/17  PRIMARY DIAGNOSIS: PULMONARY  OUTPATIENT/OBSERVATION GOALS TO BE MET BEFORE DISCHARGE:      1. Vital signs stable? YES  2. Labs stable? YES  3. Oxygen saturations at baseline or >/= 92% or at previous home O2 therapy level? No, not when ambulating, sats 84-89%  4. Short term supplemental O2 needed for use with activity at home? Yes-Home 02 order entered  5. Improvement in clinical condition? Yes, 92-93% on RA at rest, but 02 needed with activity  6. At or near baseline ADLs? YES  7. Tolerating adequate PO diet and medications? YES      A & 0 x 4. VSS on RA. LS diminished, reports LEVINE. 02 sats 84-89% when ambulating on room air.  Home 02 ordered and pt receiveing instruction re  Home 02  Currently.  Up independently in room, tolerating regular diet. IV solumedrol and scheduled nebs, reinforcing IS use. Given Tramadol for neck pain.  Will continue to monitor and provide supportive cares.

## 2017-05-14 NOTE — PLAN OF CARE
Problem: Discharge Planning  Goal: Discharge Planning (Adult, OB, Behavioral, Peds)  Outcome: No Change  PRIMARY DIAGNOSIS: PULMONARY  OUTPATIENT/OBSERVATION GOALS TO BE MET BEFORE DISCHARGE:      1. Vital signs stable? YES  2. Labs stable? YES  3. Oxygen saturations at baseline or >/= 92% or at previous home O2 therapy level? No  4. Short term supplemental O2 needed for use with activity at home? No  5. Improvement in clinical condition? Pt still requiring supplemental 02.  6. At or near baseline ADLs? YES  7. Tolerating adequate PO diet and medications? YES      VSS, pt up independently in room, tolerating regular diet, LS clear, 1LPM NC, NPC, denies SOB, no nicotine patch in place, IV solumedrol and scheduled nebs, reinforcing IS use, ambulated in room, will continue to monitor and provide supportive cares.

## 2017-05-14 NOTE — PLAN OF CARE
Problem: Discharge Planning  Goal: Discharge Planning (Adult, OB, Behavioral, Peds)  Outcome: No Change  PRIMARY DIAGNOSIS: PULMONARY  OUTPATIENT/OBSERVATION GOALS TO BE MET BEFORE DISCHARGE:      1. Vital signs stable? YES  2. Labs stable? YES  3. Oxygen saturations at baseline or >/= 92% or at previous home O2 therapy level? No  4. Short term supplemental O2 needed for use with activity at home? No  5. Improvement in clinical condition? Pt still requiring supplemental 02.  6. At or near baseline ADLs? YES  7. Tolerating adequate PO diet and medications? YES      VSS, pt up independently in room, tolerating regular diet, LS clear, 1LPM NC, NPC, denies SOB, no nicotine patch in place, IV solumedrol and scheduled nebs, reinforcing IS use, received PRN Fioricet for 3/10 headache w/ good relief,  ambulated in room, will continue to monitor and provide supportive cares.

## 2017-05-14 NOTE — PLAN OF CARE
Problem: Discharge Planning  Goal: Discharge Planning (Adult, OB, Behavioral, Peds)  Pt 93-94% on RA.

## 2017-05-14 NOTE — PLAN OF CARE
Problem: Discharge Planning  Goal: Discharge Planning (Adult, OB, Behavioral, Peds)  Outcome: Improving  PRIMARY DIAGNOSIS: PULMONARY  OUTPATIENT/OBSERVATION GOALS TO BE MET BEFORE DISCHARGE:      1. Vital signs stable? YES  2. Labs stable? YES  3. Oxygen saturations at baseline or >/= 92% or at previous home O2 therapy level? No  4. Short term supplemental O2 needed for use with activity at home? No  5. Improvement in clinical condition? Pt still requiring supplemental 02.  6. At or near baseline ADLs? YES  7. Tolerating adequate PO diet and medications? YES      VSS, pt up independently in room, tolerating regular diet, LS clear, 1LPM NC, NPC, denies SOB, no nicotine patch in place, IV solumedrol and scheduled nebs, reinforcing IS use, received PRN Fioricet for 3/10 headache w/ good relief, ambulated in room, will continue to monitor and provide supportive cares.

## 2017-05-14 NOTE — DISCHARGE INSTRUCTIONS
Oxygen Provider:  Arranged through [employer], contact number [phone]. Follow-up call arranged for 05/16/2017. If you have any questions or concerns please call the oxygen company directly.

## 2017-05-14 NOTE — PLAN OF CARE
Problem: Discharge Planning  Goal: Discharge Planning (Adult, OB, Behavioral, Peds)  Outcome: Improving  Problem: Discharge Planning  Goal: Discharge Planning (Adult, OB, Behavioral, Peds)  Outcome: Improving  PRIMARY DIAGNOSIS: PULMONARY  OUTPATIENT/OBSERVATION GOALS TO BE MET BEFORE DISCHARGE:      1. Vital signs stable? YES  2. Labs stable? YES  3. Oxygen saturations at baseline or >/= 92% or at previous home O2 therapy level? No  4. Short term supplemental O2 needed for use with activity at home? No  5. Improvement in clinical condition? Yes, 92-93% on RA.    6. At or near baseline ADLs? YES  7. Tolerating adequate PO diet and medications? YES      A & 0 x 4.  VSS on RA.  LS diminished, reports LEVINE.   Up independently in room, tolerating regular diet.   IV solumedrol and scheduled nebs, reinforcing IS use.  Denies pain after getting prn Fioricet. Will continue to monitor and provide supportive cares.  Plan is to walk pt with pulse ox/continue to monitor 02 sats.

## 2017-05-14 NOTE — PROGRESS NOTES
Transition Communication Hand-off for Care Transitions to Next Level of Care Provider    Name: Chuck Lopez  MRN #: [id number]  Primary Care Provider: Pranay Reid  Primary Care MD Name: Dr. Pranay Reid  Primary Clinic: [facility] [address] [facility] [address]  Primary Care Clinic Name: [facility]  Reason for Hospitalization:  COPD exacerbation (H) [J44.1]  Acute respiratory failure with hypoxia (H) [J96.01]  Admit Date/Time: 5/13/2017 12:31 AM  Discharge Date: 5/14/17  Payor Source: Payor: MEDICARE / Plan: MEDICARE / Product Type: Medicare /     Readmission Assessment Measure (TETO) Risk Score/category: HIGH    Plan of Care Goals/Milestone Events:   Patient Concerns:    Patient Goals:   Medical Goals   Short-term Follow up with PCP in 7 days   Long-term Follow COPD action plan         Reason for Communication Hand-off Referral: Admission diagnoses: COPD    Discharge Plan:  Discharge Plan:       Most Recent Value    Disposition Comments Social work consulted    Concerns To Be Addressed homelessness           Concern for non-adherence with plan of care:   Y/N No  Discharge Needs Assessment:  Needs       Most Recent Value    Anticipated Changes Related to Illness inability to care for self, inability to care for someone else    Transportation Available public transportation    Current Discharge Risk homeless    # of Referrals Placed by CTS Communication hand-offs to next level of Care Providers          Already enrolled in Tele-monitoring program and name of program:  n/a  Follow-up specialty is recommended: No    Follow-up plan:  No future appointments.    Any outstanding tests or procedures:    Procedures     Future Labs/Procedures    Oxygen Adult     Comments:    New Home Oxygen Order 2 liter(s) by nasal cannula with activity with use of portable tank. Expected treatment length is 2 months.. Test on conserving device as applicable.    Patients who qualify for  home O2 coverage under the CMS guidelines require ABG tests or O2 sat readings obtained closest to, but no earlier than 2 days prior to the discharge, as evidence of the need for home oxygen therapy. Testing must be performed while patient is in the chronic stable state. See notes for O2 sats.    I certify that this patient, Chuck Lopez has been under my care and that I, or a nurse practitioner or physician's assistant working with me, had a face-to-face encounter that meets the face-to-face encounter requirements with this patient on 5/14/2017. The patient, Chuck Lopez was evaluated or treated in whole, or in part, for the following medical condition, which necessitates the use of the ordered oxygen. Treatment Diagnosis: COPD    Attending Provider: Ruperto Banks  Physician signature: See electronic signature associated with these discharge orders  Date of Order: May 14, 2017                Key Recommendations:  Pt needs oxygen; however lives with 4 other people who all smoke., and he smokes as well.     Mary Mcleod, RN, BSN, PHN, CTS  Care Transitions Specialist  [facility]      AVS/Discharge Summary is the source of truth; this is a helpful guide for improved communication of patient story

## 2017-05-14 NOTE — DISCHARGE SUMMARY
"[facility]  Discharge Summary  Name: Chuck Lopez    MRN: [id number]  YOB: 1940    Age: 76 year old  Date of Discharge:  No discharge date for patient encounter.  Date of Admission: 5/13/2017  Primary Care Provider: Pranay Reid  Discharge Physician:  Ruperto Banks MD  Discharging Service:  Hospitalist       Discharge Diagnosis:  COPD exacerbation     Other Diagnosis:  HTN, DM, Hep C, COPD     Discharge Disposition:  Discharged to home     Allergies:  Allergies   Allergen Reactions     Ammonium Lactate Other (See Comments)     Burning to skin     Doxycycline Visual Disturbance     Monosodium Glutamate Other (See Comments)     Pt states it feels like his \"head swells, visual disturbances, and he can't think striaght\"     Penicillins Rash        Discharge Medications:   Current Discharge Medication List      START taking these medications    Details   fexofenadine (ALLEGRA) 60 MG tablet Take 1 tablet (60 mg) by mouth daily  Qty: 60 tablet, Refills: 0    Associated Diagnoses: Seasonal allergic rhinitis, unspecified allergic rhinitis trigger      azithromycin (ZITHROMAX) 250 MG tablet Take 1 tablet (250 mg) by mouth daily for 3 doses  Qty: 3 tablet, Refills: 0    Associated Diagnoses: COPD exacerbation (H)      predniSONE (DELTASONE) 10 MG tablet 4 tabs daily for 3 days, then 3 tabs daily for 3 days, then 2 tabs daily for 3 days, then 1 tab daily for 3 days, then stop  Qty: 30 tablet, Refills: 0    Associated Diagnoses: COPD exacerbation (H)         CONTINUE these medications which have CHANGED    Details   butalbital-APAP-caffeine-codeine (FIORICET WITH CODEINE) -04-30 MG per capsule Take 1 capsule by mouth every 6 hours as needed for headaches  Qty: 30 capsule, Refills: 0    Associated Diagnoses: Intractable migraine, unspecified migraine type         CONTINUE these medications which have NOT CHANGED    Details   atenolol (TENORMIN) 25 MG tablet Take 1 tablet (25 mg) by " mouth daily  Qty: 30 tablet, Refills: 11    Associated Diagnoses: Type 2 diabetes mellitus with hyperosmolarity without coma, unspecified long term insulin use status (H)      hydrochlorothiazide (HYDRODIURIL) 25 MG tablet Take 1 tablet (25 mg) by mouth daily  Qty: 30 tablet, Refills: 1    Associated Diagnoses: Type 2 diabetes mellitus with hyperosmolarity without coma, unspecified long term insulin use status (H)      traMADol (ULTRAM) 50 MG tablet Take 1-2 tablets ( mg) by mouth every 6 hours as needed for pain maximum 4 tablet(s) per day  Qty: 120 tablet, Refills: 1    Associated Diagnoses: Chronic midline low back pain with sciatica, sciatica laterality unspecified      albuterol (2.5 MG/3ML) 0.083% neb solution Take 1 vial (2.5 mg) by nebulization every 4 hours as needed for shortness of breath / dyspnea or wheezing  Qty: 360 mL, Refills: 3    Associated Diagnoses: COPD exacerbation (H)      albuterol (PROAIR HFA/PROVENTIL HFA/VENTOLIN HFA) 108 (90 BASE) MCG/ACT Inhaler Inhale 1-2 puffs into the lungs every 6 hours as needed for shortness of breath / dyspnea or wheezing  Qty: 2 Inhaler, Refills: 3    Associated Diagnoses: Chronic obstructive airway disease with asthma (H)      fluticasone-salmeterol (ADVAIR) 500-50 MCG/DOSE diskus inhaler Inhale 1 puff into the lungs 2 times daily  Qty: 3 Inhaler, Refills: 1    Associated Diagnoses: COPD exacerbation (H)      beclomethasone (QVAR) 80 MCG/ACT Inhaler Inhale 2 puffs into the lungs 2 times daily  Qty: 8.7 g, Refills: 11    Associated Diagnoses: Chronic obstructive pulmonary disease with acute lower respiratory infection (H)      tiotropium (SPIRIVA HANDIHALER) 18 MCG capsule INHALE ONE CAPSULE BY MOUTH EVERY DAY  Qty: 30 capsule, Refills: 11    Associated Diagnoses: Centrilobular emphysema (H)      melatonin 3 MG tablet Take 1 tablet (3 mg) by mouth nightly as needed  Qty: 30 tablet, Refills: 0    Associated Diagnoses: Insomnia, unspecified type     "  Multiple Vitamins-Minerals (V-C FORTE) CAPS Take 1 capsule by mouth daily  Qty: 90 capsule, Refills: 3    Associated Diagnoses: Hepatitis C         STOP taking these medications       losartan (COZAAR) 25 MG tablet Comments:   Reason for Stopping:         LORazepam (ATIVAN) 0.5 MG tablet Comments:   Reason for Stopping:                Condition on Discharge:  Discharge condition: Stable   Discharge vitals: Blood pressure 125/58, pulse 87, temperature 96.7  F (35.9  C), temperature source Oral, resp. rate 18, height 1.651 m (5' 5\"), weight 50 kg (110 lb 3.7 oz), SpO2 93 %.   Code status on discharge: Full Code     History of Illness:  See detailed admission note for full details.  76-year-old gentleman. He has a past medical history notable for COPD (not on home oxygen), hypertension, chronic hepatitis C, as well as chronic neck pain. He is on his fourth hospitalization this year for COPD exacerbation complicated by hypoxia. He is homeless as well, which clearly adds to the difficulty of the situation.       He carries a diagnosis of COPD. He reports compliance with his inhalers, and describes an 18-24 month history of slowly progressive shortness of breath. He notes that he feels that cold weather will trigger an exacerbation, as will humidity. Furthermore, he believes that he has allergies at least in the springtime, although he denies any itchy eyes or runny nose or sneezing. He presents this time with a 3-day history of increasing shortness of breath. Accompanying this is a worsening chronic cough that is productive of gray phlegm. He has had no nasal congestion, earache, sore throat or fever. He does note that he has been outside more, and that with the warmer weather and perceived allergies, he feels that this is the etiology for this trigger. I think also of importance is that this is his third hospitalization in a month for the same.       In the emergency room, he was noted to be hypoxic; he was at 86% on " presentation, but has rebounded nicely with oxygen at 2 liters into the mid 90% range. Further evaluation included only a troponin which was negative at 0.01.       Currently, he is feeling a little bit better. He did state that prior to coming into the hospital, he did back-to-back neb treatments, which he thinks helped, but he still felt too short of breath to be home.     Significant Physical Exam Findings:  Patient feels better. Still has sob with ambulation. No chest pain, abdo pain, n/v/d  s1s2 rrr, lungs clear, abdo +BS    Procedures:  None     Imaging:  Results for orders placed or performed during the hospital encounter of 04/18/17   Chest XR,  PA & LAT    Narrative    XR CHEST 2 VW  4/18/2017 2:11 PM    HISTORY:  dyspnea, cough    COMPARISON:  3/29/2017      Impression    IMPRESSION:  Hyperinflation consistent with emphysematous changes. No  infiltrates to suggest pneumonia. Mild stable vertebral body height  loss of a midthoracic vertebral body. Otherwise negative.     MADISON BLANC MD        Consultations:  No consultations were requested during this admission.     Significant Lab Results:  No results for input(s): WBC, HGB, HCT, MCV, PLT in the last 168 hours.       Lab Results   Component Value Date     04/19/2017     04/18/2017     03/30/2017    Lab Results   Component Value Date    CHLORIDE 101 04/19/2017    CHLORIDE 100 04/18/2017    CHLORIDE 99 03/30/2017    Lab Results   Component Value Date    BUN 20 04/19/2017    BUN 15 04/18/2017    BUN 18 03/30/2017      Lab Results   Component Value Date    POTASSIUM 4.0 04/19/2017    POTASSIUM 3.7 04/18/2017    POTASSIUM 4.5 04/04/2017    Lab Results   Component Value Date    CO2 32 04/19/2017    CO2 31 04/18/2017    CO2 35 03/30/2017    Lab Results   Component Value Date    CR 0.67 04/19/2017    CR 0.67 04/18/2017    CR 0.83 04/02/2017           Pending Results:    Unresulted Labs Ordered in the Past 30 Days of this Admission     No  orders found for last 61 day(s).           Discharge Instructions and Follow-Up:   Discharge diet:   Active Diet Order      Regular Diet Adult   Discharge activity: Activity as tolerated   Discharge follow-up: Follow up with primary care provider in 7 days   Outpatient therapy: None    Home Care agency: Home oxygen   Other instructions: none     Hospital Course:  Patient was admitted to the Obs Unit. He was treated for a COPD exacerbation. Today he is desaturating to 84% on RA with ambulation. He lives in a home with 4 roommates who all smokes. He needs to stay in this home, otherwise he is homeless. I verbally educated patient on the dangers of oxygen and smoking. I also printed him information on the dangers. He will DC home with a long 2 week steroid taper. He will complete a course of 5 days of azith. He was also DC'd with scripts for his fioricet and allegra.     Total time spent in face to face contact with the patient and coordinating discharge was:  40 Minutes.    Ruperto Banks MD  Pager: [phone]

## 2017-05-14 NOTE — PROGRESS NOTES
Paperwork received, order and equipment (POC)  reviewed with patient and follow-up call scheduled for 05/16/2017. Out of pocket expenses and potential copay discussed with patient/family.  12:34p: Delivered POC to patient at bedside. Patient demonstrated good understanding of use and care of oxygen equipment.

## 2017-05-14 NOTE — PROGRESS NOTES
10:30a Rec'd page that patient is discharging today on home oxygen. SATs and order are entered into EPIC.  11:07a: Called Quin RN on 2nd floor to let her know page was rec'd and that I would offer choice of vendor to patient. Advised that if patient went through [facility] delivery would be within 1-2 hours. Quin transferred me into the patient's room to offer choice. Spoke with patient and patient wants to use [facility]. Told patient delivery would be within 1-2 hours.

## 2017-05-15 ENCOUNTER — CARE COORDINATION (OUTPATIENT)
Dept: CARE COORDINATION | Facility: CLINIC | Age: 77
End: 2017-05-15

## 2017-05-15 ENCOUNTER — TELEPHONE (OUTPATIENT)
Dept: FAMILY MEDICINE | Facility: CLINIC | Age: 77
End: 2017-05-15

## 2017-05-15 NOTE — LETTER
[facility] CARE COORDINATION  [address] [address]  [phone]        May 18, 2017    Chuck Lopez  [address]   [place] [facility] [address]    Dear Chuck,  I am the Clinic Care Coordinator that works with your primary care provider's clinic. I have been trying to reach you recently to introduce Clinic Care Coordination and to see if there was anything I could assist you with.  I recently tried to call and was unable to reach you. Below is a description of what Clinic Care Coordination is and how I can further assist you.     The Clinic Care Coordinator role is a Registered Nurse and/or  who understands the health care system. The goal of Clinic Care Coordination is to help you manage your health and improve access to the [facility] system in the most efficient manner.  The Registered Nurse can assist you in meeting your health care goals by providing education, coordinating services, and strengthening the communication among your providers. The  can assist you with financial, behavioral, psychosocial, and chemical dependency and counseling/psychiatric resources.    Please feel free to keep this letter and contact information to contact me at [phone] with any further questions or concerns that may arise. We at [facility] are focused on providing you with the highest-quality healthcare experience possible and that all starts with you.     I hope you are doing well - please contact me or the clinic to make a hospital follow up appointment with Dr. Woodard     Sincerely,     Maryam Amador Care Coordinator RN  [facility]  [phone]  May 18, 2017

## 2017-05-15 NOTE — TELEPHONE ENCOUNTER
Chief Complaint   Patient presents with     Hospital F/U     Inpatient discharge from [facility] on 5/14/2017 Seasonal Allergic Rhinitis, Unspecified Allergic Rhinitis Trigger, Copd Exacerbation (H)       Yanira Richard/

## 2017-05-15 NOTE — PROGRESS NOTES
Clinic Care Coordination Contact  [facility]/Voicemail    Referral Source: IP/TCU Report  Clinical Data: Care Coordinator Outreach post hospital -   CCRN received CTS on patient admitted under OBS from 5/13-/514 COPD   No hospital f/u made with pcp   CCRN had been unable to reach patient in the past as he has no phone - number listed is for intown suites and no room number listed so unable to reach patient. Per  note patient is homeless and now staying in a friends basement     Discharged with 2 LPM oxygen through [facility] - currently smoking and living with 4 others who smoke     Outreach attempted x 1.  Unable to leave voicemail for patient as in town suites phone number with no room # listed     Plan: Care Coordinator will f/u with emergency contact listed number rings and rings - no voicemail to leave message. Care Coordinator will try to reach patient again in 1-2 business days.    Maryam Amador Care Coordinator RN  [facility]  [phone]  May 15, 2017

## 2017-05-18 NOTE — PROGRESS NOTES
Clinic Care Coordination Contact  [facility]/Voicemail    Referral Source: IP/TCU Report  Clinical Data: Care Coordinator Outreach - post hospital follow up copd   CCRN has been unable to reach patient or emergency contact   Unable to contact letter was mailed in the past and will mail again today (unclear if patient is getting mail at that address listed on file in epic)     Plan: Care Coordinator will mail unable to contact letter and review chart in 7-10 days if no contact from patient will close care coordination at that time.     Maryam Amador Care Coordinator RN  [facility] and [facility]  [phone]  May 18, 2017

## 2017-05-24 DIAGNOSIS — B19.20 HEPATITIS C: ICD-10-CM

## 2017-05-24 NOTE — TELEPHONE ENCOUNTER
Multiple Vitamins-Minerals (V-C FORTE) CAPS  Last Written Prescription Date: 1/12/16  Last Fill Quantity: 90,  # refills: 3   Last Office Visit with [facility] or [facility] prescribing provider:  5/1/2017                                              JEWEL Foote  May 24, 2017  10:23 AM

## 2017-05-25 DIAGNOSIS — G43.009 MIGRAINE WITHOUT AURA AND WITHOUT STATUS MIGRAINOSUS, NOT INTRACTABLE: Primary | ICD-10-CM

## 2017-05-25 RX ORDER — MULTIVIT-MINS NO.7/FOLIC ACID 1 MG
CAPSULE ORAL
Qty: 90 CAPSULE | Refills: 3 | Status: ON HOLD | OUTPATIENT
Start: 2017-05-25 | End: 2019-01-10

## 2017-05-25 NOTE — TELEPHONE ENCOUNTER
Approved per pharmacist refill protocol. Patient is current on office visit and labs. Thanks.    Rosita Vázquez, Pharm.D  Pharmacist in Charge  [facility]

## 2017-05-25 NOTE — TELEPHONE ENCOUNTER
butalbital-APAP-caffeine-codeine (FIORICET WITH CODEINE) -81-30 MG per capsule      Sig: Take 1 capsule by mouth every 6 hours as needed for headaches    Last Written Prescription Date: 5/13/17  Last Fill Quantity: 30, # refills: 0  Last Office Visit with [facility] or  [facility] prescribing provider: 5/1/2017         BP Readings from Last 3 Encounters:   05/14/17 136/59   05/01/17 138/81   04/24/17 149/53       Routing refill request to provider for review/approval because:  Drug not on the [facility] or  [facility] refill protocol or controlled substance      JEWEL Foote  May 25, 2017  1:02 PM

## 2017-05-26 RX ORDER — BUTALBITAL, ACETAMINOPHEN AND CAFFEINE 50; 325; 40 MG/1; MG/1; MG/1
TABLET ORAL
Qty: 20 TABLET | Refills: 1 | Status: SHIPPED | OUTPATIENT
Start: 2017-05-26 | End: 2017-06-23

## 2017-05-26 NOTE — TELEPHONE ENCOUNTER
RX [place] reviewed:  reviewed- recommend provider review    All meds on  are noted in refill from [facility] providers     Last fill 5/13     [place]  [email or web address]    Lesli Mccann RN

## 2017-06-01 ENCOUNTER — CARE COORDINATION (OUTPATIENT)
Dept: CARE COORDINATION | Facility: CLINIC | Age: 77
End: 2017-06-01

## 2017-06-01 NOTE — PROGRESS NOTES
Clinic Care Coordination Contact    Situation: Patient chart reviewed by care coordinator.    Background: CCRN was unable to reach patient for post hospital follow up COPD     Assessment: Unable to contact letter was mailed to patient 5/15/17 - no return call from patient     Plan/Recommendations: closing care coordination at this time as unable to reach patient - no further care coordination outreach     Maryam Amador Care Coordinator RN  [facility]  [phone]  June 1, 2017

## 2017-06-14 DIAGNOSIS — M54.40 CHRONIC MIDLINE LOW BACK PAIN WITH SCIATICA, SCIATICA LATERALITY UNSPECIFIED: ICD-10-CM

## 2017-06-14 DIAGNOSIS — G89.29 CHRONIC MIDLINE LOW BACK PAIN WITH SCIATICA, SCIATICA LATERALITY UNSPECIFIED: ICD-10-CM

## 2017-06-14 NOTE — TELEPHONE ENCOUNTER
Controlled Substance Refill Request for Tramadol  Problem List Complete:  No     PROVIDER TO CONSIDER COMPLETION OF PROBLEM LIST AND OVERVIEW/CONTROLLED SUBSTANCE AGREEMENT    Last Written Prescription Date:  05/01/2017  Last Fill Quantity: 120,05/24/2017   # refills: 1    Last Office Visit with [facility] primary care provider: 05/01/2017-Dr Reid    Future Office visit:     Controlled substance agreement on file: No.     Processing:  Staff will hand deliver Rx to on-site pharmacy   checked in past 6 months?  No, route to RN

## 2017-06-16 RX ORDER — TRAMADOL HYDROCHLORIDE 50 MG/1
50-100 TABLET ORAL EVERY 6 HOURS PRN
Qty: 120 TABLET | Refills: 1 | Status: SHIPPED | OUTPATIENT
Start: 2017-06-16 | End: 2017-07-14

## 2017-06-16 NOTE — TELEPHONE ENCOUNTER
Patient called to check on the status of his Tramadol refill. Patient states he needs it by tomorrow. I advised patient that we were waiting for the Dr to sign off on the prescription.  Patient states does not have a phone please call Kwasi (friend) when prescription is ready for   At [phone].    Mary Grace Cruz/

## 2017-06-17 NOTE — TELEPHONE ENCOUNTER
Spoke with Pharmacist asking to get an approval to refill patient's rx for Tramadol 4 days early.  Patient due 6/21/2017.      Consulted with Dr. Mendoza.  Ok to refill early.  Pharmacy notified.  Sally Oro RN

## 2017-06-23 ENCOUNTER — OFFICE VISIT (OUTPATIENT)
Dept: FAMILY MEDICINE | Facility: CLINIC | Age: 77
End: 2017-06-23
Payer: MEDICARE

## 2017-06-23 VITALS
BODY MASS INDEX: 19.86 KG/M2 | TEMPERATURE: 98.2 F | HEIGHT: 65 IN | OXYGEN SATURATION: 92 % | WEIGHT: 119.2 LBS | RESPIRATION RATE: 14 BRPM | DIASTOLIC BLOOD PRESSURE: 67 MMHG | HEART RATE: 81 BPM | SYSTOLIC BLOOD PRESSURE: 126 MMHG

## 2017-06-23 DIAGNOSIS — G89.29 OTHER CHRONIC PAIN: ICD-10-CM

## 2017-06-23 DIAGNOSIS — J44.1 COPD EXACERBATION (H): Primary | ICD-10-CM

## 2017-06-23 DIAGNOSIS — J01.01 ACUTE RECURRENT MAXILLARY SINUSITIS: ICD-10-CM

## 2017-06-23 DIAGNOSIS — G43.009 MIGRAINE WITHOUT AURA AND WITHOUT STATUS MIGRAINOSUS, NOT INTRACTABLE: ICD-10-CM

## 2017-06-23 PROCEDURE — 99214 OFFICE O/P EST MOD 30 MIN: CPT | Performed by: FAMILY MEDICINE

## 2017-06-23 RX ORDER — BUTALBITAL, ACETAMINOPHEN AND CAFFEINE 50; 325; 40 MG/1; MG/1; MG/1
TABLET ORAL
Qty: 20 TABLET | Refills: 1 | Status: SHIPPED | OUTPATIENT
Start: 2017-06-23 | End: 2017-08-07

## 2017-06-23 RX ORDER — PREDNISONE 20 MG/1
20 TABLET ORAL 2 TIMES DAILY
Qty: 15 TABLET | Refills: 0 | Status: SHIPPED | OUTPATIENT
Start: 2017-06-23 | End: 2017-09-05

## 2017-06-23 NOTE — NURSING NOTE
"Chief Complaint   Patient presents with     Headache       Initial /67 (BP Location: Left arm, Patient Position: Chair, Cuff Size: Adult Regular)  Pulse 81  Temp 98.2  F (36.8  C) (Oral)  Resp 14  Ht 5' 5\" (1.651 m)  Wt 119 lb 3.2 oz (54.1 kg)  SpO2 92%  BMI 19.84 kg/m2 Estimated body mass index is 19.84 kg/(m^2) as calculated from the following:    Height as of this encounter: 5' 5\" (1.651 m).    Weight as of this encounter: 119 lb 3.2 oz (54.1 kg).  Medication Reconciliation: complete   Michael Be CMA       "

## 2017-06-23 NOTE — MR AVS SNAPSHOT
"              After Visit Summary   2017    Chuck Lopez    MRN: [id number]           Patient Information     Date Of Birth          1940        Visit Information        Provider Department      2017 1:30 PM Pranay Reid MD [facility]        Today's Diagnoses     COPD exacerbation (H)    -  1    Acute recurrent maxillary sinusitis        Migraine without aura and without status migrainosus, not intractable           Follow-ups after your visit        Who to contact     If you have questions or need follow up information about today's clinic visit or your schedule please contact [facility] directly at [phone].  Normal or non-critical lab and imaging results will be communicated to you by MyChart, letter or phone within 4 business days after the clinic has received the results. If you do not hear from us within 7 days, please contact the clinic through MyChart or phone. If you have a critical or abnormal lab result, we will notify you by phone as soon as possible.  Submit refill requests through [employer] or call your pharmacy and they will forward the refill request to us. Please allow 3 business days for your refill to be completed.          Additional Information About Your Visit        MyChart Information     [employer] lets you send messages to your doctor, view your test results, renew your prescriptions, schedule appointments and more. To sign up, go to [email or web address] . Click on \"Log in\" on the left side of the screen, which will take you to the Welcome page. Then click on \"Sign up Now\" on the right side of the page.     You will be asked to enter the access code listed below, as well as some personal information. Please follow the directions to create your username and password.     Your access code is: [email or web address]  Expires: 2017  3:56 PM     Your access code will  in 90 days. If you need help or a new code, please call " "your [facility] clinic or [phone].        Care EveryWhere ID     This is your Care EveryWhere ID. This could be used by other organizations to access your [facility] medical records  [id number]        Your Vitals Were     Pulse Temperature Respirations Height Pulse Oximetry BMI (Body Mass Index)    81 98.2  F (36.8  C) (Oral) 14 5' 5\" (1.651 m) 92% 19.84 kg/m2       Blood Pressure from Last 3 Encounters:   06/23/17 126/67   05/14/17 136/59   05/01/17 138/81    Weight from Last 3 Encounters:   06/23/17 119 lb 3.2 oz (54.1 kg)   05/13/17 110 lb 3.7 oz (50 kg)   05/01/17 114 lb 12.8 oz (52.1 kg)              Today, you had the following     No orders found for display         Today's Medication Changes          These changes are accurate as of: 6/23/17  2:31 PM.  If you have any questions, ask your nurse or doctor.               These medicines have changed or have updated prescriptions.        Dose/Directions    butalbital-acetaminophen-caffeine -40 MG per tablet   Commonly known as:  FIORICET/ESGIC   This may have changed:  See the new instructions.   Used for:  Migraine without aura and without status migrainosus, not intractable   Changed by:  Pranay Reid MD        TAKE ONE TABLET BY MOUTH EVERY 4 HOURS AS NEEDED   Quantity:  20 tablet   Refills:  1       * predniSONE 10 MG tablet   Commonly known as:  DELTASONE   This may have changed:  Another medication with the same name was added. Make sure you understand how and when to take each.   Used for:  COPD exacerbation (H)        4 tabs daily for 3 days, then 3 tabs daily for 3 days, then 2 tabs daily for 3 days, then 1 tab daily for 3 days, then stop   Quantity:  30 tablet   Refills:  0       * predniSONE 20 MG tablet   Commonly known as:  DELTASONE   This may have changed:  You were already taking a medication with the same name, and this prescription was added. Make sure you understand how and when to take each.   Used for:  COPD exacerbation " (H), Acute recurrent maxillary sinusitis   Changed by:  Pranay Reid MD        Dose:  20 mg   Take 1 tablet (20 mg) by mouth 2 times daily For five days then one daily for five days then stop   Quantity:  15 tablet   Refills:  0       * Notice:  This list has 2 medication(s) that are the same as other medications prescribed for you. Read the directions carefully, and ask your doctor or other care provider to review them with you.         Where to get your medicines      These medications were sent to [facility] [address]  [id number] [place] [address]     Phone:  [phone]     predniSONE 20 MG tablet         Some of these will need a paper prescription and others can be bought over the counter.  Ask your nurse if you have questions.     Bring a paper prescription for each of these medications     butalbital-acetaminophen-caffeine -40 MG per tablet                Primary Care Provider Office Phone # Fax #    Pranay Reid -[phone]       [address] [address]        Equal Access to Services     [facility] AH: Hadii hannah ku hadasho Soomaali, waaxda luqadaha, qaybta kaalmada adetuyajuancho, shana yeung. So [place] -[phone].    ATENCIÓN: Si habla español, tiene a larios disposición servicios gratuitos de asistencia lingüística. [facility] [phone].    We comply with applicable federal civil rights laws and [place] laws. We do not discriminate on the basis of race, color, national origin, age, disability sex, sexual orientation or gender identity.            Thank you!     Thank you for choosing [facility]  for your care. Our goal is always to provide you with excellent care. Hearing back from our patients is one way we can continue to improve our services. Please take a few minutes to complete the written survey that you may  receive in the mail after your visit with us. Thank you!             Your Updated Medication List - Protect others around you: Learn how to safely use, store and throw away your medicines at www.disposemymeds.org.          This list is accurate as of: 6/23/17  2:31 PM.  Always use your most recent med list.                   Brand Name Dispense Instructions for use Diagnosis    * albuterol (2.5 MG/3ML) 0.083% neb solution     360 mL    Take 1 vial (2.5 mg) by nebulization every 4 hours as needed for shortness of breath / dyspnea or wheezing    COPD exacerbation (H)       * albuterol 108 (90 BASE) MCG/ACT Inhaler    PROAIR HFA/PROVENTIL HFA/VENTOLIN HFA    2 Inhaler    Inhale 1-2 puffs into the lungs every 6 hours as needed for shortness of breath / dyspnea or wheezing    Chronic obstructive airway disease with asthma (H)       atenolol 25 MG tablet    TENORMIN    30 tablet    Take 1 tablet (25 mg) by mouth daily    Type 2 diabetes mellitus with hyperosmolarity without coma, unspecified long term insulin use status (H)       beclomethasone 80 MCG/ACT Inhaler    QVAR    8.7 g    Inhale 2 puffs into the lungs 2 times daily    Chronic obstructive pulmonary disease with acute lower respiratory infection (H)       butalbital-acetaminophen-caffeine -40 MG per tablet    FIORICET/ESGIC    20 tablet    TAKE ONE TABLET BY MOUTH EVERY 4 HOURS AS NEEDED    Migraine without aura and without status migrainosus, not intractable       butalbital-APAP-caffeine-codeine -16-30 MG per capsule    FIORICET WITH codeine    30 capsule    Take 1 capsule by mouth every 6 hours as needed for headaches    Intractable migraine, unspecified migraine type       fexofenadine 60 MG tablet    ALLEGRA    60 tablet    Take 1 tablet (60 mg) by mouth daily    Seasonal allergic rhinitis, unspecified allergic rhinitis trigger       fluticasone-salmeterol 500-50 MCG/DOSE diskus inhaler    ADVAIR    3 Inhaler    Inhale 1 puff into the lungs 2  times daily    COPD exacerbation (H)       hydrochlorothiazide 25 MG tablet    HYDRODIURIL    30 tablet    Take 1 tablet (25 mg) by mouth daily    Type 2 diabetes mellitus with hyperosmolarity without coma, unspecified long term insulin use status (H)       melatonin 3 MG tablet     30 tablet    Take 1 tablet (3 mg) by mouth nightly as needed    Insomnia, unspecified type       * predniSONE 10 MG tablet    DELTASONE    30 tablet    4 tabs daily for 3 days, then 3 tabs daily for 3 days, then 2 tabs daily for 3 days, then 1 tab daily for 3 days, then stop    COPD exacerbation (H)       * predniSONE 20 MG tablet    DELTASONE    15 tablet    Take 1 tablet (20 mg) by mouth 2 times daily For five days then one daily for five days then stop    COPD exacerbation (H), Acute recurrent maxillary sinusitis       tiotropium 18 MCG capsule    SPIRIVA HANDIHALER    30 capsule    INHALE ONE CAPSULE BY MOUTH EVERY DAY    Centrilobular emphysema (H)       traMADol 50 MG tablet    ULTRAM    120 tablet    Take 1-2 tablets ( mg) by mouth every 6 hours as needed for pain maximum 4 tablet(s) per day    Chronic midline low back pain with sciatica, sciatica laterality unspecified       V-C FORTE Caps     90 capsule    TAKE ONE CAPSULE BY MOUTH EVERY DAY    Hepatitis C       * Notice:  This list has 4 medication(s) that are the same as other medications prescribed for you. Read the directions carefully, and ask your doctor or other care provider to review them with you.
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
## 2017-06-26 PROBLEM — G43.009 MIGRAINE WITHOUT AURA AND WITHOUT STATUS MIGRAINOSUS, NOT INTRACTABLE: Status: ACTIVE | Noted: 2017-06-26

## 2017-07-13 DIAGNOSIS — G43.009 MIGRAINE WITHOUT AURA AND WITHOUT STATUS MIGRAINOSUS, NOT INTRACTABLE: ICD-10-CM

## 2017-07-13 DIAGNOSIS — G43.919 INTRACTABLE MIGRAINE, UNSPECIFIED MIGRAINE TYPE: ICD-10-CM

## 2017-07-13 NOTE — TELEPHONE ENCOUNTER
2 page fax [place] from [facility] [employer] [facility]( [facility] for Dr Reid's signature.  Forms are at [facility] for review/signature.  Please fax forms back to [phone].    Mary Grace Cruz/

## 2017-07-14 DIAGNOSIS — G89.29 CHRONIC MIDLINE LOW BACK PAIN WITH SCIATICA, SCIATICA LATERALITY UNSPECIFIED: ICD-10-CM

## 2017-07-14 DIAGNOSIS — M54.40 CHRONIC MIDLINE LOW BACK PAIN WITH SCIATICA, SCIATICA LATERALITY UNSPECIFIED: ICD-10-CM

## 2017-07-14 RX ORDER — TRAMADOL HYDROCHLORIDE 50 MG/1
50-100 TABLET ORAL EVERY 6 HOURS PRN
Qty: 120 TABLET | Refills: 0 | Status: SHIPPED | OUTPATIENT
Start: 2017-07-14 | End: 2017-08-07

## 2017-07-14 NOTE — TELEPHONE ENCOUNTER
traMADol (ULTRAM) 50 MG tablet     Controlled substance agreement on file: ?     checked in past 6 months: ?      Has been out of refills for a few days, would like to  at our pharmacy tomorrow 7/15/17.    Patient doesn't have a phone, he is using a friend's, so do not leave detailed message.    Please walk next door when filled and call patient to notify at [phone], the name on the voicemail is Rekha

## 2017-07-17 RX ORDER — BUTALBITAL, ACETAMINOPHEN, CAFFEINE AND CODEINE PHOSPHATE 50; 325; 40; 30 MG/1; MG/1; MG/1; MG/1
1 CAPSULE ORAL EVERY 6 HOURS PRN
Qty: 30 CAPSULE | Refills: 0 | Status: SHIPPED | OUTPATIENT
Start: 2017-07-17 | End: 2018-11-09

## 2017-07-17 NOTE — TELEPHONE ENCOUNTER
Refill received for butalbital-apap-caffeine   Routing refill request to provider to review approval because:  Drug not on the [facility], [facility] or [facility] refill protocol or controlled substance  Last OV: 6/23/17  Reason for visit: copd, migraine, pain  Date last filled: 6/30/2017    Juana Montano RN, BSN  Message handled by Nurse Triage.

## 2017-07-24 DIAGNOSIS — E11.00 TYPE 2 DIABETES MELLITUS WITH HYPEROSMOLARITY WITHOUT COMA, UNSPECIFIED LONG TERM INSULIN USE STATUS: ICD-10-CM

## 2017-07-24 NOTE — TELEPHONE ENCOUNTER
Pending Prescriptions:                       Disp   Refills    hydrochlorothiazide (HYDRODIURIL) 25 MG t*30 tab*1            Sig: TAKE ONE TABLET BY MOUTH EVERY DAY              Last Written Prescription Date: 5/1/2017  Last Fill Quantity: 30, # refills: 1  Last Office Visit with DANIA, EDEL or [facility] prescribing provider: 6/23/2017, Jeanette         Potassium   Date Value Ref Range Status   04/19/2017 4.0 3.4 - 5.3 mmol/L Final     Creatinine   Date Value Ref Range Status   04/19/2017 0.67 0.66 - 1.25 mg/dL Final     BP Readings from Last 3 Encounters:   06/23/17 126/67   05/14/17 136/59   05/01/17 138/81

## 2017-07-27 RX ORDER — HYDROCHLOROTHIAZIDE 25 MG/1
TABLET ORAL
Qty: 30 TABLET | Refills: 10 | Status: ON HOLD | OUTPATIENT
Start: 2017-07-27 | End: 2019-01-10

## 2017-08-07 DIAGNOSIS — G89.29 CHRONIC MIDLINE LOW BACK PAIN WITH SCIATICA, SCIATICA LATERALITY UNSPECIFIED: ICD-10-CM

## 2017-08-07 DIAGNOSIS — M54.40 CHRONIC MIDLINE LOW BACK PAIN WITH SCIATICA, SCIATICA LATERALITY UNSPECIFIED: ICD-10-CM

## 2017-08-07 DIAGNOSIS — G43.009 MIGRAINE WITHOUT AURA AND WITHOUT STATUS MIGRAINOSUS, NOT INTRACTABLE: ICD-10-CM

## 2017-08-07 RX ORDER — TRAMADOL HYDROCHLORIDE 50 MG/1
50-100 TABLET ORAL EVERY 6 HOURS PRN
Qty: 120 TABLET | Refills: 0 | Status: ON HOLD | OUTPATIENT
Start: 2017-08-07 | End: 2017-09-11

## 2017-08-07 NOTE — TELEPHONE ENCOUNTER
Controlled Substance Refill Request for Tramadol  Problem List Complete:  Yes  Patient is followed by JENNA PADILLA for ongoing prescription of pain medication.  All refills should be approved by this provider, or covering partner.    Medication(s): traMADol (ULTRAM) 50 MG tablet.   Maximum quantity per month: 150  Clinic visit frequency required: Q 4 months Last Office Visit: 6/23/17    Controlled substance agreement on file: Yes       Date(s): 9/16/15    Pain Clinic evaluation in the past: No    DIRE Total Score(s):  No flowsheet data found.    Last [facility] website verification: 3/14/17   [email or web address]     checked in past 6 months?  Yes 3/14/17     Last picked up: 7/15/17    Charlotte Allen, Pharmacy [facility]

## 2017-08-08 RX ORDER — BUTALBITAL, ACETAMINOPHEN AND CAFFEINE 50; 325; 40 MG/1; MG/1; MG/1
TABLET ORAL
Qty: 20 TABLET | Refills: 0 | Status: SHIPPED | OUTPATIENT
Start: 2017-08-08 | End: 2017-08-21

## 2017-08-08 NOTE — TELEPHONE ENCOUNTER
Fioricet      Last Written Prescription Date: 6/23/17  Last Fill Quantity: 20,  # refills: 1   Last Office Visit with [facility], [facility] or [facility] prescribing provider: 6/23/17    Charlotte Allen, Pharmacy [facility]

## 2017-08-21 DIAGNOSIS — G43.009 MIGRAINE WITHOUT AURA AND WITHOUT STATUS MIGRAINOSUS, NOT INTRACTABLE: ICD-10-CM

## 2017-08-21 RX ORDER — BUTALBITAL, ACETAMINOPHEN AND CAFFEINE 50; 325; 40 MG/1; MG/1; MG/1
TABLET ORAL
Qty: 20 TABLET | Refills: 0 | Status: SHIPPED | OUTPATIENT
Start: 2017-08-21 | End: 2017-09-05

## 2017-08-21 NOTE — TELEPHONE ENCOUNTER
butalbital-acetaminophen-caffeine (FIORICET/ESGIC) -40 MG per tablet      Last Written Prescription Date:  8/8/17  Last Fill Quantity: 20,   # refills: 0  Last Office Visit with [facility], P or  Health prescribing provider: 6/23/2017    Future Office visit:       Routing refill request to provider for review/approval because:  Drug not on the [facility], [facility] or  Health refill protocol or controlled substance    Associated Diagnoses   Migraine without aura and without status migrainosus, not intractable [G43.009]           JEWEL Foote  August 21, 2017  9:10 AM

## 2017-08-21 NOTE — TELEPHONE ENCOUNTER
RX [place] ([place]) reviewed:  reviewed- no concerns    [place] profile:  [email or web address]

## 2017-08-25 ENCOUNTER — OFFICE VISIT (OUTPATIENT)
Dept: FAMILY MEDICINE | Facility: CLINIC | Age: 77
End: 2017-08-25
Payer: MEDICARE

## 2017-08-25 DIAGNOSIS — F10.21 ALCOHOLISM IN REMISSION (H): ICD-10-CM

## 2017-08-25 DIAGNOSIS — J43.2 CENTRILOBULAR EMPHYSEMA (H): Primary | ICD-10-CM

## 2017-08-25 DIAGNOSIS — N18.2 CKD (CHRONIC KIDNEY DISEASE) STAGE 2, GFR 60-89 ML/MIN: ICD-10-CM

## 2017-08-25 DIAGNOSIS — I10 HYPERTENSION GOAL BP (BLOOD PRESSURE) < 140/90: ICD-10-CM

## 2017-08-25 PROCEDURE — 99214 OFFICE O/P EST MOD 30 MIN: CPT | Performed by: FAMILY MEDICINE

## 2017-08-25 NOTE — LETTER
[facility]  [address] [place], [place], [address]  [phone]        August 25, 2017    Chuck Lopez                                                                                                                                                       [address]   [facility] [address]  DME:  Oxygen per concentrator 2 lpm nasal cannula for 12 hours per day overnight and as needed for exertion in the daytime.           Pranay Reid MD

## 2017-08-25 NOTE — NURSING NOTE
"Chief Complaint   Patient presents with     COPD       Initial /69 (BP Location: Left arm, Patient Position: Chair, Cuff Size: Adult Regular)  Pulse 77  Temp 98.5  F (36.9  C) (Oral)  Resp 10  Ht 5' 5\" (1.651 m)  Wt 120 lb 9.6 oz (54.7 kg)  SpO2 91%  BMI 20.07 kg/m2 Estimated body mass index is 20.07 kg/(m^2) as calculated from the following:    Height as of this encounter: 5' 5\" (1.651 m).    Weight as of this encounter: 120 lb 9.6 oz (54.7 kg).  Medication Reconciliation: complete   Michael Be CMA       "

## 2017-08-25 NOTE — PROGRESS NOTES
SUBJECTIVE:   Chuck Lopez is a 76 year old male who presents to clinic today for the following health issues:      COPD /BP/ckd  Nai Follow-Up: has home oxygen for nighttime oz for copd , runs below 90 in the daytime on and off also     Symptoms are currently: not sure    Current fatigue or dyspnea with ambulation: same    Shortness of breath: not sure    Increased or change in Cough/Sputum: not enough     Fever(s): No    Baseline ambulation without stopping to rest:  Maybe 100 yards. Able to walk up 1 flights of stairs without stopping to rest.    Any ER/UC or hospital admissions since your last visit? No   Lab review for kidney and bp meds and precautions    Lives in a challenging low income enviroment: exposed to THC smoke,  History   Smoking Status     Current Every Day Smoker     Packs/day: 0.25     Types: Cigarettes   Smokeless Tobacco     Never Used     Past Medical History:   Diagnosis Date     Chronic neck pain      COPD (chronic obstructive pulmonary disease) (H)      Diabetes (H)      Hepatitis C      Hypertension        Past Surgical History:   Procedure Laterality Date     APPENDECTOMY       ESOPHAGOSCOPY, GASTROSCOPY, DUODENOSCOPY (EGD), COMBINED  4/16/2014    Procedure: ESOPHAGOSCOPY, GASTROSCOPY, DUODENOSCOPY (EGD) & COLONOSCOPY with polypectomy by hot snare;  Surgeon: Dillon Vázquez MD;  Location:  GI       History reviewed. No pertinent family history.    Social History   Substance Use Topics     Smoking status: Current Every Day Smoker     Packs/day: 0.25     Types: Cigarettes     Smokeless tobacco: Never Used     Alcohol use No       No results found for: FEV1, CCT4KDZ  50 pack year smoking history :  See fluctuating sa 02    Amount of exercise or physical activity: None    Problems taking medications regularly: No    Medication side effects: none  Diet: regular (no restrictions)  /69 (BP Location: Left arm, Patient Position: Chair, Cuff Size: Adult Regular)  Pulse 77   "Temp 98.5  F (36.9  C) (Oral)  Resp 10  Ht 5' 5\" (1.651 m)  Wt 120 lb 9.6 oz (54.7 kg)  SpO2 91%  BMI 20.07 kg/m2  On exam the vital signs are stable He is still smoke   Weight is Body mass index is 20.07 kg/(m^2).   Eyes show bryant saturations fluctuate   No neck masses or thyromegaly.Ear nose and throat shows normal   No bruits, murmers, rubs or extrasounds. No cardiomegaly or chest wall tenderness. Lungs clear, no abdominal masses or organomegaly. No CVA tenderness.  Skin eval no rash   No hernias, good range of motion neck, back and extremities. No abnormal skin lesions. Normal genitalia. Good peripheral pulses. No adenopathy.  Normal gait and stance. Neck is supple.  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

## 2017-08-25 NOTE — MR AVS SNAPSHOT
"              After Visit Summary   2017    Chuck Lopez    MRN: [id number]           Patient Information     Date Of Birth          1940        Visit Information        Provider Department      2017 3:45 PM Pranay Reid MD [facility]         Follow-ups after your visit        Who to contact     If you have questions or need follow up information about today's clinic visit or your schedule please contact [facility] directly at [phone].  Normal or non-critical lab and imaging results will be communicated to you by MyChart, letter or phone within 4 business days after the clinic has received the results. If you do not hear from us within 7 days, please contact the clinic through [employer] or phone. If you have a critical or abnormal lab result, we will notify you by phone as soon as possible.  Submit refill requests through [employer] or call your pharmacy and they will forward the refill request to us. Please allow 3 business days for your refill to be completed.          Additional Information About Your Visit        [employer] Information     [employer] lets you send messages to your doctor, view your test results, renew your prescriptions, schedule appointments and more. To sign up, go to [email or web address] . Click on \"Log in\" on the left side of the screen, which will take you to the Welcome page. Then click on \"Sign up Now\" on the right side of the page.     You will be asked to enter the access code listed below, as well as some personal information. Please follow the directions to create your username and password.     Your access code is: [email or web address]  Expires: 2017  4:12 PM     Your access code will  in 90 days. If you need help or a new code, please call your [facility] or [phone].        Care EveryWhere ID     This is your Care EveryWhere ID. This could be used by other organizations to access your [place] medical " "records  [id number]        Your Vitals Were     Pulse Temperature Respirations Height Pulse Oximetry BMI (Body Mass Index)    77 98.5  F (36.9  C) (Oral) 10 5' 5\" (1.651 m) 89% 20.07 kg/m2       Blood Pressure from Last 3 Encounters:   08/25/17 124/69   06/23/17 126/67   05/14/17 136/59    Weight from Last 3 Encounters:   08/25/17 120 lb 9.6 oz (54.7 kg)   06/23/17 119 lb 3.2 oz (54.1 kg)   05/13/17 110 lb 3.7 oz (50 kg)              Today, you had the following     No orders found for display       Primary Care Provider Office Phone # Fax #    Pranay Reid [phone] [address] [facility] [address]        Equal Access to Services     [facility] Center[facility] : Hadii aad ku hadasho Soaravind, waaxda luqadaha, qaybta kaalmada adetuyada, shana nguyen . So [facility] [phone].    ATENCIÓN: Si habla español, tiene a larios disposición servicios gratuitos de asistencia lingüística. Llame al [phone].    We comply with applicable federal civil rights laws and [place] laws. We do not discriminate on the basis of race, color, national origin, age, disability sex, sexual orientation or gender identity.            Thank you!     Thank you for choosing [facility]  for your care. Our goal is always to provide you with excellent care. Hearing back from our patients is one way we can continue to improve our services. Please take a few minutes to complete the written survey that you may receive in the mail after your visit with us. Thank you!             Your Updated Medication List - Protect others around you: Learn how to safely use, store and throw away your medicines at www.disposemymeds.org.          This list is accurate as of: 8/25/17  4:12 PM.  Always use your most recent med list.                   Brand Name Dispense Instructions for use Diagnosis    * albuterol (2.5 MG/3ML) 0.083% neb solution     360 mL    Take 1 vial (2.5 mg) by nebulization " every 4 hours as needed for shortness of breath / dyspnea or wheezing    COPD exacerbation (H)       * albuterol 108 (90 BASE) MCG/ACT Inhaler    PROAIR HFA/PROVENTIL HFA/VENTOLIN HFA    2 Inhaler    Inhale 1-2 puffs into the lungs every 6 hours as needed for shortness of breath / dyspnea or wheezing    Chronic obstructive airway disease with asthma (H)       atenolol 25 MG tablet    TENORMIN    30 tablet    Take 1 tablet (25 mg) by mouth daily    Type 2 diabetes mellitus with hyperosmolarity without coma, unspecified long term insulin use status (H)       beclomethasone 80 MCG/ACT Inhaler    QVAR    8.7 g    Inhale 2 puffs into the lungs 2 times daily    Chronic obstructive pulmonary disease with acute lower respiratory infection (H)       butalbital-acetaminophen-caffeine -40 MG per tablet    FIORICET/ESGIC    20 tablet    TAKE ONE TABLET BY MOUTH EVERY 4 HOURS AS NEEDED    Migraine without aura and without status migrainosus, not intractable       butalbital-APAP-caffeine-codeine -26-30 MG per capsule    FIORICET WITH codeine    30 capsule    Take 1 capsule by mouth every 6 hours as needed for headaches    Intractable migraine, unspecified migraine type       fexofenadine 60 MG tablet    ALLEGRA    60 tablet    Take 1 tablet (60 mg) by mouth daily    Seasonal allergic rhinitis, unspecified allergic rhinitis trigger       fluticasone-salmeterol 500-50 MCG/DOSE diskus inhaler    ADVAIR    3 Inhaler    Inhale 1 puff into the lungs 2 times daily    COPD exacerbation (H)       hydrochlorothiazide 25 MG tablet    HYDRODIURIL    30 tablet    TAKE ONE TABLET BY MOUTH EVERY DAY    Type 2 diabetes mellitus with hyperosmolarity without coma, unspecified long term insulin use status (H)       melatonin 3 MG tablet     30 tablet    Take 1 tablet (3 mg) by mouth nightly as needed    Insomnia, unspecified type       * predniSONE 10 MG tablet    DELTASONE    30 tablet    4 tabs daily for 3 days, then 3 tabs daily for  3 days, then 2 tabs daily for 3 days, then 1 tab daily for 3 days, then stop    COPD exacerbation (H)       * predniSONE 20 MG tablet    DELTASONE    15 tablet    Take 1 tablet (20 mg) by mouth 2 times daily For five days then one daily for five days then stop    COPD exacerbation (H), Acute recurrent maxillary sinusitis       tiotropium 18 MCG capsule    SPIRIVA HANDIHALER    30 capsule    INHALE ONE CAPSULE BY MOUTH EVERY DAY    Centrilobular emphysema (H)       traMADol 50 MG tablet    ULTRAM    120 tablet    Take 1-2 tablets ( mg) by mouth every 6 hours as needed for pain maximum 4 tablet(s) per day    Chronic midline low back pain with sciatica, sciatica laterality unspecified       V-C FORTE Caps     90 capsule    TAKE ONE CAPSULE BY MOUTH EVERY DAY    Hepatitis C       * Notice:  This list has 4 medication(s) that are the same as other medications prescribed for you. Read the directions carefully, and ask your doctor or other care provider to review them with you.

## 2017-08-26 VITALS
RESPIRATION RATE: 10 BRPM | BODY MASS INDEX: 20.09 KG/M2 | SYSTOLIC BLOOD PRESSURE: 128 MMHG | TEMPERATURE: 98.5 F | HEART RATE: 77 BPM | OXYGEN SATURATION: 91 % | HEIGHT: 65 IN | DIASTOLIC BLOOD PRESSURE: 88 MMHG | WEIGHT: 120.6 LBS

## 2017-09-05 ENCOUNTER — HOSPITAL ENCOUNTER (INPATIENT)
Facility: CLINIC | Age: 77
LOS: 6 days | Discharge: NURSING, REHAB OR LONG-TERM CARE | DRG: 190 | End: 2017-09-11
Attending: EMERGENCY MEDICINE | Admitting: INTERNAL MEDICINE
Payer: MEDICARE

## 2017-09-05 ENCOUNTER — APPOINTMENT (OUTPATIENT)
Dept: GENERAL RADIOLOGY | Facility: CLINIC | Age: 77
DRG: 190 | End: 2017-09-05
Attending: EMERGENCY MEDICINE
Payer: MEDICARE

## 2017-09-05 DIAGNOSIS — J44.1 COPD EXACERBATION (H): ICD-10-CM

## 2017-09-05 DIAGNOSIS — M54.40 CHRONIC MIDLINE LOW BACK PAIN WITH SCIATICA, SCIATICA LATERALITY UNSPECIFIED: ICD-10-CM

## 2017-09-05 DIAGNOSIS — G89.29 CHRONIC MIDLINE LOW BACK PAIN WITH SCIATICA, SCIATICA LATERALITY UNSPECIFIED: ICD-10-CM

## 2017-09-05 DIAGNOSIS — J18.9 COMMUNITY ACQUIRED PNEUMONIA: ICD-10-CM

## 2017-09-05 LAB
ALBUMIN SERPL-MCNC: 3.5 G/DL (ref 3.4–5)
ALP SERPL-CCNC: 108 U/L (ref 40–150)
ALT SERPL W P-5'-P-CCNC: 30 U/L (ref 0–70)
ANION GAP SERPL CALCULATED.3IONS-SCNC: 9 MMOL/L (ref 3–14)
AST SERPL W P-5'-P-CCNC: 37 U/L (ref 0–45)
BASOPHILS # BLD AUTO: 0.1 10E9/L (ref 0–0.2)
BASOPHILS NFR BLD AUTO: 0.8 %
BILIRUB DIRECT SERPL-MCNC: 0.4 MG/DL (ref 0–0.2)
BILIRUB SERPL-MCNC: 1.1 MG/DL (ref 0.2–1.3)
BUN SERPL-MCNC: 14 MG/DL (ref 7–30)
CALCIUM SERPL-MCNC: 9.1 MG/DL (ref 8.5–10.1)
CHLORIDE SERPL-SCNC: 93 MMOL/L (ref 94–109)
CO2 BLDCOV-SCNC: 30 MMOL/L (ref 21–28)
CO2 SERPL-SCNC: 29 MMOL/L (ref 20–32)
CREAT SERPL-MCNC: 0.68 MG/DL (ref 0.66–1.25)
DIFFERENTIAL METHOD BLD: ABNORMAL
EOSINOPHIL # BLD AUTO: 0.1 10E9/L (ref 0–0.7)
EOSINOPHIL NFR BLD AUTO: 1.1 %
ERYTHROCYTE [DISTWIDTH] IN BLOOD BY AUTOMATED COUNT: 13.3 % (ref 10–15)
GFR SERPL CREATININE-BSD FRML MDRD: >90 ML/MIN/1.7M2
GLUCOSE SERPL-MCNC: 89 MG/DL (ref 70–99)
HCT VFR BLD AUTO: 51.3 % (ref 40–53)
HGB BLD-MCNC: 17.7 G/DL (ref 13.3–17.7)
IMM GRANULOCYTES # BLD: 0 10E9/L (ref 0–0.4)
IMM GRANULOCYTES NFR BLD: 0.2 %
LACTATE BLD-SCNC: 1.7 MMOL/L (ref 0.7–2.1)
LYMPHOCYTES # BLD AUTO: 0.9 10E9/L (ref 0.8–5.3)
LYMPHOCYTES NFR BLD AUTO: 7.4 %
MCH RBC QN AUTO: 32 PG (ref 26.5–33)
MCHC RBC AUTO-ENTMCNC: 34.5 G/DL (ref 31.5–36.5)
MCV RBC AUTO: 93 FL (ref 78–100)
MONOCYTES # BLD AUTO: 1.3 10E9/L (ref 0–1.3)
MONOCYTES NFR BLD AUTO: 10.4 %
NEUTROPHILS # BLD AUTO: 9.9 10E9/L (ref 1.6–8.3)
NEUTROPHILS NFR BLD AUTO: 80.1 %
NRBC # BLD AUTO: 0 10*3/UL
NRBC BLD AUTO-RTO: 0 /100
PCO2 BLDV: 50 MM HG (ref 40–50)
PH BLDV: 7.39 PH (ref 7.32–7.43)
PLATELET # BLD AUTO: 129 10E9/L (ref 150–450)
PO2 BLDV: 25 MM HG (ref 25–47)
POTASSIUM SERPL-SCNC: 3.4 MMOL/L (ref 3.4–5.3)
PROCALCITONIN SERPL-MCNC: 0.23 NG/ML
PROT SERPL-MCNC: 8.2 G/DL (ref 6.8–8.8)
RBC # BLD AUTO: 5.53 10E12/L (ref 4.4–5.9)
SAO2 % BLDV FROM PO2: 44 %
SODIUM SERPL-SCNC: 131 MMOL/L (ref 133–144)
WBC # BLD AUTO: 12.8 10E9/L (ref 4–11)

## 2017-09-05 PROCEDURE — 36415 COLL VENOUS BLD VENIPUNCTURE: CPT | Performed by: EMERGENCY MEDICINE

## 2017-09-05 PROCEDURE — 40000275 ZZH STATISTIC RCP TIME EA 10 MIN

## 2017-09-05 PROCEDURE — A9270 NON-COVERED ITEM OR SERVICE: HCPCS | Mod: GY | Performed by: EMERGENCY MEDICINE

## 2017-09-05 PROCEDURE — A9270 NON-COVERED ITEM OR SERVICE: HCPCS | Mod: GY | Performed by: PHYSICIAN ASSISTANT

## 2017-09-05 PROCEDURE — 94640 AIRWAY INHALATION TREATMENT: CPT

## 2017-09-05 PROCEDURE — 87040 BLOOD CULTURE FOR BACTERIA: CPT | Performed by: EMERGENCY MEDICINE

## 2017-09-05 PROCEDURE — 25000125 ZZHC RX 250: Performed by: EMERGENCY MEDICINE

## 2017-09-05 PROCEDURE — 96375 TX/PRO/DX INJ NEW DRUG ADDON: CPT

## 2017-09-05 PROCEDURE — 25000125 ZZHC RX 250: Performed by: PHYSICIAN ASSISTANT

## 2017-09-05 PROCEDURE — 80048 BASIC METABOLIC PNL TOTAL CA: CPT | Performed by: EMERGENCY MEDICINE

## 2017-09-05 PROCEDURE — 87186 SC STD MICRODIL/AGAR DIL: CPT | Performed by: EMERGENCY MEDICINE

## 2017-09-05 PROCEDURE — 25000132 ZZH RX MED GY IP 250 OP 250 PS 637: Mod: GY | Performed by: PHYSICIAN ASSISTANT

## 2017-09-05 PROCEDURE — 25000128 H RX IP 250 OP 636: Performed by: EMERGENCY MEDICINE

## 2017-09-05 PROCEDURE — 25000132 ZZH RX MED GY IP 250 OP 250 PS 637: Mod: GY | Performed by: EMERGENCY MEDICINE

## 2017-09-05 PROCEDURE — 99223 1ST HOSP IP/OBS HIGH 75: CPT | Mod: AI | Performed by: INTERNAL MEDICINE

## 2017-09-05 PROCEDURE — 80076 HEPATIC FUNCTION PANEL: CPT | Performed by: EMERGENCY MEDICINE

## 2017-09-05 PROCEDURE — 94640 AIRWAY INHALATION TREATMENT: CPT | Mod: 76

## 2017-09-05 PROCEDURE — 83605 ASSAY OF LACTIC ACID: CPT

## 2017-09-05 PROCEDURE — 93005 ELECTROCARDIOGRAM TRACING: CPT

## 2017-09-05 PROCEDURE — 87800 DETECT AGNT MULT DNA DIREC: CPT | Performed by: EMERGENCY MEDICINE

## 2017-09-05 PROCEDURE — 82803 BLOOD GASES ANY COMBINATION: CPT

## 2017-09-05 PROCEDURE — 87077 CULTURE AEROBIC IDENTIFY: CPT | Performed by: EMERGENCY MEDICINE

## 2017-09-05 PROCEDURE — 40000274 ZZH STATISTIC RCP CONSULT EA 30 MIN

## 2017-09-05 PROCEDURE — 84145 PROCALCITONIN (PCT): CPT | Performed by: EMERGENCY MEDICINE

## 2017-09-05 PROCEDURE — 71020 XR CHEST 2 VW: CPT

## 2017-09-05 PROCEDURE — 99285 EMERGENCY DEPT VISIT HI MDM: CPT | Mod: 25

## 2017-09-05 PROCEDURE — 85027 COMPLETE CBC AUTOMATED: CPT | Performed by: EMERGENCY MEDICINE

## 2017-09-05 PROCEDURE — 96365 THER/PROPH/DIAG IV INF INIT: CPT

## 2017-09-05 PROCEDURE — 25000128 H RX IP 250 OP 636: Performed by: PHYSICIAN ASSISTANT

## 2017-09-05 PROCEDURE — 85004 AUTOMATED DIFF WBC COUNT: CPT | Performed by: EMERGENCY MEDICINE

## 2017-09-05 PROCEDURE — 12000000 ZZH R&B MED SURG/OB

## 2017-09-05 RX ORDER — PREDNISONE 20 MG/1
40 TABLET ORAL DAILY
Status: DISCONTINUED | OUTPATIENT
Start: 2017-09-06 | End: 2017-09-11 | Stop reason: HOSPADM

## 2017-09-05 RX ORDER — LANOLIN ALCOHOL/MO/W.PET/CERES
3 CREAM (GRAM) TOPICAL
Status: DISCONTINUED | OUTPATIENT
Start: 2017-09-05 | End: 2017-09-11 | Stop reason: HOSPADM

## 2017-09-05 RX ORDER — LEVOFLOXACIN 5 MG/ML
750 INJECTION, SOLUTION INTRAVENOUS ONCE
Status: COMPLETED | OUTPATIENT
Start: 2017-09-05 | End: 2017-09-05

## 2017-09-05 RX ORDER — IPRATROPIUM BROMIDE AND ALBUTEROL SULFATE 2.5; .5 MG/3ML; MG/3ML
3 SOLUTION RESPIRATORY (INHALATION)
Status: DISCONTINUED | OUTPATIENT
Start: 2017-09-05 | End: 2017-09-09

## 2017-09-05 RX ORDER — TRAMADOL HYDROCHLORIDE 50 MG/1
50-100 TABLET ORAL EVERY 6 HOURS PRN
Status: DISCONTINUED | OUTPATIENT
Start: 2017-09-05 | End: 2017-09-11 | Stop reason: HOSPADM

## 2017-09-05 RX ORDER — METHYLPREDNISOLONE SODIUM SUCCINATE 125 MG/2ML
125 INJECTION, POWDER, LYOPHILIZED, FOR SOLUTION INTRAMUSCULAR; INTRAVENOUS ONCE
Status: COMPLETED | OUTPATIENT
Start: 2017-09-05 | End: 2017-09-05

## 2017-09-05 RX ORDER — BUTALBITAL, ACETAMINOPHEN, CAFFEINE AND CODEINE PHOSPHATE 50; 325; 40; 30 MG/1; MG/1; MG/1; MG/1
1 CAPSULE ORAL EVERY 6 HOURS PRN
Status: DISCONTINUED | OUTPATIENT
Start: 2017-09-05 | End: 2017-09-11 | Stop reason: HOSPADM

## 2017-09-05 RX ORDER — ATENOLOL 25 MG/1
25 TABLET ORAL DAILY
Status: DISCONTINUED | OUTPATIENT
Start: 2017-09-06 | End: 2017-09-11 | Stop reason: HOSPADM

## 2017-09-05 RX ORDER — SODIUM CHLORIDE 9 MG/ML
INJECTION, SOLUTION INTRAVENOUS CONTINUOUS
Status: DISCONTINUED | OUTPATIENT
Start: 2017-09-05 | End: 2017-09-06

## 2017-09-05 RX ORDER — ACETAMINOPHEN 500 MG
500 TABLET ORAL EVERY 6 HOURS PRN
Status: DISCONTINUED | OUTPATIENT
Start: 2017-09-05 | End: 2017-09-11 | Stop reason: HOSPADM

## 2017-09-05 RX ORDER — BUTALBITAL, ACETAMINOPHEN, CAFFEINE AND CODEINE PHOSPHATE 50; 325; 40; 30 MG/1; MG/1; MG/1; MG/1
2 CAPSULE ORAL ONCE
Status: COMPLETED | OUTPATIENT
Start: 2017-09-05 | End: 2017-09-05

## 2017-09-05 RX ORDER — ALBUTEROL SULFATE 0.83 MG/ML
2.5 SOLUTION RESPIRATORY (INHALATION)
Status: DISCONTINUED | OUTPATIENT
Start: 2017-09-05 | End: 2017-09-11 | Stop reason: HOSPADM

## 2017-09-05 RX ORDER — LIDOCAINE 40 MG/G
CREAM TOPICAL
Status: DISCONTINUED | OUTPATIENT
Start: 2017-09-05 | End: 2017-09-11 | Stop reason: HOSPADM

## 2017-09-05 RX ORDER — ACETAMINOPHEN 500 MG
1000 TABLET ORAL EVERY 4 HOURS PRN
Status: DISCONTINUED | OUTPATIENT
Start: 2017-09-05 | End: 2017-09-05

## 2017-09-05 RX ORDER — MULTIPLE VITAMINS W/ MINERALS TAB 9MG-400MCG
1 TAB ORAL DAILY
Status: DISCONTINUED | OUTPATIENT
Start: 2017-09-06 | End: 2017-09-11 | Stop reason: HOSPADM

## 2017-09-05 RX ORDER — NALOXONE HYDROCHLORIDE 0.4 MG/ML
.1-.4 INJECTION, SOLUTION INTRAMUSCULAR; INTRAVENOUS; SUBCUTANEOUS
Status: DISCONTINUED | OUTPATIENT
Start: 2017-09-05 | End: 2017-09-11 | Stop reason: HOSPADM

## 2017-09-05 RX ORDER — IPRATROPIUM BROMIDE AND ALBUTEROL SULFATE 2.5; .5 MG/3ML; MG/3ML
3 SOLUTION RESPIRATORY (INHALATION) ONCE
Status: COMPLETED | OUTPATIENT
Start: 2017-09-05 | End: 2017-09-05

## 2017-09-05 RX ADMIN — RANITIDINE 75 MG: 75 TABLET, FILM COATED ORAL at 21:12

## 2017-09-05 RX ADMIN — SODIUM CHLORIDE: 9 INJECTION, SOLUTION INTRAVENOUS at 16:36

## 2017-09-05 RX ADMIN — IPRATROPIUM BROMIDE AND ALBUTEROL SULFATE 3 ML: .5; 3 SOLUTION RESPIRATORY (INHALATION) at 19:37

## 2017-09-05 RX ADMIN — IPRATROPIUM BROMIDE AND ALBUTEROL SULFATE 3 ML: .5; 3 SOLUTION RESPIRATORY (INHALATION) at 16:47

## 2017-09-05 RX ADMIN — IPRATROPIUM BROMIDE AND ALBUTEROL SULFATE 3 ML: .5; 3 SOLUTION RESPIRATORY (INHALATION) at 12:09

## 2017-09-05 RX ADMIN — BUTALBITAL, ACETAMINOPHEN, CAFFEINE, AND CODEINE PHOSPHATE 1 CAPSULE: 30; 50; 40; 325 CAPSULE ORAL at 21:52

## 2017-09-05 RX ADMIN — METHYLPREDNISOLONE SODIUM SUCCINATE 125 MG: 125 INJECTION, POWDER, FOR SOLUTION INTRAMUSCULAR; INTRAVENOUS at 13:03

## 2017-09-05 RX ADMIN — LEVOFLOXACIN 750 MG: 5 INJECTION, SOLUTION INTRAVENOUS at 14:30

## 2017-09-05 RX ADMIN — BUTALBITAL, ACETAMINOPHEN, CAFFEINE, AND CODEINE PHOSPHATE 2 CAPSULE: 30; 50; 40; 325 CAPSULE ORAL at 14:30

## 2017-09-05 RX ADMIN — ACETAMINOPHEN 1000 MG: 500 TABLET, FILM COATED ORAL at 12:50

## 2017-09-05 ASSESSMENT — ENCOUNTER SYMPTOMS
SHORTNESS OF BREATH: 1
FEVER: 1
SORE THROAT: 1
COUGH: 1

## 2017-09-05 NOTE — IP AVS SNAPSHOT
MRN:[id number]                      After Visit Summary   9/5/2017    Chuck Lopez    MRN: [id number]           Thank you!     Thank you for choosing [facility] for your care. Our goal is always to provide you with excellent care. Hearing back from our patients is one way we can continue to improve our services. Please take a few minutes to complete the written survey that you may receive in the mail after you visit. If you would like to speak to someone directly about your visit please contact Patient Relations at [phone]. Thank you!          Patient Information     Date Of Birth          1940        About your hospital stay     You were admitted on:  September 5, 2017 You last received care in the:  [place] [address] [facility]    You were discharged on:  September 11, 2017        Reason for your hospital stay       COPD exacerbation                  Who to Call     For medical emergencies, please call 911.  For non-urgent questions about your medical care, please call your primary care provider or clinic, [phone]          Attending Provider     Provider Specialty    Francois Aceves MD Emergency Medicine    Henry Frye DO Internal Medicine       Primary Care Provider Office Phone # Fax #    Pranay Reid [phone]      After Care Instructions     Activity - Up ad roya           Advance Diet as Tolerated       Follow this diet upon discharge: Orders Placed This Encounter      Combination Diet Regular Diet Adult            General info for SNF       Length of Stay Estimate: Short Term Care: Estimated # of Days <30  Condition at Discharge: Stable  Level of care:skilled   Rehabilitation Potential: Good  Admission H&P remains valid and up-to-date: Yes  Recent Chemotherapy: N/A  Use Nursing Home Standing Orders: Yes            Mantoux instructions       Give two-step Mantoux (PPD) Per Facility Policy Yes                 "  Follow-up Appointments     Follow Up and recommended labs and tests       Follow up with Nursing home physician.  No follow up labs or test are needed.  Follow up with primary care provider in 2  weeks.  No follow up labs or test are needed.                  Additional Services     Occupational Therapy Adult Consult       Evaluate and treat as clinically indicated.            Physical Therapy Adult Consult       Evaluate and treat as clinically indicated.                  Further instructions from your care team       Continue oral nutrition supplements at d/c until oral intake returns to baseline - if not using high protein supplements, aim for 3 meals daily with a protein source + 1-2 snacks.    Pending Results     Date and Time Order Name Status Description    9/6/2017 1125 Blood culture Preliminary     9/6/2017 1125 Blood culture Preliminary             Statement of Approval     Ordered          09/11/17 1053  I have reviewed and agree with all the recommendations and orders detailed in this document.  EFFECTIVE NOW     Approved and electronically signed by:  Jori Doe MD             Admission Information     Date & Time Provider Department Dept. Phone    9/5/2017 Henry Frye,  [place] [address] [facility] [phone]      Your Vitals Were     Blood Pressure Pulse Temperature Respirations Height Weight    161/79 (BP Location: Right arm) 74 97.4  F (36.3  C) (Oral) 20 1.651 m (5' 5\") 54.2 kg (119 lb 8 oz)    Pulse Oximetry BMI (Body Mass Index)                90% 19.89 kg/m2          "[employer]" Information     "[employer]" lets you send messages to your doctor, view your test results, renew your prescriptions, schedule appointments and more. To sign up, go to [email or web address] . Click on \"Log in\" on the left side of the screen, which will take you to the Welcome page. Then click on \"Sign up Now\" on the right side of the page.     You will be asked to enter the access code listed below, " as well as some personal information. Please follow the directions to create your username and password.     Your access code is: [email or web address]  Expires: 2017  4:12 PM     Your access code will  in 90 days. If you need help or a new code, please call your [place] clinic or [phone].        Care EveryWhere ID     This is your Care EveryWhere ID. This could be used by other organizations to access your [place] medical records  [id number]        Equal Access to Services     HONORIO ALFONSO : John mejía Soaravind, wafernando luqadaha, qaybterrence kaalmajuancho garcia, shana nguyen . So [facility] [phone].    ATENCIÓN: Si habla español, tiene a larios disposición servicios gratuitos de asistencia lingüística. Llame al [phone].    We comply with applicable federal civil rights laws and [place] laws. We do not discriminate on the basis of race, color, national origin, age, disability sex, sexual orientation or gender identity.               Review of your medicines      START taking        Dose / Directions    fexofenadine 30 MG ODT tab   Commonly known as:  ALLEGRA        Dose:  30 mg   Take 1 tablet (30 mg) by mouth 2 times daily as needed for allergies (congestion)   Refills:  0       guaiFENesin 600 MG 12 hr tablet   Commonly known as:  MUCINEX        Dose:  600 mg   Take 1 tablet (600 mg) by mouth 2 times daily   Quantity:  28 tablet   Refills:  0       * ipratropium - albuterol 0.5 mg/2.5 mg/3 mL 0.5-2.5 (3) MG/3ML neb solution   Commonly known as:  DUONEB        Dose:  1 vial   Take 1 vial (3 mLs) by nebulization 4 times daily   Quantity:  30 vial   Refills:  1       * ipratropium - albuterol 0.5 mg/2.5 mg/3 mL 0.5-2.5 (3) MG/3ML neb solution   Commonly known as:  DUONEB        Dose:  1 vial   Take 1 vial (3 mLs) by nebulization every 4 hours as needed for shortness of breath / dyspnea or wheezing   Quantity:  30 vial   Refills:  1       predniSONE 10 MG tablet   Commonly  known as:  DELTASONE        4 tabs daily for 3 days, then 3 tabs daily for 3 days, then 2 tabs daily for 3 days, then 1 tab daily for 3 days, then stop   Quantity:  30 tablet   Refills:  0       * Notice:  This list has 2 medication(s) that are the same as other medications prescribed for you. Read the directions carefully, and ask your doctor or other care provider to review them with you.      CONTINUE these medicines which have NOT CHANGED        Dose / Directions    atenolol 25 MG tablet   Commonly known as:  TENORMIN   Used for:  Type 2 diabetes mellitus with hyperosmolarity without coma, unspecified long term insulin use status (H)        Dose:  25 mg   Take 1 tablet (25 mg) by mouth daily   Quantity:  30 tablet   Refills:  11       butalbital-APAP-caffeine-codeine -63-30 MG per capsule   Commonly known as:  FIORICET WITH codeine   Used for:  Intractable migraine, unspecified migraine type        Dose:  1 capsule   Take 1 capsule by mouth every 6 hours as needed for headaches   Quantity:  30 capsule   Refills:  0       fluticasone-salmeterol 500-50 MCG/DOSE diskus inhaler   Commonly known as:  ADVAIR        Dose:  1 puff   Inhale 1 puff into the lungs 2 times daily   Quantity:  3 Inhaler   Refills:  1       hydrochlorothiazide 25 MG tablet   Commonly known as:  HYDRODIURIL   Used for:  Type 2 diabetes mellitus with hyperosmolarity without coma, unspecified long term insulin use status (H)        TAKE ONE TABLET BY MOUTH EVERY DAY   Quantity:  30 tablet   Refills:  10       melatonin 3 MG tablet   Used for:  Insomnia, unspecified type        Dose:  3 mg   Take 1 tablet (3 mg) by mouth nightly as needed   Quantity:  30 tablet   Refills:  0       tiotropium 18 MCG capsule   Commonly known as:  SPIRIVA HANDIHALER   Used for:  Centrilobular emphysema (H)        INHALE ONE CAPSULE BY MOUTH EVERY DAY   Quantity:  30 capsule   Refills:  11       traMADol 50 MG tablet   Commonly known as:  ULTRAM   Used for:   Chronic midline low back pain with sciatica, sciatica laterality unspecified        Dose:   mg   Take 1-2 tablets ( mg) by mouth every 6 hours as needed for pain maximum 4 tablet(s) per day   Quantity:  120 tablet   Refills:  0       V-C FORTE Caps   Used for:  Hepatitis C        TAKE ONE CAPSULE BY MOUTH EVERY DAY   Quantity:  90 capsule   Refills:  3       ZANTAC PO        Dose:  75 mg   Take 75 mg by mouth At Bedtime   Refills:  0         STOP taking     albuterol (2.5 MG/3ML) 0.083% neb solution           albuterol 108 (90 BASE) MCG/ACT Inhaler   Commonly known as:  PROAIR HFA/PROVENTIL HFA/VENTOLIN HFA           beclomethasone 80 MCG/ACT Inhaler   Commonly known as:  QVAR                Where to get your medicines      Some of these will need a paper prescription and others can be bought over the counter. Ask your nurse if you have questions.     Bring a paper prescription for each of these medications     traMADol 50 MG tablet       You don't need a prescription for these medications     fexofenadine 30 MG ODT tab    guaiFENesin 600 MG 12 hr tablet    ipratropium - albuterol 0.5 mg/2.5 mg/3 mL 0.5-2.5 (3) MG/3ML neb solution    ipratropium - albuterol 0.5 mg/2.5 mg/3 mL 0.5-2.5 (3) MG/3ML neb solution    predniSONE 10 MG tablet                Protect others around you: Learn how to safely use, store and throw away your medicines at www.disposemymeds.org.             Medication List: This is a list of all your medications and when to take them. Check marks below indicate your daily home schedule. Keep this list as a reference.      Medications           Morning Afternoon Evening Bedtime As Needed    atenolol 25 MG tablet   Commonly known as:  TENORMIN   Take 1 tablet (25 mg) by mouth daily   Last time this was given:  25 mg on 9/11/2017  7:52 AM                                butalbital-APAP-caffeine-codeine -88-30 MG per capsule   Commonly known as:  FIORICET WITH codeine   Take 1 capsule by  mouth every 6 hours as needed for headaches   Last time this was given:  1 capsule on 9/11/2017  5:04 AM                                fexofenadine 30 MG ODT tab   Commonly known as:  ALLEGRA   Take 1 tablet (30 mg) by mouth 2 times daily as needed for allergies (congestion)                                fluticasone-salmeterol 500-50 MCG/DOSE diskus inhaler   Commonly known as:  ADVAIR   Inhale 1 puff into the lungs 2 times daily                                guaiFENesin 600 MG 12 hr tablet   Commonly known as:  MUCINEX   Take 1 tablet (600 mg) by mouth 2 times daily   Last time this was given:  600 mg on 9/11/2017  7:52 AM                                hydrochlorothiazide 25 MG tablet   Commonly known as:  HYDRODIURIL   TAKE ONE TABLET BY MOUTH EVERY DAY   Last time this was given:  25 mg on 9/11/2017  7:52 AM                                * ipratropium - albuterol 0.5 mg/2.5 mg/3 mL 0.5-2.5 (3) MG/3ML neb solution   Commonly known as:  DUONEB   Take 1 vial (3 mLs) by nebulization 4 times daily   Last time this was given:  3 mLs on 9/11/2017  7:57 AM                                * ipratropium - albuterol 0.5 mg/2.5 mg/3 mL 0.5-2.5 (3) MG/3ML neb solution   Commonly known as:  DUONEB   Take 1 vial (3 mLs) by nebulization every 4 hours as needed for shortness of breath / dyspnea or wheezing   Last time this was given:  3 mLs on 9/11/2017  7:57 AM                                melatonin 3 MG tablet   Take 1 tablet (3 mg) by mouth nightly as needed                                predniSONE 10 MG tablet   Commonly known as:  DELTASONE   4 tabs daily for 3 days, then 3 tabs daily for 3 days, then 2 tabs daily for 3 days, then 1 tab daily for 3 days, then stop   Last time this was given:  40 mg on 9/11/2017  7:51 AM                                tiotropium 18 MCG capsule   Commonly known as:  SPIRIVA HANDIHALER   INHALE ONE CAPSULE BY MOUTH EVERY DAY                                traMADol 50 MG tablet   Commonly  known as:  ULTRAM   Take 1-2 tablets ( mg) by mouth every 6 hours as needed for pain maximum 4 tablet(s) per day   Last time this was given:  100 mg on 9/11/2017  9:21 AM                                V-C FORTE Caps   TAKE ONE CAPSULE BY MOUTH EVERY DAY                                ZANTAC PO   Take 75 mg by mouth At Bedtime                                * Notice:  This list has 2 medication(s) that are the same as other medications prescribed for you. Read the directions carefully, and ask your doctor or other care provider to review them with you.

## 2017-09-05 NOTE — ED NOTES
"[facility]  ED Nurse Handoff Report    Chuck Lopez is a 76 year old male   ED Chief complaint: Shortness of Breath  . ED Diagnosis:   Final diagnoses:   COPD exacerbation (H)     Allergies:   Allergies   Allergen Reactions     Ammonium Lactate Other (See Comments)     Burning to skin     Doxycycline Visual Disturbance     Monosodium Glutamate Other (See Comments)     Pt states it feels like his \"head swells, visual disturbances, and he can't think striaght\"     Penicillins Rash       Code Status: Full Code  Activity level - Baseline/Home:  Independent. Activity Level - Current:   Stand with Assist. Lift room needed: No. Bariatric: No   Needed: No   Isolation: No. Infection: Not Applicable.     Vital Signs:   Vitals:    09/05/17 1245 09/05/17 1300 09/05/17 1315 09/05/17 1330   BP: 118/61 127/62 106/52 107/60   Pulse:       Resp:       Temp:       TempSrc:       SpO2: 93% 96% 94% 95%   Weight:           Cardiac Rhythm:  ,      Pain level:    Patient confused: No. Patient Falls Risk: Yes.   Elimination Status: Has voided   Patient Report - Initial Complaint: short. Focused Assessment:  Chuck Lopez is a 76 year old male with history of COPD and acute respiratory failure who presents with shortness of breath. The patient's shortness of breath onset was 5 days ago and has been persistent since then. His shortness of breath is worsened with movement, and it is slightly worsening when he is lying on his back. He states he has also been having a subjective fever, consistent cough, and sore throat, and once produced a red phlegm. He called EMS today and upon there arrival  They noted he was at 80% O2 saturation. He was given two Duoneb en route to the emergency department. He states he is still smoking 1.00 PPD. He has no other medical concerns.     Allergies:  Tests Performed: lab, xray. Abnormal Results: .   Treatments provided: meds  Family Comments: na  OBS brochure/video " discussed/provided to patient:  No  ED Medications:   Medications   acetaminophen (TYLENOL) tablet 1,000 mg (1,000 mg Oral Given 9/5/17 1250)   levofloxacin (LEVAQUIN) infusion 750 mg (not administered)   butalbital-APAP-caffeine-codeine (FIORICET WITH codeine) per capsule 2 capsule (not administered)   ipratropium - albuterol 0.5 mg/2.5 mg/3 mL (DUONEB) neb solution 3 mL (3 mLs Nebulization Given 9/5/17 1209)   methylPREDNISolone sodium succinate (solu-MEDROL) injection 125 mg (125 mg Intravenous Given 9/5/17 1303)     Drips infusing:  levaquin  For the majority of the shift this patient was Green. Interventions performed were na.     Severe Sepsis OR Septic Shock Diagnosis Present:   Yes    Per the ED Provider, Time Zero for severe sepsis or septic shock is:  12    3 Hour Severe Sepsis Bundle Completion:  1. Initial Lactic Acid Result:   Recent Labs   Lab Test  09/05/17   1212  04/18/17   1340  03/29/17   1510   LACT  1.7  1.0  1.6     2. Blood Cultures before Antibiotics: Yes  3. Broad Spectrum Antibiotics Administered:     Anti-infectives (Future)    Start     Dose/Rate Route Frequency Ordered Stop    09/05/17 1400  levofloxacin (LEVAQUIN) infusion 750 mg      750 mg  100 mL/hr over 90 Minutes Intravenous ONCE 09/05/17 1359          4. Na/ ml of IV fluids have been given so far      6 Hour Severe Sepsis Bundle Completion:    1. Repeat Lactic Acid Level:   Last result   Lab Results   Component Value Date    LACT 1.7 09/05/2017       2. Patient currently on Vasopressors =  No        ED Nurse Name/Phone Number: Elisabeth Hawthorne,   2:13 PM  RECEIVING UNIT ED HANDOFF REVIEW    Above ED Nurse Handoff Report was reviewed: Yes  Reviewed by: Emily Bearden on September 5, 2017 at 3:01 PM

## 2017-09-05 NOTE — ED NOTES
Patient arrives via EMS with complaints of shortness of breath onset 5 days ago. Productive cough. Green in color. Feels like when he had pneumonia before. Pursed breathing noted on arrival. Two duonebs given in route.

## 2017-09-05 NOTE — IP AVS SNAPSHOT
[place] [address] [facility]    [address]    [facility] [address]    Phone:  [phone]    Fax:  [phone]                                       After Visit Summary   9/5/2017    Chuck Lopez    MRN: [id number]           After Visit Summary Signature Page     I have received my discharge instructions, and my questions have been answered. I have discussed any challenges I see with this plan with the nurse or doctor.    ..........................................................................................................................................  Patient/Patient Representative Signature      ..........................................................................................................................................  Patient Representative Print Name and Relationship to Patient    ..................................................               ................................................  Date                                            Time    ..........................................................................................................................................  Reviewed by Signature/Title    ...................................................              ..............................................  Date                                                            Time

## 2017-09-05 NOTE — IP AVS SNAPSHOT
` [place] [address] [facility]: [phone]            Medication Administration Report for Chuck Lopez as of 09/11/17 1237   Legend:    Given Hold Not Given Due Canceled Entry Other Actions    Time Time (Time) Time  Time-Action       Inactive    Active    Linked        Medications 09/05/17 09/06/17 09/07/17 09/08/17 09/09/17 09/10/17 09/11/17    acetaminophen (TYLENOL) tablet 500 mg  Dose: 500 mg Freq: EVERY 6 HOURS PRN Route: PO  PRN Reason: mild pain  Start: 09/05/17 1546   Admin Instructions: Maximum acetaminophen dose from all sources = 75 mg/kg/day not to exceed 4 gram               acetylcysteine (MUCOMYST) 20 % nebulizer solution 2 mL  Dose: 2 mL Freq: EVERY 4 HOURS Route: NEBULIZATION  Start: 09/09/17 1030   Admin Instructions: Nebulization tubing with a FILTER is recommended with acetylcysteine nebs.         1113 (2 mL)-Given       1515 (2 mL)-Given       2018 (2 mL)-Given       2344 (2 mL)-Given        0401 (2 mL)-Given       0730 (2 mL)-Given       1148 (2 mL)-Given       1603 (2 mL)-Given       1955 (2 mL)-Given       (2315)-Not Given        0340 (2 mL)-Given       0757 (2 mL)-Given       (1159)-Not Given [C]       [ ] 1600       [ ] 2000           albuterol neb solution 2.5 mg  Dose: 2.5 mg Freq: EVERY 2 HOURS PRN Route: NEBULIZATION  PRN Reason: other  PRN Comment: dyspnea  Start: 09/05/17 1546              atenolol (TENORMIN) tablet 25 mg  Dose: 25 mg Freq: DAILY Route: PO  Start: 09/06/17 0900     0834 (25 mg)-Given        0839 (25 mg)-Given        0817 (25 mg)-Given        0858 (25 mg)-Given        0803 (25 mg)-Given        0752 (25 mg)-Given                  butalbital-APAP-caffeine-codeine (FIORICET WITH codeine) per capsule 1 capsule  Dose: 1 capsule Freq: EVERY 6 HOURS PRN Route: PO  PRN Reason: headaches  Start: 09/05/17 1554    2152 (1 capsule)-Given        0600 (1 capsule)-Given       1606 (1 capsule)-Given       2212 (1 capsule)-Given        0647 (1 capsule)-Given        1558 (1 capsule)-Given       2227 (1 capsule)-Given        0826 (1 capsule)-Given       1456 (1 capsule)-Given       2222 (1 capsule)-Given        0611 (1 capsule)-Given       1431 (1 capsule)-Given       2151 (1 capsule)-Given        0802 (1 capsule)-Given       1437 (1 capsule)-Given       2135 (1 capsule)-Given        0504 (1 capsule)-Given       1144 (1 capsule)-Given           fexofenadine (ALLEGRA) ODT tab 30 mg  Dose: 30 mg Freq: 2 TIMES DAILY PRN Route: PO  PRN Reason: allergies  PRN Comment: congestion  Start: 09/07/17 1939              fluticasone furoate (ARNUITY ELLIPTA) 100 MCG/ACT inhalation powder 1 puff  Dose: 1 puff Freq: DAILY Route: IN  Start: 09/05/17 1700   Admin Instructions: Automatic interchange for QVAR 80/84 mcg 2 inhalations BID to Arnuity Ellipta 100 mcg once daily per protocol.<br>             (0805)-Not Given        0736 (1 puff)-Given        0808 (1 puff)-Given        0759 (1 puff)-Given        0732 (1 puff)-Given        0758 (1 puff)-Given           fluticasone-vilanterol (BREO ELLIPTA) 200-25 MCG/INH oral inhaler 1 puff  Dose: 1 puff Freq: DAILY Route: IN  Start: 09/05/17 1700   Admin Instructions: *Do not use more frequently than twice daily.*  Rinse mouth after use. Autosub Advair Diskus 500mcg/50mcg BID to Breo Ellipta 200mcg/25 mcg one inhalation daily per protocol<br>             0802 (1 puff)-Given        0736 (1 puff)-Given        0808 (1 puff)-Given        0759 (1 puff)-Given        0732 (1 puff)-Given        0757 (1 puff)-Given           glucose 40 % gel 15-30 g  Dose: 15-30 g Freq: EVERY 15 MIN PRN Route: PO  PRN Reason: low blood sugar  Start: 09/06/17 1123   Admin Instructions: Give 15 g for BG 51 to 69 mg/dL IF patient is conscious and able to swallow. Give 30 g for BG less than or equal to 50 mg/dL IF patient is conscious and able to swallow. Do NOT give glucose gel via enteral tube.  IF patient has enteral tube: give apple juice 120 mL (4 oz or 15 g of CHO) via  enteral tube for BG 51 to 69 mg/dL.  Give apple juice 240 mL (8 oz or 30 g of CHO) via enteral tube for BG less than or equal to 50 mg/dL.    ~Oral gel is preferable for conscious and able to swallow patient.   ~IF gel unavailable or patient refuses may provide apple juice 120 mL (4 oz or 15 g of CHO). Document juice on I and O flowsheet.              Or  dextrose 50 % injection 25-50 mL  Dose: 25-50 mL Freq: EVERY 15 MIN PRN Route: IV  PRN Reason: low blood sugar  Start: 09/06/17 1123   Admin Instructions: Use if have IV access, BG less than 70 mg/dL and meet dose criteria below:  Dose if conscious and alert (or disorientated) and NPO = 25 mL  Dose if unconscious / not alert = 50 mL  Vesicant.              Or  glucagon injection 1 mg  Dose: 1 mg Freq: EVERY 15 MIN PRN Route: SC  PRN Reason: low blood sugar  PRN Comment: May repeat x 1 only  Start: 09/06/17 1123   Admin Instructions: May give SQ or IM. ONLY use glucagon IF patient has NO IV access AND is UNABLE to swallow AND blood glucose is LESS than or EQUAL to 50 mg/dL.               guaiFENesin (MUCINEX) 12 hr tablet 600 mg  Dose: 600 mg Freq: 2 TIMES DAILY Route: PO  Start: 09/09/17 2100   Admin Instructions: DO NOT CRUSH.         2151 (600 mg)-Given        0803 (600 mg)-Given       2135 (600 mg)-Given        0752 (600 mg)-Given              [ ] 2100           hydrochlorothiazide (HYDRODIURIL) tablet 25 mg  Dose: 25 mg Freq: DAILY Route: PO  Start: 09/08/17 1630       1641 (25 mg)-Given        0858 (25 mg)-Given        0803 (25 mg)-Given        0752 (25 mg)-Given                  insulin aspart (NovoLOG) inj (RAPID ACTING)  Dose: 1-5 Units Freq: AT BEDTIME Route: SC  Start: 09/06/17 2200   Admin Instructions: MEDIUM INSULIN RESISTANCE DOSING    Do Not give Bedtime Correction Insulin if BG less than  200.   For  - 249 give 1 units.   For  - 299 give 2 units.   For  - 349 give 3 units.   For  -399 give 4 units.   For BG greater than or  equal to 400 give 5 units.  Notify provider if glucose greater than or equal to 350 mg/dL after administration of correction dose.  If given at mealtime, must be administered 5 min before meal or immediately after.      (2212)-Not Given        2122 (1 Units)-Given [C]        (2223)-Not Given        (2154)-Not Given [C]        (2135)-Not Given [C]        [ ] 2200           insulin aspart (NovoLOG) inj (RAPID ACTING)  Dose: 1-7 Units Freq: 3 TIMES DAILY BEFORE MEALS Route: SC  Start: 09/06/17 1200   Admin Instructions: Correction Scale - MEDIUM INSULIN RESISTANCE DOSING     Do Not give Correction Insulin if Pre-Meal BG less than 140.   For Pre-Meal  - 189 give 1 unit.   For Pre-Meal  - 239 give 2 units.   For Pre-Meal  - 289 give 3 units.   For Pre-Meal  - 339 give 4 units.   For Pre-Meal - 399 give 5 units.   For Pre-Meal -449 give 6 units  For Pre-Meal BG greater than or equal to 450 give 7 units.   To be given with prandial insulin, and based on pre-meal blood glucose.    Notify provider if glucose greater than or equal to 350 mg/dL after administration of correction dose.  If given at mealtime, must be administered 5 min before meal or immediately after.      (1252)-Not Given [C]       (2009)-Not Given        (0726)-Not Given [C]       (1257)-Not Given [C]       1828 (1 Units)-Given [C]        (0649)-Not Given [C]       1235 (1 Units)-Given [C]       (1820)-Not Given [C]        (0650)-Not Given [C]       (1144)-Not Given [C]       1753 (1 Units)-Given        (0824)-Not Given       1254 (1 Units)-Given [C]       (1711)-Not Given [C]        (0737)-Not Given [C]       [ ] 1200       [ ] 1700           ipratropium - albuterol 0.5 mg/2.5 mg/3 mL (DUONEB) neb solution 3 mL  Dose: 3 mL Freq: EVERY 4 HOURS Route: NEBULIZATION  Start: 09/09/17 1915 2019 (3 mL)-Given       2344 (3 mL)-Given        0401 (3 mL)-Given       0730 (3 mL)-Given       1149 (3 mL)-Given       1603 (3  "mL)-Given       1955 (3 mL)-Given       2314 (3 mL)-Given        0340 (3 mL)-Given       0757 (3 mL)-Given       [ ] 1200       [ ] 1600       [ ] 2000           levofloxacin (LEVAQUIN) tablet 750 mg  Dose: 750 mg Freq: Daily at 10AM Route: PO  Indications of Use: COMMUNITY ACQUIRED PNEUMONIA  Start: 09/06/17 1000   Admin Instructions: Administer at least 2 hrs before or 4 hrs after aluminum, calcium, iron, zinc or magnesium containing products.      0959 (750 mg)-Given        1011 (750 mg)-Given        1118 (750 mg)-Given        1005 (750 mg)-Given        1033 (750 mg)-Given        0921 (750 mg)-Given           lidocaine (LMX4) kit  Freq: EVERY 1 HOUR PRN Route: Top  PRN Reason: pain  PRN Comment: with VAD insertion or accessing implanted port.  Start: 09/05/17 1546   Admin Instructions: Do NOT give if patient has a history of allergy to any local anesthetic or any \"julissa\" product.   Apply 30 minutes prior to VAD insertion or port access.  MAX Dose:  2.5 g (  of 5 g tube)               lidocaine 1 % 1 mL  Dose: 1 mL Freq: EVERY 1 HOUR PRN Route: OTHER  PRN Comment: mild pain with VAD insertion or accessing implanted port  Start: 09/05/17 1546   Admin Instructions: Do NOT give if patient has a history of allergy to any local anesthetic or any \"julissa\" product. MAX dose 1 mL subcutaneous OR intradermal in divided doses.               melatonin tablet 3 mg  Dose: 3 mg Freq: AT BEDTIME PRN Route: PO  PRN Reason: sleep  Start: 09/05/17 1554              multivitamin, therapeutic with minerals (THERA-VIT-M) tablet 1 tablet  Dose: 1 tablet Freq: DAILY Route: PO  Start: 09/06/17 0900     0834 (1 tablet)-Given        0839 (1 tablet)-Given        0817 (1 tablet)-Given        0858 (1 tablet)-Given        0803 (1 tablet)-Given        0753 (1 tablet)-Given                  naloxone (NARCAN) injection 0.1-0.4 mg  Dose: 0.1-0.4 mg Freq: EVERY 2 MIN PRN Route: IV  PRN Reason: opioid reversal  Start: 09/05/17 6635   Admin " Instructions: For respiratory rate LESS than or EQUAL to 8.  Partial reversal dose:  0.1 mg titrated q 2 minutes for Analgesia Side Effects Monitoring Sedation Level of 3 (frequently drowsy, arousable, drifts to sleep during conversation).Full reversal dose:  0.4 mg bolus for Analgesia Side Effects Monitoring Sedation Level of 4 (somnolent, minimal or no response to stimulation).               nicotine (NICOTROL) Inhaler 1 Cartridge  Dose: 1 Cartridge Freq: EVERY 1 HOUR PRN Route: IN  PRN Reason: smoking cessation  Start: 09/07/17 0140   Admin Instructions: RN NOTE: Do not discard plastic inhaler device. Reuse for each dose.<br>  Each cartridge delivers 4 mg.       0648 (1 Cartridge)-Given         1639 (1 Cartridge)-Given             polyethylene glycol (MIRALAX/GLYCOLAX) Packet 17 g  Dose: 17 g Freq: 2 TIMES DAILY PRN Route: PO  PRN Comment: constipation  Start: 09/08/17 1621   Admin Instructions: 1 Packet = 17 grams. Mixed prescribed dose in 8 ounces of water. Follow with 8 oz. of water.        1643 (17 g)-Given         1437 (17 g)-Given            predniSONE (DELTASONE) tablet 40 mg  Dose: 40 mg Freq: DAILY Route: PO  Start: 09/06/17 0900     0834 (40 mg)-Given        0839 (40 mg)-Given        0817 (40 mg)-Given        0858 (40 mg)-Given        0803 (40 mg)-Given        0751 (40 mg)-Given                  ranitidine (ZANTAC) tablet 75 mg  Dose: 75 mg Freq: AT BEDTIME Route: PO  Start: 09/05/17 2200    2112 (75 mg)-Given        2212 (75 mg)-Given        2127 (75 mg)-Given        2222 (75 mg)-Given        2151 (75 mg)-Given        2135 (75 mg)-Given        [ ] 2200           sodium chloride (PF) 0.9% PF flush 3 mL  Dose: 3 mL Freq: EVERY 8 HOURS Route: IK  Start: 09/05/17 1600   Admin Instructions: And Q1H PRN, to lock peripheral IV dormant line.     (1637)-Not Given        (0030)-Not Given       (0714)-Not Given       2010 (3 mL)-Given        0128 (3 mL)-Given       0840 (3 mL)-Given       (1548)-Not Given         (0018)-Not Given       (0826)-Not Given       1643 (3 mL)-Given        0221 (3 mL)-Given       0929 (3 mL)-Given       1628 (3 mL)-Given       2153 (3 mL)-Given               0805 (3 mL)-Given       1629 (3 mL)-Given        (0113)-Not Given       (0753)-Not Given       [ ] 1600           sodium chloride (PF) 0.9% PF flush 3 mL  Dose: 3 mL Freq: EVERY 1 HOUR PRN Route: IK  PRN Reason: line flush  PRN Comment: for peripheral IV flush post IV meds  Start: 09/05/17 1546              traMADol (ULTRAM) tablet  mg  Dose:  mg Freq: EVERY 6 HOURS PRN Route: PO  PRN Reason: moderate pain  Start: 09/05/17 1554   Admin Instructions: MAX of 4 tabs per day      0027 (50 mg)-Given       1250 (50 mg)-Given       2008 (50 mg)-Given        0137 (50 mg)-Given       1303 (100 mg)-Given       1933 (50 mg)-Given        0350 (50 mg)-Given       1125 (50 mg)-Given       1858 (50 mg)-Given        0228 (50 mg)-Given       1050 (50 mg)-Given       1757 (50 mg)-Given        0149 (50 mg)-Given       1035 (50 mg)-Given       1839 (50 mg)-Given        0120 (50 mg)-Given       0921 (100 mg)-Given          Completed Medications  Medications 09/05/17 09/06/17 09/07/17 09/08/17 09/09/17 09/10/17 09/11/17         Dose: 1,000 mL Freq: ONCE Route: IV  Last Dose: Stopped (09/08/17 1714)  Start: 09/08/17 1715   End: 09/08/17 1714       1708 (75 mL)-New Bag [C]       1714-Stopped                Dose: 500 mL Freq: ONCE Route: IV  Start: 09/08/17 1715   End: 09/08/17 1708       1708 (68 mL)-Given             Discontinued Medications  Medications 09/05/17 09/06/17 09/07/17 09/08/17 09/09/17 09/10/17 09/11/17         Dose: 600 mg Freq: 2 TIMES DAILY Route: PO  Start: 09/07/17 2100   End: 09/09/17 1020   Admin Instructions: DO NOT CRUSH.       2009 (600 mg)-Given        0818 (600 mg)-Given       2021 (600 mg)-Given        0858 (600 mg)-Given       1020-Med Discontinued           Dose: 3 mL Freq: 4 TIMES DAILY Route: NEBULIZATION  Start: 09/05/17  1630   End: 09/09/17 1849    1647 (3 mL)-Given       1937 (3 mL)-Given        0802 (3 mL)-Given       1241 (3 mL)-Given       1525 (3 mL)-Given       1930 (3 mL)-Given        0723 (3 mL)-Given       1125 (3 mL)-Given       1500 (3 mL)-Given       1915 (3 mL)-Given        0808 (3 mL)-Given       1113 (3 mL)-Given       1554 (3 mL)-Given       1953 (3 mL)-Given        0759 (3 mL)-Given       1112 (3 mL)-Given       1515 (3 mL)-Given       1849-Med Discontinued

## 2017-09-05 NOTE — LETTER
"  Key Recommendations:  Admit with COPD and PNA.  Smokes 1 ppd and not interested in quitting.  Lives in the basement and has roommates and they also smoke.  Pt having difficulty with ambulation and taking care of himself.   I called [facility] and requested EW screening-please follow up on this that it occurs.  Pt would benefit from AL that takes EW.   planning on doing MA application with pt also.  Pt having difficulty reading things, given \"cheater\" glasses so now able to read.  Questions about COPD medications and scheduling.  Will have inpt pharmacy meet with him at [place].  Declines Home care support d/t his living situation/environment.  Possibly open to TCU, but bad experience in the past.  DM-lost his machine-pt given new machine a1c 5.7.      Feli Portillo    AVS/Discharge Summary is the source of truth; this is a helpful guide for improved communication of patient story          "

## 2017-09-05 NOTE — IP AVS SNAPSHOT
"[place] [address] MEDICAL SURGICAL: [phone]                                              INTERAGENCY TRANSFER FORM - PHYSICIAN ORDERS   2017                    Hospital Admission Date: 2017  TIMI MAK   : 1940  Sex: Male        Attending Provider: Henry Frye DO     Allergies:  Ammonium Lactate, Doxycycline, Monosodium Glutamate, Penicillins    Infection:  None   Service:  GENERAL MEDI    Ht:  1.651 m (5' 5\")   Wt:  54.2 kg (119 lb 8 oz)   Admission Wt:  54.7 kg (120 lb 9.5 oz)    BMI:  19.89 kg/m 2   BSA:  1.58 m 2            Patient PCP Information     Provider PCP Type    Pranay Reid MD General      ED Clinical Impression     Diagnosis Description Comment Added By Time Added    COPD exacerbation (H) [J44.1] COPD exacerbation (H) [J44.1]  Francois Aceves MD 2017  2:02 PM    Community acquired pneumonia [J18.9] Community acquired pneumonia [J18.9]  Francois Aceves MD 2017  2:20 PM      Hospital Problems as of 2017              Priority Class Noted POA    COPD exacerbation (H) Medium  2017 Yes      Non-Hospital Problems as of 2017              Priority Class Noted    Pulmonary emphysema (H) Medium  2009    Elevated liver function tests Medium  2010    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) Medium  2010    CARDIOVASCULAR SCREENING; LDL GOAL LESS THAN 100 Medium  2011    CKD (chronic kidney disease) stage 2, GFR 60-89 ml/min Medium  2012    Advanced directives, counseling/discussion Medium  2012    COPD (chronic obstructive pulmonary disease) (H) Medium  Unknown    Hypertension goal BP (blood pressure) < 140/90 Medium  2013    Chronic hepatitis C (H) Medium  3/11/2014    Other chronic pain Medium  2015    Chronic obstructive airway disease with asthma (H) Medium  10/10/2016    Acute hypoxemic respiratory failure (H) Medium  2016    Obstructive chronic bronchitis with exacerbation (H) Medium  " 1/5/2017    Physical deconditioning Medium  1/14/2017    Malnutrition (H) Medium  1/14/2017    Acute exacerbation of COPD with asthma (H) Medium  3/28/2017    Acute respiratory failure (H) Medium  4/18/2017    COPD with hypoxia (H) Medium  5/13/2017    Migraine without aura and without status migrainosus, not intractable Medium  6/26/2017      Code Status History     Date Active Date Inactive Code Status Order ID Comments User Context    9/11/2017 10:53 AM  Full Code [id number]  Jori Doe MD Outpatient    9/5/2017  3:46 PM 9/11/2017 10:53 AM Full Code [id number]  Yasmin Wei PA Inpatient    5/13/2017  4:27 AM 5/14/2017  3:51 PM Full Code [id number]  Patsy John MD Inpatient    4/18/2017  7:26 PM 4/24/2017  2:58 PM Full Code [id number]  Kyleigh Dey MD Inpatient    4/4/2017  8:51 AM 4/18/2017  7:26 PM Full Code [id number]  Oneyda Mcdonald MD Outpatient    4/3/2017  8:56 AM 4/4/2017  8:51 AM Full Code [id number]  Oneyda Mcdonald MD Outpatient    3/28/2017  3:35 AM 4/3/2017  8:56 AM Full Code [id number]  Alice Carnes MD Inpatient    1/12/2017  9:49 AM 3/28/2017  3:35 AM Full Code [id number]  Joe Syed MD Outpatient    1/5/2017  2:42 PM 1/12/2017  9:49 AM Full Code [id number]  Jean Paul Latif MD Inpatient    11/26/2016 10:47 AM 1/5/2017  2:42 PM Full Code [id number]  Marley Huerta MD Outpatient    11/22/2016  4:29 AM 11/26/2016 10:47 AM Full Code [id number]  Anthony Rosa MD Inpatient         Medication Review      START taking        Dose / Directions Comments    fexofenadine 30 MG ODT tab   Commonly known as:  ALLEGRA        Dose:  30 mg   Take 1 tablet (30 mg) by mouth 2 times daily as needed for allergies (congestion)   Refills:  0        guaiFENesin 600 MG 12 hr tablet   Commonly known as:  MUCINEX        Dose:  600 mg   Take 1 tablet (600 mg) by mouth 2 times daily   Quantity:  28 tablet   Refills:  0        * ipratropium - albuterol 0.5  mg/2.5 mg/3 mL 0.5-2.5 (3) MG/3ML neb solution   Commonly known as:  DUONEB        Dose:  1 vial   Take 1 vial (3 mLs) by nebulization 4 times daily   Quantity:  30 vial   Refills:  1        * ipratropium - albuterol 0.5 mg/2.5 mg/3 mL 0.5-2.5 (3) MG/3ML neb solution   Commonly known as:  DUONEB        Dose:  1 vial   Take 1 vial (3 mLs) by nebulization every 4 hours as needed for shortness of breath / dyspnea or wheezing   Quantity:  30 vial   Refills:  1        predniSONE 10 MG tablet   Commonly known as:  DELTASONE        4 tabs daily for 3 days, then 3 tabs daily for 3 days, then 2 tabs daily for 3 days, then 1 tab daily for 3 days, then stop   Quantity:  30 tablet   Refills:  0        * Notice:  This list has 2 medication(s) that are the same as other medications prescribed for you. Read the directions carefully, and ask your doctor or other care provider to review them with you.      CONTINUE these medications which have NOT CHANGED        Dose / Directions Comments    atenolol 25 MG tablet   Commonly known as:  TENORMIN   Used for:  Type 2 diabetes mellitus with hyperosmolarity without coma, unspecified long term insulin use status (H)        Dose:  25 mg   Take 1 tablet (25 mg) by mouth daily   Quantity:  30 tablet   Refills:  11        butalbital-APAP-caffeine-codeine -44-30 MG per capsule   Commonly known as:  FIORICET WITH codeine   Used for:  Intractable migraine, unspecified migraine type        Dose:  1 capsule   Take 1 capsule by mouth every 6 hours as needed for headaches   Quantity:  30 capsule   Refills:  0        fluticasone-salmeterol 500-50 MCG/DOSE diskus inhaler   Commonly known as:  ADVAIR        Dose:  1 puff   Inhale 1 puff into the lungs 2 times daily   Quantity:  3 Inhaler   Refills:  1        hydrochlorothiazide 25 MG tablet   Commonly known as:  HYDRODIURIL   Used for:  Type 2 diabetes mellitus with hyperosmolarity without coma, unspecified long term insulin use status (H)         TAKE ONE TABLET BY MOUTH EVERY DAY   Quantity:  30 tablet   Refills:  10        melatonin 3 MG tablet   Used for:  Insomnia, unspecified type        Dose:  3 mg   Take 1 tablet (3 mg) by mouth nightly as needed   Quantity:  30 tablet   Refills:  0        tiotropium 18 MCG capsule   Commonly known as:  SPIRIVA HANDIHALER   Used for:  Centrilobular emphysema (H)        INHALE ONE CAPSULE BY MOUTH EVERY DAY   Quantity:  30 capsule   Refills:  11        traMADol 50 MG tablet   Commonly known as:  ULTRAM   Used for:  Chronic midline low back pain with sciatica, sciatica laterality unspecified        Dose:   mg   Take 1-2 tablets ( mg) by mouth every 6 hours as needed for pain maximum 4 tablet(s) per day   Quantity:  120 tablet   Refills:  0        V-C FORTE Caps   Used for:  Hepatitis C        TAKE ONE CAPSULE BY MOUTH EVERY DAY   Quantity:  90 capsule   Refills:  3        ZANTAC PO        Dose:  75 mg   Take 75 mg by mouth At Bedtime   Refills:  0          STOP taking     albuterol (2.5 MG/3ML) 0.083% neb solution           albuterol 108 (90 BASE) MCG/ACT Inhaler   Commonly known as:  PROAIR HFA/PROVENTIL HFA/VENTOLIN HFA           beclomethasone 80 MCG/ACT Inhaler   Commonly known as:  QVAR                     Further instructions from your care team       Continue oral nutrition supplements at d/c until oral intake returns to baseline - if not using high protein supplements, aim for 3 meals daily with a protein source + 1-2 snacks.    Summary of Visit     Reason for your hospital stay       COPD exacerbation             After Care     Activity - Up ad roya           Advance Diet as Tolerated       Follow this diet upon discharge: Orders Placed This Encounter      Combination Diet Regular Diet Adult       General info for SNF       Length of Stay Estimate: Short Term Care: Estimated # of Days <30  Condition at Discharge: Stable  Level of care:skilled   Rehabilitation Potential: Good  Admission H&P remains  valid and up-to-date: Yes  Recent Chemotherapy: N/A  Use Nursing Home Standing Orders: Yes       Mantoux instructions       Give two-step Mantoux (PPD) Per Facility Policy Yes             Procedures     Oxygen - Nasal cannula       2 Lpm by nasal cannula to keep O2 sats 92% or greater.             Referrals     Occupational Therapy Adult Consult       Evaluate and treat as clinically indicated.       Physical Therapy Adult Consult       Evaluate and treat as clinically indicated.             Follow-Up Appointment Instructions     Future Labs/Procedures    Follow Up and recommended labs and tests     Comments:    Follow up with Nursing home physician.  No follow up labs or test are needed.  Follow up with primary care provider in 2  weeks.  No follow up labs or test are needed.      Follow-Up Appointment Instructions     Follow Up and recommended labs and tests       Follow up with Nursing home physician.  No follow up labs or test are needed.  Follow up with primary care provider in 2  weeks.  No follow up labs or test are needed.             Statement of Approval     Ordered          09/11/17 1053  I have reviewed and agree with all the recommendations and orders detailed in this document.  EFFECTIVE NOW     Approved and electronically signed by:  Jori Doe MD

## 2017-09-05 NOTE — ED PROVIDER NOTES
History     Chief Complaint:  Shortness of Breath      HPI   Chuck Lopez is a 76 year old male with history of COPD and acute respiratory failure who presents with shortness of breath. The patient's shortness of breath onset was 5 days ago and has been persistent since then. His shortness of breath is worsened with movement, and it is slightly worsening when he is lying on his back. He states he has also been having a subjective fever, consistent cough, and sore throat, and once produced a red phlegm. He called EMS today and upon there arrival  They noted he was at 80% O2 saturation. He was given two Duoneb en route to the emergency department with subjective improvement of symptoms. He states he is still smoking 1.00 PPD. He has no other medical concerns.    Allergies:  Ammonium Lactate  Doxycycline  Monosodium Glutamate  Penicillins      Medications:    Fioricet  Ultram  Hydrodiuril  Prednisone  Allegra  Tenormin  Albuterol  Spiriva  Qvar  Advair    Past Medical History:    Acute respiratory failure  Pulmonary emphysema  Chronic neck pain  COPD  Diabetes  Hepatitis C  HTN    Past Surgical History:    Appendectomy  EGD combined    Family History:    History reviewed. No pertinent family history.    No family history on file.    Social History:  Presents via EMS   Tobacco use: 1.00 PPD  Alcohol use: No  PCP: Pranay Reid   Marital Status:        Review of Systems   Constitutional: Positive for fever (Subjective).   HENT: Positive for sore throat.    Respiratory: Positive for cough and shortness of breath.    All other systems reviewed and are negative.    Physical Exam     Patient Vitals for the past 24 hrs:   BP Temp Temp src Pulse Resp SpO2 Weight   09/05/17 1330 107/60 - - - - 95 % -   09/05/17 1315 106/52 - - - - 94 % -   09/05/17 1300 127/62 - - - - 96 % -   09/05/17 1245 118/61 - - - - 93 % -   09/05/17 1237 - 100.1  F (37.8  C) Tympanic - - - -   09/05/17 1230 113/49 - - - - 94 % -    09/05/17 1215 165/50 - - - - 91 % -   09/05/17 1214 144/80 - - 105 (!) 36 95 % 54.7 kg (120 lb 9.5 oz)   09/05/17 1209 - - - - - 93 % -           Physical Exam   General:   Pleasant,  male who appears older than stated age     In mild respiratory distress with neb in place  HEENT:    Oropharynx is moist, without lesions or trismus.  Eyes:    Conjunctiva normal, PERRL  Neck:    Supple, no meningismus.     CV:     Regular rate and rhythm.      No murmurs, rubs or gallops.       No JVD or unilateral leg swelling.       2+ radial pulses bilateral.       No lower extremity edema.  PULM:    No audible wheezing.       Mild respiratory distress; tachypneic.      Diminished air exchange.     Prolonged expiratory phase.     No rales or rhonci.     No stridor.  ABD:    Soft, non-tender, non-distended.       No rebound, guarding or rigidity.  MSK:     No gross deformity to all four extremities.   LYMPH:   No cervical lymphadenopathy.  NEURO:   Alert; good muscle tone, no atrophy  Skin:    Warm, dry and intact.    Psych:    Mood is good and affect is appropriate.            Emergency Department Course   ECG (12:08:16):  Rate 104 bpm. MD interval 146. QRS duration 80. QT/QTc 348/457. P-R-T axes 85 -86 74. Sinus tachycardia with premature atrial complexes. Left axis deviation. Pulmonary disease pattern. Septal infarct, age undetermined. Abnormal ECG. Agree with computer interpretation. No significant change when compared to EKG dated 8/13/17.  Interpreted at 1210 by Francois Aceves MD.     Imaging:  Radiographic findings were communicated with the patient who voiced understanding of the findings.  Chest XR, PA & LAT  IMPRESSION: COPD. Mild infiltrate or atelectasis right lung base.    AUDIE HIDALGO MD    Imaging independently reviewed and agree with radiologist interpretation.     Laboratory:  CBC: WBC 12.8 (H),  (L), o/w WNL (HGB 17.7)   BMP:  (L), Chloride 93 (L), o/w WNL (Creatinine 0.68)  WBC  Differential: Absolute Neutrophil 9.9 (H), o/w WNL  ISTAT Gases venous: pH 7.39, pCO2 50, pO2 25, Bicarbonate 30 (H), O2 Sat 44, Lactic Acid 1.7    Interventions:  1209: Duoneb 3 mLs Nebulization  1250: tylenol 1000 mg PO  1303: solu-Medrol 125 mg IV  Levaquin 750 IV    Emergency Department Course:  Past medical records, nursing notes, and vitals reviewed.  1211: I performed an exam of the patient and obtained history, as documented above.      Findings and plan explained to the Patient who consents to admission.     1402: Discussed the patient with the hospitalist on-call, who will admit the patient to a med bed for further monitoring, evaluation, and treatment.      I personally reviewed the laboratory results with the Patient and answered all related questions prior to admit.   Impression & Plan    Medical Decision Making:  Chuck Lopez is a 76 year old male presents with 5 day history of subjective fever and worsening dyspnea. On presentation, he is mild respiratory distress. Clinical evaluation was consistent with acute COPD exacerbation. Patient improved with bronchodilators and steroidS. On recheck, his respiratory rhythm went from 36 to 22. Aeration is improved, although it remains diminished. He did have a temperature of 100.1. Given his historical information, there was concern for pneumonia. Patient was given IV Levaquin given his previous allergies. Chest XR revealed RLL atelectasis vs pneumonia. Patient is clearly unfit to be discharged home. He will be admitted to a med bed for further evaluation and treatment of his acute COPD exacerbation with possible pneumonia.    Diagnosis:    ICD-10-CM   1. COPD exacerbation (H) J44.1   2.     Community acquired pneumonia  (J18.9)      Disposition:  Admitted to hospitalist service for further evaluation and treatment.        Ney Covington  9/5/2017   [facility] EMERGENCY DEPARTMENT  I, Ney Covington, am serving as a scribe at 12:11 PM on  9/5/2017 to document services personally performed by Francois Aceves MD based on my observations and the provider's statements to me.       Francois Aceves MD  09/05/17 1521

## 2017-09-05 NOTE — LETTER
Transition Communication Hand-off for Care Transitions to Next Level of Care Provider    Name: Chuck Lopez  MRN #: [id number]  Primary Care Provider: Pranay Reid  Primary Care MD Name: Dr Reid  Primary Clinic: [address] [facility] [address]  Primary Care Clinic Name: [facility]  Reason for Hospitalization:  Community acquired pneumonia [J18.9]  COPD exacerbation (H) [J44.1]  Admit Date/Time: 9/5/2017 12:04 PM  Discharge Date: 9/11/17  Payor Source: Payor: MEDICARE / Plan: MEDICARE / Product Type: Medicare /     Readmission Assessment Measure (TETO) Risk Score/category: ELEVATED    Plan of Care Goals/Milestone Events:   Patient Concerns:  Living situation after TCU            Reason for Communication Hand-off Referral: Admission diagnoses: PN  Admission diagnoses: COPD  Fragility  Difficulty understanding plan of care  No support or lacking a support system  Non-adherence to plan of care related to:  Other active smoker  Other unsure if pt has poor memory or declines to say what his current resources are    Discharge Plan:       Concern for non-adherence with plan of care:   YES  Discharge Needs Assessment:  Needs       Most Recent Value    Equipment Currently Used at Home oxygen [prn]    Transportation Available other (see comments) [unsure]    # of Referrals Placed by [facility] [phone], Fax: [phone]    PAS Number [id number]          Already enrolled in Tele-monitoring program and name of program:  na  Follow-up specialty is recommended: Yes    Follow-up plan:  No future appointments.    Any outstanding tests or procedures:    Procedures     Future Labs/Procedures    Oxygen - Nasal cannula     Comments:    2 Lpm by nasal cannula to keep O2 sats 92% or greater.          Referrals     Future Labs/Procedures    Occupational Therapy Adult Consult     Comments:    Evaluate and treat as  "clinically indicated.    Physical Therapy Adult Consult     Comments:    Evaluate and treat as clinically indicated.            Key Recommendations:  Admit with COPD and PNA.  Smokes 1 ppd and not interested in quitting.  Lives in the basement and has roommates and they also smoke.  Pt having difficulty with ambulation and taking care of himself.   I called [facility] and requested EW screening-but pt has medica so his engagement CM Farhana will call to set this up.  Pt would benefit from AL that takes EW.   planning on doing MA application with pt also, but we found out pt has medica and ma , but he didn't inform us of this.   Pt having difficulty reading things, given \"cheater\" glasses so now able to read.  Questions about COPD medications and scheduling.  Will have inpt pharmacy meet with him at [place].  Declines Home care support d/t his living situation/environment.  Possibly open to TCU, but bad experience in the past. Thankfully [facility] which has private rooms for free did except him.   DM-lost his machine-pt given new machine a1c 5.7.  Has home 02 through  dme, but didn't share this with us either.  He did decline care coordination in the past through [employer].      Feli Portillo    AVS/Discharge Summary is the source of truth; this is a helpful guide for improved communication of patient story            "

## 2017-09-05 NOTE — IP AVS SNAPSHOT
"` `           [place] [address] MEDICAL SURGICAL: [phone]                                              INTERAGENCY TRANSFER FORM - NURSING   2017                    Hospital Admission Date: 2017  TIMI MAK   : 1940  Sex: Male        Attending Provider: Henry Frye DO     Allergies:  Ammonium Lactate, Doxycycline, Monosodium Glutamate, Penicillins    Infection:  None   Service:  GENERAL MEDI    Ht:  1.651 m (5' 5\")   Wt:  54.2 kg (119 lb 8 oz)   Admission Wt:  54.7 kg (120 lb 9.5 oz)    BMI:  19.89 kg/m 2   BSA:  1.58 m 2            Patient PCP Information     Provider PCP Type    Pranay Reid MD General      Current Code Status     Date Active Code Status Order ID Comments User Context       Prior      Code Status History     Date Active Date Inactive Code Status Order ID Comments User Context    2017 10:53 AM  Full Code [id number]  Jori Doe MD Outpatient    2017  3:46 PM 2017 10:53 AM Full Code [id number]  Yasmin Wei PA Inpatient    2017  4:27 AM 2017  3:51 PM Full Code [id number]  Patsy John MD Inpatient    2017  7:26 PM 2017  2:58 PM Full Code [id number]  Kyleigh Dey MD Inpatient    2017  8:51 AM 2017  7:26 PM Full Code [id number]  Oneyda Mcdonald MD Outpatient    4/3/2017  8:56 AM 2017  8:51 AM Full Code [id number]  Oneyda Mcdonald MD Outpatient    3/28/2017  3:35 AM 4/3/2017  8:56 AM Full Code [id number]  Alice Carnes MD Inpatient    2017  9:49 AM 3/28/2017  3:35 AM Full Code [id number]  Joe Syed MD Outpatient    2017  2:42 PM 2017  9:49 AM Full Code [id number]  Jean Paul Latif MD Inpatient    2016 10:47 AM 2017  2:42 PM Full Code [id number]  Marley Huerta MD Outpatient    2016  4:29 AM 2016 10:47 AM Full Code [id number]  Anthony Rosa MD Inpatient      Advance Directives        Does patient have a " scanned Advance Directive/ACP document in EPIC?           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) Medium  5/17/2010    CARDIOVASCULAR SCREENING; LDL GOAL LESS THAN 100 Medium  6/8/2011    CKD (chronic kidney disease) stage 2, GFR 60-89 ml/min Medium  9/5/2012    Advanced directives, counseling/discussion Medium  12/13/2012    COPD (chronic obstructive pulmonary disease) (H) Medium  Unknown    Hypertension goal BP (blood pressure) < 140/90 Medium  9/16/2013    Chronic hepatitis C (H) Medium  3/11/2014    Other chronic pain Medium  9/16/2015    Chronic obstructive airway disease with asthma (H) Medium  10/10/2016    Acute hypoxemic respiratory failure (H) Medium  11/22/2016    Obstructive chronic bronchitis with exacerbation (H) Medium  1/5/2017    Physical deconditioning Medium  1/14/2017    Malnutrition (H) Medium  1/14/2017    Acute exacerbation of COPD with asthma (H) Medium  3/28/2017    Acute respiratory failure (H) Medium  4/18/2017    COPD with hypoxia (H) Medium  5/13/2017    Migraine without aura and without status migrainosus, not intractable Medium  6/26/2017      Immunizations     Name Date      HepB-Peds 05/02/01     HepB-Peds 03/16/00     HepB-Peds 04/22/99     Influenza (H1N1) 03/05/10     Influenza (High Dose) 3 valent vaccine 12/21/16     Influenza (High Dose) 3 valent vaccine 12/13/12     Influenza (IIV3) 09/08/09     Influenza (IIV3) 01/12/07     Influenza (IIV3) 12/22/05     Influenza (IIV3) 12/22/03     Influenza Vaccine IM 3yrs+ 4 Valent IIV4 09/10/14     Influenza Vaccine IM 3yrs+ 4 Valent IIV4 10/15/13     Pneumococcal (PCV 13) 12/21/16     Pneumococcal 23 valent 12/22/05     Pneumococcal 23 valent 03/25/99      TD (ADULT, 7+) 09/08/09     TD (ADULT, 7+) 05/20/99          END      ASSESSMENT     Discharge Profile Flowsheet     EXPECTED DISCHARGE     FINAL RESOURCES      Expected Discharge Date  09/10/17 ( roommates homeless in few days? TCU?) 09/08/17 1146   Resources List  Skilled Nursing Facility 09/11/17 1046    DISCHARGE NEEDS ASSESSMENT     Other Resources  -- (NA) 06/01/17 1443    Concerns To Be Addressed  homelessness 05/15/17 0954   Skilled Nursing Facility  [facility] ([place]) [phone], Fax: [phone] 09/11/17 1046    Patient/family verbalizes understanding of discharge plan recommendations?  Yes 09/06/17 1109   PAS Number  [id number] 09/09/17 1541    Readmission Within The Last 30 Days  no previous admission in last 30 days 09/06/17 1109   [facility] Line Referral Placed  -- (NA) 06/01/17 1443    Equipment Currently Used at Home  oxygen (prn) 09/07/17 0850   F/U Appointment Brochure Provided  -- (NA) 06/01/17 1443    Transportation Available  other (see comments) (unsure) 09/07/17 0850   Referrals Placed  -- (NA) 06/01/17 1443    # of Referrals Placed by CTS  Post Acute Facilities 09/08/17 1450   SKIN      Does Patient Need a Referral for Clinic CC  Yes 05/13/17 1017   Inspection of bony prominences  Full 09/11/17 1007    FUNCTIONAL LEVEL CURRENT     Skin WDL  ex 09/11/17 1007    Change in Functional Status Since Onset of Current Illness/Injury  no 11/26/16 1212   Skin Color/Characteristics  without discoloration 09/11/17 1007    GASTROINTESTINAL (ADULT,PEDIATRIC,OB)     Skin Temperature  warm 09/11/17 1007    GI WDL  WDL 09/11/17 1007   Skin Moisture  dry 09/11/17 1007    All Quadrants Bowel Sounds  audible and normoactive 09/11/17 1007   Skin Elasticity  quick return to original state 09/11/17 1007    Last Bowel Movement  09/09/17 09/11/17 0426   Skin Integrity  bruise(s) 09/11/17 1007    Passing flatus  yes 09/11/17 1007   SAFETY      COMMUNICATION ASSESSMENT     Safety WDL  WDL  "09/11/17 1007    Patient's communication style  spoken language (English or Bilingual) 09/05/17 1213   Safety Factors  bed in low position;wheels locked;call light in reach 09/11/17 1007                 Assessment WDL (Within Defined Limits) Definitions           Safety WDL     Effective: 09/28/15    Row Information: <b>WDL Definition:</b> Bed in low position, wheels locked; call light in reach; upper side rails up x 2; ID band on<br> <font color=\"gray\"><i>Item=AS safety wdl>>List=AS safety wdl>>Version=F14</i></font>      Skin WDL     Effective: 09/28/15    Row Information: <b>WDL Definition:</b> Warm; dry; intact; elastic; without discoloration; pressure points without redness<br> <font color=\"gray\"><i>Item=AS skin wdl>>List=AS skin wdl>>Version=F14</i></font>      Vitals     Vital Signs Flowsheet     VITAL SIGNS     Response to Interventions  Decrease in pain 09/11/17 1037    Temp  97.4  F (36.3  C) 09/11/17 0801   ANALGESIA SIDE EFFECTS MONITORING      Temp src  Oral 09/11/17 0801   Side Effects Monitoring: Respiratory Quality  R 09/11/17 1037    Resp  20 09/11/17 0801   Side Effects Monitoring: Respiratory Depth  N 09/11/17 1037    Pulse  74 09/08/17 1548   Side Effects Monitoring: Sedation Level  1 09/11/17 1037    Heart Rate  86 09/11/17 0752   HEIGHT AND WEIGHT      Pulse/Heart Rate Source  Monitor 09/09/17 2159   Height  1.651 m (5' 5\") 09/05/17 2133    BP  161/79 09/11/17 0752   Weight  54.2 kg (119 lb 8 oz) 09/05/17 2132    BP Location  Right arm 09/11/17 0801   Weight Method  Standing scale 09/05/17 1553    OXYGEN THERAPY     BSA (Calculated - sq m)  1.58 09/05/17 2133    SpO2  90 % 09/11/17 0841   BMI (Calculated)  19.93 09/05/17 2133    O2 Device  None (Room air) 09/11/17 0839   POSITIONING      Oxygen Delivery  1 LPM 09/11/17 0113   Body Position  independently positioning 09/11/17 1007    PAIN/COMFORT     Head of Bed (HOB)  HOB at 45 degrees 09/11/17 1007    Patient Currently in Pain  yes 09/11/17 " 1037   Chair  Upright in chair 09/07/17 1845    Preferred Pain Scale  number (Numeric Rating Pain Scale) 09/11/17 1037   DAILY CARE      Patient's Stated Pain Goal  2 09/11/17 1037   Activity Type  activity adjusted per tolerance;activity encouraged;ambulated in otero 09/11/17 1007    0-10 Pain Scale  1 09/11/17 1037   Activity Level of Assistance  independent 09/11/17 1007    Pain Location  Head 09/11/17 1037   POINT OF CARE TESTING      Pain Orientation  Posterior 09/10/17 2134   Puncture Site  fingertip 09/11/17 0738    Pain Descriptors  Headache 09/11/17 1037   Bedside Glucose (mg/dl )   94 mg/dl 09/11/17 0738    Pain Intervention(s)  Medication (See eMAR) 09/11/17 1037                 Patient Lines/Drains/Airways Status    Active LINES/DRAINS/AIRWAYS     None            Patient Lines/Drains/Airways Status    Active PICC/CVC     None            Intake/Output Detail Report     Date Intake     Output    Shift P.O. I.V. IV Piggyback Total Total       Noc 09/09/17 2300 - 09/10/17 0659 -- -- -- -- -- 0    Day 09/10/17 0700 - 09/10/17 1459 240 -- -- 240 -- 240    Marlene 09/10/17 1500 - 09/10/17 2259 700 -- -- 700 -- 700    Noc 09/10/17 2300 - 09/11/17 0659 200 -- -- 200 -- 200    Day 09/11/17 0700 - 09/11/17 1459 240 -- -- 240 -- 240      Last Void/BM       Most Recent Value    Urine Occurrence 2 at 09/11/2017 1000    Stool Occurrence       Case Management/Discharge Planning     Case Management/Discharge Planning Flowsheet     REFERRAL INFORMATION     Expected Discharge Date  09/10/17 ( roommates homeless in few days? TCU?) 09/08/17 1146    Did the Initial Social Work Assessment result in a Social Work Case?  Yes 09/08/17 1604   ASSESSMENT/CONCERNS TO BE ADDRESSED      Admission Type  inpatient 09/08/17 1604   Concerns To Be Addressed  homelessness 05/15/17 0954    Arrived From  home or self-care 09/08/17 1604   DISCHARGE PLANNING      Referral Source  physician 09/08/17 1604   Patient/family verbalizes  understanding of discharge plan recommendations?  Yes 09/06/17 1109    New Steerage to  Clinics?  No 09/06/17 1109   Readmission Within The Last 30 Days  no previous admission in last 30 days 09/06/17 1109    # of Referrals Placed by CTS  Post Acute Facilities 09/08/17 1450   Transportation Available  other (see comments) (unsure) 09/07/17 0850    Reason For Consult  care coordination/care conference;discharge planning;financial concerns 09/06/17 1109   Does Patient Need a Referral for Clinic CC  Yes 05/13/17 1017    Record Reviewed  clinical discipline documentation;history and physical;medical record;patient profile;plan of care 09/06/17 1109   FINAL RESOURCES       Assigned to Case Corinne 09/08/17 1604   Equipment Currently Used at Home  oxygen (prn) 09/07/17 0850    Primary Care Clinic Name  [facility] 09/06/17 1109   Resources List  Skilled Nursing Facility 09/11/17 1046    Primary Care MD Name  Dr Reid 09/06/17 1109   Other Resources  -- (NA) 06/01/17 1443    LIVING ENVIRONMENT     Skilled Nursing Facility  [facility] ([place]) [phone], Fax: [phone] 09/11/17 1046    Lives With  friend(s) 09/08/17 1604   [facility] Number  [id number] 09/09/17 1541    Living Arrangements  house 09/08/17 1604   [facility] Referral Placed  -- (NA) 06/01/17 1443    Provides Primary Care For  no one 09/08/17 1604   F/U Appointment Brochure Provided  -- (NA) 06/01/17 1443    Quality Of Family Relationships  supportive 09/08/17 1604   Referrals Placed  -- (NA) 06/01/17 1443    Able to Return to Prior Living Arrangements  -- (hope to find an  FIDE ) 09/08/17 1604   ABUSE RISK SCREEN      HOME SAFETY     QUESTION TO PATIENT:  Has a member of your family or a partner(now or in the past) intimidated, hurt, manipulated, or controlled you in any way?  no 09/05/17 1216    Patient Feels Safe Living in Home?  yes 09/08/17 1604   QUESTION TO PATIENT: Do you feel safe going back to the  place where you are living?  yes 09/05/17 1216    COPING/STRESS     OBSERVATION: Is there reason to believe there has been maltreatment of a vulnerable adult (ie. Physical/Sexual/Emotional abuse, self neglect, lack of adequate food, shelter, medical care, or financial exploitation)?  no 09/05/17 1216    Major Change/Loss/Stressor  job change/loss 09/05/17 2112   (R) MENTAL HEALTH SUICIDE RISK      EXPECTED DISCHARGE     Are you depressed or being treated for depression?  No 09/05/17 8306

## 2017-09-05 NOTE — IP AVS SNAPSHOT
` `     [place] [address] MEDICAL SURGICAL: [phone]                 INTERAGENCY TRANSFER FORM - NOTES (H&P, Discharge Summary, Consults, Procedures, Therapies)   2017                    Hospital Admission Date: 2017  TIMI MAK   : 1940  Sex: Male        Patient PCP Information     Provider PCP Type    Pranay Reid MD General         History & Physicals      H&P by Yasmin Wei PA at 2017  2:21 PM     Author:  Yasmin Wei PA Service:  Hospitalist Author Type:  Physician Assistant - C    Filed:  2017  3:58 PM Date of Service:  2017  2:21 PM Creation Time:  2017  2:21 PM    Status:  Attested :  Yasmin Wei PA (Physician Assistant - C)    Cosigner:  Henry Frye DO at 2017 12:55 PM        Attestation signed by Henry Frye DO at 2017 12:55 PM        Physician Attestation   IHenry, saw and evaluated Timi Mak as part of a shared visit.  I have reviewed and discussed with the advanced practice provider their history, physical and plan.    I personally reviewed the vital signs, medications, labs and imaging.    Please see my brief note from 17 for my findings.    Henry Frye  Date of Service (when I saw the patient): 17                               [facility][WG1.1]     Internal Medicine History and Physical        Date of Admission: 2017    Assessment & Plan  Timi Mak is a 76 year old man with a history of[WG1.2] HTN, DM II, hepatitis C, chronic neck pain, and COPD who presented w/ shortness of breath and is admitted for management of COPD exacerbation w/ possible pneumonia.     #Acute Hypoxic Respiratory Failure, COPD w/ Acute Exacerbation and Suspected CAP. Presented w/ 5 d dyspnea[WG1.3], productive cough, f/c[WG1.1] and O2 sats 80%[WG1.3] on RA[WG1.1] via EMS --> mid 90s on[WG1.3] 3L[WG1.1] after neb treatments.[WG1.3] Has[WG1.1] been  admitted prior w/ same. VBG unremarkable. CXR w/ mild infiltrate or atelectasis in right lung base. Has cough w/ temp 100.1 deg and WBC 12.8 - concern for pneumonia. Lactic acid wnl and no evidence of sepsis at this time.   -[WG1.3] Continue home ICS/LABA inhalers. Can hold home Spiriva - QID DuoNebs here. PRN albuterol nebs.   - Received 125 mg IV methylprednisolone in ED, will start prednisone 40 mg daily in AM.   - Continue Levaquin started in ED for CAP (PCN allergic). Procal pending. Sputum cx if able.     #HTN. BPs controlled.   - Continue home atenolol.   - Hold home HCTZ (25 mg) today given that patient appears somewhat dry w/ Na 131. Repeat BMP in AM.     #DM II. HgbA1c 5.7%. Does not check BG.     #Chronic Pain. Has home Tramadol and Fioricet.[WG1.1]     Diet: regular  Fluids:[WG1.2] NS @ 100 cc/hr[WG1.3]  DVT Prophylaxis:[WG1.2] mechanical given chronic thrombocytopenia[WG1.3]   Code Status: full    Yasmin Wei PA-C  Hospitalist Service      Chief Complaint   Shortness of Breath[WG1.2]     History is obtained from the patient[WG1.1]    History of Present Illness[WG1.2]   Patient presented to the ED for further evaluation of progressive shortness of breath over the past 5 days. Was initially just w/ exertion but over past 3 days is short of breath even at rest despite use of 2L NC (has prescription at home but only uses when sick and was not aware that he could titrate it up further). Has had subjective fever w/ chills, sore throat, and cough productive of gray/green colored sputum. No sick contacts or known exposures. No diarrhea. Feels similar to prior COPD exacerbations and is already feeling somewhat better w/ increased O2, nebs, and steroids given in ED. Does have a neb machine at home.[WG1.1]     Review of Systems   10 point ROS performed and negative unless otherwise noted in HPI    Past Medical History    I have reviewed this patient's medical history and updated it with pertinent information if  needed.[WG1.2]   Past Medical History:   Diagnosis Date     Chronic neck pain      COPD (chronic obstructive pulmonary disease) (H)      Diabetes (H)      Hepatitis C      Hypertension[WG1.3]        Past Surgical History   I have reviewed this patient's surgical history and updated it with pertinent information if needed.[WG1.2]  Past Surgical History:   Procedure Laterality Date     APPENDECTOMY       ESOPHAGOSCOPY, GASTROSCOPY, DUODENOSCOPY (EGD), COMBINED  4/16/2014    Procedure: ESOPHAGOSCOPY, GASTROSCOPY, DUODENOSCOPY (EGD) & COLONOSCOPY with polypectomy by hot snare;  Surgeon: Dillon Vázquez MD;  Location:  GI[WG1.3]       Social History[WG1.2]   Social History   Substance Use Topics     Smoking status: Current Every Day Smoker[WG1.3] - back up to 1 ppd[WG1.1]     Types: Cigarettes    [WG1.3] Drug use[WG1.1]:[WG1.3] Secondhand synthetic marijuana exposure; reports that he has not smoked any marijuana over past 6-8 years because of his lung disease[WG1.1]     Alcohol use[WG1.3]:[WG1.1] No[WG1.3]     Currently living at a friend's home w/ 3-4 other people. Is otherwise homeless. Does feel that he can safely return here at d/c although it is not described as a clean living environment.[WG1.1]     Family History   Reviewed and non contributory.       Prior to Admission Medications[WG1.2]   Prior to Admission Medications   Prescriptions Last Dose Informant Patient Reported? Taking?   Multiple Vitamins-Minerals (V-C FORTE) CAPS   No No   Sig: TAKE ONE CAPSULE BY MOUTH EVERY DAY   albuterol (2.5 MG/3ML) 0.083% neb solution   No No   Sig: Take 1 vial (2.5 mg) by nebulization every 4 hours as needed for shortness of breath / dyspnea or wheezing   albuterol (PROAIR HFA/PROVENTIL HFA/VENTOLIN HFA) 108 (90 BASE) MCG/ACT Inhaler   No No   Sig: Inhale 1-2 puffs into the lungs every 6 hours as needed for shortness of breath / dyspnea or wheezing   atenolol (TENORMIN) 25 MG tablet   No No   Sig: Take 1 tablet (25 mg)  "by mouth daily   beclomethasone (QVAR) 80 MCG/ACT Inhaler   No No   Sig: Inhale 2 puffs into the lungs 2 times daily   butalbital-APAP-caffeine-codeine (FIORICET WITH CODEINE) -70-30 MG per capsule   No No   Sig: Take 1 capsule by mouth every 6 hours as needed for headaches   butalbital-acetaminophen-caffeine (FIORICET/ESGIC) -40 MG per tablet   No No   Sig: TAKE ONE TABLET BY MOUTH EVERY 4 HOURS AS NEEDED   fexofenadine (ALLEGRA) 60 MG tablet   No No   Sig: Take 1 tablet (60 mg) by mouth daily   fluticasone-salmeterol (ADVAIR) 500-50 MCG/DOSE diskus inhaler   No No   Sig: Inhale 1 puff into the lungs 2 times daily   hydrochlorothiazide (HYDRODIURIL) 25 MG tablet   No No   Sig: TAKE ONE TABLET BY MOUTH EVERY DAY   melatonin 3 MG tablet   No No   Sig: Take 1 tablet (3 mg) by mouth nightly as needed   predniSONE (DELTASONE) 10 MG tablet   No No   Si tabs daily for 3 days, then 3 tabs daily for 3 days, then 2 tabs daily for 3 days, then 1 tab daily for 3 days, then stop   predniSONE (DELTASONE) 20 MG tablet   No No   Sig: Take 1 tablet (20 mg) by mouth 2 times daily For five days then one daily for five days then stop   tiotropium (SPIRIVA HANDIHALER) 18 MCG capsule   No No   Sig: INHALE ONE CAPSULE BY MOUTH EVERY DAY   traMADol (ULTRAM) 50 MG tablet   No No   Sig: Take 1-2 tablets ( mg) by mouth every 6 hours as needed for pain maximum 4 tablet(s) per day      Facility-Administered Medications: None[WG1.3]     Allergies[WG1.2]   Allergies   Allergen Reactions     Ammonium Lactate Other (See Comments)     Burning to skin     Doxycycline Visual Disturbance     Monosodium Glutamate Other (See Comments)     Pt states it feels like his \"head swells, visual disturbances, and he can't think striaght\"     Penicillins Rash[WG1.3]       Physical Exam[WG1.2]   /60  Pulse 105  Temp 100.1  F (37.8  C) (Tympanic)  Resp (!) 36  Wt 54.7 kg (120 lb 9.5 oz)  SpO2 95%  BMI 20.07 kg/m2[WG1.3]  GENERAL: " Alert and oriented x 3.[WG1.2] Sitting up in bed, appears comfortable. Pleasant and conversant.[WG1.1]   HEENT: Anicteric sclera. Mucous membranes moist.   CV: RRR. S1, S2. No murmurs appreciated.   RESPIRATORY:[WG1.2] Mild tachypnea[WG1.1] on[WG1.2] 3L NC[WG1.1]. Lungs[WG1.2] w/ diminished air entry throughout and scattered wheezing.[WG1.1]   GI: Abdomen soft and non distended, bowel sounds present. No tenderness, rebound, guarding.   NEUROLOGICAL: No focal deficits. Moves all extremities.   EXTREMITIES: No peripheral edema. Intact bilateral pedal pulses.   SKIN: No jaundice. No rashes.     Data   Data reviewed today: I reviewed all medications, new labs and imaging results over the last 24 hours.[WG1.2]                                Revision History        User Key Date/Time User Provider Type Action    > WG1.1 9/5/2017  3:58 PM Yasmin Wei PA Physician Assistant - C Sign     WG1.3 9/5/2017  2:31 PM Yasmin Wei PA Physician Assistant - C      WG1.2 9/5/2017  2:21 PM Yasmin Wei PA Physician Assistant - C             H&P by Henry Frye DO at 9/5/2017  3:35 PM     Author:  Henry Frye DO Service:  Hospitalist Author Type:  Physician    Filed:  9/5/2017  3:35 PM Date of Service:  9/5/2017  3:35 PM Creation Time:  9/5/2017  3:32 PM    Status:  Signed :  Henry Frye DO (Physician)         Patient seen and examined.  Chart reviewed.  Case discussed with Yasmin Wei PA-C.  Please see her admission H&P for full details.    Patient still feeling short of breath.  Feverish and chilled at times.    Exam:  VSS:[KR1.1]  /60  Pulse 105  Temp 100.1  F (37.8  C) (Tympanic)  Resp (!) 36  Wt 54.7 kg (120 lb 9.5 oz)  SpO2 95%  BMI 20.07 kg/m2[KR1.2]  Gen:  NAD, cachectic, A&Ox3.  Eyes:  PERRL, sclera anicteric.  OP:  MMM, no lesions.  Neck:  Supple.  CV:  Regular, no murmurs.  Lung:  Wheeze b/l, tachypnea.  Ab:  +BS, soft.  Skin:  Warm, dry to touch.  No  rash.  Ext:  No pitting edema LE b/l.    A/P:  77 yo male with pneumonia and COPD exacerbation.  Pneumonia.  Continue Levaquin.  COPD exacerbation.  Levaquin, Prednisone, Duonebs, Albuterol.[KR1.1]       Revision History        User Key Date/Time User Provider Type Action    > KR1.2 9/5/2017  3:35 PM Henry Frye DO Physician Sign     KR1.1 9/5/2017  3:32 PM Henry Frye DO Physician                   Discharge Summaries     No notes of this type exist for this encounter.         Consult Notes      Consults by Jackelin Martinez at 9/6/2017  1:19 PM     Author:  Jackelin Martinez Service:  Nutrition Author Type:  Registered Dietitian    Filed:  9/6/2017  3:23 PM Date of Service:  9/6/2017  1:19 PM Creation Time:  9/6/2017  1:19 PM    Status:  Attested :  Jackelin Martinez (Registered Dietitian)    Cosigner:  Sophie Burton RD, LD at 9/6/2017  4:19 PM         Consult Orders:    1. Nutrition Services Adult IP Consult [[id number]] ordered by Henry Frye DO at 09/06/17 1303           Attestation signed by Sophie Burton RD, LD at 9/6/2017  4:19 PM        Chart reviewed. I agree with the following note.      MYRANDA Lee, [facility]  3rd floor/ICU: [phone]  All other floors: [phone]  Weekend/holiday: [phone]  Office: [phone]                                 CLINICAL NUTRITION SERVICES  -  ASSESSMENT NOTE[AG1.1] - MD consult[AG1.2]      Future/Additional Recommendations:[AG1.1] Oral nutrition supplements--Patient refused. Consider soft mechanical diet if intake is low.[AG1.3]    Malnutrition:[AG1.1] Severe malnutrition in the context of acute on chronic illness and social/environmental conditions.[AG1.3]        REASON FOR ASSESSMENT  Chuck JB Lopez is a 76 year old male seen by Registered Dietitian for Provider Order - Malnutrition      NUTRITION HISTORY  - Information obtained from[AG1.1] patient[AG1.4] and EMR.[AG1.2]  -Chuck Lopez is a 76 year  "old male with a hx of HTN, DM II, hep C, chronic neck pain, COPD. Presents with COPD exacerbation and possible pneumonia.[AG1.1]     -No N/V but reported some dry heaving. Reports decreased appetite especially before admission because difficult breathing made eating difficult. Appetite has returned and is slightly higher than normal since admission.[AG1.4]     - Patient is on a[AG1.1] limit[AG1.4] diet at home[AG1.1] due to access issues. He eats very little fresh food. Common breakfast is toast with peanut butter and coffee, lunch and dinner might be canned tuna with canned peas and water. Eats some milk, bread, canned fruit. Not currently consuming supplements.     -Patient also reports that he has problems eating with poor dentition, but he has been able to order appropriately from regular menu.[AG1.4]       CURRENT NUTRITION ORDERS  Diet Order:     Regular     Current Intake/Tolerance:[AG1.1]  %[AG1.2] of ordered meals ([AG1.4]likely[AG1.3] meeting[AG1.4] >75% of[AG1.3] estimated calorie needs[AG1.4] and ~75% of protein needs[AG1.3])[AG1.4]      PHYSICAL FINDINGS  Observed[AG1.1]  -Dry skin, flaking on arms  -[AG1.4]Muscle[AG1.1] w[AG1.4]asting[AG1.1]: deep depression in chest consistent with severe pectoralis wasting, temporal region scooping consistent with severe depletion, Clavicle bone protruding, posterior calf poor definition consistent with moderate depletion   -[AG1.4]Poor dentition  Obtained from Chart/Interdisciplinary Team[AG1.1]  N[AG1.4]o peripheral edema noted    ANTHROPOMETRICS  Height: 5' 5\"  Weight: 54.2 kg (119 lbs 8.01 oz)  Body mass index is 19.89 kg/(m^2).  Weight Status:  Normal BMI  IBW: 6[AG1.1]1.8[AG1.2] kg (1[AG1.1]36[AG1.2] lb)[AG1.1]   87[AG1.2].5% IBW    Weight History:[AG1.1]  Wt Readings from Last 10 Encounters:   09/05/17 54.2 kg (119 lb 8 oz)   08/25/17 54.7 kg (120 lb 9.6 oz)   06/23/17 54.1 kg (119 lb 3.2 oz)   05/13/17 50 kg (110 lb 3.7 oz)   05/01/17 52.1 kg (114 lb " 12.8 oz)   04/18/17 51.8 kg (114 lb 2 oz)   04/04/17 50.8 kg (111 lb 15.9 oz)   01/24/17 51.2 kg (112 lb 12.8 oz)   01/17/17 49.5 kg (109 lb 3.2 oz)   01/12/17 47.3 kg (104 lb 3.2 oz)[AG1.5]     Pt has been at 8[AG1.1]1[AG1.2]-[AG1.1]88[AG1.2]% IBW for over 6 months[AG1.1] and as low as 76% of IBW in the last 9 months[AG1.3].    LABS  Labs reviewed  HgbA1c 5.7%  Na 131    MEDICATIONS  Medications reviewed  IV methylprednisolone in ED; prednisone 40 mg daily.   IV NS @ 100 mL/hr[AG1.1]  Multivitamin/multimineral[AG1.3]     Dosing Weight[AG1.1] 54.2 kg (admit wt)[AG1.2]    ASSESSED NUTRITION NEEDS PER APPROVED PRACTICE GUIDELINES  Estimated Energy Needs:[AG1.1] 162[AG1.2]5[AG1.3]-[AG1.2]1900[AG1.3] kcals ([AG1.1]30[AG1.3]-3[AG1.2]5[AG1.3] kcal/kg[AG1.2])  Justification:[AG1.1] repletion[AG1.2]  Estimated Protein Needs:[AG1.1] 65-81[AG1.2] grams protein ([AG1.1]1.2-1.5[AG1.2]+[AG1.3] g pro/kg[AG1.2])  Justification:[AG1.1] repletion[AG1.3]  Estimated Fluid Needs:[AG1.1] > 1 mL/kcal[AG1.2]  Justification:[AG1.1] maintenance[AG1.2]    MALNUTRITION  % Weight Loss:[AG1.1]  Weight loss does not meet criteria for malnutrition[AG1.2]   % Intake:[AG1.1]  Decreased intake does not meet criteria for malnutrition[AG1.4]   Subcutaneous Fat Loss:[AG1.1] Severe depletion in arms[AG1.4]   Muscle Loss:[AG1.1]  Severe depletion in chest, clavicle region, temporal region. Moderate depletion of calves.[AG1.4]   Fluid Retention:[AG1.1]  None noted[AG1.4]    Malnutrition Diagnosis:[AG1.1] Severe malnutrition[AG1.4]  In[AG1.1] c[AG1.4]ontext of[AG1.1] acute illness or injury on chronic disease[AG1.4]    NUTRITION DIAGNOSIS:[AG1.1]  Malnutrition[AG1.4] related to[AG1.1] long-term inadequate intake and possible increased needs secondary to COPD[AG1.4] as evidenced by[AG1.1] persistently low[AG1.4] body weight[AG1.3] of[AG1.4] 8[AG1.1]1[AG1.2]-[AG1.1]88[AG1.2]% IBW for over 6 months[AG1.1] and as low as 76%[AG1.4] of IBW[AG1.3] over 9  months[AG1.4], with severe fat and muscle loss[AG1.3].[AG1.4]        NUTRITION INTERVENTIONS  Recommendations / Nutrition Prescription[AG1.1]  Continue regular diet  Could consider switching to mechanical soft diet if patient has trouble eating normal meals.[AG1.4]   Oral supplements--patient currently refusing e[AG1.3]     Implementation  Nutrition education:[AG1.1] No education needs assessed at this time. Food access will be a concern after discharge.[AG1.4]   Care coordinator following pt.   Collaboration and referral of care: Discussed pt in multidisciplinary rounds this AM.[AG1.3]     Nutrition Goals[AG1.1]  Meet 100% of[AG1.4] calories and protein[AG1.3] needs through meals TID within 24 hrs.[AG1.4]       MONITORING AND EVALUATION[AG1.1]  Progress towards goals will be monitored and evaluated per protocol and Practice Guidelines[AG1.4]                     Revision History        User Key Date/Time User Provider Type Action    > AG1.3 9/6/2017  3:23 PM Jackelin Martinez Registered Dietitian Sign     AG1.4 9/6/2017  2:27 PM Jackelin Martinez Registered Dietitian      AG1.2 9/6/2017  1:45 PM Jackelin Martinez Registered Dietitian      AG1.5 9/6/2017  1:22 PM Jackelin Martinez Registered Dietitian      AG1.1 9/6/2017  1:19 PM Jackelin Martinez Registered Dietitian             Consults by Feli Portillo RN at 9/6/2017 11:38 AM     Author:  Feli Portillo RN Service:  (none) Author Type:      Filed:  9/6/2017 11:54 AM Date of Service:  9/6/2017 11:38 AM Creation Time:  9/6/2017 11:10 AM    Status:  Signed :  Feli Portillo RN ()     Consult Orders:    1. Care Coordinator IP Consult [[id number]] ordered by Henry Frye DO at 09/06/17 1019                Care Transition Initial Assessment - RN    Reason For Consult: care coordination/care conference, discharge planning, financial concerns   Met with: Patient.    DATA   Active Problems:    COPD  exacerbation (H)       Cognitive Status: awake, alert and oriented.  Primary Care Clinic Name: [facility]  Primary Care MD Name: Dr Reid  Contact information and PCP information verified: Yes    Lives With: friend(s)     Quality Of Family Relationships: disruptive, stressful                Insurance concerns: Underinsured/limits on insurance.  I called [employer] and left a vm to see if pt would be a candidate for MA    ASSESSMENT  Patient currently receives the following services:  NONE        Identified issues/concerns regarding health management: Pt is admitted with COPD and PNA.  He lives in the basement and has 4 room mates that smoke.  They work night shift so they are usually sleeping through out day.  They also have yippy dogs in the house that make it difficult.  He said he is having a difficult time taking care of himself and ambulating.  Pt continues to smoke 1 ppd and isn't interested in quitting currently.  He had been to TCU in the past, but had a poor experience.  We talked about [facility][RB1.1] [facility] room, [facility] room and [facility] private room-no fee.  He will see how things go with PT and then decide if I can send a referral.  Pt said Home Care isn't an option d/t where he lives and the environment.       Pt continues to smoke 1 ppd.  He isn't interested in quitting.  His roommates smoke also.  Pt did have questions about his medications for his COPD with frequency/scheduling.  I did place in Pharmacy consult for dc.  Given COPD action care plan.  Pt couldn't read it.  I did give him glasses and he can see better.      Pt does have PNA.  He was also given PNA action care plan.    Pt has DM.  He lost his last glucometer.  He is requesting a new one with supplies.  Dr Frye did order it today.  His last a1c was 5.7 as of 5/1/17.    Pt was suppose to connect with [facility] for EW assessment during his last hospitalization, but he never did.  I called  [facility] and they should be calling me back to set up a time.  This is a process and pt is aware it could take up to a month until his assessment is complete.  He said in the past he was offered an apt through [facility], but declined it because he still wanted to keep working.  Pt isn't able to work anymore, can't take care of himself and doesn't have a place of his own.  He said he usually is able to figure out transportation out with his friends.        I contacted Zoya from [employer] and she will see pt today about MA application. He said he gets $775 from [employer] monthly.  $16 in food stamps.[RB1.2]               PLAN  Patient given options and choices for discharge[RB1.1] YES[RB1.2] .  Patient/family is agreeable to the plan?[RB1.1]  Yes: awaiting PT consults and pt's wishes[RB1.2]  Patient anticipates discharging to[RB1.1] home vs TCU[RB1.2] .        Patient anticipates needs for home equipment:[RB1.1] No[RB1.2]  Discharge Planner   Discharge Plans in progress:[RB1.1] yes[RB1.2]  Barriers to discharge plan:[RB1.1] pt is deconditioned, awaiting to see how he progresses[RB1.2]  Plan/Disposition:[RB1.1] Home vs TCU[RB1.2]    Care  (CTS) will continue to follow as needed.[RB1.1]    Melanie LEWIS CTS 6085[RB1.2]         Revision History        User Key Date/Time User Provider Type Action    > RB1.2 9/6/2017 11:54 AM Feli Portillo RN Case Manager Sign     RB1.1 9/6/2017 11:10 AM Feli Portillo RN Case Manager                      Progress Notes - Physician (Notes from 09/08/17 through 09/11/17)      Progress Notes by White, Corinne C, LSW at 9/11/2017 10:46 AM     Author:  White, Corinne C, LSW Service:  (none) Author Type:      Filed:  9/11/2017 10:47 AM Date of Service:  9/11/2017 10:46 AM Creation Time:  9/11/2017 10:46 AM    Status:  Signed :  White, Corinne C, LSW ()         CM: SPoke with Admission at TCU. They are aware pt will dc  "today. THey use NWR should pt need trasnport tank. PT hopes to call friend for ride to facility  P:[CW1.1]     09/07/17 0841   [facility]   Skilled Nursing [facility]) [phone], Fax: [phone]   PAS Number ([id number])[CW1.2]   WIll fax orders once completed.[CW1.1]       Revision History        User Key Date/Time User Provider Type Action    > CW1.2 9/11/2017 10:47 AM White, Corinne C, LSW  Sign     CW1.1 9/11/2017 10:46 AM White, Corinne C, LSW              Progress Notes by Tavia Marlow RD, LD at 9/11/2017  9:34 AM     Author:  Taiva Marlow RD, LD Service:  Nutrition Author Type:  Registered Dietitian    Filed:  9/11/2017 10:09 AM Date of Service:  9/11/2017  9:34 AM Creation Time:  9/11/2017  9:34 AM    Status:  Addendum :  Tavia Marlow RD, LD (Registered Dietitian)         CLINICAL NUTRITION SERVICES - REASSESSMENT NOTE      Malnutrition:[MS1.1] Severe 9/6/2017[MS1.2]       EVALUATION OF PROGRESS TOWARD GOALS   Diet:  Regular  Intake:  Previously refused supplements.  Constipation[MS1.1] though consistently c[MS1.2]onsuming 100% of meals since last RD assessment.[MS1.1]  Pt reports fluctuating appetite but verbalized \"I need to eat\".  Not fond of our vegetable options given dentition.[MS1.2]      Dosing Weight 54.2 kg (admit wt)     ASSESSED NUTRITION NEEDS PER APPROVED PRACTICE GUIDELINES  Estimated Energy Needs: 5262-2344 kcals (30-35 kcal/kg)  Justification: repletion  Estimated Protein Needs: 65-81 grams protein (1.2-1.5+ g pro/kg)  Justification: repletion  Estimated Fluid Needs: > 1 mL/kcal  Justification: maintenance      NEW FINDINGS:   - Medications reviewed:   Daily MVI/M  - No updated wt since 9/5.  - Labs reviewed.   - Constipated.[MS1.1]    - Possible d/c today.[MS1.2]      Previous Goals:   Meet 100% of calories and protein needs through meals TID within 24 hrs.    Evaluation:[MS1.1] " Met[MS1.2]    Previous Nutrition Diagnosis:   Malnutrition related to long-term inadequate intake and possible increased needs secondary to COPD as evidenced by persistently low body weight of 81-88% IBW for over 6 months and as low as 76% of IBW over 9 months, with severe fat and muscle loss.    Evaluation:[MS1.1] No change, continued below[MS1.2]      MALNUTRITION[MS1.1] (9/6/2017)[MS1.2]  % Weight Loss:  Weight loss does not meet criteria for malnutrition   % Intake:  Decreased intake does not meet criteria for malnutrition   Subcutaneous Fat Loss: Severe depletion in arms   Muscle Loss:  Severe depletion in chest, clavicle region, temporal region. Moderate depletion of calves.   Fluid Retention:  None noted     Malnutrition Diagnosis: Severe malnutrition  In context of acute illness or injury on chronic disease    CURRENT NUTRITION DIAGNOSIS  Malnutrition related to long-term inadequate intake and possible increased needs secondary to COPD as evidenced by persistently low body weight of 81-88% IBW for over 6 months and as low as 76% of IBW over 9 months, with severe fat and muscle loss.    INTERVENTIONS  Recommendations / Nutrition Prescription[MS1.1]  Continue regular diet with self-selection of soft solids as above.  Protein source with each meal.[MS1.2]       Implementation[MS1.1]  Collaboration and Referral of Nutrition care: Discussed POC with team during rounds.[MS1.2]     Nutrition Education: Reviewed protein sources and importance/preservation of LBM.[MS1.3]    Goals[MS1.1]  Patient to consume 100% of meals TID if continues to refuse supplements.   Protein source with each meal.[MS1.2]       MONITORING AND EVALUATION:[MS1.1]  Progress towards goals will be monitored and evaluated per protocol and Practice Guidelines[MS1.2]      Tavia Marlow RD, LD  Clinical Dietitian  3rd floor/ICU: [phone]  All other floors: [phone]  Weekend/holiday: [phone][MS1.1]     Revision History        User  Key Date/Time User Provider Type Action    > MS1.3 9/11/2017 10:09 AM Tavia Marlow RD, [place] Registered Dietitian Addend     MS1.2 9/11/2017 10:06 AM Tavia Marlow RD, [place] Registered Dietitian Sign     MS1.1 9/11/2017  9:34 AM Tavia Marlow RD, [place] Registered Dietitian             Progress Notes by Jori Doe MD at 9/10/2017  3:10 PM     Author:  Jori Doe MD Service:  Hospitalist Author Type:  Physician    Filed:  9/10/2017  3:12 PM Date of Service:  9/10/2017  3:10 PM Creation Time:  9/10/2017  3:10 PM    Status:  Signed :  Jori Doe MD (Physician)         [facility]  Hospitalist Progress Note  Jori Doe MD 09/10/2017    Reason for Stay (Diagnosis): Pneumonia         Assessment and Plan:      Summary of Stay: Chuck Lopez is a 76 year old male with a past medical history of HTN, DM II, hepatitis C, chronic neck pain, and COPD  admitted on 9/5/2017 with pneumonia and COPD exacerbation.    Problem List:   1. Acute on chronic hypoxic resp failure   -- CT showed no PE or Pneumonia  -- Still sob and coughing   -- hypoxic   -- Has mucus plugs on CT -Mucinex , mucomyst neb , RT to assist with this  -- improving , continue current care , wean off oxygen as able      2. One Positive blood culture from 9/5 with gram positive cocci.  Suspect contaminant.  -- follow up cultures negative       3. COPD exacerbation.  Continue Levaquin, Prednisone, Breo Ellipta, Fluticasone, Duonebs.  Albuterol PRN.  4. HTN.  Atenolol.  5. Diabetes Mellitus.  HgbA1c only 5.5.  Will likely only need diet control as outpatient.  Continue Novolog SSI while on prednisone and in hospital.   6. GERD.  Zantac.   7. Deconditioning.  PT consulted , may need TCU   8. Tobacco use disorder.  Nicotrol inhaler.  9. Malnutrition.  RD consult.    DVT Prophylaxis: Pneumatic Compression Devices  Code Status: Full Code  Discharge Dispo: TBD.  Estimated Disch Date / # of Days until Disch:  "discharge to TCU tomorrow         Interval History (Subjective):      Patient is seen and examined by me today and medical record reviewed.Overnight events noted and care discussed with nursing staff.  Feels better but not at baseline   Wants to stay another day                   Physical Exam:      Last Vital Signs:  /66 (BP Location: Right arm)  Pulse 74  Temp 98.1  F (36.7  C) (Oral)  Resp 20  Ht 1.651 m (5' 5\")  Wt 54.2 kg (119 lb 8 oz)  SpO2 95%  BMI 19.89 kg/m2    Gen:  NAD, A&Ox3.  Eyes:  PERRL, sclera anicteric.  OP:  MMM, no lesions.  Neck:  Supple.  CV:  Regular, no murmurs.  Lung:  Poor air movement b/l,  No wheeze .  Ab:  +BS, soft.  Skin:  Warm, dry to touch.  No rash.  Ext:  No pitting edema LE b/l.           Medications:      All current medications were reviewed with changes reflected in problem list.         Data:      All new lab and imaging data was reviewed.   All laboratory and imaging data in the past 24 hours reviewed       Recent Labs  Lab 09/09/17  0548 09/06/17  0622 09/05/17  1210   WBC 8.5 12.3* 12.8*   HGB 14.1 14.8 17.7   HCT 42.6 43.9 51.3   MCV 96 94 93    160 129*       Recent Labs  Lab 09/06/17  1153 09/06/17  1147 09/06/17  0830 09/05/17  1330 09/05/17  1300   CULT No growth after 4 days No growth after 4 days Moderate growthNormal edith No growth after 5 days Cultured on the 1st day of incubation:Staphylococcus warneri*  Critical Value/Significant Value, preliminary result only, called to and read back byMylene Charles RN [facility]. 9.6.17 at 1106. GR.  (Note)POSITIVE for Staphylococci other than S.aureus, S.epidermidis andS.lugdunensis, by [employer] nucleic acid test.Coagulase-negative staphylococci are the most common venipuncture orcollection associated skin CONTAMINANTS grown in blood cultures.Final identification and antimicrobial susceptibility testing will beverified by standard methods.Specimen tested with [employer], " gram-positive blood culturenucleic acid test for the following targets: Staph aureus, Staphepidermidis, Staph lugdunensis, other Staph species, Enterococcusfaecalis, Enterococcus faecium, Streptococcus species, S. agalactiae,S. anginosus grp., S. pneumoniae, S. pyogenes, Listeria sp., mecA(methicillin resistance) and Zuri/B (vancomycin resistance).Critical Value/Significant Value called to and read back by Georgina RN, 1410 09.06.17 .       Recent Labs  Lab 09/10/17  0612 09/09/17  0548 09/08/17  0620  09/06/17  0622 09/05/17  1210   NA  --  141  --   --  134 131*   POTASSIUM  --  3.7  --   --  3.7 3.4   CHLORIDE  --  103  --   --  98 93*   CO2  --  33*  --   --  30 29   ANIONGAP  --  5  --   --  6 9   GLC  --  93  --   --  144* 89   BUN  --  19  --   --  23 14   CR 0.76 0.80 0.74  < > 0.76 0.68   GFRESTIMATED >90 >90 >90  < > >90 >90   GFRESTBLACK >90 >90 >90  < > >90 >90   MATTHEW  --  8.4*  --   --  8.9 9.1   PROTTOTAL  --   --   --   --   --  8.2   ALBUMIN  --   --   --   --   --  3.5   BILITOTAL  --   --   --   --   --  1.1   ALKPHOS  --   --   --   --   --  108   AST  --   --   --   --   --  37   ALT  --   --   --   --   --  30   < > = values in this interval not displayed.      Recent Labs  Lab 09/10/17  0107 09/09/17  2149 09/09/17  1143 09/09/17  0548 09/09/17  0222 09/08/17  0647  09/06/17  0622 09/05/17  1210   GLC  --   --   --  93  --   --   --  144* 89   * 197* 97  --  101* 94  < >  --   --    < > = values in this interval not displayed.        No results for input(s): INR in the last 168 hours.        No results for input(s): TROPONIN, TROPI, TROPR in the last 168 hours.    Invalid input(s): TROP, TROPONINIES    Recent Results (from the past 48 hour(s))   CT Chest Pulmonary Embolism w Contrast    Narrative    CT CHEST PULMONARY EMBOLISM WITH CONTRAST  9/8/2017 5:20 PM     HISTORY: Shortness of breath.     COMPARISON: CT chest 3/29/2017.    TECHNIQUE: Thin section axial images are  performed from the thoracic  inlet to the lung bases utilizing 68 mL of Isovue 370 IV contrast  without adverse event. Coronal reformatted images are also generated.  Radiation dose for this scan was reduced using automated exposure  control, adjustment of the mA and/or kV according to patient size, or  iterative reconstruction technique.    FINDINGS:     Chest: Mucoid impactions are noted within mildly dilated right middle  lobe and lingular airways. Mild bronchiectatic changes are stable  right and left lower lobes. No focal infiltrate or consolidation is  appreciated. Emphysema is present. No pleural or pericardial fluid.  Heart is normal in size. Esophagus is unremarkable. Thyroid gland  appears normal in size. No enlarged axillary or intrathoracic lymph  nodes. No evidence of pulmonary embolism. Thoracic aorta demonstrates  scattered calcified and noncalcified plaque without aneurysm or  dissection. Limited images upper abdomen demonstrate calcified  gallstones in a nondistended gallbladder. Bilateral moderate renal  cortical scarring is present.      Impression    IMPRESSION:  1. Bronchiectatic changes with mucoid impactions in the right middle  lobe and lingula. Bronchiectatic changes in the right and left lower  lobe are also noted and appear stable. No infiltrate or consolidation  to suggest pneumonia.  2. No pulmonary embolism. Thoracic aorta demonstrates atherosclerotic  plaque without aneurysm or dissection.  3. Cholelithiasis.  4. Moderate bilateral renal cortical scarring.    STEFANIE ABDUL MD[MA1.1]                Revision History        User Key Date/Time User Provider Type Action    > MA1.1 9/10/2017  3:12 PM Jori Doe MD Physician Sign            Progress Notes by Lea Wiley at 9/10/2017 12:13 PM     Author:  Lea Wiley Service:  (none) Author Type:      Filed:  9/10/2017 12:15 PM Date of Service:  9/10/2017 12:13 PM Creation Time:  9/10/2017 12:13 PM     "Status:  Signed :  Lea Wiley ()         CM: Writer notified by RN that per MD, pt will be d/c'ing tomorrow.  Writer met with pt and briefly discussed transportation options. He is speaking with a friend on whether they could transport him from hospital to TCU. Pt has oxygen unit at [facility], that can be used off electricity for 2.5 hours that pt is going to see if friend can bring.  If not, pt reports he will likely take a taxi.     P: Pt to check with friend about availability for transport tomorrow.[KC1.1]      Revision History        User Key Date/Time User Provider Type Action    > KC1.1 9/10/2017 12:15 PM Lea Wiley  Sign            Progress Notes by Paradise Pleitez RT at 9/9/2017  6:18 PM     Author:  Paradise Pleitez RT Service:  Cardiology Author Type:  Respiratory Therapist    Filed:  9/9/2017  6:32 PM Date of Service:  9/9/2017  6:18 PM Creation Time:  9/9/2017  6:18 PM    Status:  Signed :  Paradise Pleitez RT (Respiratory Therapist)         [facility]     Date: 9/9/17  Admission Dx: Copd exacerbation  Pulmonary History: COPD, current smoker  Home Nebulizer/MDI Use: Albuterol Q4 prn/ MDI Q6 prn, Advair BID, Qvar BID  Home Oxygen: PRN  Acuity Level (RCAT flow sheet): 3  Aerosol Therapy initiated: Pt scored a 3 but would benefit from Q4 treatments to help with mucus clearance. Duoneb Q4, Mucomyst Q4, Breo Qd, Arnuity QD      Pulmonary Hygiene initiated: Manual percussor-TID      Volume Expansion initiated: Incentive spirometer, Acapella      Current Oxygen Requirements: 2L nasal cannula  Current SpO2: 95%    Re-evaluation date: 9/12/17    Patient Education: Explained how the percussor worked to help with airway clearance. Explained indications for neb medications.      See \"RT Assessments\" flow sheet for patient assessment scoring and Acuity Level Details.[LW1.1]                Revision History        User Key Date/Time User Provider " Type Action    > LW1.1 9/9/2017  6:32 PM Paradise Pleitez, RT Respiratory Therapist Sign            Progress Notes by Lea Wiley at 9/9/2017  3:42 PM     Author:  Lea Wiley Service:  (none) Author Type:      Filed:  9/9/2017  3:44 PM Date of Service:  9/9/2017  3:42 PM Creation Time:  9/9/2017  3:42 PM    Status:  Addendum :  Lea Wiley ()         CM: Bed[KC1.1] available at [facility] Sunday or Monday, pending medical stability per MD. At this time, per chart it is looking like Monday. Informed [facility] of this.[KC1.2]      P: Tomorrow will schedule W/C transport via [facility], as long as pt is still progressing.     Your information has been submitted on September 09th, 2017 at 03:41:16 PM CDT. The confirmation number is [id number][KC1.1]       Revision History        User Key Date/Time User Provider Type Action    > KC1.2 9/9/2017  3:44 PM Lea Wiley  Addend     KC1.1 9/9/2017  3:42 PM Lea Wiley  Sign            Progress Notes by Jori Doe MD at 9/9/2017 12:36 PM     Author:  Jori Doe MD Service:  Hospitalist Author Type:  Physician    Filed:  9/9/2017 12:42 PM Date of Service:  9/9/2017 12:36 PM Creation Time:  9/9/2017 12:36 PM    Status:  Signed :  Jori Doe MD (Physician)         [facility]  Hospitalist Progress Note  Jori Doe MD 09/09/2017    Reason for Stay (Diagnosis): Pneumonia         Assessment and Plan:      Summary of Stay: Chuck Lopez is a 76 year old male with a past medical history of HTN, DM II, hepatitis C, chronic neck pain, and COPD  admitted on 9/5/2017 with pneumonia and COPD exacerbation.    Problem List:   1. Acute on chronic hypoxic resp failure   -- CT showed no PE or Pneumonia  -- Still sob and coughing   -- hypoxic   -- Has mucus plugs on CT -Mucinex , mucomyst neb , RT to assist with this  "    2. One Positive blood culture from 9/5 with gram positive cocci.  Suspect contaminant.  -- follow up cultures negative       3. COPD exacerbation.  Continue Levaquin, Prednisone, Breo Ellipta, Fluticasone, Duonebs.  Albuterol PRN.  4. HTN.  Atenolol.  5. Diabetes Mellitus.  HgbA1c only 5.5.  Will likely only need diet control as outpatient.  Continue Novolog SSI while on prednisone and in hospital.   6. GERD.  Zantac.   7. Deconditioning.  PT consulted , may need TCU   8. Tobacco use disorder.  Nicotrol inhaler.  9. Malnutrition.  RD consult.    DVT Prophylaxis: Pneumatic Compression Devices  Code Status: Full Code  Discharge Dispo: TBD.  Estimated Disch Date / # of Days until Disch: may discharge in two days         Interval History (Subjective):      Patient is seen and examined by me today and medical record reviewed.Overnight events noted and care discussed with nursing staff.  Feels same , has cough and sob. No fever                    Physical Exam:      Last Vital Signs:  /57 (BP Location: Left arm)  Pulse 74  Temp 97  F (36.1  C) (Oral)  Resp 20  Ht 1.651 m (5' 5\")  Wt 54.2 kg (119 lb 8 oz)  SpO2 96%  BMI 19.89 kg/m2    Gen:  NAD, A&Ox3.  Eyes:  PERRL, sclera anicteric.  OP:  MMM, no lesions.  Neck:  Supple.  CV:  Regular, no murmurs.  Lung:  Poor air movement b/l,  No wheeze .  Ab:  +BS, soft.  Skin:  Warm, dry to touch.  No rash.  Ext:  No pitting edema LE b/l.           Medications:      All current medications were reviewed with changes reflected in problem list.         Data:      All new lab and imaging data was reviewed.   All laboratory and imaging data in the past 24 hours reviewed[MA1.1]       Recent Labs  Lab 09/09/17  0548 09/06/17  0622 09/05/17  1210   WBC 8.5 12.3* 12.8*   HGB 14.1 14.8 17.7   HCT 42.6 43.9 51.3   MCV 96 94 93    160 129*       Recent Labs  Lab 09/06/17  1153 09/06/17  1147 09/06/17  0830 09/05/17  1330 09/05/17  1300   CULT No growth after 3 days No " growth after 3 days Moderate growthNormal edith No growth after 4 days Cultured on the 1st day of incubation:Staphylococcus warneri*  Critical Value/Significant Value, preliminary result only, called to and read back byMylene Charles RN from [facility]5. 9.6.17 at 1106. GR.  (Note)POSITIVE for Staphylococci other than S.aureus, S.epidermidis andS.lugdunensis, by [employer] multiplex nucleic acid test.Coagulase-negative staphylococci are the most common venipuncture orcollection associated skin CONTAMINANTS grown in blood cultures.Final identification and antimicrobial susceptibility testing will beverified by standard methods.Specimen tested with [employer] multiplex, gram-positive blood culturenucleic acid test for the following targets: Staph aureus, Staphepidermidis, Staph lugdunensis, other Staph species, Enterococcusfaecalis, Enterococcus faecium, Streptococcus species, S. agalactiae,S. anginosus grp., S. pneumoniae, S. pyogenes, Listeria sp., mecA(methicillin resistance) and Zuri/B (vancomycin resistance).Critical Value/Significant Value called to and read back by Georgina LEWIS, 1410 09.06.17 ac.       MEETiiN  Lab 09/09/17  0548 09/08/17  0620 09/07/17  0745 09/06/17  0622 09/05/17  1210     --   --  134 131*   POTASSIUM 3.7  --   --  3.7 3.4   CHLORIDE 103  --   --  98 93*   CO2 33*  --   --  30 29   ANIONGAP 5  --   --  6 9   GLC 93  --   --  144* 89   BUN 19  --   --  23 14   CR 0.80 0.74 0.61* 0.76 0.68   GFRESTIMATED >90 >90 >90 >90 >90   GFRESTBLACK >90 >90 >90 >90 >90   MATTHEW 8.4*  --   --  8.9 9.1   PROTTOTAL  --   --   --   --  8.2   ALBUMIN  --   --   --   --  3.5   BILITOTAL  --   --   --   --  1.1   ALKPHOS  --   --   --   --  108   AST  --   --   --   --  37   ALT  --   --   --   --  30         Recent [place]  Lab 09/09/17  0548 09/08/17  0647 09/08/17  0020 09/07/17  2120 09/07/17  1750 09/07/17  1255  09/06/17  0622 09/05/17  1210   GLC 93  --   --   --   --   --   --  144* 89   BG  --   94 113* 215* 144* 154*  < >  --   --    < > = values in this interval not displayed.        No results for input(s): INR in the last 168 hours.        No results for input(s): TROPONIN, TROPI, TROPR in the last 168 hours.    Invalid input(s): TROP, TROPONINIES[MA1.2]    Recent Results (from the past 48 hour(s))   CT Chest Pulmonary Embolism w Contrast    Narrative    CT CHEST PULMONARY EMBOLISM WITH CONTRAST  9/8/2017 5:20 PM     HISTORY: Shortness of breath.     COMPARISON: CT chest 3/29/2017.    TECHNIQUE: Thin section axial images are performed from the thoracic  inlet to the lung bases utilizing 68 mL of Isovue 370 IV contrast  without adverse event. Coronal reformatted images are also generated.  Radiation dose for this scan was reduced using automated exposure  control, adjustment of the mA and/or kV according to patient size, or  iterative reconstruction technique.    FINDINGS:     Chest: Mucoid impactions are noted within mildly dilated right middle  lobe and lingular airways. Mild bronchiectatic changes are stable  right and left lower lobes. No focal infiltrate or consolidation is  appreciated. Emphysema is present. No pleural or pericardial fluid.  Heart is normal in size. Esophagus is unremarkable. Thyroid gland  appears normal in size. No enlarged axillary or intrathoracic lymph  nodes. No evidence of pulmonary embolism. Thoracic aorta demonstrates  scattered calcified and noncalcified plaque without aneurysm or  dissection. Limited images upper abdomen demonstrate calcified  gallstones in a nondistended gallbladder. Bilateral moderate renal  cortical scarring is present.      Impression    IMPRESSION:  1. Bronchiectatic changes with mucoid impactions in the right middle  lobe and lingula. Bronchiectatic changes in the right and left lower  lobe are also noted and appear stable. No infiltrate or consolidation  to suggest pneumonia.  2. No pulmonary embolism. Thoracic aorta demonstrates  atherosclerotic  plaque without aneurysm or dissection.  3. Cholelithiasis.  4. Moderate bilateral renal cortical scarring.    STEFANIE ABDUL MD[MA1.1]                Revision History        User Key Date/Time User Provider Type Action    > MA1.2 9/9/2017 12:42 PM Jori Doe MD Physician Sign     MA1.1 9/9/2017 12:36 PM Jori Doe MD Physician             Progress Notes by White, Corinne C, LSW at 9/8/2017  4:04 PM     Author:  White, Corinne C, LSW Service:  (none) Author Type:      Filed:  9/8/2017  4:27 PM Date of Service:  9/8/2017  4:04 PM Creation Time:  9/8/2017  4:04 PM    Status:  Addendum :  White, Corinne C, LSW ()         Discharge Planner   Discharge Plans in progress: Pt has been clinically accepted for TCU at [facility] . They do want a copy of MA application that pt did with financial counselors   Barriers to discharge plan: Possible issues due to inability of housing at [place]. PT is not able to return to his previous living situation . PT is aware that we are sending copy of MA application to Facility.   Follow up plan: Pt is willing to accept the bed. HE does not think he will need to dc before Monday        Entered by: Corinne C. White 09/08/2017 4:04 PM[CW1.1]     PT reported to [facility] that if he DC before Monday he plans to appeal his DC.[CW1.2]      Revision History        User Key Date/Time User Provider Type Action    > CW1.2 9/8/2017  4:27 PM White, Corinne C, LSW  Addend     CW1.1 9/8/2017  4:22 PM White, Corinne C, LSW  Sign            Progress Notes by Jori Doe MD at 9/8/2017  4:06 PM     Author:  Jori Doe MD Service:  Hospitalist Author Type:  Physician    Filed:  9/8/2017  4:08 PM Date of Service:  9/8/2017  4:06 PM Creation Time:  9/8/2017  4:06 PM    Status:  Signed :  Jori Doe MD (Physician)         [facility]  Hospitalist Progress Note  Jori Doe MD  "09/08/2017    Reason for Stay (Diagnosis): Pneumonia         Assessment and Plan:      Summary of Stay: Chuck Lopez is a 76 year old male with a past medical history of HTN, DM II, hepatitis C, chronic neck pain, and COPD  admitted on 9/5/2017 with pneumonia and COPD exacerbation.    Problem List:   1. Pneumonia: CAP    -- Still sob and coughing   -- hypoxic   --will get CT chest   -- conitnue abx   -- smoking discouraged     2. One Positive blood culture from 9/5 with gram positive cocci.  Suspect contaminant.    Await final culture results.  3. COPD exacerbation.  Continue Levaquin, Prednisone, Breo Ellipta, Fluticasone, Duonebs.  Albuterol PRN.  4. Sepsis.  Due to pneumonia.  Levaquin as above.  5. HTN.  Atenolol.  6. Diabetes Mellitus.  HgbA1c only 5.5.  Will likely only need diet control as outpatient.  Continue Novolog SSI while on prednisone and in hospital.   7. GERD.  Zantac.  8. Deconditioning.  PT consult.  9. Tobacco use disorder.  Nicotrol inhaler.  10. Malnutrition.  RD consult.  DVT Prophylaxis: Pneumatic Compression Devices  Code Status: Full Code  Discharge Dispo: TBD.  Estimated Disch Date / # of Days until Disch: in 1-2 days if he improves -hypoxia and sob        Interval History (Subjective):      Patient is seen and examined by me today and medical record reviewed.Overnight events noted and care discussed with nursing staff.  No improvement   Has dyspnea   Has no fever   No cp                        Physical Exam:      Last Vital Signs:  /62 (BP Location: Left arm)  Pulse 74  Temp 98.2  F (36.8  C) (Oral)  Resp 20  Ht 1.651 m (5' 5\")  Wt 54.2 kg (119 lb 8 oz)  SpO2 95%  BMI 19.89 kg/m2    Gen:  NAD, A&Ox3.  Eyes:  PERRL, sclera anicteric.  OP:  MMM, no lesions.  Neck:  Supple.  CV:  Regular, no murmurs.  Lung:  Poor air movement b/l,  No wheeze .  Ab:  +BS, soft.  Skin:  Warm, dry to touch.  No rash.  Ext:  No pitting edema LE b/l.           Medications:      All current " medications were reviewed with changes reflected in problem list.         Data:      All new lab and imaging data was reviewed.   Labs:    Recent Labs  Lab 09/08/17 0620 09/06/17 0622   NA  --   --  134   POTASSIUM  --   --  3.7   CHLORIDE  --   --  98   CO2  --   --  30   ANIONGAP  --   --  6   GLC  --   --  144*   BUN  --   --  23   CR 0.74  < > 0.76   GFRESTIMATED >90  < > >90   GFRESTBLACK >90  < > >90   MATTHEW  --   --  8.9   < > = values in this interval not displayed.    Recent Labs  Lab 09/06/17 0622   WBC 12.3*   HGB 14.8   HCT 43.9   MCV 94         Imaging:   No results found for this or any previous visit (from the past 24 hour(s)).[MA1.1]       Revision History        User Key Date/Time User Provider Type Action    > MA1.1 9/8/2017  4:08 PM Jori Doe MD Physician Sign            Progress Notes by Feli Portillo RN at 9/8/2017 11:35 AM     Author:  Feli Portillo, RN Service:  (none) Author Type:      Filed:  9/8/2017  2:49 PM Date of Service:  9/8/2017 11:35 AM Creation Time:  9/8/2017 11:35 AM    Status:  Addendum :  Feli Portillo, JOSHUA ()         I met with pt again about dc planning.  Pt did fill out MA mariana with .  He is interested in TCU because he is below baseline for his ambulation and endurance level.  He still get's SOB.  Awaiting PT[RB1.1] session recs[RB1.2].  Pt would be agreeable to go to [facility].  Pt said he doesn't plan on returning to his basement room in his friend's house because his friend is getting an amputation and won't be able to help take care of him.  I haven't heard back from [facility] on EW assessment timing.[RB1.1] Pt said he is a , but was only able to be in the service for 3 months because [facility] became ill [facility] and then had an honorable dc d/t health reasons at age 19.[RB1.3]   Pt given education on glucometer and he was able to teach back.  Following.  Melanie  RN CTS 2626[RB1.1]    Pt declining to work with PT because he is SOB and has abdominal pain.  Pt agreeable to work with PT tomorrow preferably in the am.[RB1.2]      [facility]       Revision History        User Key Date/Time User Provider Type Action    > RB1.4 9/8/2017  2:49 PM Feli Portilol RN Case Manager Addend     RB1.2 9/8/2017  2:30 PM Feli Portillo RN Case Manager Addend     RB1.3 9/8/2017 11:46 AM Feli Portillo RN Case Manager Addend     RB1.1 9/8/2017 11:43 AM Feli Portillo RN Case Manager Sign            Progress Notes by Analilia Lion LPN at 9/8/2017 12:43 PM     Author:  Analilia Lion LPN Service:  (none) Author Type:  Licensed Nurse    Filed:  9/8/2017 12:46 PM Date of Service:  9/8/2017 12:43 PM Creation Time:  9/8/2017 12:43 PM    Status:  Signed :  Analilia Lion LPN (Licensed Nurse)         Pt. Educated on glucometer, pt demonstrated glucose check.[KC1.1]      Revision History        User Key Date/Time User Provider Type Action    > KC1.1 9/8/2017 12:46 PM Analilia Lion LPN Licensed Nurse Sign            Progress Notes by Jori Doe MD at 9/8/2017 10:48 AM     Author:  Jori Doe MD Service:  Hospitalist Author Type:  Physician    Filed:  9/8/2017 10:49 AM Date of Service:  9/8/2017 10:48 AM Creation Time:  9/8/2017 10:48 AM    Status:  Signed :  Jori Doe MD (Physician)         Patient was seen and examined this morning.   Patient is complaining of shortness of breath and weakness.  He is a chronic smoker with COPD here with his hypoxia and concern for pneumonia.  He appears to be in mild respiratory distress with decreased air entry bilaterally with no wheezing.  I'll get CT scan of the chest to exclude pulmonary embolism,  Significant infection  His social situations discussed and patient is to be seen by care coordinator.  Continue inpatient care for another day.[MA1.1]     Revision History         "User Key Date/Time User Provider Type Action    > MA1.1 9/8/2017 10:49 AM Jori Doe MD Physician Sign                  Procedure Notes     No notes of this type exist for this encounter.         Progress Notes - Therapies (Notes from 09/08/17 through 09/11/17)      Progress Notes by Paradise Pleitez RT at 9/9/2017  6:18 PM     Author:  Paradise Pleitez RT Service:  Cardiology Author Type:  Respiratory Therapist    Filed:  9/9/2017  6:32 PM Date of Service:  9/9/2017  6:18 PM Creation Time:  9/9/2017  6:18 PM    Status:  Signed :  Paradise Pleitez RT (Respiratory Therapist)         [facility] RCAT     Date: 9/9/17  Admission Dx: Copd exacerbation  Pulmonary History: COPD, current smoker  Home Nebulizer/MDI Use: Albuterol Q4 prn/ MDI Q6 prn, Advair BID, Qvar BID  Home Oxygen: PRN  Acuity Level (RCAT flow sheet): 3  Aerosol Therapy initiated: Pt scored a 3 but would benefit from Q4 treatments to help with mucus clearance. Duoneb Q4, Mucomyst Q4, Breo Qd, Arnuity QD      Pulmonary Hygiene initiated: Manual percussor-TID      Volume Expansion initiated: Incentive spirometer, Acapella      Current Oxygen Requirements: 2L nasal cannula  Current SpO2: 95%    Re-evaluation date: 9/12/17    Patient Education: Explained how the percussor worked to help with airway clearance. Explained indications for neb medications.      See \"RT Assessments\" flow sheet for patient assessment scoring and Acuity Level Details.[LW1.1]                Revision History        User Key Date/Time User Provider Type Action    > LW1.1 9/9/2017  6:32 PM Paradise Pleitez RT Respiratory Therapist Sign            "

## 2017-09-05 NOTE — IP AVS SNAPSHOT
` ` Patient Information     Patient Name Sex     Chuck Lopez ([id number]) Male 1940       Room Bed    [facility]      Patient Demographics     Address Phone    [address]  [facility] [address] [phone] (Home)  none (Work)  [phone] (Mobile)      Patient Ethnicity & Race     Ethnic Group Patient Race    American White      Emergency Contact(s)     Name Relation Home Work Mobile    Tatyana Kim Roommate [phone] none [phone]      Documents on File        Status Date Received Description       Documents for the Patient    Face Sheet Received () 09     Privacy Notice - [place] Received () 09     Insurance Card Received () 09     External Medication Information Consent Accepted () 09     Face Sheet Received () 09     ZOILA Face Sheet Received () 09     Privacy Notice - [place]  () 09     Other  09 medicare questions    Insurance Card Received 17 Medicare A & B    Consent for Services - Hospital/Clinic Received () 11 NEW    External Medication Information Consent Accepted () 11     External Medication Information Consent Accepted () 04/10/12     Privacy Notice - [place] Received 04/10/12     Consent for Services - Hospital/Clinic Received () 04/10/12     Consent for EHR Access  13 Copied from existing Consent for services - C/HOD collected on 04/10/2012    [facility] Specified Other       Consent for Services - Hospital/Clinic Received () 13     External Medication Information Consent Patient Refused 13     Consent for Services - Hospital/Clinic Received () 14     Consent for Services - Hospital/Clinic Received () 09/16/15     Consent for Services/Privacy Notice - Hospital/Clinic Received () 16     Insurance Card Received 17 Medicare    Patient ID Received 17  [place]    [place] Received 10/06/16 [place]    Consent for Services - Geriatrics Received 04/18/17     Advance Directives and Living Will Received 04/18/17 POLST 01/18/2017    Consent for Services/Privacy Notice - Hospital/Clinic Received 08/25/17     ZOILA Face Sheet Received (Deleted) 11/04/09     Patient ID Received (Deleted) 04/16/14        Documents for the Encounter    CMS IM for Patient Signature Received 09/07/17       Admission Information     Attending Provider Admitting Provider Admission Type Admission Date/Time    Henry Frye, DO Henry Frye, DO Emergency 09/05/17  1204    Discharge Date Hospital Service Auth/Cert Status Service Area     General Medicine [facility]    Unit Room/Bed Admission Status       [facility] MEDICAL SURGICAL 0543/0543-01 Admission (Confirmed)       Admission     Complaint    COPD exacerbation (H)      Hospital Account     Name Acct ID Class Status Primary Coverage    Chuck Lopez [id number] Inpatient Open MEDICARE - MEDICARE            Guarantor Account (for Hospital Account #[id number])     Name Relation to Pt Service Area Active? Acct Type    Chuck Lopez Self FCS Yes Personal/Family    Address Phone          [address]  [place] [address] [phone](H)  none(O)              Coverage Information (for Hospital Account #[id number])     F/O Payor/Plan Precert #    MEDICARE/MEDICARE     Subscriber Subscriber #    Chuck Lopez [id number]    Address Phone    ATTN CLAIMS  [address]  [facility] [place] [address] [phone]

## 2017-09-05 NOTE — IP AVS SNAPSHOT
"          [place] [address] MEDICAL SURGICAL: [phone]                                              INTERAGENCY TRANSFER FORM - LAB / IMAGING / EKG / EMG RESULTS   2017                    Hospital Admission Date: 2017  TIMI MAK   : 1940  Sex: Male        Attending Provider: Henry Frye DO     Allergies:  Ammonium Lactate, Doxycycline, Monosodium Glutamate, Penicillins    Infection:  None   Service:  GENERAL MEDI    Ht:  1.651 m (5' 5\")   Wt:  54.2 kg (119 lb 8 oz)   Admission Wt:  54.7 kg (120 lb 9.5 oz)    BMI:  19.89 kg/m 2   BSA:  1.58 m 2            Patient PCP Information     Provider PCP Type    Pranay Reid MD General         Lab Results - 3 Days      Creatinine [[id number]]  Resulted: 17 0734, Result status: Final result    Ordering provider: Henry Frye,   17 0000 Resulting lab: [facility]    Specimen Information    Type Source Collected On   Blood  17 0651          Components       Value Reference Range Flag Lab   Creatinine 0.75 0.66 - 1.25 mg/dL  [facility]   GFR Estimate >90 >60 mL/min/1.7m2  [facility]   Comment:  Non  GFR Calc   GFR Estimate If Black >90 >60 mL/min/1.7m2  [facility]   Comment:   GFR Calc            Blood culture [[id number]]  Resulted: 17 0437, Result status: Preliminary result    Ordering provider: Henry Frye,   17 1125 Resulting lab: MICRO RAPID TESTING LAB    Specimen Information    Type Source Collected On   Blood  17 1153   Comment:  Right Hand          Components       Value Reference Range Flag Lab   Specimen Description Blood Right Hand      Special Requests Aerobic and anaerobic bottles received   [facility]   Culture Micro No growth after 5 days   226            Blood culture [[id number]]  Resulted: 17 0437, Result status: Preliminary result    Ordering provider: Henry Frye,   17 1125 Resulting lab: MICRO RAPID TESTING " LAB    Specimen Information    Type Source Collected On   Blood  09/06/17 1147   Comment:  Right Arm          Components       Value Reference Range Flag Lab   Specimen Description Blood Right Arm      Special Requests Aerobic and anaerobic bottles received   [facility]   Culture Micro No growth after 5 days   226            Blood culture [[id number]]  Resulted: 09/11/17 0437, Result status: Final result    Ordering provider: Francois Aceves MD  09/05/17 1304 Resulting lab: MICRO RAPID TESTING LAB    Specimen Information    Type Source Collected On   Blood  09/05/17 1330   Comment:  Right Hand          Components       Value Reference Range Flag Lab   Specimen Description Blood Right Hand      Special Requests Aerobic and anaerobic bottles received   FrRd[facility]   Culture Micro No growth   226            Glucose by meter [[id number]] (Abnormal)  Resulted: 09/11/17 0130, Result status: Final result    Ordering provider: Henry Frye, DO  09/11/17 0123 Resulting lab: POINT OF CARE TEST, GLUCOSE    Specimen Information    Type Source Collected On     09/11/17 0123          Components       Value Reference Range Flag Lab   Glucose 109 70 - 99 mg/dL H 170            Glucose by meter [[id number]] (Abnormal)  Resulted: 09/10/17 2301, Result status: Final result    Ordering provider: Henry Frye, DO  09/10/17 2134 Resulting lab: POINT OF CARE TEST, GLUCOSE    Specimen Information    Type Source Collected On     09/10/17 2134          Components       Value Reference Range Flag Lab   Glucose 160 70 - 99 mg/dL H 170            Creatinine [[id number]]  Resulted: 09/10/17 0658, Result status: Final result    Ordering provider: Henry Frye, DO  09/10/17 0000 Resulting lab: [facility]    Specimen Information    Type Source Collected On   Blood  09/10/17 0612          Components       Value Reference Range Flag Lab   Creatinine 0.76 0.66 - 1.25 mg/dL  [facility]   GFR Estimate >90 >60 mL/min/1.7m2  [facility]    Comment:  Non  GFR Calc   GFR Estimate If Black >90 >60 mL/min/1.7m2  [facility]   Comment:   GFR Calc            Glucose by meter [[id number]] (Abnormal)  Resulted: 09/10/17 0210, Result status: Final result    Ordering provider: Henry Frye, DO  09/10/17 0107 Resulting lab: POINT OF CARE TEST, GLUCOSE    Specimen Information    Type Source Collected On     09/10/17 0107          Components       Value Reference Range Flag Lab   Glucose 123 70 - 99 mg/dL H 170            Glucose by meter [[id number]] (Abnormal)  Resulted: 09/10/17 0210, Result status: Final result    Ordering provider: Henry Frye, DO  09/09/17 2149 Resulting lab: POINT OF CARE TEST, GLUCOSE    Specimen Information    Type Source Collected On     09/09/17 2149          Components       Value Reference Range Flag Lab   Glucose 197 70 - 99 mg/dL H 170            Glucose by meter [[id number]] (Abnormal)  Resulted: 09/09/17 1331, Result status: Final result    Ordering provider: Henry Frye, DO  09/09/17 0222 Resulting lab: POINT OF CARE TEST, GLUCOSE    Specimen Information    Type Source Collected On     09/09/17 0222          Components       Value Reference Range Flag Lab   Glucose 101 70 - 99 mg/dL H 170            Glucose by meter [[id number]]  Resulted: 09/09/17 1331, Result status: Final result    Ordering provider: Henry Frye, DO  09/09/17 1143 Resulting lab: POINT OF CARE TEST, GLUCOSE    Specimen Information    Type Source Collected On     09/09/17 1143          Components       Value Reference Range Flag Lab   Glucose 97 70 - 99 mg/dL  170            Basic metabolic panel [[id number]] (Abnormal)  Resulted: 09/09/17 0635, Result status: Final result    Ordering provider: Jori Doe MD  09/09/17 0000 Resulting lab: [facility]    Specimen Information    Type Source Collected On   Blood  09/09/17 0548          Components       Value Reference Range Flag Lab   Sodium  141 133 - 144 mmol/L  [facility]   Potassium 3.7 3.4 - 5.3 mmol/L  Fr[facility]   Chloride 103 94 - 109 mmol/L  [facility]   Carbon Dioxide 33 20 - 32 mmol/L H FrRdHs   Anion Gap 5 3 - 14 mmol/L  Fr[facility]   Glucose 93 70 - 99 mg/dL  [facility]   Urea Nitrogen 19 7 - 30 mg/dL  [facility]   Creatinine 0.80 0.66 - 1.25 mg/dL  [facility]   GFR Estimate >90 >60 mL/min/1.7m2  [facility]   Comment:  Non  GFR Calc   GFR Estimate If Black >90 >60 mL/min/1.7m2  [facility]   Comment:  African American GFR Calc   Calcium 8.4 8.5 - 10.1 mg/dL L FrRdHs            CBC with platelets differential [[id number]]  Resulted: 09/09/17 0621, Result status: Final result    Ordering provider: Jori Doe MD  09/09/17 0000 Resulting lab: [facility]    Specimen Information    Type Source Collected On   Blood  09/09/17 0548          Components       Value Reference Range Flag Lab   WBC 8.5 4.0 - 11.0 10e9/L  Fr[facility]   RBC Count 4.42 4.4 - 5.9 10e12/L  [facility]   Hemoglobin 14.1 13.3 - 17.7 g/dL  [facility]   Hematocrit 42.6 40.0 - 53.0 %  Fr[facility]   MCV 96 78 - 100 fl  [facility]   MCH 31.9 26.5 - 33.0 pg  Fr[facility]   MCHC 33.1 31.5 - 36.5 g/dL  [facility]   RDW 13.8 10.0 - 15.0 %  Fr[facility]   Platelet Count 162 150 - 450 10e9/L  Fr[facility]   Diff Method Automated Method   FrRdHs   % Neutrophils 63.2 %  FrRdHs   % Lymphocytes 18.2 %  FrRdHs   % Monocytes 12.1 %  FrRdHs   % Eosinophils 2.7 %  FrRdHs   % Basophils 0.5 %  FrRdHs   % Immature Granulocytes 3.3 %  FrRdHs   Nucleated RBCs 0 0 /100  Fr[facility]   Absolute Neutrophil 5.4 1.6 - 8.3 10e9/L  Fr[facility]   Absolute Lymphocytes 1.5 0.8 - 5.3 10e9/L  Fr[facility]   Absolute Monocytes 1.0 0.0 - 1.3 10e9/L  Fr[facility]   Absolute Eosinophils 0.2 0.0 - 0.7 10e9/L  Fr[facility]   Absolute Basophils 0.0 0.0 - 0.2 10e9/L  Fr[facility]   Abs Immature Granulocytes 0.3 0 - 0.4 10e9/L  Fr[facility]   Absolute Nucleated RBC 0.0   FrRdHs            Glucose by meter [[id number]] (Abnormal)  Resulted: 09/08/17 0851, Result status: Final result    Ordering provider:  Henry Frye,   09/07/17 1750 Resulting lab: POINT OF CARE TEST, GLUCOSE    Specimen Information    Type Source Collected On     09/07/17 1750          Components       Value Reference Range Flag Lab   Glucose 144 70 - 99 mg/dL H 170            Glucose by meter [[id number]] (Abnormal)  Resulted: 09/08/17 0851, Result status: Final result    Ordering provider: Henry Frye,   09/07/17 2120 Resulting lab: POINT OF CARE TEST, GLUCOSE    Specimen Information    Type Source Collected On     09/07/17 2120          Components       Value Reference Range Flag Lab   Glucose 215 70 - 99 mg/dL H 170            Glucose by meter [[id number]] (Abnormal)  Resulted: 09/08/17 0851, Result status: Final result    Ordering provider: Henry Frye,   09/07/17 1255 Resulting lab: POINT OF CARE TEST, GLUCOSE    Specimen Information    Type Source Collected On     09/07/17 1255          Components       Value Reference Range Flag Lab   Glucose 154 70 - 99 mg/dL H 170            Blood culture [[id number]] (Abnormal)  Resulted: 09/08/17 0811, Result status: Final result    Ordering provider: Francois Aceves MD  09/05/17 1247 Resulting lab: [facility]IC [facility]    Specimen Information    Type Source Collected On   Blood Arm, Left 09/05/17 1300   Comment:  Left Arm          Components       Value Reference Range Flag Lab   Specimen Description Blood Left Arm      Special Requests Aerobic and anaerobic bottles received   75   Culture Micro --  A 225   Result:         Cultured on the 1st day of incubation:  Staphylococcus warneri     Culture Micro --   225   Result:         Critical Value/Significant Value, preliminary result only, called to and read back by  Mylene Charles RN from [facility]. 9.6.17 at 1106. GR.     Culture Micro --   225   Result:         (Note)  POSITIVE for Staphylococci other than S.aureus, S.epidermidis and  S.lugdunensis, by OrthoFi multiplex nucleic acid  test.  Coagulase-negative staphylococci are the most common venipuncture or  collection associated skin CONTAMINANTS grown in blood cultures.  Final identification and antimicrobial susceptibility testing will be  verified by standard methods.    Specimen tested with Verigene multiplex, gram-positive blood culture  nucleic acid test for the following targets: Staph aureus, Staph  epidermidis, Staph lugdunensis, other Staph species, Enterococcus  faecalis, Enterococcus faecium, Streptococcus species, S. agalactiae,  S. anginosus grp., S. pneumoniae, S. pyogenes, Listeria sp., mecA  (methicillin resistance) and Zuri/B (vancomycin resistance).    Critical Value/Significant Value called to and read back by Mylene Charles RN, 1410 09.06.17 .              Sputum Culture Aerobic Bacterial [[id number]]  Resulted: 09/08/17 0758, Result status: Final result    Ordering provider: Yasmin Wei PA  09/05/17 1546 Resulting lab: [facility]    Specimen Information    Type Source Collected On   Sputum  09/06/17 0830          Components       Value Reference Range Flag Lab   Specimen Description Sputum      Culture Micro --   225   Result:         Moderate growth  Normal edith              Creatinine [[id number]]  Resulted: 09/08/17 0653, Result status: Final result    Ordering provider: Henry Frye DO  09/08/17 0000 Resulting lab: [facility]    Specimen Information    Type Source Collected On   Blood  09/08/17 0620          Components       Value Reference Range Flag Lab   Creatinine 0.74 0.66 - 1.25 mg/dL  [facility]   GFR Estimate >90 >60 mL/min/1.7m2  [facility]   Comment:  Non  GFR Calc   GFR Estimate If Black >90 >60 mL/min/1.7m2  [facility]   Comment:   GFR Calc            Glucose by meter [[id number]]  Resulted: 09/08/17 0650, Result status: Final result    Ordering provider: Henry Frye DO  09/08/17 0647 Resulting lab: POINT OF CARE  TEST, GLUCOSE    Specimen Information    Type Source Collected On     09/08/17 0647          Components       Value Reference Range Flag Lab   Glucose 94 70 - 99 mg/dL  170            Glucose by meter [[id number]] (Abnormal)  Resulted: 09/08/17 0306, Result status: Final result    Ordering provider: Henry Frye DO  09/08/17 0020 Resulting lab: POINT OF CARE TEST, GLUCOSE    Specimen Information    Type Source Collected On     09/08/17 0020          Components       Value Reference Range Flag Lab   Glucose 113 70 - 99 mg/dL H 170            Testing Performed By     Lab - Abbreviation Name Director Address Valid Date Range    12 - [facility] Unknown [address]  [facility] [address] 05/08/15 1057 - Present    75 - Unknown [facility] Unknown [address] [facility] [address] 01/15/15 1019 - Present    170 - Unknown POINT OF CARE TEST, GLUCOSE Unknown Unknown 10/31/11 1114 - Present    225 - Unknown [facility] Unknown [address] [facility] [address] 12/19/14 0954 - Present    226 - Unknown MICRO RAPID TESTING LAB Unknown [address] [facility] [address] 12/19/14 0955 - Present            Unresulted Labs (24h ago through future)    Start       Ordered    09/07/17 0600  Creatinine  DAILY,   Routine     Comments:  Last Lab Result: Creatinine (mg/dL)       Date                     Value                 09/06/2017               0.76             ----------    09/06/17 1131         Imaging Results - 3 Days      CT Chest Pulmonary Embolism w Contrast [[id number]]  Resulted: 09/08/17 1750, Result status: Final result    Ordering provider: Jori Doe MD  09/08/17 1045 Resulted by: Vish Hurtado MD    Performed: 09/08/17 1707 - 09/08/17 1720 Resulting lab: RADIOLOGY RESULTS    Narrative:       CT CHEST PULMONARY EMBOLISM WITH CONTRAST  9/8/2017 5:20 PM     HISTORY: Shortness of breath.     COMPARISON:  CT chest 3/29/2017.    TECHNIQUE: Thin section axial images are performed from the thoracic  inlet to the lung bases utilizing 68 mL of Isovue 370 IV contrast  without adverse event. Coronal reformatted images are also generated.  Radiation dose for this scan was reduced using automated exposure  control, adjustment of the mA and/or kV according to patient size, or  iterative reconstruction technique.    FINDINGS:     Chest: Mucoid impactions are noted within mildly dilated right middle  lobe and lingular airways. Mild bronchiectatic changes are stable  right and left lower lobes. No focal infiltrate or consolidation is  appreciated. Emphysema is present. No pleural or pericardial fluid.  Heart is normal in size. Esophagus is unremarkable. Thyroid gland  appears normal in size. No enlarged axillary or intrathoracic lymph  nodes. No evidence of pulmonary embolism. Thoracic aorta demonstrates  scattered calcified and noncalcified plaque without aneurysm or  dissection. Limited images upper abdomen demonstrate calcified  gallstones in a nondistended gallbladder. Bilateral moderate renal  cortical scarring is present.      Impression:       IMPRESSION:  1. Bronchiectatic changes with mucoid impactions in the right middle  lobe and lingula. Bronchiectatic changes in the right and left lower  lobe are also noted and appear stable. No infiltrate or consolidation  to suggest pneumonia.  2. No pulmonary embolism. Thoracic aorta demonstrates atherosclerotic  plaque without aneurysm or dissection.  3. Cholelithiasis.  4. Moderate bilateral renal cortical scarring.    STEFANIE ABDUL MD      Testing Performed By     Lab - Abbreviation Name Director Address Valid Date Range    104 - [facility] RADIOLOGY RESULTS Unknown Unknown 02/16/05 1553 - Present            Encounter-Level Documents:     There are no encounter-level documents.      Order-Level Documents:     There are no order-level documents.

## 2017-09-05 NOTE — H&P
Patient seen and examined.  Chart reviewed.  Case discussed with Yasmin Wei PA-C.  Please see her admission H&P for full details.    Patient still feeling short of breath.  Feverish and chilled at times.    Exam:  VSS:  /60  Pulse 105  Temp 100.1  F (37.8  C) (Tympanic)  Resp (!) 36  Wt 54.7 kg (120 lb 9.5 oz)  SpO2 95%  BMI 20.07 kg/m2  Gen:  NAD, cachectic, A&Ox3.  Eyes:  PERRL, sclera anicteric.  OP:  MMM, no lesions.  Neck:  Supple.  CV:  Regular, no murmurs.  Lung:  Wheeze b/l, tachypnea.  Ab:  +BS, soft.  Skin:  Warm, dry to touch.  No rash.  Ext:  No pitting edema LE b/l.    A/P:  75 yo male with pneumonia and COPD exacerbation.  Pneumonia.  Continue Levaquin.  COPD exacerbation.  Levaquin, Prednisone, Duonebs, Albuterol.

## 2017-09-05 NOTE — PHARMACY-ADMISSION MEDICATION HISTORY
Admission medication history interview status for this patient is complete. See [facility] admission navigator for allergy information, prior to admission medications and immunization status.     Medication history interview source(s):Patient  Medication history resources (including written lists, pill bottles, clinic record):None  Primary pharmacy:[facility]    Changes made to [facility] medication list:  Added: Zantac  Deleted: Fioricet, Allegra, Prednisone: 2 entries  Changed: none    Actions taken by pharmacist (provider contacted, etc):None     Additional medication history information:None    Medication reconciliation/reorder completed by provider prior to medication history? No    Do you take OTC medications (eg tylenol, ibuprofen, fish oil, eye/ear drops, etc)? See list    For patients on insulin therapy: No      Prior to Admission medications    Medication Sig Last Dose Taking? Auth Provider   RaNITidine HCl (ZANTAC PO) Take 75 mg by mouth At Bedtime 9/4/2017 at Unknown time Yes Unknown, Entered By History   traMADol (ULTRAM) 50 MG tablet Take 1-2 tablets ( mg) by mouth every 6 hours as needed for pain maximum 4 tablet(s) per day 9/4/2017 at Unknown time Yes Pranay Reid MD   hydrochlorothiazide (HYDRODIURIL) 25 MG tablet TAKE ONE TABLET BY MOUTH EVERY DAY 9/5/2017 at Unknown time Yes Pranay Reid MD   butalbital-APAP-caffeine-codeine (FIORICET WITH CODEINE) -03-30 MG per capsule Take 1 capsule by mouth every 6 hours as needed for headaches Past Week at Unknown time Yes Pranay Reid MD   Multiple Vitamins-Minerals (V-C FORTE) CAPS TAKE ONE CAPSULE BY MOUTH EVERY DAY 9/5/2017 at Unknown time Yes Pranay Reid MD   atenolol (TENORMIN) 25 MG tablet Take 1 tablet (25 mg) by mouth daily 9/5/2017 at Unknown time Yes Pranay Reid MD   albuterol (2.5 MG/3ML) 0.083% neb solution Take 1 vial (2.5 mg) by nebulization every 4 hours as needed for  shortness of breath / dyspnea or wheezing 9/5/2017 at Unknown time Yes Erik Fish MD   albuterol (PROAIR HFA/PROVENTIL HFA/VENTOLIN HFA) 108 (90 BASE) MCG/ACT Inhaler Inhale 1-2 puffs into the lungs every 6 hours as needed for shortness of breath / dyspnea or wheezing Past Week at Unknown time Yes Erik Fish MD   fluticasone-salmeterol (ADVAIR) 500-50 MCG/DOSE diskus inhaler Inhale 1 puff into the lungs 2 times daily 9/5/2017 at Unknown time Yes Erik Fish MD   beclomethasone (QVAR) 80 MCG/ACT Inhaler Inhale 2 puffs into the lungs 2 times daily 9/5/2017 at Unknown time Yes Erik Fish MD   tiotropium (SPIRIVA HANDIHALER) 18 MCG capsule INHALE ONE CAPSULE BY MOUTH EVERY DAY 9/5/2017 at Unknown time Yes Erik Fish MD   melatonin 3 MG tablet Take 1 tablet (3 mg) by mouth nightly as needed 9/4/2017 at Unknown time Yes Oneyda Mcdonald MD

## 2017-09-05 NOTE — H&P
[facility]     Internal Medicine History and Physical        Date of Admission: 9/5/2017    Assessment & Plan  Chuck Lopez is a 76 year old man with a history of HTN, DM II, hepatitis C, chronic neck pain, and COPD who presented w/ shortness of breath and is admitted for management of COPD exacerbation w/ possible pneumonia.     #Acute Hypoxic Respiratory Failure, COPD w/ Acute Exacerbation and Suspected CAP. Presented w/ 5 d dyspnea, productive cough, f/c and O2 sats 80% on RA via EMS --> mid 90s on 3L after neb treatments. Has been admitted prior w/ same. VBG unremarkable. CXR w/ mild infiltrate or atelectasis in right lung base. Has cough w/ temp 100.1 deg and WBC 12.8 - concern for pneumonia. Lactic acid wnl and no evidence of sepsis at this time.   - Continue home ICS/LABA inhalers. Can hold home Spiriva - QID DuoNebs here. PRN albuterol nebs.   - Received 125 mg IV methylprednisolone in ED, will start prednisone 40 mg daily in AM.   - Continue Levaquin started in ED for CAP (PCN allergic). Procal pending. Sputum cx if able.     #HTN. BPs controlled.   - Continue home atenolol.   - Hold home HCTZ (25 mg) today given that patient appears somewhat dry w/ Na 131. Repeat BMP in AM.     #DM II. HgbA1c 5.7%. Does not check BG.     #Chronic Pain. Has home Tramadol and Fioricet.     Diet: regular  Fluids: NS @ 100 cc/hr  DVT Prophylaxis: mechanical given chronic thrombocytopenia   Code Status: full    Yasmin Wei PA-C  Hospitalist Service      Chief Complaint   Shortness of Breath     History is obtained from the patient    History of Present Illness   Patient presented to the ED for further evaluation of progressive shortness of breath over the past 5 days. Was initially just w/ exertion but over past 3 days is short of breath even at rest despite use of 2L NC (has prescription at home but only uses when sick and was not aware that he could titrate it up further). Has had subjective fever w/  chills, sore throat, and cough productive of gray/green colored sputum. No sick contacts or known exposures. No diarrhea. Feels similar to prior COPD exacerbations and is already feeling somewhat better w/ increased O2, nebs, and steroids given in ED. Does have a neb machine at home.     Review of Systems   10 point ROS performed and negative unless otherwise noted in HPI    Past Medical History    I have reviewed this patient's medical history and updated it with pertinent information if needed.   Past Medical History:   Diagnosis Date     Chronic neck pain      COPD (chronic obstructive pulmonary disease) (H)      Diabetes (H)      Hepatitis C      Hypertension        Past Surgical History   I have reviewed this patient's surgical history and updated it with pertinent information if needed.  Past Surgical History:   Procedure Laterality Date     APPENDECTOMY       ESOPHAGOSCOPY, GASTROSCOPY, DUODENOSCOPY (EGD), COMBINED  4/16/2014    Procedure: ESOPHAGOSCOPY, GASTROSCOPY, DUODENOSCOPY (EGD) & COLONOSCOPY with polypectomy by hot snare;  Surgeon: Dillon Vázquez MD;  Location:  GI       Social History   Social History   Substance Use Topics     Smoking status: Current Every Day Smoker - back up to 1 ppd     Types: Cigarettes     Drug use: Secondhand synthetic marijuana exposure; reports that he has not smoked any marijuana over past 6-8 years because of his lung disease     Alcohol use: No     Currently living at a friend's home w/ 3-4 other people. Is otherwise homeless. Does feel that he can safely return here at d/c although it is not described as a clean living environment.     Family History   Reviewed and non contributory.       Prior to Admission Medications   Prior to Admission Medications   Prescriptions Last Dose Informant Patient Reported? Taking?   Multiple Vitamins-Minerals (V-C FORTE) CAPS   No No   Sig: TAKE ONE CAPSULE BY MOUTH EVERY DAY   albuterol (2.5 MG/3ML) 0.083% neb solution   No No    Sig: Take 1 vial (2.5 mg) by nebulization every 4 hours as needed for shortness of breath / dyspnea or wheezing   albuterol (PROAIR HFA/PROVENTIL HFA/VENTOLIN HFA) 108 (90 BASE) MCG/ACT Inhaler   No No   Sig: Inhale 1-2 puffs into the lungs every 6 hours as needed for shortness of breath / dyspnea or wheezing   atenolol (TENORMIN) 25 MG tablet   No No   Sig: Take 1 tablet (25 mg) by mouth daily   beclomethasone (QVAR) 80 MCG/ACT Inhaler   No No   Sig: Inhale 2 puffs into the lungs 2 times daily   butalbital-APAP-caffeine-codeine (FIORICET WITH CODEINE) -55-30 MG per capsule   No No   Sig: Take 1 capsule by mouth every 6 hours as needed for headaches   butalbital-acetaminophen-caffeine (FIORICET/ESGIC) -40 MG per tablet   No No   Sig: TAKE ONE TABLET BY MOUTH EVERY 4 HOURS AS NEEDED   fexofenadine (ALLEGRA) 60 MG tablet   No No   Sig: Take 1 tablet (60 mg) by mouth daily   fluticasone-salmeterol (ADVAIR) 500-50 MCG/DOSE diskus inhaler   No No   Sig: Inhale 1 puff into the lungs 2 times daily   hydrochlorothiazide (HYDRODIURIL) 25 MG tablet   No No   Sig: TAKE ONE TABLET BY MOUTH EVERY DAY   melatonin 3 MG tablet   No No   Sig: Take 1 tablet (3 mg) by mouth nightly as needed   predniSONE (DELTASONE) 10 MG tablet   No No   Si tabs daily for 3 days, then 3 tabs daily for 3 days, then 2 tabs daily for 3 days, then 1 tab daily for 3 days, then stop   predniSONE (DELTASONE) 20 MG tablet   No No   Sig: Take 1 tablet (20 mg) by mouth 2 times daily For five days then one daily for five days then stop   tiotropium (SPIRIVA HANDIHALER) 18 MCG capsule   No No   Sig: INHALE ONE CAPSULE BY MOUTH EVERY DAY   traMADol (ULTRAM) 50 MG tablet   No No   Sig: Take 1-2 tablets ( mg) by mouth every 6 hours as needed for pain maximum 4 tablet(s) per day      Facility-Administered Medications: None     Allergies   Allergies   Allergen Reactions     Ammonium Lactate Other (See Comments)     Burning to skin      "Doxycycline Visual Disturbance     Monosodium Glutamate Other (See Comments)     Pt states it feels like his \"head swells, visual disturbances, and he can't think striaght\"     Penicillins Rash       Physical Exam   /60  Pulse 105  Temp 100.1  F (37.8  C) (Tympanic)  Resp (!) 36  Wt 54.7 kg (120 lb 9.5 oz)  SpO2 95%  BMI 20.07 kg/m2  GENERAL: Alert and oriented x 3. Sitting up in bed, appears comfortable. Pleasant and conversant.   HEENT: Anicteric sclera. Mucous membranes moist.   CV: RRR. S1, S2. No murmurs appreciated.   RESPIRATORY: Mild tachypnea on 3L NC. Lungs w/ diminished air entry throughout and scattered wheezing.   GI: Abdomen soft and non distended, bowel sounds present. No tenderness, rebound, guarding.   NEUROLOGICAL: No focal deficits. Moves all extremities.   EXTREMITIES: No peripheral edema. Intact bilateral pedal pulses.   SKIN: No jaundice. No rashes.     Data   Data reviewed today: I reviewed all medications, new labs and imaging results over the last 24 hours.                             "

## 2017-09-06 LAB
ANION GAP SERPL CALCULATED.3IONS-SCNC: 6 MMOL/L (ref 3–14)
BUN SERPL-MCNC: 23 MG/DL (ref 7–30)
CALCIUM SERPL-MCNC: 8.9 MG/DL (ref 8.5–10.1)
CHLORIDE SERPL-SCNC: 98 MMOL/L (ref 94–109)
CO2 SERPL-SCNC: 30 MMOL/L (ref 20–32)
CREAT SERPL-MCNC: 0.76 MG/DL (ref 0.66–1.25)
ERYTHROCYTE [DISTWIDTH] IN BLOOD BY AUTOMATED COUNT: 13.2 % (ref 10–15)
GFR SERPL CREATININE-BSD FRML MDRD: >90 ML/MIN/1.7M2
GLUCOSE BLDC GLUCOMTR-MCNC: 136 MG/DL (ref 70–99)
GLUCOSE BLDC GLUCOMTR-MCNC: 187 MG/DL (ref 70–99)
GLUCOSE BLDC GLUCOMTR-MCNC: 189 MG/DL (ref 70–99)
GLUCOSE SERPL-MCNC: 144 MG/DL (ref 70–99)
GRAM STN SPEC: ABNORMAL
HCT VFR BLD AUTO: 43.9 % (ref 40–53)
HGB BLD-MCNC: 14.8 G/DL (ref 13.3–17.7)
INTERPRETATION ECG - MUSE: NORMAL
Lab: ABNORMAL
MCH RBC QN AUTO: 31.6 PG (ref 26.5–33)
MCHC RBC AUTO-ENTMCNC: 33.7 G/DL (ref 31.5–36.5)
MCV RBC AUTO: 94 FL (ref 78–100)
PLATELET # BLD AUTO: 160 10E9/L (ref 150–450)
POTASSIUM SERPL-SCNC: 3.7 MMOL/L (ref 3.4–5.3)
RBC # BLD AUTO: 4.68 10E12/L (ref 4.4–5.9)
SODIUM SERPL-SCNC: 134 MMOL/L (ref 133–144)
SPECIMEN SOURCE: ABNORMAL
WBC # BLD AUTO: 12.3 10E9/L (ref 4–11)

## 2017-09-06 PROCEDURE — 12000000 ZZH R&B MED SURG/OB

## 2017-09-06 PROCEDURE — 94799 UNLISTED PULMONARY SVC/PX: CPT

## 2017-09-06 PROCEDURE — 36415 COLL VENOUS BLD VENIPUNCTURE: CPT | Performed by: PHYSICIAN ASSISTANT

## 2017-09-06 PROCEDURE — 87070 CULTURE OTHR SPECIMN AEROBIC: CPT | Performed by: PHYSICIAN ASSISTANT

## 2017-09-06 PROCEDURE — 87205 SMEAR GRAM STAIN: CPT | Performed by: PHYSICIAN ASSISTANT

## 2017-09-06 PROCEDURE — 80048 BASIC METABOLIC PNL TOTAL CA: CPT | Performed by: PHYSICIAN ASSISTANT

## 2017-09-06 PROCEDURE — 25000132 ZZH RX MED GY IP 250 OP 250 PS 637: Mod: GY | Performed by: PHYSICIAN ASSISTANT

## 2017-09-06 PROCEDURE — 94640 AIRWAY INHALATION TREATMENT: CPT

## 2017-09-06 PROCEDURE — 00000146 ZZHCL STATISTIC GLUCOSE BY METER IP

## 2017-09-06 PROCEDURE — 87040 BLOOD CULTURE FOR BACTERIA: CPT | Performed by: INTERNAL MEDICINE

## 2017-09-06 PROCEDURE — 94640 AIRWAY INHALATION TREATMENT: CPT | Mod: 76

## 2017-09-06 PROCEDURE — 25000128 H RX IP 250 OP 636: Performed by: INTERNAL MEDICINE

## 2017-09-06 PROCEDURE — 36415 COLL VENOUS BLD VENIPUNCTURE: CPT | Performed by: INTERNAL MEDICINE

## 2017-09-06 PROCEDURE — 25000125 ZZHC RX 250: Performed by: PHYSICIAN ASSISTANT

## 2017-09-06 PROCEDURE — 99233 SBSQ HOSP IP/OBS HIGH 50: CPT | Performed by: INTERNAL MEDICINE

## 2017-09-06 PROCEDURE — A9270 NON-COVERED ITEM OR SERVICE: HCPCS | Mod: GY | Performed by: PHYSICIAN ASSISTANT

## 2017-09-06 PROCEDURE — 40000275 ZZH STATISTIC RCP TIME EA 10 MIN

## 2017-09-06 PROCEDURE — 25000128 H RX IP 250 OP 636: Performed by: PHYSICIAN ASSISTANT

## 2017-09-06 PROCEDURE — 85027 COMPLETE CBC AUTOMATED: CPT | Performed by: PHYSICIAN ASSISTANT

## 2017-09-06 RX ORDER — CEFAZOLIN SODIUM 1 G/50ML
1250 SOLUTION INTRAVENOUS EVERY 24 HOURS
Status: DISCONTINUED | OUTPATIENT
Start: 2017-09-06 | End: 2017-09-07

## 2017-09-06 RX ORDER — DEXTROSE MONOHYDRATE 25 G/50ML
25-50 INJECTION, SOLUTION INTRAVENOUS
Status: DISCONTINUED | OUTPATIENT
Start: 2017-09-06 | End: 2017-09-11 | Stop reason: HOSPADM

## 2017-09-06 RX ORDER — NICOTINE POLACRILEX 4 MG
15-30 LOZENGE BUCCAL
Status: DISCONTINUED | OUTPATIENT
Start: 2017-09-06 | End: 2017-09-11 | Stop reason: HOSPADM

## 2017-09-06 RX ADMIN — BUTALBITAL, ACETAMINOPHEN, CAFFEINE, AND CODEINE PHOSPHATE 1 CAPSULE: 30; 50; 40; 325 CAPSULE ORAL at 22:12

## 2017-09-06 RX ADMIN — VANCOMYCIN HYDROCHLORIDE 1250 MG: 100 INJECTION, POWDER, LYOPHILIZED, FOR SOLUTION INTRAVENOUS at 12:50

## 2017-09-06 RX ADMIN — IPRATROPIUM BROMIDE AND ALBUTEROL SULFATE 3 ML: .5; 3 SOLUTION RESPIRATORY (INHALATION) at 12:41

## 2017-09-06 RX ADMIN — TRAMADOL HYDROCHLORIDE 50 MG: 50 TABLET, COATED ORAL at 12:50

## 2017-09-06 RX ADMIN — TRAMADOL HYDROCHLORIDE 50 MG: 50 TABLET, COATED ORAL at 00:27

## 2017-09-06 RX ADMIN — IPRATROPIUM BROMIDE AND ALBUTEROL SULFATE 3 ML: .5; 3 SOLUTION RESPIRATORY (INHALATION) at 19:30

## 2017-09-06 RX ADMIN — SODIUM CHLORIDE: 9 INJECTION, SOLUTION INTRAVENOUS at 11:25

## 2017-09-06 RX ADMIN — IPRATROPIUM BROMIDE AND ALBUTEROL SULFATE 3 ML: .5; 3 SOLUTION RESPIRATORY (INHALATION) at 15:25

## 2017-09-06 RX ADMIN — BUTALBITAL, ACETAMINOPHEN, CAFFEINE, AND CODEINE PHOSPHATE 1 CAPSULE: 30; 50; 40; 325 CAPSULE ORAL at 06:00

## 2017-09-06 RX ADMIN — MULTIPLE VITAMINS W/ MINERALS TAB 1 TABLET: TAB at 08:34

## 2017-09-06 RX ADMIN — BUTALBITAL, ACETAMINOPHEN, CAFFEINE, AND CODEINE PHOSPHATE 1 CAPSULE: 30; 50; 40; 325 CAPSULE ORAL at 16:06

## 2017-09-06 RX ADMIN — IPRATROPIUM BROMIDE AND ALBUTEROL SULFATE 3 ML: .5; 3 SOLUTION RESPIRATORY (INHALATION) at 08:02

## 2017-09-06 RX ADMIN — ATENOLOL 25 MG: 25 TABLET ORAL at 08:34

## 2017-09-06 RX ADMIN — FLUTICASONE FUROATE AND VILANTEROL TRIFENATATE 1 PUFF: 200; 25 POWDER RESPIRATORY (INHALATION) at 08:02

## 2017-09-06 RX ADMIN — PREDNISONE 40 MG: 20 TABLET ORAL at 08:34

## 2017-09-06 RX ADMIN — RANITIDINE 75 MG: 75 TABLET, FILM COATED ORAL at 22:12

## 2017-09-06 RX ADMIN — TRAMADOL HYDROCHLORIDE 50 MG: 50 TABLET, COATED ORAL at 20:08

## 2017-09-06 RX ADMIN — LEVOFLOXACIN 750 MG: 500 TABLET, FILM COATED ORAL at 09:59

## 2017-09-06 ASSESSMENT — PAIN DESCRIPTION - DESCRIPTORS
DESCRIPTORS: ACHING
DESCRIPTORS: ACHING

## 2017-09-06 NOTE — PLAN OF CARE
Problem: Goal Outcome Summary  Goal: Goal Outcome Summary  Outcome: No Change  Pt had no reports of SOB. Pt LS dim. Dyspneic upon ambulating to bathroom. Pt receiving PO levofloxacin, O2 3 LPM. Up ad roya. Alert and oriented. Prednisone ordered.

## 2017-09-06 NOTE — PROVIDER NOTIFICATION
Lab called about critical lab value from the 5th, patient is growing gram cocci c. Notified provider in person. Continue to monitor.

## 2017-09-06 NOTE — PHARMACY-ADMISSION MEDICATION HISTORY
Pharmacy Vancomycin Initial Note  Date of Service 2017  Patient's  1940  76 year old, male    Indication: Bacteremia    Current estimated CrCl = Estimated Creatinine Clearance: 65.4 mL/min (based on Cr of 0.76).    Creatinine for last 3 days  2017: 12:10 PM Creatinine 0.68 mg/dL  2017:  6:22 AM Creatinine 0.76 mg/dL    Recent Vancomycin Level(s) for last 3 days  No results found for requested labs within last 72 hours.      Vancomycin IV Administrations (past 72 hours)      No vancomycin orders with administrations in past 72 hours.                Nephrotoxins and other renal medications (Future)    Start     Dose/Rate Route Frequency Ordered Stop    17 1200  vancomycin 1250 mg in 0.9% NaCl 250 mL PREMIX      1,250 mg Intravenous EVERY 24 HOURS 17 1131            Contrast Orders - past 72 hours     None                Plan:  1.  Start vancomycin  1250 mg IV q24h.   2.  Goal Trough Level: 15-20 mg/L   3.  Pharmacy will check trough levels as appropriate in 3-5 Days.    4. Serum creatinine levels will be ordered daily for the first week of therapy and at least twice weekly for subsequent weeks.    5. [place] method utilized to dose vancomycin therapy: Method 1    Jayden Christie

## 2017-09-06 NOTE — CONSULTS
Care Transition Initial Assessment - RN    Reason For Consult: care coordination/care conference, discharge planning, financial concerns   Met with: Patient.    DATA   Active Problems:    COPD exacerbation (H)       Cognitive Status: awake, alert and oriented.  Primary Care Clinic Name: [facility]  Primary Care MD Name: Dr Reid  Contact information and PCP information verified: Yes    Lives With: friend(s)     Quality Of Family Relationships: disruptive, stressful                Insurance concerns: Underinsured/limits on insurance.  I called Financial advisors and left a vm to see if pt would be a candidate for MA    ASSESSMENT  Patient currently receives the following services:  NONE        Identified issues/concerns regarding health management: Pt is admitted with COPD and PNA.  He lives in the basement and has 4 room mates that smoke.  They work night shift so they are usually sleeping through out day.  They also have yippy dogs in the house that make it difficult.  He said he is having a difficult time taking care of himself and ambulating.  Pt continues to smoke 1 ppd and isn't interested in quitting currently.  He had been to TCU in the past, but had a poor experience.  We talked about [facility] DBL room, [facility] room and [facility] private room-no fee.  He will see how things go with PT and then decide if I can send a referral.  Pt said Home Care isn't an option d/t where he lives and the environment.       Pt continues to smoke 1 ppd.  He isn't interested in quitting.  His roommates smoke also.  Pt did have questions about his medications for his COPD with frequency/scheduling.  I did place in Pharmacy consult for dc.  Given COPD action care plan.  Pt couldn't read it.  I did give him glasses and he can see better.      Pt does have PNA.  He was also given PNA action care plan.    Pt has DM.  He lost his last glucometer.  He is requesting a new one with supplies.  Dr Frye  did order it today.  His last a1c was 5.7 as of 5/1/17.    Pt was suppose to connect with [facility] for EW assessment during his last hospitalization, but he never did.  I called [facility] and they should be calling me back to set up a time.  This is a process and pt is aware it could take up to a month until his assessment is complete.  He said in the past he was offered an apt through [facility], but declined it because he still wanted to keep working.  Pt isn't able to work anymore, can't take care of himself and doesn't have a place of his own.  He said he usually is able to figure out transportation out with his friends.        I contacted Zoya from [employer] and she will see pt today about MA application. He said he gets $775 from [employer] monthly.  $16 in food stamps.               PLAN  Patient given options and choices for discharge YES .  Patient/family is agreeable to the plan?  Yes: awaiting PT consults and pt's wishes  Patient anticipates discharging to home vs TCU .        Patient anticipates needs for home equipment: No  Discharge Planner   Discharge Plans in progress: yes  Barriers to discharge plan: pt is deconditioned, awaiting to see how he progresses  Plan/Disposition: Home vs TCU    Care  (CTS) will continue to follow as needed.    Melanie LEWIS CTS [phone]

## 2017-09-06 NOTE — PLAN OF CARE
Problem: Goal Outcome Summary  Goal: Goal Outcome Summary  Outcome: No Change  Patient's VA are stable, afebrile, headache noted and requested medication intervention. Patient up ad roya and manages oxygen tubing and IV appropriately, continues oxygen via nasal cannula at 2LPM. Patient awake often during night and wandering in room. LS diminished with some fatigue on exertion.

## 2017-09-06 NOTE — CONSULTS
"CLINICAL NUTRITION SERVICES  -  ASSESSMENT NOTE - MD consult      Future/Additional Recommendations: Oral nutrition supplements--Patient refused. Consider soft mechanical diet if intake is low.    Malnutrition: Severe malnutrition in the context of acute on chronic illness and social/environmental conditions.        REASON FOR ASSESSMENT  Chuck Lopez is a 76 year old male seen by Registered Dietitian for Provider Order - Malnutrition      NUTRITION HISTORY  - Information obtained from patient and EMR.  -Chuck Lopez is a 76 year old male with a hx of HTN, DM II, hep C, chronic neck pain, COPD. Presents with COPD exacerbation and possible pneumonia.     -No N/V but reported some dry heaving. Reports decreased appetite especially before admission because difficult breathing made eating difficult. Appetite has returned and is slightly higher than normal since admission.     - Patient is on a limit diet at home due to access issues. He eats very little fresh food. Common breakfast is toast with peanut butter and coffee, lunch and dinner might be canned tuna with canned peas and water. Eats some milk, bread, canned fruit. Not currently consuming supplements.     -Patient also reports that he has problems eating with poor dentition, but he has been able to order appropriately from regular menu.       CURRENT NUTRITION ORDERS  Diet Order:     Regular     Current Intake/Tolerance:  % of ordered meals (likely meeting >75% of estimated calorie needs and ~75% of protein needs)      PHYSICAL FINDINGS  Observed  -Dry skin, flaking on arms  -Muscle wasting: deep depression in chest consistent with severe pectoralis wasting, temporal region scooping consistent with severe depletion, Clavicle bone protruding, posterior calf poor definition consistent with moderate depletion   -Poor dentition  Obtained from Chart/Interdisciplinary Team  No peripheral edema noted    ANTHROPOMETRICS  Height: 5' 5\"  Weight: 54.2 kg " (119 lbs 8.01 oz)  Body mass index is 19.89 kg/(m^2).  Weight Status:  Normal BMI  IBW: 61.8 kg (136 lb)   87.5% IBW    Weight History:  Wt Readings from Last 10 Encounters:   09/05/17 54.2 kg (119 lb 8 oz)   08/25/17 54.7 kg (120 lb 9.6 oz)   06/23/17 54.1 kg (119 lb 3.2 oz)   05/13/17 50 kg (110 lb 3.7 oz)   05/01/17 52.1 kg (114 lb 12.8 oz)   04/18/17 51.8 kg (114 lb 2 oz)   04/04/17 50.8 kg (111 lb 15.9 oz)   01/24/17 51.2 kg (112 lb 12.8 oz)   01/17/17 49.5 kg (109 lb 3.2 oz)   01/12/17 47.3 kg (104 lb 3.2 oz)     Pt has been at 81-88% IBW for over 6 months and as low as 76% of IBW in the last 9 months.    LABS  Labs reviewed  HgbA1c 5.7%  Na 131    MEDICATIONS  Medications reviewed  IV methylprednisolone in ED; prednisone 40 mg daily.   IV NS @ 100 mL/hr  Multivitamin/multimineral     Dosing Weight 54.2 kg (admit wt)    ASSESSED NUTRITION NEEDS PER APPROVED PRACTICE GUIDELINES  Estimated Energy Needs: 9653-0656 kcals (30-35 kcal/kg)  Justification: repletion  Estimated Protein Needs: 65-81 grams protein (1.2-1.5+ g pro/kg)  Justification: repletion  Estimated Fluid Needs: > 1 mL/kcal  Justification: maintenance    MALNUTRITION  % Weight Loss:  Weight loss does not meet criteria for malnutrition   % Intake:  Decreased intake does not meet criteria for malnutrition   Subcutaneous Fat Loss: Severe depletion in arms   Muscle Loss:  Severe depletion in chest, clavicle region, temporal region. Moderate depletion of calves.   Fluid Retention:  None noted    Malnutrition Diagnosis: Severe malnutrition  In context of acute illness or injury on chronic disease    NUTRITION DIAGNOSIS:  Malnutrition related to long-term inadequate intake and possible increased needs secondary to COPD as evidenced by persistently low body weight of 81-88% IBW for over 6 months and as low as 76% of IBW over 9 months, with severe fat and muscle loss.        NUTRITION INTERVENTIONS  Recommendations / Nutrition Prescription  Continue regular  diet  Could consider switching to mechanical soft diet if patient has trouble eating normal meals.   Oral supplements--patient currently refusing e     Implementation  Nutrition education: No education needs assessed at this time. Food access will be a concern after discharge.   Care coordinator following pt.   Collaboration and referral of care: Discussed pt in multidisciplinary rounds this AM.     Nutrition Goals  Meet 100% of calories and protein needs through meals TID within 24 hrs.       MONITORING AND EVALUATION  Progress towards goals will be monitored and evaluated per protocol and Practice Guidelines

## 2017-09-06 NOTE — PLAN OF CARE
Problem: Goal Outcome Summary  Goal: Goal Outcome Summary  Outcome: Declining  Pt was admitted PNA. Critical lab values: blood culture now with gram positive cocci and staphylococcus, isatu MAXWELL. Continued on Levaquin.  Add IV Vanco for now. Continues on oxygen due to shortness of breath. Will be here for a few more days before discharge, possibility to TCU. Patient has discharge medication in the return to pharm lock box (diabetes equipment to help teaching). Continue plan of care.

## 2017-09-06 NOTE — PROGRESS NOTES
Respiratory Therapy Note        Started Pt on Aerobika therapy and gave instruction in the IS.    September 6, 2017 3:39 PM  Jayden Bunn

## 2017-09-06 NOTE — PROGRESS NOTES
"[facility]  Hospitalist Progress Note  Henry Frye, DO 09/06/2017    Reason for Stay (Diagnosis): Pneumonia         Assessment and Plan:      Summary of Stay: Chuck Lopez is a 76 year old male with a past medical history of HTN, DM II, hepatitis C, chronic neck pain, and COPD  admitted on 9/5/2017 with pneumonia and COPD exacerbation.    Problem List:   1. Pneumonia.  One blood culture now with gram positive cocci.  Continue Levaquin.  Add IV Vanco for now.  Recheck blood cultures today.  2. Bacteremia.  One blood culture from 9/5 with gram positive cocci.  Start IV Vanco.  Recheck two blood cultures today.  3. COPD exacerbation.  Continue Levaquin, Prednisone, Breo Ellipta, Fluticasone, Duonebs.  Albuterol PRN.  4. Sepsis.  Due to pneumonia and possible bacteremia.  Vanco and Levaquin as above.  5. HTN.  Atenolol.  6. Diabetes Mellitus.  Start Novolog SSI.  7. GERD.  Zantac.  8. Deconditioning.  PT consult.  9. Tobacco use disorder.  Reinforced advise for long term cessation today.  Currently, does not feel need for Nicotine replacement therapy.  10. Malnutrition.  RD consult.  DVT Prophylaxis: Pneumatic Compression Devices  Code Status: Full Code  Discharge Dispo: TBD.  Estimated Disch Date / # of Days until Disch: 2-4        Interval History (Subjective):      Short of breath.  Coughing.  No CP, F/C, N/V, or diarrhea.                  Physical Exam:      Last Vital Signs:  /57  Pulse 84  Temp 96.6  F (35.9  C) (Oral)  Resp 16  Ht 1.651 m (5' 5\")  Wt 54.2 kg (119 lb 8 oz)  SpO2 94%  BMI 19.89 kg/m2    Gen:  Cachetic, NAD, A&Ox3.  Eyes:  PERRL, sclera anicteric.  OP:  MMM, no lesions.  Neck:  Supple.  CV:  Regular, no murmurs.  Lung:  Poor air movement b/l, wheeze b/l.  Ab:  +BS, soft.  Skin:  Warm, dry to touch.  No rash.  Ext:  No pitting edema LE b/l.           Medications:      All current medications were reviewed with changes reflected in problem list.         Data:    "   All new lab and imaging data was reviewed.   Labs:    Recent Labs  Lab 09/06/17  0622      POTASSIUM 3.7   CHLORIDE 98   CO2 30   ANIONGAP 6   *   BUN 23   CR 0.76   GFRESTIMATED >90   GFRESTBLACK >90   MATTHEW 8.9       Recent Labs  Lab 09/06/17  0622   WBC 12.3*   HGB 14.8   HCT 43.9   MCV 94         Imaging:   Recent Results (from the past 24 hour(s))   Chest XR,  PA & LAT    Narrative    XR CHEST 2 VW 9/5/2017 2:14 PM    HISTORY: dyspnea      Impression    IMPRESSION: COPD. Mild infiltrate or atelectasis right lung base.    AUDIE HIDALGO MD

## 2017-09-07 ENCOUNTER — APPOINTMENT (OUTPATIENT)
Dept: PHYSICAL THERAPY | Facility: CLINIC | Age: 77
DRG: 190 | End: 2017-09-07
Attending: INTERNAL MEDICINE
Payer: MEDICARE

## 2017-09-07 LAB
CREAT SERPL-MCNC: 0.61 MG/DL (ref 0.66–1.25)
GFR SERPL CREATININE-BSD FRML MDRD: >90 ML/MIN/1.7M2
GLUCOSE BLDC GLUCOMTR-MCNC: 100 MG/DL (ref 70–99)
GLUCOSE BLDC GLUCOMTR-MCNC: 128 MG/DL (ref 70–99)
HBA1C MFR BLD: 5.5 % (ref 4.3–6)

## 2017-09-07 PROCEDURE — 25000132 ZZH RX MED GY IP 250 OP 250 PS 637: Mod: GY | Performed by: INTERNAL MEDICINE

## 2017-09-07 PROCEDURE — 25000132 ZZH RX MED GY IP 250 OP 250 PS 637: Mod: GY | Performed by: HOSPITALIST

## 2017-09-07 PROCEDURE — 00000146 ZZHCL STATISTIC GLUCOSE BY METER IP

## 2017-09-07 PROCEDURE — 97161 PT EVAL LOW COMPLEX 20 MIN: CPT | Mod: GP | Performed by: PHYSICAL THERAPIST

## 2017-09-07 PROCEDURE — 94640 AIRWAY INHALATION TREATMENT: CPT | Mod: 76

## 2017-09-07 PROCEDURE — 82565 ASSAY OF CREATININE: CPT | Performed by: INTERNAL MEDICINE

## 2017-09-07 PROCEDURE — 25000131 ZZH RX MED GY IP 250 OP 636 PS 637: Mod: GY | Performed by: INTERNAL MEDICINE

## 2017-09-07 PROCEDURE — 40000275 ZZH STATISTIC RCP TIME EA 10 MIN

## 2017-09-07 PROCEDURE — 25000125 ZZHC RX 250: Performed by: PHYSICIAN ASSISTANT

## 2017-09-07 PROCEDURE — 25000128 H RX IP 250 OP 636: Performed by: INTERNAL MEDICINE

## 2017-09-07 PROCEDURE — 94640 AIRWAY INHALATION TREATMENT: CPT

## 2017-09-07 PROCEDURE — 25000132 ZZH RX MED GY IP 250 OP 250 PS 637: Mod: GY | Performed by: PHYSICIAN ASSISTANT

## 2017-09-07 PROCEDURE — 83036 HEMOGLOBIN GLYCOSYLATED A1C: CPT | Performed by: INTERNAL MEDICINE

## 2017-09-07 PROCEDURE — A9270 NON-COVERED ITEM OR SERVICE: HCPCS | Mod: GY | Performed by: PHYSICIAN ASSISTANT

## 2017-09-07 PROCEDURE — 99232 SBSQ HOSP IP/OBS MODERATE 35: CPT | Performed by: INTERNAL MEDICINE

## 2017-09-07 PROCEDURE — 12000000 ZZH R&B MED SURG/OB

## 2017-09-07 PROCEDURE — 40000193 ZZH STATISTIC PT WARD VISIT: Performed by: PHYSICAL THERAPIST

## 2017-09-07 PROCEDURE — A9270 NON-COVERED ITEM OR SERVICE: HCPCS | Mod: GY | Performed by: INTERNAL MEDICINE

## 2017-09-07 PROCEDURE — 97110 THERAPEUTIC EXERCISES: CPT | Mod: GP | Performed by: PHYSICAL THERAPIST

## 2017-09-07 PROCEDURE — 25900017 H RX MED GY IP 259 OP 259 PS 637: Mod: GY | Performed by: PHYSICIAN ASSISTANT

## 2017-09-07 PROCEDURE — 36415 COLL VENOUS BLD VENIPUNCTURE: CPT | Performed by: INTERNAL MEDICINE

## 2017-09-07 PROCEDURE — A9270 NON-COVERED ITEM OR SERVICE: HCPCS | Mod: GY | Performed by: HOSPITALIST

## 2017-09-07 PROCEDURE — 97116 GAIT TRAINING THERAPY: CPT | Mod: GP | Performed by: PHYSICAL THERAPIST

## 2017-09-07 RX ORDER — GUAIFENESIN 600 MG/1
600 TABLET, EXTENDED RELEASE ORAL 2 TIMES DAILY
Status: DISCONTINUED | OUTPATIENT
Start: 2017-09-07 | End: 2017-09-09

## 2017-09-07 RX ADMIN — IPRATROPIUM BROMIDE AND ALBUTEROL SULFATE 3 ML: .5; 3 SOLUTION RESPIRATORY (INHALATION) at 19:15

## 2017-09-07 RX ADMIN — TRAMADOL HYDROCHLORIDE 50 MG: 50 TABLET, COATED ORAL at 19:33

## 2017-09-07 RX ADMIN — FLUTICASONE FUROATE AND VILANTEROL TRIFENATATE 1 PUFF: 200; 25 POWDER RESPIRATORY (INHALATION) at 07:36

## 2017-09-07 RX ADMIN — ATENOLOL 25 MG: 25 TABLET ORAL at 08:39

## 2017-09-07 RX ADMIN — IPRATROPIUM BROMIDE AND ALBUTEROL SULFATE 3 ML: .5; 3 SOLUTION RESPIRATORY (INHALATION) at 07:23

## 2017-09-07 RX ADMIN — LEVOFLOXACIN 750 MG: 500 TABLET, FILM COATED ORAL at 10:11

## 2017-09-07 RX ADMIN — FLUTICASONE FUROATE 1 PUFF: 100 POWDER RESPIRATORY (INHALATION) at 07:36

## 2017-09-07 RX ADMIN — PREDNISONE 40 MG: 20 TABLET ORAL at 08:39

## 2017-09-07 RX ADMIN — GUAIFENESIN 600 MG: 600 TABLET, EXTENDED RELEASE ORAL at 20:09

## 2017-09-07 RX ADMIN — RANITIDINE 75 MG: 75 TABLET, FILM COATED ORAL at 21:27

## 2017-09-07 RX ADMIN — IPRATROPIUM BROMIDE AND ALBUTEROL SULFATE 3 ML: .5; 3 SOLUTION RESPIRATORY (INHALATION) at 15:00

## 2017-09-07 RX ADMIN — TRAMADOL HYDROCHLORIDE 50 MG: 50 TABLET, COATED ORAL at 01:37

## 2017-09-07 RX ADMIN — INSULIN ASPART 1 UNITS: 100 INJECTION, SOLUTION INTRAVENOUS; SUBCUTANEOUS at 18:28

## 2017-09-07 RX ADMIN — BUTALBITAL, ACETAMINOPHEN, CAFFEINE, AND CODEINE PHOSPHATE 1 CAPSULE: 30; 50; 40; 325 CAPSULE ORAL at 22:27

## 2017-09-07 RX ADMIN — IPRATROPIUM BROMIDE AND ALBUTEROL SULFATE 3 ML: .5; 3 SOLUTION RESPIRATORY (INHALATION) at 11:25

## 2017-09-07 RX ADMIN — NICOTINE 1 CARTRIDGE: 4 INHALANT RESPIRATORY (INHALATION) at 06:48

## 2017-09-07 RX ADMIN — MULTIPLE VITAMINS W/ MINERALS TAB 1 TABLET: TAB at 08:39

## 2017-09-07 RX ADMIN — TRAMADOL HYDROCHLORIDE 100 MG: 50 TABLET, COATED ORAL at 13:03

## 2017-09-07 RX ADMIN — BUTALBITAL, ACETAMINOPHEN, CAFFEINE, AND CODEINE PHOSPHATE 1 CAPSULE: 30; 50; 40; 325 CAPSULE ORAL at 15:58

## 2017-09-07 RX ADMIN — BUTALBITAL, ACETAMINOPHEN, CAFFEINE, AND CODEINE PHOSPHATE 1 CAPSULE: 30; 50; 40; 325 CAPSULE ORAL at 06:47

## 2017-09-07 RX ADMIN — VANCOMYCIN HYDROCHLORIDE 1250 MG: 100 INJECTION, POWDER, LYOPHILIZED, FOR SOLUTION INTRAVENOUS at 13:03

## 2017-09-07 ASSESSMENT — PAIN DESCRIPTION - DESCRIPTORS: DESCRIPTORS: ACHING

## 2017-09-07 NOTE — PROVIDER NOTIFICATION
Pt states having nicotine withdrawal, can we get the nicotine inhaler? Please advise, thank you!  -Inhaler ordered

## 2017-09-07 NOTE — PLAN OF CARE
Problem: Goal Outcome Summary  Goal: Goal Outcome Summary  Outcome: No Change  Pt resting comfortably. AO. VSS- BP hypertensive. On 1L O2. Pain controlled with Ultram. Slight LEVINE, otherwise no sob reported. Blood sugar 128. Transfers ind/sba. Voiding. Md notified: Pt reports feeling the withdrawal from cigarettes, education provided and nicatrol inhaler ordered. Will continue to monitor.

## 2017-09-07 NOTE — PLAN OF CARE
Problem: Goal Outcome Summary  Goal: Goal Outcome Summary  Outcome: Improving  Reports good pain control with po fioricet and ultram. No complain ts of nausea. Good appetite. Reports not sleeping well. SOB, occasional cough. Up independently in room. Sats 93% on   1 L/min O2.

## 2017-09-07 NOTE — PROGRESS NOTES
"[facility]  Hospitalist Progress Note  Henry Frye,  09/07/2017    Reason for Stay (Diagnosis): Pneumonia         Assessment and Plan:      Summary of Stay: Chuck Lopez is a 76 year old male with a past medical history of HTN, DM II, hepatitis C, chronic neck pain, and COPD  admitted on 9/5/2017 with pneumonia and COPD exacerbation.    Problem List:   1. Pneumonia.  One blood culture now with gram positive cocci.  Suspect this is a contaminant.  Continue Levaquin.  Stop IV Vanco for now.    2. One Positive blood culture from 9/5 with gram positive cocci.  Suspect contaminant.  Stop IV Vanco.  Await final culture results.  3. COPD exacerbation.  Continue Levaquin, Prednisone, Breo Ellipta, Fluticasone, Duonebs.  Albuterol PRN.  4. Sepsis.  Due to pneumonia.  Levaquin as above.  5. HTN.  Atenolol.  6. Diabetes Mellitus.  HgbA1c only 5.5.  Will likely only need diet control as outpatient.  Continue Novolog SSI while on prednisone and in hospital.  7. GERD.  Zantac.  8. Deconditioning.  PT consult.  9. Tobacco use disorder.  Nicotrol inhaler.  10. Malnutrition.  RD consult.  DVT Prophylaxis: Pneumatic Compression Devices  Code Status: Full Code  Discharge Dispo: TBD.  Estimated Disch Date / # of Days until Disch: 2-3        Interval History (Subjective):      Short of breath.  Coughing.  No CP, F/C, N/V, or diarrhea.                  Physical Exam:      Last Vital Signs:  /59 (BP Location: Right arm)  Pulse 86  Temp 97.6  F (36.4  C) (Oral)  Resp 16  Ht 1.651 m (5' 5\")  Wt 54.2 kg (119 lb 8 oz)  SpO2 93%  BMI 19.89 kg/m2    Gen:  NAD, A&Ox3.  Eyes:  PERRL, sclera anicteric.  OP:  MMM, no lesions.  Neck:  Supple.  CV:  Regular, no murmurs.  Lung:  Poor air movement b/l, wheeze b/l.  Ab:  +BS, soft.  Skin:  Warm, dry to touch.  No rash.  Ext:  No pitting edema LE b/l.           Medications:      All current medications were reviewed with changes reflected in problem list.         " Data:      All new lab and imaging data was reviewed.   Labs:    Recent Labs  Lab 09/07/17 0745 09/06/17 0622   NA  --  134   POTASSIUM  --  3.7   CHLORIDE  --  98   CO2  --  30   ANIONGAP  --  6   GLC  --  144*   BUN  --  23   CR 0.61* 0.76   GFRESTIMATED >90 >90   GFRESTBLACK >90 >90   MATTHEW  --  8.9       Recent Labs  Lab 09/06/17 0622   WBC 12.3*   HGB 14.8   HCT 43.9   MCV 94         Imaging:   No results found for this or any previous visit (from the past 24 hour(s)).

## 2017-09-07 NOTE — PLAN OF CARE
Problem: Goal Outcome Summary  Goal: Goal Outcome Summary  Outcome: No Change  Patient was admitted for PNA. On 1L O2, trying to wean off oxygen. A&Ox4. VSS- BP hypertensive.  Pain controlled with Ultram, given once this shift. Blood sugar at 7- 100 and 12- 156 (held insulin due to patient already eating when I took BS). Transfers ind/sba. Voiding well. Pt reports feeling the withdrawal from cigarettes, education provided and nicatrol inhaler ordered. Plan to discharge 2-3 days from now. Started DM education, needs to continue to use meter and giving insulin. Will continue to monitor.

## 2017-09-07 NOTE — PROGRESS NOTES
09/07/17 0841   Quick Adds   Type of Visit Initial PT Evaluation   Living Environment   Lives With friend(s)   Living Arrangements house  (for a few days; homeless otherwise)   Home Accessibility stairs to enter home;stairs within home;bed and bath on same level   Number of Stairs to Enter Home 3  (no rail;)   Number of Stairs Within Home 13  (rail present)   Transportation Available other (see comments)  (unsure)   Living Environment Comment *irritable about questioning his living environment   Self-Care   Usual Activity Tolerance moderate   Current Activity Tolerance fair   Equipment Currently Used at Home oxygen  (prn)   Activity/Exercise/Self-Care Comment patient reports inidependence; no device; doesn't carry O2 with him   Functional Level Prior   Ambulation 0-->independent   Transferring 0-->independent   Toileting 0-->independent   Bathing 0-->independent   Dressing 0-->independent   Eating 0-->independent   Communication 0-->understands/communicates without difficulty   Swallowing 0-->swallows foods/liquids without difficulty   Cognition 0 - no cognition issues reported   Fall history within last six months no   Which of the above functional risks had a recent onset or change? ambulation  (tolerance for activity)   Prior Functional Level Comment pt has difficulty with flight of stairs at home due to SOB   General Information   Onset of Illness/Injury or Date of Surgery - Date 09/05/17   Referring Physician Henry Frye D, DO   Patient/Family Goals Statement return to prior living situation   Pertinent History of Current Problem (include personal factors and/or comorbidities that impact the POC) Chuck Lopez is a 76 year old male with a past medical history of HTN, DM II, hepatitis C, chronic neck pain, and COPD  admitted on 9/5/2017 with pneumonia and COPD exacerbation.  ; see medical record for further information   Precautions/Limitations fall precautions;oxygen therapy device and L/min  "  General Observations patient irritable during subjective information but then apologetic   Cognitive Status Examination   Orientation orientation to person, place and time   Level of Consciousness alert   Follows Commands and Answers Questions 100% of the time   Personal Safety and Judgment intact   Memory intact   Pain Assessment   Patient Currently in Pain No   Posture    Posture Comments forward head; rounded shoulders   Range of Motion (ROM)   ROM Comment WFL   Strength   Strength Comments WF   Bed Mobility   Bed Mobility Comments independent   Transfer Skills   Transfer Comments SBA; assist to manage O2   Gait   Gait Comments SBA; assist to manage O2; mild path deviations; near baseline per patient   Balance   Balance Comments baseline mild impairment; reports holding onto objects in the environment; baseline neuropathy   Sensory Examination   Sensory Perception Comments neuropathy in bilateral feet   General Therapy Interventions   Planned Therapy Interventions progressive activity/exercise;gait training   Clinical Impression   Criteria for Skilled Therapeutic Intervention yes, treatment indicated   PT Diagnosis impaired gait; decreased activity tolerance   Influenced by the following impairments decreased activity tolerance; SOB; need for O2; baseline balance impairment; neuropathy   Functional limitations due to impairments difficulty with stairs; decreased gait tolerance   Clinical Presentation Stable/Uncomplicated   Clinical Presentation Rationale SBA for mobility; improving medically   Clinical Decision Making (Complexity) Low complexity   Therapy Frequency` daily   Predicted Duration of Therapy Intervention (days/wks) 3 days   Anticipated Equipment Needs at Discharge other (see comments)  (O2?)   Anticipated Discharge Disposition Home   Risk & Benefits of therapy have been explained Yes   Patient, Family & other staff in agreement with plan of care Yes   [facility] AM-PAC TM \"6 Clicks\"   2016, " "Trustees of [facility], under license to [employer].  All rights reserved.   6 Clicks Short Forms Basic Mobility Inpatient Short Form   [facility] AM-PAC  \"6 Clicks\" V.2 Basic Mobility Inpatient Short Form   1. Turning from your back to your side while in a flat bed without using bedrails? 4 - None   2. Moving from lying on your back to sitting on the side of a flat bed without using bedrails? 4 - None   3. Moving to and from a bed to a chair (including a wheelchair)? 3 - A Little   4. Standing up from a chair using your arms (e.g., wheelchair, or bedside chair)? 3 - A Little   5. To walk in hospital room? 3 - A Little   6. Climbing 3-5 steps with a railing? 3 - A Little   Basic Mobility Raw Score (Score out of 24.Lower scores equate to lower levels of function) 20   Total Evaluation Time   Total Evaluation Time (Minutes) 15     "

## 2017-09-07 NOTE — PLAN OF CARE
Problem: Goal Outcome Summary  Goal: Goal Outcome Summary  Discharge Planner PT   Patient plan for discharge: return back to prior living situation  Current status: SBA for gait x 100 feet in hallway without an assistive device; mild path deviations which patient able to self correct for; use of 2L O2; O2 sats 94% at rest and decreased briefly to 88% with activity; returned to 90's in less than 1 minute with rest. Reports he feels better now than he has in weeks.  Barriers to return to prior living situation: possible O2 needs; stairs  Recommendations for discharge: return to prior living situation with possible O2 for activity pending progress  Rationale for recommendations: decreased activity tolerance, generalized weakness; on 2L O2 currently and normally only uses it prn       Entered by: Elizabet Limon 09/07/2017 9:12 AM

## 2017-09-08 ENCOUNTER — APPOINTMENT (OUTPATIENT)
Dept: CT IMAGING | Facility: CLINIC | Age: 77
DRG: 190 | End: 2017-09-08
Attending: INTERNAL MEDICINE
Payer: MEDICARE

## 2017-09-08 LAB
BACTERIA SPEC CULT: ABNORMAL
BACTERIA SPEC CULT: NORMAL
CREAT SERPL-MCNC: 0.74 MG/DL (ref 0.66–1.25)
GFR SERPL CREATININE-BSD FRML MDRD: >90 ML/MIN/1.7M2
GLUCOSE BLDC GLUCOMTR-MCNC: 113 MG/DL (ref 70–99)
GLUCOSE BLDC GLUCOMTR-MCNC: 144 MG/DL (ref 70–99)
GLUCOSE BLDC GLUCOMTR-MCNC: 154 MG/DL (ref 70–99)
GLUCOSE BLDC GLUCOMTR-MCNC: 215 MG/DL (ref 70–99)
GLUCOSE BLDC GLUCOMTR-MCNC: 94 MG/DL (ref 70–99)
Lab: ABNORMAL
SPECIMEN SOURCE: ABNORMAL
SPECIMEN SOURCE: NORMAL

## 2017-09-08 PROCEDURE — 40000141 ZZH STATISTIC PERIPHERAL IV START W/O US GUIDANCE

## 2017-09-08 PROCEDURE — 36415 COLL VENOUS BLD VENIPUNCTURE: CPT | Performed by: INTERNAL MEDICINE

## 2017-09-08 PROCEDURE — 40000275 ZZH STATISTIC RCP TIME EA 10 MIN

## 2017-09-08 PROCEDURE — A9270 NON-COVERED ITEM OR SERVICE: HCPCS | Mod: GY | Performed by: PHYSICIAN ASSISTANT

## 2017-09-08 PROCEDURE — A9270 NON-COVERED ITEM OR SERVICE: HCPCS | Mod: GY | Performed by: INTERNAL MEDICINE

## 2017-09-08 PROCEDURE — 00000146 ZZHCL STATISTIC GLUCOSE BY METER IP

## 2017-09-08 PROCEDURE — 71260 CT THORAX DX C+: CPT

## 2017-09-08 PROCEDURE — 94640 AIRWAY INHALATION TREATMENT: CPT

## 2017-09-08 PROCEDURE — 25000128 H RX IP 250 OP 636: Performed by: INTERNAL MEDICINE

## 2017-09-08 PROCEDURE — 82565 ASSAY OF CREATININE: CPT | Performed by: INTERNAL MEDICINE

## 2017-09-08 PROCEDURE — 25000132 ZZH RX MED GY IP 250 OP 250 PS 637: Mod: GY | Performed by: INTERNAL MEDICINE

## 2017-09-08 PROCEDURE — 94640 AIRWAY INHALATION TREATMENT: CPT | Mod: 76

## 2017-09-08 PROCEDURE — 99233 SBSQ HOSP IP/OBS HIGH 50: CPT | Performed by: INTERNAL MEDICINE

## 2017-09-08 PROCEDURE — 25000125 ZZHC RX 250: Performed by: PHYSICIAN ASSISTANT

## 2017-09-08 PROCEDURE — 25000132 ZZH RX MED GY IP 250 OP 250 PS 637: Mod: GY | Performed by: PHYSICIAN ASSISTANT

## 2017-09-08 PROCEDURE — 12000000 ZZH R&B MED SURG/OB

## 2017-09-08 RX ORDER — HYDROCHLOROTHIAZIDE 25 MG/1
25 TABLET ORAL DAILY
Status: DISCONTINUED | OUTPATIENT
Start: 2017-09-08 | End: 2017-09-11 | Stop reason: HOSPADM

## 2017-09-08 RX ORDER — IOPAMIDOL 755 MG/ML
500 INJECTION, SOLUTION INTRAVASCULAR ONCE
Status: COMPLETED | OUTPATIENT
Start: 2017-09-08 | End: 2017-09-08

## 2017-09-08 RX ORDER — POLYETHYLENE GLYCOL 3350 17 G/17G
17 POWDER, FOR SOLUTION ORAL 2 TIMES DAILY PRN
Status: DISCONTINUED | OUTPATIENT
Start: 2017-09-08 | End: 2017-09-11 | Stop reason: HOSPADM

## 2017-09-08 RX ADMIN — BUTALBITAL, ACETAMINOPHEN, CAFFEINE, AND CODEINE PHOSPHATE 1 CAPSULE: 30; 50; 40; 325 CAPSULE ORAL at 08:26

## 2017-09-08 RX ADMIN — BUTALBITAL, ACETAMINOPHEN, CAFFEINE, AND CODEINE PHOSPHATE 1 CAPSULE: 30; 50; 40; 325 CAPSULE ORAL at 14:56

## 2017-09-08 RX ADMIN — MULTIPLE VITAMINS W/ MINERALS TAB 1 TABLET: TAB at 08:17

## 2017-09-08 RX ADMIN — PREDNISONE 40 MG: 20 TABLET ORAL at 08:17

## 2017-09-08 RX ADMIN — RANITIDINE 75 MG: 75 TABLET, FILM COATED ORAL at 22:22

## 2017-09-08 RX ADMIN — SODIUM CHLORIDE 75 ML: 9 INJECTION, SOLUTION INTRAVENOUS at 17:08

## 2017-09-08 RX ADMIN — GUAIFENESIN 600 MG: 600 TABLET, EXTENDED RELEASE ORAL at 20:21

## 2017-09-08 RX ADMIN — INSULIN ASPART 1 UNITS: 100 INJECTION, SOLUTION INTRAVENOUS; SUBCUTANEOUS at 12:35

## 2017-09-08 RX ADMIN — HYDROCHLOROTHIAZIDE 25 MG: 25 TABLET ORAL at 16:41

## 2017-09-08 RX ADMIN — POLYETHYLENE GLYCOL 3350 17 G: 17 POWDER, FOR SOLUTION ORAL at 16:43

## 2017-09-08 RX ADMIN — FLUTICASONE FUROATE 1 PUFF: 100 POWDER RESPIRATORY (INHALATION) at 08:08

## 2017-09-08 RX ADMIN — IPRATROPIUM BROMIDE AND ALBUTEROL SULFATE 3 ML: .5; 3 SOLUTION RESPIRATORY (INHALATION) at 15:54

## 2017-09-08 RX ADMIN — LEVOFLOXACIN 750 MG: 500 TABLET, FILM COATED ORAL at 11:18

## 2017-09-08 RX ADMIN — ATENOLOL 25 MG: 25 TABLET ORAL at 08:17

## 2017-09-08 RX ADMIN — FLUTICASONE FUROATE AND VILANTEROL TRIFENATATE 1 PUFF: 200; 25 POWDER RESPIRATORY (INHALATION) at 08:08

## 2017-09-08 RX ADMIN — GUAIFENESIN 600 MG: 600 TABLET, EXTENDED RELEASE ORAL at 08:18

## 2017-09-08 RX ADMIN — IOPAMIDOL 68 ML: 755 INJECTION, SOLUTION INTRAVENOUS at 17:08

## 2017-09-08 RX ADMIN — BUTALBITAL, ACETAMINOPHEN, CAFFEINE, AND CODEINE PHOSPHATE 1 CAPSULE: 30; 50; 40; 325 CAPSULE ORAL at 22:22

## 2017-09-08 RX ADMIN — TRAMADOL HYDROCHLORIDE 50 MG: 50 TABLET, COATED ORAL at 03:50

## 2017-09-08 RX ADMIN — IPRATROPIUM BROMIDE AND ALBUTEROL SULFATE 3 ML: .5; 3 SOLUTION RESPIRATORY (INHALATION) at 08:08

## 2017-09-08 RX ADMIN — TRAMADOL HYDROCHLORIDE 50 MG: 50 TABLET, COATED ORAL at 11:25

## 2017-09-08 RX ADMIN — TRAMADOL HYDROCHLORIDE 50 MG: 50 TABLET, COATED ORAL at 18:58

## 2017-09-08 RX ADMIN — IPRATROPIUM BROMIDE AND ALBUTEROL SULFATE 3 ML: .5; 3 SOLUTION RESPIRATORY (INHALATION) at 19:53

## 2017-09-08 RX ADMIN — IPRATROPIUM BROMIDE AND ALBUTEROL SULFATE 3 ML: .5; 3 SOLUTION RESPIRATORY (INHALATION) at 11:13

## 2017-09-08 ASSESSMENT — PAIN DESCRIPTION - DESCRIPTORS
DESCRIPTORS: DULL
DESCRIPTORS: HEADACHE

## 2017-09-08 NOTE — PROGRESS NOTES
"[facility]  Hospitalist Progress Note  Jori Doe MD 09/08/2017    Reason for Stay (Diagnosis): Pneumonia         Assessment and Plan:      Summary of Stay: Chuck Lopez is a 76 year old male with a past medical history of HTN, DM II, hepatitis C, chronic neck pain, and COPD  admitted on 9/5/2017 with pneumonia and COPD exacerbation.    Problem List:   1. Pneumonia: CAP    -- Still sob and coughing   -- hypoxic   --will get CT chest   -- conitnue abx   -- smoking discouraged     2. One Positive blood culture from 9/5 with gram positive cocci.  Suspect contaminant.    Await final culture results.  3. COPD exacerbation.  Continue Levaquin, Prednisone, Breo Ellipta, Fluticasone, Duonebs.  Albuterol PRN.  4. Sepsis.  Due to pneumonia.  Levaquin as above.  5. HTN.  Atenolol.  6. Diabetes Mellitus.  HgbA1c only 5.5.  Will likely only need diet control as outpatient.  Continue Novolog SSI while on prednisone and in hospital.   7. GERD.  Zantac.  8. Deconditioning.  PT consult.  9. Tobacco use disorder.  Nicotrol inhaler.  10. Malnutrition.  RD consult.  DVT Prophylaxis: Pneumatic Compression Devices  Code Status: Full Code  Discharge Dispo: TBD.  Estimated Disch Date / # of Days until Disch: in 1-2 days if he improves -hypoxia and sob        Interval History (Subjective):      Patient is seen and examined by me today and medical record reviewed.Overnight events noted and care discussed with nursing staff.  No improvement   Has dyspnea   Has no fever   No cp                        Physical Exam:      Last Vital Signs:  /62 (BP Location: Left arm)  Pulse 74  Temp 98.2  F (36.8  C) (Oral)  Resp 20  Ht 1.651 m (5' 5\")  Wt 54.2 kg (119 lb 8 oz)  SpO2 95%  BMI 19.89 kg/m2    Gen:  NAD, A&Ox3.  Eyes:  PERRL, sclera anicteric.  OP:  MMM, no lesions.  Neck:  Supple.  CV:  Regular, no murmurs.  Lung:  Poor air movement b/l,  No wheeze .  Ab:  +BS, soft.  Skin:  Warm, dry to touch.  No " rash.  Ext:  No pitting edema LE b/l.           Medications:      All current medications were reviewed with changes reflected in problem list.         Data:      All new lab and imaging data was reviewed.   Labs:    Recent Labs  Lab 09/08/17 0620 09/06/17 0622   NA  --   --  134   POTASSIUM  --   --  3.7   CHLORIDE  --   --  98   CO2  --   --  30   ANIONGAP  --   --  6   GLC  --   --  144*   BUN  --   --  23   CR 0.74  < > 0.76   GFRESTIMATED >90  < > >90   GFRESTBLACK >90  < > >90   MATTHEW  --   --  8.9   < > = values in this interval not displayed.    Recent Labs  Lab 09/06/17 0622   WBC 12.3*   HGB 14.8   HCT 43.9   MCV 94         Imaging:   No results found for this or any previous visit (from the past 24 hour(s)).

## 2017-09-08 NOTE — PLAN OF CARE
Problem: Goal Outcome Summary  Goal: Goal Outcome Summary  Outcome: No Change  Pt admitted for Pneumonia and COPD exacerbation.  Pt c/o SOB with activity.  On 1.5 L oxygen with sats mid 90's.  Has productive cough, lung sounds diminished. Continue levaquin for Pneumonia, CT scan negative for PE today.  Ambulates independently.  Tolerating a regular diet, but having some constipation, given Miralax this evening.  Patient has chronic neck pain, given tramadol with relief.  Pt starting doing own glucose checks today and doing well.  CM involved for placement.  Plan for discharge to TCU.

## 2017-09-08 NOTE — PLAN OF CARE
Problem: Goal Outcome Summary  Goal: Goal Outcome Summary  PT: Patient feeling unable to participate in PT today secondary to reports of feeling weaker, having stomach discomfort, and more SOB; Patient is possibly more open to TCU currently per care coordinator given his weakness and decreased activity tolerance which is impairing his ability to care for self independently upon discharge. May benefit from TCU stay to maximize strength, activity tolerance, and independence with all mobility.

## 2017-09-08 NOTE — PROGRESS NOTES
Patient was seen and examined this morning.   Patient is complaining of shortness of breath and weakness.  He is a chronic smoker with COPD here with his hypoxia and concern for pneumonia.  He appears to be in mild respiratory distress with decreased air entry bilaterally with no wheezing.  I'll get CT scan of the chest to exclude pulmonary embolism,  Significant infection  His social situations discussed and patient is to be seen by care coordinator.  Continue inpatient care for another day.

## 2017-09-08 NOTE — PROGRESS NOTES
Discharge Planner   Discharge Plans in progress: Pt has been clinically accepted for TCU at [facility] . They do want a copy of MA application that pt did with financial counselors   Barriers to discharge plan: Possible issues due to inability of housing at dc. PT is not able to return to his previous living situation . PT is aware that we are sending copy of MA application to Facility.   Follow up plan: Pt is willing to accept the bed. HE does not think he will need to dc before Monday        Entered by: Corinne C. White 09/08/2017 4:04 PM     PT reported to [facility] that if he DC before Monday he plans to appeal his DC.

## 2017-09-08 NOTE — PLAN OF CARE
Problem: Goal Outcome Summary  Goal: Goal Outcome Summary  Outcome: Improving  Pt alert & oriented.  VSS.  LS clear, diminished, on 1L NC, trying to wean for discharge.  C/O neck pain, taking tramadol PRN.  , 215.  On reg diet, up ind.  On oral levaquin.  Pt requesting stool softener, last BM was 9/5/2017, advised pt importance of water and ambulation.  Plan to discharge in 2-3 days.

## 2017-09-08 NOTE — PROGRESS NOTES
I met with pt again about dc planning.  Pt did fill out MA mariana with .  He is interested in TCU because he is below baseline for his ambulation and endurance level.  He still get's SOB.  Awaiting PT session recs.  Pt would be agreeable to go to [facility].  Pt said he doesn't plan on returning to his basement room in his friend's house because his friend is getting an amputation and won't be able to help take care of him.  I haven't heard back from [facility] on EW assessment timing. Pt said he is a , but was only able to be in the service for 3 months because he became ill and was mostly in the [facility] and then had an honorable dc d/t health reasons at age 19.   Pt given education on glucometer and he was able to teach back.  Following.  Melanie RN CTS 2021    Pt declining to work with PT because he is SOB and has abdominal pain.  Pt agreeable to work with PT tomorrow preferably in the am.      Referral sent to [facility].

## 2017-09-08 NOTE — PLAN OF CARE
Problem: Goal Outcome Summary  Goal: Goal Outcome Summary  Outcome: Improving  Orientation: Alert and oriented x4  VSS. 95% on 1.5 L NC. Afebrile.   LS: Diminished. Mild SOB reported at times. Productive cough noted at times.   GI: Passing gas. no BM. Denies N/V.   : Adequate urine output. Clear yellow.   Skin: bruising, scabs, and scarring noted throughout. Skin intact.   Activity: Independent. Pt slept comfortably throughout shift.   Pain: 2/10 headache. Well controlled with oral pain medication. Tramadol x1.   Plan: Continue with current cares.

## 2017-09-09 ENCOUNTER — APPOINTMENT (OUTPATIENT)
Dept: PHYSICAL THERAPY | Facility: CLINIC | Age: 77
DRG: 190 | End: 2017-09-09
Payer: MEDICARE

## 2017-09-09 LAB
ANION GAP SERPL CALCULATED.3IONS-SCNC: 5 MMOL/L (ref 3–14)
BASOPHILS # BLD AUTO: 0 10E9/L (ref 0–0.2)
BASOPHILS NFR BLD AUTO: 0.5 %
BUN SERPL-MCNC: 19 MG/DL (ref 7–30)
CALCIUM SERPL-MCNC: 8.4 MG/DL (ref 8.5–10.1)
CHLORIDE SERPL-SCNC: 103 MMOL/L (ref 94–109)
CO2 SERPL-SCNC: 33 MMOL/L (ref 20–32)
CREAT SERPL-MCNC: 0.8 MG/DL (ref 0.66–1.25)
DIFFERENTIAL METHOD BLD: NORMAL
EOSINOPHIL # BLD AUTO: 0.2 10E9/L (ref 0–0.7)
EOSINOPHIL NFR BLD AUTO: 2.7 %
ERYTHROCYTE [DISTWIDTH] IN BLOOD BY AUTOMATED COUNT: 13.8 % (ref 10–15)
GFR SERPL CREATININE-BSD FRML MDRD: >90 ML/MIN/1.7M2
GLUCOSE BLDC GLUCOMTR-MCNC: 101 MG/DL (ref 70–99)
GLUCOSE BLDC GLUCOMTR-MCNC: 97 MG/DL (ref 70–99)
GLUCOSE SERPL-MCNC: 93 MG/DL (ref 70–99)
HCT VFR BLD AUTO: 42.6 % (ref 40–53)
HGB BLD-MCNC: 14.1 G/DL (ref 13.3–17.7)
IMM GRANULOCYTES # BLD: 0.3 10E9/L (ref 0–0.4)
IMM GRANULOCYTES NFR BLD: 3.3 %
LYMPHOCYTES # BLD AUTO: 1.5 10E9/L (ref 0.8–5.3)
LYMPHOCYTES NFR BLD AUTO: 18.2 %
MCH RBC QN AUTO: 31.9 PG (ref 26.5–33)
MCHC RBC AUTO-ENTMCNC: 33.1 G/DL (ref 31.5–36.5)
MCV RBC AUTO: 96 FL (ref 78–100)
MONOCYTES # BLD AUTO: 1 10E9/L (ref 0–1.3)
MONOCYTES NFR BLD AUTO: 12.1 %
NEUTROPHILS # BLD AUTO: 5.4 10E9/L (ref 1.6–8.3)
NEUTROPHILS NFR BLD AUTO: 63.2 %
NRBC # BLD AUTO: 0 10*3/UL
NRBC BLD AUTO-RTO: 0 /100
PLATELET # BLD AUTO: 162 10E9/L (ref 150–450)
POTASSIUM SERPL-SCNC: 3.7 MMOL/L (ref 3.4–5.3)
RBC # BLD AUTO: 4.42 10E12/L (ref 4.4–5.9)
SODIUM SERPL-SCNC: 141 MMOL/L (ref 133–144)
WBC # BLD AUTO: 8.5 10E9/L (ref 4–11)

## 2017-09-09 PROCEDURE — 25000132 ZZH RX MED GY IP 250 OP 250 PS 637: Mod: GY | Performed by: INTERNAL MEDICINE

## 2017-09-09 PROCEDURE — 40000275 ZZH STATISTIC RCP TIME EA 10 MIN

## 2017-09-09 PROCEDURE — 94640 AIRWAY INHALATION TREATMENT: CPT

## 2017-09-09 PROCEDURE — 85025 COMPLETE CBC W/AUTO DIFF WBC: CPT | Performed by: INTERNAL MEDICINE

## 2017-09-09 PROCEDURE — A9270 NON-COVERED ITEM OR SERVICE: HCPCS | Mod: GY | Performed by: INTERNAL MEDICINE

## 2017-09-09 PROCEDURE — 25000125 ZZHC RX 250: Performed by: INTERNAL MEDICINE

## 2017-09-09 PROCEDURE — 99207 ZZC CDG-MDM COMPONENT: MEETS MODERATE - UP CODED: CPT | Performed by: INTERNAL MEDICINE

## 2017-09-09 PROCEDURE — 99233 SBSQ HOSP IP/OBS HIGH 50: CPT | Performed by: INTERNAL MEDICINE

## 2017-09-09 PROCEDURE — 40000193 ZZH STATISTIC PT WARD VISIT

## 2017-09-09 PROCEDURE — 40000274 ZZH STATISTIC RCP CONSULT EA 30 MIN

## 2017-09-09 PROCEDURE — 25000125 ZZHC RX 250: Performed by: PHYSICIAN ASSISTANT

## 2017-09-09 PROCEDURE — 12000000 ZZH R&B MED SURG/OB

## 2017-09-09 PROCEDURE — 36415 COLL VENOUS BLD VENIPUNCTURE: CPT | Performed by: INTERNAL MEDICINE

## 2017-09-09 PROCEDURE — 94640 AIRWAY INHALATION TREATMENT: CPT | Mod: 76

## 2017-09-09 PROCEDURE — 25000132 ZZH RX MED GY IP 250 OP 250 PS 637: Mod: GY | Performed by: PHYSICIAN ASSISTANT

## 2017-09-09 PROCEDURE — 00000146 ZZHCL STATISTIC GLUCOSE BY METER IP

## 2017-09-09 PROCEDURE — 80048 BASIC METABOLIC PNL TOTAL CA: CPT | Performed by: INTERNAL MEDICINE

## 2017-09-09 PROCEDURE — 97116 GAIT TRAINING THERAPY: CPT | Mod: GP

## 2017-09-09 PROCEDURE — A9270 NON-COVERED ITEM OR SERVICE: HCPCS | Mod: GY | Performed by: PHYSICIAN ASSISTANT

## 2017-09-09 RX ORDER — ACETYLCYSTEINE 200 MG/ML
2 SOLUTION ORAL; RESPIRATORY (INHALATION) EVERY 4 HOURS
Status: DISCONTINUED | OUTPATIENT
Start: 2017-09-09 | End: 2017-09-11 | Stop reason: HOSPADM

## 2017-09-09 RX ORDER — GUAIFENESIN 600 MG/1
600 TABLET, EXTENDED RELEASE ORAL 2 TIMES DAILY
Status: DISCONTINUED | OUTPATIENT
Start: 2017-09-09 | End: 2017-09-11 | Stop reason: HOSPADM

## 2017-09-09 RX ORDER — IPRATROPIUM BROMIDE AND ALBUTEROL SULFATE 2.5; .5 MG/3ML; MG/3ML
3 SOLUTION RESPIRATORY (INHALATION) EVERY 4 HOURS
Status: DISCONTINUED | OUTPATIENT
Start: 2017-09-09 | End: 2017-09-11 | Stop reason: HOSPADM

## 2017-09-09 RX ADMIN — RANITIDINE 75 MG: 75 TABLET, FILM COATED ORAL at 21:51

## 2017-09-09 RX ADMIN — IPRATROPIUM BROMIDE AND ALBUTEROL SULFATE 3 ML: .5; 3 SOLUTION RESPIRATORY (INHALATION) at 07:59

## 2017-09-09 RX ADMIN — IPRATROPIUM BROMIDE AND ALBUTEROL SULFATE 3 ML: .5; 3 SOLUTION RESPIRATORY (INHALATION) at 20:19

## 2017-09-09 RX ADMIN — LEVOFLOXACIN 750 MG: 500 TABLET, FILM COATED ORAL at 10:05

## 2017-09-09 RX ADMIN — MULTIPLE VITAMINS W/ MINERALS TAB 1 TABLET: TAB at 08:58

## 2017-09-09 RX ADMIN — INSULIN ASPART 1 UNITS: 100 INJECTION, SOLUTION INTRAVENOUS; SUBCUTANEOUS at 17:53

## 2017-09-09 RX ADMIN — IPRATROPIUM BROMIDE AND ALBUTEROL SULFATE 3 ML: .5; 3 SOLUTION RESPIRATORY (INHALATION) at 11:12

## 2017-09-09 RX ADMIN — HYDROCHLOROTHIAZIDE 25 MG: 25 TABLET ORAL at 08:58

## 2017-09-09 RX ADMIN — BUTALBITAL, ACETAMINOPHEN, CAFFEINE, AND CODEINE PHOSPHATE 1 CAPSULE: 30; 50; 40; 325 CAPSULE ORAL at 14:31

## 2017-09-09 RX ADMIN — BUTALBITAL, ACETAMINOPHEN, CAFFEINE, AND CODEINE PHOSPHATE 1 CAPSULE: 30; 50; 40; 325 CAPSULE ORAL at 21:51

## 2017-09-09 RX ADMIN — NICOTINE 1 CARTRIDGE: 4 INHALANT RESPIRATORY (INHALATION) at 16:39

## 2017-09-09 RX ADMIN — FLUTICASONE FUROATE 1 PUFF: 100 POWDER RESPIRATORY (INHALATION) at 07:59

## 2017-09-09 RX ADMIN — TRAMADOL HYDROCHLORIDE 50 MG: 50 TABLET, COATED ORAL at 17:57

## 2017-09-09 RX ADMIN — BUTALBITAL, ACETAMINOPHEN, CAFFEINE, AND CODEINE PHOSPHATE 1 CAPSULE: 30; 50; 40; 325 CAPSULE ORAL at 06:11

## 2017-09-09 RX ADMIN — ACETYLCYSTEINE 2 ML: 200 SOLUTION ORAL; RESPIRATORY (INHALATION) at 11:13

## 2017-09-09 RX ADMIN — FLUTICASONE FUROATE AND VILANTEROL TRIFENATATE 1 PUFF: 200; 25 POWDER RESPIRATORY (INHALATION) at 07:59

## 2017-09-09 RX ADMIN — ATENOLOL 25 MG: 25 TABLET ORAL at 08:58

## 2017-09-09 RX ADMIN — ACETYLCYSTEINE 2 ML: 200 SOLUTION ORAL; RESPIRATORY (INHALATION) at 15:15

## 2017-09-09 RX ADMIN — GUAIFENESIN 600 MG: 600 TABLET, EXTENDED RELEASE ORAL at 08:58

## 2017-09-09 RX ADMIN — ACETYLCYSTEINE 2 ML: 200 SOLUTION ORAL; RESPIRATORY (INHALATION) at 23:44

## 2017-09-09 RX ADMIN — IPRATROPIUM BROMIDE AND ALBUTEROL SULFATE 3 ML: .5; 3 SOLUTION RESPIRATORY (INHALATION) at 15:15

## 2017-09-09 RX ADMIN — TRAMADOL HYDROCHLORIDE 50 MG: 50 TABLET, COATED ORAL at 10:50

## 2017-09-09 RX ADMIN — ACETYLCYSTEINE 2 ML: 200 SOLUTION ORAL; RESPIRATORY (INHALATION) at 20:18

## 2017-09-09 RX ADMIN — IPRATROPIUM BROMIDE AND ALBUTEROL SULFATE 3 ML: .5; 3 SOLUTION RESPIRATORY (INHALATION) at 23:44

## 2017-09-09 RX ADMIN — GUAIFENESIN 600 MG: 600 TABLET, EXTENDED RELEASE ORAL at 21:51

## 2017-09-09 RX ADMIN — TRAMADOL HYDROCHLORIDE 50 MG: 50 TABLET, COATED ORAL at 02:28

## 2017-09-09 RX ADMIN — PREDNISONE 40 MG: 20 TABLET ORAL at 08:58

## 2017-09-09 ASSESSMENT — PAIN DESCRIPTION - DESCRIPTORS
DESCRIPTORS: HEADACHE
DESCRIPTORS: HEADACHE

## 2017-09-09 NOTE — PLAN OF CARE
Problem: Goal Outcome Summary  Goal: Goal Outcome Summary  Problem: Goal Outcome Summary  Goal: Goal Outcome Summary  Discharge Planner PT   Patient plan for discharge: TCU  Current status: SBA for gait 150ft x2 in hallway without assistive device, 2L O2 for first trial with sats 93/94% throughout, 1L O2 for second trial with O2 sats 92/93% throughout requiring 3 min seated rest break d/t shortness of breath b/n trials  Barriers to return to prior living situation: possible O2 needs; stairs; generalized weakness and decreased activity tolerance  Recommendations for discharge: TCU  Rationale for recommendations: decreased activity tolerance, generalized weakness; on 1.5L O2 currently and normally only uses it prn       Entered by: Kirsten Campuzano 09/09/2017 9:22 AM

## 2017-09-09 NOTE — PLAN OF CARE
Problem: Goal Outcome Summary  Goal: Goal Outcome Summary  [id number]  Oxygen sats low 90's on 1.5LPM via NC. C/o some mild SOB. Remains on levaquin. Regular diet. Up independent. Blood sugars 185 and 101.

## 2017-09-09 NOTE — PROGRESS NOTES
"[facility]     Date: 9/9/17  Admission Dx: Copd exacerbation  Pulmonary History: COPD, current smoker  Home Nebulizer/MDI Use: Albuterol Q4 prn/ MDI Q6 prn, Advair BID, Qvar BID  Home Oxygen: PRN  Acuity Level (RCAT flow sheet): 3  Aerosol Therapy initiated: Pt scored a 3 but would benefit from Q4 treatments to help with mucus clearance. Duoneb Q4, Mucomyst Q4, Breo Qd, Arnuity QD      Pulmonary Hygiene initiated: Manual percussor-TID      Volume Expansion initiated: Incentive spirometer, Acapella      Current Oxygen Requirements: 2L nasal cannula  Current SpO2: 95%    Re-evaluation date: 9/12/17    Patient Education: Explained how the percussor worked to help with airway clearance. Explained indications for neb medications.      See \"RT Assessments\" flow sheet for patient assessment scoring and Acuity Level Details.             "

## 2017-09-09 NOTE — PROGRESS NOTES
CM: Bed available at [facility] Sunday or Monday, pending medical stability per MD. At this time, per chart it is looking like Monday. Informed [facility] of this.      P: Tomorrow will schedule W/C transport via [facility], as long as pt is still progressing.     Your information has been submitted on September 09th, 2017 at 03:41:16 PM CDT. The confirmation number is [id number]

## 2017-09-09 NOTE — PROGRESS NOTES
"[facility]  Hospitalist Progress Note  Jori Doe MD 09/09/2017    Reason for Stay (Diagnosis): Pneumonia         Assessment and Plan:      Summary of Stay: Chuck Lopez is a 76 year old male with a past medical history of HTN, DM II, hepatitis C, chronic neck pain, and COPD  admitted on 9/5/2017 with pneumonia and COPD exacerbation.    Problem List:   1. Acute on chronic hypoxic resp failure   -- CT showed no PE or Pneumonia  -- Still sob and coughing   -- hypoxic   -- Has mucus plugs on CT -Mucinex , mucomyst neb , RT to assist with this     2. One Positive blood culture from 9/5 with gram positive cocci.  Suspect contaminant.  -- follow up cultures negative       3. COPD exacerbation.  Continue Levaquin, Prednisone, Breo Ellipta, Fluticasone, Duonebs.  Albuterol PRN.  4. HTN.  Atenolol.  5. Diabetes Mellitus.  HgbA1c only 5.5.  Will likely only need diet control as outpatient.  Continue Novolog SSI while on prednisone and in hospital.   6. GERD.  Zantac.   7. Deconditioning.  PT consulted , may need TCU   8. Tobacco use disorder.  Nicotrol inhaler.  9. Malnutrition.  RD consult.    DVT Prophylaxis: Pneumatic Compression Devices  Code Status: Full Code  Discharge Dispo: TBD.  Estimated Disch Date / # of Days until Disch: may discharge in two days         Interval History (Subjective):      Patient is seen and examined by me today and medical record reviewed.Overnight events noted and care discussed with nursing staff.  Feels same , has cough and sob. No fever                    Physical Exam:      Last Vital Signs:  /57 (BP Location: Left arm)  Pulse 74  Temp 97  F (36.1  C) (Oral)  Resp 20  Ht 1.651 m (5' 5\")  Wt 54.2 kg (119 lb 8 oz)  SpO2 96%  BMI 19.89 kg/m2    Gen:  NAD, A&Ox3.  Eyes:  PERRL, sclera anicteric.  OP:  MMM, no lesions.  Neck:  Supple.  CV:  Regular, no murmurs.  Lung:  Poor air movement b/l,  No wheeze .  Ab:  +BS, soft.  Skin:  Warm, dry to touch.  No " rash.  Ext:  No pitting edema LE b/l.           Medications:      All current medications were reviewed with changes reflected in problem list.         Data:      All new lab and imaging data was reviewed.   All laboratory and imaging data in the past 24 hours reviewed       Recent Labs  Lab 09/09/17  0548 09/06/17  0622 09/05/17  1210   WBC 8.5 12.3* 12.8*   HGB 14.1 14.8 17.7   HCT 42.6 43.9 51.3   MCV 96 94 93    160 129*       Recent Labs  Lab 09/06/17  1153 09/06/17  1147 09/06/17  0830 09/05/17  1330 09/05/17  1300   CULT No growth after 3 days No growth after 3 days Moderate growthNormal edith No growth after 4 days Cultured on the 1st day of incubation:Staphylococcus warneri*  Critical Value/Significant Value, preliminary result only, called to and read back byMylene Charles RN from [facility]. 9.6.17 at 1106. GR.  (Note)POSITIVE for Staphylococci other than S.aureus, S.epidermidis andS.lugdunensis, by [employer] multiplex nucleic acid test.Coagulase-negative staphylococci are the most common venipuncture orcollection associated skin CONTAMINANTS grown in blood cultures.Final identification and antimicrobial susceptibility testing will beverified by standard methods.Specimen tested with Verigene multiplex, gram-positive blood culturenucleic acid test for the following targets: Staph aureus, Staphepidermidis, Staph lugdunensis, other Staph species, Enterococcusfaecalis, Enterococcus faecium, Streptococcus species, S. agalactiae,S. anginosus grp., S. pneumoniae, S. pyogenes, Listeria sp., mecA(methicillin resistance) and Zuri/B (vancomycin resistance).Critical Value/Significant Value called to and read back by Georgina LEWIS, 1410 09.06.17 .       Recent Labs  Lab 09/09/17  0548 09/08/17  0620 09/07/17  0745 09/06/17  0622 09/05/17  1210     --   --  134 131*   POTASSIUM 3.7  --   --  3.7 3.4   CHLORIDE 103  --   --  98 93*   CO2 33*  --   --  30 29   ANIONGAP 5  --   --  6 9   GLC 93  --    --  144* 89   BUN 19  --   --  23 14   CR 0.80 0.74 0.61* 0.76 0.68   GFRESTIMATED >90 >90 >90 >90 >90   GFRESTBLACK >90 >90 >90 >90 >90   MATTHEW 8.4*  --   --  8.9 9.1   PROTTOTAL  --   --   --   --  8.2   ALBUMIN  --   --   --   --  3.5   BILITOTAL  --   --   --   --  1.1   ALKPHOS  --   --   --   --  108   AST  --   --   --   --  37   ALT  --   --   --   --  30         Recent Labs  Lab 09/09/17  0548 09/08/17  0647 09/08/17  0020 09/07/17  2120 09/07/17  1750 09/07/17  1255  09/06/17  0622 09/05/17  1210   GLC 93  --   --   --   --   --   --  144* 89   BGM  --  94 113* 215* 144* 154*  < >  --   --    < > = values in this interval not displayed.        No results for input(s): INR in the last 168 hours.        No results for input(s): TROPONIN, TROPI, TROPR in the last 168 hours.    Invalid input(s): TROP, TROPONINIES    Recent Results (from the past 48 hour(s))   CT Chest Pulmonary Embolism w Contrast    Narrative    CT CHEST PULMONARY EMBOLISM WITH CONTRAST  9/8/2017 5:20 PM     HISTORY: Shortness of breath.     COMPARISON: CT chest 3/29/2017.    TECHNIQUE: Thin section axial images are performed from the thoracic  inlet to the lung bases utilizing 68 mL of Isovue 370 IV contrast  without adverse event. Coronal reformatted images are also generated.  Radiation dose for this scan was reduced using automated exposure  control, adjustment of the mA and/or kV according to patient size, or  iterative reconstruction technique.    FINDINGS:     Chest: Mucoid impactions are noted within mildly dilated right middle  lobe and lingular airways. Mild bronchiectatic changes are stable  right and left lower lobes. No focal infiltrate or consolidation is  appreciated. Emphysema is present. No pleural or pericardial fluid.  Heart is normal in size. Esophagus is unremarkable. Thyroid gland  appears normal in size. No enlarged axillary or intrathoracic lymph  nodes. No evidence of pulmonary embolism. Thoracic aorta  demonstrates  scattered calcified and noncalcified plaque without aneurysm or  dissection. Limited images upper abdomen demonstrate calcified  gallstones in a nondistended gallbladder. Bilateral moderate renal  cortical scarring is present.      Impression    IMPRESSION:  1. Bronchiectatic changes with mucoid impactions in the right middle  lobe and lingula. Bronchiectatic changes in the right and left lower  lobe are also noted and appear stable. No infiltrate or consolidation  to suggest pneumonia.  2. No pulmonary embolism. Thoracic aorta demonstrates atherosclerotic  plaque without aneurysm or dissection.  3. Cholelithiasis.  4. Moderate bilateral renal cortical scarring.    STEFANIE ABDUL MD

## 2017-09-09 NOTE — PLAN OF CARE
Problem: Goal Outcome Summary  Goal: Goal Outcome Summary  Outcome: Improving  VSS, Continues on 2 LPM nc. Fioricet x1 for headache at EOS. Up ind. Continues to have SOB/LEVINE, some improvement after RT. Infrequent/productive cough. BM beginning of shift. Walked in halls x2, at 2 lpm, w/ short resting period. [facility].

## 2017-09-10 ENCOUNTER — APPOINTMENT (OUTPATIENT)
Dept: PHYSICAL THERAPY | Facility: CLINIC | Age: 77
DRG: 190 | End: 2017-09-10
Payer: MEDICARE

## 2017-09-10 LAB
CREAT SERPL-MCNC: 0.76 MG/DL (ref 0.66–1.25)
GFR SERPL CREATININE-BSD FRML MDRD: >90 ML/MIN/1.7M2
GLUCOSE BLDC GLUCOMTR-MCNC: 123 MG/DL (ref 70–99)
GLUCOSE BLDC GLUCOMTR-MCNC: 160 MG/DL (ref 70–99)
GLUCOSE BLDC GLUCOMTR-MCNC: 197 MG/DL (ref 70–99)

## 2017-09-10 PROCEDURE — 99233 SBSQ HOSP IP/OBS HIGH 50: CPT | Performed by: INTERNAL MEDICINE

## 2017-09-10 PROCEDURE — 00000146 ZZHCL STATISTIC GLUCOSE BY METER IP

## 2017-09-10 PROCEDURE — A9270 NON-COVERED ITEM OR SERVICE: HCPCS | Mod: GY | Performed by: PHYSICIAN ASSISTANT

## 2017-09-10 PROCEDURE — 82565 ASSAY OF CREATININE: CPT | Performed by: INTERNAL MEDICINE

## 2017-09-10 PROCEDURE — 25000125 ZZHC RX 250: Performed by: PHYSICIAN ASSISTANT

## 2017-09-10 PROCEDURE — 40000193 ZZH STATISTIC PT WARD VISIT: Performed by: PHYSICAL THERAPIST

## 2017-09-10 PROCEDURE — 36415 COLL VENOUS BLD VENIPUNCTURE: CPT | Performed by: INTERNAL MEDICINE

## 2017-09-10 PROCEDURE — 25000132 ZZH RX MED GY IP 250 OP 250 PS 637: Mod: GY | Performed by: INTERNAL MEDICINE

## 2017-09-10 PROCEDURE — 97110 THERAPEUTIC EXERCISES: CPT | Mod: GP | Performed by: PHYSICAL THERAPIST

## 2017-09-10 PROCEDURE — 94640 AIRWAY INHALATION TREATMENT: CPT

## 2017-09-10 PROCEDURE — 40000275 ZZH STATISTIC RCP TIME EA 10 MIN

## 2017-09-10 PROCEDURE — 25000132 ZZH RX MED GY IP 250 OP 250 PS 637: Mod: GY | Performed by: PHYSICIAN ASSISTANT

## 2017-09-10 PROCEDURE — A9270 NON-COVERED ITEM OR SERVICE: HCPCS | Mod: GY | Performed by: INTERNAL MEDICINE

## 2017-09-10 PROCEDURE — 94640 AIRWAY INHALATION TREATMENT: CPT | Mod: 76

## 2017-09-10 PROCEDURE — 25000125 ZZHC RX 250: Performed by: INTERNAL MEDICINE

## 2017-09-10 PROCEDURE — 12000000 ZZH R&B MED SURG/OB

## 2017-09-10 RX ADMIN — BUTALBITAL, ACETAMINOPHEN, CAFFEINE, AND CODEINE PHOSPHATE 1 CAPSULE: 30; 50; 40; 325 CAPSULE ORAL at 08:02

## 2017-09-10 RX ADMIN — TRAMADOL HYDROCHLORIDE 50 MG: 50 TABLET, COATED ORAL at 10:35

## 2017-09-10 RX ADMIN — IPRATROPIUM BROMIDE AND ALBUTEROL SULFATE 3 ML: .5; 3 SOLUTION RESPIRATORY (INHALATION) at 16:03

## 2017-09-10 RX ADMIN — ATENOLOL 25 MG: 25 TABLET ORAL at 08:03

## 2017-09-10 RX ADMIN — ACETYLCYSTEINE 2 ML: 200 SOLUTION ORAL; RESPIRATORY (INHALATION) at 04:01

## 2017-09-10 RX ADMIN — IPRATROPIUM BROMIDE AND ALBUTEROL SULFATE 3 ML: .5; 3 SOLUTION RESPIRATORY (INHALATION) at 19:55

## 2017-09-10 RX ADMIN — GUAIFENESIN 600 MG: 600 TABLET, EXTENDED RELEASE ORAL at 21:35

## 2017-09-10 RX ADMIN — IPRATROPIUM BROMIDE AND ALBUTEROL SULFATE 3 ML: .5; 3 SOLUTION RESPIRATORY (INHALATION) at 23:14

## 2017-09-10 RX ADMIN — POLYETHYLENE GLYCOL 3350 17 G: 17 POWDER, FOR SOLUTION ORAL at 14:37

## 2017-09-10 RX ADMIN — MULTIPLE VITAMINS W/ MINERALS TAB 1 TABLET: TAB at 08:03

## 2017-09-10 RX ADMIN — TRAMADOL HYDROCHLORIDE 50 MG: 50 TABLET, COATED ORAL at 01:49

## 2017-09-10 RX ADMIN — BUTALBITAL, ACETAMINOPHEN, CAFFEINE, AND CODEINE PHOSPHATE 1 CAPSULE: 30; 50; 40; 325 CAPSULE ORAL at 21:35

## 2017-09-10 RX ADMIN — HYDROCHLOROTHIAZIDE 25 MG: 25 TABLET ORAL at 08:03

## 2017-09-10 RX ADMIN — ACETYLCYSTEINE 2 ML: 200 SOLUTION ORAL; RESPIRATORY (INHALATION) at 16:03

## 2017-09-10 RX ADMIN — RANITIDINE 75 MG: 75 TABLET, FILM COATED ORAL at 21:35

## 2017-09-10 RX ADMIN — INSULIN ASPART 1 UNITS: 100 INJECTION, SOLUTION INTRAVENOUS; SUBCUTANEOUS at 12:54

## 2017-09-10 RX ADMIN — ACETYLCYSTEINE 2 ML: 200 SOLUTION ORAL; RESPIRATORY (INHALATION) at 07:30

## 2017-09-10 RX ADMIN — LEVOFLOXACIN 750 MG: 500 TABLET, FILM COATED ORAL at 10:33

## 2017-09-10 RX ADMIN — IPRATROPIUM BROMIDE AND ALBUTEROL SULFATE 3 ML: .5; 3 SOLUTION RESPIRATORY (INHALATION) at 07:30

## 2017-09-10 RX ADMIN — FLUTICASONE FUROATE 1 PUFF: 100 POWDER RESPIRATORY (INHALATION) at 07:32

## 2017-09-10 RX ADMIN — GUAIFENESIN 600 MG: 600 TABLET, EXTENDED RELEASE ORAL at 08:03

## 2017-09-10 RX ADMIN — ACETYLCYSTEINE 2 ML: 200 SOLUTION ORAL; RESPIRATORY (INHALATION) at 11:48

## 2017-09-10 RX ADMIN — FLUTICASONE FUROATE AND VILANTEROL TRIFENATATE 1 PUFF: 200; 25 POWDER RESPIRATORY (INHALATION) at 07:32

## 2017-09-10 RX ADMIN — IPRATROPIUM BROMIDE AND ALBUTEROL SULFATE 3 ML: .5; 3 SOLUTION RESPIRATORY (INHALATION) at 04:01

## 2017-09-10 RX ADMIN — TRAMADOL HYDROCHLORIDE 50 MG: 50 TABLET, COATED ORAL at 18:39

## 2017-09-10 RX ADMIN — ACETYLCYSTEINE 2 ML: 200 SOLUTION ORAL; RESPIRATORY (INHALATION) at 19:55

## 2017-09-10 RX ADMIN — PREDNISONE 40 MG: 20 TABLET ORAL at 08:03

## 2017-09-10 RX ADMIN — BUTALBITAL, ACETAMINOPHEN, CAFFEINE, AND CODEINE PHOSPHATE 1 CAPSULE: 30; 50; 40; 325 CAPSULE ORAL at 14:37

## 2017-09-10 RX ADMIN — IPRATROPIUM BROMIDE AND ALBUTEROL SULFATE 3 ML: .5; 3 SOLUTION RESPIRATORY (INHALATION) at 11:49

## 2017-09-10 ASSESSMENT — PAIN DESCRIPTION - DESCRIPTORS
DESCRIPTORS: HEADACHE

## 2017-09-10 NOTE — PLAN OF CARE
Problem: COPD, Chronic Bronchitis/Emphysema (Adult)  Goal: Signs and Symptoms of Listed Potential Problems Will be Absent or Manageable (COPD, Chronic Bronchitis/Emphysema)  Signs and symptoms of listed potential problems will be absent or manageable by discharge/transition of care (reference COPD, Chronic Bronchitis/Emphysema (Adult) CPG).  Outcome: Improving  VSS on 2 LPM NC. Endorses SOB, no LEVINE, increased WOB observed. LS diminished, received scheduled nebs. C/O chronic HA pain, received prn tramadol with rest observed after administration. PIV SL. On reg diet, tolerating clears and PO meds this shift. Ambulating ad roya in room, voiding, not saving. BG overnight 123. Alert and oriented, able to make needs known. Continue to monitor.

## 2017-09-10 NOTE — PROGRESS NOTES
CM: Writer notified by RN that per MD, pt will be d/c'ing tomorrow.  Writer met with pt and briefly discussed transportation options. He is speaking with a friend on whether they could transport him from hospital to TCU. Pt has oxygen unit at friends house, that can be used off electricity for 2.5 hours that pt is going to see if friend can bring.  If not, pt reports he will likely take a taxi.     P: Pt to check with friend about availability for transport tomorrow.

## 2017-09-10 NOTE — PLAN OF CARE
Problem: COPD, Chronic Bronchitis/Emphysema (Adult)  Goal: Signs and Symptoms of Listed Potential Problems Will be Absent or Manageable (COPD, Chronic Bronchitis/Emphysema)  Signs and symptoms of listed potential problems will be absent or manageable by discharge/transition of care (reference COPD, Chronic Bronchitis/Emphysema (Adult) CPG).   Lung sounds diminished. SOB with ambulation. 2L O2 NC. Scheduled nebs.   Independently ambulating in halls and room.   Tolerating regular diet.

## 2017-09-10 NOTE — PLAN OF CARE
Problem: Goal Outcome Summary  Goal: Goal Outcome Summary  Outcome: No Change  Pt A&O, up in halls independently, Tier A activity level. VSS, afebrile + sats maintained at low- to mid-90's on 2LPM NC. LS diminished, congest cough. C/o increased work with breathing at all times, but denies SOB. C/o headache relieved with PRN Tramadol x1. Regular diet,  + 197, SSI.

## 2017-09-10 NOTE — PLAN OF CARE
Problem: Goal Outcome Summary  Goal: Goal Outcome Summary  Problem: Goal Outcome Summary  Goal: Goal Outcome Summary  Discharge Planner PT   Patient plan for discharge: TCU  Current status: SBA/indep with ambulation skills in hallway, limited by fatigue and SOB yet.  SaO2 stable on 2L nc O2 92-93%, trial wean to RA but desaturation to 87-88% noted.  SOB with activity, required one seated rest break.  Amb x 8 min total with one seated rest break.    Barriers to return to prior living situation: possible O2 needs; stairs; generalized weakness and decreased activity tolerance  Recommendations for discharge: TCU pending progress  Rationale for recommendations: decreased activity tolerance, generalized weakness; on 2L O2 currently and normally only uses it prn       Entered by: Kirsten Campuzano 09/09/2017 9:22 AM

## 2017-09-10 NOTE — PROGRESS NOTES
"[facility]  Hospitalist Progress Note  Jori Doe MD 09/10/2017    Reason for Stay (Diagnosis): Pneumonia         Assessment and Plan:      Summary of Stay: Chuck Lopez is a 76 year old male with a past medical history of HTN, DM II, hepatitis C, chronic neck pain, and COPD  admitted on 9/5/2017 with pneumonia and COPD exacerbation.    Problem List:   1. Acute on chronic hypoxic resp failure   -- CT showed no PE or Pneumonia  -- Still sob and coughing   -- hypoxic   -- Has mucus plugs on CT -Mucinex , mucomyst neb , RT to assist with this  -- improving , continue current care , wean off oxygen as able      2. One Positive blood culture from 9/5 with gram positive cocci.  Suspect contaminant.  -- follow up cultures negative       3. COPD exacerbation.  Continue Levaquin, Prednisone, Breo Ellipta, Fluticasone, Duonebs.  Albuterol PRN.  4. HTN.  Atenolol.  5. Diabetes Mellitus.  HgbA1c only 5.5.  Will likely only need diet control as outpatient.  Continue Novolog SSI while on prednisone and in hospital.   6. GERD.  Zantac.   7. Deconditioning.  PT consulted , may need TCU   8. Tobacco use disorder.  Nicotrol inhaler.  9. Malnutrition.  RD consult.    DVT Prophylaxis: Pneumatic Compression Devices  Code Status: Full Code  Discharge Dispo: TBD.  Estimated Disch Date / # of Days until Disch: discharge to TCU tomorrow         Interval History (Subjective):      Patient is seen and examined by me today and medical record reviewed.Overnight events noted and care discussed with nursing staff.  Feels better but not at baseline   Wants to stay another day                   Physical Exam:      Last Vital Signs:  /66 (BP Location: Right arm)  Pulse 74  Temp 98.1  F (36.7  C) (Oral)  Resp 20  Ht 1.651 m (5' 5\")  Wt 54.2 kg (119 lb 8 oz)  SpO2 95%  BMI 19.89 kg/m2    Gen:  NAD, A&Ox3.  Eyes:  PERRL, sclera anicteric.  OP:  MMM, no lesions.  Neck:  Supple.  CV:  Regular, no murmurs.  Lung:  " Poor air movement b/l,  No wheeze .  Ab:  +BS, soft.  Skin:  Warm, dry to touch.  No rash.  Ext:  No pitting edema LE b/l.           Medications:      All current medications were reviewed with changes reflected in problem list.         Data:      All new lab and imaging data was reviewed.   All laboratory and imaging data in the past 24 hours reviewed       Recent Labs  Lab 09/09/17  0548 09/06/17  0622 09/05/17  1210   WBC 8.5 12.3* 12.8*   HGB 14.1 14.8 17.7   HCT 42.6 43.9 51.3   MCV 96 94 93    160 129*       Recent Labs  Lab 09/06/17  1153 09/06/17  1147 09/06/17  0830 09/05/17  1330 09/05/17  1300   CULT No growth after 4 days No growth after 4 days Moderate growthNormal edith No growth after 5 days Cultured on the 1st day of incubation:Staphylococcus warneri*  Critical Value/Significant Value, preliminary result only, called to and read back byMylene Charles RN from [facility]. 9.6.17 at 1106. GR.  (Note)POSITIVE for Staphylococci other than S.aureus, S.epidermidis andS.lugdunensis, by Ver[employer]ene multiplex nucleic acid test.Coagulase-negative staphylococci are the most common venipuncture orcollection associated skin CONTAMINANTS grown in blood cultures.Final identification and antimicrobial susceptibility testing will beverified by standard methods.Specimen tested with Verigene multiplex, gram-positive blood culturenucleic acid test for the following targets: Staph aureus, Staphepidermidis, Staph lugdunensis, other Staph species, Enterococcusfaecalis, Enterococcus faecium, Streptococcus species, S. agalactiae,S. anginosus grp., S. pneumoniae, S. pyogenes, Listeria sp., mecA(methicillin resistance) and Zuri/B (vancomycin resistance).Critical Value/Significant Value called to and read back by Georgina LEWIS, 1410 09.06.17 .       Recent Labs  Lab 09/10/17  0612 09/09/17  0548 09/08/17  0620  09/06/17  0622 09/05/17  1210   NA  --  141  --   --  134 131*   POTASSIUM  --  3.7  --   --  3.7 3.4    CHLORIDE  --  103  --   --  98 93*   CO2  --  33*  --   --  30 29   ANIONGAP  --  5  --   --  6 9   GLC  --  93  --   --  144* 89   BUN  --  19  --   --  23 14   CR 0.76 0.80 0.74  < > 0.76 0.68   GFRESTIMATED >90 >90 >90  < > >90 >90   GFRESTBLACK >90 >90 >90  < > >90 >90   MATTHEW  --  8.4*  --   --  8.9 9.1   PROTTOTAL  --   --   --   --   --  8.2   ALBUMIN  --   --   --   --   --  3.5   BILITOTAL  --   --   --   --   --  1.1   ALKPHOS  --   --   --   --   --  108   AST  --   --   --   --   --  37   ALT  --   --   --   --   --  30   < > = values in this interval not displayed.      Recent Labs  Lab 09/10/17  0107 09/09/17  2149 09/09/17  1143 09/09/17  0548 09/09/17  0222 09/08/17  0647  09/06/17  0622 09/05/17  1210   GLC  --   --   --  93  --   --   --  144* 89   * 197* 97  --  101* 94  < >  --   --    < > = values in this interval not displayed.        No results for input(s): INR in the last 168 hours.        No results for input(s): TROPONIN, TROPI, TROPR in the last 168 hours.    Invalid input(s): TROP, TROPONINIES    Recent Results (from the past 48 hour(s))   CT Chest Pulmonary Embolism w Contrast    Narrative    CT CHEST PULMONARY EMBOLISM WITH CONTRAST  9/8/2017 5:20 PM     HISTORY: Shortness of breath.     COMPARISON: CT chest 3/29/2017.    TECHNIQUE: Thin section axial images are performed from the thoracic  inlet to the lung bases utilizing 68 mL of Isovue 370 IV contrast  without adverse event. Coronal reformatted images are also generated.  Radiation dose for this scan was reduced using automated exposure  control, adjustment of the mA and/or kV according to patient size, or  iterative reconstruction technique.    FINDINGS:     Chest: Mucoid impactions are noted within mildly dilated right middle  lobe and lingular airways. Mild bronchiectatic changes are stable  right and left lower lobes. No focal infiltrate or consolidation is  appreciated. Emphysema is present. No pleural or pericardial  fluid.  Heart is normal in size. Esophagus is unremarkable. Thyroid gland  appears normal in size. No enlarged axillary or intrathoracic lymph  nodes. No evidence of pulmonary embolism. Thoracic aorta demonstrates  scattered calcified and noncalcified plaque without aneurysm or  dissection. Limited images upper abdomen demonstrate calcified  gallstones in a nondistended gallbladder. Bilateral moderate renal  cortical scarring is present.      Impression    IMPRESSION:  1. Bronchiectatic changes with mucoid impactions in the right middle  lobe and lingula. Bronchiectatic changes in the right and left lower  lobe are also noted and appear stable. No infiltrate or consolidation  to suggest pneumonia.  2. No pulmonary embolism. Thoracic aorta demonstrates atherosclerotic  plaque without aneurysm or dissection.  3. Cholelithiasis.  4. Moderate bilateral renal cortical scarring.    STEFANIE ABDUL MD

## 2017-09-11 VITALS
SYSTOLIC BLOOD PRESSURE: 161 MMHG | WEIGHT: 119.5 LBS | RESPIRATION RATE: 20 BRPM | BODY MASS INDEX: 19.91 KG/M2 | DIASTOLIC BLOOD PRESSURE: 79 MMHG | HEART RATE: 74 BPM | OXYGEN SATURATION: 90 % | TEMPERATURE: 97.4 F | HEIGHT: 65 IN

## 2017-09-11 LAB
BACTERIA SPEC CULT: NO GROWTH
CREAT SERPL-MCNC: 0.75 MG/DL (ref 0.66–1.25)
GFR SERPL CREATININE-BSD FRML MDRD: >90 ML/MIN/1.7M2
GLUCOSE BLDC GLUCOMTR-MCNC: 109 MG/DL (ref 70–99)
Lab: NORMAL
SPECIMEN SOURCE: NORMAL

## 2017-09-11 PROCEDURE — 00000146 ZZHCL STATISTIC GLUCOSE BY METER IP

## 2017-09-11 PROCEDURE — 25000132 ZZH RX MED GY IP 250 OP 250 PS 637: Mod: GY | Performed by: INTERNAL MEDICINE

## 2017-09-11 PROCEDURE — 25000132 ZZH RX MED GY IP 250 OP 250 PS 637: Mod: GY | Performed by: PHYSICIAN ASSISTANT

## 2017-09-11 PROCEDURE — 40000809 ZZH STATISTIC NO DOCUMENTATION TO SUPPORT CHARGE

## 2017-09-11 PROCEDURE — 40000275 ZZH STATISTIC RCP TIME EA 10 MIN

## 2017-09-11 PROCEDURE — 25000125 ZZHC RX 250: Performed by: PHYSICIAN ASSISTANT

## 2017-09-11 PROCEDURE — 94640 AIRWAY INHALATION TREATMENT: CPT | Mod: 76

## 2017-09-11 PROCEDURE — A9270 NON-COVERED ITEM OR SERVICE: HCPCS | Mod: GY | Performed by: INTERNAL MEDICINE

## 2017-09-11 PROCEDURE — A9270 NON-COVERED ITEM OR SERVICE: HCPCS | Mod: GY | Performed by: PHYSICIAN ASSISTANT

## 2017-09-11 PROCEDURE — 99239 HOSP IP/OBS DSCHRG MGMT >30: CPT | Performed by: INTERNAL MEDICINE

## 2017-09-11 PROCEDURE — 25000125 ZZHC RX 250: Performed by: INTERNAL MEDICINE

## 2017-09-11 PROCEDURE — 82565 ASSAY OF CREATININE: CPT | Performed by: INTERNAL MEDICINE

## 2017-09-11 PROCEDURE — 36415 COLL VENOUS BLD VENIPUNCTURE: CPT | Performed by: INTERNAL MEDICINE

## 2017-09-11 PROCEDURE — 94640 AIRWAY INHALATION TREATMENT: CPT

## 2017-09-11 RX ORDER — IPRATROPIUM BROMIDE AND ALBUTEROL SULFATE 2.5; .5 MG/3ML; MG/3ML
1 SOLUTION RESPIRATORY (INHALATION) EVERY 4 HOURS PRN
Qty: 30 VIAL | Refills: 1 | Status: ON HOLD | DISCHARGE
Start: 2017-09-11 | End: 2019-01-10

## 2017-09-11 RX ORDER — GUAIFENESIN 600 MG/1
600 TABLET, EXTENDED RELEASE ORAL 2 TIMES DAILY
Qty: 28 TABLET | Refills: 0 | DISCHARGE
Start: 2017-09-11 | End: 2018-11-09

## 2017-09-11 RX ORDER — TRAMADOL HYDROCHLORIDE 50 MG/1
50-100 TABLET ORAL EVERY 6 HOURS PRN
Qty: 120 TABLET | Refills: 0 | Status: SHIPPED | OUTPATIENT
Start: 2017-09-11 | End: 2018-05-16

## 2017-09-11 RX ORDER — PREDNISONE 10 MG/1
TABLET ORAL
Qty: 30 TABLET | Refills: 0 | DISCHARGE
Start: 2017-09-11 | End: 2018-05-16

## 2017-09-11 RX ORDER — IPRATROPIUM BROMIDE AND ALBUTEROL SULFATE 2.5; .5 MG/3ML; MG/3ML
1 SOLUTION RESPIRATORY (INHALATION) 4 TIMES DAILY
Qty: 30 VIAL | Refills: 1 | Status: ON HOLD | DISCHARGE
Start: 2017-09-11 | End: 2019-01-10

## 2017-09-11 RX ADMIN — TRAMADOL HYDROCHLORIDE 50 MG: 50 TABLET, COATED ORAL at 01:20

## 2017-09-11 RX ADMIN — BUTALBITAL, ACETAMINOPHEN, CAFFEINE, AND CODEINE PHOSPHATE 1 CAPSULE: 30; 50; 40; 325 CAPSULE ORAL at 05:04

## 2017-09-11 RX ADMIN — FLUTICASONE FUROATE 1 PUFF: 100 POWDER RESPIRATORY (INHALATION) at 07:58

## 2017-09-11 RX ADMIN — IPRATROPIUM BROMIDE AND ALBUTEROL SULFATE 3 ML: .5; 3 SOLUTION RESPIRATORY (INHALATION) at 03:40

## 2017-09-11 RX ADMIN — ACETYLCYSTEINE 2 ML: 200 SOLUTION ORAL; RESPIRATORY (INHALATION) at 07:57

## 2017-09-11 RX ADMIN — ATENOLOL 25 MG: 25 TABLET ORAL at 07:52

## 2017-09-11 RX ADMIN — HYDROCHLOROTHIAZIDE 25 MG: 25 TABLET ORAL at 07:52

## 2017-09-11 RX ADMIN — FLUTICASONE FUROATE AND VILANTEROL TRIFENATATE 1 PUFF: 200; 25 POWDER RESPIRATORY (INHALATION) at 07:57

## 2017-09-11 RX ADMIN — IPRATROPIUM BROMIDE AND ALBUTEROL SULFATE 3 ML: .5; 3 SOLUTION RESPIRATORY (INHALATION) at 07:57

## 2017-09-11 RX ADMIN — BUTALBITAL, ACETAMINOPHEN, CAFFEINE, AND CODEINE PHOSPHATE 1 CAPSULE: 30; 50; 40; 325 CAPSULE ORAL at 11:44

## 2017-09-11 RX ADMIN — MULTIPLE VITAMINS W/ MINERALS TAB 1 TABLET: TAB at 07:53

## 2017-09-11 RX ADMIN — TRAMADOL HYDROCHLORIDE 100 MG: 50 TABLET, COATED ORAL at 09:21

## 2017-09-11 RX ADMIN — ACETYLCYSTEINE 2 ML: 200 SOLUTION ORAL; RESPIRATORY (INHALATION) at 03:40

## 2017-09-11 RX ADMIN — LEVOFLOXACIN 750 MG: 500 TABLET, FILM COATED ORAL at 09:21

## 2017-09-11 RX ADMIN — PREDNISONE 40 MG: 20 TABLET ORAL at 07:51

## 2017-09-11 RX ADMIN — GUAIFENESIN 600 MG: 600 TABLET, EXTENDED RELEASE ORAL at 07:52

## 2017-09-11 ASSESSMENT — PAIN DESCRIPTION - DESCRIPTORS
DESCRIPTORS: HEADACHE

## 2017-09-11 NOTE — PROGRESS NOTES
"CLINICAL NUTRITION SERVICES - REASSESSMENT NOTE      Malnutrition: Severe 9/6/2017       EVALUATION OF PROGRESS TOWARD GOALS   Diet:  Regular  Intake:  Previously refused supplements.  Constipation though consistently consuming 100% of meals since last RD assessment.  Pt reports fluctuating appetite but verbalized \"I need to eat\".  Not fond of our vegetable options given dentition.      Dosing Weight 54.2 kg (admit wt)     ASSESSED NUTRITION NEEDS PER APPROVED PRACTICE GUIDELINES  Estimated Energy Needs: 4339-3732 kcals (30-35 kcal/kg)  Justification: repletion  Estimated Protein Needs: 65-81 grams protein (1.2-1.5+ g pro/kg)  Justification: repletion  Estimated Fluid Needs: > 1 mL/kcal  Justification: maintenance      NEW FINDINGS:   - Medications reviewed:   Daily MVI/M  - No updated wt since 9/5.  - Labs reviewed.   - Constipated.    - Possible d/c today.      Previous Goals:   Meet 100% of calories and protein needs through meals TID within 24 hrs.    Evaluation: Met    Previous Nutrition Diagnosis:   Malnutrition related to long-term inadequate intake and possible increased needs secondary to COPD as evidenced by persistently low body weight of 81-88% IBW for over 6 months and as low as 76% of IBW over 9 months, with severe fat and muscle loss.    Evaluation: No change, continued below      MALNUTRITION (9/6/2017)  % Weight Loss:  Weight loss does not meet criteria for malnutrition   % Intake:  Decreased intake does not meet criteria for malnutrition   Subcutaneous Fat Loss: Severe depletion in arms   Muscle Loss:  Severe depletion in chest, clavicle region, temporal region. Moderate depletion of calves.   Fluid Retention:  None noted     Malnutrition Diagnosis: Severe malnutrition  In context of acute illness or injury on chronic disease    CURRENT NUTRITION DIAGNOSIS  Malnutrition related to long-term inadequate intake and possible increased needs secondary to COPD as evidenced by persistently low body weight " of 81-88% IBW for over 6 months and as low as 76% of IBW over 9 months, with severe fat and muscle loss.    INTERVENTIONS  Recommendations / Nutrition Prescription  Continue regular diet with self-selection of soft solids as above.  Protein source with each meal.       Implementation  Collaboration and Referral of Nutrition care: Discussed POC with team during rounds.     Nutrition Education: Reviewed protein sources and importance/preservation of LBM.    Goals  Patient to consume 100% of meals TID if continues to refuse supplements.   Protein source with each meal.       MONITORING AND EVALUATION:  Progress towards goals will be monitored and evaluated per protocol and Practice Guidelines      Tavia Marlow RD, LD  Clinical Dietitian  3rd floor/ICU: [phone]  All other floors: [phone]  Weekend/holiday: [phone]

## 2017-09-11 NOTE — PLAN OF CARE
Problem: Goal Outcome Summary  Goal: Goal Outcome Summary  Outcome: No Change  Pt A&O, up in halls independently, Tier A activity level. VSS, afebrile + O2 weaned to RA with sats at low-90's while at rest. LS diminished, nonproductive cough. Regular diet, good PO intake,  + 160, no SSI required. C/o headache, tramadol + Fioricet with some relief. Possible DC tomorrow?

## 2017-09-11 NOTE — DISCHARGE INSTRUCTIONS
Continue oral nutrition supplements at d/c until oral intake returns to baseline - if not using high protein supplements, aim for 3 meals daily with a protein source + 1-2 snacks.

## 2017-09-11 NOTE — PLAN OF CARE
Problem: Goal Outcome Summary  Goal: Goal Outcome Summary  Outcome: Improving  Pt A&O,up independently to the bathroom, VSS.  O2 weaned to RA with sats at low-90's while at rest. LS diminished, nonproductive cough. Regular diet, BG check 109. C/o headache, tramadol + Fioricet with some relief. Possible DC today.

## 2017-09-11 NOTE — PLAN OF CARE
Problem: Discharge Planning  Goal: Discharge Planning (Adult, OB, Behavioral, Peds)  Outcome: Adequate for Discharge Date Met:  09/11/17  Pt d/c to TCU today. Pt educated on COPD and acute respiratory distress. Pt abx d/c. Prescribed prednisone.

## 2017-09-11 NOTE — PROGRESS NOTES
[facility] called and said pt is open to [facility].  He will need to call [phone] to set up an EW assessment.  I also called financial advisors and notified them of additional insurance.  I called [facility] and they said pt has an engagement Care coordinator Farhana FLORES [phone].  I called her and left a vm of pt's needs of EW.  He declined case management.  I also called [facility] and informed them of DAPHNE Navarro RN CTS [id number]

## 2017-09-11 NOTE — PLAN OF CARE
Problem: Goal Outcome Summary  Goal: Goal Outcome Summary  PT - Pt discharging to TCU today with goals not met.  Please refer to discharge summary.  Amb x 8 min total with one seated rest break. Not met  Stairs were not performed. Not met

## 2017-09-11 NOTE — PROGRESS NOTES
CM: SPoke with Admission at TCU. They are aware pt will dc today. THey use NWR should pt need trasnport tank. PT hopes to call friend for ride to facility  P:     09/07/17 0808   [facility] ([place]) [phone], Fax: [phone]   PAS Number ([id number])   WIll fax orders once completed.

## 2017-09-12 ENCOUNTER — AMBULATORY - HEALTHEAST (OUTPATIENT)
Dept: ADMINISTRATIVE | Facility: CLINIC | Age: 77
End: 2017-09-12

## 2017-09-12 ENCOUNTER — CARE COORDINATION (OUTPATIENT)
Dept: CARE COORDINATION | Facility: CLINIC | Age: 77
End: 2017-09-12

## 2017-09-12 LAB
BACTERIA SPEC CULT: NO GROWTH
BACTERIA SPEC CULT: NO GROWTH
Lab: NORMAL
Lab: NORMAL
SPECIMEN SOURCE: NORMAL
SPECIMEN SOURCE: NORMAL

## 2017-09-12 NOTE — PROGRESS NOTES
Clinic Care Coordination Contact    Situation: CCRN received handoff from care transitions staff inpatient at [facility]     Background: Patient admitted from 9/5 - 9/11 with COPD exacerbation and CAP     Assessment: Discharged to [facility] TCU in [facility] - CCRN spoke to Feli AMBROSE - she wrote down CCRN contact information and will advise of discharge plans - Feli AMBROSE states that she also has the [facility] TAMARA Delcid's phone number [phone], Farhana is planning to submit paperwork for Elderly Waiver assessment     Patient is on 2 LPM oxygen through [facility] - he currently smokes 1 ppd and is not interested in quitting per hospital notes     Plan/Recommendations: CCRN will await call from TCU to advise of discharge plans/date   CCRN will f/u with TCU in 3 weeks if has not heard from them in that time frame     Maryam Amador Care Coordinator RN  [facility]  [phone]  September 12, 2017

## 2017-09-12 NOTE — PROGRESS NOTES
Transition Communication Hand-off for Care Transitions to Next Level of Care Provider    Name: Chuck Lopez  MRN #: [id number]  Primary Care Provider: Pranay Reid  Primary Care MD Name: Dr Reid  Primary Clinic: [address] [facility] [address]  Primary Care Clinic Name: [facility]  Reason for Hospitalization:  Community acquired pneumonia [J18.9]  COPD exacerbation (H) [J44.1]  Admit Date/Time: 9/5/2017 12:04 PM  Discharge Date: 9/11/17  Payor Source: Payor: MEDICARE / Plan: MEDICARE / Product Type: Medicare /     Readmission Assessment Measure (TETO) Risk Score/category: ELEVATED    Plan of Care Goals/Milestone Events:   Patient Concerns:  Living situation after TCU            Reason for Communication Hand-off Referral: Admission diagnoses: PN  Admission diagnoses: COPD  Fragility  Difficulty understanding plan of care  No support or lacking a support system  Non-adherence to plan of care related to:  Other active smoker  Other unsure if pt has poor memory or declines to say what his current resources are    Discharge Plan:       Concern for non-adherence with plan of care:   YES  Discharge Needs Assessment:  Needs       Most Recent Value    Equipment Currently Used at Home oxygen [prn]    Transportation Available other (see comments) [unsure]    # of Referrals Placed by [facility] ([place]) [phone], Fax: [phone]    PAS Number [id number]          Already enrolled in Tele-monitoring program and name of program:  na  Follow-up specialty is recommended: Yes    Follow-up plan:  No future appointments.    Any outstanding tests or procedures:    Procedures     Future Labs/Procedures    Oxygen - Nasal cannula     Comments:    2 Lpm by nasal cannula to keep O2 sats 92% or greater.          Referrals     Future Labs/Procedures    Occupational Therapy Adult Consult     Comments:    Evaluate and treat as  "clinically indicated.    Physical Therapy Adult Consult     Comments:    Evaluate and treat as clinically indicated.            Key Recommendations:  Admit with COPD and PNA.  Smokes 1 ppd and not interested in quitting.  Lives in the basement and has roommates and they also smoke.  Pt having difficulty with ambulation and taking care of himself.   I called [facility] and requested EW screening-but pt has medica so his engagement CM Farhana will call to set this up.  Pt would benefit from AL that takes EW.   planning on doing MA application with pt also, but we found out pt has medica and ma , but he didn't inform us of this.   Pt having difficulty reading things, given \"cheater\" glasses so now able to read.  Questions about COPD medications and scheduling.  Will have inpt pharmacy meet with him at [place].  Declines Home care support d/t his living situation/environment.  Possibly open to TCU, but bad experience in the past. Thankfully [facility] which has private rooms for free did except him.   DM-lost his machine-pt given new machine a1c 5.7.  Has home 02 through  dme, but didn't share this with us either.  He did decline care coordination in the past through medica.      Feli Portillo    AVS/Discharge Summary is the source of truth; this is a helpful guide for improved communication of patient story  "

## 2017-09-14 NOTE — DISCHARGE SUMMARY
"Physician Discharge Summary     Name: Chuck Lopez    MRN: [id number]     YOB: 1940    Age: 76 year old                                                 Primary care provider: Pranay Reid      Admit date:  9/5/2017      Discharge date and time: 9/11/2017 12:05 PM       Discharge Physician:  Jori Doe        Discharge Diagnosis:       #1.Acute on chronic hypoxic resp failure   #2.Acute COPD exacerbation leading to #1  #3.Tobacco use disorder -wants to continue to smoke despite the risk   #4.Homlessness       Secondary Diagnosis /chronic medical conditions        Past Medical History:   Diagnosis Date     Chronic neck pain      COPD (chronic obstructive pulmonary disease) (H)      Diabetes (H)      Hepatitis C      Hypertension        Past Surgical History:    Past Surgical History:   Procedure Laterality Date     APPENDECTOMY       ESOPHAGOSCOPY, GASTROSCOPY, DUODENOSCOPY (EGD), COMBINED  4/16/2014    Procedure: ESOPHAGOSCOPY, GASTROSCOPY, DUODENOSCOPY (EGD) & COLONOSCOPY with polypectomy by hot snare;  Surgeon: Dillon Vázquez MD;  Location:  GI                   Brief Summary of Hospital stay :       Please refer to  Admission H&P note for full details of patient presentation.      Admission Condition: poor     Discharged Condition: stable    /79 (BP Location: Right arm)  Pulse 74  Temp 97.4  F (36.3  C) (Oral)  Resp 20  Ht 1.651 m (5' 5\")  Wt 54.2 kg (119 lb 8 oz)  SpO2 90%  BMI 19.89 kg/m2       Presenting problem/signs and symptoms:     sob    Brief Hospital Summary:      Chuck Lopez is a 76 year old male with a past medical history of HTN, DM II, hepatitis C, chronic neck pain, and COPD  admitted on 9/5/2017 with concern for pneumonia and COPD exacerbation.    Problem List:   1. Acute on chronic hypoxic resp failure   -- CT showed no PE or Pneumonia  -- Still sob and coughing   -- hypoxic   -- Has mucus plugs on CT -Mucinex , mucomyst neb , RT " consulted   -- improving , continue current care , wean off oxygen as able       2. One Positive blood culture from 9/5 with gram positive cocci.  Suspect contaminant.  -- follow up cultures negative       3. COPD exacerbation.  Continue Levaquin, Prednisone, Breo Ellipta, Fluticasone, Duonebs.  Albuterol PRN.  4. HTN.  Atenolol.  5. Diabetes Mellitus.  HgbA1c only 5.5.  Will likely only need diet control as outpatient.  .   6. GERD.  Zantac.   7. Deconditioning.  PT consulted ,  TCU   8. Tobacco use disorder.  Nicotrol inhaler.   9.  Malnutrition.  RD consulted           Consultations during hospital stay         CARE COORDINATOR IP CONSULT  PHARMACY DISCHARGE EDUCATION BY PHARMACIST  PHARMACY TO DOSE VANCO  PHYSICAL THERAPY ADULT IP CONSULT  NUTRITION SERVICES ADULT IP CONSULT  PHYSICAL THERAPY ADULT IP CONSULT  OCCUPATIONAL THERAPY ADULT IP CONSULT      Major procedure performed/  Significant Diagnostic Studies              Results for orders placed or performed during the hospital encounter of 09/05/17   Chest XR,  PA & LAT    Narrative    XR CHEST 2 VW 9/5/2017 2:14 PM    HISTORY: dyspnea      Impression    IMPRESSION: COPD. Mild infiltrate or atelectasis right lung base.    AUDIE HIDALGO MD   CT Chest Pulmonary Embolism w Contrast    Narrative    CT CHEST PULMONARY EMBOLISM WITH CONTRAST  9/8/2017 5:20 PM     HISTORY: Shortness of breath.     COMPARISON: CT chest 3/29/2017.    TECHNIQUE: Thin section axial images are performed from the thoracic  inlet to the lung bases utilizing 68 mL of Isovue 370 IV contrast  without adverse event. Coronal reformatted images are also generated.  Radiation dose for this scan was reduced using automated exposure  control, adjustment of the mA and/or kV according to patient size, or  iterative reconstruction technique.    FINDINGS:     Chest: Mucoid impactions are noted within mildly dilated right middle  lobe and lingular airways. Mild bronchiectatic changes are stable  right  and left lower lobes. No focal infiltrate or consolidation is  appreciated. Emphysema is present. No pleural or pericardial fluid.  Heart is normal in size. Esophagus is unremarkable. Thyroid gland  appears normal in size. No enlarged axillary or intrathoracic lymph  nodes. No evidence of pulmonary embolism. Thoracic aorta demonstrates  scattered calcified and noncalcified plaque without aneurysm or  dissection. Limited images upper abdomen demonstrate calcified  gallstones in a nondistended gallbladder. Bilateral moderate renal  cortical scarring is present.      Impression    IMPRESSION:  1. Bronchiectatic changes with mucoid impactions in the right middle  lobe and lingula. Bronchiectatic changes in the right and left lower  lobe are also noted and appear stable. No infiltrate or consolidation  to suggest pneumonia.  2. No pulmonary embolism. Thoracic aorta demonstrates atherosclerotic  plaque without aneurysm or dissection.  3. Cholelithiasis.  4. Moderate bilateral renal cortical scarring.    STEFANIE ABDUL MD         Recent Labs  Lab 09/09/17  0548   WBC 8.5   HGB 14.1   HCT 42.6   MCV 96        No results for input(s): CULT in the last 168 hours.    Recent Labs  Lab 09/11/17  0651 09/10/17  0612 09/09/17  0548   NA  --   --  141   POTASSIUM  --   --  3.7   CHLORIDE  --   --  103   CO2  --   --  33*   ANIONGAP  --   --  5   GLC  --   --  93   BUN  --   --  19   CR 0.75 0.76 0.80   GFRESTIMATED >90 >90 >90   GFRESTBLACK >90 >90 >90   MATTHEW  --   --  8.4*         Recent Labs  Lab 09/11/17  0123 09/10/17  2134 09/10/17  0107 09/09/17  2149 09/09/17  1143 09/09/17  0548   GLC  --   --   --   --   --  93   * 160* 123* 197* 97  --            No results for input(s): INR in the last 168 hours.        No results for input(s): TROPONIN, TROPI, TROPR in the last 168 hours.    Invalid input(s): TROP, TROPONINIES              Pending Results           Unresulted Labs Ordered in the Past 30 Days of this Admission   "   No orders found from 7/7/2017 to 9/6/2017.              Disposition         SNF      Allergies       Allergies   Allergen Reactions     Ammonium Lactate Other (See Comments)     Burning to skin     Doxycycline Visual Disturbance     Monosodium Glutamate Other (See Comments)     Pt states it feels like his \"head swells, visual disturbances, and he can't think striaght\"     Penicillins Rash            Patient Instructions and Discharge Medications              Review of your medicines      START taking       Dose / Directions    fexofenadine 30 MG ODT tab   Commonly known as:  ALLEGRA   Used for:  COPD exacerbation (H)        Dose:  30 mg   Take 1 tablet (30 mg) by mouth 2 times daily as needed for allergies (congestion)   Refills:  0       guaiFENesin 600 MG 12 hr tablet   Commonly known as:  MUCINEX   Used for:  COPD exacerbation (H)        Dose:  600 mg   Take 1 tablet (600 mg) by mouth 2 times daily   Quantity:  28 tablet   Refills:  0       * ipratropium - albuterol 0.5 mg/2.5 mg/3 mL 0.5-2.5 (3) MG/3ML neb solution   Commonly known as:  DUONEB   Used for:  COPD exacerbation (H)        Dose:  1 vial   Take 1 vial (3 mLs) by nebulization 4 times daily   Quantity:  30 vial   Refills:  1       * ipratropium - albuterol 0.5 mg/2.5 mg/3 mL 0.5-2.5 (3) MG/3ML neb solution   Commonly known as:  DUONEB   Used for:  COPD exacerbation (H)        Dose:  1 vial   Take 1 vial (3 mLs) by nebulization every 4 hours as needed for shortness of breath / dyspnea or wheezing   Quantity:  30 vial   Refills:  1       predniSONE 10 MG tablet   Commonly known as:  DELTASONE   Used for:  COPD exacerbation (H)        4 tabs daily for 3 days, then 3 tabs daily for 3 days, then 2 tabs daily for 3 days, then 1 tab daily for 3 days, then stop   Quantity:  30 tablet   Refills:  0       * Notice:  This list has 2 medication(s) that are the same as other medications prescribed for you. Read the directions carefully, and ask your doctor or " other care provider to review them with you.      CONTINUE these medicines which have NOT CHANGED       Dose / Directions    atenolol 25 MG tablet   Commonly known as:  TENORMIN   Used for:  Type 2 diabetes mellitus with hyperosmolarity without coma, unspecified long term insulin use status (H)        Dose:  25 mg   Take 1 tablet (25 mg) by mouth daily   Quantity:  30 tablet   Refills:  11       butalbital-APAP-caffeine-codeine -58-30 MG per capsule   Commonly known as:  FIORICET WITH codeine   Used for:  Intractable migraine, unspecified migraine type        Dose:  1 capsule   Take 1 capsule by mouth every 6 hours as needed for headaches   Quantity:  30 capsule   Refills:  0       fluticasone-salmeterol 500-50 MCG/DOSE diskus inhaler   Commonly known as:  ADVAIR   Used for:  COPD exacerbation (H)        Dose:  1 puff   Inhale 1 puff into the lungs 2 times daily   Quantity:  3 Inhaler   Refills:  1       hydrochlorothiazide 25 MG tablet   Commonly known as:  HYDRODIURIL   Used for:  Type 2 diabetes mellitus with hyperosmolarity without coma, unspecified long term insulin use status (H)        TAKE ONE TABLET BY MOUTH EVERY DAY   Quantity:  30 tablet   Refills:  10       melatonin 3 MG tablet   Used for:  Insomnia, unspecified type        Dose:  3 mg   Take 1 tablet (3 mg) by mouth nightly as needed   Quantity:  30 tablet   Refills:  0       tiotropium 18 MCG capsule   Commonly known as:  SPIRIVA HANDIHALER   Used for:  Centrilobular emphysema (H)        INHALE ONE CAPSULE BY MOUTH EVERY DAY   Quantity:  30 capsule   Refills:  11       traMADol 50 MG tablet   Commonly known as:  ULTRAM   Used for:  Chronic midline low back pain with sciatica, sciatica laterality unspecified        Dose:   mg   Take 1-2 tablets ( mg) by mouth every 6 hours as needed for pain maximum 4 tablet(s) per day   Quantity:  120 tablet   Refills:  0       V-C FORTE Caps   Used for:  Hepatitis C        TAKE ONE CAPSULE BY MOUTH  EVERY DAY   Quantity:  90 capsule   Refills:  3       ZANTAC PO        Dose:  75 mg   Take 75 mg by mouth At Bedtime   Refills:  0         STOP taking          albuterol (2.5 MG/3ML) 0.083% neb solution           albuterol 108 (90 BASE) MCG/ACT Inhaler   Commonly known as:  PROAIR HFA/PROVENTIL HFA/VENTOLIN HFA           beclomethasone 80 MCG/ACT Inhaler   Commonly known as:  QVAR                Where to get your medicines      Some of these will need a paper prescription and others can be bought over the counter. Ask your nurse if you have questions.     Bring a paper prescription for each of these medications      traMADol 50 MG tablet       You don't need a prescription for these medications      fexofenadine 30 MG ODT tab     guaiFENesin 600 MG 12 hr tablet     ipratropium - albuterol 0.5 mg/2.5 mg/3 mL 0.5-2.5 (3) MG/3ML neb solution     ipratropium - albuterol 0.5 mg/2.5 mg/3 mL 0.5-2.5 (3) MG/3ML neb solution     predniSONE 10 MG tablet              Discharge diet:  Active Diet Order      Advance Diet as Tolerated  diabetic diet        Discharge activity:Activity as tolerated      Discharge follow-up:    Follow up with primary care provider in 7 days or earlier if symptoms return or gets worse.    Follow up with consultant as instructed     Other instructions:    We discussed with Patient/family about detail discharge instructions as well as discharge medications above including potential risks,side effects and benefits.Patient/family understood benefits and potential serious side effects of taking these medications and need to follow up with PCP if the patient develops complications.  Patient is also advised to see a doctor immediately for severe symptoms.          I saw and evaluated the patient on day of discharge and  discharge instructions reviewed  and  all the patient's questions and concerns addressed.Over 30 minutes spent on discharge and coordination of discharge process for this  patient.          Disclaimer: This note consists of symbols derived from keyboarding, dictation and/or voice recognition software. As a result, there may be errors in the script that have gone undetected. Please consider this when interpreting information found in this chart

## 2017-09-15 ENCOUNTER — OFFICE VISIT - HEALTHEAST (OUTPATIENT)
Dept: GERIATRICS | Facility: CLINIC | Age: 77
End: 2017-09-15

## 2017-09-15 DIAGNOSIS — Z59.00 HOMELESSNESS: ICD-10-CM

## 2017-09-15 DIAGNOSIS — Z72.0 TOBACCO USE: ICD-10-CM

## 2017-09-15 DIAGNOSIS — J18.9 PNEUMONIA: ICD-10-CM

## 2017-09-15 DIAGNOSIS — I10 HYPERTENSION: ICD-10-CM

## 2017-09-15 DIAGNOSIS — B19.20 HEPATITIS C: ICD-10-CM

## 2017-09-15 SDOH — ECONOMIC STABILITY - HOUSING INSECURITY: HOMELESSNESS UNSPECIFIED: Z59.00

## 2017-09-18 ENCOUNTER — OFFICE VISIT - HEALTHEAST (OUTPATIENT)
Dept: GERIATRICS | Facility: CLINIC | Age: 77
End: 2017-09-18

## 2017-09-18 DIAGNOSIS — J18.9 PNEUMONIA: ICD-10-CM

## 2017-09-18 DIAGNOSIS — B19.20 HEPATITIS C: ICD-10-CM

## 2017-09-18 DIAGNOSIS — Z72.0 TOBACCO USE: ICD-10-CM

## 2017-09-18 DIAGNOSIS — R51.9 HEADACHE: ICD-10-CM

## 2017-09-22 ENCOUNTER — OFFICE VISIT - HEALTHEAST (OUTPATIENT)
Dept: GERIATRICS | Facility: CLINIC | Age: 77
End: 2017-09-22

## 2017-09-22 DIAGNOSIS — Z59.00 HOMELESSNESS: ICD-10-CM

## 2017-09-22 DIAGNOSIS — R51.9 HEADACHE: ICD-10-CM

## 2017-09-22 DIAGNOSIS — J18.9 PNEUMONIA: ICD-10-CM

## 2017-09-22 DIAGNOSIS — Z72.0 TOBACCO USE: ICD-10-CM

## 2017-09-22 DIAGNOSIS — B19.20 HEPATITIS C: ICD-10-CM

## 2017-09-22 SDOH — ECONOMIC STABILITY - HOUSING INSECURITY: HOMELESSNESS UNSPECIFIED: Z59.00

## 2017-09-25 ENCOUNTER — OFFICE VISIT - HEALTHEAST (OUTPATIENT)
Dept: GERIATRICS | Facility: CLINIC | Age: 77
End: 2017-09-25

## 2017-09-25 DIAGNOSIS — B19.20 HEPATITIS C: ICD-10-CM

## 2017-09-25 DIAGNOSIS — J18.9 PNEUMONIA: ICD-10-CM

## 2017-09-25 DIAGNOSIS — Z59.00 HOMELESSNESS: ICD-10-CM

## 2017-09-25 DIAGNOSIS — J44.9 COPD (CHRONIC OBSTRUCTIVE PULMONARY DISEASE) (H): ICD-10-CM

## 2017-09-25 SDOH — ECONOMIC STABILITY - HOUSING INSECURITY: HOMELESSNESS UNSPECIFIED: Z59.00

## 2017-09-29 ENCOUNTER — OFFICE VISIT - HEALTHEAST (OUTPATIENT)
Dept: GERIATRICS | Facility: CLINIC | Age: 77
End: 2017-09-29

## 2017-09-29 DIAGNOSIS — G89.29 CHRONIC PAIN: ICD-10-CM

## 2017-09-29 DIAGNOSIS — Z72.0 TOBACCO USE: ICD-10-CM

## 2017-09-29 DIAGNOSIS — J44.9 COPD (CHRONIC OBSTRUCTIVE PULMONARY DISEASE) (H): ICD-10-CM

## 2017-09-29 DIAGNOSIS — B19.20 HEPATITIS C: ICD-10-CM

## 2017-10-02 ENCOUNTER — OFFICE VISIT - HEALTHEAST (OUTPATIENT)
Dept: GERIATRICS | Facility: CLINIC | Age: 77
End: 2017-10-02

## 2017-10-02 DIAGNOSIS — G89.29 CHRONIC PAIN: ICD-10-CM

## 2017-10-02 DIAGNOSIS — B19.20 HEPATITIS C: ICD-10-CM

## 2017-10-02 DIAGNOSIS — I10 HYPERTENSION: ICD-10-CM

## 2017-10-02 DIAGNOSIS — Z72.0 TOBACCO USE: ICD-10-CM

## 2017-10-02 DIAGNOSIS — J18.9 PNEUMONIA: ICD-10-CM

## 2017-10-04 ENCOUNTER — CARE COORDINATION (OUTPATIENT)
Dept: CARE COORDINATION | Facility: CLINIC | Age: 77
End: 2017-10-04

## 2017-10-04 NOTE — PROGRESS NOTES
Clinic Care Coordination Contact  Care Team Conversations      CCRN placed call to [facility] TCU - verified that patient is still currently admitted at that facility     CCRN will continue to monitor for d/c and f/u with patient - CCRN contact information available at TCU for staff to advise     Maryam Amador Care Coordinator RN  [facility]  [phone]  October 4, 2017

## 2017-10-05 ENCOUNTER — AMBULATORY - HEALTHEAST (OUTPATIENT)
Dept: GERIATRICS | Facility: CLINIC | Age: 77
End: 2017-10-05

## 2017-10-06 ENCOUNTER — OFFICE VISIT - HEALTHEAST (OUTPATIENT)
Dept: GERIATRICS | Facility: CLINIC | Age: 77
End: 2017-10-06

## 2017-10-06 DIAGNOSIS — Z59.00 HOMELESSNESS: ICD-10-CM

## 2017-10-06 DIAGNOSIS — R51.9 CHRONIC HEADACHE: ICD-10-CM

## 2017-10-06 DIAGNOSIS — G89.29 CHRONIC HEADACHE: ICD-10-CM

## 2017-10-06 DIAGNOSIS — J44.9 COPD (CHRONIC OBSTRUCTIVE PULMONARY DISEASE) (H): ICD-10-CM

## 2017-10-06 SDOH — ECONOMIC STABILITY - HOUSING INSECURITY: HOMELESSNESS UNSPECIFIED: Z59.00

## 2017-10-09 ENCOUNTER — OFFICE VISIT - HEALTHEAST (OUTPATIENT)
Dept: GERIATRICS | Facility: CLINIC | Age: 77
End: 2017-10-09

## 2017-10-09 DIAGNOSIS — I10 HYPERTENSION: ICD-10-CM

## 2017-10-09 DIAGNOSIS — B19.20 HEPATITIS C: ICD-10-CM

## 2017-10-09 DIAGNOSIS — J44.9 COPD (CHRONIC OBSTRUCTIVE PULMONARY DISEASE) (H): ICD-10-CM

## 2017-10-09 DIAGNOSIS — Z72.0 TOBACCO USE: ICD-10-CM

## 2017-10-13 ENCOUNTER — OFFICE VISIT - HEALTHEAST (OUTPATIENT)
Dept: GERIATRICS | Facility: CLINIC | Age: 77
End: 2017-10-13

## 2017-10-13 DIAGNOSIS — I10 HYPERTENSION: ICD-10-CM

## 2017-10-13 DIAGNOSIS — J44.9 COPD (CHRONIC OBSTRUCTIVE PULMONARY DISEASE) (H): ICD-10-CM

## 2017-10-13 DIAGNOSIS — Z59.00 HOMELESSNESS: ICD-10-CM

## 2017-10-13 DIAGNOSIS — G89.29 CHRONIC PAIN: ICD-10-CM

## 2017-10-13 DIAGNOSIS — B19.20 HEPATITIS C: ICD-10-CM

## 2017-10-13 DIAGNOSIS — G89.29 CHRONIC HEADACHE: ICD-10-CM

## 2017-10-13 DIAGNOSIS — R51.9 CHRONIC HEADACHE: ICD-10-CM

## 2017-10-13 SDOH — ECONOMIC STABILITY - HOUSING INSECURITY: HOMELESSNESS UNSPECIFIED: Z59.00

## 2017-10-16 ENCOUNTER — OFFICE VISIT - HEALTHEAST (OUTPATIENT)
Dept: GERIATRICS | Facility: CLINIC | Age: 77
End: 2017-10-16

## 2017-10-16 DIAGNOSIS — J44.9 COPD (CHRONIC OBSTRUCTIVE PULMONARY DISEASE) (H): ICD-10-CM

## 2017-10-16 DIAGNOSIS — R51.9 CHRONIC HEADACHE: ICD-10-CM

## 2017-10-16 DIAGNOSIS — I10 HYPERTENSION: ICD-10-CM

## 2017-10-16 DIAGNOSIS — F41.9 ANXIETY: ICD-10-CM

## 2017-10-16 DIAGNOSIS — G89.29 CHRONIC HEADACHE: ICD-10-CM

## 2017-10-16 DIAGNOSIS — Z72.0 TOBACCO USE: ICD-10-CM

## 2017-10-19 ENCOUNTER — AMBULATORY - HEALTHEAST (OUTPATIENT)
Dept: GERIATRICS | Facility: CLINIC | Age: 77
End: 2017-10-19

## 2017-10-19 ENCOUNTER — DOCUMENTATION ONLY (OUTPATIENT)
Dept: OTHER | Facility: CLINIC | Age: 77
End: 2017-10-19

## 2017-10-19 DIAGNOSIS — Z71.89 ADVANCED DIRECTIVES, COUNSELING/DISCUSSION: Chronic | ICD-10-CM

## 2017-10-20 ENCOUNTER — OFFICE VISIT - HEALTHEAST (OUTPATIENT)
Dept: GERIATRICS | Facility: CLINIC | Age: 77
End: 2017-10-20

## 2017-10-20 DIAGNOSIS — R51.9 HEADACHE: ICD-10-CM

## 2017-10-20 DIAGNOSIS — Z59.00 HOMELESSNESS: ICD-10-CM

## 2017-10-20 DIAGNOSIS — B19.20 HEPATITIS C: ICD-10-CM

## 2017-10-20 DIAGNOSIS — I10 HYPERTENSION: ICD-10-CM

## 2017-10-20 DIAGNOSIS — J44.9 COPD (CHRONIC OBSTRUCTIVE PULMONARY DISEASE) (H): ICD-10-CM

## 2017-10-20 SDOH — ECONOMIC STABILITY - HOUSING INSECURITY: HOMELESSNESS UNSPECIFIED: Z59.00

## 2017-10-23 ENCOUNTER — OFFICE VISIT - HEALTHEAST (OUTPATIENT)
Dept: GERIATRICS | Facility: CLINIC | Age: 77
End: 2017-10-23

## 2017-10-23 DIAGNOSIS — I10 HYPERTENSION: ICD-10-CM

## 2017-10-23 DIAGNOSIS — J44.9 COPD (CHRONIC OBSTRUCTIVE PULMONARY DISEASE) (H): ICD-10-CM

## 2017-10-23 DIAGNOSIS — F41.9 ANXIETY: ICD-10-CM

## 2017-10-23 DIAGNOSIS — D72.10 EOSINOPHILIA: ICD-10-CM

## 2017-10-27 ENCOUNTER — OFFICE VISIT - HEALTHEAST (OUTPATIENT)
Dept: GERIATRICS | Facility: CLINIC | Age: 77
End: 2017-10-27

## 2017-10-27 DIAGNOSIS — J44.9 COPD (CHRONIC OBSTRUCTIVE PULMONARY DISEASE) (H): ICD-10-CM

## 2017-10-27 DIAGNOSIS — D72.10 EOSINOPHILIA: ICD-10-CM

## 2017-10-27 DIAGNOSIS — B19.20 HEPATITIS C: ICD-10-CM

## 2017-10-27 DIAGNOSIS — F41.9 ANXIETY: ICD-10-CM

## 2017-10-27 DIAGNOSIS — I10 HYPERTENSION: ICD-10-CM

## 2017-10-27 DIAGNOSIS — Z59.00 HOMELESSNESS: ICD-10-CM

## 2017-10-27 SDOH — ECONOMIC STABILITY - HOUSING INSECURITY: HOMELESSNESS UNSPECIFIED: Z59.00

## 2017-10-30 ENCOUNTER — OFFICE VISIT - HEALTHEAST (OUTPATIENT)
Dept: GERIATRICS | Facility: CLINIC | Age: 77
End: 2017-10-30

## 2017-10-30 DIAGNOSIS — B19.20 HEPATITIS C: ICD-10-CM

## 2017-10-30 DIAGNOSIS — J44.9 COPD (CHRONIC OBSTRUCTIVE PULMONARY DISEASE) (H): ICD-10-CM

## 2017-10-30 DIAGNOSIS — F41.9 ANXIETY: ICD-10-CM

## 2017-10-30 DIAGNOSIS — Z72.0 TOBACCO USE: ICD-10-CM

## 2017-11-03 ENCOUNTER — OFFICE VISIT - HEALTHEAST (OUTPATIENT)
Dept: GERIATRICS | Facility: CLINIC | Age: 77
End: 2017-11-03

## 2017-11-03 DIAGNOSIS — R51.9 HEADACHE: ICD-10-CM

## 2017-11-03 DIAGNOSIS — J44.9 COPD (CHRONIC OBSTRUCTIVE PULMONARY DISEASE) (H): ICD-10-CM

## 2017-11-03 DIAGNOSIS — B19.20 HEPATITIS C: ICD-10-CM

## 2017-11-06 ENCOUNTER — OFFICE VISIT - HEALTHEAST (OUTPATIENT)
Dept: GERIATRICS | Facility: CLINIC | Age: 77
End: 2017-11-06

## 2017-11-06 DIAGNOSIS — J44.9 COPD (CHRONIC OBSTRUCTIVE PULMONARY DISEASE) (H): ICD-10-CM

## 2017-11-06 DIAGNOSIS — F41.9 ANXIETY: ICD-10-CM

## 2017-11-06 DIAGNOSIS — G89.29 CHRONIC PAIN: ICD-10-CM

## 2017-11-06 DIAGNOSIS — Z59.00 HOMELESSNESS: ICD-10-CM

## 2017-11-06 SDOH — ECONOMIC STABILITY - HOUSING INSECURITY: HOMELESSNESS UNSPECIFIED: Z59.00

## 2017-11-10 ENCOUNTER — OFFICE VISIT - HEALTHEAST (OUTPATIENT)
Dept: GERIATRICS | Facility: CLINIC | Age: 77
End: 2017-11-10

## 2017-11-10 DIAGNOSIS — Z72.0 TOBACCO USE: ICD-10-CM

## 2017-11-10 DIAGNOSIS — G89.29 CHRONIC HEADACHE: ICD-10-CM

## 2017-11-10 DIAGNOSIS — R51.9 CHRONIC HEADACHE: ICD-10-CM

## 2017-11-10 DIAGNOSIS — J44.9 COPD (CHRONIC OBSTRUCTIVE PULMONARY DISEASE) (H): ICD-10-CM

## 2017-11-11 ENCOUNTER — CARE COORDINATION (OUTPATIENT)
Dept: CARE COORDINATION | Facility: CLINIC | Age: 77
End: 2017-11-11

## 2017-11-11 NOTE — PROGRESS NOTES
Clinic Care Coordination Contact    Situation: Patient chart reviewed by RN care coordinator.    Background: Patient admitted from 9/5 - 9/11 with COPD exacerbation and CAP - discharged to [facility] TCU     Assessment: CCRN placed call to TCU to verify patient still admitted - patient still admitted at this clyde e    Plan/Recommendations: will call TCU in 30 days if no contact from them advising of discharge     Maryam Amador Care Coordinator RN  [facility]  [phone]  November 11, 2017

## 2017-11-13 ENCOUNTER — OFFICE VISIT - HEALTHEAST (OUTPATIENT)
Dept: GERIATRICS | Facility: CLINIC | Age: 77
End: 2017-11-13

## 2017-11-13 DIAGNOSIS — Z72.0 TOBACCO USE: ICD-10-CM

## 2017-11-13 DIAGNOSIS — J44.9 COPD (CHRONIC OBSTRUCTIVE PULMONARY DISEASE) (H): ICD-10-CM

## 2017-11-13 DIAGNOSIS — G89.29 CHRONIC HEADACHE: ICD-10-CM

## 2017-11-13 DIAGNOSIS — R51.9 CHRONIC HEADACHE: ICD-10-CM

## 2017-11-20 ENCOUNTER — OFFICE VISIT - HEALTHEAST (OUTPATIENT)
Dept: GERIATRICS | Facility: CLINIC | Age: 77
End: 2017-11-20

## 2017-11-20 DIAGNOSIS — R51.9 CHRONIC HEADACHE: ICD-10-CM

## 2017-11-20 DIAGNOSIS — Z59.00 HOMELESSNESS: ICD-10-CM

## 2017-11-20 DIAGNOSIS — J44.9 COPD (CHRONIC OBSTRUCTIVE PULMONARY DISEASE) (H): ICD-10-CM

## 2017-11-20 DIAGNOSIS — F41.9 ANXIETY: ICD-10-CM

## 2017-11-20 DIAGNOSIS — G89.29 CHRONIC HEADACHE: ICD-10-CM

## 2017-11-20 DIAGNOSIS — Z72.0 TOBACCO USE: ICD-10-CM

## 2017-11-20 SDOH — ECONOMIC STABILITY - HOUSING INSECURITY: HOMELESSNESS UNSPECIFIED: Z59.00

## 2017-11-24 ENCOUNTER — OFFICE VISIT - HEALTHEAST (OUTPATIENT)
Dept: GERIATRICS | Facility: CLINIC | Age: 77
End: 2017-11-24

## 2017-11-24 DIAGNOSIS — Z72.0 TOBACCO USE: ICD-10-CM

## 2017-11-24 DIAGNOSIS — N40.0 BPH (BENIGN PROSTATIC HYPERPLASIA): ICD-10-CM

## 2017-11-24 DIAGNOSIS — J44.9 COPD (CHRONIC OBSTRUCTIVE PULMONARY DISEASE) (H): ICD-10-CM

## 2017-11-24 DIAGNOSIS — F41.9 ANXIETY: ICD-10-CM

## 2017-11-27 ENCOUNTER — OFFICE VISIT - HEALTHEAST (OUTPATIENT)
Dept: GERIATRICS | Facility: CLINIC | Age: 77
End: 2017-11-27

## 2017-11-27 DIAGNOSIS — J44.9 COPD (CHRONIC OBSTRUCTIVE PULMONARY DISEASE) (H): ICD-10-CM

## 2017-11-27 DIAGNOSIS — B19.20 HEPATITIS C: ICD-10-CM

## 2017-11-27 DIAGNOSIS — Z59.00 HOMELESSNESS: ICD-10-CM

## 2017-11-27 DIAGNOSIS — F41.9 ANXIETY: ICD-10-CM

## 2017-11-27 DIAGNOSIS — Z72.0 TOBACCO USE: ICD-10-CM

## 2017-11-27 SDOH — ECONOMIC STABILITY - HOUSING INSECURITY: HOMELESSNESS UNSPECIFIED: Z59.00

## 2017-12-01 ENCOUNTER — OFFICE VISIT - HEALTHEAST (OUTPATIENT)
Dept: GERIATRICS | Facility: CLINIC | Age: 77
End: 2017-12-01

## 2017-12-01 DIAGNOSIS — J44.9 COPD (CHRONIC OBSTRUCTIVE PULMONARY DISEASE) (H): ICD-10-CM

## 2017-12-01 DIAGNOSIS — F41.9 ANXIETY: ICD-10-CM

## 2017-12-01 DIAGNOSIS — Z72.0 TOBACCO USE: ICD-10-CM

## 2017-12-01 DIAGNOSIS — Z59.00 HOMELESSNESS: ICD-10-CM

## 2017-12-01 DIAGNOSIS — N40.0 BPH (BENIGN PROSTATIC HYPERPLASIA): ICD-10-CM

## 2017-12-01 SDOH — ECONOMIC STABILITY - HOUSING INSECURITY: HOMELESSNESS UNSPECIFIED: Z59.00

## 2017-12-04 ENCOUNTER — OFFICE VISIT - HEALTHEAST (OUTPATIENT)
Dept: GERIATRICS | Facility: CLINIC | Age: 77
End: 2017-12-04

## 2017-12-04 DIAGNOSIS — J44.9 COPD (CHRONIC OBSTRUCTIVE PULMONARY DISEASE) (H): ICD-10-CM

## 2017-12-04 DIAGNOSIS — Z59.00 HOMELESSNESS: ICD-10-CM

## 2017-12-04 DIAGNOSIS — G89.29 CHRONIC PAIN: ICD-10-CM

## 2017-12-04 DIAGNOSIS — F41.9 ANXIETY: ICD-10-CM

## 2017-12-04 SDOH — ECONOMIC STABILITY - HOUSING INSECURITY: HOMELESSNESS UNSPECIFIED: Z59.00

## 2017-12-08 ENCOUNTER — OFFICE VISIT - HEALTHEAST (OUTPATIENT)
Dept: GERIATRICS | Facility: CLINIC | Age: 77
End: 2017-12-08

## 2017-12-08 DIAGNOSIS — Z59.00 HOMELESSNESS: ICD-10-CM

## 2017-12-08 DIAGNOSIS — J44.9 COPD (CHRONIC OBSTRUCTIVE PULMONARY DISEASE) (H): ICD-10-CM

## 2017-12-08 DIAGNOSIS — R51.9 CHRONIC HEADACHE: ICD-10-CM

## 2017-12-08 DIAGNOSIS — G89.29 CHRONIC HEADACHE: ICD-10-CM

## 2017-12-08 DIAGNOSIS — Z72.0 TOBACCO USE: ICD-10-CM

## 2017-12-08 SDOH — ECONOMIC STABILITY - HOUSING INSECURITY: HOMELESSNESS UNSPECIFIED: Z59.00

## 2017-12-11 ENCOUNTER — OFFICE VISIT - HEALTHEAST (OUTPATIENT)
Dept: GERIATRICS | Facility: CLINIC | Age: 77
End: 2017-12-11

## 2017-12-11 DIAGNOSIS — R51.9 CHRONIC HEADACHE: ICD-10-CM

## 2017-12-11 DIAGNOSIS — I10 HYPERTENSION: ICD-10-CM

## 2017-12-11 DIAGNOSIS — F41.9 ANXIETY: ICD-10-CM

## 2017-12-11 DIAGNOSIS — G89.29 CHRONIC HEADACHE: ICD-10-CM

## 2017-12-11 DIAGNOSIS — J44.9 COPD (CHRONIC OBSTRUCTIVE PULMONARY DISEASE) (H): ICD-10-CM

## 2017-12-11 DIAGNOSIS — N40.0 BPH (BENIGN PROSTATIC HYPERPLASIA): ICD-10-CM

## 2017-12-15 ENCOUNTER — OFFICE VISIT - HEALTHEAST (OUTPATIENT)
Dept: GERIATRICS | Facility: CLINIC | Age: 77
End: 2017-12-15

## 2017-12-15 ENCOUNTER — CARE COORDINATION (OUTPATIENT)
Dept: CARE COORDINATION | Facility: CLINIC | Age: 77
End: 2017-12-15

## 2017-12-15 DIAGNOSIS — F41.9 ANXIETY: ICD-10-CM

## 2017-12-15 DIAGNOSIS — G89.4 CHRONIC PAIN SYNDROME: ICD-10-CM

## 2017-12-15 DIAGNOSIS — J44.9 COPD (CHRONIC OBSTRUCTIVE PULMONARY DISEASE) (H): ICD-10-CM

## 2017-12-15 NOTE — LETTER
[facility] CARE COORDINATION  [address] [address]  [phone]    December 29, 2017    Chuck MUHAMMAD Jessica  [address]  [place] [id number]    Dear Chuck ,  I am the Clinic Care Coordinator that works with your primary care provider's clinic. I recently tried to call and was unable to reach you. Below is a description of what Clinic Care Coordination is and how I can further assist you.     The Clinic Care Coordinator role is a Registered Nurse and/or  who understands the health care system. The goal of Clinic Care Coordination is to help you manage your health and improve access to the [facility] in the most efficient manner.  The Registered Nurse can assist you in meeting your health care goals by providing education, coordinating services, and strengthening the communication among your providers. The  can assist you with financial, behavioral, psychosocial, and chemical dependency and counseling/psychiatric resources.    Please feel free to keep this letter and contact information to contact me at [phone] with any further questions or concerns that may arise. We at [facility] are focused on providing you with the highest-quality healthcare experience possible and that all starts with you.     ** Please call to schedule an appointment with Dr. Reid - if you are not going to continue with care through the [facility] system please call to advise **     Sincerely,   Maryam Amador Care Coordinator RN  [facility]  [phone]  December 29, 2017     Enclosed: I have enclosed a copy of a 24 Hour Access Plan. This has helpful phone numbers for you to call when needed. Please keep this in an easy to access place to use as needed.

## 2017-12-15 NOTE — LETTER
Health Care Home - Access Care Plan    About Me  Patient Name:  Chuck Lopez    YOB: 1940  Age:                             77 year old   Rebecca MRN:            [id number] Telephone Information:     Home Phone [phone]   Mobile [phone]       Address:    [address] [place], [place] [id number] Email address:  No e-mail address on record      Emergency Contact(s)  Name Relationship Lgl Grd Work Phone Home Phone Mobile Phone   Zeinab SINGH Roommate No none [phone]             Health Maintenance: Routine Health maintenance Reviewed: Due/Overdue   Health Maintenance Due   Topic Date Due     MIGRAINE ACTION PLAN  10/24/1960     AORTIC ANEURYSM SCREENING (SYSTEM ASSIGNED)  10/24/2007     INFLUENZA VACCINE (SYSTEM ASSIGNED)  09/01/2017     PNEUMOCOCCAL (2 of 2 - PPSV23) 12/21/2017     PHQ-9 Q1YR  12/21/2017     My Access Plan   Medical Emergency 911   Questions or concerns during clinic hours Primary Clinic Line, I will call the clinic directly: Primary Clinic: [facility]- [phone]   24 Hour Appointment Line [phone] or  [phone]) (toll free)   24 Hour Nurse Line [phone] (toll free)   Questions or concerns outside clinic hours 24 Hour Appointment Line, I will call the after-hours on-call line:   [facility] [phone] or [phone]) (toll-free)   Preferred Urgent Care Preferred Urgent Care: [facility], [phone]   Preferred Hospital Preferred Hospital: [facility]  [phone]   Preferred Pharmacy [facility] [address]     Behavioral Health Crisis Line The National Suicide Prevention Lifeline at 1-615.988.1234 or 911     My Care Team Members   Pranay Reid MD PCP - General Family Practice 4/23/14    Phone: [phone] Fax: [phone]         Maryam Amador RN Clinic Care  Coordinator  9/12/17    Phone: [phone] Fax: [phone]       My Medical and Care Information  Problem List   Patient Active Problem List   Diagnosis     Pulmonary emphysema (H)     Elevated liver function tests     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)     CARDIOVASCULAR SCREENING; LDL GOAL LESS THAN 100     CKD (chronic kidney disease) stage 2, GFR 60-89 ml/min     Advance Care Planning     COPD (chronic obstructive pulmonary disease) (H)     Hypertension goal BP (blood pressure) < 140/90     Chronic hepatitis C (H)     Other chronic pain     Chronic obstructive airway disease with asthma (H)     Acute hypoxemic respiratory failure (H)     Obstructive chronic bronchitis with exacerbation (H)     Physical deconditioning     Malnutrition (H)     Acute exacerbation of COPD with asthma (H)     Acute respiratory failure (H)     COPD with hypoxia (H)     Migraine without aura and without status migrainosus, not intractable     COPD exacerbation (H)

## 2017-12-15 NOTE — PROGRESS NOTES
Clinic Care Coordination Contact    Situation: Patient chart reviewed by RN care coordinator.    Background: Background: Patient admitted from 9/5 - 9/11 with COPD exacerbation and CAP - discharged to [facility]CU     Assessment: CCRN placed call to [facility] TCU and spoke to Alma Delia who advised that patient discharged today to [facility] with services - she does not show home care order - no pcp f/u scheduled at this time and unsure if he will be coming back to [facility] clinic     Plan/Recommendations: CCRN will f/u next business day     Maryam Amador Care Coordinator RN  [facility]  [phone]  December 15, 2017

## 2017-12-18 ENCOUNTER — AMBULATORY - HEALTHEAST (OUTPATIENT)
Dept: GERIATRICS | Facility: CLINIC | Age: 77
End: 2017-12-18

## 2017-12-22 NOTE — PROGRESS NOTES
CCRN will be out of the office 12/22 and 12/27/17   Will follow up with patient upon return to the office     Maryam Amador Care Coordinator RN  [facility]  [phone]  December 21, 2017

## 2017-12-28 NOTE — PROGRESS NOTES
Clinic Care Coordination Contact  [facility]/Voicemail    Referral Source: IP/TCU Report  Clinical Data: Care Coordinator Outreach - TCU d/c follow up   CCRN placed call to home number - incorrect number for patient   CCRN placed call to home number for Kwasi - friend, advised by a male that answered he was not home at this time to try cell number   CCRN placed call to cell phone for Kwasi - he states he is uncertain where patient is at this time and he does not hear from him often   CCRN placed call to [facility] - message left on voicemail of Sherita Director of Nursing asking for return call to discuss care for patient - if he will be returning to  [facility] clinic he needs an appt. Or if he will be seen by another provider closer to the facility ?     Outreach attempted x 1.  Left message on voicemail with call back information and requested return call.    Maryam Amador Care Coordinator RN  [facility]  [phone]  December 28, 2017

## 2017-12-29 NOTE — PROGRESS NOTES
Clinic Care Coordination Contact  [facility]/Voicemail    Referral Source: IP/TCU Report  Clinical Data: Care Coordinator Outreach - [facility] director of nursing Sherita   Outreach attempted x 2.  Left message on voicemail with call back information and requested return call.  Plan: Care Coordinator will try to reach patient again in 1-2 business days.    Maryam Amador Care Coordinator RN  [facility]  [phone]  December 29, 2017

## 2017-12-29 NOTE — PROGRESS NOTES
Clinic Care Coordination Contact    CCRN received call from director of nursing at [facility]     As far as she knows Chuck is still planning to see Dr. Reid at the  AV clinic -     Patient does not have a home phone - Address given to CCRN by Sherita Director of Nursing and CCRN will send a letter to patient     CCRN will review chart in 7-10 days after letter mailed and if no contact from patient will close care coordination at that time     Maryam Amador Care Coordinator RN  [facility]  [phone]  December 29, 2017

## 2018-02-15 ENCOUNTER — TELEPHONE (OUTPATIENT)
Dept: FAMILY MEDICINE | Facility: CLINIC | Age: 78
End: 2018-02-15

## 2018-02-15 NOTE — LETTER
[facility]  [address] [facility] [address]  [phone]  February 15, 2018    Chuck MUHAMMAD Jessica  [address]  [place] [id number]    Dear Chuck,    I care about your health and have reviewed your health plan. I have reviewed your medical conditions, medication list, and lab results and am making recommendations based on this review, to better manage your health.    You are in particular need of attention regarding:  PHQ-9     I am recommending that you:  {recommendations:   -tobacco    Here is a list of Health Maintenance topics that are due now or due soon:  Health Maintenance Due   Topic Date Due     MIGRAINE ACTION PLAN  10/24/1960     AORTIC ANEURYSM SCREENING (SYSTEM ASSIGNED)  10/24/2007     INFLUENZA VACCINE (SYSTEM ASSIGNED)  09/01/2017     PNEUMOCOCCAL (2 of 2 - PPSV23) 12/21/2017     PHQ-9 Q1YR  12/21/2017     A1C Q6 MO  03/07/2018       Please call us at [phone] (or use TextÃ¡do) to address the above recommendations.     Thank you for trusting [facility] and we appreciate the opportunity to serve you.  We look forward to supporting your healthcare needs in the future.    Healthy Regards,    Pranay Reid MD

## 2018-02-15 NOTE — TELEPHONE ENCOUNTER
Panel Management Review      Patient has the following on his problem list:     Hypertension   Last three blood pressure readings:  BP Readings from Last 3 Encounters:   09/11/17 161/79   08/26/17 128/88   06/23/17 126/67     Blood pressure: FAILED    HTN Guidelines:  Age 18-59 BP range:  Less than 140/90  Age 60-85 with Diabetes:  Less than 140/90  Age 60-85 without Diabetes:  less than 150/90     COPD  Diagnosis date: 9/5/17   Is this diagnosis new within the last year?   YES   Was spirometry completed?  YES      Composite cancer screening  Chart review shows that this patient is due/due soon for the following None  Summary:    Patient is due/failing the following:   PHQ9    Action needed:   Patient needs to do PHQ9.    Type of outreach:    Sent letter.    Questions for provider review:    None                                                                                                                                    Michael Be [facility]        Chart routed to none .

## 2018-02-20 ENCOUNTER — CARE COORDINATION (OUTPATIENT)
Dept: CARE COORDINATION | Facility: CLINIC | Age: 78
End: 2018-02-20

## 2018-02-20 NOTE — PROGRESS NOTES
Clinic Care Coordination Contact    Situation: Patient chart reviewed by RN care coordinator.    Background: Patient had transferred to TCU after hospitalization - from TCU transferred to an [facility]     Assessment:  CCRN had been unable to reach patient since he discharged from TCU to [facility] - phone number on file was not correct - letter mailed - no return call from patient     Plan/Recommendations: closing care coordination at this time as unable to reach patient     Maryam Amador Care Coordinator RN  [facility]  [phone]  February 20, 2018

## 2018-05-16 ENCOUNTER — OFFICE VISIT (OUTPATIENT)
Dept: FAMILY MEDICINE | Facility: CLINIC | Age: 78
End: 2018-05-16
Payer: MEDICARE

## 2018-05-16 VITALS
RESPIRATION RATE: 14 BRPM | OXYGEN SATURATION: 91 % | TEMPERATURE: 98.3 F | WEIGHT: 111.8 LBS | DIASTOLIC BLOOD PRESSURE: 69 MMHG | HEART RATE: 77 BPM | BODY MASS INDEX: 18.6 KG/M2 | SYSTOLIC BLOOD PRESSURE: 125 MMHG

## 2018-05-16 DIAGNOSIS — J44.1 COPD EXACERBATION (H): ICD-10-CM

## 2018-05-16 DIAGNOSIS — B35.0 TINEA CAPITIS: Primary | ICD-10-CM

## 2018-05-16 PROCEDURE — 99214 OFFICE O/P EST MOD 30 MIN: CPT | Performed by: FAMILY MEDICINE

## 2018-05-16 RX ORDER — PREDNISONE 10 MG/1
TABLET ORAL
Qty: 30 TABLET | Refills: 0 | Status: SHIPPED | OUTPATIENT
Start: 2018-05-16 | End: 2018-08-15

## 2018-05-16 RX ORDER — KETOCONAZOLE 20 MG/ML
SHAMPOO TOPICAL
Qty: 120 ML | Refills: 3 | Status: ON HOLD | OUTPATIENT
Start: 2018-05-16 | End: 2019-01-10

## 2018-05-16 RX ORDER — AZITHROMYCIN 250 MG/1
TABLET, FILM COATED ORAL
Qty: 6 TABLET | Refills: 0 | Status: SHIPPED | OUTPATIENT
Start: 2018-05-16 | End: 2018-06-13

## 2018-05-16 RX ORDER — MULTIVIT-MINS NO.7/FOLIC ACID 1 MG
1 CAPSULE ORAL DAILY
Qty: 30 CAPSULE | Refills: 3 | Status: SHIPPED | OUTPATIENT
Start: 2018-05-16 | End: 2018-09-06

## 2018-05-16 NOTE — PROGRESS NOTES
SUBJECTIVE:   Chuck Lopez is a 77 year old male who presents to clinic today for the following health issues:      COPD Follow-Up on continuous oxygen, flare up with     Symptoms are currently: not sure    Current fatigue or dyspnea with ambulation: worsened from baseline    Shortness of breath: slightly worsened    Increased or change in Cough/Sputum: Yes-  Green phlegm    Fever(s): No    Baseline ambulation without stopping to rest:  100 feet. Able to walk up 0 flights of stairs without stopping to rest.    Any ER/UC or hospital admissions since your last visit? Yes - Hospital : [facility]   He had copd flare so dramatic that they discharged him to assisted living because he'd been homeless, he's working to transition to his own apartment but his temporary place is far from where he lives historically ,   He struggles jobless and on chronic oxygen, hasn't fully given up tobacco    History   Smoking Status     Current Every Day Smoker     Packs/day: 0.25     Types: Cigarettes   Smokeless Tobacco     Never Used     No results found for: FEV1, LOR9ZBS    Amount of exercise or physical activity: None    Problems taking medications regularly: No    Medication side effects: none    Diet: certain restrictions on diet    Past Medical History:   Diagnosis Date     Chronic neck pain      COPD (chronic obstructive pulmonary disease) (H)      Diabetes (H)      Hepatitis C      Hypertension        Past Surgical History:   Procedure Laterality Date     APPENDECTOMY       ESOPHAGOSCOPY, GASTROSCOPY, DUODENOSCOPY (EGD), COMBINED  4/16/2014    Procedure: ESOPHAGOSCOPY, GASTROSCOPY, DUODENOSCOPY (EGD) & COLONOSCOPY with polypectomy by hot snare;  Surgeon: Dillon Vázquez MD;  Location:  GI       History reviewed. No pertinent family history.    Social History   Substance Use Topics     Smoking status: Current Every Day Smoker     Packs/day: 0.25     Types: Cigarettes     Smokeless tobacco: Never Used     Alcohol use No         REVIEW OF SYSTEMS    Generally has been hilario struggling with his breathing feeling well until this episode. No problems with vision, hearing, dental or neck pain.Has long severe hx  airborne or ingestion allergy  No chest pain, palpitations, dyspnea, change in bowel habits, blood  in stool or dyspepsia.  No rashes, changing moles, weakness, lassitude or back problems.  No chronic issues . No dysuria  Patient still  a smoker. No problems with significant headaches.    On exam the vital signs are stable  Weight is Body mass index is 18.6 kg/(m^2).   Eyes show bryant   No neck masses or thyromegaly.Ear nose and throat shows dental issues   No bruits, murmers, rubs or extrasounds. No cardiomegaly or chest wall tenderness. Lungs clear, no abdominal masses or organomegaly. No CVA tenderness.  Skin eval   No hernias, good range of motion neck, back and extremities. No abnormal skin lesions. Normal genitalia. Good peripheral pulses. No adenopathy.  Normal gait and stance. Neck is supple.  Back exam shows hyperinflation and kyphosis    He has social work with his county and facility helping him to try to relocate    (B35.0) Tinea capitis  (primary encounter diagnosis)  Comment:   Plan: ketoconazole (NIZORAL) 2 % shampoo        flareup since he's been sickk    (J44.1) COPD exacerbation (H)  Comment:   Plan: Multiple Vitamins-Minerals (V-C FORTE) CAPS,         predniSONE (DELTASONE) 10 MG tablet,         azithromycin (ZITHROMAX) 250 MG tablet        Discussed Pulmonary follow up , controller inhalers, compliance challenges     One month follow up or prn

## 2018-05-16 NOTE — LETTER
My Asthma Action Plan  Name: Chuck Lopez   YOB: 1940  Date: 5/17/2018   My doctor: Pranay Reid MD   My clinic: [facility]        Current Outpatient Prescriptions   Medication     atenolol (TENORMIN) 25 MG tablet     azithromycin (ZITHROMAX) 250 MG tablet     butalbital-APAP-caffeine-codeine (FIORICET WITH CODEINE) -19-30 MG per capsule     fexofenadine (ALLEGRA) 30 MG ODT tab     fluticasone-salmeterol (ADVAIR) 500-50 MCG/DOSE diskus inhaler     guaiFENesin (MUCINEX) 600 MG 12 hr tablet     hydrochlorothiazide (HYDRODIURIL) 25 MG tablet     ipratropium - albuterol 0.5 mg/2.5 mg/3 mL (DUONEB) 0.5-2.5 (3) MG/3ML neb solution     ipratropium - albuterol 0.5 mg/2.5 mg/3 mL (DUONEB) 0.5-2.5 (3) MG/3ML neb solution     ketoconazole (NIZORAL) 2 % shampoo     melatonin 3 MG tablet     Multiple Vitamins-Minerals (V-C FORTE) CAPS     Multiple Vitamins-Minerals (V-C FORTE) CAPS     predniSONE (DELTASONE) 10 MG tablet     RaNITidine HCl (ZANTAC PO)     tiotropium (SPIRIVA HANDIHALER) 18 MCG capsule     No current facility-administered medications for this visit.       My Asthma Severity: severe persistent  Avoid your asthma triggers: smoke, pollens, exercise or sports and co morbid copd                GREEN ZONE   Good Control    I feel good    No cough or wheeze    Can work, sleep and play without asthma symptoms       Take your asthma control medicine every day.     1. If exercise triggers your asthma, take your rescue medication    15 minutes before exercise or sports, and    During exercise if you have asthma symptoms  2. Spacer to use with inhaler: If you have a spacer, make sure to use it with your inhaler             YELLOW ZONE Getting Worse  I have ANY of these:    I do not feel good    Cough or wheeze    Chest feels tight    Wake up at night   1. Keep taking your Green Zone medications  2. Start taking your rescue medicine:    every 20 minutes for up to 1  hour. Then every 4 hours for 24-48 hours.  3. If you stay in the Yellow Zone for more than 12-24 hours, contact your doctor.  4. If you do not return to the Green Zone in 12-24 hours or you get worse, start taking your oral steroid medicine if prescribed by your provider.           RED ZONE Medical Alert - Get Help  I have ANY of these:    I feel awful    Medicine is not helping    Breathing getting harder    Trouble walking or talking    Nose opens wide to breathe       1. Take your rescue medicine NOW  2. If your provider has prescribed an oral steroid medicine, start taking it NOW  3. Call your doctor NOW  4. If you are still in the Red Zone after 20 minutes and you have not reached your doctor:    Take your rescue medicine again and    Call 911 or go to the emergency room right away    See your regular doctor within 2 weeks of an Emergency Room or Urgent Care visit for follow-up treatment.          Annual Reminders:  Meet with Asthma Educator,  Flu Shot in the Fall, consider Pneumonia Vaccination for patients with asthma (aged 19 and older).    Pharmacy:    [place], [place] - [address] [place], [place] - [address] [facility]                      Asthma Triggers  How To Control Things That Make Your Asthma Worse    Triggers are things that make your asthma worse.  Look at the list below to help you find your triggers and what you can do about them.  You can help prevent asthma flare-ups by staying away from your triggers.      Trigger                                                          What you can do   Cigarette Smoke  Tobacco smoke can make asthma worse. Do not allow smoking in your home, car or around you.  Be sure no one smokes at a child s day care or school.  If you smoke, ask your health care provider for ways to help you quit.  Ask family members to quit too.  Ask your health care provider for a referral to Quit Plan to help you  quit smoking, or call 7-117-344-MAAA.     Colds, Flu, Bronchitis  These are common triggers of asthma. Wash your hands often.  Don t touch your eyes, nose or mouth.  Get a flu shot every year.     Dust Mites  These are tiny bugs that live in cloth or carpet. They are too small to see. Wash sheets and blankets in hot water every week.   Encase pillows and mattress in dust mite proof covers.  Avoid having carpet if you can. If you have carpet, vacuum weekly.   Use a dust mask and HEPA vacuum.   Pollen and Outdoor Mold  Some people are allergic to trees, grass, or weed pollen, or molds. Try to keep your windows closed.  Limit time out doors when pollen count is high.   Ask you health care provider about taking medicine during allergy season.     Animal Dander  Some people are allergic to skin flakes, urine or saliva from pets with fur or feathers. Keep pets with fur or feathers out of your home.    If you can t keep the pet outdoors, then keep the pet out of your bedroom.  Keep the bedroom door closed.  Keep pets off cloth furniture and away from stuffed toys.     Mice, Rats, and Cockroaches  Some people are allergic to the waste from these pests.   Cover food and garbage.  Clean up spills and food crumbs.  Store grease in the refrigerator.   Keep food out of the bedroom.   Indoor Mold  This can be a trigger if your home has high moisture. Fix leaking faucets, pipes, or other sources of water.   Clean moldy surfaces.  Dehumidify basement if it is damp and smelly.   Smoke, Strong Odors, and Sprays  These can reduce air quality. Stay away from strong odors and sprays, such as perfume, powder, hair spray, paints, smoke incense, paint, cleaning products, candles and new carpet.   Exercise or Sports  Some people with asthma have this trigger. Be active!  Ask your doctor about taking medicine before sports or exercise to prevent symptoms.    Warm up for 5-10 minutes before and after sports or exercise.     Other Triggers of  Asthma  Cold air:  Cover your nose and mouth with a scarf.  Sometimes laughing or crying can be a trigger.  Some medicines and food can trigger asthma.

## 2018-05-16 NOTE — MR AVS SNAPSHOT
"              After Visit Summary   2018    Chuck Lopez    MRN: [id number]           Patient Information     Date Of Birth          1940        Visit Information        Provider Department      2018 2:00 PM Pranay Reid MD [facility]        Today's Diagnoses     Tinea capitis    -  1    COPD exacerbation (H)           Follow-ups after your visit        Follow-up notes from your care team     Return in about 4 weeks (around 2018) for Routine Visit.      Who to contact     If you have questions or need follow up information about today's clinic visit or your schedule please contact [facility] directly at [phone].  Normal or non-critical lab and imaging results will be communicated to you by MyChart, letter or phone within 4 business days after the clinic has received the results. If you do not hear from us within 7 days, please contact the clinic through MyChart or phone. If you have a critical or abnormal lab result, we will notify you by phone as soon as possible.  Submit refill requests through [employer] or call your pharmacy and they will forward the refill request to us. Please allow 3 business days for your refill to be completed.          Additional Information About Your Visit        MyChart Information     [employer] lets you send messages to your doctor, view your test results, renew your prescriptions, schedule appointments and more. To sign up, go to [email or web address] . Click on \"Log in\" on the left side of the screen, which will take you to the Welcome page. Then click on \"Sign up Now\" on the right side of the page.     You will be asked to enter the access code listed below, as well as some personal information. Please follow the directions to create your username and password.     Your access code is: [email or web address]  Expires: 8/15/2018  9:29 PM     Your access code will  in 90 days. If you need help or a new code, " please call your [facility] clinic or [phone].        Care EveryWhere ID     This is your Care EveryWhere ID. This could be used by other organizations to access your [facility] medical records  [id number]        Your Vitals Were     Pulse Temperature Respirations Pulse Oximetry BMI (Body Mass Index)       77 98.3  F (36.8  C) (Oral) 14 91% 18.6 kg/m2        Blood Pressure from Last 3 Encounters:   05/16/18 125/69   09/11/17 161/79   08/26/17 128/88    Weight from Last 3 Encounters:   05/16/18 111 lb 12.8 oz (50.7 kg)   09/05/17 119 lb 8 oz (54.2 kg)   08/25/17 120 lb 9.6 oz (54.7 kg)              Today, you had the following     No orders found for display         Today's Medication Changes          These changes are accurate as of 5/16/18 11:59 PM.  If you have any questions, ask your nurse or doctor.               Start taking these medicines.        Dose/Directions    azithromycin 250 MG tablet   Commonly known as:  ZITHROMAX   Used for:  COPD exacerbation (H)   Started by:  Pranay Reid MD        Two tablets first day, then one tablet daily for four days.   Quantity:  6 tablet   Refills:  0       ketoconazole 2 % shampoo   Commonly known as:  NIZORAL   Used for:  Tinea capitis   Started by:  Pranay Reid MD        Apply to the affected area and wash off after 5 minutes.   Quantity:  120 mL   Refills:  3         These medicines have changed or have updated prescriptions.        Dose/Directions    * V-C FORTE Caps   This may have changed:  Another medication with the same name was added. Make sure you understand how and when to take each.   Used for:  Hepatitis C   Changed by:  Pranay Reid MD        TAKE ONE CAPSULE BY MOUTH EVERY DAY   Quantity:  90 capsule   Refills:  3       * V-C FORTE Caps   This may have changed:  You were already taking a medication with the same name, and this prescription was added. Make sure you understand how and when to take each.   Used for:  COPD  exacerbation (H)   Changed by:  Pranay Reid MD        Dose:  1 days   Take 1 days by mouth daily   Quantity:  30 capsule   Refills:  3       * Notice:  This list has 2 medication(s) that are the same as other medications prescribed for you. Read the directions carefully, and ask your doctor or other care provider to review them with you.      Stop taking these medicines if you haven't already. Please contact your care team if you have questions.     traMADol 50 MG tablet   Commonly known as:  ULTRAM   Stopped by:  Pranay Reid MD                Where to get your medicines      Some of these will need a paper prescription and others can be bought over the counter.  Ask your nurse if you have questions.     Bring a paper prescription for each of these medications     azithromycin 250 MG tablet    ketoconazole 2 % shampoo    predniSONE 10 MG tablet    V-C FORTE Caps                Primary Care Provider Office Phone # Fax #    Pranay Reid [phone] [address] [facility] [address]        Equal Access to Services     [facility] AH: Hadii aad ku hadasho Soomaali, waaxda luqadaha, qaybta kaalmada adeegyada, waxay idiin hayaan adeeg kharamarta laefraín . So [facility] [phone].    ATENCIÓN: Si habla español, tiene a larios disposición servicios gratuitos de asistencia lingüística. [facility] [phone].    We comply with applicable federal civil rights laws and [place] laws. We do not discriminate on the basis of race, color, national origin, age, disability, sex, sexual orientation, or gender identity.            Thank you!     Thank you for choosing [facility]  for your care. Our goal is always to provide you with excellent care. Hearing back from our patients is one way we can continue to improve our services. Please take a few minutes to complete the written survey that you may receive in the mail after your visit with us. Thank you!              Your Updated Medication List - Protect others around you: Learn how to safely use, store and throw away your medicines at www.disposemymeds.org.          This list is accurate as of 5/16/18 11:59 PM.  Always use your most recent med list.                   Brand Name Dispense Instructions for use Diagnosis    atenolol 25 MG tablet    TENORMIN    30 tablet    Take 1 tablet (25 mg) by mouth daily    Type 2 diabetes mellitus with hyperosmolarity without coma, unspecified long term insulin use status (H)       azithromycin 250 MG tablet    ZITHROMAX    6 tablet    Two tablets first day, then one tablet daily for four days.    COPD exacerbation (H)       butalbital-APAP-caffeine-codeine -04-30 MG per capsule    FIORICET WITH codeine    30 capsule    Take 1 capsule by mouth every 6 hours as needed for headaches    Intractable migraine, unspecified migraine type       fexofenadine 30 MG ODT tab    ALLEGRA     Take 1 tablet (30 mg) by mouth 2 times daily as needed for allergies (congestion)    COPD exacerbation (H)       fluticasone-salmeterol 500-50 MCG/DOSE diskus inhaler    ADVAIR    3 Inhaler    Inhale 1 puff into the lungs 2 times daily    COPD exacerbation (H)       guaiFENesin 600 MG 12 hr tablet    MUCINEX    28 tablet    Take 1 tablet (600 mg) by mouth 2 times daily    COPD exacerbation (H)       hydrochlorothiazide 25 MG tablet    HYDRODIURIL    30 tablet    TAKE ONE TABLET BY MOUTH EVERY DAY    Type 2 diabetes mellitus with hyperosmolarity without coma, unspecified long term insulin use status (H)       * ipratropium - albuterol 0.5 mg/2.5 mg/3 mL 0.5-2.5 (3) MG/3ML neb solution    DUONEB    30 vial    Take 1 vial (3 mLs) by nebulization 4 times daily    COPD exacerbation (H)       * ipratropium - albuterol 0.5 mg/2.5 mg/3 mL 0.5-2.5 (3) MG/3ML neb solution    DUONEB    30 vial    Take 1 vial (3 mLs) by nebulization every 4 hours as needed for shortness of breath / dyspnea or wheezing    COPD exacerbation  (H)       ketoconazole 2 % shampoo    NIZORAL    120 mL    Apply to the affected area and wash off after 5 minutes.    Tinea capitis       melatonin 3 MG tablet     30 tablet    Take 1 tablet (3 mg) by mouth nightly as needed    Insomnia, unspecified type       predniSONE 10 MG tablet    DELTASONE    30 tablet    4 tabs daily for 3 days, then 3 tabs daily for 3 days, then 2 tabs daily for 3 days, then 1 tab daily for 3 days, then stop    COPD exacerbation (H)       tiotropium 18 MCG capsule    SPIRIVA HANDIHALER    30 capsule    INHALE ONE CAPSULE BY MOUTH EVERY DAY    Centrilobular emphysema (H)       * V-C FORTE Caps     90 capsule    TAKE ONE CAPSULE BY MOUTH EVERY DAY    Hepatitis C       * V-C FORTE Caps     30 capsule    Take 1 days by mouth daily    COPD exacerbation (H)       ZANTAC PO      Take 75 mg by mouth At Bedtime        * Notice:  This list has 4 medication(s) that are the same as other medications prescribed for you. Read the directions carefully, and ask your doctor or other care provider to review them with you.

## 2018-06-13 ENCOUNTER — OFFICE VISIT (OUTPATIENT)
Dept: FAMILY MEDICINE | Facility: CLINIC | Age: 78
End: 2018-06-13
Payer: MEDICARE

## 2018-06-13 VITALS
HEIGHT: 65 IN | TEMPERATURE: 98.7 F | BODY MASS INDEX: 18.43 KG/M2 | DIASTOLIC BLOOD PRESSURE: 67 MMHG | WEIGHT: 110.6 LBS | SYSTOLIC BLOOD PRESSURE: 129 MMHG | RESPIRATION RATE: 12 BRPM | OXYGEN SATURATION: 93 % | HEART RATE: 75 BPM

## 2018-06-13 DIAGNOSIS — J30.89 CHRONIC NONSEASONAL ALLERGIC RHINITIS DUE TO OTHER ALLERGEN: ICD-10-CM

## 2018-06-13 DIAGNOSIS — N18.2 CKD (CHRONIC KIDNEY DISEASE) STAGE 2, GFR 60-89 ML/MIN: ICD-10-CM

## 2018-06-13 DIAGNOSIS — J43.2 CENTRILOBULAR EMPHYSEMA (H): ICD-10-CM

## 2018-06-13 DIAGNOSIS — J44.1 CHRONIC OBSTRUCTIVE PULMONARY DISEASE WITH ACUTE EXACERBATION (H): ICD-10-CM

## 2018-06-13 DIAGNOSIS — Z00.00 ENCOUNTER FOR MEDICARE ANNUAL WELLNESS EXAM: Primary | ICD-10-CM

## 2018-06-13 DIAGNOSIS — F10.21 ALCOHOLISM IN REMISSION (H): ICD-10-CM

## 2018-06-13 LAB
BASOPHILS # BLD AUTO: 0 10E9/L (ref 0–0.2)
BASOPHILS NFR BLD AUTO: 0.5 %
DIFFERENTIAL METHOD BLD: ABNORMAL
EOSINOPHIL # BLD AUTO: 0.9 10E9/L (ref 0–0.7)
EOSINOPHIL NFR BLD AUTO: 14.4 %
ERYTHROCYTE [DISTWIDTH] IN BLOOD BY AUTOMATED COUNT: 12.8 % (ref 10–15)
HCT VFR BLD AUTO: 42.6 % (ref 40–53)
HGB BLD-MCNC: 14.3 G/DL (ref 13.3–17.7)
LYMPHOCYTES # BLD AUTO: 1.4 10E9/L (ref 0.8–5.3)
LYMPHOCYTES NFR BLD AUTO: 23.3 %
MCH RBC QN AUTO: 32.4 PG (ref 26.5–33)
MCHC RBC AUTO-ENTMCNC: 33.6 G/DL (ref 31.5–36.5)
MCV RBC AUTO: 97 FL (ref 78–100)
MONOCYTES # BLD AUTO: 1 10E9/L (ref 0–1.3)
MONOCYTES NFR BLD AUTO: 17.3 %
NEUTROPHILS # BLD AUTO: 2.7 10E9/L (ref 1.6–8.3)
NEUTROPHILS NFR BLD AUTO: 44.5 %
PLATELET # BLD AUTO: 239 10E9/L (ref 150–450)
RBC # BLD AUTO: 4.41 10E12/L (ref 4.4–5.9)
WBC # BLD AUTO: 6 10E9/L (ref 4–11)

## 2018-06-13 PROCEDURE — 36415 COLL VENOUS BLD VENIPUNCTURE: CPT | Performed by: FAMILY MEDICINE

## 2018-06-13 PROCEDURE — 99397 PER PM REEVAL EST PAT 65+ YR: CPT | Performed by: FAMILY MEDICINE

## 2018-06-13 PROCEDURE — 80053 COMPREHEN METABOLIC PANEL: CPT | Performed by: FAMILY MEDICINE

## 2018-06-13 PROCEDURE — 85025 COMPLETE CBC W/AUTO DIFF WBC: CPT | Performed by: FAMILY MEDICINE

## 2018-06-13 RX ORDER — PREDNISONE 10 MG/1
10 TABLET ORAL DAILY
Qty: 30 TABLET | Refills: 1 | Status: SHIPPED | OUTPATIENT
Start: 2018-06-13 | End: 2018-11-09

## 2018-06-13 RX ORDER — LORATADINE 10 MG/1
10 TABLET ORAL DAILY
Qty: 30 TABLET | Refills: 1 | Status: SHIPPED | OUTPATIENT
Start: 2018-06-13 | End: 2018-08-13

## 2018-06-13 ASSESSMENT — ANXIETY QUESTIONNAIRES
GAD7 TOTAL SCORE: 5
IF YOU CHECKED OFF ANY PROBLEMS ON THIS QUESTIONNAIRE, HOW DIFFICULT HAVE THESE PROBLEMS MADE IT FOR YOU TO DO YOUR WORK, TAKE CARE OF THINGS AT HOME, OR GET ALONG WITH OTHER PEOPLE: NOT DIFFICULT AT ALL
7. FEELING AFRAID AS IF SOMETHING AWFUL MIGHT HAPPEN: NOT AT ALL
2. NOT BEING ABLE TO STOP OR CONTROL WORRYING: NOT AT ALL
3. WORRYING TOO MUCH ABOUT DIFFERENT THINGS: NOT AT ALL
1. FEELING NERVOUS, ANXIOUS, OR ON EDGE: MORE THAN HALF THE DAYS
6. BECOMING EASILY ANNOYED OR IRRITABLE: SEVERAL DAYS
5. BEING SO RESTLESS THAT IT IS HARD TO SIT STILL: NOT AT ALL

## 2018-06-13 ASSESSMENT — PATIENT HEALTH QUESTIONNAIRE - PHQ9: 5. POOR APPETITE OR OVEREATING: MORE THAN HALF THE DAYS

## 2018-06-13 NOTE — MR AVS SNAPSHOT
After Visit Summary   6/13/2018    Chuck Lopez    MRN: [id number]           Patient Information     Date Of Birth          1940        Visit Information        Provider Department      6/13/2018 2:00 PM Pranay Reid MD [facility]        Today's Diagnoses     Encounter for Medicare annual wellness exam    -  1    Centrilobular emphysema (H)        Chronic obstructive pulmonary disease with acute exacerbation (H)        CKD (chronic kidney disease) stage 2, GFR 60-89 ml/min        Chronic nonseasonal allergic rhinitis due to other allergen          Care Instructions      Preventive Health Recommendations:   Male Ages 65 and over    Yearly exam:             See your health care provider every year in order to  o   Review health changes.   o   Discuss preventive care.    o   Review your medicines if your doctor has prescribed any.    Talk with your health care provider about whether you should have a test to screen for prostate cancer (PSA).    Every 3 years, have a diabetes test (fasting glucose). If you are at risk for diabetes, you should have this test more often.    Every 5 years, have a cholesterol test. Have this test more often if you are at risk for high cholesterol or heart disease.     Every 10 years, have a colonoscopy. Or, have a yearly FIT test (stool test). These exams will check for colon cancer.    Talk to with your health care provider about screening for Abdominal Aortic Aneurysm if you have a family history of AAA or have a history of smoking.    Shots:     Get a flu shot each year.     Get a tetanus shot every 10 years.     Talk to your doctor about your pneumonia vaccines. There are now two you should receive - Pneumovax (PPSV 23) and Prevnar (PCV 13).     Talk to your doctor about a shingles vaccine.     Talk to your doctor about the hepatitis B vaccine.  Nutrition:     Eat at least 5 servings of fruits and vegetables each day.     Eat  "whole-grain bread, whole-wheat pasta and brown rice instead of white grains and rice.     Talk to your provider about Calcium and Vitamin D.   Lifestyle    Exercise for at least 150 minutes a week (30 minutes a day, 5 days a week). This will help you control your weight and prevent disease.     Limit alcohol to one drink per day.     No smoking.     Wear sunscreen to prevent skin cancer.     See your dentist every six months for an exam and cleaning.     See your eye doctor every 1 to 2 years to screen for conditions such as glaucoma, macular degeneration, cataracts, etc           Follow-ups after your visit        Who to contact     If you have questions or need follow up information about today's clinic visit or your schedule please contact [facility] directly at [phone].  Normal or non-critical lab and imaging results will be communicated to you by [employer] [address] [employer], letter or phone within 4 business days after the clinic has received the results. If you do not hear from us within 7 days, please contact the clinic through [employer] [address] [employer] or phone. If you have a critical or abnormal lab result, we will notify you by phone as soon as possible.  Submit refill requests through [employer] or call your pharmacy and they will forward the refill request to us. Please allow 3 business days for your refill to be completed.          Additional Information About Your Visit        Vis[employer] Information     [employer] lets you send messages to your doctor, view your test results, renew your prescriptions, schedule appointments and more. To sign up, go to [email or web address] . Click on \"Log in\" on the left side of the screen, which will take you to the Welcome page. Then click on \"Sign up Now\" on the right side of the page.     You will be asked to enter the access code listed below, as well as some personal information. Please follow the directions to create your username and password.     Your access code is: " [email or web address]  Expires: 8/15/2018  9:29 PM     Your access code will  in 90 days. If you need help or a new code, please call your [place] clinic or [phone].        Care EveryWhere ID     This is your Care EveryWhere ID. This could be used by other organizations to access your [place] medical records  [id number]        Your Vitals Were     Pulse Temperature Respirations Height Pulse Oximetry BMI (Body Mass Index)    75 98.7  F (37.1  C) (Oral) 12 5' 5\" (1.651 m) 93% 18.4 kg/m2       Blood Pressure from Last 3 Encounters:   18 129/67   18 125/69   17 161/79    Weight from Last 3 Encounters:   18 110 lb 9.6 oz (50.2 kg)   18 111 lb 12.8 oz (50.7 kg)   17 119 lb 8 oz (54.2 kg)              We Performed the Following     CBC with platelets and differential     Comprehensive metabolic panel          Today's Medication Changes          These changes are accurate as of 18  2:34 PM.  If you have any questions, ask your nurse or doctor.               Start taking these medicines.        Dose/Directions    loratadine 10 MG tablet   Commonly known as:  CLARITIN   Used for:  Chronic nonseasonal allergic rhinitis due to other allergen   Started by:  Pranay Reid MD        Dose:  10 mg   Take 1 tablet (10 mg) by mouth daily   Quantity:  30 tablet   Refills:  1         These medicines have changed or have updated prescriptions.        Dose/Directions    * predniSONE 10 MG tablet   Commonly known as:  DELTASONE   This may have changed:  Another medication with the same name was added. Make sure you understand how and when to take each.   Used for:  COPD exacerbation (H)   Changed by:  Pranay Reid MD        4 tabs daily for 3 days, then 3 tabs daily for 3 days, then 2 tabs daily for 3 days, then 1 tab daily for 3 days, then stop   Quantity:  30 tablet   Refills:  0       * predniSONE 10 MG tablet   Commonly known as:  DELTASONE   This may have changed:  You " were already taking a medication with the same name, and this prescription was added. Make sure you understand how and when to take each.   Used for:  Chronic obstructive pulmonary disease with acute exacerbation (H)   Changed by:  Pranay Reid MD        Dose:  10 mg   Take 1 tablet (10 mg) by mouth daily   Quantity:  30 tablet   Refills:  1       * Notice:  This list has 2 medication(s) that are the same as other medications prescribed for you. Read the directions carefully, and ask your doctor or other care provider to review them with you.         Where to get your medicines      These medications were sent to [facility] [address]  [address] [facility] [address]     Phone:  [phone]     loratadine 10 MG tablet    predniSONE 10 MG tablet                Primary Care Provider Office Phone # Fax #    Pranay Reid [phone] [address] [facility] [address]        Equal Access to Services     [facility] : Hadii hannah ku hadasho Soomaali, waaxda luqadaha, qaybta kaalmada adeegyada, waxay idiin haycindyn carito nguyen . So [facility] [phone].    ATENCIÓN: Si habla español, tiene a larios disposición servicios gratuitos de asistencia lingüística. Llame al [phone].    We comply with applicable federal civil rights laws and [place] laws. We do not discriminate on the basis of race, color, national origin, age, disability, sex, sexual orientation, or gender identity.            Thank you!     Thank you for choosing [facility]  for your care. Our goal is always to provide you with excellent care. Hearing back from our patients is one way we can continue to improve our services. Please take a few minutes to complete the written survey that you may receive in the mail after your visit with us. Thank you!             Your Updated Medication List - Protect others around you: Learn how to safely  use, store and throw away your medicines at www.disposemymeds.org.          This list is accurate as of 6/13/18  2:34 PM.  Always use your most recent med list.                   Brand Name Dispense Instructions for use Diagnosis    atenolol 25 MG tablet    TENORMIN    30 tablet    Take 1 tablet (25 mg) by mouth daily    Type 2 diabetes mellitus with hyperosmolarity without coma, unspecified long term insulin use status (H)       butalbital-APAP-caffeine-codeine -67-30 MG per capsule    FIORICET WITH codeine    30 capsule    Take 1 capsule by mouth every 6 hours as needed for headaches    Intractable migraine, unspecified migraine type       fexofenadine 30 MG ODT tab    ALLEGRA     Take 1 tablet (30 mg) by mouth 2 times daily as needed for allergies (congestion)    COPD exacerbation (H)       fluticasone-salmeterol 500-50 MCG/DOSE diskus inhaler    ADVAIR    3 Inhaler    Inhale 1 puff into the lungs 2 times daily    COPD exacerbation (H)       guaiFENesin 600 MG 12 hr tablet    MUCINEX    28 tablet    Take 1 tablet (600 mg) by mouth 2 times daily    COPD exacerbation (H)       hydrochlorothiazide 25 MG tablet    HYDRODIURIL    30 tablet    TAKE ONE TABLET BY MOUTH EVERY DAY    Type 2 diabetes mellitus with hyperosmolarity without coma, unspecified long term insulin use status (H)       * ipratropium - albuterol 0.5 mg/2.5 mg/3 mL 0.5-2.5 (3) MG/3ML neb solution    DUONEB    30 vial    Take 1 vial (3 mLs) by nebulization 4 times daily    COPD exacerbation (H)       * ipratropium - albuterol 0.5 mg/2.5 mg/3 mL 0.5-2.5 (3) MG/3ML neb solution    DUONEB    30 vial    Take 1 vial (3 mLs) by nebulization every 4 hours as needed for shortness of breath / dyspnea or wheezing    COPD exacerbation (H)       ketoconazole 2 % shampoo    NIZORAL    120 mL    Apply to the affected area and wash off after 5 minutes.    Tinea capitis       loratadine 10 MG tablet    CLARITIN    30 tablet    Take 1 tablet (10 mg) by mouth  daily    Chronic nonseasonal allergic rhinitis due to other allergen       melatonin 3 MG tablet     30 tablet    Take 1 tablet (3 mg) by mouth nightly as needed    Insomnia, unspecified type       * predniSONE 10 MG tablet    DELTASONE    30 tablet    4 tabs daily for 3 days, then 3 tabs daily for 3 days, then 2 tabs daily for 3 days, then 1 tab daily for 3 days, then stop    COPD exacerbation (H)       * predniSONE 10 MG tablet    DELTASONE    30 tablet    Take 1 tablet (10 mg) by mouth daily    Chronic obstructive pulmonary disease with acute exacerbation (H)       tiotropium 18 MCG capsule    SPIRIVA HANDIHALER    30 capsule    INHALE ONE CAPSULE BY MOUTH EVERY DAY    Centrilobular emphysema (H)       * V-C FORTE Caps     90 capsule    TAKE ONE CAPSULE BY MOUTH EVERY DAY    Hepatitis C       * V-C FORTE Caps     30 capsule    Take 1 days by mouth daily    COPD exacerbation (H)       ZANTAC PO      Take 75 mg by mouth At Bedtime        * Notice:  This list has 6 medication(s) that are the same as other medications prescribed for you. Read the directions carefully, and ask your doctor or other care provider to review them with you.

## 2018-06-13 NOTE — LETTER
June 14, 2018      Chuck Lopez  [address] [place] [address]        Dear ,    We are writing to inform you of your test results.    Your test results fall within the expected range(s) or remain unchanged from previous results.  Please continue with current treatment plan.    Resulted Orders   CBC with platelets and differential   Result Value Ref Range    WBC 6.0 4.0 - 11.0 10e9/L    RBC Count 4.41 4.4 - 5.9 10e12/L    Hemoglobin 14.3 13.3 - 17.7 g/dL    Hematocrit 42.6 40.0 - 53.0 %    MCV 97 78 - 100 fl    MCH 32.4 26.5 - 33.0 pg    MCHC 33.6 31.5 - 36.5 g/dL    RDW 12.8 10.0 - 15.0 %    Platelet Count 239 150 - 450 10e9/L    Diff Method Automated Method     % Neutrophils 44.5 %    % Lymphocytes 23.3 %    % Monocytes 17.3 %    % Eosinophils 14.4 %    % Basophils 0.5 %    Absolute Neutrophil 2.7 1.6 - 8.3 10e9/L    Absolute Lymphocytes 1.4 0.8 - 5.3 10e9/L    Absolute Monocytes 1.0 0.0 - 1.3 10e9/L    Absolute Eosinophils 0.9 (H) 0.0 - 0.7 10e9/L    Absolute Basophils 0.0 0.0 - 0.2 10e9/L   Comprehensive metabolic panel   Result Value Ref Range    Sodium 138 133 - 144 mmol/L    Potassium 4.1 3.4 - 5.3 mmol/L    Chloride 98 94 - 109 mmol/L    Carbon Dioxide 33 (H) 20 - 32 mmol/L    Anion Gap 7 3 - 14 mmol/L    Glucose 72 70 - 99 mg/dL    Urea Nitrogen 12 7 - 30 mg/dL    Creatinine 0.70 0.66 - 1.25 mg/dL    GFR Estimate >90 >60 mL/min/1.7m2      Comment:      Non  GFR Calc    GFR Estimate If Black >90 >60 mL/min/1.7m2      Comment:       GFR Calc    Calcium 9.5 8.5 - 10.1 mg/dL    Bilirubin Total 0.4 0.2 - 1.3 mg/dL    Albumin 3.7 3.4 - 5.0 g/dL    Protein Total 7.9 6.8 - 8.8 g/dL    Alkaline Phosphatase 104 40 - 150 U/L    ALT 61 0 - 70 U/L    AST 47 (H) 0 - 45 U/L       If you have any questions or concerns, please call the clinic at the number listed above.       Sincerely,        Pranay Reid MD

## 2018-06-13 NOTE — PROGRESS NOTES
SUBJECTIVE:   Chuck Lopez is a 77 year old male who presents for Preventive Visit.    Has had to go to TCU due to copd :  Struggling to get back into the community living   Are you in the first 12 months of your Medicare Part B coverage?  No    Healthy Habits:    Do you get at least three servings of calcium containing foods daily (dairy, green leafy vegetables, etc.)? no    Amount of exercise or daily activities, outside of work: none    Problems taking medications regularly No    Medication side effects: No    Have you had an eye exam in the past two years? yes    Do you see a dentist twice per year? no    Do you have sleep apnea, excessive snoring or daytime drowsiness?no      Ability to successfully perform activities of daily living: Yes, no assistance needed    Home safety:  none identified     Hearing impairment: No    Fall risk:           COGNITIVE SCREEN  1) Repeat 3 items (Banana, Sunrise, Chair)    2) Clock draw: NORMAL  3) 3 item recall: Recalls 1 object   Results: NORMAL clock, 1-2 items recalled: COGNITIVE IMPAIRMENT LESS LIKELY    Mini-CogTM Copyright SAMMI Gonzalez. Licensed by the author for use in [facility]; reprinted with permission (crow@[facility]). All rights reserved.        Reviewed and updated as needed this visit by clinical staff  Tobacco  Allergies  Meds  Med Hx  Surg Hx  Fam Hx  Soc Hx        Reviewed and updated as needed this visit by Provider        Social History   Substance Use Topics     Smoking status: Current Every Day Smoker     Packs/day: 0.25     Types: Cigarettes     Smokeless tobacco: Never Used     Alcohol use No       If you drink alcohol do you typically have >3 drinks per day or >7 drinks per week?                         Today's PHQ-2 Score:   PHQ-2 ( 1999 Pfizer) 5/1/2017 8/16/2016   Q1: Little interest or pleasure in doing things 0 0   Q2: Feeling down, depressed or hopeless 0 0   PHQ-2 Score 0 0       Do you feel safe in your environment - No    Do  you have a Health Care Directive?: No: Advance care planning was reviewed with patient; patient declined at this time.    Current providers sharing in care for this patient include:   Patient Care Team:  Pranay Reid MD as PCP - General (Family Practice)    The following health maintenance items are reviewed in Epic and correct as of today:  Health Maintenance   Topic Date Due     COPD ACTION PLAN Q1 YR  1940     MIGRAINE ACTION PLAN  10/24/1960     AORTIC ANEURYSM SCREENING (SYSTEM ASSIGNED)  10/24/2007     PNEUMOCOCCAL (2 of 2 - PPSV23) 12/21/2017     PHQ-9 Q1YR  12/21/2017     A1C Q6 MO  03/07/2018     EYE EXAM Q1 YEAR  04/11/2018     FOOT EXAM Q1 YEAR  05/01/2018     MEDICARE ANNUAL WELLNESS VISIT  05/01/2018     LIPID MONITORING Q1 YEAR  05/01/2018     MICROALBUMIN Q1 YEAR  05/01/2018     URINE DRUG SCREEN Q1 YR  05/01/2018     JASPREET QUESTIONNAIRE 1 YEAR  05/01/2018     FALL RISK ASSESSMENT  06/23/2018     INFLUENZA VACCINE (Season Ended) 09/01/2018     BMP Q1 YR  09/09/2018     TSH W/ FREE T4 REFLEX Q2 YEAR  05/01/2019     TETANUS IMMUNIZATION (SYSTEM ASSIGNED)  09/08/2019     ADVANCE DIRECTIVE PLANNING Q5 YRS  10/19/2022     COLON CANCER SCREEN (SYSTEM ASSIGNED)  04/16/2024     SPIROMETRY ONETIME  Completed     BP Readings from Last 3 Encounters:   06/13/18 129/67   05/16/18 125/69   09/11/17 161/79    Wt Readings from Last 3 Encounters:   06/13/18 110 lb 9.6 oz (50.2 kg)   05/16/18 111 lb 12.8 oz (50.7 kg)   09/05/17 119 lb 8 oz (54.2 kg)                    Pneumonia Vaccine:Adults age 65+ who received Pneumovax (PPSV23) at 65 years or older: Should be given PCV13 > 1 year after their most recent PPSV23    ROS:  Constitutional, HEENT, cardiovascular, pulmonary, GI, , musculoskeletal, neuro, skin, endocrine and psych systems are negative, except as otherwise noted.    OBJECTIVE:   /67 (BP Location: Left arm, Patient Position: Chair, Cuff Size: Adult Small)  Pulse 75  Temp 98.7  F  "(37.1  C) (Oral)  Resp 12  Ht 5' 5\" (1.651 m)  Wt 110 lb 9.6 oz (50.2 kg)  SpO2 93%  BMI 18.4 kg/m2 Estimated body mass index is 18.4 kg/(m^2) as calculated from the following:    Height as of this encounter: 5' 5\" (1.651 m).    Weight as of this encounter: 110 lb 9.6 oz (50.2 kg).  EXAM:   GENERAL: healthy, alert and no distress  EYES: Eyes grossly normal to inspection, PERRL and conjunctivae and sclerae normal  HENT: ear canals and TM's normal, nose and mouth without ulcers or lesions  NECK: no adenopathy, no asymmetry, masses, or scars and thyroid normal to palpation  RESP: lungs clear to auscultation - no rales, rhonchi or wheezes  CV: regular rate and rhythm, normal S1 S2, no S3 or S4, no murmur, click or rub, no peripheral edema and peripheral pulses strong  ABDOMEN: soft, nontender, no hepatosplenomegaly, no masses and bowel sounds normal  MS: no gross musculoskeletal defects noted, no edema  SKIN: no suspicious lesions or rashes  NEURO: Normal strength and tone, mentation intact and speech normal  PSYCH: mentation appears normal, affect normal/bright    ASSESSMENT / PLAN:   (Z00.00) Encounter for Medicare annual wellness exam  (primary encounter diagnosis)  Comment:   Plan: now living in senior housing     (J43.2) Centrilobular emphysema (H)  Comment:   Plan: soboe    (J44.1) Chronic obstructive pulmonary disease with acute exacerbation (H)  Comment:   Plan: no cough     (N18.2) CKD (chronic kidney disease) stage 2, GFR 60-89 ml/min  Comment: check labs   Plan:     (J30.89) Chronic nonseasonal allergic rhinitis due to other allergen  Comment:   Plan: loratadine (CLARITIN) 10 MG tablet        New meds         End of Life Planning:  Patient currently has an advanced directive: Yes.  Practitioner is supportive of decision.    COUNSELING:  Reviewed preventive health counseling, as reflected in patient instructions       air quality        Regular exercise       Vision screening        Estimated body mass " "index is 18.4 kg/(m^2) as calculated from the following:    Height as of this encounter: 5' 5\" (1.651 m).    Weight as of this encounter: 110 lb 9.6 oz (50.2 kg).       reports that he has been smoking Cigarettes.  He has been smoking about 0.25 packs per day. He has never used smokeless tobacco.  Tobacco Cessation Action Plan: he's slowly trying to get off the tobacco     Appropriate preventive services were discussed with this patient, including applicable screening as appropriate for cardiovascular disease, diabetes, osteopenia/osteoporosis, and glaucoma.  As appropriate for age/gender, discussed screening for colorectal cancer, prostate cancer, breast cancer, and cervical cancer. Checklist reviewing preventive services available has been given to the patient.    Reviewed patients plan of care and provided an AVS. The Basic Care Plan (routine screening as documented in Health Maintenance) for Chuck meets the Care Plan requirement. This Care Plan has been established and reviewed with the Patient.    Counseling Resources:  ATP IV Guidelines  Pooled Cohorts Equation Calculator  Breast Cancer Risk Calculator  FRAX Risk Assessment  ICSI Preventive Guidelines  Dietary Guidelines for Americans, 2010  USDA's MyPlate  ASA Prophylaxis  Lung CA Screening    Pranay Reid MD  [facility]  "

## 2018-06-14 LAB
ALBUMIN SERPL-MCNC: 3.7 G/DL (ref 3.4–5)
ALP SERPL-CCNC: 104 U/L (ref 40–150)
ALT SERPL W P-5'-P-CCNC: 61 U/L (ref 0–70)
ANION GAP SERPL CALCULATED.3IONS-SCNC: 7 MMOL/L (ref 3–14)
AST SERPL W P-5'-P-CCNC: 47 U/L (ref 0–45)
BILIRUB SERPL-MCNC: 0.4 MG/DL (ref 0.2–1.3)
BUN SERPL-MCNC: 12 MG/DL (ref 7–30)
CALCIUM SERPL-MCNC: 9.5 MG/DL (ref 8.5–10.1)
CHLORIDE SERPL-SCNC: 98 MMOL/L (ref 94–109)
CO2 SERPL-SCNC: 33 MMOL/L (ref 20–32)
CREAT SERPL-MCNC: 0.7 MG/DL (ref 0.66–1.25)
GFR SERPL CREATININE-BSD FRML MDRD: >90 ML/MIN/1.7M2
GLUCOSE SERPL-MCNC: 72 MG/DL (ref 70–99)
POTASSIUM SERPL-SCNC: 4.1 MMOL/L (ref 3.4–5.3)
PROT SERPL-MCNC: 7.9 G/DL (ref 6.8–8.8)
SODIUM SERPL-SCNC: 138 MMOL/L (ref 133–144)

## 2018-06-14 ASSESSMENT — PATIENT HEALTH QUESTIONNAIRE - PHQ9: SUM OF ALL RESPONSES TO PHQ QUESTIONS 1-9: 3

## 2018-06-14 ASSESSMENT — ANXIETY QUESTIONNAIRES: GAD7 TOTAL SCORE: 5

## 2018-08-10 DIAGNOSIS — J30.89 CHRONIC NONSEASONAL ALLERGIC RHINITIS DUE TO OTHER ALLERGEN: ICD-10-CM

## 2018-08-10 DIAGNOSIS — J44.1 CHRONIC OBSTRUCTIVE PULMONARY DISEASE WITH ACUTE EXACERBATION (H): ICD-10-CM

## 2018-08-10 RX ORDER — PREDNISONE 10 MG/1
TABLET ORAL
Qty: 30 TABLET | Refills: 1 | Status: CANCELLED | OUTPATIENT
Start: 2018-08-10

## 2018-08-10 RX ORDER — LORATADINE 10 MG/1
TABLET ORAL
Qty: 30 TABLET | Refills: 1 | Status: CANCELLED | OUTPATIENT
Start: 2018-08-10

## 2018-08-11 NOTE — TELEPHONE ENCOUNTER
Controlled Substance Refill Request for predniSONE (DELTASONE) 10 MG tablet  Problem List Complete:  No     PROVIDER TO CONSIDER COMPLETION OF PROBLEM LIST AND OVERVIEW/CONTROLLED SUBSTANCE AGREEMENT    Last Written Prescription Date:  06/13/2018  Last Fill Quantity: 30 TABLET,   # refills: 1    Last Office Visit with [facility] primary care provider: 06/13/2018    Future Office visit:   Next 5 appointments (look out 90 days)     Aug 15, 2018  2:00 PM CDT   [facility]    [address] [place] [id number]   [phone]                  Controlled substance agreement on file: Yes:  Date 05/05/2017.     Processing:  Fax Rx to [facility] pharmacy   checked in past 3 months?  No, route to RN       Requested Prescriptions   Pending Prescriptions Disp Refills     loratadine (CLARITIN) 10 MG tablet [Pharmacy Med Name: LORATADINE 10MG TABS] 30 tablet 1    Last Written Prescription Date:  06/13/2018  Last Fill Quantity: 30 tablet,  # refills: 1  Last office visit: 6/13/2018 with prescribing provider:  06/13/2018   Future Office Visit:   Next 5 appointments (look out 90 days)     Aug 15, 2018  2:00 PM CDT   SHORT with Pranay Reid MD   [facility]    [address] [place] [id number]   [phone]                 06/13/2018 Sig: TAKE ONE TABLET BY MOUTH EVERY DAY    Antihistamines Protocol Failed    8/10/2018  3:15 PM       Failed - Patient is 3-64 years of age    Apply weight-based dosing for peds patients age 3 - 12 years of age.    Forward request to provider for patients under the age of 3 or over the age of 64.         Passed - Recent (12 mo) or future (30 days) visit within the authorizing provider's specialty    Patient had office visit in the last 12 months or has a visit in the next 30 days with authorizing provider or within  "the authorizing provider's specialty.  See \"Patient Info\" tab in inbasket, or \"Choose Columns\" in Meds & Orders section of the refill encounter.                              Anthony HASSANT    "

## 2018-08-13 RX ORDER — PREDNISONE 10 MG/1
10 TABLET ORAL DAILY
Qty: 30 TABLET | Refills: 1 | Status: CANCELLED | OUTPATIENT
Start: 2018-08-13

## 2018-08-15 ENCOUNTER — OFFICE VISIT (OUTPATIENT)
Dept: FAMILY MEDICINE | Facility: CLINIC | Age: 78
End: 2018-08-15
Payer: MEDICARE

## 2018-08-15 VITALS
TEMPERATURE: 97.7 F | RESPIRATION RATE: 14 BRPM | OXYGEN SATURATION: 94 % | BODY MASS INDEX: 20.95 KG/M2 | HEART RATE: 79 BPM | DIASTOLIC BLOOD PRESSURE: 70 MMHG | SYSTOLIC BLOOD PRESSURE: 127 MMHG | WEIGHT: 125.9 LBS

## 2018-08-15 DIAGNOSIS — J44.1 COPD EXACERBATION (H): ICD-10-CM

## 2018-08-15 DIAGNOSIS — J30.89 CHRONIC NONSEASONAL ALLERGIC RHINITIS DUE TO OTHER ALLERGEN: ICD-10-CM

## 2018-08-15 DIAGNOSIS — J44.1 CHRONIC OBSTRUCTIVE PULMONARY DISEASE WITH ACUTE EXACERBATION (H): ICD-10-CM

## 2018-08-15 PROCEDURE — 99214 OFFICE O/P EST MOD 30 MIN: CPT | Performed by: FAMILY MEDICINE

## 2018-08-15 RX ORDER — AZITHROMYCIN 250 MG/1
TABLET, FILM COATED ORAL
Qty: 6 TABLET | Refills: 0 | Status: SHIPPED | OUTPATIENT
Start: 2018-08-15 | End: 2018-11-09

## 2018-08-15 RX ORDER — LORATADINE 10 MG/1
10 TABLET ORAL DAILY
Qty: 30 TABLET | Refills: 1 | Status: SHIPPED | OUTPATIENT
Start: 2018-08-15 | End: 2018-11-08

## 2018-08-15 RX ORDER — PREDNISONE 10 MG/1
TABLET ORAL
Qty: 40 TABLET | Refills: 3 | Status: SHIPPED | OUTPATIENT
Start: 2018-08-15 | End: 2018-11-09

## 2018-08-15 NOTE — PROGRESS NOTES
SUBJECTIVE:   Chuck Lopez is a 77 year old male who presents to clinic today for the following health issues:    /70 (BP Location: Left arm, Patient Position: Chair, Cuff Size: Adult Regular)  Pulse 79  Temp 97.7  F (36.5  C) (Oral)  Resp 14  Wt 125 lb 14.4 oz (57.1 kg)  SpO2 94%  BMI 20.95 kg/m2    Patient is here for a follow up on his chest congestion.He has chronic oxygen dependent respiratory failure     Past Medical History:   Diagnosis Date     Chronic neck pain      COPD (chronic obstructive pulmonary disease) (H)      Diabetes (H)      Hepatitis C      Hypertension      Lives in Senior facility out of town, he'd like to move closer,  Past Surgical History:   Procedure Laterality Date     APPENDECTOMY       ESOPHAGOSCOPY, GASTROSCOPY, DUODENOSCOPY (EGD), COMBINED  4/16/2014    Procedure: ESOPHAGOSCOPY, GASTROSCOPY, DUODENOSCOPY (EGD) & COLONOSCOPY with polypectomy by hot snare;  Surgeon: Dillon Vázquez MD;  Location:  GI       History reviewed. No pertinent family history.    Social History   Substance Use Topics     Smoking status: Current Every Day Smoker     Packs/day: 0.25     Types: Cigarettes     Smokeless tobacco: Never Used     Alcohol use No        REVIEW OF SYSTEMS    Generally has been otherwise fatigued, not  feeling well until this episode. No problems with vision, hearing, dental or neck pain.Has hph airborne or ingestion allergy  No chest pain, palpitations, dyspnea, change in bowel habits, blood  in stool or dyspepsia.  No rashes, changing moles, weakness, lassitude or back problems.  No chronic issues . No dysuria  Patient no longer  a smoker. No problems with significant headaches.  On exam the vital signs are stable  Weight is Body mass index is 20.95 kg/(m^2).   Eyes show bryant   No neck masses or thyromegaly.Ear nose and throat shows normal   No bruits, murmers, rubs or extrasounds. No cardiomegaly or chest wall tenderness. Lungs clear, no abdominal masses or  organomegaly. No CVA tenderness.  Skin eval no rash   Good peripheral pulses. No adenopathy.  Normal gait and stance. Neck is supple.  Back exam shows good rom     (J44.1) Chronic obstructive pulmonary disease with acute exacerbation (H)  Comment:   Plan: severe, lives in assisted living now    (J30.89) Chronic nonseasonal allergic rhinitis due to other allergen  Comment:   Plan: loratadine (CLARITIN) 10 MG tablet        meds helpful,nasal too     (J44.1) COPD exacerbation (H)  Comment:   Plan: predniSONE (DELTASONE) 10 MG tablet,         azithromycin (ZITHROMAX) 250 MG tablet        Treat flare and daily low dose steroid

## 2018-08-15 NOTE — MR AVS SNAPSHOT
After Visit Summary   8/15/2018    Chuck Lopez    MRN: [id number]           Patient Information     Date Of Birth          1940        Visit Information        Provider Department      8/15/2018 2:00 PM Pranay Reid MD [facility]        Today's Diagnoses     Chronic obstructive pulmonary disease with acute exacerbation (H)        Chronic nonseasonal allergic rhinitis due to other allergen        COPD exacerbation (H)           Follow-ups after your visit        Follow-up notes from your care team     Return in about 3 months (around 11/15/2018) for Lab Work, BP Recheck, Routine Visit.      Who to contact     If you have questions or need follow up information about today's clinic visit or your schedule please contact [facility] directly at [phone].  Normal or non-critical lab and imaging results will be communicated to you by MyChart, letter or phone within 4 business days after the clinic has received the results. If you do not hear from us within 7 days, please contact the clinic through MyChart or phone. If you have a critical or abnormal lab result, we will notify you by phone as soon as possible.  Submit refill requests through [employer] or call your pharmacy and they will forward the refill request to us. Please allow 3 business days for your refill to be completed.          Additional Information About Your Visit        Care EveryWhere ID     This is your Care EveryWhere ID. This could be used by other organizations to access your [facility] medical records  [id number]        Your Vitals Were     Pulse Temperature Respirations Pulse Oximetry BMI (Body Mass Index)       79 97.7  F (36.5  C) (Oral) 14 94% 20.95 kg/m2        Blood Pressure from Last 3 Encounters:   08/15/18 127/70   06/13/18 129/67   05/16/18 125/69    Weight from Last 3 Encounters:   08/15/18 125 lb 14.4 oz (57.1 kg)   06/13/18 110 lb 9.6 oz (50.2 kg)   05/16/18 111  lb 12.8 oz (50.7 kg)              Today, you had the following     No orders found for display         Today's Medication Changes          These changes are accurate as of 8/15/18  2:34 PM.  If you have any questions, ask your nurse or doctor.               Start taking these medicines.        Dose/Directions    azithromycin 250 MG tablet   Commonly known as:  ZITHROMAX   Used for:  COPD exacerbation (H)   Started by:  Pranay Reid MD        Two tablets first day, then one tablet daily for four days.   Quantity:  6 tablet   Refills:  0         These medicines have changed or have updated prescriptions.        Dose/Directions    * predniSONE 10 MG tablet   Commonly known as:  DELTASONE   This may have changed:  Another medication with the same name was changed. Make sure you understand how and when to take each.   Used for:  Chronic obstructive pulmonary disease with acute exacerbation (H)   Changed by:  Pranay Reid MD        Dose:  10 mg   Take 1 tablet (10 mg) by mouth daily   Quantity:  30 tablet   Refills:  1       * predniSONE 10 MG tablet   Commonly known as:  DELTASONE   This may have changed:  additional instructions   Used for:  COPD exacerbation (H)   Changed by:  Pranay Reid MD        4 tabs daily for 3 days, then 3 tabs daily for 3 days, then 2 tabs daily for 3 days, then 1 tab daily for 3 days, then take one daily   Quantity:  40 tablet   Refills:  3       * Notice:  This list has 2 medication(s) that are the same as other medications prescribed for you. Read the directions carefully, and ask your doctor or other care provider to review them with you.         Where to get your medicines      These medications were sent to [facility] [address]  [address] [facility] [address]     Phone:  [phone]     azithromycin 250 MG tablet    loratadine 10 MG tablet    predniSONE 10 MG tablet                Primary Care  Provider Office Phone # Fax #    Pranay Frank Reid [phone] [address] [facility] [address]        Equal Access to Services     KIKIJOSELITO KADEN : Hadmarty hannah aldana kym Case, waaxda luqadaha, qaybta kaalmada apolinarda, shana espinozamarcelina yeung. So [facility] [phone].    ATENCIÓN: Si habla español, tiene a larios disposición servicios gratuitos de asistencia lingüística. Llame al [phone].    We comply with applicable federal civil rights laws and [place] laws. We do not discriminate on the basis of race, color, national origin, age, disability, sex, sexual orientation, or gender identity.            Thank you!     Thank you for choosing [facility]  for your care. Our goal is always to provide you with excellent care. Hearing back from our patients is one way we can continue to improve our services. Please take a few minutes to complete the written survey that you may receive in the mail after your visit with us. Thank you!             Your Updated Medication List - Protect others around you: Learn how to safely use, store and throw away your medicines at www.disposemymeds.org.          This list is accurate as of 8/15/18  2:34 PM.  Always use your most recent med list.                   Brand Name Dispense Instructions for use Diagnosis    atenolol 25 MG tablet    TENORMIN    30 tablet    Take 1 tablet (25 mg) by mouth daily    Type 2 diabetes mellitus with hyperosmolarity without coma, unspecified long term insulin use status (H)       azithromycin 250 MG tablet    ZITHROMAX    6 tablet    Two tablets first day, then one tablet daily for four days.    COPD exacerbation (H)       butalbital-APAP-caffeine-codeine -25-30 MG per capsule    FIORICET WITH codeine    30 capsule    Take 1 capsule by mouth every 6 hours as needed for headaches    Intractable migraine, unspecified migraine type       fexofenadine 30 MG ODT tab    ALLEGRA     Take  1 tablet (30 mg) by mouth 2 times daily as needed for allergies (congestion)    COPD exacerbation (H)       fluticasone-salmeterol 500-50 MCG/DOSE diskus inhaler    ADVAIR    3 Inhaler    Inhale 1 puff into the lungs 2 times daily    COPD exacerbation (H)       guaiFENesin 600 MG 12 hr tablet    MUCINEX    28 tablet    Take 1 tablet (600 mg) by mouth 2 times daily    COPD exacerbation (H)       hydrochlorothiazide 25 MG tablet    HYDRODIURIL    30 tablet    TAKE ONE TABLET BY MOUTH EVERY DAY    Type 2 diabetes mellitus with hyperosmolarity without coma, unspecified long term insulin use status (H)       * ipratropium - albuterol 0.5 mg/2.5 mg/3 mL 0.5-2.5 (3) MG/3ML neb solution    DUONEB    30 vial    Take 1 vial (3 mLs) by nebulization 4 times daily    COPD exacerbation (H)       * ipratropium - albuterol 0.5 mg/2.5 mg/3 mL 0.5-2.5 (3) MG/3ML neb solution    DUONEB    30 vial    Take 1 vial (3 mLs) by nebulization every 4 hours as needed for shortness of breath / dyspnea or wheezing    COPD exacerbation (H)       ketoconazole 2 % shampoo    NIZORAL    120 mL    Apply to the affected area and wash off after 5 minutes.    Tinea capitis       loratadine 10 MG tablet    CLARITIN    30 tablet    Take 1 tablet (10 mg) by mouth daily    Chronic nonseasonal allergic rhinitis due to other allergen       melatonin 3 MG tablet     30 tablet    Take 1 tablet (3 mg) by mouth nightly as needed    Insomnia, unspecified type       * predniSONE 10 MG tablet    DELTASONE    30 tablet    Take 1 tablet (10 mg) by mouth daily    Chronic obstructive pulmonary disease with acute exacerbation (H)       * predniSONE 10 MG tablet    DELTASONE    40 tablet    4 tabs daily for 3 days, then 3 tabs daily for 3 days, then 2 tabs daily for 3 days, then 1 tab daily for 3 days, then take one daily    COPD exacerbation (H)       tiotropium 18 MCG capsule    SPIRIVA HANDIHALER    30 capsule    INHALE ONE CAPSULE BY MOUTH EVERY DAY    Centrilobular  emphysema (H)       * V-C FORTE Caps     90 capsule    TAKE ONE CAPSULE BY MOUTH EVERY DAY    Hepatitis C       * V-C FORTE Caps     30 capsule    Take 1 days by mouth daily    COPD exacerbation (H)       ZANTAC PO      Take 75 mg by mouth At Bedtime        * Notice:  This list has 6 medication(s) that are the same as other medications prescribed for you. Read the directions carefully, and ask your doctor or other care provider to review them with you.

## 2018-09-06 DIAGNOSIS — J44.1 COPD EXACERBATION (H): ICD-10-CM

## 2018-09-06 NOTE — TELEPHONE ENCOUNTER
"Requested Prescriptions   Pending Prescriptions Disp Refills     Multiple Vitamins-Minerals (V-C FORTE) CAPS [Pharmacy Med Name: V-C FORTE  CAPS]  Last Written Prescription Date:  5/16/2018  Last Fill Quantity: 30 capsule,  # refills: 3   Last office visit: 8/15/2018 with prescribing provider:  Jeanette Baron capsule 3     Sig: TAKE ONE CAPSULE BY MOUTH EVERY DAY    Vitamin Supplements (Adult) Protocol Passed    9/6/2018 10:45 AM       Passed - High dose Vitamin D not ordered       Passed - Recent (12 mo) or future (30 days) visit within the authorizing provider's specialty    Patient had office visit in the last 12 months or has a visit in the next 30 days with authorizing provider or within the authorizing provider's specialty.  See \"Patient Info\" tab in inbasket, or \"Choose Columns\" in Meds & Orders section of the refill encounter.              "

## 2018-09-07 RX ORDER — MULTIVIT-MINS NO.7/FOLIC ACID 1 MG
CAPSULE ORAL
Qty: 30 CAPSULE | Refills: 3 | Status: SHIPPED | OUTPATIENT
Start: 2018-09-07 | End: 2018-11-09

## 2018-09-21 ENCOUNTER — TELEPHONE (OUTPATIENT)
Dept: FAMILY MEDICINE | Facility: CLINIC | Age: 78
End: 2018-09-21

## 2018-09-21 NOTE — TELEPHONE ENCOUNTER
Panel Management Review      Patient has the following on his problem list:     Hypertension   Last three blood pressure readings:  BP Readings from Last 3 Encounters:   08/15/18 127/70   06/13/18 129/67   05/16/18 125/69     Blood pressure: Passed    HTN Guidelines:  Age 18-59 BP range:  Less than 140/90  Age 60-85 with Diabetes:  Less than 140/90  Age 60-85 without Diabetes:  less than 150/90      Composite cancer screening  Chart review shows that this patient is due/due soon for the following None  Summary:    Patient is due/failing the following:   Tobacco use    Action needed:   Tobacco cessation    Type of outreach:    sent letter with tobacco cessation information    Questions for provider review:    None                                                                                                                                    Margarita Arce CMA       Chart routed to Care Team .

## 2018-09-21 NOTE — LETTER
[facility]  [address] [facility] [address]  [phone]  September 21, 2018    Chuck MUHAMMAD Jessica  [address] [place] [address]    Dear Chuck,    I care about your health and have reviewed your health plan. I have reviewed your medical conditions, medication list, and lab results and am making recommendations based on this review, to better manage your health.    You are in particular need of attention regarding:  -Tobacco use    I am recommending that you:   If you are smoking still and would like some help, then please contact us or make an appointment to discuss your options ([email or web address] has very good free information.)    Here is a list of Health Maintenance topics that are due now or due soon:  Health Maintenance Due   Topic Date Due     COPD ACTION PLAN Q1 YR  1940     MIGRAINE ACTION PLAN  10/24/1960     AORTIC ANEURYSM SCREENING (SYSTEM ASSIGNED)  10/24/2007     PNEUMOCOCCAL (2 of 2 - PPSV23) 12/21/2017     LIPID MONITORING Q1 YEAR  05/01/2018     MICROALBUMIN Q1 YEAR  05/01/2018     URINE DRUG SCREEN Q1 YR  05/01/2018     INFLUENZA VACCINE (1) 09/01/2018     Please call us at [phone] (or use [employer]) to address the above recommendations.     Thank you for trusting [facility] and we appreciate the opportunity to serve you.  We look forward to supporting your healthcare needs in the future.    Healthy Regards,  Pranay Reid MD

## 2018-11-08 DIAGNOSIS — J30.1 SEASONAL ALLERGIC RHINITIS DUE TO POLLEN: Primary | ICD-10-CM

## 2018-11-08 NOTE — TELEPHONE ENCOUNTER
1. lorataine 10mg   Last Written Prescription Date: 8-15-18  Last Fill Quantity: 30, # refill: 1  Last Office Visit with [facility] primary care provider: 8-15-18    2. Controlled Substance Refill Request for tramadol   Problem List Complete:  Yes  Patient is followed by JENNA PADILLA for ongoing prescription of pain medication.  All refills should be approved by this provider, or covering partner.     Medication(s): traMADol (ULTRAM) 50 MG tablet.   Maximum quantity per month: 150  Clinic visit frequency required: Q 4 months      Controlled substance agreement on file: Yes       Date(s): 9/16/15     Pain Clinic evaluation in the past: No     DIRE Total Score(s):  No flowsheet data found.     Last [facility] website verification: 3/14/17   [email or web address]   checked in past 3 months?  No, route to RN      Thanks,    Malcolm Blair  Pharmacy Technician  [facility]   [phone]

## 2018-11-09 ENCOUNTER — APPOINTMENT (OUTPATIENT)
Dept: GENERAL RADIOLOGY | Facility: CLINIC | Age: 78
DRG: 193 | End: 2018-11-09
Attending: EMERGENCY MEDICINE
Payer: MEDICARE

## 2018-11-09 ENCOUNTER — HOSPITAL ENCOUNTER (INPATIENT)
Facility: CLINIC | Age: 78
LOS: 6 days | Discharge: NURSING, REHAB OR LONG-TERM CARE | DRG: 193 | End: 2018-11-15
Attending: EMERGENCY MEDICINE | Admitting: INTERNAL MEDICINE
Payer: MEDICARE

## 2018-11-09 DIAGNOSIS — J44.1 COPD EXACERBATION (H): ICD-10-CM

## 2018-11-09 DIAGNOSIS — R91.8 PULMONARY NODULES: ICD-10-CM

## 2018-11-09 DIAGNOSIS — E87.1 HYPONATREMIA: ICD-10-CM

## 2018-11-09 DIAGNOSIS — F32.9 MAJOR DEPRESSIVE DISORDER, REMISSION STATUS UNSPECIFIED, UNSPECIFIED WHETHER RECURRENT: Primary | ICD-10-CM

## 2018-11-09 DIAGNOSIS — G89.29 OTHER CHRONIC PAIN: ICD-10-CM

## 2018-11-09 DIAGNOSIS — G43.009 MIGRAINE WITHOUT AURA AND WITHOUT STATUS MIGRAINOSUS, NOT INTRACTABLE: ICD-10-CM

## 2018-11-09 LAB
ANION GAP SERPL CALCULATED.3IONS-SCNC: 5 MMOL/L (ref 3–14)
BASOPHILS # BLD AUTO: 0.1 10E9/L (ref 0–0.2)
BASOPHILS NFR BLD AUTO: 0.8 %
BUN SERPL-MCNC: 11 MG/DL (ref 7–30)
CALCIUM SERPL-MCNC: 8.4 MG/DL (ref 8.5–10.1)
CHLORIDE SERPL-SCNC: 97 MMOL/L (ref 94–109)
CO2 SERPL-SCNC: 30 MMOL/L (ref 20–32)
CREAT SERPL-MCNC: 0.71 MG/DL (ref 0.66–1.25)
DIFFERENTIAL METHOD BLD: ABNORMAL
EOSINOPHIL # BLD AUTO: 1.2 10E9/L (ref 0–0.7)
EOSINOPHIL NFR BLD AUTO: 9.9 %
ERYTHROCYTE [DISTWIDTH] IN BLOOD BY AUTOMATED COUNT: 12.7 % (ref 10–15)
FLUAV+FLUBV AG SPEC QL: NEGATIVE
FLUAV+FLUBV AG SPEC QL: NEGATIVE
GFR SERPL CREATININE-BSD FRML MDRD: >90 ML/MIN/1.7M2
GLUCOSE SERPL-MCNC: 98 MG/DL (ref 70–99)
HCT VFR BLD AUTO: 41 % (ref 40–53)
HGB BLD-MCNC: 13.5 G/DL (ref 13.3–17.7)
IMM GRANULOCYTES # BLD: 0.1 10E9/L (ref 0–0.4)
IMM GRANULOCYTES NFR BLD: 0.5 %
LYMPHOCYTES # BLD AUTO: 1.6 10E9/L (ref 0.8–5.3)
LYMPHOCYTES NFR BLD AUTO: 13.7 %
MCH RBC QN AUTO: 32.5 PG (ref 26.5–33)
MCHC RBC AUTO-ENTMCNC: 32.9 G/DL (ref 31.5–36.5)
MCV RBC AUTO: 99 FL (ref 78–100)
MONOCYTES # BLD AUTO: 1.6 10E9/L (ref 0–1.3)
MONOCYTES NFR BLD AUTO: 13.5 %
NEUTROPHILS # BLD AUTO: 7.2 10E9/L (ref 1.6–8.3)
NEUTROPHILS NFR BLD AUTO: 61.6 %
NRBC # BLD AUTO: 0 10*3/UL
NRBC BLD AUTO-RTO: 0 /100
PLATELET # BLD AUTO: 208 10E9/L (ref 150–450)
POTASSIUM SERPL-SCNC: 3.9 MMOL/L (ref 3.4–5.3)
RBC # BLD AUTO: 4.16 10E12/L (ref 4.4–5.9)
SODIUM SERPL-SCNC: 132 MMOL/L (ref 133–144)
SPECIMEN SOURCE: NORMAL
TROPONIN I SERPL-MCNC: <0.015 UG/L (ref 0–0.04)
WBC # BLD AUTO: 11.7 10E9/L (ref 4–11)

## 2018-11-09 PROCEDURE — 25000132 ZZH RX MED GY IP 250 OP 250 PS 637: Mod: GY | Performed by: EMERGENCY MEDICINE

## 2018-11-09 PROCEDURE — A9270 NON-COVERED ITEM OR SERVICE: HCPCS | Mod: GY | Performed by: EMERGENCY MEDICINE

## 2018-11-09 PROCEDURE — 94640 AIRWAY INHALATION TREATMENT: CPT

## 2018-11-09 PROCEDURE — 25000125 ZZHC RX 250: Performed by: EMERGENCY MEDICINE

## 2018-11-09 PROCEDURE — 71046 X-RAY EXAM CHEST 2 VIEWS: CPT

## 2018-11-09 PROCEDURE — 80048 BASIC METABOLIC PNL TOTAL CA: CPT | Performed by: EMERGENCY MEDICINE

## 2018-11-09 PROCEDURE — 84484 ASSAY OF TROPONIN QUANT: CPT | Performed by: EMERGENCY MEDICINE

## 2018-11-09 PROCEDURE — 25000128 H RX IP 250 OP 636: Performed by: EMERGENCY MEDICINE

## 2018-11-09 PROCEDURE — 96361 HYDRATE IV INFUSION ADD-ON: CPT

## 2018-11-09 PROCEDURE — 99285 EMERGENCY DEPT VISIT HI MDM: CPT | Mod: 25

## 2018-11-09 PROCEDURE — 85025 COMPLETE CBC W/AUTO DIFF WBC: CPT | Performed by: EMERGENCY MEDICINE

## 2018-11-09 PROCEDURE — 87804 INFLUENZA ASSAY W/OPTIC: CPT | Performed by: EMERGENCY MEDICINE

## 2018-11-09 PROCEDURE — 12000007 ZZH R&B INTERMEDIATE

## 2018-11-09 PROCEDURE — 93005 ELECTROCARDIOGRAM TRACING: CPT

## 2018-11-09 PROCEDURE — 96360 HYDRATION IV INFUSION INIT: CPT

## 2018-11-09 PROCEDURE — 36415 COLL VENOUS BLD VENIPUNCTURE: CPT | Performed by: EMERGENCY MEDICINE

## 2018-11-09 RX ORDER — PREDNISONE 20 MG/1
60 TABLET ORAL ONCE
Status: COMPLETED | OUTPATIENT
Start: 2018-11-09 | End: 2018-11-09

## 2018-11-09 RX ORDER — LEVOFLOXACIN 500 MG/1
500 TABLET, FILM COATED ORAL ONCE
Status: COMPLETED | OUTPATIENT
Start: 2018-11-09 | End: 2018-11-09

## 2018-11-09 RX ORDER — GUAIFENESIN 400 MG/1
400 TABLET ORAL 3 TIMES DAILY
Status: ON HOLD | COMMUNITY
End: 2019-01-10

## 2018-11-09 RX ORDER — BUTALBITAL, ACETAMINOPHEN AND CAFFEINE 50; 325; 40 MG/1; MG/1; MG/1
1 TABLET ORAL EVERY 6 HOURS PRN
Status: ON HOLD | COMMUNITY
End: 2018-11-15

## 2018-11-09 RX ORDER — TAMSULOSIN HYDROCHLORIDE 0.4 MG/1
0.4 CAPSULE ORAL DAILY
Status: ON HOLD | COMMUNITY
End: 2019-01-10

## 2018-11-09 RX ORDER — LORAZEPAM 0.5 MG/1
0.5 TABLET ORAL ONCE
Status: COMPLETED | OUTPATIENT
Start: 2018-11-09 | End: 2018-11-09

## 2018-11-09 RX ORDER — LORATADINE 10 MG/1
TABLET ORAL
Qty: 30 TABLET | Refills: 1 | OUTPATIENT
Start: 2018-11-09

## 2018-11-09 RX ORDER — METAXALONE 800 MG/1
800 TABLET ORAL 3 TIMES DAILY
Status: ON HOLD | COMMUNITY
End: 2019-01-10

## 2018-11-09 RX ORDER — POTASSIUM CHLORIDE 1500 MG/1
20 TABLET, EXTENDED RELEASE ORAL EVERY MORNING
Status: ON HOLD | COMMUNITY
End: 2019-01-10

## 2018-11-09 RX ORDER — SODIUM CHLORIDE, SODIUM LACTATE, POTASSIUM CHLORIDE, CALCIUM CHLORIDE 600; 310; 30; 20 MG/100ML; MG/100ML; MG/100ML; MG/100ML
1000 INJECTION, SOLUTION INTRAVENOUS CONTINUOUS
Status: DISCONTINUED | OUTPATIENT
Start: 2018-11-09 | End: 2018-11-10

## 2018-11-09 RX ORDER — LORATADINE 10 MG/1
10 TABLET ORAL DAILY
Qty: 30 TABLET | Refills: 1 | Status: SHIPPED | OUTPATIENT
Start: 2018-11-09

## 2018-11-09 RX ORDER — TRAMADOL HYDROCHLORIDE 50 MG/1
50 TABLET ORAL 4 TIMES DAILY
Status: ON HOLD | COMMUNITY
End: 2018-11-15

## 2018-11-09 RX ORDER — BUSPIRONE HYDROCHLORIDE 10 MG/1
10 TABLET ORAL 4 TIMES DAILY
Status: ON HOLD | COMMUNITY
End: 2018-11-15

## 2018-11-09 RX ORDER — IPRATROPIUM BROMIDE AND ALBUTEROL SULFATE 2.5; .5 MG/3ML; MG/3ML
3 SOLUTION RESPIRATORY (INHALATION) ONCE
Status: COMPLETED | OUTPATIENT
Start: 2018-11-09 | End: 2018-11-09

## 2018-11-09 RX ADMIN — PREDNISONE 60 MG: 20 TABLET ORAL at 19:02

## 2018-11-09 RX ADMIN — LEVOFLOXACIN 500 MG: 500 TABLET, FILM COATED ORAL at 21:43

## 2018-11-09 RX ADMIN — LORAZEPAM 0.5 MG: 0.5 TABLET ORAL at 21:45

## 2018-11-09 RX ADMIN — TRAMADOL HYDROCHLORIDE 25 MG: 50 TABLET ORAL at 19:02

## 2018-11-09 RX ADMIN — IPRATROPIUM BROMIDE AND ALBUTEROL SULFATE 3 ML: .5; 3 SOLUTION RESPIRATORY (INHALATION) at 19:02

## 2018-11-09 RX ADMIN — SODIUM CHLORIDE, POTASSIUM CHLORIDE, SODIUM LACTATE AND CALCIUM CHLORIDE 1000 ML: 600; 310; 30; 20 INJECTION, SOLUTION INTRAVENOUS at 19:02

## 2018-11-09 ASSESSMENT — ENCOUNTER SYMPTOMS
FEVER: 0
SHORTNESS OF BREATH: 1
DIAPHORESIS: 0
ARTHRALGIAS: 1
DIARRHEA: 1
APPETITE CHANGE: 1
WEAKNESS: 1
COUGH: 1

## 2018-11-09 NOTE — ED NOTES
Bed: ED09  Expected date: 11/9/18  Expected time: 5:10 PM  Means of arrival: Ambulance  Comments:  Hen 419

## 2018-11-09 NOTE — IP AVS SNAPSHOT
"          [place] [address] MEDICAL SURGICAL: [phone]                                              INTERAGENCY TRANSFER FORM - LAB / IMAGING / EKG / EMG RESULTS   2018                    Hospital Admission Date: 2018  TIMI MAK   : 1940  Sex: Male        Attending Provider: Henry Frye DO     Allergies:  Ammonium Lactate, Doxycycline, Monosodium Glutamate, Penicillins    Infection:  None   Service:  GENERAL MEDI    Ht:  1.651 m (5' 5\")   Wt:  55.2 kg (121 lb 11.2 oz)   Admission Wt:  55.7 kg (122 lb 12.8 oz)    BMI:  20.25 kg/m 2   BSA:  1.59 m 2            Patient PCP Information     Provider PCP Type    Pranay Reid MD General         Lab Results - 3 Days      Creatinine [[id number]]  Resulted: 11/15/18 0730, Result status: Final result    Ordering provider: Henry Frye DO  18 1800 Resulting lab: [facility]    Specimen Information    Type Source Collected On   Blood  11/15/18 0648          Components       Value Reference Range Flag Lab   Creatinine 0.94 0.66 - 1.25 mg/dL  [facility]   GFR Estimate 78 >60 mL/min/1.7m2  [facility]   Comment:  Non  GFR Calc   GFR Estimate If Black >90 >60 mL/min/1.7m2  [facility]   Comment:   GFR Calc            Platelet count [[id number]]  Resulted: 11/15/18 0705, Result status: Final result    Ordering provider: Henry Frye DO  11/15/18 0000 Resulting lab: [facility]    Specimen Information    Type Source Collected On   Blood  11/15/18 0648          Components       Value Reference Range Flag Lab   Platelet Count 205 150 - 450 10e9/L  [facility]            Creatinine [[id number]]  Resulted: 18 0739, Result status: Final result    Ordering provider: Henry Frye DO  18 1800 Resulting lab: [facility]    Specimen Information    Type Source Collected On   Blood  18 0711          Components       Value Reference Range Flag Lab "   Creatinine 0.90 0.66 - 1.25 mg/dL  [facility]   GFR Estimate 82 >60 mL/min/1.7m2  [facility]   Comment:  Non  GFR Calc   GFR Estimate If Black >90 >60 mL/min/1.7m2  [facility]   Comment:   GFR Calc            Platelet count [[id number]]  Resulted: 11/12/18 0724, Result status: Final result    Ordering provider: Henry Frye, DO  11/12/18 0000 Resulting lab: [facility]    Specimen Information    Type Source Collected On   Blood  11/12/18 0711          Components       Value Reference Range Flag Lab   Platelet Count 210 150 - 450 10e9/L  [facility]            Testing Performed By     Lab - Abbreviation Name Director Address Valid Date Range    12 - [facility] Unknown [address] [facility] [address] 05/08/15 1057 - Present            Unresulted Labs (24h ago through future)    Start       Ordered    11/12/18 0600  Platelet count  (Pharmacological Prophylaxis - enoxaparin (LOVENOX) *Use only if creatinine clearance is greater than 30 mL/min)  EVERY THREE DAYS,   Routine     Comments:  Repeat every 3 days while on VTE prophylaxis.  Notify provider and hold enoxaparin if platelet count falls by 50% of baseline. If no result is listed, this lab has not been done the past 365 days. LATEST LAB RESULT: Platelet Count (10e9/L)       Date                     Value                 11/09/2018               208              ----------    11/10/18 0146    11/12/18 0000  Creatinine  (Pharmacological Prophylaxis - enoxaparin (LOVENOX) *Use only if creatinine clearance is greater than 30 mL/min)  EVERY THREE DAYS,   Routine     Comments:  Repeat every 3 days while on VTE prophylaxis.    11/10/18 0146      Encounter-Level Documents:     There are no encounter-level documents.      Order-Level Documents:     There are no order-level documents.

## 2018-11-09 NOTE — IP AVS SNAPSHOT
` `     [place] [address] MEDICAL SURGICAL: [phone]                 INTERAGENCY TRANSFER FORM - NOTES (H&P, Discharge Summary, Consults, Procedures, Therapies)   2018                    Hospital Admission Date: 2018  CHUCK LOPEZ   : 1940  Sex: Male        Patient PCP Information     Provider PCP Type    Pranay Reid MD General         History & Physicals      H&P by Henry Frye DO at 11/10/2018 12:03 AM     Author:  Henry Frye DO Service:  Hospitalist Author Type:  Physician    Filed:  11/10/2018 12:48 AM Date of Service:  11/10/2018 12:03 AM Creation Time:  2018 11:55 PM    Status:  Addendum :  Henry Frye DO (Physician)         [facility]  Hospitalist H&P    Name: Chuck Lopez      MRN: [id number]  YOB: 1940    Age: 78 year old  Date of admission: 2018  Primary care provider: Pranay Reid            Assessment and Plan:   Chuck Lopez is a 78 year old male with a history of hypertension, hepatitis C, diabetes mellitus, COPD, and chronic pain syndrome who presents with pneumonia and COPD exacerbation.    1.  Pneumonia.  Add pro-calcitonin level.  Continue Levaquin 750 mg a day.    2.  Acute COPD exacerbation.  Due to pneumonia.  Prednisone 60 mg a day.  Levaquin 750 mg a day.  Duo nebs 4 times a day.  Albuterol more often if needed.    3.  Acute on chronic hypoxic respiratory failure.  Acute portion due to pneumonia and COPD.  Supplemental oxygen as needed.    4.  Hyponatremia.  Mild.  Start continuous IV fluid.    5.  Malnutrition.  Appears severe on exam.  Will get registered dietitian consult.    6.  Deconditioning.  Physical and Occupational Therapy consults.    7.  BPH.  Restart tamsulosin.[KR1.1]    8.  Tobacco use disorder.  I did advise smoking cessation.  Start Nicotrol Inhaler as needed.[KR1.2]    Code status: Full code.  Admit to inpatient.  Prophylaxis: Lovenox.               Chief Complaint:   Shortness of breath.         History of Present Illness:   Chuck Lopez is a 78 year old male who presents with shortness of breath.  He does have chronic problems due to COPD.  Is on chronic oxygen.  Has been feeling more short of breath than usual for the past few days.  Has had a cough.  Cough is productive at times of a yellow colored sputum.  Has felt like he has had fevers and chills at times.  Has not checked his temperature with a thermometer.  Feels weak compared to usual.  Does have chronic pain in his legs.  Pain does not seem any different than usual.  Has not noted any chest pain with this.  Does sound like he is also been having difficulty with his current living situation.  He does feel quite stressed because of difficulties with living situation.  No other complaints.            Past Medical History:[KR1.1]     Past Medical History:   Diagnosis Date     Chronic neck pain      COPD (chronic obstructive pulmonary disease) (H)      Diabetes (H)      Hepatitis C      Hypertension[KR1.3]              Past Surgical History:[KR1.1]     Past Surgical History:   Procedure Laterality Date     APPENDECTOMY       ESOPHAGOSCOPY, GASTROSCOPY, DUODENOSCOPY (EGD), COMBINED  4/16/2014    Procedure: ESOPHAGOSCOPY, GASTROSCOPY, DUODENOSCOPY (EGD) & COLONOSCOPY with polypectomy by hot snare;  Surgeon: Dillon Vázquez MD;  Location:  GI[KR1.3]             Social History:[KR1.1]     Social History   Substance Use Topics     Smoking status: Current Every Day Smoker     Packs/day: 0.25     Types: Cigarettes     Smokeless tobacco: Never Used     Alcohol use No[KR1.3]             Family History:[KR1.1]   Mother with stroke.    Father with unknown type of cancer.[KR1.2]         Allergies:[KR1.1]     Allergies   Allergen Reactions     Ammonium Lactate Other (See Comments)     Burning to skin     Doxycycline Visual Disturbance     Monosodium Glutamate Other (See Comments)     Pt states it feels  "like his \"head swells, visual disturbances, and he can't think striaght\"     Penicillins Rash[KR1.3]             Medications:     Prior to Admission medications    Medication Sig Last Dose Taking? Auth Provider   atenolol (TENORMIN) 25 MG tablet Take 1 tablet (25 mg) by mouth daily 11/9/2018 at am Yes Pranay Reid MD   busPIRone (BUSPAR) 10 MG tablet Take 10 mg by mouth 4 times daily 11/9/2018 at 1400 Yes Unknown, Entered By History   fexofenadine (ALLEGRA) 30 MG ODT tab Take 1 tablet (30 mg) by mouth 2 times daily as needed for allergies (congestion) Past Week at Unknown time Yes Jori Doe MD   fluticasone-salmeterol (ADVAIR) 500-50 MCG/DOSE diskus inhaler Inhale 1 puff into the lungs 2 times daily 11/9/2018 at am Yes Erik Fish MD   guaiFENesin 400 MG TABS Take 400 mg by mouth 3 times daily 11/9/2018 at x2 Yes Unknown, Entered By History   hydrochlorothiazide (HYDRODIURIL) 25 MG tablet TAKE ONE TABLET BY MOUTH EVERY DAY 11/9/2018 at am Yes Pranay Reid MD   ipratropium - albuterol 0.5 mg/2.5 mg/3 mL (DUONEB) 0.5-2.5 (3) MG/3ML neb solution Take 1 vial (3 mLs) by nebulization 4 times daily 11/9/2018 at 1730 Yes Jori Doe MD   ipratropium - albuterol 0.5 mg/2.5 mg/3 mL (DUONEB) 0.5-2.5 (3) MG/3ML neb solution Take 1 vial (3 mLs) by nebulization every 4 hours as needed for shortness of breath / dyspnea or wheezing  Yes Jori Doe MD   ketoconazole (NIZORAL) 2 % shampoo Apply to the affected area and wash off after 5 minutes. Past Month at Unknown time Yes Pranay Reid MD   melatonin 3 MG tablet Take 1 tablet (3 mg) by mouth nightly as needed 11/9/2018 at Unknown time Yes Oneyda Mcdonald MD   metaxalone (SKELAXIN) 800 MG tablet Take 800 mg by mouth 3 times daily 11/9/2018 at 1400 Yes Unknown, Entered By History   Multiple Vitamins-Minerals (V-C FORTE) CAPS TAKE ONE CAPSULE BY MOUTH EVERY DAY 11/9/2018 at am Yes Pranay Reid MD "   potassium chloride SA (K-DUR/KLOR-CON M) 20 MEQ CR tablet Take 20 mEq by mouth every morning 11/9/2018 at Unknown time Yes Unknown, Entered By History   RaNITidine HCl (ZANTAC PO) Take 75 mg by mouth At Bedtime 11/8/2018 at Unknown time Yes Unknown, Entered By History   tamsulosin (FLOMAX) 0.4 MG capsule Take 0.4 mg by mouth daily 11/9/2018 at 1400 Yes Unknown, Entered By History   tiotropium (SPIRIVA HANDIHALER) 18 MCG capsule INHALE ONE CAPSULE BY MOUTH EVERY DAY 11/9/2018 at Unknown time Yes Erik Fish MD   traMADol (ULTRAM) 50 MG tablet Take 50 mg by mouth 4 times daily 11/9/2018 at 2000 Yes Unknown, Entered By History   butalbital-acetaminophen-caffeine (FIORICET/ESGIC) -40 MG per tablet Take 1 tablet by mouth every 6 hours as needed for headaches More than a month at Unknown time  Unknown, Entered By History   loratadine (CLARITIN) 10 MG tablet Take 1 tablet (10 mg) by mouth daily More than a month at Unknown time  Pranay Reid MD             Review of Systems:   A Comprehensive greater than 10 system review of systems was carried out.  Pertinent positives and negatives are noted above.  Otherwise negative for contributory information.           Physical Exam:[KR1.1]   Blood pressure 131/64, temperature 99.2  F (37.3  C), temperature source Oral, resp. rate 18, SpO2 96 %.  Wt Readings from Last 1 Encounters:   08/15/18 57.1 kg (125 lb 14.4 oz)[KR1.3]     Exam:  GENERAL: No apparent distress. Awake, alert, and oriented seemingly x3.  HEENT: Normocephalic, atraumatic. Extraocular movements intact.  CARDIOVASCULAR: Regular rate and rhythm without murmurs or rubs. No S3.  PULMONARY: Wheeze to auscultation bilaterally.  Poor air movement bilaterally.  ABDOMINAL: Soft, non-tender, non-distended. Bowel sounds normoactive.   EXTREMITIES: No cyanosis or clubbing. No appreciable edema.  NEUROLOGICAL: CN 2-12 grossly intact, no focal neurological deficits.  DERMATOLOGICAL: No rash, ulcer,  bruising, nor jaundice.          Data:   EKG:  Personally reviewed. Sinus.  No acute ischemia.    Laboratory:[KR1.1]    Recent Labs  Lab 11/09/18 1916   WBC 11.7*   HGB 13.5   HCT 41.0   MCV 99          Recent Labs  Lab 11/09/18 1916   *   POTASSIUM 3.9   CHLORIDE 97   CO2 30   ANIONGAP 5   GLC 98   BUN 11   CR 0.71   GFRESTIMATED >90   GFRESTBLACK >90   MATTHEW 8.4*[KR1.4]     No results for input(s): CULT in the last 168 hours.[KR1.3]    Imaging:[KR1.1]  Recent Results (from the past 24 hour(s))   XR Chest 2 Views    Narrative    CHEST TWO VIEWS  11/9/2018 7:55 PM     HISTORY: Increased shortness of breath, history of COPD.       Impression    IMPRESSION: Severe pulmonary hyperinflation. New somewhat nodular  opacities may be present in the left midlung when compared to  9/5/2017. The lungs otherwise appear clear.    ANAYA SINGH MD[KR1.3]            Revision History        User Key Date/Time User Provider Type Action    > KR1.2 11/10/2018 12:48 AM Henry Frye, DO Physician Addend     KR1.4 11/10/2018 12:03 AM Henry Frye, DO Physician Sign     KR1.3 11/9/2018 11:56 PM Henry Frye, DO Physician      KR1.1 11/9/2018 11:55 PM Henry Frye, DO Physician                      Discharge Summaries      Discharge Summaries by Oliver Call MD at 11/15/2018 11:52 AM     Author:  Oliver Call MD Service:  Hospitalist Author Type:  Physician    Filed:  11/15/2018 11:59 AM Date of Service:  11/15/2018 11:52 AM Creation Time:  11/15/2018 11:52 AM    Status:  Signed :  Oliver Call MD (Physician)         [facility]  Discharge Summary  Name: Chuck Lopez    MRN: [id number]  YOB: 1940    Age: 78 year old  Date of Discharge:[MF1.1]  11/15/2018[MF1.2]  Date of Admission: 11/9/2018  Primary Care Provider: Pranay Reid  Discharge Physician:  Rajat Call MD  Discharging Service:   Hospitalist      Hospital Course/Discharge Diagnoses:  Chuck Lopez is a 78 year old male with history including chronic pain syndrome, COPD with chronic oxygen dependence and ongoing smoking, depression, hypertension admitted on 11/9/2018 with shortness of breath.  He was felt clinically to be having a COPD exacerbation and started on steroids, neb treatments and Levaquin in the ER.  Chest x-ray was obtained and showed severe pulmonary hyperinflation with new somewhat nodular opacities in the left midlung but otherwise clear.  His pro calcitonin was checked and negative here but he did continue 5 days of Levaquin empirically for possible atypical pneumonia.  At this point he is clinically improved and remained stable on 2-3 L of supplemental oxygen which he tells me is his baseline.  He is also been tapered off of steroids.  He has been up and ambulatory and tells me he feels much better than upon presentation.     He has been somewhat overwhelmed and depressed and psychiatry is now following.  They do feel he is competent to make his own decisions and question whether untreated depression may be playing a role here.  They did recommend initiation of Lexapro with outpatient follow-up.  After initially declining to work with social work he now is agreeable to finding placement in a TCU if possible.    Today I been informed that we have TCU placement and he would discharge once transport is arranged.           Problem List:   1. Pneumonia.  Completed Levaquin 750 mg times 5 days.  Procalcitonin low, question viral versus atypical pneumonia. Resolved.  CXR w/ some nodular appearance, consider repeat CXR in 2-3 weeks to ensure clearing.     2. Acute COPD exacerbation.  Much improved.  No longer with any wheeze.  Continue Breo Ellipta..  completed antibiotics and prednisone.  Has neb machine, continue nebs.  Needs supplemental O2.     3. Acute on chronic hypoxic respiratory failure.  Acute worsening due to  "pneumonia and COPD.  Continue supplemental oxygen as needed.  I have reordered this to be delivered to the hospital prior to discharge as apparently he ran out of supplemental oxygen prior to presentation which may be playing a role in his worsened shortness of breath upon presentation.     4. chronic pain syndrome.  Tramadol as needed.  Tylenol as needed.  Pain team consulted.  He reportedly follows with (per Pain/palliative care documentation) Dr. Eli Rivera MD at [facility] in [place]     5.  Tobacco use disorder.  Counseled repeatedly.  He quit just prior to presentation and plans not to restart.       6.   Hypertension.  Continue atenolol and hydrochlorothiazide.     7.   Depression.  Continue buspirone.     8.  Benign prostatic hyperplasia.  Tamsulosin.     9.  Mild hyponatremia.  Resolved.       10.  Deconditioning.  Physical and Occupational Therapy consulted.     11.  Social concerns: [place] by psychiatry to be competent to make his own decisions.  OT recommending a TCU stay.  After initially being very resistant to working with us to find placement he now is changed his tune and is willing to accept TCU placement.  Social work following.    12.  Suspected mild to moderate malnutrition from chronic disease.        Discharge Disposition:  Discharged to short-term care facility     Allergies:  Allergies   Allergen Reactions     Ammonium Lactate Other (See Comments)     Burning to skin     Doxycycline Visual Disturbance     Monosodium Glutamate Other (See Comments)     Pt states it feels like his \"head swells, visual disturbances, and he can't think striaght\"     Penicillins Rash        Discharge Medications:        Review of your medicines      START taking       Dose / Directions    escitalopram 5 MG tablet   Commonly known as:  LEXAPRO   Used for:  Major depressive disorder, remission status unspecified, unspecified whether recurrent        Dose:  5 mg   Start taking on:  " 11/16/2018   Take 1 tablet (5 mg) by mouth daily   Quantity:  30 tablet   Refills:  0         CONTINUE these medicines which may have CHANGED, or have new prescriptions. If we are uncertain of the size of tablets/capsules you have at home, strength may be listed as something that might have changed.       Dose / Directions    busPIRone 15 MG tablet   Commonly known as:  BUSPAR   This may have changed:    - medication strength  - how much to take  - when to take this   Used for:  Major depressive disorder, remission status unspecified, unspecified whether recurrent        Dose:  15 mg   Take 1 tablet (15 mg) by mouth 3 times daily   Refills:  0       traMADol 50 MG tablet   Commonly known as:  ULTRAM   This may have changed:    - how much to take  - when to take this  - reasons to take this   Used for:  Other chronic pain        Dose:  25 mg   Take 0.5 tablets (25 mg) by mouth every 6 hours as needed for moderate pain or severe pain   Quantity:  10 tablet   Refills:  0         CONTINUE these medicines which have NOT CHANGED       Dose / Directions    atenolol 25 MG tablet   Commonly known as:  TENORMIN   Used for:  Type 2 diabetes mellitus with hyperosmolarity without coma, unspecified long term insulin use status        Dose:  25 mg   Take 1 tablet (25 mg) by mouth daily   Quantity:  30 tablet   Refills:  11       butalbital-acetaminophen-caffeine -40 MG per tablet   Commonly known as:  FIORICET/ESGIC   Used for:  Migraine without aura and without status migrainosus, not intractable        Dose:  1 tablet   Take 1 tablet by mouth every 6 hours as needed for headaches   Quantity:  10 tablet   Refills:  0       fexofenadine 30 MG ODT tab   Commonly known as:  ALLEGRA        Dose:  30 mg   Take 1 tablet (30 mg) by mouth 2 times daily as needed for allergies (congestion)   Refills:  0       FLOMAX 0.4 MG capsule   Generic drug:  tamsulosin        Dose:  0.4 mg   Take 0.4 mg by mouth daily   Refills:  0        fluticasone-salmeterol 500-50 MCG/DOSE diskus inhaler   Commonly known as:  ADVAIR        Dose:  1 puff   Inhale 1 puff into the lungs 2 times daily   Quantity:  3 Inhaler   Refills:  1       guaiFENesin 400 MG Tabs        Dose:  400 mg   Take 400 mg by mouth 3 times daily   Refills:  0       hydrochlorothiazide 25 MG tablet   Commonly known as:  HYDRODIURIL   Used for:  Type 2 diabetes mellitus with hyperosmolarity without coma, unspecified long term insulin use status        TAKE ONE TABLET BY MOUTH EVERY DAY   Quantity:  30 tablet   Refills:  10       * ipratropium - albuterol 0.5 mg/2.5 mg/3 mL 0.5-2.5 (3) MG/3ML neb solution   Commonly known as:  DUONEB        Dose:  1 vial   Take 1 vial (3 mLs) by nebulization 4 times daily   Quantity:  30 vial   Refills:  1       * ipratropium - albuterol 0.5 mg/2.5 mg/3 mL 0.5-2.5 (3) MG/3ML neb solution   Commonly known as:  DUONEB        Dose:  1 vial   Take 1 vial (3 mLs) by nebulization every 4 hours as needed for shortness of breath / dyspnea or wheezing   Quantity:  30 vial   Refills:  1       ketoconazole 2 % shampoo   Commonly known as:  NIZORAL   Used for:  Tinea capitis        Apply to the affected area and wash off after 5 minutes.   Quantity:  120 mL   Refills:  3       loratadine 10 MG tablet   Commonly known as:  CLARITIN   Used for:  Seasonal allergic rhinitis due to pollen        Dose:  10 mg   Take 1 tablet (10 mg) by mouth daily   Quantity:  30 tablet   Refills:  1       melatonin 3 MG tablet   Used for:  Insomnia, unspecified type        Dose:  3 mg   Take 1 tablet (3 mg) by mouth nightly as needed   Quantity:  30 tablet   Refills:  0       metaxalone 800 MG tablet   Commonly known as:  SKELAXIN        Dose:  800 mg   Take 800 mg by mouth 3 times daily   Refills:  0       potassium chloride SA 20 MEQ CR tablet   Commonly known as:  K-DUR/KLOR-CON M        Dose:  20 mEq   Take 20 mEq by mouth every morning   Refills:  0       tiotropium 18 MCG capsule  "  Commonly known as:  SPIRIVA HANDIHALER   Used for:  Centrilobular emphysema (H)        INHALE ONE CAPSULE BY MOUTH EVERY DAY   Quantity:  30 capsule   Refills:  11       V-C FORTE Caps   Used for:  Hepatitis C        TAKE ONE CAPSULE BY MOUTH EVERY DAY   Quantity:  90 capsule   Refills:  3       ZANTAC PO        Dose:  75 mg   Take 75 mg by mouth At Bedtime   Refills:  0       * Notice:  This list has 2 medication(s) that are the same as other medications prescribed for you. Read the directions carefully, and ask your doctor or other care provider to review them with you.         Where to get your medicines      These medications were sent to [place], [place] - [address] [facility]  [address] [address] [facility] [address]     Phone:  [phone]      escitalopram 5 MG tablet         Some of these will need a paper prescription and others can be bought over the counter. Ask your nurse if you have questions.     Bring a paper prescription for each of these medications      butalbital-acetaminophen-caffeine -40 MG per tablet     traMADol 50 MG tablet       You don't need a prescription for these medications      busPIRone 15 MG tablet             Condition on Discharge:  Discharge condition: Stable       Code status on discharge: Full Code     History of Illness:  See detailed admission note for full details.    Physical Exam:  Vital signs:  Temp: 98.2  F (36.8  C) Temp src: Oral BP: 136/51   Heart Rate: 91 Resp: 16 SpO2: 95 % O2 Device: Nasal cannula Oxygen Delivery: 2 LPM   Weight: 55.2 kg (121 lb 11.2 oz)  Estimated body mass index is 20.25 kg/(m^2) as calculated from the following:    Height as of 6/13/18: 1.651 m (5' 5\").    Weight as of this encounter: 55.2 kg (121 lb 11.2 oz).    Wt Readings from Last 1 Encounters:   11/15/18 55.2 kg (121 lb 11.2 oz)     General: Alert, awake, no acute distress.  Elderly man.  HEENT: NC/AT, eyes anicteric, external occular movements " intact, face symmetric.  Dentition WNL, MM moist.  Cardiac: RRR, S1, S2.  No murmurs appreciated.  Pulmonary: Normal chest rise, normal work of breathing.  Lungs CTA BL.  Moderately diminished air movement but no wheezing.  Full sentences.  Abdomen: soft, non-tender, non-distended.  Bowel Sounds Present.  No guarding.  Extremities: no deformities.  Warm, well perfused.  Skin: no rashes or lesions noted.  Warm and Dry.  Neuro: No focal deficits noted.  Speech clear.  Coordination and strength grossly normal.  Psych: Appropriate affect.    Procedures other than Imaging:  none     Imaging:[MF1.1]  Results for orders placed or performed during the hospital encounter of 11/09/18   XR Chest 2 Views    Narrative    CHEST TWO VIEWS  11/9/2018 7:55 PM     HISTORY: Increased shortness of breath, history of COPD.       Impression    IMPRESSION: Severe pulmonary hyperinflation. New somewhat nodular  opacities may be present in the left midlung when compared to  9/5/2017. The lungs otherwise appear clear.    ANAYA SINGH MD[MF1.2]        Consultations:  Consultation during this admission received from PT/OT/SW/psychiatry.       Recent Lab Results:[MF1.1]    Recent Labs  Lab 11/15/18  0648 11/12/18  0711 11/10/18  0717 11/09/18  1916   WBC  --   --  8.1 11.7*   HGB  --   --  12.4* 13.5   HCT  --   --  37.3* 41.0   MCV  --   --  99 99    210 183 208[MF1.2]          Lab Results   Component Value Date     11/11/2018     11/10/2018     11/09/2018    Lab Results   Component Value Date    CHLORIDE 102 11/11/2018    CHLORIDE 100 11/10/2018    CHLORIDE 97 11/09/2018    Lab Results   Component Value Date    BUN 15 11/11/2018    BUN 12 11/10/2018    BUN 11 11/09/2018      Lab Results   Component Value Date    POTASSIUM 3.5 11/11/2018    POTASSIUM 4.0 11/10/2018    POTASSIUM 3.9 11/09/2018    Lab Results   Component Value Date    CO2 33 11/11/2018    CO2 29 11/10/2018    CO2 30 11/09/2018    Lab Results    Component Value Date    CR 0.94 11/15/2018    CR 0.90 11/12/2018    CR 0.78 11/11/2018             Pending Results:    Unresulted Labs Ordered in the Past 30 Days of this Admission     No orders found from 9/10/2018 to 11/10/2018.           Discharge Instructions and Follow-Up:     Discharge Procedure Orders  Reason for your hospital stay   Order Comments: You were admitted for COPD exacerbation related to possible pneumonia versus bronchitis.  Your treated with steroids and antibiotics as well as breathing treatments.  Very important that you avoid cigarette smoke and if possible allergic triggers.     Activity   Order Comments: Your activity upon discharge: activity as tolerated   Order Specific Question Answer Comments   Is discharge order? Yes      Follow-up and recommended labs and tests    Order Comments: Follow up with primary care provider or provider at your TCU for hospital follow- up.  The following labs/tests are recommended: Please review your recent hospital stay and discuss an ongoing plan for medication refills and discuss timing of a repeat chest x-ray in 2-3 weeks to make sure the small nodules/pneumonia noted here are resolving.     General info for SNF   Order Comments: Length of Stay Estimate: Short Term Care: Estimated # of Days <30  Condition at Discharge: Stable  Level of care:skilled   Rehabilitation Potential: Fair  Admission H&P remains valid and up-to-date: Yes  Recent Chemotherapy: N/A  Use Nursing Home Standing Orders: Yes     Mantoux instructions   Order Comments: Give two-step Mantoux (PPD) Per Facility Policy Yes     Full Code   Order Specific Question Answer Comments   Code status determined by: Discussion with patient/legal decision maker      Physical Therapy Adult Consult   Order Comments: Evaluate and treat as clinically indicated     Occupational Therapy Adult Consult   Order Comments: Evaluate and treat as clinically indicated.     Oxygen Adult   Order Comments: Renew Home  Oxygen Order  Renew previous prescription.  Expected treatment length is indefinite (99 months).    Attending Provider: Oliver Call  Physician signature: See electronic signature associated with these discharge orders  Date of Order: November 13, 2018     Oxygen - Nasal cannula   Order Comments: 2-3 Lpm by nasal cannula to keep O2 sats 92% or greater.     Diet   Order Comments: Follow this diet upon discharge: Orders Placed This Encounter     Snacks/Supplements Adult: Other; Vanilla shake 2 pm + 2 packets Beneprotein; Between Meals     Room Service     Combination Diet Regular Diet Adult; No Caffeine Diet   Order Specific Question Answer Comments   Is discharge order? Yes          Total time spent in face to face contact with the patient and coordinating discharge was:  50 Minutes.[MF1.1]           Revision History        User Key Date/Time User Provider Type Action    > MF1.2 11/15/2018 11:59 AM Oliver Call MD Physician Sign     MF1.1 11/15/2018 11:52 AM Oliver Call MD Physician                      Consult Notes      Consults by Chuck Magana LP at 11/14/2018  9:15 AM     Author:  Chuck Magana LP Service:  Behavioral Health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 non-tender/non-distended.  +BS  EXT:  Edema & pulses as noted above.  Color, moisture and sensation intact x 4.     M/S:[AM1.1]   Discomfort of neck and base of neck resolved with message per patient report (he just had dose of st.[AM1.2]     SKIN:  Dry to touch, no exanthems noted in the visualized areas.    NEURO: Symmetric strength +5/5.  Sensation to touch intact all extremities.   There is no area of allodynia or hyperesthesia.  PAIN BEHAVIOR: Cooperative  Psych:  Normal affect.  Calm, cooperative, conversant appropriately.       Data[AM1.1]   Results for orders placed or performed during the hospital encounter of 11/09/18   XR Chest 2 Views    Narrative    CHEST TWO VIEWS  11/9/2018 7:55 PM     HISTORY: Increased shortness of breath, history of COPD.       Impression    IMPRESSION: Severe pulmonary hyperinflation. New somewhat nodular  opacities may be present in the left midlung when compared to  9/5/2017. The lungs otherwise appear clear.    ANAYA SINGH MD   Basic metabolic panel   Result Value Ref Range    Sodium 132 (L) 133 - 144 mmol/L    Potassium 3.9 3.4 - 5.3 mmol/L    Chloride 97 94 - 109 mmol/L    Carbon Dioxide 30 20 - 32 mmol/L    Anion Gap 5 3 - 14 mmol/L    Glucose 98 70 - 99 mg/dL    Urea Nitrogen 11 7 - 30 mg/dL    Creatinine 0.71 0.66 - 1.25 mg/dL    GFR Estimate >90 >60 mL/min/1.7m2    GFR Estimate If Black >90 >60 mL/min/1.7m2    Calcium 8.4 (L) 8.5 - 10.1 mg/dL   CBC with platelets differential   Result Value Ref Range    WBC 11.7 (H) 4.0 - 11.0 10e9/L    RBC Count 4.16 (L) 4.4 - 5.9 10e12/L    Hemoglobin 13.5 13.3 - 17.7 g/dL    Hematocrit 41.0 40.0 - 53.0 %    MCV 99 78 - 100 fl    MCH 32.5 26.5 - 33.0 pg    MCHC 32.9 31.5 - 36.5 g/dL    RDW 12.7 10.0 - 15.0 %    Platelet Count 208 150 -  450 10e9/L    Diff Method Automated Method     % Neutrophils 61.6 %    % Lymphocytes 13.7 %    % Monocytes 13.5 %    % Eosinophils 9.9 %    % Basophils 0.8 %    % Immature Granulocytes 0.5 %    Nucleated RBCs 0 0 /100    Absolute Neutrophil 7.2 1.6 - 8.3 10e9/L    Absolute Lymphocytes 1.6 0.8 - 5.3 10e9/L    Absolute Monocytes 1.6 (H) 0.0 - 1.3 10e9/L    Absolute Eosinophils 1.2 (H) 0.0 - 0.7 10e9/L    Absolute Basophils 0.1 0.0 - 0.2 10e9/L    Abs Immature Granulocytes 0.1 0 - 0.4 10e9/L    Absolute Nucleated RBC 0.0    Troponin I   Result Value Ref Range    Troponin I ES <0.015 0.000 - 0.045 ug/L   Basic metabolic panel   Result Value Ref Range    Sodium 135 133 - 144 mmol/L    Potassium 4.0 3.4 - 5.3 mmol/L    Chloride 100 94 - 109 mmol/L    Carbon Dioxide 29 20 - 32 mmol/L    Anion Gap 6 3 - 14 mmol/L    Glucose 100 (H) 70 - 99 mg/dL    Urea Nitrogen 12 7 - 30 mg/dL    Creatinine 0.71 0.66 - 1.25 mg/dL    GFR Estimate >90 >60 mL/min/1.7m2    GFR Estimate If Black >90 >60 mL/min/1.7m2    Calcium 8.4 (L) 8.5 - 10.1 mg/dL   CBC with platelets   Result Value Ref Range    WBC 8.1 4.0 - 11.0 10e9/L    RBC Count 3.77 (L) 4.4 - 5.9 10e12/L    Hemoglobin 12.4 (L) 13.3 - 17.7 g/dL    Hematocrit 37.3 (L) 40.0 - 53.0 %    MCV 99 78 - 100 fl    MCH 32.9 26.5 - 33.0 pg    MCHC 33.2 31.5 - 36.5 g/dL    RDW 12.7 10.0 - 15.0 %    Platelet Count 183 150 - 450 10e9/L   Procalcitonin   Result Value Ref Range    Procalcitonin 0.05 ng/ml   Basic metabolic panel   Result Value Ref Range    Sodium 139 133 - 144 mmol/L    Potassium 3.5 3.4 - 5.3 mmol/L    Chloride 102 94 - 109 mmol/L    Carbon Dioxide 33 (H) 20 - 32 mmol/L    Anion Gap 4 3 - 14 mmol/L    Glucose 99 70 - 99 mg/dL    Urea Nitrogen 15 7 - 30 mg/dL    Creatinine 0.78 0.66 - 1.25 mg/dL    GFR Estimate >90 >60 mL/min/1.7m2    GFR Estimate If Black >90 >60 mL/min/1.7m2    Calcium 8.5 8.5 - 10.1 mg/dL   Creatinine   Result Value Ref Range    Creatinine 0.90 0.66 - 1.25 mg/dL     "GFR Estimate 82 >60 mL/min/1.7m2    GFR Estimate If Black >90 >60 mL/min/1.7m2   Platelet count   Result Value Ref Range    Platelet Count 210 150 - 450 10e9/L   EKG 12 lead   Result Value Ref Range    Interpretation ECG Click View Image link to view waveform and result    Nutrition Services Adult IP Consult    Narrative    Sophie Burton RD, LD     11/10/2018 11:40 AM  CLINICAL NUTRITION SERVICES  -  ASSESSMENT NOTE      Recommendations Ordered by Registered Dietitian (RD):   Oral nutrition supplements  Regular diet to promote adequacy of oral intake   Malnutrition 11/10:   % Weight Loss:Weight loss does not meet criteria for malnutrition     % Intake:</= 50% for >/= 5 days (severe malnutrition)  Subcutaneous Fat Loss:Orbital region moderate depletion, Upper   arm region moderate depletion and Thoracic region mild to   moderate depletion  Muscle Loss:Temporal region moderate depletion, Clavicle bone   region mild to moderate depletion, Acromion bone region moderate   depletion, Dorsal hand region moderate depletion, Patellar region   moderate or greater depletion, Anterior thigh region moderate   depletion and Posterior calf region moderate depletion  Fluid Retention:None noted    Malnutrition Diagnosis: Severe malnutrition  In Context of:  Acute on Chronic illness or disease     REASON FOR ASSESSMENT  Chuck Lopez is a 78 year old male seen by the dietitian for   Provider Order - severe malnutrition     NUTRITION HISTORY  - Information obtained from patient  - Food allergies/intolerances: MSG, face becomes swollen -->   reports he has to avoid many pork and meat products due to   preservatives  - Patient is on a mechanical/dental soft diet at home.  - Feeling ill for weeks with sharp decline in intake over last   five days, consumed only a cheeseburger and V8 juice  - Reports having a vague \"Sickness\" over multiple months that   include symptoms: loss of appetite, decreased strength  Edentuous with cracked " "back teeth - only can consume soft foods  No supplements previously  Tolerates dairy    Receives breakfast meal at [facility] in the dining room  Lunch or dinner meal: Canned tuna, sweet potatoes and vegetables;   [employer]     CURRENT NUTRITION ORDERS  - Diet: Cardiac diet  - Supplement: none  Current Intake/Tolerance:  - Per flow sheet review, no intake for meals yet documented.   Patient reports good tolerance to omelette this morning.  - Factors affecting nutrition intake include: poor dental health,   poor appetite PTA    PHYSICAL FINDINGS  Observed  See malnutrition section below - see fat and muscle loss below  Obtained from Chart/Interdisciplinary Team  Greg nutrition score: 3; total score: 19  BM: 11/9  Skin: bruised, ecchymotic  Generalized weakness    ANTHROPOMETRICS  Height: 5'5\"  Weight: 55 kg   Body mass index is 20.43 kg/(m^2).  Weight Status:  Normal BMI  Ideal body weight: 61.8 kg +/- 10%, 90% of IBW   Weight History:  Weight appears relatively stable with fluctuations of +\-5 kg in   last year; patient reports a 5# weight loss in 2 weeks   Wt Readings from Last 10 Encounters:   11/10/18 55.7 kg (122 lb 12.8 oz)   08/15/18 57.1 kg (125 lb 14.4 oz)   06/13/18 50.2 kg (110 lb 9.6 oz)   05/16/18 50.7 kg (111 lb 12.8 oz)   09/05/17 54.2 kg (119 lb 8 oz)   08/25/17 54.7 kg (120 lb 9.6 oz)   06/23/17 54.1 kg (119 lb 3.2 oz)   05/13/17 50 kg (110 lb 3.7 oz)   05/01/17 52.1 kg (114 lb 12.8 oz)   04/18/17 51.8 kg (114 lb 2 oz)       ASSESSED NUTRITION NEEDS (PER APPROVED PRACTICE GUIDELINES,   Dosing weight: kg):  Estimated Energy Needs: 1082-2862 kcals (35-40 Kcal/Kg)  Justification: repletion and borderline underweight  Estimated Protein Needs:  grams protein (1.5-2 g pro/Kg)  Justification: Repletion  Estimated Fluid Needs: >1 mL/Kcal  Justification: maintenance    LABS  Labs reviewed    Recent Labs   Lab Test  11/10/18   0717  11/09/18   1916  06/13/18   1436  09/09/17   0548  09/06/17   0622 "   POTASSIUM  4.0  3.9  4.1  3.7  3.7     No results for input(s): PHOS in the last 81978 hours.  Recent Labs   Lab Test  04/04/17   0656  04/03/17   0657  04/02/17   0730  03/28/17   0715  11/23/16   0759   MAG  2.6*  2.5*  2.6*  2.3  2.4*     Recent Labs   Lab Test  11/10/18   0717  11/09/18   1916  06/13/18   1436  09/09/17   0548  09/06/17   0622   NA  135  132*  138  141  134     Recent Labs   Lab Test  11/10/18   0717  11/09/18   1916  06/13/18   1436  09/11/17   0651  09/10/17   0612   CR  0.71  0.71  0.70  0.75  0.76       Recent Labs  Lab 11/10/18  0717 11/09/18  1916   * 98     Lab Results   Component Value Date    A1C 5.5 09/07/2017    A1C 5.7 05/01/2017    A1C 5.7 04/19/2017    A1C 5.3 01/06/2017    A1C 5.4 11/23/2016       MEDICATIONS  Medications reviewed  MVI+M  IVF at 75 mL/hr  Prednisone    MALNUTRITION:  % Weight Loss:Weight loss does not meet criteria for malnutrition     % Intake:</= 50% for >/= 5 days (severe malnutrition)  Subcutaneous Fat Loss:Orbital region moderate depletion, Upper   arm region moderate depletion and Thoracic region mild to   moderate depletion  Muscle Loss:Temporal region moderate depletion, Clavicle bone   region mild to moderate depletion, Acromion bone region moderate   depletion, Dorsal hand region moderate depletion, Patellar region   moderate or greater depletion, Anterior thigh region moderate   depletion and Posterior calf region moderate depletion  Fluid Retention:None noted    Malnutrition Diagnosis: Severe malnutrition  In Context of:  Acute on Chronic illness or disease    NUTRITION DIAGNOSIS:  Increased nutrient needs (protein energy) related to   hypermetabolism 2/2 chronic disease and repletion needs as   evidenced by fat and muscle loss, PO intake meeting <50% of needs   for >5 days, severe malnutrition criteria met    INTERVENTIONS  Recommendations / Nutrition Prescription  Diet liberalization to regular to promote intake + oral nutrition    supplements to increase calorie and protein intake    MVI+M    Implementation  Nutrition education: discussed diet liberalization, need to   increase protein and calorie dense foods, frequent   sips/snacks/bites as able.  Medical food supplement: high protein milkshake 2 pm  Diet order: Regular (caffeine restriction per MD discretion);   Room service with assist for meal ordering guidance and to offer   mechanical/dental soft diet  Multivitamin/Minerals: unable to order, currently   contraindicated.    Goals  Patient to consume >/= 75% of meals TID and >/=1 high protein   supplements per day      MONITORING AND EVALUATION:  Progress towards goals will be monitored and evaluated per   protocol and Practice Guidelines      Sophie Burton RDN, LD, CNSC  Pager - 3rd floor/ICU: [phone]  Pager - All other floors: [phone]  Pager - Weekend/holiday: [phone]  Office: [phone]     Care Coordinator IP Consult    Narrative    Ivett Vázquez CM     11/10/2018  2:47 PM  Care Transition Initial Assessment - RN    Reason For Consult: care coordination/care conference, discharge   planning   Met with: Patient.  DATA   Active Problems:    COPD exacerbation (H)       Cognitive Status: awake, alert and oriented.  [facility] Care Clinic [facility]: [facility]  Primary Care MD Name: TBERICK  Contact information and PCP information verified: Yes  Lives With: other (see comments) (roommate)     Description of Support System: Supportive   Who is your support system?: Other (specify) (friend)       Insurance concerns: No Insurance issues identified  ASSESSMENT  Patient currently receives the following services:  None.      Identified issues/concerns regarding health management: He was   living at [facility] but states he left d/t the   fragrance policy at the facility. He is allergic to fragrances   and most staff wears perfume or scented lotions. He states his   large oxygen concentrator is still there.  He has a 4WW in the   room with a portable O2 concentrator on the seat. When CM asked   who is his oxygen provider, he states he doesn't have one. His   responses are scattered and non-specific. He keeps returning to   the fragrance situation and does not give CM direct answers to   questions asked.      PLAN  Financial costs for the patient were not discussed.  Patient was not given options and choices for discharge. SW to   see re: housing resources.   Patient/family is agreeable to the plan?  TBD   Patient anticipates discharging to probable shelter.     Other Resources: Other (see comment) (COPD Action Plan discussed   w/pt)  Patient anticipates needs for home equipment: No  Plan/Disposition: TBD    Appointments: TBD once disposition known.     CM will continue to follow patient until discharge for any   additional needs.     Elina Vázquez RN, BSN, CTS  [facility]  [phone]               Influenza A/B antigen   Result Value Ref Range    Influenza A/B Agn Specimen Nasopharyngeal     Influenza A Negative NEG^Negative    Influenza B Negative NEG^Negative   Sputum Culture Aerobic Bacterial   Result Value Ref Range    Specimen Description Sputum     Culture Micro Canceled, Test credited     Culture Micro (A)      >10 Squamous epithelial cells/low power field indicates oral contamination. Please   recollect.      Culture Micro       Notification of test cancellation was given to  Rome Frankel RN on RHMS3 at 1914 11/10/18 SRQ     Gram stain   Result Value Ref Range    Specimen Description Sputum Screen     Gram Stain (A)      >10 Squamous epithelial cells/low power field indicates oral contamination. Please   recollect.      Gram Stain <25 PMNs/low power field     Gram Stain Many  Mixed gram negative and positive edith[AM1.6]                     Revision History        User Key Date/Time User Provider Type Action    > AM1.2 11/12/2018  6:53 PM Danuta Negron APRN CNS Clinical Nurse  Specialist Sign     AM1.3 11/12/2018  5:10 PM Danuta Negron APRN CNS Clinical Nurse Specialist      AM1.4 11/12/2018 10:31 AM Danuta Negron APRN CNS Clinical Nurse Specialist      AM1.5 11/12/2018 10:27 AM Danuta Negron APRN CNS Clinical Nurse Specialist      AM1.6 11/12/2018 10:22 AM Danuta Negron APRN CNS Clinical Nurse Specialist      AM1.1 11/12/2018 10:21 AM Danuta Negron APRN CNS Clinical Nurse Specialist             Consults by Eli Esqueda LSW at 11/12/2018  4:59 PM     Author:  Eli Esqueda LSW Service:  (none) Author Type:      Filed:  11/12/2018  4:59 PM Date of Service:  11/12/2018  4:59 PM Creation Time:  11/12/2018  4:44 PM    Status:  Signed :  Eli Esqueda LSW ()     Consult Orders:    1. Social Work IP Consult [[id number]] ordered by Henry Frye DO at 11/09/18 2355                Care Transition Initial Assessment -   Reason For Consult: discharge planning  Met with: Patient[TH1.1]    Active Problems:    COPD exacerbation (H)[TH1.2]       DATA  Lives With: alone - did live at [facility]  Living Arrangements: homeless - Pt was evicted due to lack of payment.  Description of Support System: Uninvolved - Pt states he does not have a support system.  Who is your support system?: Limited to a previous roommate - Julio.  Support Assessment: Lacks adequate emotional support, Lacks necessary supervision and assistance, Limited social contact and support. Pt reports he does not have a support system.  Identified issues/concerns regarding health management: Pt was admitted for COPD Exacerbation.          Quality Of Family Relationships: non-existent      ASSESSMENT  Cognitive Status:  awake, alert and oriented  Concerns to be addressed:  Pt states that he told [facility] since June that he was not going to pay them until they were able to provide the cares to him that he needs.  He states that he is allergic  to perfumes and fragrances and everyone at the facility wore fragrances and there was also mold and mildew that affected his breathing.  Pt states that the could not accommodate his food allergies - he cannot have preservatives he reports.  Pt said that they hid a lot of things from him at the facility.  Pt said he would like to go to a group home or have an apartment of his own, but he cannot afford it.  Pt showed sw documentation that was from a court ordered eviction notices from [facility] against the pt with a court date of 10/23/18.  Pt had been asked to vacate [facility] on 6/1/18, 7/24/18, 8/29/18, and 9/8/18 due to lack of payment - it is a month to month lease.  Pt did not pay rent from Jan 2018-Sep 2018 and currently owes them $7,761.60.  Pt said that nobody at the facility would ever help him with anything and they were always hiding things from him.  He would try to get in contact with his [employer] Care Coordinator, but said he couldn't because he didn't have a phone and he couldn't leave a message without a phone number for them to call him back at.  PT gave sw the name and number of his [employer] Care Coordinator Sylvie Mccann [phone] and the number for his [facility] Financial Worker Sylvie Torres [phone]. Sw called Sylvie, but she said that she has not worked with the pt for a little while now and he no longer has an open case.  She gave me the number for Yanira Palacios who is the last worker that the pt had to call to see if she had any other information [phone].  Sylvie assumes that the pt never filled out the paperwork and that is why his case was closed. Pt said that he has Elder Care and gets $1,030/month for social security.  Pt states that he still has some of his things at the facility.  Pt had previously been at [facility] as well.      PLAN  Sw will continue with discharge planning and be available as needed until discharge.    Sw will reach out to the pt's Foley Ctdayan  Financial Worker and Yanira at [facility].[TH1.1]     Revision History        User Key Date/Time User Provider Type Action    > TH1.2 11/12/2018  4:59 PM Eli Esqueda LSW  Sign     TH1.1 11/12/2018  4:44 PM Eli Esqueda LSW              Consults by Ivett Vázquez CM at 11/10/2018  2:47 PM     Author:  Ivett Vázquez CM Service:  Care Coordinator Author Type:      Filed:  11/10/2018  2:47 PM Date of Service:  11/10/2018  2:47 PM Creation Time:  11/10/2018  2:39 PM    Status:  Signed :  Ivett Vázquez CM ()     Consult Orders:    1. Care Coordinator IP Consult [[id number]] ordered by Henry Frye DO at 11/10/18 0718                Care Transition Initial Assessment - RN    Reason For Consult: care coordination/care conference, discharge planning   Met with: Patient.  DATA   Active Problems:    COPD exacerbation (H)       Cognitive Status: awake, alert and oriented.  Primary Care Clinic Name: [facility]  Primary Care MD Name: TBD  Contact information and PCP information verified: Yes  Lives With: other (see comments) (roommate)     Description of Support System: Supportive   Who is your support system?: Other (specify) (friend)       Insurance concerns: No Insurance issues identified  ASSESSMENT  Patient currently receives the following services:  None.      Identified issues/concerns regarding health management: He was living at [facility] but states he left d/t the fragrance policy at the facility. He is allergic to fragrances and most staff wears perfume or scented lotions. He states his large oxygen concentrator is still there. He has a 4WW in the room with a portable O2 concentrator on the seat. When CM asked who is his oxygen provider, he states he doesn't have one. His responses are scattered and non-specific. He keeps returning to the fragrance situation and does not give CM direct answers to questions asked.       PLAN  Financial costs for the patient were not discussed.  Patient was not given options and choices for discharge. SW to see re: housing resources.   Patient/family is agreeable to the plan?  TBD   Patient anticipates discharging to probable shelter.     Other Resources: Other (see comment) (COPD Action Plan discussed w/pt)  Patient anticipates needs for home equipment: No  Plan/Disposition: TBD    Appointments: TBD once disposition known.     CM will continue to follow patient until discharge for any additional needs.     Elina Vázquez RN, BSN, CTS  [facility]  [phone][BS1.1]                 Revision History        User Key Date/Time User Provider Type Action    > BS1.1 11/10/2018  2:47 PM Ivett Vázquez CM  Sign            Consults by Sophie Burton RD, LD at 11/10/2018 11:40 AM     Author:  Sophie Burton RD, LD Service:  Nutrition Author Type:  Registered Dietitian    Filed:  11/10/2018 11:40 AM Date of Service:  11/10/2018 11:40 AM Creation Time:  11/10/2018 11:27 AM    Status:  Signed :  Sophie Burton RD, LD (Registered Dietitian)     Consult Orders:    1. Nutrition Services Adult IP Consult [[id number]] ordered by Henry Frye DO at 11/09/18 6858                CLINICAL NUTRITION SERVICES  -  ASSESSMENT NOTE      Recommendations Ordered by Registered Dietitian (RD):   Oral nutrition supplements  Regular diet to promote adequacy of oral intake   Malnutrition 11/10:   % Weight Loss:Weight loss does not meet criteria for malnutrition   % Intake:</= 50% for >/= 5 days (severe malnutrition)  Subcutaneous Fat Loss:Orbital region moderate depletion, Upper arm region moderate depletion and Thoracic region mild to moderate depletion  Muscle Loss:Temporal region moderate depletion, Clavicle bone region mild to moderate depletion, Acromion bone region moderate depletion, Dorsal hand region moderate depletion, Patellar region moderate or greater depletion,  "Anterior thigh region moderate depletion and Posterior calf region moderate depletion  Fluid Retention:None noted    Malnutrition Diagnosis: Severe malnutrition  In Context of:  Acute on Chronic illness or disease     REASON FOR ASSESSMENT  Chuck Lopez is a 78 year old male seen by the dietitian for Provider Order - severe malnutrition     NUTRITION HISTORY  - Information obtained from patient  - Food allergies/intolerances: MSG, face becomes swollen --> reports he has to avoid many pork and meat products due to preservatives  - Patient is on a mechanical/dental soft diet at home.  - Feeling ill for weeks with sharp decline in intake over last five days, consumed only a cheeseburger and V8 juice  - Reports having a vague \"Sickness\" over multiple months that include symptoms: loss of appetite, decreased strength  Edentuous with cracked back teeth - only can consume soft foods  No supplements previously  Tolerates dairy    Receives breakfast meal at [facility] in the dining room  Lunch or dinner meal: Canned tuna, sweet potatoes and vegetables; [employer]'s     CURRENT NUTRITION ORDERS  - Diet: Cardiac diet  - Supplement: none  Current Intake/Tolerance:  - Per flow sheet review, no intake for meals yet documented. Patient reports good tolerance to omelette this morning.  - Factors affecting nutrition intake include: poor dental health, poor appetite PTA    PHYSICAL FINDINGS  Observed  See malnutrition section below - see fat and muscle loss below  Obtained from Chart/Interdisciplinary Team  Greg nutrition score: 3; total score: 19  BM: 11/9  Skin: bruised, ecchymotic  Generalized weakness    ANTHROPOMETRICS  Height: 5'5\"  Weight:[RP1.1] 55 kg   Body mass index is 20.43 kg/(m^2).[RP1.2]  Weight Status:  Normal BMI  Ideal body weight: 61.8 kg +/- 10%, 90% of IBW   Weight History:  Weight appears relatively stable with fluctuations of +\-5 kg in last year; patient reports a 5# weight loss in 2 weeks[RP1.1]   Wt Readings " from Last 10 Encounters:   11/10/18 55.7 kg (122 lb 12.8 oz)   08/15/18 57.1 kg (125 lb 14.4 oz)   06/13/18 50.2 kg (110 lb 9.6 oz)   05/16/18 50.7 kg (111 lb 12.8 oz)   09/05/17 54.2 kg (119 lb 8 oz)   08/25/17 54.7 kg (120 lb 9.6 oz)   06/23/17 54.1 kg (119 lb 3.2 oz)   05/13/17 50 kg (110 lb 3.7 oz)   05/01/17 52.1 kg (114 lb 12.8 oz)   04/18/17 51.8 kg (114 lb 2 oz)[RP1.2]       ASSESSED NUTRITION NEEDS (PER APPROVED PRACTICE GUIDELINES, Dosing weight: kg):  Estimated Energy Needs: 3243-9612 kcals (35-40 Kcal/Kg)  Justification: repletion and borderline underweight  Estimated Protein Needs:  grams protein (1.5-2 g pro/Kg)  Justification: Repletion  Estimated Fluid Needs: >1 mL/Kcal  Justification: maintenance    LABS  Labs reviewed    Recent Labs   Lab Test  11/10/18   0717 11/09/18 1916 06/13/18   1436  09/09/17   0548  09/06/17   0622   POTASSIUM  4.0  3.9  4.1  3.7  3.7     No results for input(s): PHOS in the last 06274 hours.  Recent Labs   Lab Test  04/04/17   0656  04/03/17   0657  04/02/17   0730  03/28/17   0715  11/23/16   0759   MAG  2.6*  2.5*  2.6*  2.3  2.4*     Recent Labs   Lab Test  11/10/18   0717  11/09/18 1916  06/13/18   1436  09/09/17   0548  09/06/17   0622   NA  135  132*  138  141  134     Recent Labs   Lab Test  11/10/18   0717  11/09/18 1916 06/13/18   1436  09/11/17   0651  09/10/17   0612   CR  0.71  0.71  0.70  0.75  0.76[RP1.1]       Recent Labs  Lab 11/10/18  0717 11/09/18  1916   * 98[RP1.2]     Lab Results   Component Value Date    A1C 5.5 09/07/2017    A1C 5.7 05/01/2017    A1C 5.7 04/19/2017    A1C 5.3 01/06/2017    A1C 5.4 11/23/2016       MEDICATIONS  Medications reviewed  MVI+M  IVF at 75 mL/hr  Prednisone    MALNUTRITION:  % Weight Loss:Weight loss does not meet criteria for malnutrition   % Intake:</= 50% for >/= 5 days (severe malnutrition)  Subcutaneous Fat Loss:Orbital region moderate depletion, Upper arm region moderate depletion and Thoracic  region mild to moderate depletion  Muscle Loss:Temporal region moderate depletion, Clavicle bone region mild to moderate depletion, Acromion bone region moderate depletion, Dorsal hand region moderate depletion, Patellar region moderate or greater depletion, Anterior thigh region moderate depletion and Posterior calf region moderate depletion  Fluid Retention:None noted    Malnutrition Diagnosis: Severe malnutrition  In Context of:  Acute on Chronic illness or disease    NUTRITION DIAGNOSIS:  Increased nutrient needs (protein energy) related to hypermetabolism 2/2 chronic disease and repletion needs as evidenced by fat and muscle loss, PO intake meeting <50% of needs for >5 days, severe malnutrition criteria met    INTERVENTIONS  Recommendations / Nutrition Prescription  Diet liberalization to regular to promote intake + oral nutrition supplements to increase calorie and protein intake    MVI+M    Implementation  Nutrition education: discussed diet liberalization, need to increase protein and calorie dense foods, frequent sips/snacks/bites as able.  Medical food supplement: high protein milkshake 2 pm  Diet order: Regular (caffeine restriction per MD discretion); Room service with assist for meal ordering guidance and to offer mechanical/dental soft diet  Multivitamin/Minerals: unable to order, currently contraindicated.    Goals  Patient to consume >/= 75% of meals TID and >/=1 high protein supplements per day      MONITORING AND EVALUATION:  Progress towards goals will be monitored and evaluated per protocol and Practice Guidelines      Sophie Burton RDN, LD, [facility]  Pager - 3rd floor/ICU: [phone]  Pager - All other floors: [phone]  Pager - Weekend/holiday: [phone]  Office: [phone][RP1.1]       Revision History        User Key Date/Time User Provider Type Action    > RP1.2 11/10/2018 11:40 AM Sophie Burton RD, LD Registered Dietitian Sign     RP1.1 11/10/2018 11:27 AM Sophie Burton RD,  GEORGINA Registered Dietitian                      Progress Notes - Physician (Notes from 11/12/18 through 11/15/18)      Progress Notes by Nayan Hansen at 11/15/2018  1:24 PM     Author:  Nayan Hansen Service:  (none) Author Type:  Coordinator    Filed:  11/15/2018  1:24 PM Date of Service:  11/15/2018  1:24 PM Creation Time:  11/15/2018  1:24 PM    Status:  Signed :  Nayan Hansen (Coordinator)         Your information has been submitted on November 15th, 2018 at 01:24:40 PM CST. The confirmation number is [id number][BB1.1]       Revision History        User Key Date/Time User Provider Type Action    > BB1.1 11/15/2018  1:24 PM Nayan Hansen Coordinator Sign            Progress Notes by Eli Esqueda LSW at 11/14/2018 11:07 AM     Author:  Eli Esqueda LSW Service:  (none) Author Type:      Filed:  11/14/2018  4:43 PM Date of Service:  11/14/2018 11:07 AM Creation Time:  11/14/2018 11:07 AM    Status:  Addendum :  Eli Esqueda LSW ()         D:  Per record review, pt is discharge ready.  Per OT's recommendation, pt needs TCU.    I:  Sw spoke with pt who reports that he is willing to go to TCU.  Pt refused OT's initial assessment.  Pt walked 160 feet in the otero with his rolling walker and with nursing supervision and sw was present.  Pt told sw that for 2 days in a row, he was up independently to the bathroom for BMs.    A/P:  Sw will continue with discharge planning and be available as needed until discharge.[TH1.1]      Addendum:  Referrals were sent to [facility], [facility], [facility], and [facility].    [facility] asked for nurses' notes for the last 72 hours and said that they will provide the information to the director to see if they will accept him or not.  [facility] said to expect a call in the morning.    Margarita at the \[facility]"" of [place] left a voicemail wanting sw to call and f/u regarding the  referral ([phone] - Margarita).  Sw called back and left a voicemail.[TH1.2]       Revision History        User Key Date/Time User Provider Type Action    > TH1.2 11/14/2018  4:43 PM Eli Esqueda LSW  Addend     TH1.1 11/14/2018 11:13 AM Eli Esqueda LSW  Sign            Progress Notes by Tavia Marlow RD, LD at 11/14/2018 11:31 AM     Author:  Tavia Marlow RD, LD Service:  Nutrition Author Type:  Registered Dietitian    Filed:  11/14/2018  2:47 PM Date of Service:  11/14/2018 11:31 AM Creation Time:  11/14/2018 11:31 AM    Status:  Addendum :  Tavia Marlow RD, LD (Registered Dietitian)         CLINICAL NUTRITION SERVICES - REASSESSMENT NOTE      MALNUTRITION: (11/14/2018) -->intake updated  % Weight Loss:Weight loss does not meet criteria for malnutrition   % Intake: No decreased intake noted --> during admit  Subcutaneous Fat Loss:Orbital region moderate depletion, Upper arm region moderate depletion and Thoracic region mild to moderate depletion  Muscle Loss:Temporal region moderate depletion, Clavicle bone region mild to moderate depletion, Acromion bone region moderate depletion, Dorsal hand region moderate depletion, Patellar region moderate or greater depletion, Anterior thigh region moderate depletion and Posterior calf region moderate depletion  Fluid Retention:None noted     Malnutrition Diagnosis: Severe malnutrition  In Context of:  Acute on Chronic illness or disease       EVALUATION OF PROGRESS TOWARD GOALS   Diet:  Regular/No caffeine  Room service with assist  Supplements: Vanilla shake + 2 pkts beneprotein at 2 pm    Intake: % meal consumption since admission.  Consistently ordering meals TID.  MSG allergy has interfaced with meal ordering system.[MS1.1]  Is not fond of the supplement therefore not consistently[MS1.2] consuming[MS1.3].  Feels satisfied by meals and believes he is eating more in this facility when compared to[MS1.2]  PTA[MS1.3].[MS1.2]        ASSESSED NUTRITION NEEDS (PER APPROVED PRACTICE GUIDELINES, Dosing weight: kg):  Estimated Energy Needs: 6672-5647 kcals (35-40 Kcal/Kg)  Justification: repletion and borderline underweight  Estimated Protein Needs:  grams protein (1.5-2 g pro/Kg)  Justification: Repletion  Estimated Fluid Needs: >1 mL/Kcal  Justification: maintenance      NEW FINDINGS:   - Medications reviewed:   Daily MVI/M  - Labs reviewed.  - Wt stable during admit.  - BM yesterday.    Previous Goals:   Patient to consume >/= 75% of meals TID and >/=1 high protein supplements per day  Evaluation:[MS1.1] Not met[MS1.2]    Previous Nutrition Diagnosis:   Increased nutrient needs (protein energy) related to hypermetabolism 2/2 chronic disease and repletion needs as evidenced by fat and muscle loss, PO intake meeting <50% of needs for >5 days, severe malnutrition criteria met  Evaluation: No change, updated below      CURRENT NUTRITION DIAGNOSIS  Increased nutrient needs (protein energy) related to hypermetabolism 2/2 chronic disease and repletion needs as evidenced by indicators of moderate fat and muscle loss, severe malnutrition criteria met.    INTERVENTIONS  Recommendations / Nutrition Prescription  Continue diet a[MS1.1]s ordered.  Self-selection of high calorie/high protein items as would no longer like to receive supplements.[MS1.2]      Implementation  Collaboration and Referral of Nutrition care: Discussed POC with team during rounds.    Goals[MS1.1]  Patient to consistently consume at least 75% of meals TID.[MS1.2]      MONITORING AND EVALUATION:  Progress towards goals will be monitored and evaluated per protocol and Practice Guidelines      Tavia Marlow, RD, LD  Clinical Dietitian  3rd floor/ICU: [phone]  All other floors: [phone]  Weekend/holiday: [phone][MS1.1]     Revision History        User Key Date/Time User Provider Type Action    > [N/A] 11/14/2018  2:47 PM Tavia Marlow  GEORGINA OREILLY Registered Dietitian Addend     MS1.3 11/14/2018  2:47 PM Tavia Marlow RD, LD Registered Dietitian Sign     MS1.2 11/14/2018  2:44 PM Tavia Marlow RD, LD Registered Dietitian      MS1.1 11/14/2018 11:31 AM Tavia Marlow RD, LD Registered Dietitian             Progress Notes by Oliver Call MD at 11/14/2018 10:54 AM     Author:  Oliver Call MD Service:  Hospitalist Author Type:  Physician    Filed:  11/14/2018 10:57 AM Date of Service:  11/14/2018 10:54 AM Creation Time:  11/14/2018 10:54 AM    Status:  Signed :  Oliver Call MD (Physician)         [facility]  Hospitalist Progress Note  Oliver Call MD[MF1.1] 11/14/2018[MF1.2]    Reason for Stay (Diagnosis): copd         Assessment and Plan:      Summary of Stay:     Chuck Lopez is a 78 year old male with history including chronic pain syndrome, COPD with chronic oxygen dependence and ongoing smoking, depression, hypertension admitted on 11/9/2018 with shortness of breath.  He was felt clinically to be having a COPD exacerbation and started on steroids, neb treatments and Levaquin in the ER.  Chest x-ray was obtained and showed severe pulmonary hyperinflation with new somewhat nodular opacities in the left midlung but otherwise clear.  His pro calcitonin was checked and negative here but he did continue 5 days of Levaquin empirically for possible atypical pneumonia.  At this point he is clinically improved and remained stable on 2-3 L of supplemental oxygen which he tells me is his baseline.  He is also been tapered off of steroids.  He has been up and ambulatory and tells me he feels much better than upon presentation.    He has been somewhat overwhelmed and depressed and psychiatry is now following.  They do feel he is competent to make his own decisions.  After initially declining to work with social work he now is agreeable to finding placement  in a TCU if possible.        Problem List:   1. Pneumonia.  Completed Levaquin 750 mg times 5 days.  Procalcitonin low, question viral versus atypical pneumonia. Resolved.    2. Acute COPD exacerbation.  Much improved.  No longer with any wheeze.  Continue Breo Ellipta..  completed antibiotics and prednisone.  Has neb machine, continue those.    3. Acute on chronic hypoxic respiratory failure.  Acute worsening due to pneumonia and COPD.  Continue supplemental oxygen as needed.  I have reordered this to be delivered to the hospital prior to discharge as apparently he ran out of supplemental oxygen prior to presentation which may be playing a role in his worsened shortness of breath upon presentation.    4. Acute on chronic pain syndrome.  Tramadol as needed.  Diclofenac gel as needed.  Tylenol as needed.  Pain team consulted.  He reportedly follows with a pain clinic and I have not refilled any narcotic pain medications here.       5.  Tobacco use disorder.  Counseled repeatedly.    6.   Hypertension.  Continue atenolol and hydrochlorothiazide.    7.   Depression.  Continue buspirone.    8.  Benign prostatic hyperplasia.  Tamsulosin.    9.  Mild hyponatremia.  Resolved.      10.  Deconditioning.  Physical and Occupational Therapy consulted.    11.  Social concerns: Loa by psychiatry to be competent to make his own decisions.  OT recommending a TCU stay.  After initially being very resistant to working with us to find placement he now is changed his tune and is willing to accept TCU placement.  Social work following.             Interval History (Subjective):      In a better mood today but still feeling somewhat overwhelmed  After much discussion he now is willing to work with social work and find appropriate placement  OT recommending TCU  Breathing better, COPD exacerbation and pneumonia essentially resolved  Discussed with psychiatry, starting antidepressant as there is some concern depressions playing a role  here and his fatigue                  Physical Exam:      Last Vital Signs:  /53 (BP Location: Right arm)  Pulse 79  Temp 97.8  F (36.6  C) (Oral)  Resp 18  Wt 56 kg (123 lb 6.4 oz)  SpO2 95%  BMI 20.53 kg/m2    I/O last 3 completed shifts:  In: 1080 [P.O.:1080]  Out: 1340 [Urine:1340]    General: Alert, awake, no acute distress.  HEENT: NC/AT, eyes anicteric, external occular movements intact, face symmetric.  Dentition WNL, MM moist.  Cardiac: RRR, S1, S2.  No murmurs appreciated.  Pulmonary: Normal chest rise, normal work of breathing.  Lungs CTA BL  Abdomen: soft, non-tender, non-distended.  Bowel Sounds Present.  No guarding.  Extremities: no deformities.  Warm, well perfused.  Skin: no rashes or lesions noted.  Warm and Dry.  Neuro: No focal deficits noted.  Speech clear.  Coordination and strength grossly normal.  Psych: Appropriate affect.         Medications:      All current medications were reviewed with changes reflected in problem list.         Data:      All new lab and imaging data was reviewed.   Labs:    Recent Labs  Lab 11/12/18  0711 11/11/18  0723   NA  --  139   POTASSIUM  --  3.5   CHLORIDE  --  102   CO2  --  33*   ANIONGAP  --  4   GLC  --  99   BUN  --  15   CR 0.90 0.78   GFRESTIMATED 82 >90   GFRESTBLACK >90 >90   MATTHEW  --  8.5       Recent Labs  Lab 11/12/18  0711 11/10/18  0717   WBC  --  8.1   HGB  --  12.4*   HCT  --  37.3*   MCV  --  99    183      Imaging:   No results found for this or any previous visit (from the past 48 hour(s)).      Oliver Call MD.[MF1.1]         Revision History        User Key Date/Time User Provider Type Action    > MF1.2 11/14/2018 10:57 AM Oliver Call MD Physician Sign     MF1.1 11/14/2018 10:54 AM Oliver Call MD Physician             Progress Notes by Eli Esqueda LSW at 11/14/2018  8:26 AM     Author:  Eli Esqueda LSW Service:  (none) Author Type:      Filed:  11/14/2018   8:31 AM Date of Service:  11/14/2018  8:26 AM Creation Time:  11/14/2018  8:26 AM    Status:  Signed :  Eli Esqueda LSW ()         D:  Pt is medically discharge ready.  Psych and OT have been consulted.  Pt declining all discharge resources at this time.    I:  Hilary called Yanira Palacios ([phone]) who briefly worked as the pt's [employer]  prior to him being termed at the end of June.  Yanira stated that the facility helped the pt fill out paperwork and sent it in, but the pt would not allow them to send in the supporting documentation such as his bank statements.  Yanira said they had to fill out a 6037 form since the pt was falling out of MA.  Og Wynne then took the case over and Yanira received a letter about a month ago saying that the case would bot be opened.  Yanira said that they as well as the facility tried to work the with pt to get all of the paperwork completed, but he was noncompliant.    Hilary tried to call Mary Grace ([phone]) who was his financial , but she did not answer and her voicemail was full so hilary could not leave a message.    A/P:  Hilary will continue with discharge planning and be available as needed until discharge.  Hilary will continue to try and get in contact with Mary Grace.[TH1.1]     Revision History        User Key Date/Time User Provider Type Action    > TH1.1 11/14/2018  8:31 AM Eli Esqueda LSW  Sign            Progress Notes by Barb Bolivar OT at 11/14/2018  7:30 AM     Author:  Barb Bolivar OT Service:  (none) Author Type:  Occupational Therapist    Filed:  11/14/2018  8:26 AM Date of Service:  11/14/2018  7:30 AM Creation Time:  11/14/2018  8:26 AM    Status:  Signed :  Barb Bolivar OT (Occupational Therapist)            11/14/18 0700   Quick Adds   Type of Visit Initial Occupational Therapy Evaluation   Living Environment   Lives With alone   Living Arrangements homeless   Self-Care   Usual Activity Tolerance poor   Current  Activity Tolerance poor   Regular Exercise no   Equipment Currently Used at Home oxygen;walker, rolling   Functional Level Prior   Ambulation 1-->assistive equipment   Transferring 0-->independent   Toileting 0-->independent   Bathing 0-->independent   Dressing 0-->independent   Eating 0-->independent   Communication 0-->understands/communicates without difficulty   Fall history within last six months no   General Information   Onset of Illness/Injury or Date of Surgery - Date 11/09/18   Referring Physician Henry Frye,    Patient/Family Goals Statement Not stated   Additional Occupational Profile Info/Pertinent History of Current Problem Chuck Lopez is a 78 year old male with history including chronic pain syndrome, COPD with chronic oxygen dependence and ongoing smoking, depression, hypertension admitted on 11/9/2018 with shortness of breath.   General Observations Pt supine in bed and reported feeling fatigued and SOB   Cognitive Status Examination   Orientation orientation to person, place and time   Level of Consciousness lethargic/somnolent   Able to Follow Commands WNL/WFL   Attention Reports problems attending   Cognitive Comment SLUMS Examination: 22/30; Mild Cognitive Impairment   Sensory Examination   Sensory Comments No deficits beyond baseline   Pain Assessment   Patient Currently in Pain Yes, see Vital Sign flowsheet   Strength   Strength Comments Pt reported decreased overall strength   Toilet Transfer   Toilet Transfer Comments Per pt report, pt I in I/ADL's   Transfer Skill: Toilet Transfer   Toilet Transfer Skill Comments Per pt report, pt I in I/ADL's   Tub/Shower Transfer   Tub/Shower Transfer Comments Per pt report, pt I in I/ADL's   Bathing   Level of [place] other (see comments)  (Per pt report, pt I in I/ADL's)   Upper Body Dressing   Level of [place]: Dress Upper Body other (see comments)  (Per pt report, pt I in I/ADL's)   Lower Body Dressing   Level of  "[place]: Dress Lower Body other (see comments)  (Per pt report, pt I in I/ADL's)   Toileting   Level of [place]: Toilet other (see comments)  (Per pt report, pt I in I/ADL's)   Grooming   Level of [place]: Grooming other (see comments)  (Per pt report, pt I in I/ADL's)   Eating/Self Feeding   Level of [place]: Eating other (see comments)  (Per pt report, pt I in I/ADL's)   Instrumental Activities of Daily Living (IADL)   IADL Comments (Per pt report, pt I in I/ADL's)   Activities of Daily Living Analysis   Impairments Contributing to Impaired Activities of Daily Living pain;strength decreased;cognition impaired   General Therapy Interventions   Planned Therapy Interventions ADL retraining;cognition;strengthening   Clinical Impression   Criteria for Skilled Therapeutic Interventions Met yes, treatment indicated   OT Diagnosis Decreased ind with I/ADL's   Influenced by the following impairments Decreased activity tolerance; increased pain; increased fatigue   Assessment of Occupational Performance 1-3 Performance Deficits   Identified Performance Deficits Decreased activity tolerance; increased pain; increased fatigue   Clinical Decision Making (Complexity) Low complexity   Therapy Frequency daily   Predicted Duration of Therapy Intervention (days/wks) 3 days   Anticipated Discharge Disposition Transitional Care Facility   Risks and Benefits of Treatment have been explained. Yes   Patient, Family & other staff in agreement with plan of care Yes   [facility] [employer] TM \"6 Clicks\"   2016, Trustees of [facility], under license to [employer].  All rights reserved.   6 Clicks Short Forms Daily Activity Inpatient Short Form   [facility]  \"6 Clicks\" Daily Activity Inpatient Short Form   1. Putting on and taking off regular lower body clothing? 4 - None   2. Bathing (including washing, rinsing, drying)? 4 - None   3. Toileting, which includes using toilet, bedpan or urinal? 4 - " None   4. Putting on and taking off regular upper body clothing? 4 - None   5. Taking care of personal grooming such as brushing teeth? 4 - None   6. Eating meals? 4 - None   Daily Activity Raw Score (Score out of 24.Lower scores equate to lower levels of function) 24   Total Evaluation Time   Total Evaluation Time (Minutes) 30[CF1.1]        Revision History        User Key Date/Time User Provider Type Action    > CF1.1 11/14/2018  8:26 AM Barb Bolivar OT Occupational Therapist Sign            Progress Notes by Eli Esqueda LSW at 11/13/2018  2:05 PM     Author:  Eli Esqueda LSW Service:  (none) Author Type:      Filed:  11/13/2018  2:33 PM Date of Service:  11/13/2018  2:05 PM Creation Time:  11/13/2018  2:05 PM    Status:  Addendum :  Eli Esqueda LSW ()         D:  Per record review, pt is ready for discharge back to home.  He is not in need of PT or OT services at this time.    I:  Hilary spoke with pt about discharge plans.  Pt is currently homeless.  Pt has refused all options that hilary has provided.  Hilary has offered extended stay hotels ([facility] ([phone]) in [place] and Extended Stay [facility] ([phone]) in [place], emergency shelter information ([phone]), and the information for his oxygen company (pt reports it is through ) to make sure that he has that once he finds a place to live ([phone]).  Sw found the cheapest hotels in the area for the adult giving his financial status.  Hilary contacted Yanira Palacios, a previous [employer] Care Coordinator, to see if she had any other updated information ([phone]).  Hilary called [facility] ([phone]) and talked to Lacho to verify that all of the pt's belongings and his oxygen concentrator are at the facility still.  Lacho said that everything is still in his room, but they need to double check where is oxygen is through.  Pt states that he did smoke, but quit  the day before he was admitted.   Pt says he was staying with a friend between being evicted from [place] and being admitted here, but cannot go back there any longer.  Pt stated that he would not talk about his finances or going back to his friend's house.   Pt said that all of the options he was given to live are not possible because they cannot meet his allergy needs (msg and fragrances).  Sw asked if he had documentation of the allergy to provide a facility and he said no, it was when he was 44 years old.    He gave the phone number for [facility] ([phone]), but it was for [place] [address].    Sw has continued to try and get in touch with his [facility] Financial Worker Sylvie Torres ([phone]), but have been able to get in contact with her.  Pt says that over the last few months his social security has fluctuated between $650/month and $1,030/month.  Physician visited the pt with sw due to the pt refusing all discharge placement options.  Pt continued to refuse.  Physician will consult OT and psych.  Pt said that he is unable to make these decisions and make contact with anyone because he is stressed.  Sw supervisor and pt relations has been contacted regarding this pt.  Sw informed the pt that if he continues to refuse all placement options and he is discharge ready, he may have to begin paying for his hospital stay himself due to policy and he reported that means nothing to him.      A/P:  Sw will continue with discharge planning and be available as needed until discharge.[TH1.1]     Addendum:  Sw also spoke with the pt about going to an [facility], but he also declined this option due to him saying they cannot meet his needs and accommodate his allergies.[TH1.2]     Revision History        User Key Date/Time User Provider Type Action    > TH1.2 11/13/2018  2:33 PM Eli Esqueda LSW  Addend     TH1.1 11/13/2018  2:31 PM Eli Esqueda LSW  Sign            Progress Notes by  Oliver Call MD at 11/13/2018  2:01 PM     Author:  Oliver Call MD Service:  Hospitalist Author Type:  Physician    Filed:  11/13/2018  2:10 PM Date of Service:  11/13/2018  2:01 PM Creation Time:  11/13/2018  2:01 PM    Status:  Signed :  Oliver Call MD (Physician)         [facility]  Hospitalist Progress Note  Oliver Call MD 11/13/18    Reason for Stay (Diagnosis): copd         Assessment and Plan:      Summary of Stay:     Chuck Lopez is a 78 year old male with history including chronic pain syndrome, COPD with chronic oxygen dependence and ongoing smoking, depression, hypertension admitted on 11/9/2018 with shortness of breath.  He was felt clinically to be having a COPD exacerbation and started on steroids, neb treatments and Levaquin in the ER.  Chest x-ray was obtained and showed severe pulmonary hyperinflation with new somewhat nodular opacities in the left midlung but otherwise clear.  His pro calcitonin was checked and negative here but he did continue 5 days of Levaquin empirically for possible atypical pneumonia.  At this point he is clinically improved and remained stable on 2-3 L of supplemental oxygen which he tells me is his baseline.  He is also been tapered off of steroids.  He has been up and ambulatory and tells me he feels much better than upon presentation.    Notably, after discussion with the patient and also social work it sounds as though he had been staying in his assisted living but did not like the care he was receiving and stopped paying his rent.  Per the  he is not allowed to go back there as he has not been paying since this summer and owes them a large amount of money.  Social work has been following regarding helping the patient sort out his financial situation and living options but at this point they seem to be limited to friends and or a hotel or shelter.  He does apparently  "have $1300 per month of income for eldercare and also social security funds but has been refusing to pay for his assisted living.      At this point I am advising complete smoking cessation and close outpatient follow-up.  I think it would be reasonable to repeat some imaging of his chest to make sure that his infiltrates resolved.  During my evaluation of him today he seemed completely alert and appropriate and expressed understanding of the plan for his medical care and recommended follow-up plan but he is refusing discharge and raised the question as to whether he can manage his own affairs.  He is refusing to engage w/ SW re: finding appropriate disposition and demanding that we \"figure it out\" though he refusing snf placement.    At this point Psychiatry and OT will be consulted to help determine if he is competent to make these decisions for himself.  Patient rep also being involved.    Problem List:   1. Pneumonia.  Completed Levaquin 750 mg times 5 days.  Procalcitonin low, question viral versus atypical pneumonia..      2. Acute COPD exacerbation.  Much improved.  No longer with any wheeze.  Continue Breo Ellipta..  completed antibiotics and prednisone.    3. Acute on chronic hypoxic respiratory failure.  Acute worsening due to pneumonia and COPD.  Continue supplemental oxygen as needed.  I have reordered this to be delivered to the hospital prior to discharge as apparently he ran out of supplemental oxygen prior to presentation which may be playing a role in his worsened shortness of breath upon presentation.    4. Acute on chronic pain syndrome.  Tramadol as needed.  Diclofenac gel as needed.  Tylenol as needed.  Pain team consulted.  He reportedly follows with a pain clinic and I have not refilled any narcotic pain medications here.       5.  Tobacco use disorder.  Counseled repeatedly.    6.   Hypertension.  Continue atenolol and hydrochlorothiazide.    7.   Depression.  Continue buspirone.    8.  " "Benign prostatic hyperplasia.  Tamsulosin.    9.  Mild hyponatremia.  Resolved.      10.  Deconditioning.  Physical and Occupational Therapy consulted.    11.  Non-compliance and refusal of social supports: declines to work w/ SW on placement.  Refuses [facility] due to \"being allergic to foods at all assisted livings\", declines phone numbers for local hotels and declines our offer to work on a medical bed at a shelter.  Psychiatry and OT eval to see if he is competent but I get the sense he likely is.  Patient rep also involved in part at my request.             Interval History (Subjective):      At this point he is clinically improved and remained stable on 2-3 L of supplemental oxygen which he tells me is his baseline.  He is also been tapered off of steroids.  He has been up and ambulatory and tells me he feels much better than upon presentation.    Notably, after discussion with the patient and also social work it sounds as though he had been staying in his assisted living but did not like the care he was receiving and stopped paying his rent.  Per the  he is not allowed to go back there as he has not been paying since this summer and owes them a large amount of money.  Social work has been following regarding helping the patient sort out his financial situation and living options but at this point they seem to be limited to friends and or a hotel or shelter.  He does apparently have $1300 per month of income for eldercare and also social security funds but has been refusing to pay for his assisted living.      At this point I am advising complete smoking cessation and close outpatient follow-up.  I think it would be reasonable to repeat some imaging of his chest to make sure that his infiltrates resolved.  During my evaluation of him today he seemed completely alert and appropriate and expressed understanding of the plan for his medical care and recommended follow-up plan but he is refusing discharge " "and raised the question as to whether he can manage his own affairs.  He is refusing to engage w/ SW re: finding appropriate disposition and demanding that we \"figure it out\" though he refusing [facility] placement.    At this point Psychiatry and OT will be consulted to help determine if he is competent to make these decisions for himself.  Patient rep also being involved.                    Physical Exam:      Last Vital Signs:  /57 (BP Location: Right arm)  Pulse 79  Temp 98.8  F (37.1  C) (Oral)  Resp 18  Wt 55.8 kg (123 lb)  SpO2 97%  BMI 20.47 kg/m2[MF1.1]    I/O last 3 completed shifts:  In: 950 [P.O.:950]  Out: 1375 [Urine:1375][MF1.2]    General: Alert, awake, no acute distress.  HEENT: NC/AT, eyes anicteric, external occular movements intact, face symmetric.  Dentition WNL, MM moist.  Cardiac: RRR, S1, S2.  No murmurs appreciated.  Pulmonary: Normal chest rise, normal work of breathing.  Lungs CTA BL  Abdomen: soft, non-tender, non-distended.  Bowel Sounds Present.  No guarding.  Extremities: no deformities.  Warm, well perfused.  Skin: no rashes or lesions noted.  Warm and Dry.  Neuro: No focal deficits noted.  Speech clear.  Coordination and strength grossly normal.  Psych: Appropriate affect.         Medications:      All current medications were reviewed with changes reflected in problem list.         Data:      All new lab and imaging data was reviewed.   Labs:[MF1.1]    Recent Labs  Lab 11/12/18  0711 11/11/18  0723   NA  --  139   POTASSIUM  --  3.5   CHLORIDE  --  102   CO2  --  33*   ANIONGAP  --  4   GLC  --  99   BUN  --  15   CR 0.90 0.78   GFRESTIMATED 82 >90   GFRESTBLACK >90 >90   MATTHEW  --  8.5       Recent Labs  Lab 11/12/18  0711 11/10/18  0717   WBC  --  8.1   HGB  --  12.4*   HCT  --  37.3*   MCV  --  99    183[MF1.2]      Imaging:[MF1.1]   No results found for this or any previous visit (from the past 48 hour(s)).[MF1.2]      Oliver Call MD.[MF1.1]         " "Revision History        User Key Date/Time User Provider Type Action    > MF1.2 11/13/2018  2:10 PM Oliver Call MD Physician Sign     MF1.1 11/13/2018  2:01 PM Oliver Call MD Physician             Progress Notes by Danuta Negron APRN CNS at 11/13/2018 10:44 AM     Author:  Danuta Negron APRN CNS Service:  Pain Service Author Type:  Clinical Nurse Specialist    Filed:  11/13/2018 11:13 AM Date of Service:  11/13/2018 10:44 AM Creation Time:  11/13/2018 10:44 AM    Status:  Signed :  Danuta Negron APRN CNS (Clinical Nurse Specialist)         [facility]  Pain Management Progress Note  Text Page[AM1.1]     Assessment & Plan[AM1.2]   Chuck Lopez is a 78 year old male who was admitted on 11/9/2018.[AM1.1]     Rich states he slept well last night. He was more interested in talking about his dismissal plan: \"You know I'm not going to last two days at a shelter\". I explained our  HUMAIRA Floyd is the person he needs to speak with in regard to his dismissal plan.[AM1.3]     1)  Chronic neck and head pain exacerbated due to cough. Limited range of motion of the neck[AM1.1]. Pain much improved per patient report.[AM1.3]      2)  Patient with chronic neck and head pain, on chronic opioid therapy managed by Eli Rivera [facility] at [facility]  Baseline 20 mg Daily Morphine Equivalent as dispensed as reported daily use.  Patient has a possible opioid tolerance.      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 Butalbital-APAP-Caffeine -40 every 6 hours prn and Trazodone 25-50 mg every 6 hours prn     3)  Non-medication interventions  Patient is most interested in message  Positioning, Heating pad PRN, Relaxation, Distraction     4)  Opioids: Tramadol 25-50 mg every 6 hours prn  Opioids Treatment Goal: -Do not escalate from chronic pain plan     5)  Constipation Prophylaxis  Senna-S prn     6)  Pain Education  -Opioid safe use, storage and disposal information included in DC AVS     7)  DC Planning   Discussed goal of Opioid therapy as above with patient, Hospitalist Oliver Call MD and bedside nurse Umm Oliver RN   Total daily dose of opioids is less than 30 morphine equivalents dose (MED), opioid(s) may be stopped without taper.  Continued outpatient management is dependent on follow up plan   Disposition: Patient is homeless: appreciate input of  HUMAIRA Floyd   Support systems: None  Outpatient Referrals: No, as patient does not have transportation opportunities to attend follow up[AM1.1] appointments[AM1.3]  The following risk factors have been identified for unintentional overdose: patient is on multiple sedating medications , patient is a smoker, patient is > 65 years old, patient has a history of substance abuse disorder, patient has anxiety, depression or  PTSD and patient has compromised kidney function . Discharge with intra-nasal naloxone if discharged to home with opioids.  Plan for education prior to discharge    Danuta Negron MS, RN, CNS, APRN, ACHPN, FAACVPR  Pain and Palliative Care  Pager [phone]  Office [phone][AM1.1]       Time Spent on this Encounter[AM1.2]   I spent[AM1.1] 15[AM1.3] minutes ([AM1.1]10:55 AM[AM1.3]-[AM1.1]11:10 AM[AM1.3]) in assessment of the patient, counseling and discussion with the patient and family as documented in sections below. Another[AM1.1] 20[AM1.3] minutes in review of chart, documentation, coordination of care and discussion with the health care team.[AM1.1]      Interval History[AM1.2]   Chart reviewed[AM1.3]    Review of Systems[AM1.2]   CONSTITUTIONAL: NEGATIVE for fever, chills, change in weight  ENT/MOUTH: NEGATIVE for ear, mouth and throat problems  RESP: NEGATIVE for significant cough or SOB  CV: NEGATIVE for chest pain, palpitations or peripheral edema[AM1.3]    Physical Exam   Temp:  [96.5  F (35.8  C)-97.9  F (36.6  C)] 97.4  F (36.3  C)  Heart Rate:  [64-72] 72  Resp:  [16-18] 18  BP: (118-124)/(49-55) 124/49  SpO2:  [95 %-99 %] 97 %  123 lbs 0 oz[AM1.2]  GEN:  Alert, oriented x 3, appears comfortable, NAD.  HEENT:  Normocephalic/atraumatic, no scleral icterus, no nasal discharge, mouth moist.  RESP: Symmetric chest rise on inhalation noted.  Normal respiratory effort.  PAIN BEHAVIOR: Cooperative  Psych:  Normal affect.  Calm, cooperative, conversant appropriately.[AM1.1]    Medications       atenolol  25 mg Oral Daily     busPIRone  20 mg Oral TID     diclofenac  2 g Transdermal 4x Daily     enoxaparin  40 mg Subcutaneous Q24H     fluticasone-vilanterol  1 puff Inhalation Daily     hydrochlorothiazide  25 mg Oral Daily     levofloxacin  750 mg Oral Q24H     loratadine  10 mg Oral Daily     multivitamin, therapeutic with minerals  1 tablet Oral Daily     potassium chloride SA  20 mEq Oral QAM      ranitidine  75 mg Oral At Bedtime     tamsulosin  0.4 mg Oral Daily     tiZANidine  4 mg Oral At Bedtime       Data   No results found for this or any previous visit (from the past 24 hour(s)).[AM1.2]       Revision History        User Key Date/Time User Provider Type Action    > AM1.3 11/13/2018 11:13 AM Danuta Negron APRN CNS Clinical Nurse Specialist Sign     AM1.2 11/13/2018 10:45 AM Danuta Negron APRN CNS Clinical Nurse Specialist      AM1.1 11/13/2018 10:44 AM Danuta Negron APRN CNS Clinical Nurse Specialist             Progress Notes by Henry Frye DO at 11/12/2018  2:01 PM     Author:  Henry Frye DO Service:  Hospitalist Author Type:  Physician    Filed:  11/12/2018  2:05 PM Date of Service:  11/12/2018  2:01 PM Creation Time:  11/12/2018  2:01 PM    Status:  Signed :  Henry Frye DO (Physician)         [facility]  Hospitalist Progress Note  Henry Frye DO 11/12/2018    Reason for Stay (Diagnosis): Pneumonia         Assessment and Plan:      Summary of Stay: Chuck Lopez is a 78 year old male admitted on 11/9/2018 with pneumonia.    Problem List:   1. Pneumonia.  Continue Levaquin 750 mg a day.  Pro-calcitonin low.  Today is day 4 of 5 for antibiotics.  2. Acute COPD exacerbation.  Much improved.  No longer with any wheeze.  Continue Breo Ellipta and Levaquin 750 mg a day.  Stop prednisone and scheduled do nebs.  Continue Albuterol as needed.  3. Acute on chronic hypoxic respiratory failure.  Acute worsening due to pneumonia and COPD.  Continue supplemental oxygen as needed.  4. Acute on chronic pain syndrome.  Tramadol as needed.  Diclofenac gel as needed.  Tylenol as needed.  Pain team consult.  5. Tobacco use disorder.  I did reinforce advise for long-term cessation on 11/10/18.  Continue Nicotrol Inhaler as needed.  6. Hypertension.  Continue atenolol and hydrochlorothiazide.  7. Depression.  Continue  buspirone.  8. Benign prostatic hyperplasia.  Tamsulosin.  9. Mild hyponatremia.  Resolved.    10. Deconditioning.  Physical and Occupational Therapy consults.  DVT Prophylaxis: Enoxaparin (Lovenox) SQ  Code Status: Full Code  Discharge Dispo: TBD  Estimated Disch Date / # of Days until Disch: 1        Interval History (Subjective):      Is having trouble sleeping.  Occasional cough.  Feels weak.  Breathing much improved.  Denies chest pain, fevers, chills, nausea, vomiting, or diarrhea.                  Physical Exam:      Last Vital Signs:  /62 (BP Location: Right arm)  Pulse 79  Temp 97.6  F (36.4  C) (Oral)  Resp 18  Wt 55.5 kg (122 lb 4.8 oz)  SpO2 95%  BMI 20.35 kg/m2    Gen:  NAD, A&Ox3.  Eyes:  PERRL, sclera anicteric.  OP:  MMM, no lesions.  Neck:  Supple.  CV:  Regular, no murmurs.  Lung:  CTA b/l, normal effort.  Ab:  +BS, soft.  Skin:  Warm, dry to touch.  No rash.  Ext:  No pitting edema LE b/l.[KR1.1]      Recent Labs  Lab 11/12/18 0711 11/11/18  0723   NA  --  139   POTASSIUM  --  3.5   CHLORIDE  --  102   CO2  --  33*   ANIONGAP  --  4   GLC  --  99   BUN  --  15   CR 0.90 0.78   GFRESTIMATED 82 >90   GFRESTBLACK >90 >90   MATTHEW  --  8.5       Recent Labs  Lab 11/12/18  0711 11/10/18  0717   WBC  --  8.1   HGB  --  12.4*   HCT  --  37.3*   MCV  --  99    183[KR1.2]    NAD, A&Ox3.  Eyes:  PERRL, sclera anicteric.  OP:  MMM, no lesions.  Neck:  Supple.  CV:  Regular, no murmurs.  Lung:  CTA b/l, normal effort.  Ab:  +BS, soft.  Skin:  Warm, dry to touch.  No rash.  Ext:  No pitting edema LE b/l.           Medications:      All current medications were reviewed with changes reflected in problem list.         Data:      All new lab and imaging data was reviewed.   Labs:[KR1.1]    Recent Labs  Lab 11/12/18  0711 11/11/18  0723   NA  --  139   POTASSIUM  --  3.5   CHLORIDE  --  102   CO2  --  33*   ANIONGAP  --  4   GLC  --  99   BUN  --  15   CR 0.90 0.78   GFRESTIMATED 82 >90    GFRESTBLACK >90 >90   MATTHEW  --  8.5       Recent Labs  Lab 11/12/18  0711 11/10/18  0717   WBC  --  8.1   HGB  --  12.4*   HCT  --  37.3*   MCV  --  99    183[KR1.2]      Imaging:[KR1.1]   No results found for this or any previous visit (from the past 24 hour(s)).[KR1.2]       Revision History        User Key Date/Time User Provider Type Action    > KR1.2 11/12/2018  2:05 PM Henry rFye DO Physician Sign     KR1.1 11/12/2018  2:01 PM Henry Frye DO Physician                   Procedure Notes     No notes of this type exist for this encounter.         Progress Notes - Therapies (Notes from 11/12/18 through 11/15/18)      Progress Notes by Barb Bolivar OT at 11/14/2018  7:30 AM     Author:  Barb Bolivar OT Service:  (none) Author Type:  Occupational Therapist    Filed:  11/14/2018  8:26 AM Date of Service:  11/14/2018  7:30 AM Creation Time:  11/14/2018  8:26 AM    Status:  Signed :  Barb Bolivar OT (Occupational Therapist)            11/14/18 0700   Quick Adds   Type of Visit Initial Occupational Therapy Evaluation   Living Environment   Lives With alone   Living Arrangements homeless   Self-Care   Usual Activity Tolerance poor   Current Activity Tolerance poor   Regular Exercise no   Equipment Currently Used at Home oxygen;walker, rolling   Functional Level Prior   Ambulation 1-->assistive equipment   Transferring 0-->independent   Toileting 0-->independent   Bathing 0-->independent   Dressing 0-->independent   Eating 0-->independent   Communication 0-->understands/communicates without difficulty   Fall history within last six months no   General Information   Onset of Illness/Injury or Date of Surgery - Date 11/09/18   Referring Physician Henry Frye DO   Patient/Family Goals Statement Not stated   Additional Occupational Profile Info/Pertinent History of Current Problem Chuck Lopez is a 78 year old male with history including chronic pain syndrome, COPD  with chronic oxygen dependence and ongoing smoking, depression, hypertension admitted on 11/9/2018 with shortness of breath.   General Observations Pt supine in bed and reported feeling fatigued and SOB   Cognitive Status Examination   Orientation orientation to person, place and time   Level of Consciousness lethargic/somnolent   Able to Follow Commands WNL/WFL   Attention Reports problems attending   Cognitive Comment SLUMS Examination: 22/30; Mild Cognitive Impairment   Sensory Examination   Sensory Comments No deficits beyond baseline   Pain Assessment   Patient Currently in Pain Yes, see Vital Sign flowsheet   Strength   Strength Comments Pt reported decreased overall strength   Toilet Transfer   Toilet Transfer Comments Per pt report, pt I in I/ADL's   Transfer Skill: Toilet Transfer   Toilet Transfer Skill Comments Per pt report, pt I in I/ADL's   Tub/Shower Transfer   Tub/Shower Transfer Comments Per pt report, pt I in I/ADL's   Bathing   Level of [place] other (see comments)  (Per pt report, pt I in I/ADL's)   Upper Body Dressing   Level of [place]: Dress Upper Body other (see comments)  (Per pt report, pt I in I/ADL's)   Lower Body Dressing   Level of [place]: Dress Lower Body other (see comments)  (Per pt report, pt I in I/ADL's)   Toileting   Level of [place]: Toilet other (see comments)  (Per pt report, pt I in I/ADL's)   Grooming   Level of [place]: Grooming other (see comments)  (Per pt report, pt I in I/ADL's)   Eating/Self Feeding   Level of [place]: Eating other (see comments)  (Per pt report, pt I in I/ADL's)   Instrumental Activities of Daily Living (IADL)   IADL Comments (Per pt report, pt I in I/ADL's)   Activities of Daily Living Analysis   Impairments Contributing to Impaired Activities of Daily Living pain;strength decreased;cognition impaired   General Therapy Interventions   Planned Therapy Interventions ADL retraining;cognition;strengthening   Clinical  "Impression   Criteria for Skilled Therapeutic Interventions Met yes, treatment indicated   OT Diagnosis Decreased ind with I/ADL's   Influenced by the following impairments Decreased activity tolerance; increased pain; increased fatigue   Assessment of Occupational Performance 1-3 Performance Deficits   Identified Performance Deficits Decreased activity tolerance; increased pain; increased fatigue   Clinical Decision Making (Complexity) Low complexity   Therapy Frequency daily   Predicted Duration of Therapy Intervention (days/wks) 3 days   Anticipated Discharge Disposition Transitional Care Facility   Risks and Benefits of Treatment have been explained. Yes   Patient, Family & other staff in agreement with plan of care Yes   [facility] TM \"6 Clicks\"   2016, Trustees of [facility], under license to [employer].  All rights reserved.   6 Clicks Short Forms Daily Activity Inpatient Short Form   [facility]  \"6 Clicks\" Daily Activity Inpatient Short Form   1. Putting on and taking off regular lower body clothing? 4 - None   2. Bathing (including washing, rinsing, drying)? 4 - None   3. Toileting, which includes using toilet, bedpan or urinal? 4 - None   4. Putting on and taking off regular upper body clothing? 4 - None   5. Taking care of personal grooming such as brushing teeth? 4 - None   6. Eating meals? 4 - None   Daily Activity Raw Score (Score out of 24.Lower scores equate to lower levels of function) 24   Total Evaluation Time   Total Evaluation Time (Minutes) 30[CF1.1]        Revision History        User Key Date/Time User Provider Type Action    > CF1.1 11/14/2018  8:26 AM Barb Bolivar OT Occupational Therapist Sign            "

## 2018-11-09 NOTE — IP AVS SNAPSHOT
"` `           [place] [address] MEDICAL SURGICAL: [phone]                                              INTERAGENCY TRANSFER FORM - NURSING   2018                    Hospital Admission Date: 2018  TIMI MAK   : 1940  Sex: Male        Attending Provider: Henry Frye DO     Allergies:  Ammonium Lactate, Doxycycline, Monosodium Glutamate, Penicillins    Infection:  None   Service:  GENERAL MEDI    Ht:  1.651 m (5' 5\")   Wt:  55.2 kg (121 lb 11.2 oz)   Admission Wt:  55.7 kg (122 lb 12.8 oz)    BMI:  20.25 kg/m 2   BSA:  1.59 m 2            Patient PCP Information     Provider PCP Type    Pranay Reid MD General      Current Code Status     Date Active Code Status Order ID Comments User Context       Prior      Code Status History     Date Active Date Inactive Code Status Order ID Comments User Context    11/15/2018 10:55 AM  Full Code [id number]  Oliver Call MD Outpatient    11/10/2018  1:46 AM 11/15/2018 10:55 AM Full Code [id number]  Henry Frye DO Inpatient    2017 10:53 AM 11/10/2018  1:46 AM Full Code [id number]  Jori Doe MD Outpatient    2017  3:46 PM 2017 10:53 AM Full Code [id number]  Yasmin Wei PA Inpatient    2017  4:27 AM 2017  3:51 PM Full Code [id number]  Patsy John MD Inpatient    2017  7:26 PM 2017  2:58 PM Full Code [id number]  Kyleigh Dey MD Inpatient    2017  8:51 AM 2017  7:26 PM Full Code [id number]  Oneyda Mcdonald MD Outpatient    4/3/2017  8:56 AM 2017  8:51 AM Full Code [id number]  Oneyda Mcdonald MD Outpatient    3/28/2017  3:35 AM 4/3/2017  8:56 AM Full Code [id number]  Alice Carnes MD Inpatient    2017  9:49 AM 3/28/2017  3:35 AM Full Code [id number]  Joe Syed MD Outpatient    2017  2:42 PM 2017  9:49 AM Full Code [id number]  Jean Paul Latif MD Inpatient    2016 10:47 AM 2017  2:42 " PM Full Code [id number]  Marley Huerta MD Outpatient    11/22/2016  4:29 AM 11/26/2016 10:47 AM Full Code [id number]  Anthony Rosa MD Inpatient      Advance Directives        Scanned docmt in ACP Activity?           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) Medium  5/17/2010    CARDIOVASCULAR SCREENING; LDL GOAL LESS THAN 100 Medium  6/8/2011    CKD (chronic kidney disease) stage 2, GFR 60-89 ml/min Medium  9/5/2012    Advance Care Planning Medium  12/13/2012    COPD (chronic obstructive pulmonary disease) (H) Medium  Unknown    Hypertension goal BP (blood pressure) < 140/90 Medium  9/16/2013    Chronic hepatitis C (H) Medium  3/11/2014    Other chronic pain Medium  9/16/2015    Physical deconditioning Medium  1/14/2017    Malnutrition (H) Medium  1/14/2017    Acute exacerbation of COPD with asthma (H) Medium  3/28/2017    Acute respiratory failure (H) Medium  4/18/2017    COPD with hypoxia (H) Medium  5/13/2017    Migraine without aura and without status migrainosus, not intractable Medium  6/26/2017      Immunizations     Name Date      HepB 05/02/01     HepB 03/16/00     HepB 04/22/99     Influenza (H1N1) 03/05/10     Influenza (High Dose) 3 valent vaccine 12/21/16     Influenza (High Dose) 3 valent vaccine 12/13/12     Influenza (IIV3) PF 09/08/09     Influenza (IIV3) PF 01/12/07     Influenza (IIV3) PF 12/22/05     Influenza (IIV3) PF 12/22/03     Influenza Vaccine IM 3yrs+ 4 Valent IIV4 09/10/14     Influenza Vaccine IM 3yrs+ 4 Valent IIV4 10/15/13     Pneumo Conj 13-V (2010&after) 12/21/16     Pneumococcal 23 valent 12/22/05     Pneumococcal 23 valent 03/25/99     TD (ADULT, 7+) 09/08/09      TD (ADULT, 7+) 05/20/99          END      ASSESSMENT     Discharge Profile Flowsheet     EXPECTED DISCHARGE     COMMUNICATION ASSESSMENT      Expected Discharge Date  -- (TBD > /home) 11/10/18 0807   Patient's communication style  spoken language (English or Bilingual) 11/09/18 1752    DISCHARGE NEEDS ASSESSMENT     FINAL RESOURCES      Concerns To Be Addressed  homelessness 11/12/18 1644   Resources List  Skilled Nursing Facility 11/15/18 1217    Patient/family verbalizes understanding of discharge plan recommendations?  Yes 11/12/18 1644   Other Resources  Other (see comment) (COPD Action Plan discussed w/pt) 11/10/18 1437    Medical Team notified of plan?  yes 11/12/18 1644   Skilled Nursing Facility  [facility] [phone], Fax: [phone] 11/15/18 1217    Equipment Currently Used at Home  oxygen;walker, rolling 11/14/18 0815   PAS Number  [id number] 11/15/18 1325    Transportation Available  none 11/10/18 1437   SKIN      # of Referrals Placed by CTS  Communication hand-offs to next level of Care Providers 09/12/17 1125   Inspection of bony prominences  Full except (identify areas not inspected) 11/15/18 0919    Does Patient Need a Referral for Clinic CC  Yes 11/10/18 1437   Full except areas not inspected   Hip, left;Hip, right;Buttock, left;Buttock, right;Sacrum;Coccyx 11/15/18 0919    Coordination Referral Criteria  Admission DX PN;Admission DX COPD 11/10/18 1437   Inspection under devices  Full 11/14/18 1122    New Steerage to  Clinics?  Yes 11/10/18 1437   Skin WDL  ex 11/15/18 0919    Primary Care Clinic Name  [facility] 11/10/18 1437   Skin Color/Characteristics  pale 11/15/18 0919    Primary Care MD Name  Dr. Pranay Reid 11/12/18 1644   Skin Temperature  warm 11/15/18 0919    ASSESSMENT OF FUNCTIONAL STATUS     Skin Moisture  dry 11/15/18 0919    Prior to admission patient needed assistance with:  Medications 11/12/18 1644   Skin Elasticity  quick return to  "original state 11/15/18 0919    GASTROINTESTINAL (ADULT,PEDIATRIC,OB)     Skin Integrity  tattoo(s) 11/15/18 0919    GI WDL  WDL 11/15/18 0919   SAFETY      Last Bowel Movement  11/13/18 11/14/18 2010   Safety WDL  WDL 11/15/18 0919    GI Signs/Symptoms  nausea 11/15/18 0919   Safety Factors  bed in low position;wheels locked;call light in reach;upper side rails raised x 2;ID band on 11/15/18 0919    Passing flatus  yes 11/14/18 2257   All Alarms  none present 11/15/18 0919                 Assessment WDL (Within Defined Limits) Definitions           Safety WDL     Effective: 09/28/15    Row Information: <b>WDL Definition:</b> Bed in low position, wheels locked; call light in reach; upper side rails up x 2; ID band on<br> <font color=\"gray\"><i>Item=AS safety wdl>>List=AS safety wdl>>Version=F14</i></font>      Skin WDL     Effective: 09/28/15    Row Information: <b>WDL Definition:</b> Warm; dry; intact; elastic; without discoloration; pressure points without redness<br> <font color=\"gray\"><i>Item=AS skin wdl>>List=AS skin wdl>>Version=F14</i></font>      Vitals     Vital Signs Flowsheet     VITAL SIGNS     Pain Orientation  Anterior 11/14/18 1749    Temp  98.2  F (36.8  C) 11/15/18 0808   Pain Descriptors  Discomfort 11/14/18 1849    Temp src  Oral 11/15/18 0808   Pain Management Interventions  analgesia administered 11/14/18 1849    Resp  16 11/15/18 0808   Pain Intervention(s)  Repositioned 11/14/18 2242    Pulse  79 11/11/18 1644   Response to Interventions  Decrease in pain 11/15/18 0334    Heart Rate  91 11/15/18 0808   ANALGESIA SIDE EFFECTS MONITORING      Pulse/Heart Rate Source  Monitor 11/15/18 0808   Side Effects Monitoring: Respiratory Quality  R 11/14/18 2242    BP  136/51 11/15/18 0808   Side Effects Monitoring: Respiratory Depth  N 11/14/18 2242    BP Location  Left arm 11/15/18 0808   Side Effects Monitoring: Sedation Level  1 11/14/18 2242    OXYGEN THERAPY     HEIGHT AND WEIGHT      SpO2  95 % " 11/15/18 0808   Weight  55.2 kg (121 lb 11.2 oz) 11/15/18 0700    O2 Device  Nasal cannula 11/15/18 0808   Weight Method  Standing scale 11/15/18 0700    Oxygen Delivery  2 LPM 11/15/18 0808   POSITIONING      PAIN/COMFORT     Body Position  independently positioning 11/15/18 0919    Patient Currently in Pain  yes 11/14/18 1849   Head of Bed (HOB)  HOB at 20-30 degrees 11/14/18 1534    Preferred Pain Scale  number (Numeric Rating Pain Scale) 11/14/18 1849   Positioning/Transfer Devices  pillows;in use 11/14/18 1534    Patient's Stated Pain Goal  4 11/14/18 2242   DAILY CARE      0-10 Pain Scale  6 11/15/18 0056   Activity Management  activity adjusted per tolerance 11/15/18 0919    Word Pain Scale  4 11/14/18 1849   Activity Assistance Provided  independent 11/15/18 0919    Pain Location  Generalized 11/14/18 1849                 Patient Lines/Drains/Airways Status    Active LINES/DRAINS/AIRWAYS     None            Patient Lines/Drains/Airways Status    Active PICC/CVC     None            Intake/Output Detail Report     Date Intake     Output Net    Shift P.O. I.V. IV Piggyback Total Urine Total       Noc 11/13/18 2300 - 11/14/18 0659 -- -- -- -- 365 365 -365    Day 11/14/18 0700 - 11/14/18 1459 -- -- -- -- 850 850 -850    Marlene 11/14/18 1500 - 11/14/18 2259 370 -- -- 370 350 350 20    Noc 11/14/18 2300 - 11/15/18 0659 -- -- -- -- -- -- 0    Day 11/15/18 0700 - 11/15/18 1459 -- -- -- -- 400 400 -400      Last Void/BM       Most Recent Value    Urine Occurrence 1 at 11/14/2018 1537    Stool Occurrence 1 at 11/13/2018 1500      Case Management/Discharge Planning     Case Management/Discharge Planning Flowsheet     REFERRAL INFORMATION     Do you work full or part-time?  no 11/12/18 1644    Did the Initial Social Work Assessment result in a Social Work Case?  Yes 11/12/18 1644   COPING/STRESS      Admission Type  inpatient 11/12/18 1644   Major Change/Loss/Stressor  housing concerns 11/11/18 1110    Arrived From  nursing  facility 11/12/18 1644   EXPECTED DISCHARGE      Referral Source  physician 11/12/18 1644   Expected Discharge Date  -- (TBD > /home) 11/10/18 0807    New Steerage to  Clinics?  Yes 11/10/18 1437   ASSESSMENT/CONCERNS TO BE ADDRESSED      # of Referrals Placed by CTS  Communication hand-offs to next level of Care Providers 09/12/17 1125   Concerns To Be Addressed  homelessness 11/12/18 1644    Reason For Consult  discharge planning 11/12/18 1644   DISCHARGE PLANNING      Record Reviewed  medical record;plan of care;patient profile 11/12/18 1644   Patient/family verbalizes understanding of discharge plan recommendations?  Yes 11/12/18 1644    CTS Assigned to Case  Eli 11/12/18 1644   Medical Team notified of plan?  yes 11/12/18 1644    Primary Care Clinic Name  [facility] 11/10/18 1437   Transportation Available  none 11/10/18 1437    Primary Care MD Name  Dr. Pranay Reid 11/12/18 1644   Does Patient Need a Referral for Clinic CC  Yes 11/10/18 1437    LIVING ENVIRONMENT     Coordination Referral Criteria  Admission DX PN;Admission DX COPD 11/10/18 1437    Lives With  alone 11/14/18 0815   FINAL RESOURCES      Living Arrangements  homeless 11/14/18 0815   Equipment Currently Used at Home  oxygen;walker, rolling 11/14/18 0815    Provides Primary Care For  no one 11/12/18 1644   Resources List  Skilled Nursing Facility 11/15/18 1217    Quality Of Family Relationships  non-existent 11/12/18 1644   Other Resources  Other (see comment) (COPD Action Plan discussed w/pt) 11/10/18 1437    Able to Return to Prior Living Arrangements  no 11/12/18 1644   Skilled Nursing Facility  [facility] [phone], Fax: [phone] 11/15/18 1217    HOME SAFETY     PAS Number  [id number] 11/15/18 1325    Patient Feels Safe Living in Home?  yes 11/10/18 1437   ABUSE RISK SCREEN      ASSESSMENT OF FAMILY/SOCIAL SUPPORT     QUESTION TO PATIENT:  Has a member of your family or a partner(now or in the past)  intimidated, hurt, manipulated, or controlled you in any way?  no 11/09/18 1802    Marital Status   11/12/18 1644   QUESTION TO PATIENT: Do you feel safe going back to the place where you are living?  yes 11/09/18 1802    Who is your support system?  Limited to 11/12/18 1644   OBSERVATION: Is there reason to believe there has been maltreatment of a vulnerable adult (ie. Physical/Sexual/Emotional abuse, self neglect, lack of adequate food, shelter, medical care, or financial exploitation)?  no 11/09/18 1802    Description of Support System  Uninvolved 11/12/18 1644   OTHER      Support Assessment  Lacks adequate emotional support;Lacks necessary supervision and assistance;Limited social contact and support 11/12/18 1644   Are you depressed or being treated for depression?  No 11/11/18 1110    ASSESSMENT OF FUNCTIONAL STATUS     HOMICIDE RISK      Prior to admission patient needed assistance with:  Medications 11/12/18 1644   Feels Like Hurting Others  no 11/09/18 1802    EMPLOYMENT

## 2018-11-09 NOTE — IP AVS SNAPSHOT
` ` Patient Information     Patient Name Sex Chuck Armstrong ([id number]) Male 1940       Room Bed    [facility] 0343-01      Patient Demographics     Address Phone    [address] [facility] [address] [phone] (Home)  none (Work)  [phone] (Mobile)      Patient Ethnicity & Race     Ethnic Group Patient Race    American White      Emergency Contact(s)     Name Relation Home Work Mobile    Tatyana Kim Roommate [phone] none [phone]      Documents on File        Status Date Received Description       Documents for the Patient    Face Sheet Received () 09     Privacy Notice - [place] Received () 09     Insurance Card Received () 09     External Medication Information Consent Accepted () 09     Face Sheet Received () 09     ZOILA Face Sheet Received () 09     Privacy Notice - [place]  () 09     Other  09 medicare questions    Insurance Card Received 17 Medicare A & B    Consent for Services - Hospital/Clinic Received () 11 NEW    External Medication Information Consent Accepted () 11     External Medication Information Consent Accepted () 04/10/12     Privacy Notice - [place] Received 04/10/12     Consent for Services - Hospital/Clinic Received () 04/10/12     Consent for EHR Access  13 Copied from existing Consent for services - C/HOD collected on 04/10/2012    [facility] Specified Other       Consent for Services - Hospital/Clinic Received () 13     External Medication Information Consent Patient Refused 13     Consent for Services - Hospital/Clinic Received () 14     Consent for Services - Hospital/Clinic Received () 09/16/15     Consent for Services/Privacy Notice - Hospital/Clinic Received () 16     Insurance Card Received 17 Medicare    Patient ID Received 17 [place]-[employer]     Insurance Card Received 10/06/16 ROSALIO GRIMM    Consent for Services - Geriatrics Received 17     Advance Directives and Living Will Received 17 POLST 2017    Consent for Services/Privacy Notice - Hospital/Clinic Received () 17     Consent for Services - Hospital and Clinic Received 18     HIE Auth Received 18     ZOILA Face Sheet Received (Deleted) 09     Patient ID Received (Deleted) 14        Documents for the Encounter    CMS IM for Patient Signature Received 18     CMS IM for Patient Signature Received 11/15/18 2nd IMM    Discharge Attachment   CHRONIC OBSTRUCTIVE PULMONARY DISEASE (COPD), DISCHARGE INSTRUCTIONS (ENGLISH)      Admission Information     Attending Provider Admitting Provider Admission Type Admission Date/Time    Henry Frye, DO Henry Frye, DO Emergency 18  1737    Discharge Date Hospital Service Auth/Cert Status Service Area     [facility]    Unit Room/Bed Admission Status        3 MEDICAL SURGICAL 0343/0343-01 Admission (Confirmed)       Admission     Complaint    COPD exacerbation (H)      Hospital Account     Name Acct ID Class Status Primary Coverage    Chuck Lopez [id number] Inpatient Open MEDICARE - MEDICARE            Guarantor Account (for Hospital Account #[id number])     Name Relation to Pt Service Area Active? Acct Type    Chuck Lopez Self FCS Yes Personal/Family    Address Phone          [address] [place] [address] [phone](H)  none(O)              Coverage Information (for Hospital Account #[id number])     F/O Payor/Plan Precert #    MEDICARE/MEDICARE     Subscriber Subscriber #    Chuck Lopez [id number]    Address Phone    ATTN CLAIMS  [address]  [facility] [place] [address] [phone]

## 2018-11-09 NOTE — IP AVS SNAPSHOT
` `     [place] [address] MEDICAL SURGICAL: [phone]            Medication Administration Report for Chuck Lopez as of 11/15/18 1501   Legend:    Given Hold Not Given Due Canceled Entry Other Actions    Time Time (Time) Time  Time-Action       Inactive    Active    Linked        Medications 11/09/18 11/10/18 11/11/18 11/12/18 11/13/18 11/14/18 11/15/18    acetaminophen (TYLENOL) Suppository 650 mg  Dose: 650 mg  Freq: EVERY 4 HOURS PRN Route: RE  PRN Reason: mild pain  Start: 11/10/18 0145   Admin Instructions: Alternate ibuprofen (if ordered) with acetaminophen.  Maximum acetaminophen dose from all sources = 75 mg/kg/day not to exceed 4 grams/day.    Admin. Amount: 1 suppository (1 × 650 mg suppository)  Dispense Loc:  [facility]               acetaminophen (TYLENOL) tablet 650 mg  Dose: 650 mg  Freq: EVERY 4 HOURS PRN Route: PO  PRN Reason: mild pain  Start: 11/10/18 0145   Admin Instructions: Alternate ibuprofen (if ordered) with acetaminophen.  Maximum acetaminophen dose from all sources = 75 mg/kg/day not to exceed 4 grams/day.    Admin. Amount: 2 tablet (2 × 325 mg tablet)  Last Admin: 11/14/18 1746  Dispense Loc: [facility]      0356 (650 mg)-Given           1746 (325 mg)-Given            albuterol neb solution 2.5 mg  Dose: 3 mL  Freq: EVERY 2 HOURS PRN Route: NEBULIZATION  PRN Reasons: wheezing,shortness of breath / dyspnea  Start: 11/10/18 0145   Admin. Amount: 2.5 mg = 3 mL Conc: 2.5 mg/3 mL  Last Admin: 11/15/18 0701  Dispense Loc: [facility]  Volume: 3 mL        2234 (2.5 mg)-Given        0820 (2.5 mg)-Given       2038 (2.5 mg)-Given       2326 (2.5 mg)-Given        0814 (2.5 mg)-Given       1317 (2.5 mg)-Given       1739 (2.5 mg)-Given        0701 (2.5 mg)-Given           atenolol (TENORMIN) tablet 25 mg  Dose: 25 mg  Freq: DAILY Route: PO  Start: 11/10/18 1000   Admin. Amount: 1 tablet (1 × 25 mg tablet)  Last Admin: 11/15/18 0812  Dispense Loc:  [facility]      1050 (25 mg)-Given         0800 (25 mg)-Given        0905 (25 mg)-Given        0850 (25 mg)-Given        0903 (25 mg)-Given        0812 (25 mg)-Given           bisacodyl (DULCOLAX) Suppository 10 mg  Dose: 10 mg  Freq: DAILY PRN Route: RE  PRN Reason: constipation  Start: 11/10/18 0145   Admin Instructions: Hold for loose stools.  This is the third step of a three step constipation treatment.    Admin. Amount: 1 suppository (1 × 10 mg suppository)  Dispense Loc:  [facility]               busPIRone (BUSPAR) tablet 15 mg  Dose: 15 mg  Freq: 3 TIMES DAILY Route: PO  Start: 11/14/18 1600   Admin. Amount: 1 tablet (1 × 15 mg tablet)  Last Admin: 11/15/18 0812  Dispense Loc:  [facility]          1534 (15 mg)-Given       2159 (15 mg)-Given        0812 (15 mg)-Given       [ ] 1600       [ ] 2200           butalbital-acetaminophen-caffeine (FIORICET/ESGIC) per tablet 1 tablet  Dose: 1 tablet  Freq: EVERY 6 HOURS PRN Route: PO  PRN Reason: headaches  Start: 11/10/18 0946   Admin. Amount: 1 tablet  Last Admin: 11/14/18 1747  Dispense Loc:  [facility]      1130 (1 tablet)-Given       1742 (1 tablet)-Given        0004 (1 tablet)-Given       0758 (1 tablet)-Given       1345 (1 tablet)-Given       2000 (1 tablet)-Given        0302 (1 tablet)-Given       1558 (1 tablet)-Given        0850 (1 tablet)-Given       2021 (1 tablet)-Given        1747 (1 tablet)-Given            diclofenac (VOLTAREN) 1 % topical gel 2 g  Dose: 2 g  Freq: 4 TIMES DAILY Route: TD  Start: 11/12/18 1800   Admin Instructions: Apply to neck and bilateral shoulders.  Send dosing card with product.    Admin. Amount: 2 g  Last Admin: 11/13/18 1842  Dispense Loc:  Main Pharmacy        1938 (2 g)-Given       2224 (2 g)-Given        0854 (2 g)-Given       (1442)-Not Given       1842 (2 g)-Given       (2312)-Not Given        (0905)-Not Given       (1305)-Not Given [C]       (1813)-Not Given [C]       (2200)-Not Given        (0815)-Not Given       (1227)-Not Given       [ ] 1800       [ ]  2200           enoxaparin (LOVENOX) injection 40 mg  Dose: 40 mg  Freq: EVERY 24 HOURS Route: SC  Start: 11/10/18 0900   Admin Instructions: HOLD if platelet count falls below 50% of baseline or less than 100,000/ L and notify provider.    Admin. Amount: 40 mg = 0.4 mL Conc: 40 mg/0.4 mL  Last Admin: 11/15/18 1237  Dispense Loc:  [facility]  Volume: 0.4 mL      1130 (40 mg)-Given        1153 (40 mg)-Given        1323 (40 mg)-Given        1219 (40 mg)-Given        1304 (40 mg)-Given        1237 (40 mg)-Given           escitalopram (LEXAPRO) tablet 5 mg  Dose: 5 mg  Freq: DAILY Route: PO  Start: 11/14/18 1315   Admin. Amount: 1 tablet (1 × 5 mg tablet)  Last Admin: 11/15/18 0812  Dispense Loc:  [facility]          1447 (5 mg)-Given        0812 (5 mg)-Given           fluticasone-vilanterol (BREO ELLIPTA) 200-25 MCG/INH inhaler 1 puff  Dose: 1 puff  Freq: DAILY Route: IN  Start: 11/10/18 1100   Admin Instructions: *Do not use more frequently than twice daily.*  Rinse mouth after use.<br><br>Formulary alternate for Advair 500/50 2 puffs bid.    Admin. Amount: 1 puff  Last Admin: 11/15/18 0701  Dispense Loc:  Main Pharmacy      1112 (1 puff)-Given        0811 (1 puff)-Given        0831 (1 puff)-Given        0821 (1 puff)-Given        0800 (1 puff)-Given        0701 (1 puff)-Given           guaiFENesin (ROBITUSSIN) 20 mg/mL solution 10 mL  Dose: 10 mL  Freq: EVERY 4 HOURS PRN Route: PO  PRN Reason: other  PRN Comment: secretion expectorant  Start: 11/10/18 0145   Admin. Amount: 10 mL  Dispense Loc:  [facility]  Volume: 10 mL               hydrochlorothiazide (HYDRODIURIL) tablet 25 mg  Dose: 25 mg  Freq: DAILY Route: PO  Start: 11/10/18 1000   Admin. Amount: 1 tablet (1 × 25 mg tablet)  Last Admin: 11/15/18 0812  Dispense Loc:  [facility]      1050 (25 mg)-Given        0759 (25 mg)-Given               0905 (25 mg)-Given        0851 (25 mg)-Given        0904 (25 mg)-Given        0812 (25 mg)-Given            hypromellose-dextran (ARTIFICAL TEARS) 0.1-0.3 % ophthalmic solution 2-3 drop  Dose: 2-3 drop  Freq: EVERY 1 HOUR PRN Route: OP  PRN Reason: dry eyes  Start: 11/10/18 1415   Admin Instructions: To affected eye(s).  Start with lower dose.  If symptoms unresolved after 1 dose at lower dose, increase to higher dose for subsequent administrations.    Admin. Amount: 2-3 drop  Last Admin: 11/12/18 2223  Dispense Loc:  Main Pharmacy  Volume: 15 mL      1745 (2 drop)-Given        0007 (2 drop)-Given       0758 (2 drop)-Given        2223 (2 drop)-Given              loratadine (CLARITIN) tablet 10 mg  Dose: 10 mg  Freq: DAILY Route: PO  Start: 11/10/18 1000   Admin. Amount: 1 tablet (1 × 10 mg tablet)  Last Admin: 11/15/18 0812  Dispense Loc:  [facility]      1050 (10 mg)-Given        0759 (10 mg)-Given               0905 (10 mg)-Given        0851 (10 mg)-Given        0904 (10 mg)-Given        0812 (10 mg)-Given           melatonin tablet 1 mg  Dose: 1 mg  Freq: AT BEDTIME PRN Route: PO  PRN Reason: sleep  Start: 11/10/18 0145   Admin Instructions: Do not give unless at least 6 hours of uninterrupted sleep is expected.<br>If the patient has multiple insomnia agents ordered PRN, offer in the following order per policy.  Move to the next available step if the earlier step is ineffective.  <br>Step 1 - melatonin; Step 2 - diphenhydramine; Step 3 - temazepam; Step 4 - zolpidem; Step 5 - trazodone.<br>    Admin. Amount: 1 tablet (1 × 1 mg tablet)  Last Admin: 11/11/18 2250  Dispense Loc:  [facility]      0357 (1 mg)-Given        0242 (1 mg)-Given       2250 (1 mg)-Given               multivitamin, therapeutic with minerals (THERA-VIT-M) tablet 1 tablet  Dose: 1 tablet  Freq: DAILY Route: PO  Start: 11/10/18 1000   Admin. Amount: 1 tablet  Last Admin: 11/15/18 0812  Dispense Loc:  [facility]      1049 (1 tablet)-Given        0800 (1 tablet)-Given               0905 (1 tablet)-Given        0851 (1 tablet)-Given        0904 (1  tablet)-Given        0812 (1 tablet)-Given           naloxone (NARCAN) injection 0.1-0.4 mg  Dose: 0.1-0.4 mg  Freq: EVERY 2 MIN PRN Route: IV  PRN Reason: opioid reversal  Start: 11/10/18 0145   Admin Instructions: For respiratory rate LESS than or EQUAL to 8.  Partial reversal dose:  0.1 mg titrated q 2 minutes for Analgesia Side Effects Monitoring Sedation Level of 3 (frequently drowsy, arousable, drifts to sleep during conversation).Full reversal dose:  0.4 mg bolus for Analgesia Side Effects Monitoring Sedation Level of 4 (somnolent, minimal or no response to stimulation).  For ordered IV doses 0.1-2mg give IVP. Give each 0.4mg over 15 seconds in emergency situations. For non-emergent situations further dilute in 9mL of NS to facilitate titration of response.    Admin. Amount: 0.1-0.4 mg = 0.25-1 mL Conc: 0.4 mg/mL  Dispense Loc:  [facility]  Volume: 1 mL               nicotine (NICOTROL) Inhaler 1 Cartridge  Dose: 1 Cartridge  Freq: EVERY 1 HOUR PRN Route: IN  PRN Reason: smoking cessation  Start: 11/10/18 0145   Admin Instructions: Each cartridge delivers 4 mg.    Admin. Amount: 1 Cartridge  Dispense Loc:  Main [facility]               ondansetron (ZOFRAN-ODT) ODT tab 4 mg  Dose: 4 mg  Freq: EVERY 6 HOURS PRN Route: PO  PRN Reasons: nausea,vomiting  Start: 11/10/18 0145   Admin Instructions: This is Step 1 of nausea and vomiting management.  If nausea not resolved in 15 minutes, go to Step 2 prochlorperazine (COMPAZINE). Do not push through foil backing. Peel back foil and gently remove. Place on tongue immediately. Administration with liquid unnecessary  With dry hands, peel back foil backing and gently remove tablet; do not push oral disintegrating tablet through foil backing; administer immediately on tongue and oral disintegrating tablet dissolves in seconds; then swallow with saliva; liquid not required.    Admin. Amount: 1 tablet (1 × 4 mg tablet)  Dispense Loc:  [facility]              Or  ondansetron  (ZOFRAN) injection 4 mg  Dose: 4 mg  Freq: EVERY 6 HOURS PRN Route: IV  PRN Reasons: nausea,vomiting  Start: 11/10/18 0145   Admin Instructions: This is Step 1 of nausea and vomiting management.  If nausea not resolved in 15 minutes, go to Step 2 prochlorperazine (COMPAZINE).  Irritant. For ordered IV doses 0.1-4 mg, give IV Push undiluted over 2-5 minutes.    Admin. Amount: 4 mg = 2 mL Conc: 4 mg/2 mL  Dispense Loc: [facility]  Infused Over: 2-5 Minutes  Volume: 2 mL               polyethylene glycol (MIRALAX/GLYCOLAX) Packet 17 g  Dose: 17 g  Freq: DAILY PRN Route: PO  PRN Reason: constipation  Start: 11/10/18 0145   Admin Instructions: Give in 8oz of  water, juice, or soda. Hold for loose stools.  This is the second step of a three step constipation treatment.  1 Packet = 17 grams. Mixed prescribed dose in 8 ounces of water. Follow with 8 oz. of water.    Admin. Amount: 17 g  Last Admin: 11/13/18 0458  Dispense Loc: [facility]         0458 (17 g)-Given             potassium chloride SA (K-DUR/KLOR-CON M) CR tablet 20 mEq  Dose: 20 mEq  Freq: EVERY MORNING Route: PO  Start: 11/10/18 1000   Admin Instructions: DO NOT CRUSH    Admin. Amount: 1 tablet (1 × 20 mEq tablet)  Last Admin: 11/15/18 0812  Dispense Loc: [facility]      1050 (20 mEq)-Given        0759 (20 mEq)-Given               0904 (20 mEq)-Given        0850 (20 mEq)-Given        0903 (20 mEq)-Given        0812 (20 mEq)-Given           ranitidine (ZANTAC) tablet 75 mg  Dose: 75 mg  Freq: AT BEDTIME Route: PO  Start: 11/10/18 2200   Admin. Amount: 1 tablet (1 × 75 mg tablet)  Last Admin: 11/14/18 2159  Dispense Loc: [facility]      2118 (75 mg)-Given        2101 (75 mg)-Given        2224 (75 mg)-Given        2310 (75 mg)-Given        2159 (75 mg)-Given        [ ] 2200           senna-docusate (SENOKOT-S;PERICOLACE) 8.6-50 MG per tablet 1 tablet  Dose: 1 tablet  Freq: 2 TIMES DAILY PRN Route: PO  PRN Reason: constipation  Start: 11/10/18 0145   Admin  Instructions: If no bowel movement in 24 hours, increase to 2 tablets PO.  Hold for loose stools.  This is the first step of a three step constipation treatment.    Admin. Amount: 1 tablet  Dispense Loc: [facility]                            Or  senna-docusate (SENOKOT-S;PERICOLACE) 8.6-50 MG per tablet 2 tablet  Dose: 2 tablet  Freq: 2 TIMES DAILY PRN Route: PO  PRN Reason: constipation  Start: 11/10/18 0145   Admin Instructions: Hold for loose stools.  This is the first step of a three step constipation treatment.    Admin. Amount: 2 tablet  Last Admin: 11/12/18 2226  Dispense Loc: [facility]       1636 (2 tablet)-Given        2226 (2 tablet)-Given              tamsulosin (FLOMAX) capsule 0.4 mg  Dose: 0.4 mg  Freq: DAILY Route: PO  Start: 11/10/18 1000   Admin Instructions: Administer 30 minutes after the same meal each day.  Capsules should be swallowed whole; do not crush chew or open.    Admin. Amount: 1 capsule (1 × 0.4 mg capsule)  Last Admin: 11/15/18 0812  Dispense Loc: [facility]      1050 (0.4 mg)-Given        0800 (0.4 mg)-Given        0904 (0.4 mg)-Given        0851 (0.4 mg)-Given        0903 (0.4 mg)-Given        0812 (0.4 mg)-Given           tiZANidine (ZANAFLEX) tablet 4 mg  Dose: 4 mg  Freq: AT BEDTIME Route: PO  Start: 11/12/18 2200   Admin. Amount: 1 tablet (1 × 4 mg tablet)  Last Admin: 11/14/18 2159  Dispense Loc: [facility]        2223 (4 mg)-Given        2310 (4 mg)-Given        2159 (4 mg)-Given        [ ] 2200           traMADol (ULTRAM) half-tab 25 mg  Dose: 25 mg  Freq: EVERY 6 HOURS PRN Route: PO  PRN Reason: moderate pain  Start: 11/10/18 1128   Admin. Amount: 1 half-tab (1 × 25 mg half-tab)  Last Admin: 11/15/18 1456  Dispense Loc: [facility]      1206 (25 mg)-Given       1742 (25 mg)-Given        0004 (25 mg)-Given       1346 (25 mg)-Given       2001 (25 mg)-Given        0302 (25 mg)-Given       0904 (25 mg)-Given       1553 (25 mg)-Given       2224 (25 mg)-Given        0457 (25  mg)-Given       1225 (25 mg)-Given       1848 (25 mg)-Given        0059 (25 mg)-Given       1304 (25 mg)-Given       1841 (25 mg)-Given        0056 (25 mg)-Given       1456 (25 mg)-Given           traZODone (DESYREL) half-tab 25-50 mg  Dose: 25-50 mg  Freq: AT BEDTIME PRN Route: PO  PRN Reason: sleep  Start: 18   Admin Instructions: If the patient has multiple insomnia agents ordered PRN, offer in the following order per policy.  Move to the next available step if the earlier step is ineffective.  <br>Step 1 - melatonin; Step 2 - diphenhydramine; Step 3 - temazepam; Step 4 - zolpidem; Step 5 - trazodone.<br>    Admin. Amount: 1-2 half-tab (1-2 × 25 mg half-tab)  Last Admin: 11/15/18 0056  Dispense Loc:  [facility]       210 (50 mg)-Given         0048 (25 mg)-Given        0059 (25 mg)-Given        0056 (25 mg)-Given          Completed Medications  Medications 11/09/18 11/10/18 11/11/18 11/12/18 11/13/18 11/14/18 11/15/18         Dose: 0.5 mg  Freq: ONCE Route: PO  Start: 11/15/18 1200   End: 11/15/18 1237   Admin. Amount: 1 tablet (1 × 0.5 mg tablet)  Last Admin: 11/15/18 1237  Dispense Loc:  [facility]  Administrations Remainin           1237 (0.5 mg)-Given          Discontinued Medications  Medications 11/09/18 11/10/18 11/11/18 11/12/18 11/13/18 11/14/18 11/15/18         Dose: 10 mg  Freq: EVERY 6 HOURS Route: PO  Start: 18 0300   End: 18 1645   Admin. Amount: 1 tablet (1 × 10 mg tablet)  Last Admin: 18 1553  Dispense Loc:  [facility]       0242 (10 mg)-Given       0759 (10 mg)-Given              1346 (10 mg)-Given               (10 mg)-Given        0302 (10 mg)-Given       0905 (10 mg)-Given       1553 (10 mg)-Given       1645-Med Discontinued            Dose: 20 mg  Freq: 3 TIMES DAILY Route: PO  Start: 18   End: 18 1301   Admin. Amount: 2 tablet (2 × 10 mg tablet)  Last Admin: 18 0904  Dispense Loc:  [facility]        2223 (20 mg)-Given        0850  (20 mg)-Given       1640 (20 mg)-Given       2310 (20 mg)-Given        0904 (20 mg)-Given       1301-Med Discontinued          Dose: 2 g  Freq: 4 TIMES DAILY PRN Route: TD  PRN Reason: moderate pain  Start: 11/10/18 1129   End: 18   Admin Instructions: Apply to bilateral shoulders.  Send dosing card with product.    Admin. Amount: 2 g  Dispense Loc:  Main Pharmacy        1646-Med Discontinued            Dose: 750 mg  Freq: EVERY 24 HOURS Route: PO  Indications of Use: COMMUNITY ACQUIRED PNEUMONIA  Start: 11/10/18 2100   End: 11/15/18 0730   Admin. Amount: 1 tablet (1 × 250 mg tablet) + 1 tablet (1 × 500 mg tablet)  Last Admin: 18  Dispense Loc:  [facility]   Mixture Administration Information:   Medication Type Amount   levofloxacin 250 MG TABS Medications 250 mg   levofloxacin 500 MG TABS Medications 500 mg              2118 (750 mg)-Given        2000 (750 mg)-Given        2223 (750 mg)-Given        1459 (750 mg)-Given        2159 (750 mg)-Given        0730-Med Discontinued         Dose: 30 mL  Freq: ONCE Route: PO  Start: 11/15/18 113   End: 11/15/18 112   Admin. Amount: 30 mL  Administrations Remainin  Volume: 30 mL   Mixture Administration Information:   Medication Type Amount   lidocaine (viscous) 2 % SOLN Additives 15 mL   alum & mag hydroxide-simethicone 400-400-40 MG/5ML SUSP Additives 15 mL                   1121-Med Discontinued         Dose: 800 mg  Freq: 3 TIMES DAILY PRN Route: PO  PRN Reasons: muscle soreness,moderate pain  Start: 11/10/18 1129   End: 18   Admin. Amount: 1 tablet (1 × 800 mg tablet)  Last Admin: 18 1553  Dispense Loc:  [facility]      1130 (800 mg)-Given       1922 (800 mg)-Given        0759 (800 mg)-Given       1346 (800 mg)-Given        0905 (800 mg)-Given       1553 (800 mg)-Given       1647-Med Discontinued       Medications 11/09/18 11/10/18 11/11/18 11/12/18 11/13/18 11/14/18 11/15/18

## 2018-11-09 NOTE — IP AVS SNAPSHOT
MRN:[id number]                      After Visit Summary   11/9/2018    Chuck Lopez    MRN: [id number]           Thank you!     Thank you for choosing [facility] for your care. Our goal is always to provide you with excellent care. Hearing back from our patients is one way we can continue to improve our services. Please take a few minutes to complete the written survey that you may receive in the mail after you visit. If you would like to speak to someone directly about your visit please contact Patient Relations at [phone]. Thank you!          Patient Information     Date Of Birth          1940        About your hospital stay     You were admitted on:  November 9, 2018 You last received care in the:  [place] [address] [facility]    You were discharged on:  November 15, 2018        Reason for your hospital stay       You were admitted for COPD exacerbation related to possible pneumonia versus bronchitis.  Your treated with steroids and antibiotics as well as breathing treatments.  Very important that you avoid cigarette smoke and if possible allergic triggers.                  Who to Call     For medical emergencies, please call 911.  For non-urgent questions about your medical care, please call your primary care provider or clinic, [phone]          Attending Provider     Provider Specialty    Mag Carrasco MD Emergency Medicine    [facility]  [facility]       Primary Care Provider Office Phone # Fax #    Pranay Reid [phone]      After Care Instructions     Activity       Your activity upon discharge: activity as tolerated            Diet       Follow this diet upon discharge: Orders Placed This Encounter      Snacks/Supplements Adult: Other; Vanilla shake 2 pm + 2 packets Beneprotein; Between Meals      Room Service      Combination Diet Regular Diet Adult; No Caffeine Diet            General info for  SNF       Length of Stay Estimate: Short Term Care: Estimated # of Days <30  Condition at Discharge: Stable  Level of care:skilled   Rehabilitation Potential: Fair  Admission H&P remains valid and up-to-date: Yes  Recent Chemotherapy: N/A  Use Nursing Home Standing Orders: Yes            Mantoux instructions       Give two-step Mantoux (PPD) Per Facility Policy Yes            Oxygen - Nasal cannula       2-3 Lpm by nasal cannula to keep O2 sats 92% or greater.            Oxygen Adult       Renew Home Oxygen Order  Renew previous prescription.  Expected treatment length is indefinite (99 months).    Attending Provider: Oliver Call  Physician signature: See electronic signature associated with these discharge orders  Date of Order: November 13, 2018                  Follow-up Appointments     Follow-up and recommended labs and tests        Follow up with primary care provider or provider at your TCU for hospital follow- up.  The following labs/tests are recommended: Please review your recent hospital stay and discuss an ongoing plan for medication refills and discuss timing of a repeat chest x-ray in 2-3 weeks to make sure the small nodules/pneumonia noted here are resolving.                  Additional Services     Occupational Therapy Adult Consult       Evaluate and treat as clinically indicated.            Physical Therapy Adult Consult       Evaluate and treat as clinically indicated                  Pending Results     No orders found from 11/7/2018 to 11/10/2018.            Statement of Approval     Ordered          11/15/18 1200  I have reviewed and agree with all the recommendations and orders detailed in this document.  EFFECTIVE NOW     Approved and electronically signed by:  Oliver Call MD           11/15/18 1109  I have reviewed and agree with all the recommendations and orders detailed in this document.  EFFECTIVE NOW     Approved and electronically signed by:   "Oliver Call MD           18 1053  I have reviewed and agree with all the recommendations and orders detailed in this document.  EFFECTIVE NOW     Approved and electronically signed by:  Oliver Call MD             Admission Information     Date & Time Provider Department Dept. Phone    2018 NormanHenry zuniga, DO [place] [address] [facility] [phone]      Your Vitals Were     Blood Pressure Pulse Temperature Respirations Weight Pulse Oximetry    136/51 (BP Location: Left arm) 79 98.2  F (36.8  C) (Oral) 16 55.2 kg (121 lb 11.2 oz) 95%    BMI (Body Mass Index)                   20.25 kg/m2           MyChart Information     [employer] lets you send messages to your doctor, view your test results, renew your prescriptions, schedule appointments and more. To sign up, go to [email or web address] . Click on \"Log in\" on the left side of the screen, which will take you to the Welcome page. Then click on \"Sign up Now\" on the right side of the page.     You will be asked to enter the access code listed below, as well as some personal information. Please follow the directions to create your username and password.     Your access code is: [email or web address]  Expires: 2019  1:03 PM     Your access code will  in 90 days. If you need help or a new code, please call your [place] clinic or [phone].        Care EveryWhere ID     This is your Care EveryWhere ID. This could be used by other organizations to access your [place] medical records  [id number]        Equal Access to Services     [facility]: Hadii hannah aldana hadasho Soaravind, waaxda luqadaha, qaybta kaalmada shana garcia. So [facility] [phone].    ATENCIÓN: Si habla español, tiene a larios disposición servicios gratuitos de asistencia lingüística. Llame al [phone].    We comply with applicable federal civil rights laws and [place] laws. We do not discriminate on the " basis of race, color, national origin, age, disability, sex, sexual orientation, or gender identity.               Review of your medicines      START taking        Dose / Directions    escitalopram 5 MG tablet   Commonly known as:  LEXAPRO   Used for:  Major depressive disorder, remission status unspecified, unspecified whether recurrent        Dose:  5 mg   Start taking on:  11/16/2018   Take 1 tablet (5 mg) by mouth daily   Quantity:  30 tablet   Refills:  0         CONTINUE these medicines which may have CHANGED, or have new prescriptions. If we are uncertain of the size of tablets/capsules you have at home, strength may be listed as something that might have changed.        Dose / Directions    busPIRone 15 MG tablet   Commonly known as:  BUSPAR   This may have changed:    - medication strength  - how much to take  - when to take this   Used for:  Major depressive disorder, remission status unspecified, unspecified whether recurrent        Dose:  15 mg   Take 1 tablet (15 mg) by mouth 3 times daily   Refills:  0       traMADol 50 MG tablet   Commonly known as:  ULTRAM   This may have changed:    - how much to take  - when to take this  - reasons to take this   Used for:  Other chronic pain        Dose:  25 mg   Take 0.5 tablets (25 mg) by mouth every 6 hours as needed for moderate pain or severe pain   Quantity:  10 tablet   Refills:  0         CONTINUE these medicines which have NOT CHANGED        Dose / Directions    atenolol 25 MG tablet   Commonly known as:  TENORMIN   Used for:  Type 2 diabetes mellitus with hyperosmolarity without coma, unspecified long term insulin use status        Dose:  25 mg   Take 1 tablet (25 mg) by mouth daily   Quantity:  30 tablet   Refills:  11       butalbital-acetaminophen-caffeine -40 MG per tablet   Commonly known as:  FIORICET/ESGIC   Used for:  Migraine without aura and without status migrainosus, not intractable        Dose:  1 tablet   Take 1 tablet by mouth every  6 hours as needed for headaches   Quantity:  10 tablet   Refills:  0       fexofenadine 30 MG ODT tab   Commonly known as:  ALLEGRA        Dose:  30 mg   Take 1 tablet (30 mg) by mouth 2 times daily as needed for allergies (congestion)   Refills:  0       FLOMAX 0.4 MG capsule   Generic drug:  tamsulosin        Dose:  0.4 mg   Take 0.4 mg by mouth daily   Refills:  0       fluticasone-salmeterol 500-50 MCG/DOSE diskus inhaler   Commonly known as:  ADVAIR        Dose:  1 puff   Inhale 1 puff into the lungs 2 times daily   Quantity:  3 Inhaler   Refills:  1       guaiFENesin 400 MG Tabs        Dose:  400 mg   Take 400 mg by mouth 3 times daily   Refills:  0       hydrochlorothiazide 25 MG tablet   Commonly known as:  HYDRODIURIL   Used for:  Type 2 diabetes mellitus with hyperosmolarity without coma, unspecified long term insulin use status        TAKE ONE TABLET BY MOUTH EVERY DAY   Quantity:  30 tablet   Refills:  10       * ipratropium - albuterol 0.5 mg/2.5 mg/3 mL 0.5-2.5 (3) MG/3ML neb solution   Commonly known as:  DUONEB        Dose:  1 vial   Take 1 vial (3 mLs) by nebulization 4 times daily   Quantity:  30 vial   Refills:  1       * ipratropium - albuterol 0.5 mg/2.5 mg/3 mL 0.5-2.5 (3) MG/3ML neb solution   Commonly known as:  DUONEB        Dose:  1 vial   Take 1 vial (3 mLs) by nebulization every 4 hours as needed for shortness of breath / dyspnea or wheezing   Quantity:  30 vial   Refills:  1       ketoconazole 2 % shampoo   Commonly known as:  NIZORAL   Used for:  Tinea capitis        Apply to the affected area and wash off after 5 minutes.   Quantity:  120 mL   Refills:  3       loratadine 10 MG tablet   Commonly known as:  CLARITIN   Used for:  Seasonal allergic rhinitis due to pollen        Dose:  10 mg   Take 1 tablet (10 mg) by mouth daily   Quantity:  30 tablet   Refills:  1       melatonin 3 MG tablet   Used for:  Insomnia, unspecified type        Dose:  3 mg   Take 1 tablet (3 mg) by mouth  nightly as needed   Quantity:  30 tablet   Refills:  0       metaxalone 800 MG tablet   Commonly known as:  SKELAXIN        Dose:  800 mg   Take 800 mg by mouth 3 times daily   Refills:  0       potassium chloride SA 20 MEQ CR tablet   Commonly known as:  K-DUR/KLOR-CON M        Dose:  20 mEq   Take 20 mEq by mouth every morning   Refills:  0       tiotropium 18 MCG capsule   Commonly known as:  SPIRIVA HANDIHALER   Used for:  Centrilobular emphysema (H)        INHALE ONE CAPSULE BY MOUTH EVERY DAY   Quantity:  30 capsule   Refills:  11       V-C FORTE Caps   Used for:  Hepatitis C        TAKE ONE CAPSULE BY MOUTH EVERY DAY   Quantity:  90 capsule   Refills:  3       ZANTAC PO        Dose:  75 mg   Take 75 mg by mouth At Bedtime   Refills:  0       * Notice:  This list has 2 medication(s) that are the same as other medications prescribed for you. Read the directions carefully, and ask your doctor or other care provider to review them with you.         Where to get your medicines      These medications were sent to [facility] [place], [place] - [address] [facility]  [address] [facility] [address]     Phone:  [phone]     escitalopram 5 MG tablet         Some of these will need a paper prescription and others can be bought over the counter. Ask your nurse if you have questions.     Bring a paper prescription for each of these medications     butalbital-acetaminophen-caffeine -40 MG per tablet    traMADol 50 MG tablet       You don't need a prescription for these medications     busPIRone 15 MG tablet                Protect others around you: Learn how to safely use, store and throw away your medicines at www.disposemymeds.org.        Information about OPIOIDS     PRESCRIPTION OPIOIDS: WHAT YOU NEED TO KNOW   We gave you an opioid (narcotic) pain medicine. It is important to manage your pain, but opioids are not always the best choice. You should first try all the other  options your care team gave you. Take this medicine for as short a time (and as few doses) as possible.    Some activities can increase your pain, such as bandage changes or therapy sessions. It may help to take your pain medicine 30 to 60 minutes before these activities. Reduce your stress by getting enough sleep, working on hobbies you enjoy and practicing relaxation or meditation. Talk to your care team about ways to manage your pain beyond prescription opioids.    These medicines have risks:    DO NOT drive when on new or higher doses of pain medicine. These medicines can affect your alertness and reaction times, and you could be arrested for driving under the influence (DUI). If you need to use opioids long-term, talk to your care team about driving.    DO NOT operate heavy machinery    DO NOT do any other dangerous activities while taking these medicines.    DO NOT drink any alcohol while taking these medicines.     If the opioid prescribed includes acetaminophen, DO NOT take with any other medicines that contain acetaminophen. Read all labels carefully. Look for the word  acetaminophen  or  Tylenol.  Ask your pharmacist if you have questions or are unsure.    You can get addicted to pain medicines, especially if you have a history of addiction (chemical, alcohol or substance dependence). Talk to your care team about ways to reduce this risk.    All opioids tend to cause constipation. Drink plenty of water and eat foods that have a lot of fiber, such as fruits, vegetables, prune juice, apple juice and high-fiber cereal. Take a laxative (Miralax, milk of magnesia, Colace, Senna) if you don t move your bowels at least every other day. Other side effects include upset stomach, sleepiness, dizziness, throwing up, tolerance (needing more of the medicine to have the same effect), physical dependence and slowed breathing.    Store your pills in a secure place, locked if possible. We will not replace any lost or  stolen medicine. If you don t finish your medicine, please throw away (dispose) as directed by your pharmacist. The [place] Pollution Control Agency has more information about safe disposal: https://www.pca.[facility].[place].us/living-green/managing-unwanted-medications             Medication List: This is a list of all your medications and when to take them. Check marks below indicate your daily home schedule. Keep this list as a reference.      Medications           Morning Afternoon Evening Bedtime As Needed    atenolol 25 MG tablet   Commonly known as:  TENORMIN   Take 1 tablet (25 mg) by mouth daily   Last time this was given:  25 mg on 11/15/2018  8:12 AM   Next Dose Due:  Take tomorrow 11/16/18                                   busPIRone 15 MG tablet   Commonly known as:  BUSPAR   Take 1 tablet (15 mg) by mouth 3 times daily   Last time this was given:  15 mg on 11/15/2018  8:12 AM   Next Dose Due:  Take this afternoon 11/15/18                                         butalbital-acetaminophen-caffeine -40 MG per tablet   Commonly known as:  FIORICET/ESGIC   Take 1 tablet by mouth every 6 hours as needed for headaches   Last time this was given:  1 tablet on 11/14/2018  5:47 PM                                   escitalopram 5 MG tablet   Commonly known as:  LEXAPRO   Take 1 tablet (5 mg) by mouth daily   Start taking on:  11/16/2018   Last time this was given:  5 mg on 11/15/2018  8:12 AM   Next Dose Due:  Take tomorrow morning 11/16/18                                   fexofenadine 30 MG ODT tab   Commonly known as:  ALLEGRA   Take 1 tablet (30 mg) by mouth 2 times daily as needed for allergies (congestion)                                   FLOMAX 0.4 MG capsule   Take 0.4 mg by mouth daily   Last time this was given:  0.4 mg on 11/15/2018  8:12 AM   Generic drug:  tamsulosin   Next Dose Due:  Take tomorrow morning 11/16/18                                   fluticasone-salmeterol 500-50 MCG/DOSE diskus inhaler    Commonly known as:  ADVAIR   Inhale 1 puff into the lungs 2 times daily                                      guaiFENesin 400 MG Tabs   Take 400 mg by mouth 3 times daily                                         hydrochlorothiazide 25 MG tablet   Commonly known as:  HYDRODIURIL   TAKE ONE TABLET BY MOUTH EVERY DAY   Last time this was given:  25 mg on 11/15/2018  8:12 AM   Next Dose Due:  Take tomorrow morning 11/16/18                                   * ipratropium - albuterol 0.5 mg/2.5 mg/3 mL 0.5-2.5 (3) MG/3ML neb solution   Commonly known as:  DUONEB   Take 1 vial (3 mLs) by nebulization 4 times daily   Last time this was given:  3 mLs on 11/12/2018 12:20 PM   Next Dose Due:  Take this afternoon 11/15/18                                            * ipratropium - albuterol 0.5 mg/2.5 mg/3 mL 0.5-2.5 (3) MG/3ML neb solution   Commonly known as:  DUONEB   Take 1 vial (3 mLs) by nebulization every 4 hours as needed for shortness of breath / dyspnea or wheezing   Last time this was given:  3 mLs on 11/12/2018 12:20 PM                                   ketoconazole 2 % shampoo   Commonly known as:  NIZORAL   Apply to the affected area and wash off after 5 minutes.                                   loratadine 10 MG tablet   Commonly known as:  CLARITIN   Take 1 tablet (10 mg) by mouth daily   Last time this was given:  10 mg on 11/15/2018  8:12 AM   Next Dose Due:  Take tomorrow morning 11/16/18                                   melatonin 3 MG tablet   Take 1 tablet (3 mg) by mouth nightly as needed   Last time this was given:  1 mg on 11/11/2018 10:50 PM                                   metaxalone 800 MG tablet   Commonly known as:  SKELAXIN   Take 800 mg by mouth 3 times daily   Last time this was given:  800 mg on 11/12/2018  3:53 PM                                potassium chloride SA 20 MEQ CR tablet   Commonly known as:  K-DUR/KLOR-CON M   Take 20 mEq by mouth every morning   Last time this was given:  20 mEq  on 11/15/2018  8:12 AM                                tiotropium 18 MCG capsule   Commonly known as:  SPIRIVA HANDIHALER   INHALE ONE CAPSULE BY MOUTH EVERY DAY                                   traMADol 50 MG tablet   Commonly known as:  ULTRAM   Take 0.5 tablets (25 mg) by mouth every 6 hours as needed for moderate pain or severe pain   Last time this was given:  25 mg on 11/15/2018 12:56 AM                                   V-C FORTE Caps   TAKE ONE CAPSULE BY MOUTH EVERY DAY                                ZANTAC PO   Take 75 mg by mouth At Bedtime                                   * Notice:  This list has 2 medication(s) that are the same as other medications prescribed for you. Read the directions carefully, and ask your doctor or other care provider to review them with you.              More Information        Discharge Instructions: COPD  You have been diagnosed with chronic obstructive pulmonary disease (COPD). This is a name given to a group of diseases that limit the flow of air in and out of your lungs. This makes it harder to breathe. With COPD, you are also more likely to get lung infections. COPD includes chronic bronchitis and emphysema. COPD is most often caused by heavy, long-term cigarette smoking.  Home care  Quit smoking    If you smoke, quit. It is the best thing you can do for your COPD and your overall health.    Join a stop-smoking program. There are even telephone, text message, and Internet programs to help you quit.    Ask your healthcare provider about medicines or other methods to help you quit.    Ask family members to quit smoking as well.    Don't allow people to smoke in your home, in your car, or when they are around you.  Protect yourself from infection    Wash your hands often. Do your best to keep your hands away from your face. Most germs are spread from your hands to your mouth.    Get a flu shot every year. Also ask your provider about pneumonia vaccines.    Avoid crowds. It's  especially important to do this in the winter when more people have colds and flu.    To stay healthy, get enough sleep, exercise regularly, and eat a balanced diet. You should:  ? Get about 8 hours of sleep every night.  ? Try to exercise for at least 30 minutes on most days.  ? Have healthy foods including fruits and vegetables, 100% whole grains, lean meats and fish, and low-fat dairy products. Try to stay away from foods high in fats and sugar.  Take your medicines  Take your medicines exactly as directed. Don't skip doses.  Manage your stress  Stress can make COPD worse. Use this stress management technique:    Find a quiet place and sit or lie in a comfortable position.    Close your eyes and perform breathing exercises for several minutes. Ask your provider about the best way to breathe.  Pulmonary rehabilitation    Pulmonary rehab can help you feel better. These programs include exercise, breathing techniques, information about COPD, counseling, and help for smokers.    Ask your provider or your local hospital about programs in your area.  When to call your healthcare provider  Call your provider immediately if you have any of the following:    Shortness of breath, wheezing, or coughing    Increased mucus    Yellow, green, bloody, or smelly mucus    Fever or chills    Tightness in your chest that does not go away with rest or medicine    An irregular heartbeat or a feeling that your heart is beating very fast    Swollen ankles   Date Last Reviewed: 5/1/2016 2000-2018 [employer]. [address] [place] [address]. All rights reserved. This information is not intended as a substitute for professional medical care. Always follow your healthcare professional's instructions.

## 2018-11-09 NOTE — LETTER
Transition Communication Hand-off for Care Transitions to Next Level of Care Provider    Hand-off for Care Transitions to Next Level of Care Provider  Name: Chuck Lopez  : 1940  MRN #: [id number]  Reason for Hospitalization:  Hyponatremia [E87.1]  Pulmonary nodules [R91.8]  COPD exacerbation (H) [J44.1]  Admit Date/Time: 2018  5:37 PM  Discharge Date: 11/15/18    Reason for Communication Hand-off Referral: Admission diagnoses: PN  Admission diagnoses: COPD    Discharge Plan:  Discharged to: TCU:  [facility]                   Patient agreeable to post-hospital support suggestions:  Yes    Patient is on new medications:   Yes    MTM follow up recommended: No    Tel-Assurance program:  Ineligible    Patient will receive  TCU  at:  discharge to [facility].     Concern for non-adherence with plan of care:  No.     Follow-up specialty is recommended: Yes. Follow up with Occupational & Physical Therapy while at TCU.     Follow-up plan:  No future appointments.    Any outstanding tests or procedures:    Procedures     Future Labs/Procedures    Oxygen - Nasal cannula     Comments:    2-3 Lpm by nasal cannula to keep O2 sats 92% or greater.    Oxygen Adult     Comments:    Renew Home Oxygen Order  Renew previous prescription.  Expected treatment length is indefinite (99 months).    Attending Provider: Oliver Call  Physician signature: See electronic signature associated with these discharge orders  Date of Order: 2018          Referrals     Future Labs/Procedures    Occupational Therapy Adult Consult     Comments:    Evaluate and treat as clinically indicated.    Physical Therapy Adult Consult     Comments:    Evaluate and treat as clinically indicated          Key Recommendations: He was living at [facility] but states he left d/t the fragrance policy at the facility. He is allergic to fragrances and most staff wears perfume or scented  lotions. He states his large oxygen concentrator is still there. He has a 4WW in the room with a portable O2 concentrator on the seat. When CM asked who is his oxygen provider, he states he doesn't know. Later, during his admission, he stated to  that [facility] provides his oxygen. Please assess his progress after continued PT & OT while at TCU.     Communicated handoff via EPIC Comm Mgt to Dr. Pranay Reid's CC at  Care Coord.      Elina Vázquez RN, BSN, CTS  [facility]  [phone]    AVS/Discharge Summary is the source of truth; this is a helpful guide for improved communication of patient story

## 2018-11-09 NOTE — Clinical Note
Admitting Physician: TRE MORRISON [[id number]]   [id number]   Bed Type: Observation Unit [54]   Special needs: Fall Risk [8]   Bed request comments: OBS bed request [id number], 2 p assist

## 2018-11-09 NOTE — IP AVS SNAPSHOT
"    [place] [address] [facility]: [phone]                                              INTERAGENCY TRANSFER FORM - PHYSICIAN ORDERS   2018                    Hospital Admission Date: 2018  TIMI MAK   : 1940  Sex: Male        Attending Provider: Henry Frye DO     Allergies:  Ammonium Lactate, Doxycycline, Monosodium Glutamate, Penicillins    Infection:  None   Service:  GENERAL MEDI    Ht:  1.651 m (5' 5\")   Wt:  55.2 kg (121 lb 11.2 oz)   Admission Wt:  55.7 kg (122 lb 12.8 oz)    BMI:  20.25 kg/m 2   BSA:  1.59 m 2            Patient PCP Information     Provider PCP Type    Pranay Reid MD General      ED Clinical Impression     Diagnosis Description Comment Added By Time Added    COPD exacerbation (H) [J44.1] COPD exacerbation (H) [J44.1]  Mag Carrasco MD 2018  9:22 PM    Hyponatremia [E87.1] Hyponatremia [E87.1]  Mag Carrasco MD 2018  9:22 PM    Pulmonary nodules [R91.8] Pulmonary nodules [R91.8]  Mag Carrasco MD 2018  9:22 PM      Hospital Problems as of 11/15/2018              Priority Class Noted POA    COPD exacerbation (H) Medium  11/10/2018 Yes      Non-Hospital Problems as of 11/15/2018              Priority Class Noted    Pulmonary emphysema (H) Medium  2009    Elevated liver function tests Medium  2010    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) Medium  2010    CARDIOVASCULAR SCREENING; LDL GOAL LESS THAN 100 Medium  2011    CKD (chronic kidney disease) stage 2, GFR 60-89 ml/min Medium  2012    Advance Care Planning Medium  2012    COPD (chronic obstructive pulmonary disease) (H) Medium  Unknown    Hypertension goal BP (blood pressure) < 140/90 Medium  2013    Chronic hepatitis C (H) Medium  3/11/2014    Other chronic pain Medium  2015    Physical deconditioning Medium  2017    Malnutrition (H) Medium  2017    Acute exacerbation of COPD with asthma (H) Medium  3/28/2017    " Acute respiratory failure (H) Medium  4/18/2017    COPD with hypoxia (H) Medium  5/13/2017    Migraine without aura and without status migrainosus, not intractable Medium  6/26/2017      Code Status History     Date Active Date Inactive Code Status Order ID Comments User Context    11/15/2018 10:55 AM  Full Code [id number]  Oliver Call MD Outpatient    11/10/2018  1:46 AM 11/15/2018 10:55 AM Full Code [id number]  Henry Frye DO Inpatient    9/11/2017 10:53 AM 11/10/2018  1:46 AM Full Code [id number]  Jori Doe MD Outpatient    9/5/2017  3:46 PM 9/11/2017 10:53 AM Full Code [id number]  Yasmin Wei PA Inpatient    5/13/2017  4:27 AM 5/14/2017  3:51 PM Full Code [id number]  Patsy John MD Inpatient    4/18/2017  7:26 PM 4/24/2017  2:58 PM Full Code [id number]  Kyleigh Dey MD Inpatient    4/4/2017  8:51 AM 4/18/2017  7:26 PM Full Code [id number]  Oneyda Mcdonald MD Outpatient    4/3/2017  8:56 AM 4/4/2017  8:51 AM Full Code [id number]  Oneyda Mcdonald MD Outpatient    3/28/2017  3:35 AM 4/3/2017  8:56 AM Full Code [id number]  Alice Carnes MD Inpatient    1/12/2017  9:49 AM 3/28/2017  3:35 AM Full Code [id number]  Joe Syed MD Outpatient    1/5/2017  2:42 PM 1/12/2017  9:49 AM Full Code [id number]  Jean Paul Latif MD Inpatient    11/26/2016 10:47 AM 1/5/2017  2:42 PM Full Code [id number]  Marley Huerta MD Outpatient    11/22/2016  4:29 AM 11/26/2016 10:47 AM Full Code [id number]  Anthony Rosa MD Inpatient         Medication Review      START taking        Dose / Directions Comments    escitalopram 5 MG tablet   Commonly known as:  LEXAPRO   Used for:  Major depressive disorder, remission status unspecified, unspecified whether recurrent        Dose:  5 mg   Start taking on:  11/16/2018   Take 1 tablet (5 mg) by mouth daily   Quantity:  30 tablet   Refills:  0          CONTINUE these medications which may have CHANGED,  or have new prescriptions. If we are uncertain of the size of tablets/capsules you have at home, strength may be listed as something that might have changed.        Dose / Directions Comments    busPIRone 15 MG tablet   Commonly known as:  BUSPAR   This may have changed:    - medication strength  - how much to take  - when to take this   Used for:  Major depressive disorder, remission status unspecified, unspecified whether recurrent        Dose:  15 mg   Take 1 tablet (15 mg) by mouth 3 times daily   Refills:  0        traMADol 50 MG tablet   Commonly known as:  ULTRAM   This may have changed:    - how much to take  - when to take this  - reasons to take this   Used for:  Other chronic pain        Dose:  25 mg   Take 0.5 tablets (25 mg) by mouth every 6 hours as needed for moderate pain or severe pain   Quantity:  10 tablet   Refills:  0          CONTINUE these medications which have NOT CHANGED        Dose / Directions Comments    atenolol 25 MG tablet   Commonly known as:  TENORMIN   Used for:  Type 2 diabetes mellitus with hyperosmolarity without coma, unspecified long term insulin use status        Dose:  25 mg   Take 1 tablet (25 mg) by mouth daily   Quantity:  30 tablet   Refills:  11        butalbital-acetaminophen-caffeine -40 MG per tablet   Commonly known as:  FIORICET/ESGIC   Used for:  Migraine without aura and without status migrainosus, not intractable        Dose:  1 tablet   Take 1 tablet by mouth every 6 hours as needed for headaches   Quantity:  10 tablet   Refills:  0        fexofenadine 30 MG ODT tab   Commonly known as:  ALLEGRA        Dose:  30 mg   Take 1 tablet (30 mg) by mouth 2 times daily as needed for allergies (congestion)   Refills:  0        FLOMAX 0.4 MG capsule   Generic drug:  tamsulosin        Dose:  0.4 mg   Take 0.4 mg by mouth daily   Refills:  0        fluticasone-salmeterol 500-50 MCG/DOSE diskus inhaler   Commonly known as:  ADVAIR        Dose:  1 puff   Inhale 1 puff  into the lungs 2 times daily   Quantity:  3 Inhaler   Refills:  1        guaiFENesin 400 MG Tabs        Dose:  400 mg   Take 400 mg by mouth 3 times daily   Refills:  0        hydrochlorothiazide 25 MG tablet   Commonly known as:  HYDRODIURIL   Used for:  Type 2 diabetes mellitus with hyperosmolarity without coma, unspecified long term insulin use status        TAKE ONE TABLET BY MOUTH EVERY DAY   Quantity:  30 tablet   Refills:  10        * ipratropium - albuterol 0.5 mg/2.5 mg/3 mL 0.5-2.5 (3) MG/3ML neb solution   Commonly known as:  DUONEB        Dose:  1 vial   Take 1 vial (3 mLs) by nebulization 4 times daily   Quantity:  30 vial   Refills:  1        * ipratropium - albuterol 0.5 mg/2.5 mg/3 mL 0.5-2.5 (3) MG/3ML neb solution   Commonly known as:  DUONEB        Dose:  1 vial   Take 1 vial (3 mLs) by nebulization every 4 hours as needed for shortness of breath / dyspnea or wheezing   Quantity:  30 vial   Refills:  1        ketoconazole 2 % shampoo   Commonly known as:  NIZORAL   Used for:  Tinea capitis        Apply to the affected area and wash off after 5 minutes.   Quantity:  120 mL   Refills:  3        loratadine 10 MG tablet   Commonly known as:  CLARITIN   Used for:  Seasonal allergic rhinitis due to pollen        Dose:  10 mg   Take 1 tablet (10 mg) by mouth daily   Quantity:  30 tablet   Refills:  1        melatonin 3 MG tablet   Used for:  Insomnia, unspecified type        Dose:  3 mg   Take 1 tablet (3 mg) by mouth nightly as needed   Quantity:  30 tablet   Refills:  0        metaxalone 800 MG tablet   Commonly known as:  SKELAXIN        Dose:  800 mg   Take 800 mg by mouth 3 times daily   Refills:  0        potassium chloride SA 20 MEQ CR tablet   Commonly known as:  K-DUR/KLOR-CON M        Dose:  20 mEq   Take 20 mEq by mouth every morning   Refills:  0        tiotropium 18 MCG capsule   Commonly known as:  SPIRIVA HANDIHALER   Used for:  Centrilobular emphysema (H)        INHALE ONE CAPSULE BY  MOUTH EVERY DAY   Quantity:  30 capsule   Refills:  11        V-C FORTE Caps   Used for:  Hepatitis C        TAKE ONE CAPSULE BY MOUTH EVERY DAY   Quantity:  90 capsule   Refills:  3        ZANTAC PO        Dose:  75 mg   Take 75 mg by mouth At Bedtime   Refills:  0        * Notice:  This list has 2 medication(s) that are the same as other medications prescribed for you. Read the directions carefully, and ask your doctor or other care provider to review them with you.            Summary of Visit     Reason for your hospital stay       You were admitted for COPD exacerbation related to possible pneumonia versus bronchitis.  Your treated with steroids and antibiotics as well as breathing treatments.  Very important that you avoid cigarette smoke and if possible allergic triggers.             After Care     Activity       Your activity upon discharge: activity as tolerated       Diet       Follow this diet upon discharge: Orders Placed This Encounter      Snacks/Supplements Adult: Other; Vanilla shake 2 pm + 2 packets Beneprotein; Between Meals      Room Service      Combination Diet Regular Diet Adult; No Caffeine Diet       General info for SNF       Length of Stay Estimate: Short Term Care: Estimated # of Days <30  Condition at Discharge: Stable  Level of care:skilled   Rehabilitation Potential: Fair  Admission H&P remains valid and up-to-date: Yes  Recent Chemotherapy: N/A  Use Nursing Home Standing Orders: Yes       Mantoux instructions       Give two-step Mantoux (PPD) Per Facility Policy Yes             Procedures     Oxygen - Nasal cannula       2-3 Lpm by nasal cannula to keep O2 sats 92% or greater.       Oxygen Adult       Renew Home Oxygen Order  Renew previous prescription.  Expected treatment length is indefinite (99 months).    Attending Provider: Oliver Call  Physician signature: See electronic signature associated with these discharge orders  Date of Order: November 13, 2018              Referrals     Occupational Therapy Adult Consult       Evaluate and treat as clinically indicated.       Physical Therapy Adult Consult       Evaluate and treat as clinically indicated             Follow-Up Appointment Instructions     Future Labs/Procedures    Follow-up and recommended labs and tests      Comments:    Follow up with primary care provider or provider at your TCU for hospital follow- up.  The following labs/tests are recommended: Please review your recent hospital stay and discuss an ongoing plan for medication refills and discuss timing of a repeat chest x-ray in 2-3 weeks to make sure the small nodules/pneumonia noted here are resolving.      Follow-Up Appointment Instructions     Follow-up and recommended labs and tests        Follow up with primary care provider or provider at your TCU for hospital follow- up.  The following labs/tests are recommended: Please review your recent hospital stay and discuss an ongoing plan for medication refills and discuss timing of a repeat chest x-ray in 2-3 weeks to make sure the small nodules/pneumonia noted here are resolving.             Statement of Approval     Ordered          11/15/18 1200  I have reviewed and agree with all the recommendations and orders detailed in this document.  EFFECTIVE NOW     Approved and electronically signed by:  Oliver Call MD           11/15/18 1109  I have reviewed and agree with all the recommendations and orders detailed in this document.  EFFECTIVE NOW     Approved and electronically signed by:  Oliver Call MD           11/13/18 1053  I have reviewed and agree with all the recommendations and orders detailed in this document.  EFFECTIVE NOW     Approved and electronically signed by:  Oliver Call MD

## 2018-11-09 NOTE — LETTER
Transition Communication Hand-off for Care Transitions to Next Level of Care Provider    Name: Chuck Lopez  : 1940  MRN #: [id number]  Primary Care Provider: Pranay Reid  Primary Care MD Name: TBD  Primary Clinic: [address] [facility] [address]  Primary Care Clinic Name: [facility]  Reason for Hospitalization:  Hyponatremia [E87.1]  Pulmonary nodules [R91.8]  COPD exacerbation (H) [J44.1]  Admit Date/Time: 2018  5:37 PM    Key Recommendations:  He was living at [facility] but states he left d/t the fragrance policy at the facility. He is allergic to fragrances and most staff wears perfume or scented lotions. He states his large oxygen concentrator is still there. He has a 4WW in the room with a portable O2 concentrator on the seat. When CM asked who is his oxygen provider, he states he doesn't have one. His responses are scattered and non-specific. He keeps returning to the fragrance situation and does not give CM direct answers to questions asked.        Ivett Vázquez    AVS/Discharge Summary is the source of truth; this is a helpful guide for improved communication of patient story

## 2018-11-09 NOTE — IP AVS SNAPSHOT
[place] [address] [facility]    [address]    [facility] [address]    Phone:  [phone]    Fax:  [phone]                                       After Visit Summary   11/9/2018    Chuck Lopez    MRN: [id number]           After Visit Summary Signature Page     I have received my discharge instructions, and my questions have been answered. I have discussed any challenges I see with this plan with the nurse or doctor.    ..........................................................................................................................................  Patient/Patient Representative Signature      ..........................................................................................................................................  Patient Representative Print Name and Relationship to Patient    ..................................................               ................................................  Date                                   Time    ..........................................................................................................................................  Reviewed by Signature/Title    ...................................................              ..............................................  Date                                               Time          22EPIC Rev 08/18

## 2018-11-09 NOTE — TELEPHONE ENCOUNTER
"  Duplicate- sent - info sent to pharmacy.  Alma Delia Parker,RN  [facility]  [phone]    Last Written Prescription Date:  11/09/18  Last Fill Quantity: 30,  # refills: 1   Last office visit: 8/15/2018 with prescribing provider:  \   Future Office Visit:    Requested Prescriptions   Pending Prescriptions Disp Refills     loratadine (CLARITIN) 10 MG tablet [Pharmacy Med Name: LORATADINE 10MG TABS] 30 tablet 1     Sig: TAKE ONE TABLET BY MOUTH EVERY DAY    Antihistamines Protocol Failed    11/8/2018  2:02 PM       Failed - Patient is 3-64 years of age    Apply weight-based dosing for peds patients age 3 - 12 years of age.    Forward request to provider for patients under the age of 3 or over the age of 64.         Passed - Recent (12 mo) or future (30 days) visit within the authorizing provider's specialty    Patient had office visit in the last 12 months or has a visit in the next 30 days with authorizing provider or within the authorizing provider's specialty.  See \"Patient Info\" tab in inbasket, or \"Choose Columns\" in Meds & Orders section of the refill encounter.                "

## 2018-11-10 LAB
ANION GAP SERPL CALCULATED.3IONS-SCNC: 6 MMOL/L (ref 3–14)
BACTERIA SPEC CULT: ABNORMAL
BUN SERPL-MCNC: 12 MG/DL (ref 7–30)
CALCIUM SERPL-MCNC: 8.4 MG/DL (ref 8.5–10.1)
CHLORIDE SERPL-SCNC: 100 MMOL/L (ref 94–109)
CO2 SERPL-SCNC: 29 MMOL/L (ref 20–32)
CREAT SERPL-MCNC: 0.71 MG/DL (ref 0.66–1.25)
ERYTHROCYTE [DISTWIDTH] IN BLOOD BY AUTOMATED COUNT: 12.7 % (ref 10–15)
GFR SERPL CREATININE-BSD FRML MDRD: >90 ML/MIN/1.7M2
GLUCOSE SERPL-MCNC: 100 MG/DL (ref 70–99)
GRAM STN SPEC: ABNORMAL
HCT VFR BLD AUTO: 37.3 % (ref 40–53)
HGB BLD-MCNC: 12.4 G/DL (ref 13.3–17.7)
INTERPRETATION ECG - MUSE: NORMAL
MCH RBC QN AUTO: 32.9 PG (ref 26.5–33)
MCHC RBC AUTO-ENTMCNC: 33.2 G/DL (ref 31.5–36.5)
MCV RBC AUTO: 99 FL (ref 78–100)
PLATELET # BLD AUTO: 183 10E9/L (ref 150–450)
POTASSIUM SERPL-SCNC: 4 MMOL/L (ref 3.4–5.3)
PROCALCITONIN SERPL-MCNC: 0.05 NG/ML
RBC # BLD AUTO: 3.77 10E12/L (ref 4.4–5.9)
SODIUM SERPL-SCNC: 135 MMOL/L (ref 133–144)
SPECIMEN SOURCE: ABNORMAL
SPECIMEN SOURCE: ABNORMAL
WBC # BLD AUTO: 8.1 10E9/L (ref 4–11)

## 2018-11-10 PROCEDURE — 12000007 ZZH R&B INTERMEDIATE

## 2018-11-10 PROCEDURE — 94640 AIRWAY INHALATION TREATMENT: CPT

## 2018-11-10 PROCEDURE — 99223 1ST HOSP IP/OBS HIGH 75: CPT | Mod: AI | Performed by: INTERNAL MEDICINE

## 2018-11-10 PROCEDURE — 25000132 ZZH RX MED GY IP 250 OP 250 PS 637: Mod: GY | Performed by: INTERNAL MEDICINE

## 2018-11-10 PROCEDURE — 25000131 ZZH RX MED GY IP 250 OP 636 PS 637: Mod: GY | Performed by: INTERNAL MEDICINE

## 2018-11-10 PROCEDURE — 36415 COLL VENOUS BLD VENIPUNCTURE: CPT | Performed by: INTERNAL MEDICINE

## 2018-11-10 PROCEDURE — 25000125 ZZHC RX 250: Performed by: INTERNAL MEDICINE

## 2018-11-10 PROCEDURE — 85027 COMPLETE CBC AUTOMATED: CPT | Performed by: INTERNAL MEDICINE

## 2018-11-10 PROCEDURE — A9270 NON-COVERED ITEM OR SERVICE: HCPCS | Mod: GY | Performed by: INTERNAL MEDICINE

## 2018-11-10 PROCEDURE — 25000128 H RX IP 250 OP 636: Performed by: INTERNAL MEDICINE

## 2018-11-10 PROCEDURE — 80048 BASIC METABOLIC PNL TOTAL CA: CPT | Performed by: INTERNAL MEDICINE

## 2018-11-10 PROCEDURE — 40000275 ZZH STATISTIC RCP TIME EA 10 MIN

## 2018-11-10 PROCEDURE — 87205 SMEAR GRAM STAIN: CPT | Performed by: INTERNAL MEDICINE

## 2018-11-10 PROCEDURE — 94640 AIRWAY INHALATION TREATMENT: CPT | Mod: 76

## 2018-11-10 PROCEDURE — 40000274 ZZH STATISTIC RCP CONSULT EA 30 MIN

## 2018-11-10 PROCEDURE — 84145 PROCALCITONIN (PCT): CPT | Performed by: INTERNAL MEDICINE

## 2018-11-10 RX ORDER — TAMSULOSIN HYDROCHLORIDE 0.4 MG/1
0.4 CAPSULE ORAL DAILY
Status: DISCONTINUED | OUTPATIENT
Start: 2018-11-10 | End: 2018-11-10

## 2018-11-10 RX ORDER — BUSPIRONE HYDROCHLORIDE 10 MG/1
10 TABLET ORAL 4 TIMES DAILY
Status: DISCONTINUED | OUTPATIENT
Start: 2018-11-10 | End: 2018-11-11

## 2018-11-10 RX ORDER — AMOXICILLIN 250 MG
2 CAPSULE ORAL 2 TIMES DAILY PRN
Status: DISCONTINUED | OUTPATIENT
Start: 2018-11-10 | End: 2018-11-15 | Stop reason: HOSPADM

## 2018-11-10 RX ORDER — ONDANSETRON 4 MG/1
4 TABLET, ORALLY DISINTEGRATING ORAL EVERY 6 HOURS PRN
Status: DISCONTINUED | OUTPATIENT
Start: 2018-11-10 | End: 2018-11-15 | Stop reason: HOSPADM

## 2018-11-10 RX ORDER — BISACODYL 10 MG
10 SUPPOSITORY, RECTAL RECTAL DAILY PRN
Status: DISCONTINUED | OUTPATIENT
Start: 2018-11-10 | End: 2018-11-15 | Stop reason: HOSPADM

## 2018-11-10 RX ORDER — HYDROCHLOROTHIAZIDE 25 MG/1
25 TABLET ORAL DAILY
Status: DISCONTINUED | OUTPATIENT
Start: 2018-11-10 | End: 2018-11-15 | Stop reason: HOSPADM

## 2018-11-10 RX ORDER — SODIUM CHLORIDE 9 MG/ML
INJECTION, SOLUTION INTRAVENOUS CONTINUOUS
Status: DISCONTINUED | OUTPATIENT
Start: 2018-11-10 | End: 2018-11-10

## 2018-11-10 RX ORDER — ACETAMINOPHEN 325 MG/1
650 TABLET ORAL EVERY 4 HOURS PRN
Status: DISCONTINUED | OUTPATIENT
Start: 2018-11-10 | End: 2018-11-15 | Stop reason: HOSPADM

## 2018-11-10 RX ORDER — LORATADINE 10 MG/1
10 TABLET ORAL DAILY
Status: DISCONTINUED | OUTPATIENT
Start: 2018-11-10 | End: 2018-11-15 | Stop reason: HOSPADM

## 2018-11-10 RX ORDER — IPRATROPIUM BROMIDE AND ALBUTEROL SULFATE 2.5; .5 MG/3ML; MG/3ML
3 SOLUTION RESPIRATORY (INHALATION) 4 TIMES DAILY
Status: DISCONTINUED | OUTPATIENT
Start: 2018-11-10 | End: 2018-11-12

## 2018-11-10 RX ORDER — ATENOLOL 25 MG/1
25 TABLET ORAL DAILY
Status: DISCONTINUED | OUTPATIENT
Start: 2018-11-10 | End: 2018-11-15 | Stop reason: HOSPADM

## 2018-11-10 RX ORDER — PROCHLORPERAZINE MALEATE 5 MG
5 TABLET ORAL EVERY 6 HOURS PRN
Status: DISCONTINUED | OUTPATIENT
Start: 2018-11-10 | End: 2018-11-10

## 2018-11-10 RX ORDER — ALBUTEROL SULFATE 0.83 MG/ML
3 SOLUTION RESPIRATORY (INHALATION)
Status: DISCONTINUED | OUTPATIENT
Start: 2018-11-10 | End: 2018-11-15 | Stop reason: HOSPADM

## 2018-11-10 RX ORDER — TAMSULOSIN HYDROCHLORIDE 0.4 MG/1
0.4 CAPSULE ORAL DAILY
Status: DISCONTINUED | OUTPATIENT
Start: 2018-11-10 | End: 2018-11-15 | Stop reason: HOSPADM

## 2018-11-10 RX ORDER — ONDANSETRON 2 MG/ML
4 INJECTION INTRAMUSCULAR; INTRAVENOUS EVERY 6 HOURS PRN
Status: DISCONTINUED | OUTPATIENT
Start: 2018-11-10 | End: 2018-11-15 | Stop reason: HOSPADM

## 2018-11-10 RX ORDER — ACETAMINOPHEN 650 MG/1
650 SUPPOSITORY RECTAL EVERY 4 HOURS PRN
Status: DISCONTINUED | OUTPATIENT
Start: 2018-11-10 | End: 2018-11-15 | Stop reason: HOSPADM

## 2018-11-10 RX ORDER — POTASSIUM CHLORIDE 1500 MG/1
20 TABLET, EXTENDED RELEASE ORAL EVERY MORNING
Status: DISCONTINUED | OUTPATIENT
Start: 2018-11-10 | End: 2018-11-15 | Stop reason: HOSPADM

## 2018-11-10 RX ORDER — AMOXICILLIN 250 MG
1 CAPSULE ORAL 2 TIMES DAILY PRN
Status: DISCONTINUED | OUTPATIENT
Start: 2018-11-10 | End: 2018-11-15 | Stop reason: HOSPADM

## 2018-11-10 RX ORDER — NALOXONE HYDROCHLORIDE 0.4 MG/ML
.1-.4 INJECTION, SOLUTION INTRAMUSCULAR; INTRAVENOUS; SUBCUTANEOUS
Status: DISCONTINUED | OUTPATIENT
Start: 2018-11-10 | End: 2018-11-15 | Stop reason: HOSPADM

## 2018-11-10 RX ORDER — POLYETHYLENE GLYCOL 3350 17 G/17G
17 POWDER, FOR SOLUTION ORAL DAILY PRN
Status: DISCONTINUED | OUTPATIENT
Start: 2018-11-10 | End: 2018-11-15 | Stop reason: HOSPADM

## 2018-11-10 RX ORDER — PROCHLORPERAZINE 25 MG
12.5 SUPPOSITORY, RECTAL RECTAL EVERY 12 HOURS PRN
Status: DISCONTINUED | OUTPATIENT
Start: 2018-11-10 | End: 2018-11-10

## 2018-11-10 RX ORDER — BUTALBITAL, ACETAMINOPHEN AND CAFFEINE 50; 325; 40 MG/1; MG/1; MG/1
1 TABLET ORAL EVERY 6 HOURS PRN
Status: DISCONTINUED | OUTPATIENT
Start: 2018-11-10 | End: 2018-11-15 | Stop reason: HOSPADM

## 2018-11-10 RX ORDER — LEVOFLOXACIN 250 MG/1
250 TABLET, FILM COATED ORAL ONCE
Status: COMPLETED | OUTPATIENT
Start: 2018-11-10 | End: 2018-11-10

## 2018-11-10 RX ORDER — METAXALONE 800 MG/1
800 TABLET ORAL 3 TIMES DAILY
Status: DISCONTINUED | OUTPATIENT
Start: 2018-11-10 | End: 2018-11-10

## 2018-11-10 RX ORDER — MULTIPLE VITAMINS W/ MINERALS TAB 9MG-400MCG
1 TAB ORAL DAILY
Status: DISCONTINUED | OUTPATIENT
Start: 2018-11-10 | End: 2018-11-15 | Stop reason: HOSPADM

## 2018-11-10 RX ORDER — METAXALONE 800 MG/1
800 TABLET ORAL 3 TIMES DAILY PRN
Status: DISCONTINUED | OUTPATIENT
Start: 2018-11-10 | End: 2018-11-12

## 2018-11-10 RX ADMIN — DEXTRAN 70, AND HYPROMELLOSE 2910 2 DROP: 1; 3 SOLUTION/ DROPS OPHTHALMIC at 17:45

## 2018-11-10 RX ADMIN — LEVOFLOXACIN 750 MG: 500 TABLET, FILM COATED ORAL at 21:18

## 2018-11-10 RX ADMIN — BUTALBITAL, ACETAMINOPHEN, AND CAFFEINE 1 TABLET: 50; 325; 40 TABLET ORAL at 11:30

## 2018-11-10 RX ADMIN — IPRATROPIUM BROMIDE AND ALBUTEROL SULFATE 3 ML: .5; 3 SOLUTION RESPIRATORY (INHALATION) at 11:10

## 2018-11-10 RX ADMIN — POTASSIUM CHLORIDE 20 MEQ: 1500 TABLET, EXTENDED RELEASE ORAL at 10:50

## 2018-11-10 RX ADMIN — BUSPIRONE HYDROCHLORIDE 10 MG: 10 TABLET ORAL at 17:43

## 2018-11-10 RX ADMIN — IPRATROPIUM BROMIDE AND ALBUTEROL SULFATE 3 ML: .5; 3 SOLUTION RESPIRATORY (INHALATION) at 07:58

## 2018-11-10 RX ADMIN — BUSPIRONE HYDROCHLORIDE 10 MG: 10 TABLET ORAL at 10:50

## 2018-11-10 RX ADMIN — ATENOLOL 25 MG: 25 TABLET ORAL at 10:50

## 2018-11-10 RX ADMIN — METAXALONE 800 MG: 800 TABLET ORAL at 11:30

## 2018-11-10 RX ADMIN — TRAMADOL HYDROCHLORIDE 25 MG: 50 TABLET, FILM COATED ORAL at 17:42

## 2018-11-10 RX ADMIN — FLUTICASONE FUROATE AND VILANTEROL TRIFENATATE 1 PUFF: 200; 25 POWDER RESPIRATORY (INHALATION) at 11:12

## 2018-11-10 RX ADMIN — METAXALONE 800 MG: 800 TABLET ORAL at 19:22

## 2018-11-10 RX ADMIN — IPRATROPIUM BROMIDE AND ALBUTEROL SULFATE 3 ML: .5; 3 SOLUTION RESPIRATORY (INHALATION) at 15:11

## 2018-11-10 RX ADMIN — PREDNISONE 60 MG: 50 TABLET ORAL at 10:50

## 2018-11-10 RX ADMIN — BUTALBITAL, ACETAMINOPHEN, AND CAFFEINE 1 TABLET: 50; 325; 40 TABLET ORAL at 17:42

## 2018-11-10 RX ADMIN — ACETAMINOPHEN 650 MG: 325 TABLET, FILM COATED ORAL at 03:56

## 2018-11-10 RX ADMIN — IPRATROPIUM BROMIDE AND ALBUTEROL SULFATE 3 ML: .5; 3 SOLUTION RESPIRATORY (INHALATION) at 20:54

## 2018-11-10 RX ADMIN — RANITIDINE 75 MG: 75 TABLET, FILM COATED ORAL at 21:18

## 2018-11-10 RX ADMIN — BUSPIRONE HYDROCHLORIDE 10 MG: 10 TABLET ORAL at 12:07

## 2018-11-10 RX ADMIN — LEVOFLOXACIN 250 MG: 250 TABLET, FILM COATED ORAL at 03:56

## 2018-11-10 RX ADMIN — LORATADINE 10 MG: 10 TABLET ORAL at 10:50

## 2018-11-10 RX ADMIN — TAMSULOSIN HYDROCHLORIDE 0.4 MG: 0.4 CAPSULE ORAL at 10:50

## 2018-11-10 RX ADMIN — SODIUM CHLORIDE: 9 INJECTION, SOLUTION INTRAVENOUS at 01:56

## 2018-11-10 RX ADMIN — TRAMADOL HYDROCHLORIDE 25 MG: 50 TABLET, FILM COATED ORAL at 12:06

## 2018-11-10 RX ADMIN — HYDROCHLOROTHIAZIDE 25 MG: 25 TABLET ORAL at 10:50

## 2018-11-10 RX ADMIN — MULTIPLE VITAMINS W/ MINERALS TAB 1 TABLET: TAB at 10:49

## 2018-11-10 RX ADMIN — BUSPIRONE HYDROCHLORIDE 10 MG: 10 TABLET ORAL at 21:18

## 2018-11-10 RX ADMIN — Medication 1 MG: at 03:57

## 2018-11-10 RX ADMIN — ENOXAPARIN SODIUM 40 MG: 40 INJECTION SUBCUTANEOUS at 11:30

## 2018-11-10 ASSESSMENT — ACTIVITIES OF DAILY LIVING (ADL)
ADLS_ACUITY_SCORE: 11

## 2018-11-10 NOTE — PROGRESS NOTES
[facility]  Hospitalist Progress Note  Henry Frye, DO 11/10/2018    Reason for Stay (Diagnosis): Pneumonia.         Assessment and Plan:      Summary of Stay: Chuck Lopez is a 78 year old male admitted on 11/9/2018 with pneumonia.    Problem List:   1. Pneumonia.  Continue Levaquin 750 mg a day.  Check pro-calcitonin level.  2. Acute COPD exacerbation.  Restart Breo Ellipta.  Levaquin 250 mg a day.  Prednisone 60 mg a day.  Duo nebs 4 times a day.  Albuterol as needed.  3. Acute on chronic hypoxic respiratory failure.  Acute worsening due to pneumonia and COPD.  Continue supplemental oxygen as needed.  4. Acute on chronic pain syndrome.  Restart tramadol as needed.  Add diclofenac gel as needed.  Continue Tylenol as needed.  5. Tobacco use disorder.  I did reinforce advise for long-term cessation on 11/10/18.  Continue Nicotrol Inhaler as needed.  6. Hypertension.  Restart atenolol and hydrochlorothiazide.  7. Depression.  Restart buspirone.  8. Benign prostatic hyperplasia.  Tamsulosin.  9. Mild hyponatremia.  Resolved.  Stop IV fluids.  Recheck metabolic panel tomorrow.  10. Deconditioning.  Physical and Occupational Therapy consults.  DVT Prophylaxis: Enoxaparin (Lovenox) SQ  Code Status: Full Code  Discharge Dispo: TBD  Estimated Disch Date / # of Days until Disch: 2-3        Interval History (Subjective):      Short of breath.  Coughing.  Having pain in his shoulders and upper back.  Denies chest pain, fevers, chills, nausea, vomiting, or diarrhea.                  Physical Exam:      Last Vital Signs:  /68 (BP Location: Right arm)  Temp 97.1  F (36.2  C) (Oral)  Resp 20  Wt 55.7 kg (122 lb 12.8 oz)  SpO2 95%  BMI 20.43 kg/m2    Gen:  NAD, A&Ox3.  Eyes:  PERRL, sclera anicteric.  OP:  MMM, no lesions.  Neck:  Supple.  CV:  Regular, no murmurs.  Lung:  Wheeze b/l, normal effort.  Ab:  +BS, soft.  Skin:  Warm, dry to touch.  No rash.  Ext:  No pitting edema LE b/l.            Medications:      All current medications were reviewed with changes reflected in problem list.         Data:      All new lab and imaging data was reviewed.   Labs:    Recent Labs  Lab 11/10/18  0717      POTASSIUM 4.0   CHLORIDE 100   CO2 29   ANIONGAP 6   *   BUN 12   CR 0.71   GFRESTIMATED >90   GFRESTBLACK >90   MATTHEW 8.4*       Recent Labs  Lab 11/10/18  0717   WBC 8.1   HGB 12.4*   HCT 37.3*   MCV 99         Imaging:   Recent Results (from the past 24 hour(s))   XR Chest 2 Views    Narrative    CHEST TWO VIEWS  11/9/2018 7:55 PM     HISTORY: Increased shortness of breath, history of COPD.       Impression    IMPRESSION: Severe pulmonary hyperinflation. New somewhat nodular  opacities may be present in the left midlung when compared to  9/5/2017. The lungs otherwise appear clear.    ANAYA SINGH MD

## 2018-11-10 NOTE — PLAN OF CARE
"Problem: Patient Care Overview  Goal: Plan of Care/Patient Progress Review  Outcome: No Change  Arrived to unit at approximately 0200, VSS, c/o SOB with exertion, 4L NC 92-94%, prednisone, duo nebs, and levaquin, NS @ 75ml/hr, pt brought grocery paper bag full of medications from home - set to pharmacy, pt upset about \"8pm\" medications. Awaiting pharmacy reconciliation and physcian review prior to administration. Pt became agitated and verbally aggressive with staff. Writer redirected pt - given melatonin and tylenol to aid with sleep - pt states he has not slept in several days. PT and OT consult for deconditioning and nutrition for malnourishment. Continue with plan of care.       "

## 2018-11-10 NOTE — ED NOTES
Pt reports neb treatment helped at first but when he stood up at xray he was unable to breathe again. Pt oxygen was turned to to 4L/min until he laid down and was able to catch his breath again.

## 2018-11-10 NOTE — CONSULTS
"CLINICAL NUTRITION SERVICES  -  ASSESSMENT NOTE      Recommendations Ordered by Registered Dietitian (RD):   Oral nutrition supplements  Regular diet to promote adequacy of oral intake   Malnutrition 11/10:   % Weight Loss:Weight loss does not meet criteria for malnutrition   % Intake:</= 50% for >/= 5 days (severe malnutrition)  Subcutaneous Fat Loss:Orbital region moderate depletion, Upper arm region moderate depletion and Thoracic region mild to moderate depletion  Muscle Loss:Temporal region moderate depletion, Clavicle bone region mild to moderate depletion, Acromion bone region moderate depletion, Dorsal hand region moderate depletion, Patellar region moderate or greater depletion, Anterior thigh region moderate depletion and Posterior calf region moderate depletion  Fluid Retention:None noted    Malnutrition Diagnosis: Severe malnutrition  In Context of:  Acute on Chronic illness or disease     REASON FOR ASSESSMENT  Chuck JB Lopez is a 78 year old male seen by the dietitian for Provider Order - severe malnutrition     NUTRITION HISTORY  - Information obtained from patient  - Food allergies/intolerances: MSG, face becomes swollen --> reports he has to avoid many pork and meat products due to preservatives  - Patient is on a mechanical/dental soft diet at home.  - Feeling ill for weeks with sharp decline in intake over last five days, consumed only a cheeseburger and V8 juice  - Reports having a vague \"Sickness\" over multiple months that include symptoms: loss of appetite, decreased strength  Edentuous with cracked back teeth - only can consume soft foods  No supplements previously  Tolerates dairy    Receives breakfast meal at [facility] in the dining room  Lunch or dinner meal: Canned tuna, sweet potatoes and vegetables; [employer]     CURRENT NUTRITION ORDERS  - Diet: Cardiac diet  - Supplement: none  Current Intake/Tolerance:  - Per flow sheet review, no intake for meals yet documented. Patient reports good " "tolerance to omelette this morning.  - Factors affecting nutrition intake include: poor dental health, poor appetite PTA    PHYSICAL FINDINGS  Observed  See malnutrition section below - see fat and muscle loss below  Obtained from Chart/Interdisciplinary Team  Greg nutrition score: 3; total score: 19  BM: 11/9  Skin: bruised, ecchymotic  Generalized weakness    ANTHROPOMETRICS  Height: 5'5\"  Weight: 55 kg   Body mass index is 20.43 kg/(m^2).  Weight Status:  Normal BMI  Ideal body weight: 61.8 kg +/- 10%, 90% of IBW   Weight History:  Weight appears relatively stable with fluctuations of +\-5 kg in last year; patient reports a 5# weight loss in 2 weeks   Wt Readings from Last 10 Encounters:   11/10/18 55.7 kg (122 lb 12.8 oz)   08/15/18 57.1 kg (125 lb 14.4 oz)   06/13/18 50.2 kg (110 lb 9.6 oz)   05/16/18 50.7 kg (111 lb 12.8 oz)   09/05/17 54.2 kg (119 lb 8 oz)   08/25/17 54.7 kg (120 lb 9.6 oz)   06/23/17 54.1 kg (119 lb 3.2 oz)   05/13/17 50 kg (110 lb 3.7 oz)   05/01/17 52.1 kg (114 lb 12.8 oz)   04/18/17 51.8 kg (114 lb 2 oz)       ASSESSED NUTRITION NEEDS (PER APPROVED PRACTICE GUIDELINES, Dosing weight: kg):  Estimated Energy Needs: 2367-7510 kcals (35-40 Kcal/Kg)  Justification: repletion and borderline underweight  Estimated Protein Needs:  grams protein (1.5-2 g pro/Kg)  Justification: Repletion  Estimated Fluid Needs: >1 mL/Kcal  Justification: maintenance    LABS  Labs reviewed    Recent Labs   Lab Test  11/10/18   0717  11/09/18   1916  06/13/18   1436  09/09/17   0548  09/06/17   0622   POTASSIUM  4.0  3.9  4.1  3.7  3.7     No results for input(s): PHOS in the last 97046 hours.  Recent Labs   Lab Test  04/04/17   0656  04/03/17   0657  04/02/17   0730  03/28/17   0715  11/23/16   0759   MAG  2.6*  2.5*  2.6*  2.3  2.4*     Recent Labs   Lab Test  11/10/18   0717  11/09/18   1916  06/13/18   1436  09/09/17   0548  09/06/17   0622   NA  135  132*  138  141  134     Recent Labs   Lab Test  " 11/10/18   0717  11/09/18   1916  06/13/18   1436  09/11/17   0651  09/10/17   0612   CR  0.71  0.71  0.70  0.75  0.76       Recent Labs  Lab 11/10/18  0717 11/09/18 1916   * 98     Lab Results   Component Value Date    A1C 5.5 09/07/2017    A1C 5.7 05/01/2017    A1C 5.7 04/19/2017    A1C 5.3 01/06/2017    A1C 5.4 11/23/2016       MEDICATIONS  Medications reviewed  MVI+M  IVF at 75 mL/hr  Prednisone    MALNUTRITION:  % Weight Loss:Weight loss does not meet criteria for malnutrition   % Intake:</= 50% for >/= 5 days (severe malnutrition)  Subcutaneous Fat Loss:Orbital region moderate depletion, Upper arm region moderate depletion and Thoracic region mild to moderate depletion  Muscle Loss:Temporal region moderate depletion, Clavicle bone region mild to moderate depletion, Acromion bone region moderate depletion, Dorsal hand region moderate depletion, Patellar region moderate or greater depletion, Anterior thigh region moderate depletion and Posterior calf region moderate depletion  Fluid Retention:None noted    Malnutrition Diagnosis: Severe malnutrition  In Context of:  Acute on Chronic illness or disease    NUTRITION DIAGNOSIS:  Increased nutrient needs (protein energy) related to hypermetabolism 2/2 chronic disease and repletion needs as evidenced by fat and muscle loss, PO intake meeting <50% of needs for >5 days, severe malnutrition criteria met    INTERVENTIONS  Recommendations / Nutrition Prescription  Diet liberalization to regular to promote intake + oral nutrition supplements to increase calorie and protein intake    MVI+M    Implementation  Nutrition education: discussed diet liberalization, need to increase protein and calorie dense foods, frequent sips/snacks/bites as able.  Medical food supplement: high protein milkshake 2 pm  Diet order: Regular (caffeine restriction per MD discretion); Room service with assist for meal ordering guidance and to offer mechanical/dental soft  diet  Multivitamin/Minerals: unable to order, currently contraindicated.    Goals  Patient to consume >/= 75% of meals TID and >/=1 high protein supplements per day      MONITORING AND EVALUATION:  Progress towards goals will be monitored and evaluated per protocol and Practice Guidelines      Sophie Burton RDN, LD, [facility]  Pager - 3rd floor/ICU: [phone]  Pager - All other floors: [phone]  Pager - Weekend/holiday: [phone]  Office: [phone]

## 2018-11-10 NOTE — PLAN OF CARE
Problem: Patient Care Overview  Goal: Plan of Care/Patient Progress Review  OT: order received, chart reviewed, and spoke with NSG.  Pt. Not appropriate at this time.  Will re-schedule OT eval for tomorrow.

## 2018-11-10 NOTE — ED PROVIDER NOTES
History     Chief Complaint:  shortness of breath       HPI   Chuck Lopez is a 78 year old male with a history of COPD, CKD, alcoholism, and chronic neck pain who presents with increased shortness of breath. The patient states that he recently left [facility] living [facility] due to issues with the food and musty air at the facility and he has been staying with a friend. He is oxygen dependent and today he ran out of his oxygen. The patient reports severe shortness of breath on minimal exertion which seems to be getting worse. He also reports a productive cough which is normal for him, chills, bilateral chest pain which he has not noted any triggers for, body aches, low appetite, increased leg weakness, and diarrhea. He denies any fever, leg swelling, sweats, or falls. He reports that he has been taking his medications, but he ran out of his Tramadol this morning. The patient used his nebulizer today.       Allergies:  Ammonium Lactate  Doxycycline  Monosodium Glutamate  Penicillins     Medications:    Tenormin  Allegra  Advair  Mucinex  Hydrodiuril  Duoneb  Claritin  Deltasone  Zantac  Spiriva  Tramadol     Past Medical History:    Chronic neck pain  COPD  Diabetes  Hepatitis C  HTN  Pulmonary emphysema  Alcoholism  CKD stage II    Past Surgical History:    Appendectomy  ECG    Family History:    No past pertinent family history.    Social History:  Patient presents alone  Current smoker  Negative for alcohol use.  Marital Status:        Review of Systems   Constitutional: Positive for appetite change. Negative for diaphoresis and fever.   Respiratory: Positive for cough and shortness of breath.    Cardiovascular: Positive for chest pain. Negative for leg swelling.   Gastrointestinal: Positive for diarrhea.   Musculoskeletal: Positive for arthralgias.   Neurological: Positive for weakness.   All other systems reviewed and are negative.      Physical Exam     Patient Vitals for the past 24  hrs:   BP Temp Temp src Heart Rate Resp SpO2   11/09/18 2200 125/55 - - 79 - 95 %   11/09/18 2130 140/72 - - 85 18 95 %   11/09/18 2100 123/81 - - 80 18 96 %   11/09/18 2016 136/64 - - 82 - 96 %   11/09/18 1933 - - - - 17 -   11/09/18 1930 140/63 - - 85 - -   11/09/18 1900 123/55 - - 75 15 97 %   11/09/18 1845 120/61 99.2  F (37.3  C) Oral 80 (!) 37 -   11/09/18 1830 114/55 - - 73 22 97 %   11/09/18 1815 129/61 - - 80 23 98 %   11/09/18 1800 128/64 - - 77 22 99 %   11/09/18 1745 140/63 - - - 24 98 %         Physical Exam  Gen:  Cachectic, chronically ill appearing.    Eye:   Pupils are equal, round, and reactive.     Sclera non-injected.    ENT:   Moist mucus membranes.     Normal tongue.    Oropharynx without lesions.    Cardiac:     Normal rate and regular rhythm.    No murmurs, gallops, or rubs.    Pulmonary:     Poor air movement   Tachypneic.     Speaking in full sentences.   No significantly increased work of breathing.    Abdomen:     Normal active bowel sounds.     Abdomen is soft and non-distended, without focal tenderness.    Musculoskeletal:     Normal movement of all extremities without evidence for deficit.    Extremities:    No edema. Non tender calves. Old bruising to the dorsum of the left hand    Skin:   Warm and dry.    Neurologic:    Non-focal exam without asymmetric weakness or numbness.    Normal tone    Psychiatric:     Anxious.    Emergency Department Course   ECG:  Time: 1753  Vent. Rate 78 bpm. NY interval 156. QRS duration 78. QT/QTc 390/444. P-R-T axis 86 -79 74. Sinus rhythm with occasional premature ventricular complexes. Left axis deviation. Abnormal ECG. Read time: 1759      Imaging:  Radiographic findings were communicated with the patient who voiced understanding of the findings.  X-ray Chest, 2 views:  Severe pulmonary hyperinflation. New somewhat nodular  opacities may be present in the left midlung when compared to  9/5/2017. The lungs otherwise appear clear.  Result per  radiology.      Laboratory:  BMP: Na: 143 (L), Calcium: 8.4 (L) o/w WNL (Creatinine: 0.71)(glucose: 98)  CBC: WBC: 11.7 (H), HGB: 13.5, PLT: 208  Troponin:  <0.015  Influenza A/B: negative    Interventions:  1902 - Lactated ringers 1L IV  1902 - Ultram 12.5 mg PO  1902 - Albuterol-Ipratropium 2.5mg-0.5mg/3ml, inhalation solution, Nebulizer  1902 - Deltasone 60 mg PO  2143 - Levaquin 500 mg PO  2145 - Ativan 0.5 mg PO    Emergency Department Course:  Nursing notes and vitals reviewed.  1800: I performed an exam of the patient as documented above.     Findings and plan explained to the Patient who consents to admission.     2156: Discussed the patient with Dr. Frye, who will admit the patient to a observation bed for further monitoring, evaluation, and treatment.     Impression & Plan    Medical Decision Making:  Chuck Lopez is a 78 year old male with history of advanced COPD currently on 1-2 L of oxygen at baseline, Hepatitis C, who presents today with increased shortness of breath. On exam, the patient is at his baseline as far as oxygen needs. He has poor air movement throughout but does not have significantly increased work of breathing with this. Chest x-ray does not demonstrate obvious pneumonia, but does demonstrate severe pulmonary hyperinflation, left sided pulmonary nodules. I informed the patient of this and recommended an outpatient evaluation with CT scan. Otherwise, labs are notable for mild hypernatremia. This is likely related to poor oral intake. White blood cell count is very slightly elevated when compared to prior back in June. Otherwise, the patient does have active cough. In the setting of mild COPD exacerbation, I ordered antibiotics. Flu swab was negative and otherwise I do not have a high suspicion for influenza. Overall, the patient appears somewhat chronically decompensated and likely has end stage COPD. He is currently homeless and has had difficulty finding placement secondary to  multiple dietary allergies. He will need to be brought into the hospital for continued treatment of COPD exacerbation which seems to be mild at this time. He will also need placement.     Diagnosis:    ICD-10-CM    1. COPD exacerbation (H) J44.1    2. Hyponatremia E87.1    3. Pulmonary nodules R91.8        Jayden FLORES, am serving as a scribe on 11/9/2018 at 6:00 PM to personally document services performed by Mag Carrasco MD based on my observations and the provider's statements to me.       Jayden Crouch  11/9/2018   [facility] EMERGENCY DEPARTMENT       Mag Carrasco MD  11/09/18 [id number]

## 2018-11-10 NOTE — PROGRESS NOTES
Discharge Planner   Discharge Plans in progress: Yes  Barriers to discharge plan: Yes. Pt states he is homeless. SW to provide resources.   Follow up plan: Pt declined f/u appt d/t housing situation.        Entered by: Ivett Vázquez 11/10/2018 2:37 PM

## 2018-11-10 NOTE — ED NOTES
".  [facility]  ED Nurse Handoff Report    Chuck Lopez is a 78 year old male   ED Chief complaint: COPD  . ED Diagnosis:   Final diagnoses:   None     Allergies:   Allergies   Allergen Reactions     Ammonium Lactate Other (See Comments)     Burning to skin     Doxycycline Visual Disturbance     Monosodium Glutamate Other (See Comments)     Pt states it feels like his \"head swells, visual disturbances, and he can't think striaght\"     Penicillins Rash       Code Status: Full Code, last documented 2017  Activity level - Baseline/Home:  Independent, uses a walker. Activity Level - Current:   Stand with Assist. Lift room needed: No. Bariatric: No   Needed: No   Isolation: No. Infection: Not Applicable.     Vital Signs:   Vitals:    11/09/18 1900 11/09/18 1930 11/09/18 1933 11/09/18 2016   BP: 123/55 140/63  136/64   Resp: 15  17    Temp:       TempSrc:       SpO2: 97%   96%       Cardiac Rhythm:  ,      Pain level: 0-10 Pain Scale: 4  Patient confused: No. Patient Falls Risk: Yes.   Elimination Status: Has voided   Patient Report - Initial Complaint: COPD. Focused Assessment: HPI per MD note:   \"Chuck Lopez is a 78 year old male with a history of COPD, CKD, alcoholism, and chronic neck pain who presents with increased shortness of breath. The patient states that he recently left [place] assisted living [facility] due to issues with the food and musty air at the facility and he has been staying with a friend. He is oxygen dependent and today he ran out of his oxygen. The patient reports severe shortness of breath on minimal exertion which seems to be getting worse. He also reports a productive cough which is normal for him, chills, bilateral chest pain which he has not noted any triggers for, body aches, low appetite, increased leg weakness, and diarrhea. He denies any fever, leg swelling, sweats, or falls. He reports that he has been taking his medications, but he ran out of his " "Tramadol this morning. The patient used his nebulizer today.\"  Tests Performed: labs, chest xray. Abnormal Results: .  Labs Ordered and Resulted from Time of ED Arrival Up to the Time of Departure from the ED   BASIC METABOLIC PANEL - Abnormal; Notable for the following:        Result Value    Sodium 132 (*)     Calcium 8.4 (*)     All other components within normal limits   CBC WITH PLATELETS DIFFERENTIAL - Abnormal; Notable for the following:     WBC 11.7 (*)     RBC Count 4.16 (*)     Absolute Monocytes 1.6 (*)     Absolute Eosinophils 1.2 (*)     All other components within normal limits   TROPONIN I   VITAL SIGNS   PULSE OXIMETRY NURSING   CARDIAC CONTINUOUS MONITORING   INFLUENZA A/B ANTIGEN   .  XR Chest 2 Views   Preliminary Result   IMPRESSION: Severe pulmonary hyperinflation. New somewhat nodular   opacities may be present in the left midlung when compared to   9/5/2017. The lungs otherwise appear clear.      .   Treatments provided: Fluids, meds, monitoring   Family Comments: pt alone, came by EMS  OBS brochure/video discussed/provided to patient:  Yes  ED Medications:   Medications   lactated ringers BOLUS 1,000 mL (1,000 mLs Intravenous New Bag 11/9/18 1902)     Followed by   lactated ringers infusion (not administered)   traMADol (ULTRAM) half-tab 25 mg (25 mg Oral Given 11/9/18 1902)   ipratropium - albuterol 0.5 mg/2.5 mg/3 mL (DUONEB) neb solution 3 mL (3 mLs Nebulization Given 11/9/18 1902)   predniSONE (DELTASONE) tablet 60 mg (60 mg Oral Given 11/9/18 1902)     Drips infusing:  No  For the majority of the shift, the patient's behavior Green. Interventions performed were n/a.     Severe Sepsis OR Septic Shock Diagnosis Present: No      ED Nurse Name/Phone Number: Rosy \"Connie\" JOSHUA Vazquez  8:36 PM  RECEIVING UNIT ED HANDOFF REVIEW    Above ED Nurse Handoff Report was reviewed: Yes  Reviewed by: Miranda Richardson on November 10, 2018 at 1:11 AM       "

## 2018-11-10 NOTE — PLAN OF CARE
Problem: Patient Care Overview  Goal: Plan of Care/Patient Progress Review  Outcome: Improving  RN - VSS. Low grade fever this afternoon. Maintaining adequate O2 saturation with 4L supplemental O2. Pt states his baseline is 2-4L at home. Pt also mentioning that he is having troubles with home O2 regulator and will need that evaluated prior to discharge. Lung sounds diminished. Infrequent productive cough noted - sputum sent. Noted dyspnea with activity. Pt c/o headache - improved upon receiving home medications. Tolerating PO intake - PIV SL. Voiding adequately. Up with SBA - declining PT this AM. Likely eventual discharge to assisted living - will need to be determined as pt did not prefer previous location.

## 2018-11-10 NOTE — PROGRESS NOTES
"[facility] RCAT      Date: 11/10/2018     Admission Dx: COPD Excacerbation     Pulmonary History: COPD, Smoker     Home Nebulizer/MDI Use: Duoneb 4 times Daily     Home Oxygen: None     Acuity Level (RCAT flow sheet): 3     Aerosol Therapy initiated: Duoneb QID     Pulmonary Hygiene initiated: Deep cough exercises     Volume Expansion initiated: Incentive spirometer TID     Current Oxygen Requirements: 4L nasal canula     Current SpO2: 97     Re-evaluation date:  11/13/2018     Patient Education: Discussed smoking cessation, Reviewed medication mechanisms of action, Instructed Deep Cough techniques     See \"RT Assessments\" flow sheet for patient assessment scoring and Acuity Level Details.                                          "

## 2018-11-10 NOTE — H&P
[facility]  Hospitalist H&P    Name: Chuck Lopez      MRN: [id number]  YOB: 1940    Age: 78 year old  Date of admission: 11/9/2018  Primary care provider: Pranay Reid            Assessment and Plan:   Chuck Lopez is a 78 year old male with a history of hypertension, hepatitis C, diabetes mellitus, COPD, and chronic pain syndrome who presents with pneumonia and COPD exacerbation.    1.  Pneumonia.  Add pro-calcitonin level.  Continue Levaquin 750 mg a day.    2.  Acute COPD exacerbation.  Due to pneumonia.  Prednisone 60 mg a day.  Levaquin 750 mg a day.  Duo nebs 4 times a day.  Albuterol more often if needed.    3.  Acute on chronic hypoxic respiratory failure.  Acute portion due to pneumonia and COPD.  Supplemental oxygen as needed.    4.  Hyponatremia.  Mild.  Start continuous IV fluid.    5.  Malnutrition.  Appears severe on exam.  Will get registered dietitian consult.    6.  Deconditioning.  Physical and Occupational Therapy consults.    7.  BPH.  Restart tamsulosin.    8.  Tobacco use disorder.  I did advise smoking cessation.  Start Nicotrol Inhaler as needed.    Code status: Full code.  Admit to inpatient.  Prophylaxis: Lovenox.              Chief Complaint:   Shortness of breath.         History of Present Illness:   Chuck Lopez is a 78 year old male who presents with shortness of breath.  He does have chronic problems due to COPD.  Is on chronic oxygen.  Has been feeling more short of breath than usual for the past few days.  Has had a cough.  Cough is productive at times of a yellow colored sputum.  Has felt like he has had fevers and chills at times.  Has not checked his temperature with a thermometer.  Feels weak compared to usual.  Does have chronic pain in his legs.  Pain does not seem any different than usual.  Has not noted any chest pain with this.  Does sound like he is also been having difficulty with his current living situation.  He  "does feel quite stressed because of difficulties with living situation.  No other complaints.            Past Medical History:     Past Medical History:   Diagnosis Date     Chronic neck pain      COPD (chronic obstructive pulmonary disease) (H)      Diabetes (H)      Hepatitis C      Hypertension              Past Surgical History:     Past Surgical History:   Procedure Laterality Date     APPENDECTOMY       ESOPHAGOSCOPY, GASTROSCOPY, DUODENOSCOPY (EGD), COMBINED  4/16/2014    Procedure: ESOPHAGOSCOPY, GASTROSCOPY, DUODENOSCOPY (EGD) & COLONOSCOPY with polypectomy by hot snare;  Surgeon: Dillon Vázquez MD;  Location:  GI             Social History:     Social History   Substance Use Topics     Smoking status: Current Every Day Smoker     Packs/day: 0.25     Types: Cigarettes     Smokeless tobacco: Never Used     Alcohol use No             Family History:   Mother with stroke.    Father with unknown type of cancer.         Allergies:     Allergies   Allergen Reactions     Ammonium Lactate Other (See Comments)     Burning to skin     Doxycycline Visual Disturbance     Monosodium Glutamate Other (See Comments)     Pt states it feels like his \"head swells, visual disturbances, and he can't think striaght\"     Penicillins Rash             Medications:     Prior to Admission medications    Medication Sig Last Dose Taking? Auth Provider   atenolol (TENORMIN) 25 MG tablet Take 1 tablet (25 mg) by mouth daily 11/9/2018 at am Yes Pranay Reid MD   busPIRone (BUSPAR) 10 MG tablet Take 10 mg by mouth 4 times daily 11/9/2018 at 1400 Yes Unknown, Entered By History   fexofenadine (ALLEGRA) 30 MG ODT tab Take 1 tablet (30 mg) by mouth 2 times daily as needed for allergies (congestion) Past Week at Unknown time Yes Jori Doe MD   fluticasone-salmeterol (ADVAIR) 500-50 MCG/DOSE diskus inhaler Inhale 1 puff into the lungs 2 times daily 11/9/2018 at am Yes Erik Fish MD   guaiFENesin 400 MG " TABS Take 400 mg by mouth 3 times daily 11/9/2018 at x2 Yes Unknown, Entered By History   hydrochlorothiazide (HYDRODIURIL) 25 MG tablet TAKE ONE TABLET BY MOUTH EVERY DAY 11/9/2018 at am Yes Pranay Reid MD   ipratropium - albuterol 0.5 mg/2.5 mg/3 mL (DUONEB) 0.5-2.5 (3) MG/3ML neb solution Take 1 vial (3 mLs) by nebulization 4 times daily 11/9/2018 at 1730 Yes Jori Doe MD   ipratropium - albuterol 0.5 mg/2.5 mg/3 mL (DUONEB) 0.5-2.5 (3) MG/3ML neb solution Take 1 vial (3 mLs) by nebulization every 4 hours as needed for shortness of breath / dyspnea or wheezing  Yes Jori Doe MD   ketoconazole (NIZORAL) 2 % shampoo Apply to the affected area and wash off after 5 minutes. Past Month at Unknown time Yes Pranay Reid MD   melatonin 3 MG tablet Take 1 tablet (3 mg) by mouth nightly as needed 11/9/2018 at Unknown time Yes Oneyda Mcdonald MD   metaxalone (SKELAXIN) 800 MG tablet Take 800 mg by mouth 3 times daily 11/9/2018 at 1400 Yes Unknown, Entered By History   Multiple Vitamins-Minerals (V-C FORTE) CAPS TAKE ONE CAPSULE BY MOUTH EVERY DAY 11/9/2018 at am Yes Pranay Reid MD   potassium chloride SA (K-DUR/KLOR-CON M) 20 MEQ CR tablet Take 20 mEq by mouth every morning 11/9/2018 at Unknown time Yes Unknown, Entered By History   RaNITidine HCl (ZANTAC PO) Take 75 mg by mouth At Bedtime 11/8/2018 at Unknown time Yes Unknown, Entered By History   tamsulosin (FLOMAX) 0.4 MG capsule Take 0.4 mg by mouth daily 11/9/2018 at 1400 Yes Unknown, Entered By History   tiotropium (SPIRIVA HANDIHALER) 18 MCG capsule INHALE ONE CAPSULE BY MOUTH EVERY DAY 11/9/2018 at Unknown time Yes Erik Fish MD   traMADol (ULTRAM) 50 MG tablet Take 50 mg by mouth 4 times daily 11/9/2018 at 2000 Yes Unknown, Entered By History   butalbital-acetaminophen-caffeine (FIORICET/ESGIC) -40 MG per tablet Take 1 tablet by mouth every 6 hours as needed for headaches More than a  month at Unknown time  Unknown, Entered By History   loratadine (CLARITIN) 10 MG tablet Take 1 tablet (10 mg) by mouth daily More than a month at Unknown time  Pranay Reid MD             Review of Systems:   A Comprehensive greater than 10 system review of systems was carried out.  Pertinent positives and negatives are noted above.  Otherwise negative for contributory information.           Physical Exam:   Blood pressure 131/64, temperature 99.2  F (37.3  C), temperature source Oral, resp. rate 18, SpO2 96 %.  Wt Readings from Last 1 Encounters:   08/15/18 57.1 kg (125 lb 14.4 oz)     Exam:  GENERAL: No apparent distress. Awake, alert, and oriented seemingly x3.  HEENT: Normocephalic, atraumatic. Extraocular movements intact.  CARDIOVASCULAR: Regular rate and rhythm without murmurs or rubs. No S3.  PULMONARY: Wheeze to auscultation bilaterally.  Poor air movement bilaterally.  ABDOMINAL: Soft, non-tender, non-distended. Bowel sounds normoactive.   EXTREMITIES: No cyanosis or clubbing. No appreciable edema.  NEUROLOGICAL: CN 2-12 grossly intact, no focal neurological deficits.  DERMATOLOGICAL: No rash, ulcer, bruising, nor jaundice.          Data:   EKG:  Personally reviewed. Sinus.  No acute ischemia.    Laboratory:    Recent Labs  Lab 11/09/18 1916   WBC 11.7*   HGB 13.5   HCT 41.0   MCV 99          Recent Labs  Lab 11/09/18 1916   *   POTASSIUM 3.9   CHLORIDE 97   CO2 30   ANIONGAP 5   GLC 98   BUN 11   CR 0.71   GFRESTIMATED >90   GFRESTBLACK >90   MATTHEW 8.4*     No results for input(s): CULT in the last 168 hours.    Imaging:  Recent Results (from the past 24 hour(s))   XR Chest 2 Views    Narrative    CHEST TWO VIEWS  11/9/2018 7:55 PM     HISTORY: Increased shortness of breath, history of COPD.       Impression    IMPRESSION: Severe pulmonary hyperinflation. New somewhat nodular  opacities may be present in the left midlung when compared to  9/5/2017. The lungs otherwise appear  soledad.    ANAYA SINGH MD

## 2018-11-10 NOTE — ED TRIAGE NOTES
Patient arrived at around 18:00 complaining of increased shortness of breath. History of COPD ABCs intact. Alert and oriented X4. Oriented to room and call light. Patient educated about hand hygiene practices.

## 2018-11-10 NOTE — CONSULTS
Care Transition Initial Assessment - RN    Reason For Consult: care coordination/care conference, discharge planning   Met with: Patient.  DATA   Active Problems:    COPD exacerbation (H)       Cognitive Status: awake, alert and oriented.  Primary Care Clinic Name: [facility]  Primary Care MD Name: TBD  Contact information and PCP information verified: Yes  Lives With: other (see comments) (roommate)     Description of Support System: Supportive   Who is your support system?: Other (specify) (friend)       Insurance concerns: No Insurance issues identified  ASSESSMENT  Patient currently receives the following services:  None.      Identified issues/concerns regarding health management: He was living at [facility] but states he left d/t the fragrance policy at the facility. He is allergic to fragrances and most staff wears perfume or scented lotions. He states his large oxygen concentrator is still there. He has a 4WW in the room with a portable O2 concentrator on the seat. When CM asked who is his oxygen provider, he states he doesn't have one. His responses are scattered and non-specific. He keeps returning to the fragrance situation and does not give CM direct answers to questions asked.      PLAN  Financial costs for the patient were not discussed.  Patient was not given options and choices for discharge. SW to see re: housing resources.   Patient/family is agreeable to the plan?  TBD   Patient anticipates discharging to probable shelter.     Other Resources: Other (see comment) (COPD Action Plan discussed w/pt)  Patient anticipates needs for home equipment: No  Plan/Disposition: TBD    Appointments: TBD once disposition known.     CM will continue to follow patient until discharge for any additional needs.     Elina Vázquez, RN, BSN, CTS  [facility]  [phone]

## 2018-11-10 NOTE — PHARMACY-ADMISSION MEDICATION HISTORY
Admission medication history interview status for this patient is complete. See [facility] admission navigator for allergy information, prior to admission medications and immunization status.     Medication history interview source(s):Patient  Medication history resources (including written lists, pill bottles, clinic record):pill paks    Changes made to PTA medication list:  Added: tramadol, skelaxin, buspar, kcl, flomax, fioricet  Deleted: prednisone, fioricet with codeine  Changed: guaifensin,     Actions taken by pharmacist (provider contacted, etc):None     Additional medication history information:None    Medication reconciliation/reorder completed by provider prior to medication history? No    For patients on insulin therapy: no (Yes/No)   Lantus/levemir/NPH/Mix 70/30 dose: ___ in AM/PM or twice daily   Sliding scale Novolog Y/N   If Yes, do you have a baseline novolog pre-meal dose: ______units with meals   Patients eat three meals a day: Y/N ---  How many episodes of hypoglycemia (low blood glucose) do you have weekly: ---   How many missed doses do you have a week: ---  How many times do you check your blood glucose per day: ---  Any Barriers to therapy: cost of medications/comfortable with giving injections (if applicable)/ comfortable and confident with current diabetes regimen ---      Prior to Admission medications    Medication Sig Last Dose Taking? Auth Provider   atenolol (TENORMIN) 25 MG tablet Take 1 tablet (25 mg) by mouth daily 11/9/2018 at am Yes Pranay Reid MD   busPIRone (BUSPAR) 10 MG tablet Take 10 mg by mouth 4 times daily 11/9/2018 at 1400 Yes Unknown, Entered By History   fexofenadine (ALLEGRA) 30 MG ODT tab Take 1 tablet (30 mg) by mouth 2 times daily as needed for allergies (congestion) Past Week at Unknown time Yes Jori Doe MD   fluticasone-salmeterol (ADVAIR) 500-50 MCG/DOSE diskus inhaler Inhale 1 puff into the lungs 2 times daily 11/9/2018 at am Yes Erik Fish  MD [place]   guaiFENesin 400 MG TABS Take 400 mg by mouth 3 times daily 11/9/2018 at x2 Yes Unknown, Entered By History   hydrochlorothiazide (HYDRODIURIL) 25 MG tablet TAKE ONE TABLET BY MOUTH EVERY DAY 11/9/2018 at am Yes Pranay Reid MD   ipratropium - albuterol 0.5 mg/2.5 mg/3 mL (DUONEB) 0.5-2.5 (3) MG/3ML neb solution Take 1 vial (3 mLs) by nebulization 4 times daily 11/9/2018 at 1730 Yes Jori Doe MD   ipratropium - albuterol 0.5 mg/2.5 mg/3 mL (DUONEB) 0.5-2.5 (3) MG/3ML neb solution Take 1 vial (3 mLs) by nebulization every 4 hours as needed for shortness of breath / dyspnea or wheezing  Yes Jori Doe MD   ketoconazole (NIZORAL) 2 % shampoo Apply to the affected area and wash off after 5 minutes. Past Month at Unknown time Yes Pranay Reid MD   melatonin 3 MG tablet Take 1 tablet (3 mg) by mouth nightly as needed 11/9/2018 at Unknown time Yes Oneyda Mcdonald MD   metaxalone (SKELAXIN) 800 MG tablet Take 800 mg by mouth 3 times daily 11/9/2018 at 1400 Yes Unknown, Entered By History   Multiple Vitamins-Minerals (V-C FORTE) CAPS TAKE ONE CAPSULE BY MOUTH EVERY DAY 11/9/2018 at am Yes Pranay Reid MD   potassium chloride SA (K-DUR/KLOR-CON M) 20 MEQ CR tablet Take 20 mEq by mouth every morning 11/9/2018 at Unknown time Yes Unknown, Entered By History   RaNITidine HCl (ZANTAC PO) Take 75 mg by mouth At Bedtime 11/8/2018 at Unknown time Yes Unknown, Entered By History   tamsulosin (FLOMAX) 0.4 MG capsule Take 0.4 mg by mouth daily 11/9/2018 at 1400 Yes Unknown, Entered By History   tiotropium (SPIRIVA HANDIHALER) 18 MCG capsule INHALE ONE CAPSULE BY MOUTH EVERY DAY 11/9/2018 at Unknown time Yes Erik Fish MD   traMADol (ULTRAM) 50 MG tablet Take 50 mg by mouth 4 times daily 11/9/2018 at 2000 Yes Unknown, Entered By History   butalbital-acetaminophen-caffeine (FIORICET/ESGIC) -40 MG per tablet Take 1 tablet by mouth every 6 hours as  needed for headaches More than a month at Unknown time  Unknown, Entered By History   loratadine (CLARITIN) 10 MG tablet Take 1 tablet (10 mg) by mouth daily More than a month at Unknown time  Pranay Reid MD

## 2018-11-10 NOTE — PLAN OF CARE
Problem: Patient Care Overview  Goal: Plan of Care/Patient Progress Review  PT: pt is refusing PT until he receives pain medication, nurse notified, will check back as schedule allows

## 2018-11-11 ENCOUNTER — APPOINTMENT (OUTPATIENT)
Dept: PHYSICAL THERAPY | Facility: CLINIC | Age: 78
DRG: 193 | End: 2018-11-11
Attending: INTERNAL MEDICINE
Payer: MEDICARE

## 2018-11-11 LAB
ANION GAP SERPL CALCULATED.3IONS-SCNC: 4 MMOL/L (ref 3–14)
BUN SERPL-MCNC: 15 MG/DL (ref 7–30)
CALCIUM SERPL-MCNC: 8.5 MG/DL (ref 8.5–10.1)
CHLORIDE SERPL-SCNC: 102 MMOL/L (ref 94–109)
CO2 SERPL-SCNC: 33 MMOL/L (ref 20–32)
CREAT SERPL-MCNC: 0.78 MG/DL (ref 0.66–1.25)
GFR SERPL CREATININE-BSD FRML MDRD: >90 ML/MIN/1.7M2
GLUCOSE SERPL-MCNC: 99 MG/DL (ref 70–99)
POTASSIUM SERPL-SCNC: 3.5 MMOL/L (ref 3.4–5.3)
SODIUM SERPL-SCNC: 139 MMOL/L (ref 133–144)

## 2018-11-11 PROCEDURE — 25000125 ZZHC RX 250: Performed by: INTERNAL MEDICINE

## 2018-11-11 PROCEDURE — 12000000 ZZH R&B MED SURG/OB

## 2018-11-11 PROCEDURE — 99232 SBSQ HOSP IP/OBS MODERATE 35: CPT | Performed by: INTERNAL MEDICINE

## 2018-11-11 PROCEDURE — 40000275 ZZH STATISTIC RCP TIME EA 10 MIN

## 2018-11-11 PROCEDURE — 97116 GAIT TRAINING THERAPY: CPT | Mod: GP

## 2018-11-11 PROCEDURE — 94640 AIRWAY INHALATION TREATMENT: CPT | Mod: 76

## 2018-11-11 PROCEDURE — 36415 COLL VENOUS BLD VENIPUNCTURE: CPT | Performed by: INTERNAL MEDICINE

## 2018-11-11 PROCEDURE — A9270 NON-COVERED ITEM OR SERVICE: HCPCS | Mod: GY | Performed by: INTERNAL MEDICINE

## 2018-11-11 PROCEDURE — 40000193 ZZH STATISTIC PT WARD VISIT

## 2018-11-11 PROCEDURE — 25000132 ZZH RX MED GY IP 250 OP 250 PS 637: Mod: GY | Performed by: INTERNAL MEDICINE

## 2018-11-11 PROCEDURE — 25000131 ZZH RX MED GY IP 250 OP 636 PS 637: Mod: GY | Performed by: INTERNAL MEDICINE

## 2018-11-11 PROCEDURE — 97161 PT EVAL LOW COMPLEX 20 MIN: CPT | Mod: GP

## 2018-11-11 PROCEDURE — 94640 AIRWAY INHALATION TREATMENT: CPT

## 2018-11-11 PROCEDURE — 80048 BASIC METABOLIC PNL TOTAL CA: CPT | Performed by: INTERNAL MEDICINE

## 2018-11-11 PROCEDURE — 25000128 H RX IP 250 OP 636: Performed by: INTERNAL MEDICINE

## 2018-11-11 RX ORDER — BUSPIRONE HYDROCHLORIDE 10 MG/1
10 TABLET ORAL EVERY 6 HOURS
Status: DISCONTINUED | OUTPATIENT
Start: 2018-11-11 | End: 2018-11-12

## 2018-11-11 RX ADMIN — Medication 50 MG: at 21:01

## 2018-11-11 RX ADMIN — HYDROCHLOROTHIAZIDE 25 MG: 25 TABLET ORAL at 07:59

## 2018-11-11 RX ADMIN — IPRATROPIUM BROMIDE AND ALBUTEROL SULFATE 3 ML: .5; 3 SOLUTION RESPIRATORY (INHALATION) at 15:49

## 2018-11-11 RX ADMIN — SENNOSIDES AND DOCUSATE SODIUM 2 TABLET: 8.6; 5 TABLET ORAL at 16:36

## 2018-11-11 RX ADMIN — BUSPIRONE HYDROCHLORIDE 10 MG: 10 TABLET ORAL at 07:59

## 2018-11-11 RX ADMIN — ATENOLOL 25 MG: 25 TABLET ORAL at 08:00

## 2018-11-11 RX ADMIN — BUSPIRONE HYDROCHLORIDE 10 MG: 10 TABLET ORAL at 02:42

## 2018-11-11 RX ADMIN — BUTALBITAL, ACETAMINOPHEN, AND CAFFEINE 1 TABLET: 50; 325; 40 TABLET ORAL at 07:58

## 2018-11-11 RX ADMIN — BUTALBITAL, ACETAMINOPHEN, AND CAFFEINE 1 TABLET: 50; 325; 40 TABLET ORAL at 13:45

## 2018-11-11 RX ADMIN — DEXTRAN 70, AND HYPROMELLOSE 2910 2 DROP: 1; 3 SOLUTION/ DROPS OPHTHALMIC at 07:58

## 2018-11-11 RX ADMIN — IPRATROPIUM BROMIDE AND ALBUTEROL SULFATE 3 ML: .5; 3 SOLUTION RESPIRATORY (INHALATION) at 08:10

## 2018-11-11 RX ADMIN — MULTIPLE VITAMINS W/ MINERALS TAB 1 TABLET: TAB at 08:00

## 2018-11-11 RX ADMIN — Medication 1 MG: at 02:42

## 2018-11-11 RX ADMIN — TRAMADOL HYDROCHLORIDE 25 MG: 50 TABLET, FILM COATED ORAL at 00:04

## 2018-11-11 RX ADMIN — RANITIDINE 75 MG: 75 TABLET, FILM COATED ORAL at 21:01

## 2018-11-11 RX ADMIN — IPRATROPIUM BROMIDE AND ALBUTEROL SULFATE 3 ML: .5; 3 SOLUTION RESPIRATORY (INHALATION) at 11:53

## 2018-11-11 RX ADMIN — BUTALBITAL, ACETAMINOPHEN, AND CAFFEINE 1 TABLET: 50; 325; 40 TABLET ORAL at 20:00

## 2018-11-11 RX ADMIN — BUSPIRONE HYDROCHLORIDE 10 MG: 10 TABLET ORAL at 13:46

## 2018-11-11 RX ADMIN — TRAMADOL HYDROCHLORIDE 25 MG: 50 TABLET, FILM COATED ORAL at 20:01

## 2018-11-11 RX ADMIN — LORATADINE 10 MG: 10 TABLET ORAL at 07:59

## 2018-11-11 RX ADMIN — PREDNISONE 60 MG: 50 TABLET ORAL at 07:59

## 2018-11-11 RX ADMIN — TAMSULOSIN HYDROCHLORIDE 0.4 MG: 0.4 CAPSULE ORAL at 08:00

## 2018-11-11 RX ADMIN — BUTALBITAL, ACETAMINOPHEN, AND CAFFEINE 1 TABLET: 50; 325; 40 TABLET ORAL at 00:04

## 2018-11-11 RX ADMIN — DEXTRAN 70, AND HYPROMELLOSE 2910 2 DROP: 1; 3 SOLUTION/ DROPS OPHTHALMIC at 00:07

## 2018-11-11 RX ADMIN — ENOXAPARIN SODIUM 40 MG: 40 INJECTION SUBCUTANEOUS at 11:53

## 2018-11-11 RX ADMIN — POTASSIUM CHLORIDE 20 MEQ: 1500 TABLET, EXTENDED RELEASE ORAL at 07:59

## 2018-11-11 RX ADMIN — TRAMADOL HYDROCHLORIDE 25 MG: 50 TABLET, FILM COATED ORAL at 13:46

## 2018-11-11 RX ADMIN — Medication 1 MG: at 22:50

## 2018-11-11 RX ADMIN — LEVOFLOXACIN 750 MG: 500 TABLET, FILM COATED ORAL at 20:00

## 2018-11-11 RX ADMIN — BUSPIRONE HYDROCHLORIDE 10 MG: 10 TABLET ORAL at 20:01

## 2018-11-11 RX ADMIN — FLUTICASONE FUROATE AND VILANTEROL TRIFENATATE 1 PUFF: 200; 25 POWDER RESPIRATORY (INHALATION) at 08:11

## 2018-11-11 RX ADMIN — METAXALONE 800 MG: 800 TABLET ORAL at 13:46

## 2018-11-11 RX ADMIN — IPRATROPIUM BROMIDE AND ALBUTEROL SULFATE 3 ML: .5; 3 SOLUTION RESPIRATORY (INHALATION) at 20:45

## 2018-11-11 RX ADMIN — METAXALONE 800 MG: 800 TABLET ORAL at 07:59

## 2018-11-11 ASSESSMENT — ACTIVITIES OF DAILY LIVING (ADL)
ADLS_ACUITY_SCORE: 11

## 2018-11-11 NOTE — PLAN OF CARE
Problem: Patient Care Overview  Goal: Plan of Care/Patient Progress Review  A&O. Complains of a headache, PRN Fioricet and tramadol. LS diminished. Dyspnea with exertion. O2 96% 4 L.

## 2018-11-11 NOTE — PROGRESS NOTES
[facility]  Hospitalist Progress Note  Henry Frye,  11/11/2018    Reason for Stay (Diagnosis): Pneumonia.         Assessment and Plan:      Summary of Stay: Chuck Lopez is a 78 year old male admitted on 11/9/2018 with pneumonia.    Problem List:   1. Pneumonia.  Continue Levaquin 750 mg a day.  2. Acute COPD exacerbation.  Breo Ellipta.  Levaquin 750 mg a day.  Prednisone 60 mg a day.  Duo nebs 4 times a day.  Albuterol as needed.  3. Acute on chronic hypoxic respiratory failure.  Acute worsening due to pneumonia and COPD.  Continue supplemental oxygen as needed.  4. Acute on chronic pain syndrome.  Tramadol as needed.  Diclofenac gel as needed.  Tylenol as needed.  Pain team consult tomorrow.  5. Tobacco use disorder.  I did reinforce advise for long-term cessation on 11/10/18.  Continue Nicotrol Inhaler as needed.  6. Hypertension.  Continue atenolol and hydrochlorothiazide.  7. Depression.  Continue buspirone.  8. Benign prostatic hyperplasia.  Tamsulosin.  9. Mild hyponatremia.  Resolved.    10. Deconditioning.  Physical and Occupational Therapy consults.  DVT Prophylaxis: Enoxaparin (Lovenox) SQ  Code Status: Full Code  Discharge Dispo: TBD  Estimated Disch Date / # of Days until Disch: 1-2        Interval History (Subjective):      Short of breath.  Occasional cough.  Does have mild worsening of his chronic neck and shoulder pain.  Denies chest pain, fevers, chills, nausea, vomiting, or diarrhea.                  Physical Exam:      Last Vital Signs:  /65 (BP Location: Right arm)  Pulse 67  Temp 98.7  F (37.1  C) (Oral)  Resp 18  Wt 55.7 kg (122 lb 12.8 oz)  SpO2 97%  BMI 20.43 kg/m2    Gen:  NAD, A&Ox3.  Eyes:  PERRL, sclera anicteric.  OP:  MMM, no lesions.  Neck:  Supple.  CV:  Regular, no murmurs.  Lung:  Mild wheeze b/l, normal effort.  Ab:  +BS, soft.  Skin:  Warm, dry to touch.  No rash.  Ext:  No pitting edema LE b/l.           Medications:      All current  medications were reviewed with changes reflected in problem list.         Data:      All new lab and imaging data was reviewed.   Labs:    Recent Labs  Lab 11/11/18  0723      POTASSIUM 3.5   CHLORIDE 102   CO2 33*   ANIONGAP 4   GLC 99   BUN 15   CR 0.78   GFRESTIMATED >90   GFRESTBLACK >90   MATTHEW 8.5       Recent Labs  Lab 11/10/18  0717   WBC 8.1   HGB 12.4*   HCT 37.3*   MCV 99         Imaging:   No results found for this or any previous visit (from the past 24 hour(s)).

## 2018-11-11 NOTE — PROGRESS NOTES
11/11/18 1030   Quick Adds   Type of Visit Initial PT Evaluation       Present yes   Living Environment   Lives With alone   Living Arrangements homeless   Living Environment Comment Pt was previously living in an assisted living facility - left the facility per pt d/t their fragrance policy and inability to adhere to his dietary allergies.  Pt does not currently have a discharge plan/location, states he is working with social work to determine discharge plan   Self-Care   Dominant Hand right   Usual Activity Tolerance moderate   Current Activity Tolerance fair   Regular Exercise no   Equipment Currently Used at Home oxygen;walker, rolling   Activity/Exercise/Self-Care Comment Pt reports he uses walker and O2 at baseline, however reports difficulty with charging portable O2 sometimes.   Functional Level Prior   Ambulation 1-->assistive equipment   Transferring 0-->independent   Toileting 0-->independent   Bathing 0-->independent   Dressing 0-->independent   Eating 0-->independent   Communication 0-->understands/communicates without difficulty   Swallowing 0-->swallows foods/liquids without difficulty   Cognition 0 - no cognition issues reported   Fall history within last six months no   General Information   Onset of Illness/Injury or Date of Surgery - Date 11/09/18   Referring Physician Henry Frye DO   Patient/Family Goals Statement None stated   Pertinent History of Current Problem (include personal factors and/or comorbidities that impact the POC) Pt presented to ED with reports of increasing SOB and recent onset of increased coughing/weakness.  Pt diagnosed with COPD exacerbation and mild hypanatremia.  Pt has a history of COPD/CKD/alcoholism/neck pain.   General Info Comments Pt is supplemental O2 dependent at baseline.   Cognitive Status Examination   Orientation orientation to person, place and time   Level of Consciousness alert   Follows Commands and Answers Questions 100% of  "the time   Personal Safety and Judgment intact   Memory intact   Pain Assessment   Patient Currently in Pain No   Integumentary/Edema   Integumentary/Edema no deficits were identifed   Posture    Posture Not impaired   Range of Motion (ROM)   ROM Comment LE ROM is WNL BL   Strength   Strength Comments Pt demonstrates 5/5 strength for hip flexion, knee flexion/extension and ankle DF in short sit   Bed Mobility   Bed Mobility Comments Supine<>sit from flat bed independently   Transfer Skills   Transfer Comments Sit<>stand from bed with SBA, no AD   Gait   Gait Comments Ambulation in room without AD with SBA   Balance   Balance no deficits were identified   Sensory Examination   Sensory Perception no deficits were identified   General Therapy Interventions   Planned Therapy Interventions gait training;progressive activity/exercise;strengthening   Clinical Impression   Criteria for Skilled Therapeutic Intervention yes, treatment indicated   PT Diagnosis Difficulty Walking   Influenced by the following impairments decreased activity tolerance   Functional limitations due to impairments Decreased independence with functional mobility   Clinical Presentation Evolving/Changing   Clinical Presentation Rationale Pt demonstrates improving medical status however discharge planning is evolving/changing as pt is currently homeless.   Clinical Decision Making (Complexity) Moderate complexity   Therapy Frequency` 5 times/week   Predicted Duration of Therapy Intervention (days/wks) 2 days   Anticipated Discharge Disposition Other (see comments)  (Pt is homeless so TBD, do not anticipate TCU need)   Risk & Benefits of therapy have been explained Yes   Patient, Family & other staff in agreement with plan of care Yes   [facility] [employer]-PAC TM \"6 Clicks\"   2016, Trustees of [facility], under license to [employer].  All rights reserved.   6 Clicks Short Forms Basic Mobility Inpatient Short Form   [facility] AM-PAC  \"6 " "Clicks\" V.2 Basic Mobility Inpatient Short Form   1. Turning from your back to your side while in a flat bed without using bedrails? 4 - None   2. Moving from lying on your back to sitting on the side of a flat bed without using bedrails? 4 - None   3. Moving to and from a bed to a chair (including a wheelchair)? 4 - None   4. Standing up from a chair using your arms (e.g., wheelchair, or bedside chair)? 4 - None   5. To walk in hospital room? 3 - A Little   6. Climbing 3-5 steps with a railing? 3 - A Little   Basic Mobility Raw Score (Score out of 24.Lower scores equate to lower levels of function) 22   Total Evaluation Time   Total Evaluation Time (Minutes) 14     "

## 2018-11-11 NOTE — PLAN OF CARE
Problem: Patient Care Overview  Goal: Plan of Care/Patient Progress Review  OT order received, chart reviewed, and met with pt.  Pt. Stating today was a harder day and he does not have identified concerns with ADLs at this time.  Pt. Does recognize his SOB impacts his functional ambulation and standing tolerance.  Pt. Declines need for IP OT at this time and is working with staff on safe discharge plans.  Pt. Does have care plan set with PT.  Will complete OT order at this time.  Please re-order should new needs arise and pt. Is open to having OT services.

## 2018-11-11 NOTE — PLAN OF CARE
Problem: Patient Care Overview  Goal: Plan of Care/Patient Progress Review  Outcome: Improving  RN - VSS. C/o ongoing headache - expressing adequate relief with maintaining PRN medications. Pt maintaining adequate O2 saturation at 3L O2 - utilizing 4L with activity (baseline is 2-4L). Denying SOB. Pt tolerating PO intake. Ambulating hallways with SBA. Voiding adequately. Anticipating discharge to extended care facility in the next couple days.

## 2018-11-11 NOTE — PLAN OF CARE
"Problem: Patient Care Overview  Goal: Plan of Care/Patient Progress Review  PT: Evaluation complete, treatment initiated.  Pt's discharge plans are currently unclear d/t pt leaving prior assisted living facility with no other housing options in place.  Pt was agreeable to limited participation in PT only this date, agreeable to further participation, per pt as he \"gets more sleep\" he will be better able to participate.    Discharge Planner PT   Patient plan for discharge: Pt does not have a plan, homeless  Current status: Pt ambulating short distances in room with SBA/no assistive device, completing bed mobility independently and transfers without assistive device with SBA.  Barriers to return to prior living situation: Pt is currently homeless  Recommendations for discharge: Unclear at this time d/t pt being homeless, do not anticipate pt will require TCU stay.  Rationale for recommendations: Pt is ambulating well with SBA only for in room distances, anticipate with improving medical status pt will tolerate increased activity/longer distance ambulation using WW/supplemental O2.  PT will continue to follow for further clarification/determination of discharge location options.       Entered by: Jayden Quinones 11/11/2018 11:10 AM           "

## 2018-11-11 NOTE — PLAN OF CARE
Problem: Patient Care Overview  Goal: Plan of Care/Patient Progress Review  VSS. A&Ox2, chronic headache pain 8/10. PRN meds given. LS diminished. Productive cough. Declined Robitussin. Needs another sputum culture, previous was canceled due to sputum contamination. Tolerates regular diet. Up independently. Calls appropriately.

## 2018-11-12 LAB
CREAT SERPL-MCNC: 0.9 MG/DL (ref 0.66–1.25)
GFR SERPL CREATININE-BSD FRML MDRD: 82 ML/MIN/1.7M2
PLATELET # BLD AUTO: 210 10E9/L (ref 150–450)

## 2018-11-12 PROCEDURE — 94640 AIRWAY INHALATION TREATMENT: CPT | Mod: 76

## 2018-11-12 PROCEDURE — 25000125 ZZHC RX 250: Performed by: INTERNAL MEDICINE

## 2018-11-12 PROCEDURE — A9270 NON-COVERED ITEM OR SERVICE: HCPCS | Mod: GY | Performed by: INTERNAL MEDICINE

## 2018-11-12 PROCEDURE — 25000132 ZZH RX MED GY IP 250 OP 250 PS 637: Mod: GY | Performed by: INTERNAL MEDICINE

## 2018-11-12 PROCEDURE — 12000000 ZZH R&B MED SURG/OB

## 2018-11-12 PROCEDURE — 94640 AIRWAY INHALATION TREATMENT: CPT

## 2018-11-12 PROCEDURE — 25000128 H RX IP 250 OP 636: Performed by: INTERNAL MEDICINE

## 2018-11-12 PROCEDURE — 25000132 ZZH RX MED GY IP 250 OP 250 PS 637: Mod: GY | Performed by: CLINICAL NURSE SPECIALIST

## 2018-11-12 PROCEDURE — A9270 NON-COVERED ITEM OR SERVICE: HCPCS | Mod: GY | Performed by: CLINICAL NURSE SPECIALIST

## 2018-11-12 PROCEDURE — 40000275 ZZH STATISTIC RCP TIME EA 10 MIN

## 2018-11-12 PROCEDURE — 25000131 ZZH RX MED GY IP 250 OP 636 PS 637: Mod: GY | Performed by: INTERNAL MEDICINE

## 2018-11-12 PROCEDURE — 85049 AUTOMATED PLATELET COUNT: CPT | Performed by: INTERNAL MEDICINE

## 2018-11-12 PROCEDURE — 99232 SBSQ HOSP IP/OBS MODERATE 35: CPT | Performed by: INTERNAL MEDICINE

## 2018-11-12 PROCEDURE — 36415 COLL VENOUS BLD VENIPUNCTURE: CPT | Performed by: INTERNAL MEDICINE

## 2018-11-12 PROCEDURE — 82565 ASSAY OF CREATININE: CPT | Performed by: INTERNAL MEDICINE

## 2018-11-12 PROCEDURE — 99223 1ST HOSP IP/OBS HIGH 75: CPT | Performed by: CLINICAL NURSE SPECIALIST

## 2018-11-12 RX ORDER — PREDNISONE 20 MG/1
40 TABLET ORAL DAILY
Status: DISCONTINUED | OUTPATIENT
Start: 2018-11-13 | End: 2018-11-12

## 2018-11-12 RX ORDER — BUSPIRONE HYDROCHLORIDE 10 MG/1
20 TABLET ORAL 3 TIMES DAILY
Status: DISCONTINUED | OUTPATIENT
Start: 2018-11-12 | End: 2018-11-14

## 2018-11-12 RX ADMIN — PREDNISONE 60 MG: 50 TABLET ORAL at 09:05

## 2018-11-12 RX ADMIN — ATENOLOL 25 MG: 25 TABLET ORAL at 09:05

## 2018-11-12 RX ADMIN — DICLOFENAC SODIUM 2 G: 10 GEL TOPICAL at 19:38

## 2018-11-12 RX ADMIN — IPRATROPIUM BROMIDE AND ALBUTEROL SULFATE 3 ML: .5; 3 SOLUTION RESPIRATORY (INHALATION) at 12:20

## 2018-11-12 RX ADMIN — BUSPIRONE HYDROCHLORIDE 10 MG: 10 TABLET ORAL at 15:53

## 2018-11-12 RX ADMIN — LEVOFLOXACIN 750 MG: 500 TABLET, FILM COATED ORAL at 22:23

## 2018-11-12 RX ADMIN — DEXTRAN 70, AND HYPROMELLOSE 2910 2 DROP: 1; 3 SOLUTION/ DROPS OPHTHALMIC at 22:23

## 2018-11-12 RX ADMIN — BUSPIRONE HYDROCHLORIDE 10 MG: 10 TABLET ORAL at 09:05

## 2018-11-12 RX ADMIN — ALBUTEROL SULFATE 2.5 MG: 2.5 SOLUTION RESPIRATORY (INHALATION) at 22:34

## 2018-11-12 RX ADMIN — TAMSULOSIN HYDROCHLORIDE 0.4 MG: 0.4 CAPSULE ORAL at 09:04

## 2018-11-12 RX ADMIN — BUTALBITAL, ACETAMINOPHEN, AND CAFFEINE 1 TABLET: 50; 325; 40 TABLET ORAL at 15:58

## 2018-11-12 RX ADMIN — TRAMADOL HYDROCHLORIDE 25 MG: 50 TABLET, FILM COATED ORAL at 22:24

## 2018-11-12 RX ADMIN — BUSPIRONE HYDROCHLORIDE 10 MG: 10 TABLET ORAL at 03:02

## 2018-11-12 RX ADMIN — RANITIDINE 75 MG: 75 TABLET, FILM COATED ORAL at 22:24

## 2018-11-12 RX ADMIN — BUTALBITAL, ACETAMINOPHEN, AND CAFFEINE 1 TABLET: 50; 325; 40 TABLET ORAL at 03:02

## 2018-11-12 RX ADMIN — ENOXAPARIN SODIUM 40 MG: 40 INJECTION SUBCUTANEOUS at 13:23

## 2018-11-12 RX ADMIN — TIZANIDINE 4 MG: 4 TABLET ORAL at 22:23

## 2018-11-12 RX ADMIN — SENNOSIDES AND DOCUSATE SODIUM 2 TABLET: 8.6; 5 TABLET ORAL at 22:26

## 2018-11-12 RX ADMIN — METAXALONE 800 MG: 800 TABLET ORAL at 09:05

## 2018-11-12 RX ADMIN — POTASSIUM CHLORIDE 20 MEQ: 1500 TABLET, EXTENDED RELEASE ORAL at 09:04

## 2018-11-12 RX ADMIN — BUSPIRONE HYDROCHLORIDE 20 MG: 10 TABLET ORAL at 22:23

## 2018-11-12 RX ADMIN — HYDROCHLOROTHIAZIDE 25 MG: 25 TABLET ORAL at 09:05

## 2018-11-12 RX ADMIN — METAXALONE 800 MG: 800 TABLET ORAL at 15:53

## 2018-11-12 RX ADMIN — DICLOFENAC SODIUM 2 G: 10 GEL TOPICAL at 22:24

## 2018-11-12 RX ADMIN — TRAMADOL HYDROCHLORIDE 25 MG: 50 TABLET, FILM COATED ORAL at 15:53

## 2018-11-12 RX ADMIN — TRAMADOL HYDROCHLORIDE 25 MG: 50 TABLET, FILM COATED ORAL at 03:02

## 2018-11-12 RX ADMIN — MULTIPLE VITAMINS W/ MINERALS TAB 1 TABLET: TAB at 09:05

## 2018-11-12 RX ADMIN — LORATADINE 10 MG: 10 TABLET ORAL at 09:05

## 2018-11-12 RX ADMIN — FLUTICASONE FUROATE AND VILANTEROL TRIFENATATE 1 PUFF: 200; 25 POWDER RESPIRATORY (INHALATION) at 08:31

## 2018-11-12 RX ADMIN — TRAMADOL HYDROCHLORIDE 25 MG: 50 TABLET, FILM COATED ORAL at 09:04

## 2018-11-12 RX ADMIN — IPRATROPIUM BROMIDE AND ALBUTEROL SULFATE 3 ML: .5; 3 SOLUTION RESPIRATORY (INHALATION) at 08:31

## 2018-11-12 ASSESSMENT — ACTIVITIES OF DAILY LIVING (ADL)
ADLS_ACUITY_SCORE: 11

## 2018-11-12 NOTE — PROGRESS NOTES
[facility]  Hospitalist Progress Note  Henry Frye,  11/12/2018    Reason for Stay (Diagnosis): Pneumonia         Assessment and Plan:      Summary of Stay: Chuck Lopez is a 78 year old male admitted on 11/9/2018 with pneumonia.    Problem List:   1. Pneumonia.  Continue Levaquin 750 mg a day.  Pro-calcitonin low.  Today is day 4 of 5 for antibiotics.  2. Acute COPD exacerbation.  Much improved.  No longer with any wheeze.  Continue Breo Ellipta and Levaquin 750 mg a day.  Stop prednisone and scheduled do nebs.  Continue Albuterol as needed.  3. Acute on chronic hypoxic respiratory failure.  Acute worsening due to pneumonia and COPD.  Continue supplemental oxygen as needed.  4. Acute on chronic pain syndrome.  Tramadol as needed.  Diclofenac gel as needed.  Tylenol as needed.  Pain team consult.  5. Tobacco use disorder.  I did reinforce advise for long-term cessation on 11/10/18.  Continue Nicotrol Inhaler as needed.  6. Hypertension.  Continue atenolol and hydrochlorothiazide.  7. Depression.  Continue buspirone.  8. Benign prostatic hyperplasia.  Tamsulosin.  9. Mild hyponatremia.  Resolved.    10. Deconditioning.  Physical and Occupational Therapy consults.  DVT Prophylaxis: Enoxaparin (Lovenox) SQ  Code Status: Full Code  Discharge Dispo: TBD  Estimated Disch Date / # of Days until Disch: 1        Interval History (Subjective):      Is having trouble sleeping.  Occasional cough.  Feels weak.  Breathing much improved.  Denies chest pain, fevers, chills, nausea, vomiting, or diarrhea.                  Physical Exam:      Last Vital Signs:  /62 (BP Location: Right arm)  Pulse 79  Temp 97.6  F (36.4  C) (Oral)  Resp 18  Wt 55.5 kg (122 lb 4.8 oz)  SpO2 95%  BMI 20.35 kg/m2    Gen:  NAD, A&Ox3.  Eyes:  PERRL, sclera anicteric.  OP:  MMM, no lesions.  Neck:  Supple.  CV:  Regular, no murmurs.  Lung:  CTA b/l, normal effort.  Ab:  +BS, soft.  Skin:  Warm, dry to touch.  No  rash.  Ext:  No pitting edema LE b/l.      Recent Labs  Lab 11/12/18 0711 11/11/18 0723   NA  --  139   POTASSIUM  --  3.5   CHLORIDE  --  102   CO2  --  33*   ANIONGAP  --  4   GLC  --  99   BUN  --  15   CR 0.90 0.78   GFRESTIMATED 82 >90   GFRESTBLACK >90 >90   MATTHEW  --  8.5       Recent Labs  Lab 11/12/18  0711 11/10/18  0717   WBC  --  8.1   HGB  --  12.4*   HCT  --  37.3*   MCV  --  99    183    NAD, A&Ox3.  Eyes:  PERRL, sclera anicteric.  OP:  MMM, no lesions.  Neck:  Supple.  CV:  Regular, no murmurs.  Lung:  CTA b/l, normal effort.  Ab:  +BS, soft.  Skin:  Warm, dry to touch.  No rash.  Ext:  No pitting edema LE b/l.           Medications:      All current medications were reviewed with changes reflected in problem list.         Data:      All new lab and imaging data was reviewed.   Labs:    Recent Labs  Lab 11/12/18 0711 11/11/18 0723   NA  --  139   POTASSIUM  --  3.5   CHLORIDE  --  102   CO2  --  33*   ANIONGAP  --  4   GLC  --  99   BUN  --  15   CR 0.90 0.78   GFRESTIMATED 82 >90   GFRESTBLACK >90 >90   MATTHEW  --  8.5       Recent Labs  Lab 11/12/18  0711 11/10/18  0717   WBC  --  8.1   HGB  --  12.4*   HCT  --  37.3*   MCV  --  99    183      Imaging:   No results found for this or any previous visit (from the past 24 hour(s)).

## 2018-11-12 NOTE — PLAN OF CARE
Problem: Patient Care Overview  Goal: Plan of Care/Patient Progress Review  Outcome: No Change  Pt a/o x 4; VSS, pt denies dizziness, & n/v. Pt reports on-going headache and SOB when changing positions. PRN medications given Q6 are managing headache pain. Pt maintaining adequate O2 saturation at 3L O2 - utilizing 4L with activity (baseline is 2-4L). Pt tolerating PO intake. Ambulating hallways with SBA. Voiding adequately. Anticipating discharge to extended care facility in the next couple days. Will continue with plan of care.

## 2018-11-12 NOTE — PLAN OF CARE
"Problem: Patient Care Overview  Goal: Plan of Care/Patient Progress Review  PT: Spoke with pt. Per chart and patient, he was able to ambulate in the halls with his 4WW and SBA. Pt reports being able to shower and \"feeling better than he has in years\". Pt agrees for discharge from IP PT. He is agreeable to continue to walk with RN staff for duration of stay.     Physical Therapy Discharge Summary    Reason for therapy discharge:    Formal PT goals not met, however, pt demonstrating sufficient mobility for DC from PT services.     Progress towards therapy goal(s). See goals on Care Plan in [facility] electronic health record for goal details.  Goals not met.  Barriers to achieving goals:   SBA with RN staff. .    Therapy recommendation(s):    No further therapy is recommended.        "

## 2018-11-12 NOTE — CONSULTS
Care Transition Initial Assessment -   Reason For Consult: discharge planning  Met with: Patient    Active Problems:    COPD exacerbation (H)       DATA  Lives With: alone - did live at [facility]  Living Arrangements: homeless - Pt was evicted due to lack of payment.  Description of Support System: Uninvolved - Pt states he does not have a support system.  Who is your support system?: Limited to a previous roommate - Julio.  Support Assessment: Lacks adequate emotional support, Lacks necessary supervision and assistance, Limited social contact and support. Pt reports he does not have a support system.  Identified issues/concerns regarding health management: Pt was admitted for COPD Exacerbation.          Quality Of Family Relationships: non-existent      ASSESSMENT  Cognitive Status:  awake, alert and oriented  Concerns to be addressed:  Pt states that he told [facility] since June that he was not going to pay them until they were able to provide the cares to him that he needs.  He states that he is allergic to perfumes and fragrances and everyone at the facility wore fragrances and there was also mold and mildew that affected his breathing.  Pt states that the could not accommodate his food allergies - he cannot have preservatives he reports.  Pt said that they hid a lot of things from him at the facility.  Pt said he would like to go to a group home or have an apartment of his own, but he cannot afford it.  Pt showed sw documentation that was from a court ordered eviction notices from [facility] against the pt with a court date of 10/23/18.  Pt had been asked to vacate [facility] on 6/1/18, 7/24/18, 8/29/18, and 9/8/18 due to lack of payment - it is a month to month lease.  Pt did not pay rent from Jan 2018-Sep 2018 and currently owes them $7,761.60.  Pt said that nobody at the facility would ever help him with anything and they were always hiding things from him.  He would try to get in  contact with his [facility] Care Coordinator, but said he couldn't because he didn't have a phone and he couldn't leave a message without a phone number for them to call him back at.  PT gave sw the name and number of his [facility] Care Coordinator Sylvie Mccann [phone] and the number for his [employer] Worker Sylvie Torres [phone]. Sw called Sylvie, but she said that she has not worked with the pt for a little while now and he no longer has an open case.  She gave me the number for Yanira Palacios who is the last worker that the pt had to call to see if she had any other information [phone].  Sylvie assumes that the pt never filled out the paperwork and that is why his case was closed. Pt said that he has Elder Care and gets $1,030/month for social security.  Pt states that he still has some of his things at the facility.  Pt had previously been at [facility] as well.      PLAN  Sw will continue with discharge planning and be available as needed until discharge.    Sw will reach out to the pt's [employer] Worker and Yanira at [facility].

## 2018-11-12 NOTE — CONSULTS
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 order, currently   contraindicated.    Goals  Patient to consume >/= 75% of meals TID and >/=1 high protein   supplements per day      MONITORING AND EVALUATION:  Progress towards goals will be monitored and evaluated per   protocol and Practice Guidelines      Sophie Burton RDN, LD, [facility]  Pager - 3rd floor/ICU: [phone]  Pager - All other floors: [phone]  Pager - Weekend/holiday: [phone]  Office: [phone]     Care Coordinator IP Consult    Narrative    Ivett Vázquez CM     11/10/2018  2:47 PM  Care Transition Initial Assessment - RN    Reason For Consult: care coordination/care conference, discharge   planning   Met with: Patient.  DATA   Active Problems:    COPD exacerbation (H)       Cognitive Status: awake, alert and oriented.  Primary Care Clinic Name: [facility]  Primary Care MD Name: TBD  Contact information and PCP information verified: Yes  Lives With: other (see comments) (roommate)     Description of Support System: Supportive   Who is your support system?: Other (specify) (friend)       Insurance concerns: No Insurance issues identified  ASSESSMENT  Patient currently receives the following services:  None.      Identified issues/concerns regarding health  management: He was   living at [facility] but states he left d/t the   fragrance policy at the facility. He is allergic to fragrances   and most staff wears perfume or scented lotions. He states his   large oxygen concentrator is still there. He has a 4WW in the   room with a portable O2 concentrator on the seat. When CM asked   who is his oxygen provider, he states he doesn't have one. His   responses are scattered and non-specific. He keeps returning to   the fragrance situation and does not give CM direct answers to   questions asked.      PLAN  Financial costs for the patient were not discussed.  Patient was not given options and choices for discharge. SW to   see re: housing resources.   Patient/family is agreeable to the plan?  TBD   Patient anticipates discharging to probable shelter.     Other Resources: Other (see comment) (COPD Action Plan discussed   w/pt)  Patient anticipates needs for home equipment: No  Plan/Disposition: TBD    Appointments: TBD once disposition known.     CM will continue to follow patient until discharge for any   additional needs.     Elina Vázquez RN, BSN, CTS  [facility]  [phone]               Influenza A/B antigen   Result Value Ref Range    Influenza A/B Agn Specimen Nasopharyngeal     Influenza A Negative NEG^Negative    Influenza B Negative NEG^Negative   Sputum Culture Aerobic Bacterial   Result Value Ref Range    Specimen Description Sputum     Culture Micro Canceled, Test credited     Culture Micro (A)      >10 Squamous epithelial cells/low power field indicates oral contamination. Please   recollect.      Culture Micro       Notification of test cancellation was given to  Rome Frankel RN on RHMS3 at 1914 11/10/18 SRQ     Gram stain   Result Value Ref Range    Specimen Description Sputum Screen     Gram Stain (A)      >10 Squamous epithelial cells/low power field indicates oral contamination. Please   recollect.      Gram Stain <25  PMNs/low power field     Gram Stain Many  Mixed gram negative and positive edith

## 2018-11-13 PROCEDURE — 25000132 ZZH RX MED GY IP 250 OP 250 PS 637: Mod: GY | Performed by: INTERNAL MEDICINE

## 2018-11-13 PROCEDURE — 94640 AIRWAY INHALATION TREATMENT: CPT

## 2018-11-13 PROCEDURE — 94640 AIRWAY INHALATION TREATMENT: CPT | Mod: 76

## 2018-11-13 PROCEDURE — 40000275 ZZH STATISTIC RCP TIME EA 10 MIN

## 2018-11-13 PROCEDURE — A9270 NON-COVERED ITEM OR SERVICE: HCPCS | Mod: GY | Performed by: CLINICAL NURSE SPECIALIST

## 2018-11-13 PROCEDURE — 12000000 ZZH R&B MED SURG/OB

## 2018-11-13 PROCEDURE — 99233 SBSQ HOSP IP/OBS HIGH 50: CPT | Performed by: INTERNAL MEDICINE

## 2018-11-13 PROCEDURE — 25000125 ZZHC RX 250: Performed by: INTERNAL MEDICINE

## 2018-11-13 PROCEDURE — A9270 NON-COVERED ITEM OR SERVICE: HCPCS | Mod: GY | Performed by: INTERNAL MEDICINE

## 2018-11-13 PROCEDURE — 99231 SBSQ HOSP IP/OBS SF/LOW 25: CPT | Performed by: CLINICAL NURSE SPECIALIST

## 2018-11-13 PROCEDURE — 99207 ZZC CDG-CODE CATEGORY CHANGED: CPT | Performed by: INTERNAL MEDICINE

## 2018-11-13 PROCEDURE — 25000128 H RX IP 250 OP 636: Performed by: INTERNAL MEDICINE

## 2018-11-13 PROCEDURE — 25000132 ZZH RX MED GY IP 250 OP 250 PS 637: Mod: GY | Performed by: CLINICAL NURSE SPECIALIST

## 2018-11-13 RX ADMIN — TAMSULOSIN HYDROCHLORIDE 0.4 MG: 0.4 CAPSULE ORAL at 08:51

## 2018-11-13 RX ADMIN — TRAMADOL HYDROCHLORIDE 25 MG: 50 TABLET, FILM COATED ORAL at 18:48

## 2018-11-13 RX ADMIN — BUSPIRONE HYDROCHLORIDE 20 MG: 10 TABLET ORAL at 16:40

## 2018-11-13 RX ADMIN — MULTIPLE VITAMINS W/ MINERALS TAB 1 TABLET: TAB at 08:51

## 2018-11-13 RX ADMIN — ENOXAPARIN SODIUM 40 MG: 40 INJECTION SUBCUTANEOUS at 12:19

## 2018-11-13 RX ADMIN — BUSPIRONE HYDROCHLORIDE 20 MG: 10 TABLET ORAL at 23:10

## 2018-11-13 RX ADMIN — BUTALBITAL, ACETAMINOPHEN, AND CAFFEINE 1 TABLET: 50; 325; 40 TABLET ORAL at 08:50

## 2018-11-13 RX ADMIN — DICLOFENAC SODIUM 2 G: 10 GEL TOPICAL at 08:54

## 2018-11-13 RX ADMIN — POTASSIUM CHLORIDE 20 MEQ: 1500 TABLET, EXTENDED RELEASE ORAL at 08:50

## 2018-11-13 RX ADMIN — DICLOFENAC SODIUM 2 G: 10 GEL TOPICAL at 18:42

## 2018-11-13 RX ADMIN — ALBUTEROL SULFATE 2.5 MG: 2.5 SOLUTION RESPIRATORY (INHALATION) at 20:38

## 2018-11-13 RX ADMIN — TRAMADOL HYDROCHLORIDE 25 MG: 50 TABLET, FILM COATED ORAL at 04:57

## 2018-11-13 RX ADMIN — BUTALBITAL, ACETAMINOPHEN, AND CAFFEINE 1 TABLET: 50; 325; 40 TABLET ORAL at 20:21

## 2018-11-13 RX ADMIN — POLYETHYLENE GLYCOL 3350 17 G: 17 POWDER, FOR SOLUTION ORAL at 04:58

## 2018-11-13 RX ADMIN — LEVOFLOXACIN 750 MG: 500 TABLET, FILM COATED ORAL at 14:59

## 2018-11-13 RX ADMIN — FLUTICASONE FUROATE AND VILANTEROL TRIFENATATE 1 PUFF: 200; 25 POWDER RESPIRATORY (INHALATION) at 08:21

## 2018-11-13 RX ADMIN — ATENOLOL 25 MG: 25 TABLET ORAL at 08:50

## 2018-11-13 RX ADMIN — ALBUTEROL SULFATE 2.5 MG: 2.5 SOLUTION RESPIRATORY (INHALATION) at 23:26

## 2018-11-13 RX ADMIN — Medication 25 MG: at 00:48

## 2018-11-13 RX ADMIN — LORATADINE 10 MG: 10 TABLET ORAL at 08:51

## 2018-11-13 RX ADMIN — RANITIDINE 75 MG: 75 TABLET, FILM COATED ORAL at 23:10

## 2018-11-13 RX ADMIN — ALBUTEROL SULFATE 2.5 MG: 2.5 SOLUTION RESPIRATORY (INHALATION) at 08:20

## 2018-11-13 RX ADMIN — BUSPIRONE HYDROCHLORIDE 20 MG: 10 TABLET ORAL at 08:50

## 2018-11-13 RX ADMIN — HYDROCHLOROTHIAZIDE 25 MG: 25 TABLET ORAL at 08:51

## 2018-11-13 RX ADMIN — TIZANIDINE 4 MG: 4 TABLET ORAL at 23:10

## 2018-11-13 RX ADMIN — TRAMADOL HYDROCHLORIDE 25 MG: 50 TABLET, FILM COATED ORAL at 12:25

## 2018-11-13 ASSESSMENT — ACTIVITIES OF DAILY LIVING (ADL)
ADLS_ACUITY_SCORE: 11

## 2018-11-13 NOTE — PLAN OF CARE
Problem: Patient Care Overview  Goal: Plan of Care/Patient Progress Review  Outcome: Improving  Please see flowsheets for detailed vital signs and assessments.   Orientation: A&O  Vital Signs: stable  Pain: c/o mild pain, gave tramadol x1, pt rested quietly  O2: mid 90s 3L NC  Tele: na  Lungs: dim  GI: no BM for 4 days per pt, gave Miralax this shift  : voiding w/o difficulty    Skin: bruised, scab, scar  Activity: independent in room  IVF: saline locked  Consults: SW  Plan: discharge 1-2 days

## 2018-11-13 NOTE — PROGRESS NOTES
D:  Per record review, pt is ready for discharge back to home.  He is not in need of PT or OT services at this time.    I:  Sw spoke with pt about discharge plans.  Pt is currently homeless.  Pt has refused all options that sw has provided.  Sw has offered extended stay hotels ([facility] ([phone]) in [place] and [facility] ([phone]) in [place], emergency shelter information ([phone]), and the information for his oxygen company (pt reports it is through ) to make sure that he has that once he finds a place to live ([phone]).  Sw found the cheapest hotels in the area for the adult giving his financial status.  Sw contacted Yanira Palacios, a previous [facility] Care Coordinator, to see if she had any other updated information ([phone]).  Sw called [facility] ([phone]) and talked to Lacho to verify that all of the pt's belongings and his oxygen concentrator are at the facility still.  Lacho said that everything is still in his room, but they need to double check where is oxygen is through.  Pt states that he did smoke, but quit the day before he was admitted.   Pt says he was staying with a friend between being evicted from [facility] and being admitted here, but cannot go back there any longer.  Pt stated that he would not talk about his finances or going back to his friend's house.   Pt said that all of the options he was given to live are not possible because they cannot meet his allergy needs (msg and fragrances).  Sw asked if he had documentation of the allergy to provide a facility and he said no, it was when he was 44 years old.    He gave the phone number for [facility] ([phone]), but it was for [employer].    Sw has continued to try and get in touch with his [facility] Financial Worker Sylvie Torres ([phone]), but have been able to get in contact with her.  Pt says that over the last few months his  social security has fluctuated between $650/month and $1,030/month.  Physician visited the pt with sw due to the pt refusing all discharge placement options.  Pt continued to refuse.  Physician will consult OT and psych.  Pt said that he is unable to make these decisions and make contact with anyone because he is stressed.  Sw supervisor and pt relations has been contacted regarding this pt.  Dudley informed the pt that if he continues to refuse all placement options and he is discharge ready, he may have to begin paying for his hospital stay himself due to policy and he reported that means nothing to him.      A/P:  Sw will continue with discharge planning and be available as needed until discharge.     Addendum:  Sw also spoke with the pt about going to an long term, but he also declined this option due to him saying they cannot meet his needs and accommodate his allergies.

## 2018-11-13 NOTE — PROGRESS NOTES
[facility]  Hospitalist Progress Note  Oliver Call MD 11/13/18    Reason for Stay (Diagnosis): copd         Assessment and Plan:      Summary of Stay:     Chuck Lopez is a 78 year old male with history including chronic pain syndrome, COPD with chronic oxygen dependence and ongoing smoking, depression, hypertension admitted on 11/9/2018 with shortness of breath.  He was felt clinically to be having a COPD exacerbation and started on steroids, neb treatments and Levaquin in the ER.  Chest x-ray was obtained and showed severe pulmonary hyperinflation with new somewhat nodular opacities in the left midlung but otherwise clear.  His pro calcitonin was checked and negative here but he did continue 5 days of Levaquin empirically for possible atypical pneumonia.  At this point he is clinically improved and remained stable on 2-3 L of supplemental oxygen which he tells me is his baseline.  He is also been tapered off of steroids.  He has been up and ambulatory and tells me he feels much better than upon presentation.    Notably, after discussion with the patient and also social work it sounds as though he had been staying in his assisted living but did not like the care he was receiving and stopped paying his rent.  Per the  he is not allowed to go back there as he has not been paying since this summer and owes them a large amount of money.  Social work has been following regarding helping the patient sort out his financial situation and living options but at this point they seem to be limited to friends and or a hotel or shelter.  He does apparently have $1300 per month of income for eldercare and also social security funds but has been refusing to pay for his assisted living.      At this point I am advising complete smoking cessation and close outpatient follow-up.  I think it would be reasonable to repeat some imaging of his chest to make sure that his infiltrates resolved.  " During my evaluation of him today he seemed completely alert and appropriate and expressed understanding of the plan for his medical care and recommended follow-up plan but he is refusing discharge and raised the question as to whether he can manage his own affairs.  He is refusing to engage w/ SW re: finding appropriate disposition and demanding that we \"figure it out\" though he refusing long term placement.    At this point Psychiatry and OT will be consulted to help determine if he is competent to make these decisions for himself.  Patient rep also being involved.    Problem List:   1. Pneumonia.  Completed Levaquin 750 mg times 5 days.  Procalcitonin low, question viral versus atypical pneumonia..      2. Acute COPD exacerbation.  Much improved.  No longer with any wheeze.  Continue Breo Ellipta..  completed antibiotics and prednisone.    3. Acute on chronic hypoxic respiratory failure.  Acute worsening due to pneumonia and COPD.  Continue supplemental oxygen as needed.  I have reordered this to be delivered to the hospital prior to discharge as apparently he ran out of supplemental oxygen prior to presentation which may be playing a role in his worsened shortness of breath upon presentation.    4. Acute on chronic pain syndrome.  Tramadol as needed.  Diclofenac gel as needed.  Tylenol as needed.  Pain team consulted.  He reportedly follows with a pain clinic and I have not refilled any narcotic pain medications here.       5.  Tobacco use disorder.  Counseled repeatedly.    6.   Hypertension.  Continue atenolol and hydrochlorothiazide.    7.   Depression.  Continue buspirone.    8.  Benign prostatic hyperplasia.  Tamsulosin.    9.  Mild hyponatremia.  Resolved.      10.  Deconditioning.  Physical and Occupational Therapy consulted.    11.  Non-compliance and refusal of social supports: declines to work w/ SW on placement.  Refuses [facility] due to \"being allergic to foods at all assisted livings\", declines phone numbers " "for local hotels and declines our offer to work on a medical bed at a shelter.  Psychiatry and OT eval to see if he is competent but I get the sense he likely is.  Patient rep also involved in part at my request.             Interval History (Subjective):      At this point he is clinically improved and remained stable on 2-3 L of supplemental oxygen which he tells me is his baseline.  He is also been tapered off of steroids.  He has been up and ambulatory and tells me he feels much better than upon presentation.    Notably, after discussion with the patient and also social work it sounds as though he had been staying in his assisted living but did not like the care he was receiving and stopped paying his rent.  Per the  he is not allowed to go back there as he has not been paying since this summer and owes them a large amount of money.  Social work has been following regarding helping the patient sort out his financial situation and living options but at this point they seem to be limited to friends and or a hotel or shelter.  He does apparently have $1300 per month of income for eldercare and also social security funds but has been refusing to pay for his assisted living.      At this point I am advising complete smoking cessation and close outpatient follow-up.  I think it would be reasonable to repeat some imaging of his chest to make sure that his infiltrates resolved.  During my evaluation of him today he seemed completely alert and appropriate and expressed understanding of the plan for his medical care and recommended follow-up plan but he is refusing discharge and raised the question as to whether he can manage his own affairs.  He is refusing to engage w/ SW re: finding appropriate disposition and demanding that we \"figure it out\" though he refusing [facility] placement.    At this point Psychiatry and OT will be consulted to help determine if he is competent to make these decisions for himself.  " Patient rep also being involved.                    Physical Exam:      Last Vital Signs:  /57 (BP Location: Right arm)  Pulse 79  Temp 98.8  F (37.1  C) (Oral)  Resp 18  Wt 55.8 kg (123 lb)  SpO2 97%  BMI 20.47 kg/m2    I/O last 3 completed shifts:  In: 950 [P.O.:950]  Out: 1375 [Urine:1375]    General: Alert, awake, no acute distress.  HEENT: NC/AT, eyes anicteric, external occular movements intact, face symmetric.  Dentition WNL, MM moist.  Cardiac: RRR, S1, S2.  No murmurs appreciated.  Pulmonary: Normal chest rise, normal work of breathing.  Lungs CTA BL  Abdomen: soft, non-tender, non-distended.  Bowel Sounds Present.  No guarding.  Extremities: no deformities.  Warm, well perfused.  Skin: no rashes or lesions noted.  Warm and Dry.  Neuro: No focal deficits noted.  Speech clear.  Coordination and strength grossly normal.  Psych: Appropriate affect.         Medications:      All current medications were reviewed with changes reflected in problem list.         Data:      All new lab and imaging data was reviewed.   Labs:    Recent Labs  Lab 11/12/18  0711 11/11/18  0723   NA  --  139   POTASSIUM  --  3.5   CHLORIDE  --  102   CO2  --  33*   ANIONGAP  --  4   GLC  --  99   BUN  --  15   CR 0.90 0.78   GFRESTIMATED 82 >90   GFRESTBLACK >90 >90   MATTHEW  --  8.5       Recent Labs  Lab 11/12/18  0711 11/10/18  0717   WBC  --  8.1   HGB  --  12.4*   HCT  --  37.3*   MCV  --  99    183      Imaging:   No results found for this or any previous visit (from the past 48 hour(s)).      Oliver Call MD.

## 2018-11-13 NOTE — PLAN OF CARE
Problem: Patient Care Overview  Goal: Plan of Care/Patient Progress Review  Outcome: Improving  VS stable. A & O x 4.   Diet:regular, no caffeine.   Ambulates: independently.   IV meds:saline locked.   Pain:chronic neck/back pain. Patient using voltarren cream, tramadolol and now zanaflex.   Abnormal assessments:occassionally dyspneic on exertion, lung sounds diminished. Patient requested PRN nebulizer    Intake & output: voiding appropriately, ate well this evening.   Drains/devices :3 liters of oxygen nasal cannula.   Discharge plan: pending discharge,  Patient does not have place to go currently. Will continue to monitor and review plan of care.

## 2018-11-13 NOTE — PLAN OF CARE
Problem: Patient Care Overview  Goal: Plan of Care/Patient Progress Review  Outcome: Improving  Vs stable. Afebrile, LEVINE. On oxygen at 3L, patient baseline,  Has COPD. PO Levaquin for PNA.  Chronic back and neck pain, pain consult following see note.  Patient is homeless, SW assisting with discharge planning, see note. OT and psyche consult added.

## 2018-11-13 NOTE — PROGRESS NOTES
"[facility]  Pain Management Progress Note  Text Page     Assessment & Plan   Chuck Lopez is a 78 year old male who was admitted on 11/9/2018.     Rich states he slept well last night. He was more interested in talking about his dismissal plan: \"You know I'm not going to last two days at a shelter\". I explained our  Eli Esqueda, LSW is the person he needs to speak with in regard to his dismissal plan.     1)  Chronic neck and head pain exacerbated due to cough. Limited range of motion of the neck. Pain much improved per patient report.      2)  Patient with chronic neck and head pain, on chronic opioid therapy managed by Eli Rivera MD at [facility] in [place]  Baseline 20 mg Daily Morphine Equivalent as dispensed as reported daily use.  Patient has a possible opioid tolerance.      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       2)   Non-opioid multimodal medication therapy  -Topical:Voltaren 1% Topical Gel to neck and bilateral shoulders QID  -N-SAIDS:Avoid due to Creatine Clearance 54.8 mL/min  -Muscle Relaxants:Tizanidine 4 mg at bedtime  -Adjuvants:Acetaminophen 650 mg every 4 hours prn  -Antidepressants/anxiolytics: Buspar increased to 20 mg TID. Butalbital-APAP-Caffeine -40 every 6 hours prn and " Trazodone 25-50 mg every 6 hours prn     3)  Non-medication interventions  Patient is most interested in message  Positioning, Heating pad PRN, Relaxation, Distraction     4)  Opioids: Tramadol 25-50 mg every 6 hours prn  Opioids Treatment Goal: -Do not escalate from chronic pain plan     5)  Constipation Prophylaxis  Senna-S prn     6)  Pain Education  -Opioid safe use, storage and disposal information included in DC AVS     7)  DC Planning   Discussed goal of Opioid therapy as above with patient, Hospitalist Oliver Call MD and bedside nurse Umm Oliver RN   Total daily dose of opioids is less than 30 morphine equivalents dose (MED), opioid(s) may be stopped without taper.  Continued outpatient management is dependent on follow up plan   Disposition: Patient is homeless: appreciate input of  HUMAIRA Floyd   Support systems: None  Outpatient Referrals: No, as patient does not have transportation opportunities to attend follow up appointments  The following risk factors have been identified for unintentional overdose: patient is on multiple sedating medications , patient is a smoker, patient is > 65 years old, patient has a history of substance abuse disorder, patient has anxiety, depression or PTSD and patient has compromised kidney function . Discharge with intra-nasal naloxone if discharged to home with opioids.  Plan for education prior to discharge    Danuta Negron MS, RN, CNS, APRN, ACHPN, FAACVPR  Pain and Palliative Care  Pager [phone]  Office [phone]       Time Spent on this Encounter   I spent 15 minutes (10:55 AM-11:10 AM) in assessment of the patient, counseling and discussion with the patient and family as documented in sections below. Another 20 minutes in review of chart, documentation, coordination of care and discussion with the health care team.      Interval History   Chart reviewed    Review of Systems   CONSTITUTIONAL: NEGATIVE for fever, chills, change in  weight  ENT/MOUTH: NEGATIVE for ear, mouth and throat problems  RESP: NEGATIVE for significant cough or SOB  CV: NEGATIVE for chest pain, palpitations or peripheral edema    Physical Exam   Temp:  [96.5  F (35.8  C)-97.9  F (36.6  C)] 97.4  F (36.3  C)  Heart Rate:  [64-72] 72  Resp:  [16-18] 18  BP: (118-124)/(49-55) 124/49  SpO2:  [95 %-99 %] 97 %  123 lbs 0 oz  GEN:  Alert, oriented x 3, appears comfortable, NAD.  HEENT:  Normocephalic/atraumatic, no scleral icterus, no nasal discharge, mouth moist.  RESP: Symmetric chest rise on inhalation noted.  Normal respiratory effort.  PAIN BEHAVIOR: Cooperative  Psych:  Normal affect.  Calm, cooperative, conversant appropriately.    Medications       atenolol  25 mg Oral Daily     busPIRone  20 mg Oral TID     diclofenac  2 g Transdermal 4x Daily     enoxaparin  40 mg Subcutaneous Q24H     fluticasone-vilanterol  1 puff Inhalation Daily     hydrochlorothiazide  25 mg Oral Daily     levofloxacin  750 mg Oral Q24H     loratadine  10 mg Oral Daily     multivitamin, therapeutic with minerals  1 tablet Oral Daily     potassium chloride SA  20 mEq Oral QAM     ranitidine  75 mg Oral At Bedtime     tamsulosin  0.4 mg Oral Daily     tiZANidine  4 mg Oral At Bedtime       Data   No results found for this or any previous visit (from the past 24 hour(s)).

## 2018-11-13 NOTE — DISCHARGE SUMMARY
[facility]  Discharge Summary  Name: Chuck Lopez    MRN: [id number]  YOB: 1940    Age: 78 year old  Date of Discharge:  11/13/2018  Date of Admission: 11/9/2018  Primary Care Provider: Pranay Reid  Discharge Physician:  Rajat Call MD  Discharging Service:  Hospitalist      Hospital Course/Discharge Diagnoses:  Chuck Lopez is a 78 year old male with history including chronic pain syndrome, COPD with chronic oxygen dependence and ongoing smoking, depression, hypertension admitted on 11/9/2018 with shortness of breath.  He was felt clinically to be having a COPD exacerbation and started on steroids, neb treatments and Levaquin in the ER.  Chest x-ray was obtained and showed severe pulmonary hyperinflation with new somewhat nodular opacities in the left midlung but otherwise clear.  His pro calcitonin was checked and negative here but he did continue 5 days of Levaquin empirically for possible atypical pneumonia.  At this point he is clinically improved and remained stable on 2-3 L of supplemental oxygen which he tells me is his baseline.  He is also been tapered off of steroids.  He has been up and ambulatory and tells me he feels much better than upon presentation.    Notably, after discussion with the patient and also social work it sounds as though he had been staying in his assisted living but did not like the care he was receiving and stopped paying his rent.  Per the  he is not allowed to go back there as he has not been paying since this summer and owes them a large amount of money.  Social work has been following regarding helping the patient sort out his financial situation and living options but at this point they seem to be limited to friends and or a hotel or shelter.  He does apparently have $1300 per month of income for eldercare and also social security funds but has been refusing to pay for his assisted living.      At this point  "I am advising complete smoking cessation and close outpatient follow-up.  I think it would be reasonable to repeat some imaging of his chest to make sure that his infiltrates resolved.  During my evaluation of him today he seemed completely alert and appropriate and expressed understanding of the plan for his medical care and follow-up plan.        Problem List:   1. Pneumonia.  Completed Levaquin 750 mg times 5 days.  Pro calcitonin low, question viral versus atypical pneumonia..      2. Acute COPD exacerbation.  Much improved.  No longer with any wheeze.  Continue Breo Ellipta..  completed antibiotics and prednisone.    3. Acute on chronic hypoxic respiratory failure.  Acute worsening due to pneumonia and COPD.  Continue supplemental oxygen as needed.  I have reordered this to be delivered to the hospital prior to discharge as apparently he ran out of supplemental oxygen prior to presentation which may be playing a role in his worsened shortness of breath upon presentation    4. Acute on chronic pain syndrome.  Tramadol as needed.  Diclofenac gel as needed.  Tylenol as needed.  Pain team consulted.  He reportedly follows with a pain clinic and I have not refilled any narcotic pain medications here.       5.  Tobacco use disorder.  Counseled repeatedly.    6.   Hypertension.  Continue atenolol and hydrochlorothiazide.    7.   Depression.  Continue buspirone.    8.  Benign prostatic hyperplasia.  Tamsulosin.    9.  Mild hyponatremia.  Resolved.      10.  Deconditioning.  Physical and Occupational Therapy consulted.         Discharge Disposition:  Discharged to home     Allergies:  Allergies   Allergen Reactions     Ammonium Lactate Other (See Comments)     Burning to skin     Doxycycline Visual Disturbance     Monosodium Glutamate Other (See Comments)     Pt states it feels like his \"head swells, visual disturbances, and he can't think striaght\"     Penicillins Rash        Discharge Medications:        Review of " your medicines      CONTINUE these medicines which have NOT CHANGED       Dose / Directions    atenolol 25 MG tablet   Commonly known as:  TENORMIN   Used for:  Type 2 diabetes mellitus with hyperosmolarity without coma, unspecified long term insulin use status        Dose:  25 mg   Take 1 tablet (25 mg) by mouth daily   Quantity:  30 tablet   Refills:  11       busPIRone 10 MG tablet   Commonly known as:  BUSPAR        Dose:  10 mg   Take 10 mg by mouth 4 times daily   Refills:  0       butalbital-acetaminophen-caffeine -40 MG per tablet   Commonly known as:  FIORICET/ESGIC        Dose:  1 tablet   Take 1 tablet by mouth every 6 hours as needed for headaches   Refills:  0       fexofenadine 30 MG ODT tab   Commonly known as:  ALLEGRA        Dose:  30 mg   Take 1 tablet (30 mg) by mouth 2 times daily as needed for allergies (congestion)   Refills:  0       FLOMAX 0.4 MG capsule   Generic drug:  tamsulosin        Dose:  0.4 mg   Take 0.4 mg by mouth daily   Refills:  0       fluticasone-salmeterol 500-50 MCG/DOSE diskus inhaler   Commonly known as:  ADVAIR        Dose:  1 puff   Inhale 1 puff into the lungs 2 times daily   Quantity:  3 Inhaler   Refills:  1       guaiFENesin 400 MG Tabs        Dose:  400 mg   Take 400 mg by mouth 3 times daily   Refills:  0       hydrochlorothiazide 25 MG tablet   Commonly known as:  HYDRODIURIL   Used for:  Type 2 diabetes mellitus with hyperosmolarity without coma, unspecified long term insulin use status        TAKE ONE TABLET BY MOUTH EVERY DAY   Quantity:  30 tablet   Refills:  10       * ipratropium - albuterol 0.5 mg/2.5 mg/3 mL 0.5-2.5 (3) MG/3ML neb solution   Commonly known as:  DUONEB        Dose:  1 vial   Take 1 vial (3 mLs) by nebulization 4 times daily   Quantity:  30 vial   Refills:  1       * ipratropium - albuterol 0.5 mg/2.5 mg/3 mL 0.5-2.5 (3) MG/3ML neb solution   Commonly known as:  DUONEB        Dose:  1 vial   Take 1 vial (3 mLs) by nebulization every 4  hours as needed for shortness of breath / dyspnea or wheezing   Quantity:  30 vial   Refills:  1       ketoconazole 2 % shampoo   Commonly known as:  NIZORAL   Used for:  Tinea capitis        Apply to the affected area and wash off after 5 minutes.   Quantity:  120 mL   Refills:  3       loratadine 10 MG tablet   Commonly known as:  CLARITIN   Used for:  Seasonal allergic rhinitis due to pollen        Dose:  10 mg   Take 1 tablet (10 mg) by mouth daily   Quantity:  30 tablet   Refills:  1       melatonin 3 MG tablet   Used for:  Insomnia, unspecified type        Dose:  3 mg   Take 1 tablet (3 mg) by mouth nightly as needed   Quantity:  30 tablet   Refills:  0       metaxalone 800 MG tablet   Commonly known as:  SKELAXIN        Dose:  800 mg   Take 800 mg by mouth 3 times daily   Refills:  0       potassium chloride SA 20 MEQ CR tablet   Commonly known as:  K-DUR/KLOR-CON M        Dose:  20 mEq   Take 20 mEq by mouth every morning   Refills:  0       tiotropium 18 MCG capsule   Commonly known as:  SPIRIVA HANDIHALER   Used for:  Centrilobular emphysema (H)        INHALE ONE CAPSULE BY MOUTH EVERY DAY   Quantity:  30 capsule   Refills:  11       traMADol 50 MG tablet   Commonly known as:  ULTRAM        Dose:  50 mg   Take 50 mg by mouth 4 times daily   Refills:  0       V-C FORTE Caps   Used for:  Hepatitis C        TAKE ONE CAPSULE BY MOUTH EVERY DAY   Quantity:  90 capsule   Refills:  3       ZANTAC PO        Dose:  75 mg   Take 75 mg by mouth At Bedtime   Refills:  0       * Notice:  This list has 2 medication(s) that are the same as other medications prescribed for you. Read the directions carefully, and ask your doctor or other care provider to review them with you.          Condition on Discharge:  Discharge condition: Stable       Code status on discharge: Full Code     History of Illness:  See detailed admission note for full details.    Physical Exam:  Vital signs:  Temp: 97.4  F (36.3  C) Temp src: Oral BP:  "124/49   Heart Rate: 72 Resp: 18 SpO2: 97 % O2 Device: Nasal cannula Oxygen Delivery: 3 LPM   Weight: 55.8 kg (123 lb)  Estimated body mass index is 20.47 kg/(m^2) as calculated from the following:    Height as of 6/13/18: 1.651 m (5' 5\").    Weight as of this encounter: 55.8 kg (123 lb).    Wt Readings from Last 1 Encounters:   11/13/18 55.8 kg (123 lb)     General: Alert, awake, no acute distress.  Nasal O2 in place.    HEENT: NC/AT, eyes anicteric, external occular movements intact, face symmetric.  Dentition WNL, MM moist.  Cardiac: RRR, S1, S2.  No murmurs appreciated.  Pulmonary: Normal chest rise, normal work of breathing.  Lungs CTA BL.  Good air movement.  No coughing noted.  Full sentences.  Abdomen: soft, non-tender, non-distended.  Bowel Sounds Present.  No guarding.  Extremities: no deformities.  Warm, well perfused.  Skin: no rashes or lesions noted.  Warm and Dry.  Neuro: fully alert and oriented, calm affect, conversational.  No focal deficits noted.  Speech clear.  Coordination and strength grossly normal.  Psych: Appropriate affect.  Seems to be well aware of his medical treatments and plan.  Asks appropriate questions.    Procedures other than Imaging:  None.     Imaging:  Results for orders placed or performed during the hospital encounter of 11/09/18   XR Chest 2 Views    Narrative    CHEST TWO VIEWS  11/9/2018 7:55 PM     HISTORY: Increased shortness of breath, history of COPD.       Impression    IMPRESSION: Severe pulmonary hyperinflation. New somewhat nodular  opacities may be present in the left midlung when compared to  9/5/2017. The lungs otherwise appear clear.    ANAYA SINGH MD        Consultations:  Palliative care.       Recent Lab Results:    Recent Labs  Lab 11/12/18  0711 11/10/18  0717 11/09/18  1916   WBC  --  8.1 11.7*   HGB  --  12.4* 13.5   HCT  --  37.3* 41.0   MCV  --  99 99    183 208          Lab Results   Component Value Date     11/11/2018     " 11/10/2018     11/09/2018    Lab Results   Component Value Date    CHLORIDE 102 11/11/2018    CHLORIDE 100 11/10/2018    CHLORIDE 97 11/09/2018    Lab Results   Component Value Date    BUN 15 11/11/2018    BUN 12 11/10/2018    BUN 11 11/09/2018      Lab Results   Component Value Date    POTASSIUM 3.5 11/11/2018    POTASSIUM 4.0 11/10/2018    POTASSIUM 3.9 11/09/2018    Lab Results   Component Value Date    CO2 33 11/11/2018    CO2 29 11/10/2018    CO2 30 11/09/2018    Lab Results   Component Value Date    CR 0.90 11/12/2018    CR 0.78 11/11/2018    CR 0.71 11/10/2018             Pending Results:    Unresulted Labs Ordered in the Past 30 Days of this Admission     No orders found from 9/10/2018 to 11/10/2018.           Discharge Instructions and Follow-Up:     Discharge Procedure Orders  Reason for your hospital stay   Order Comments: You were admitted for COPD exacerbation related to possible pneumonia versus bronchitis.  Your treated with steroids and antibiotics as well as breathing treatments.  Very important that you avoid cigarette smoke and if possible allergic triggers.     Activity   Order Comments: Your activity upon discharge: activity as tolerated   Order Specific Question Answer Comments   Is discharge order? Yes      Follow-up and recommended labs and tests    Order Comments: Follow up with primary care provider, Pranay Reid, within 7 days for hospital follow- up.  The following labs/tests are recommended: Please review your recent hospital stay and discuss an ongoing plan for medication refills and discuss timing of a repeat chest x-ray to make sure the small nodules/pneumonia noted here are resolving.     Oxygen Adult   Order Comments: Renew Home Oxygen Order  Renew previous prescription.  Expected treatment length is indefinite (99 months).    Attending Provider: Oliver Call  Physician signature: See electronic signature associated with these discharge orders  Date of  Order: November 13, 2018     Diet   Order Comments: Follow this diet upon discharge: Orders Placed This Encounter     Snacks/Supplements Adult: Other; Vanilla shake 2 pm + 2 packets Beneprotein; Between Meals     Room Service     Combination Diet Regular Diet Adult; No Caffeine Diet   Order Specific Question Answer Comments   Is discharge order? Yes          Total time spent in face to face contact with the patient and coordinating discharge was:  60 Minutes.

## 2018-11-14 ENCOUNTER — APPOINTMENT (OUTPATIENT)
Dept: OCCUPATIONAL THERAPY | Facility: CLINIC | Age: 78
DRG: 193 | End: 2018-11-14
Attending: INTERNAL MEDICINE
Payer: MEDICARE

## 2018-11-14 PROCEDURE — 25000128 H RX IP 250 OP 636: Performed by: INTERNAL MEDICINE

## 2018-11-14 PROCEDURE — 94640 AIRWAY INHALATION TREATMENT: CPT

## 2018-11-14 PROCEDURE — 40000133 ZZH STATISTIC OT WARD VISIT

## 2018-11-14 PROCEDURE — 25000125 ZZHC RX 250: Performed by: INTERNAL MEDICINE

## 2018-11-14 PROCEDURE — A9270 NON-COVERED ITEM OR SERVICE: HCPCS | Mod: GY | Performed by: INTERNAL MEDICINE

## 2018-11-14 PROCEDURE — 12000000 ZZH R&B MED SURG/OB

## 2018-11-14 PROCEDURE — 25000132 ZZH RX MED GY IP 250 OP 250 PS 637: Mod: GY | Performed by: CLINICAL NURSE SPECIALIST

## 2018-11-14 PROCEDURE — 99232 SBSQ HOSP IP/OBS MODERATE 35: CPT | Performed by: INTERNAL MEDICINE

## 2018-11-14 PROCEDURE — A9270 NON-COVERED ITEM OR SERVICE: HCPCS | Mod: GY | Performed by: CLINICAL NURSE SPECIALIST

## 2018-11-14 PROCEDURE — 97165 OT EVAL LOW COMPLEX 30 MIN: CPT | Mod: GO

## 2018-11-14 PROCEDURE — 99207 ZZC NON-BILLABLE SERV PER CHARTING: CPT | Performed by: PSYCHOLOGIST

## 2018-11-14 PROCEDURE — 99207 ZZC CDG-MDM COMPONENT: MEETS LOW - DOWN CODED: CPT | Performed by: INTERNAL MEDICINE

## 2018-11-14 PROCEDURE — 40000275 ZZH STATISTIC RCP TIME EA 10 MIN

## 2018-11-14 PROCEDURE — 94640 AIRWAY INHALATION TREATMENT: CPT | Mod: 76

## 2018-11-14 PROCEDURE — 25000132 ZZH RX MED GY IP 250 OP 250 PS 637: Mod: GY | Performed by: INTERNAL MEDICINE

## 2018-11-14 RX ORDER — BUSPIRONE HYDROCHLORIDE 15 MG/1
15 TABLET ORAL 3 TIMES DAILY
Status: DISCONTINUED | OUTPATIENT
Start: 2018-11-14 | End: 2018-11-15 | Stop reason: HOSPADM

## 2018-11-14 RX ORDER — ESCITALOPRAM OXALATE 5 MG/1
5 TABLET ORAL DAILY
Status: DISCONTINUED | OUTPATIENT
Start: 2018-11-14 | End: 2018-11-15 | Stop reason: HOSPADM

## 2018-11-14 RX ADMIN — ACETAMINOPHEN 325 MG: 325 TABLET, FILM COATED ORAL at 17:46

## 2018-11-14 RX ADMIN — ENOXAPARIN SODIUM 40 MG: 40 INJECTION SUBCUTANEOUS at 13:04

## 2018-11-14 RX ADMIN — BUSPIRONE HYDROCHLORIDE 15 MG: 15 TABLET ORAL at 21:59

## 2018-11-14 RX ADMIN — ALBUTEROL SULFATE 2.5 MG: 2.5 SOLUTION RESPIRATORY (INHALATION) at 13:17

## 2018-11-14 RX ADMIN — RANITIDINE 75 MG: 75 TABLET, FILM COATED ORAL at 21:59

## 2018-11-14 RX ADMIN — MULTIPLE VITAMINS W/ MINERALS TAB 1 TABLET: TAB at 09:04

## 2018-11-14 RX ADMIN — BUSPIRONE HYDROCHLORIDE 20 MG: 10 TABLET ORAL at 09:04

## 2018-11-14 RX ADMIN — ATENOLOL 25 MG: 25 TABLET ORAL at 09:03

## 2018-11-14 RX ADMIN — ALBUTEROL SULFATE 2.5 MG: 2.5 SOLUTION RESPIRATORY (INHALATION) at 17:39

## 2018-11-14 RX ADMIN — HYDROCHLOROTHIAZIDE 25 MG: 25 TABLET ORAL at 09:04

## 2018-11-14 RX ADMIN — LORATADINE 10 MG: 10 TABLET ORAL at 09:04

## 2018-11-14 RX ADMIN — TRAMADOL HYDROCHLORIDE 25 MG: 50 TABLET, FILM COATED ORAL at 18:41

## 2018-11-14 RX ADMIN — BUSPIRONE HYDROCHLORIDE 15 MG: 15 TABLET ORAL at 15:34

## 2018-11-14 RX ADMIN — FLUTICASONE FUROATE AND VILANTEROL TRIFENATATE 1 PUFF: 200; 25 POWDER RESPIRATORY (INHALATION) at 08:00

## 2018-11-14 RX ADMIN — Medication 25 MG: at 00:59

## 2018-11-14 RX ADMIN — TRAMADOL HYDROCHLORIDE 25 MG: 50 TABLET, FILM COATED ORAL at 13:04

## 2018-11-14 RX ADMIN — TRAMADOL HYDROCHLORIDE 25 MG: 50 TABLET, FILM COATED ORAL at 00:59

## 2018-11-14 RX ADMIN — LEVOFLOXACIN 750 MG: 500 TABLET, FILM COATED ORAL at 21:59

## 2018-11-14 RX ADMIN — BUTALBITAL, ACETAMINOPHEN, AND CAFFEINE 1 TABLET: 50; 325; 40 TABLET ORAL at 17:47

## 2018-11-14 RX ADMIN — POTASSIUM CHLORIDE 20 MEQ: 1500 TABLET, EXTENDED RELEASE ORAL at 09:03

## 2018-11-14 RX ADMIN — ESCITALOPRAM 5 MG: 5 TABLET, FILM COATED ORAL at 14:47

## 2018-11-14 RX ADMIN — ALBUTEROL SULFATE 2.5 MG: 2.5 SOLUTION RESPIRATORY (INHALATION) at 08:14

## 2018-11-14 RX ADMIN — TAMSULOSIN HYDROCHLORIDE 0.4 MG: 0.4 CAPSULE ORAL at 09:03

## 2018-11-14 RX ADMIN — TIZANIDINE 4 MG: 4 TABLET ORAL at 21:59

## 2018-11-14 ASSESSMENT — ACTIVITIES OF DAILY LIVING (ADL)
ADLS_ACUITY_SCORE: 11

## 2018-11-14 NOTE — PROGRESS NOTES
[facility]  Hospitalist Progress Note  Oliver Call MD 11/14/2018    Reason for Stay (Diagnosis): copd         Assessment and Plan:      Summary of Stay:     Chuck Lopez is a 78 year old male with history including chronic pain syndrome, COPD with chronic oxygen dependence and ongoing smoking, depression, hypertension admitted on 11/9/2018 with shortness of breath.  He was felt clinically to be having a COPD exacerbation and started on steroids, neb treatments and Levaquin in the ER.  Chest x-ray was obtained and showed severe pulmonary hyperinflation with new somewhat nodular opacities in the left midlung but otherwise clear.  His pro calcitonin was checked and negative here but he did continue 5 days of Levaquin empirically for possible atypical pneumonia.  At this point he is clinically improved and remained stable on 2-3 L of supplemental oxygen which he tells me is his baseline.  He is also been tapered off of steroids.  He has been up and ambulatory and tells me he feels much better than upon presentation.    He has been somewhat overwhelmed and depressed and psychiatry is now following.  They do feel he is competent to make his own decisions.  After initially declining to work with social work he now is agreeable to finding placement in a TCU if possible.        Problem List:   1. Pneumonia.  Completed Levaquin 750 mg times 5 days.  Procalcitonin low, question viral versus atypical pneumonia. Resolved.    2. Acute COPD exacerbation.  Much improved.  No longer with any wheeze.  Continue Breo Ellipta..  completed antibiotics and prednisone.  Has neb machine, continue those.    3. Acute on chronic hypoxic respiratory failure.  Acute worsening due to pneumonia and COPD.  Continue supplemental oxygen as needed.  I have reordered this to be delivered to the hospital prior to discharge as apparently he ran out of supplemental oxygen prior to presentation which may be playing a role  in his worsened shortness of breath upon presentation.    4. Acute on chronic pain syndrome.  Tramadol as needed.  Diclofenac gel as needed.  Tylenol as needed.  Pain team consulted.  He reportedly follows with a pain clinic and I have not refilled any narcotic pain medications here.       5.  Tobacco use disorder.  Counseled repeatedly.    6.   Hypertension.  Continue atenolol and hydrochlorothiazide.    7.   Depression.  Continue buspirone.    8.  Benign prostatic hyperplasia.  Tamsulosin.    9.  Mild hyponatremia.  Resolved.      10.  Deconditioning.  Physical and Occupational Therapy consulted.    11.  Social concerns: [place] by psychiatry to be competent to make his own decisions.  OT recommending a TCU stay.  After initially being very resistant to working with us to find placement he now is changed his tune and is willing to accept TCU placement.  Social work following.             Interval History (Subjective):      In a better mood today but still feeling somewhat overwhelmed  After much discussion he now is willing to work with social work and find appropriate placement  OT recommending TCU  Breathing better, COPD exacerbation and pneumonia essentially resolved  Discussed with psychiatry, starting antidepressant as there is some concern depressions playing a role here and his fatigue                  Physical Exam:      Last Vital Signs:  /53 (BP Location: Right arm)  Pulse 79  Temp 97.8  F (36.6  C) (Oral)  Resp 18  Wt 56 kg (123 lb 6.4 oz)  SpO2 95%  BMI 20.53 kg/m2    I/O last 3 completed shifts:  In: 1080 [P.O.:1080]  Out: 1340 [Urine:1340]    General: Alert, awake, no acute distress.  HEENT: NC/AT, eyes anicteric, external occular movements intact, face symmetric.  Dentition WNL, MM moist.  Cardiac: RRR, S1, S2.  No murmurs appreciated.  Pulmonary: Normal chest rise, normal work of breathing.  Lungs CTA BL  Abdomen: soft, non-tender, non-distended.  Bowel Sounds Present.  No  guarding.  Extremities: no deformities.  Warm, well perfused.  Skin: no rashes or lesions noted.  Warm and Dry.  Neuro: No focal deficits noted.  Speech clear.  Coordination and strength grossly normal.  Psych: Appropriate affect.         Medications:      All current medications were reviewed with changes reflected in problem list.         Data:      All new lab and imaging data was reviewed.   Labs:    Recent Labs  Lab 11/12/18 0711 11/11/18  0723   NA  --  139   POTASSIUM  --  3.5   CHLORIDE  --  102   CO2  --  33*   ANIONGAP  --  4   GLC  --  99   BUN  --  15   CR 0.90 0.78   GFRESTIMATED 82 >90   GFRESTBLACK >90 >90   MATTHEW  --  8.5       Recent Labs  Lab 11/12/18  0711 11/10/18  0717   WBC  --  8.1   HGB  --  12.4*   HCT  --  37.3*   MCV  --  99    183      Imaging:   No results found for this or any previous visit (from the past 48 hour(s)).      Olievr Call MD.

## 2018-11-14 NOTE — PROGRESS NOTES
CLINICAL NUTRITION SERVICES - REASSESSMENT NOTE      MALNUTRITION: (11/14/2018) -->intake updated  % Weight Loss:Weight loss does not meet criteria for malnutrition   % Intake: No decreased intake noted --> during admit  Subcutaneous Fat Loss:Orbital region moderate depletion, Upper arm region moderate depletion and Thoracic region mild to moderate depletion  Muscle Loss:Temporal region moderate depletion, Clavicle bone region mild to moderate depletion, Acromion bone region moderate depletion, Dorsal hand region moderate depletion, Patellar region moderate or greater depletion, Anterior thigh region moderate depletion and Posterior calf region moderate depletion  Fluid Retention:None noted     Malnutrition Diagnosis: Severe malnutrition  In Context of:  Acute on Chronic illness or disease       EVALUATION OF PROGRESS TOWARD GOALS   Diet:  Regular/No caffeine  Room service with assist  Supplements: Vanilla shake + 2 pkts beneprotein at 2 pm    Intake: % meal consumption since admission.  Consistently ordering meals TID.  MSG allergy has interfaced with meal ordering system.  Is not fond of the supplement therefore not consistently consuming.  Feels satisfied by meals and believes he is eating more in this facility when compared to PTA.        ASSESSED NUTRITION NEEDS (PER APPROVED PRACTICE GUIDELINES, Dosing weight: kg):  Estimated Energy Needs: 2139-9629 kcals (35-40 Kcal/Kg)  Justification: repletion and borderline underweight  Estimated Protein Needs:  grams protein (1.5-2 g pro/Kg)  Justification: Repletion  Estimated Fluid Needs: >1 mL/Kcal  Justification: maintenance      NEW FINDINGS:   - Medications reviewed:   Daily MVI/M  - Labs reviewed.  - Wt stable during admit.  - BM yesterday.    Previous Goals:   Patient to consume >/= 75% of meals TID and >/=1 high protein supplements per day  Evaluation: Not met    Previous Nutrition Diagnosis:   Increased nutrient needs (protein energy) related to  hypermetabolism 2/2 chronic disease and repletion needs as evidenced by fat and muscle loss, PO intake meeting <50% of needs for >5 days, severe malnutrition criteria met  Evaluation: No change, updated below      CURRENT NUTRITION DIAGNOSIS  Increased nutrient needs (protein energy) related to hypermetabolism 2/2 chronic disease and repletion needs as evidenced by indicators of moderate fat and muscle loss, severe malnutrition criteria met.    INTERVENTIONS  Recommendations / Nutrition Prescription  Continue diet as ordered.  Self-selection of high calorie/high protein items as would no longer like to receive supplements.      Implementation  Collaboration and Referral of Nutrition care: Discussed POC with team during rounds.    Goals  Patient to consistently consume at least 75% of meals TID.      MONITORING AND EVALUATION:  Progress towards goals will be monitored and evaluated per protocol and Practice Guidelines      Tavia Marlow RD, LD  Clinical Dietitian  3rd floor/ICU: [phone]  All other floors: [phone]  Weekend/holiday: [phone]

## 2018-11-14 NOTE — PROGRESS NOTES
D:  Pt is medically discharge ready.  Psych and OT have been consulted.  Pt declining all discharge resources at this time.    I:  Hilary called Yanira Palacios ([phone]) who briefly worked as the pt's [employer]  prior to him being termed at the end of June.  Yanira stated that the facility helped the pt fill out paperwork and sent it in, but the pt would not allow them to send in the supporting documentation such as his bank statements.  Yanira said they had to fill out a 6037 form since the pt was falling out of MA.  [facility] Wynne then took the case over and Yanira received a letter about a month ago saying that the case would bot be opened.  Yanira said that they as well as the facility tried to work the with pt to get all of the paperwork completed, but he was noncompliant.    Hilary tried to call Mary Grace ([phone]) who was his financial , but she did not answer and her voicemail was full so hilary could not leave a message.    A/P:  Hilary will continue with discharge planning and be available as needed until discharge.  Hilary will continue to try and get in contact with Mary Grace.
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
## 2018-11-14 NOTE — PLAN OF CARE
Problem: Patient Care Overview  Goal: Plan of Care/Patient Progress Review  Outcome: No Change  VSS. 2L O2. A&Ox4. Ind in room. Ambulated in hallway 160ft w/ walker independently on 2L O2 at 1000. Continue to monitor. Continue w/ POC.     1446 - Ambulated hallway 160ft w/ walker independently on 2L O2.

## 2018-11-14 NOTE — PROGRESS NOTES
D:  Per record review, pt is discharge ready.  Per OT's recommendation, pt needs TCU.    I:  Sw spoke with pt who reports that he is willing to go to TCU.  Pt refused OT's initial assessment.  Pt walked 160 feet in the otero with his rolling walker and with nursing supervision and sw was present.  Pt told sw that for 2 days in a row, he was up independently to the bathroom for BMs.    A/P:  Sw will continue with discharge planning and be available as needed until discharge.      Addendum:  Referrals were sent to [facility], [facility], [facility], and [facility].    Nectar asked for nurses' notes for the last 72 hours and said that they will provide the information to the director to see if they will accept him or not.  Nectar said to expect a call in the morning.    Margarita at the [facility] left a voicemail wanting sw to call and f/u regarding the referral ([phone] - Margarita).  Sw called back and left a voicemail.

## 2018-11-14 NOTE — PROGRESS NOTES
11/14/18 0700   Quick Adds   Type of Visit Initial Occupational Therapy Evaluation   Living Environment   Lives With alone   Living Arrangements homeless   Self-Care   Usual Activity Tolerance poor   Current Activity Tolerance poor   Regular Exercise no   Equipment Currently Used at Home oxygen;walker, rolling   Functional Level Prior   Ambulation 1-->assistive equipment   Transferring 0-->independent   Toileting 0-->independent   Bathing 0-->independent   Dressing 0-->independent   Eating 0-->independent   Communication 0-->understands/communicates without difficulty   Fall history within last six months no   General Information   Onset of Illness/Injury or Date of Surgery - Date 11/09/18   Referring Physician Henry Frye, DO   Patient/Family Goals Statement Not stated   Additional Occupational Profile Info/Pertinent History of Current Problem Chuck Lopez is a 78 year old male with history including chronic pain syndrome, COPD with chronic oxygen dependence and ongoing smoking, depression, hypertension admitted on 11/9/2018 with shortness of breath.   General Observations Pt supine in bed and reported feeling fatigued and SOB   Cognitive Status Examination   Orientation orientation to person, place and time   Level of Consciousness lethargic/somnolent   Able to Follow Commands WNL/WFL   Attention Reports problems attending   Cognitive Comment SLUMS Examination: 22/30; Mild Cognitive Impairment   Sensory Examination   Sensory Comments No deficits beyond baseline   Pain Assessment   Patient Currently in Pain Yes, see Vital Sign flowsheet   Strength   Strength Comments Pt reported decreased overall strength   Toilet Transfer   Toilet Transfer Comments Per pt report, pt I in I/ADL's   Transfer Skill: Toilet Transfer   Toilet Transfer Skill Comments Per pt report, pt I in I/ADL's   Tub/Shower Transfer   Tub/Shower Transfer Comments Per pt report, pt I in I/ADL's   Bathing   Level of [place] other  "(see comments)  (Per pt report, pt I in I/ADL's)   Upper Body Dressing   Level of [place]: Dress Upper Body other (see comments)  (Per pt report, pt I in I/ADL's)   Lower Body Dressing   Level of [place]: Dress Lower Body other (see comments)  (Per pt report, pt I in I/ADL's)   Toileting   Level of [place]: Toilet other (see comments)  (Per pt report, pt I in I/ADL's)   Grooming   Level of [place]: Grooming other (see comments)  (Per pt report, pt I in I/ADL's)   Eating/Self Feeding   Level of [place]: Eating other (see comments)  (Per pt report, pt I in I/ADL's)   Instrumental Activities of Daily Living (IADL)   IADL Comments (Per pt report, pt I in I/ADL's)   Activities of Daily Living Analysis   Impairments Contributing to Impaired Activities of Daily Living pain;strength decreased;cognition impaired   General Therapy Interventions   Planned Therapy Interventions ADL retraining;cognition;strengthening   Clinical Impression   Criteria for Skilled Therapeutic Interventions Met yes, treatment indicated   OT Diagnosis Decreased ind with I/ADL's   Influenced by the following impairments Decreased activity tolerance; increased pain; increased fatigue   Assessment of Occupational Performance 1-3 Performance Deficits   Identified Performance Deficits Decreased activity tolerance; increased pain; increased fatigue   Clinical Decision Making (Complexity) Low complexity   Therapy Frequency daily   Predicted Duration of Therapy Intervention (days/wks) 3 days   Anticipated Discharge Disposition Transitional Care Facility   Risks and Benefits of Treatment have been explained. Yes   Patient, Family & other staff in agreement with plan of care Yes   [facility] [employer] TM \"6 Clicks\"   2016, Trustees of [facility], under license to [employer].  All rights reserved.   6 Clicks Short Forms Daily Activity Inpatient Short Form   [facility] AM-PAC  \"6 Clicks\" Daily Activity Inpatient Short " Form   1. Putting on and taking off regular lower body clothing? 4 - None   2. Bathing (including washing, rinsing, drying)? 4 - None   3. Toileting, which includes using toilet, bedpan or urinal? 4 - None   4. Putting on and taking off regular upper body clothing? 4 - None   5. Taking care of personal grooming such as brushing teeth? 4 - None   6. Eating meals? 4 - None   Daily Activity Raw Score (Score out of 24.Lower scores equate to lower levels of function) 24   Total Evaluation Time   Total Evaluation Time (Minutes) 30

## 2018-11-14 NOTE — PLAN OF CARE
Problem: Patient Care Overview  Goal: Plan of Care/Patient Progress Review  Discharge Planner OT   Patient plan for discharge: Not stated  Current status: OT orders received and reviewed. Eval completed. Pt refused out of bed activity due to SOB and fatigue. Pt 79 y/o male admitted for COPD. Mental Status (SLUMS) Examination completed. Pt scored 22, indicating mild cognitive impairment. Pt demonstrated difficulty with short-term memory, categorization, executive functioning, and comprehension recall.   Barriers to return to prior living situation: Decreased activity tolerance; fatigue; increased pain; SOB; lives alone  Recommendations for discharge: TCU  Rationale for recommendations: Pt would benefit from continued skilled therapy to increase activity tolerance, strength, and to maximize energy conservation and work simplification strategies during I/ADL's.       Entered by: Barb Bolivar 11/14/2018 8:27 AM

## 2018-11-14 NOTE — PLAN OF CARE
Problem: Patient Care Overview  Goal: Plan of Care/Patient Progress Review  A&O. Complains of head pain, PRN Ultram given. Pt request Tramadol for sleep. LS clear. Dyspnea with exertion. O2 97% on 3 L.

## 2018-11-14 NOTE — PLAN OF CARE
Problem: Patient Care Overview  Goal: Plan of Care/Patient Progress Review  Outcome: Improving  VSS afebrile; LS dim, using IS up to 1250; c/o LEVINE, recovers with rest; O2 at 3L NC, requested nebs twice this shift, states takes them up to 4 times day prior to hospitalization; on oral Levaquin; coccyx/bilateral gluteal folds with blanchable redness-applied moisture barrier and encouraged to turn q 2 hours;OT and Psych Consult added today; anticipate discharge once placement established, SW following;chronic pain-back and neck- Pain team and PT also following

## 2018-11-15 VITALS
HEART RATE: 79 BPM | SYSTOLIC BLOOD PRESSURE: 136 MMHG | RESPIRATION RATE: 16 BRPM | TEMPERATURE: 98.2 F | BODY MASS INDEX: 20.25 KG/M2 | DIASTOLIC BLOOD PRESSURE: 51 MMHG | WEIGHT: 121.7 LBS | OXYGEN SATURATION: 95 %

## 2018-11-15 LAB
CREAT SERPL-MCNC: 0.94 MG/DL (ref 0.66–1.25)
GFR SERPL CREATININE-BSD FRML MDRD: 78 ML/MIN/1.7M2
PLATELET # BLD AUTO: 205 10E9/L (ref 150–450)

## 2018-11-15 PROCEDURE — 40000275 ZZH STATISTIC RCP TIME EA 10 MIN

## 2018-11-15 PROCEDURE — 84484 ASSAY OF TROPONIN QUANT: CPT | Performed by: INTERNAL MEDICINE

## 2018-11-15 PROCEDURE — 94640 AIRWAY INHALATION TREATMENT: CPT

## 2018-11-15 PROCEDURE — A9270 NON-COVERED ITEM OR SERVICE: HCPCS | Mod: GY | Performed by: INTERNAL MEDICINE

## 2018-11-15 PROCEDURE — 36415 COLL VENOUS BLD VENIPUNCTURE: CPT | Performed by: INTERNAL MEDICINE

## 2018-11-15 PROCEDURE — 25000132 ZZH RX MED GY IP 250 OP 250 PS 637: Mod: GY | Performed by: INTERNAL MEDICINE

## 2018-11-15 PROCEDURE — 25000125 ZZHC RX 250: Performed by: INTERNAL MEDICINE

## 2018-11-15 PROCEDURE — 85049 AUTOMATED PLATELET COUNT: CPT | Performed by: INTERNAL MEDICINE

## 2018-11-15 PROCEDURE — 82565 ASSAY OF CREATININE: CPT | Performed by: INTERNAL MEDICINE

## 2018-11-15 PROCEDURE — 99239 HOSP IP/OBS DSCHRG MGMT >30: CPT | Performed by: INTERNAL MEDICINE

## 2018-11-15 PROCEDURE — 25000128 H RX IP 250 OP 636: Performed by: INTERNAL MEDICINE

## 2018-11-15 RX ORDER — LORAZEPAM 0.5 MG/1
0.5 TABLET ORAL ONCE
Status: COMPLETED | OUTPATIENT
Start: 2018-11-15 | End: 2018-11-15

## 2018-11-15 RX ORDER — ESCITALOPRAM OXALATE 5 MG/1
5 TABLET ORAL DAILY
Qty: 30 TABLET | Refills: 0 | Status: SHIPPED | OUTPATIENT
Start: 2018-11-16

## 2018-11-15 RX ORDER — BUTALBITAL, ACETAMINOPHEN AND CAFFEINE 50; 325; 40 MG/1; MG/1; MG/1
1 TABLET ORAL EVERY 6 HOURS PRN
Qty: 10 TABLET | Refills: 0 | Status: ON HOLD | OUTPATIENT
Start: 2018-11-15 | End: 2019-01-10

## 2018-11-15 RX ORDER — BUSPIRONE HYDROCHLORIDE 15 MG/1
15 TABLET ORAL 3 TIMES DAILY
Status: ON HOLD
Start: 2018-11-15 | End: 2019-01-10

## 2018-11-15 RX ORDER — TRAMADOL HYDROCHLORIDE 50 MG/1
25 TABLET ORAL EVERY 6 HOURS PRN
Qty: 10 TABLET | Refills: 0 | Status: SHIPPED | OUTPATIENT
Start: 2018-11-15

## 2018-11-15 RX ADMIN — MULTIPLE VITAMINS W/ MINERALS TAB 1 TABLET: TAB at 08:12

## 2018-11-15 RX ADMIN — POTASSIUM CHLORIDE 20 MEQ: 1500 TABLET, EXTENDED RELEASE ORAL at 08:12

## 2018-11-15 RX ADMIN — ENOXAPARIN SODIUM 40 MG: 40 INJECTION SUBCUTANEOUS at 12:37

## 2018-11-15 RX ADMIN — LORAZEPAM 0.5 MG: 0.5 TABLET ORAL at 12:37

## 2018-11-15 RX ADMIN — Medication 25 MG: at 00:56

## 2018-11-15 RX ADMIN — FLUTICASONE FUROATE AND VILANTEROL TRIFENATATE 1 PUFF: 200; 25 POWDER RESPIRATORY (INHALATION) at 07:01

## 2018-11-15 RX ADMIN — TRAMADOL HYDROCHLORIDE 25 MG: 50 TABLET, FILM COATED ORAL at 00:56

## 2018-11-15 RX ADMIN — BUSPIRONE HYDROCHLORIDE 15 MG: 15 TABLET ORAL at 08:12

## 2018-11-15 RX ADMIN — ATENOLOL 25 MG: 25 TABLET ORAL at 08:12

## 2018-11-15 RX ADMIN — ALBUTEROL SULFATE 2.5 MG: 2.5 SOLUTION RESPIRATORY (INHALATION) at 07:01

## 2018-11-15 RX ADMIN — HYDROCHLOROTHIAZIDE 25 MG: 25 TABLET ORAL at 08:12

## 2018-11-15 RX ADMIN — TAMSULOSIN HYDROCHLORIDE 0.4 MG: 0.4 CAPSULE ORAL at 08:12

## 2018-11-15 RX ADMIN — ESCITALOPRAM 5 MG: 5 TABLET, FILM COATED ORAL at 08:12

## 2018-11-15 RX ADMIN — TRAMADOL HYDROCHLORIDE 25 MG: 50 TABLET, FILM COATED ORAL at 14:56

## 2018-11-15 RX ADMIN — LORATADINE 10 MG: 10 TABLET ORAL at 08:12

## 2018-11-15 ASSESSMENT — ACTIVITIES OF DAILY LIVING (ADL)
ADLS_ACUITY_SCORE: 13
ADLS_ACUITY_SCORE: 13
ADLS_ACUITY_SCORE: 12
ADLS_ACUITY_SCORE: 13

## 2018-11-15 NOTE — PLAN OF CARE
Problem: Patient Care Overview  Goal: Plan of Care/Patient Progress Review  Occupational Therapy Discharge Summary    Reason for therapy discharge:    Discharged to transitional care facility.    Progress towards therapy goal(s). See goals on Care Plan in [facility] electronic health record for goal details.  Goals not met.  Barriers to achieving goals:   discharge from facility.    Therapy recommendation(s):    Continued therapy is recommended.  Rationale/Recommendations:  eval and treat at TCU.

## 2018-11-15 NOTE — PROGRESS NOTES
Discharge Planner   Discharge Plans in progress: Pt will discharge to [facility] via HE wheelchair transport at 1545 today.  Pt is aware and acknowledges costs of transportation.  Facility was updated of discharge time and orders were faxed.    Dudley spoke with Lacho at [place] ([phone]) who reported that the pt still has an O2 concentrator at their facility and it is through  [employer].   Medical reported earlier today that they have not billed the pt since July and it was picked up, they said that on 10/15 [employer] delivered a canula for the pt.     11/15/18 1215 11/15/18 1300   Final Resources   Resources List Skilled Nursing Facility --    Skilled Nursing [facility] [phone], Fax: [phone] --    PAS Number --  [id number]     Barriers to discharge plan: None  Follow up plan: Sw will continue to be available as needed until discharge.       Entered by: Eli Esqueda 11/15/2018 1:59 PM

## 2018-11-15 NOTE — PROGRESS NOTES
Your information has been submitted on November 15th, 2018 at 01:24:40 PM CST. The confirmation number is [id number]

## 2018-11-15 NOTE — PROVIDER NOTIFICATION
Respiratory paged:  This patient is requesting a neb treatment.  I am paging you for his Nurse Karla CHAPA who is currently busy.  Thanks.

## 2018-11-15 NOTE — PLAN OF CARE
"Problem: Pneumonia (Adult)  Goal: Signs and Symptoms of Listed Potential Problems Will be Absent, Minimized or Managed (Pneumonia)  Signs and symptoms of listed potential problems will be absent, minimized or managed by discharge/transition of care (reference Pneumonia (Adult) CPG).   Outcome: No Change  RN SHIFT SUMMARY :  DX/STORY : admit of 11/9 with SOB/Cough>> pneumonia on COPD, Had steroids & Levaquin   HX : COPD- 02 dep. States was using home 02 at his [facility] ( which he owed money to & can't return there ) ,TBI- as child, depression ,  HTN, non-complinace, chronic neck/back pain.   LABS/PROTOCOLS :  None today   TELE : no   ASSESS : a/o but seems to lack insight & blames his old [facility] for his issues. Up in room with SBA & walked halls with RW & SBA.   C/o neck & back pain but refuses Voltaren cream d/t \"smells\" . Had Fiorecet x1 & Ultram . Coughed up mod. Amt of tan sputum.   TEACHING : discussed POC -   PLAN : at baseline was living in an [facility] ( [facility])  in [facility] - has not paid rent & non-compliant with keeping up with M.A.  Cont. POC of therapy/psych/SW/Levaquin &  now needs New plan as unable to live with friend. Psych consulting too.   States he's used [facility] at [facility].   D/C goal: pending SW has calls out to a few TCU units         "

## 2018-11-15 NOTE — PLAN OF CARE
Problem: Patient Care Overview  Goal: Plan of Care/Patient Progress Review  Outcome: Improving  Pain managed with po meds. Continues on po antibiotic. Refused tylenol. Vitals stable and afebrile.

## 2018-11-15 NOTE — PLAN OF CARE
Problem: Pneumonia (Adult)  Goal: Signs and Symptoms of Listed Potential Problems Will be Absent, Minimized or Managed (Pneumonia)  Signs and symptoms of listed potential problems will be absent, minimized or managed by discharge/transition of care (reference Pneumonia (Adult) CPG).   Outcome: Adequate for Discharge Date Met: 11/15/18  VSS on 2L O2. LEVINE. Pt c/o anxiety, one time dose of Ativan given, see MAR. Pt offered shower and refused. Pt c/o stomach pain, MD aware and pt states has been an ongoing issue, no interventions ordered at this time. Pt to discharge to TCU this afternoon via HE transport with O2. Pt home meds that were in Pharmacy are in med room to be given to pt upon discharge.     Pt discharged to TCU via HE transport. With home meds and all belongings.

## 2018-11-15 NOTE — DISCHARGE SUMMARY
[facility]  Discharge Summary  Name: Chuck Lopez    MRN: [id number]  YOB: 1940    Age: 78 year old  Date of Discharge:  11/15/2018  Date of Admission: 11/9/2018  Primary Care Provider: Pranay Reid  Discharge Physician:  Rajat Call MD  Discharging Service:  Hospitalist      Hospital Course/Discharge Diagnoses:  Chuck Lopez is a 78 year old male with history including chronic pain syndrome, COPD with chronic oxygen dependence and ongoing smoking, depression, hypertension admitted on 11/9/2018 with shortness of breath.  He was felt clinically to be having a COPD exacerbation and started on steroids, neb treatments and Levaquin in the ER.  Chest x-ray was obtained and showed severe pulmonary hyperinflation with new somewhat nodular opacities in the left midlung but otherwise clear.  His pro calcitonin was checked and negative here but he did continue 5 days of Levaquin empirically for possible atypical pneumonia.  At this point he is clinically improved and remained stable on 2-3 L of supplemental oxygen which he tells me is his baseline.  He is also been tapered off of steroids.  He has been up and ambulatory and tells me he feels much better than upon presentation.     He has been somewhat overwhelmed and depressed and psychiatry is now following.  They do feel he is competent to make his own decisions and question whether untreated depression may be playing a role here.  They did recommend initiation of Lexapro with outpatient follow-up.  After initially declining to work with social work he now is agreeable to finding placement in a TCU if possible.    Today I been informed that we have TCU placement and he would discharge once transport is arranged.           Problem List:   1. Pneumonia.  Completed Levaquin 750 mg times 5 days.  Procalcitonin low, question viral versus atypical pneumonia. Resolved.  CXR w/ some nodular appearance, consider repeat CXR in  "2-3 weeks to ensure clearing.     2. Acute COPD exacerbation.  Much improved.  No longer with any wheeze.  Continue Breo Ellipta..  completed antibiotics and prednisone.  Has neb machine, continue nebs.  Needs supplemental O2.     3. Acute on chronic hypoxic respiratory failure.  Acute worsening due to pneumonia and COPD.  Continue supplemental oxygen as needed.  I have reordered this to be delivered to the hospital prior to discharge as apparently he ran out of supplemental oxygen prior to presentation which may be playing a role in his worsened shortness of breath upon presentation.     4. chronic pain syndrome.  Tramadol as needed.  Tylenol as needed.  Pain team consulted.  He reportedly follows with (per Pain/palliative care documentation) Dr. Eli Rivera MD at [facility] in [place]     5.  Tobacco use disorder.  Counseled repeatedly.  He quit just prior to presentation and plans not to restart.       6.   Hypertension.  Continue atenolol and hydrochlorothiazide.     7.   Depression.  Continue buspirone.     8.  Benign prostatic hyperplasia.  Tamsulosin.     9.  Mild hyponatremia.  Resolved.       10.  Deconditioning.  Physical and Occupational Therapy consulted.     11.  Social concerns: [place] by psychiatry to be competent to make his own decisions.  OT recommending a TCU stay.  After initially being very resistant to working with us to find placement he now is changed his tune and is willing to accept TCU placement.  Social work following.    12.  Suspected mild to moderate malnutrition from chronic disease.        Discharge Disposition:  Discharged to short-term care facility     Allergies:  Allergies   Allergen Reactions     Ammonium Lactate Other (See Comments)     Burning to skin     Doxycycline Visual Disturbance     Monosodium Glutamate Other (See Comments)     Pt states it feels like his \"head swells, visual disturbances, and he can't think striaght\"     Penicillins Rash    "     Discharge Medications:        Review of your medicines      START taking       Dose / Directions    escitalopram 5 MG tablet   Commonly known as:  LEXAPRO   Used for:  Major depressive disorder, remission status unspecified, unspecified whether recurrent        Dose:  5 mg   Start taking on:  11/16/2018   Take 1 tablet (5 mg) by mouth daily   Quantity:  30 tablet   Refills:  0         CONTINUE these medicines which may have CHANGED, or have new prescriptions. If we are uncertain of the size of tablets/capsules you have at home, strength may be listed as something that might have changed.       Dose / Directions    busPIRone 15 MG tablet   Commonly known as:  BUSPAR   This may have changed:    - medication strength  - how much to take  - when to take this   Used for:  Major depressive disorder, remission status unspecified, unspecified whether recurrent        Dose:  15 mg   Take 1 tablet (15 mg) by mouth 3 times daily   Refills:  0       traMADol 50 MG tablet   Commonly known as:  ULTRAM   This may have changed:    - how much to take  - when to take this  - reasons to take this   Used for:  Other chronic pain        Dose:  25 mg   Take 0.5 tablets (25 mg) by mouth every 6 hours as needed for moderate pain or severe pain   Quantity:  10 tablet   Refills:  0         CONTINUE these medicines which have NOT CHANGED       Dose / Directions    atenolol 25 MG tablet   Commonly known as:  TENORMIN   Used for:  Type 2 diabetes mellitus with hyperosmolarity without coma, unspecified long term insulin use status        Dose:  25 mg   Take 1 tablet (25 mg) by mouth daily   Quantity:  30 tablet   Refills:  11       butalbital-acetaminophen-caffeine -40 MG per tablet   Commonly known as:  FIORICET/ESGIC   Used for:  Migraine without aura and without status migrainosus, not intractable        Dose:  1 tablet   Take 1 tablet by mouth every 6 hours as needed for headaches   Quantity:  10 tablet   Refills:  0        fexofenadine 30 MG ODT tab   Commonly known as:  ALLEGRA        Dose:  30 mg   Take 1 tablet (30 mg) by mouth 2 times daily as needed for allergies (congestion)   Refills:  0       FLOMAX 0.4 MG capsule   Generic drug:  tamsulosin        Dose:  0.4 mg   Take 0.4 mg by mouth daily   Refills:  0       fluticasone-salmeterol 500-50 MCG/DOSE diskus inhaler   Commonly known as:  ADVAIR        Dose:  1 puff   Inhale 1 puff into the lungs 2 times daily   Quantity:  3 Inhaler   Refills:  1       guaiFENesin 400 MG Tabs        Dose:  400 mg   Take 400 mg by mouth 3 times daily   Refills:  0       hydrochlorothiazide 25 MG tablet   Commonly known as:  HYDRODIURIL   Used for:  Type 2 diabetes mellitus with hyperosmolarity without coma, unspecified long term insulin use status        TAKE ONE TABLET BY MOUTH EVERY DAY   Quantity:  30 tablet   Refills:  10       * ipratropium - albuterol 0.5 mg/2.5 mg/3 mL 0.5-2.5 (3) MG/3ML neb solution   Commonly known as:  DUONEB        Dose:  1 vial   Take 1 vial (3 mLs) by nebulization 4 times daily   Quantity:  30 vial   Refills:  1       * ipratropium - albuterol 0.5 mg/2.5 mg/3 mL 0.5-2.5 (3) MG/3ML neb solution   Commonly known as:  DUONEB        Dose:  1 vial   Take 1 vial (3 mLs) by nebulization every 4 hours as needed for shortness of breath / dyspnea or wheezing   Quantity:  30 vial   Refills:  1       ketoconazole 2 % shampoo   Commonly known as:  NIZORAL   Used for:  Tinea capitis        Apply to the affected area and wash off after 5 minutes.   Quantity:  120 mL   Refills:  3       loratadine 10 MG tablet   Commonly known as:  CLARITIN   Used for:  Seasonal allergic rhinitis due to pollen        Dose:  10 mg   Take 1 tablet (10 mg) by mouth daily   Quantity:  30 tablet   Refills:  1       melatonin 3 MG tablet   Used for:  Insomnia, unspecified type        Dose:  3 mg   Take 1 tablet (3 mg) by mouth nightly as needed   Quantity:  30 tablet   Refills:  0       metaxalone 800 MG  tablet   Commonly known as:  SKELAXIN        Dose:  800 mg   Take 800 mg by mouth 3 times daily   Refills:  0       potassium chloride SA 20 MEQ CR tablet   Commonly known as:  K-DUR/KLOR-CON M        Dose:  20 mEq   Take 20 mEq by mouth every morning   Refills:  0       tiotropium 18 MCG capsule   Commonly known as:  SPIRIVA HANDIHALER   Used for:  Centrilobular emphysema (H)        INHALE ONE CAPSULE BY MOUTH EVERY DAY   Quantity:  30 capsule   Refills:  11       V-C FORTE Caps   Used for:  Hepatitis C        TAKE ONE CAPSULE BY MOUTH EVERY DAY   Quantity:  90 capsule   Refills:  3       ZANTAC PO        Dose:  75 mg   Take 75 mg by mouth At Bedtime   Refills:  0       * Notice:  This list has 2 medication(s) that are the same as other medications prescribed for you. Read the directions carefully, and ask your doctor or other care provider to review them with you.         Where to get your medicines      [place] [facility] [address] [facility]  [address] [facility] [address]     Phone:  [phone]      escitalopram 5 MG tablet         Some of these will need a paper prescription and others can be bought over the counter. Ask your nurse if you have questions.     Bring a paper prescription for each of these medications      butalbital-acetaminophen-caffeine -40 MG per tablet     traMADol 50 MG tablet       You don't need a prescription for these medications      busPIRone 15 MG tablet             Condition on Discharge:  Discharge condition: Stable       Code status on discharge: Full Code     History of Illness:  See detailed admission note for full details.    Physical Exam:  Vital signs:  Temp: 98.2  F (36.8  C) Temp src: Oral BP: 136/51   Heart Rate: 91 Resp: 16 SpO2: 95 % O2 Device: Nasal cannula Oxygen Delivery: 2 LPM   Weight: 55.2 kg (121 lb 11.2 oz)  Estimated body mass index is 20.25 kg/(m^2) as calculated from the  "following:    Height as of 6/13/18: 1.651 m (5' 5\").    Weight as of this encounter: 55.2 kg (121 lb 11.2 oz).    Wt Readings from Last 1 Encounters:   11/15/18 55.2 kg (121 lb 11.2 oz)     General: Alert, awake, no acute distress.  Elderly man.  HEENT: NC/AT, eyes anicteric, external occular movements intact, face symmetric.  Dentition WNL, MM moist.  Cardiac: RRR, S1, S2.  No murmurs appreciated.  Pulmonary: Normal chest rise, normal work of breathing.  Lungs CTA BL.  Moderately diminished air movement but no wheezing.  Full sentences.  Abdomen: soft, non-tender, non-distended.  Bowel Sounds Present.  No guarding.  Extremities: no deformities.  Warm, well perfused.  Skin: no rashes or lesions noted.  Warm and Dry.  Neuro: No focal deficits noted.  Speech clear.  Coordination and strength grossly normal.  Psych: Appropriate affect.    Procedures other than Imaging:  none     Imaging:  Results for orders placed or performed during the hospital encounter of 11/09/18   XR Chest 2 Views    Narrative    CHEST TWO VIEWS  11/9/2018 7:55 PM     HISTORY: Increased shortness of breath, history of COPD.       Impression    IMPRESSION: Severe pulmonary hyperinflation. New somewhat nodular  opacities may be present in the left midlung when compared to  9/5/2017. The lungs otherwise appear clear.    ANAYA SINGH MD        Consultations:  Consultation during this admission received from PT/OT/SW/psychiatry.       Recent Lab Results:    Recent Labs  Lab 11/15/18  0648 11/12/18  0711 11/10/18  0717 11/09/18  1916   WBC  --   --  8.1 11.7*   HGB  --   --  12.4* 13.5   HCT  --   --  37.3* 41.0   MCV  --   --  99 99    210 183 208          Lab Results   Component Value Date     11/11/2018     11/10/2018     11/09/2018    Lab Results   Component Value Date    CHLORIDE 102 11/11/2018    CHLORIDE 100 11/10/2018    CHLORIDE 97 11/09/2018    Lab Results   Component Value Date    BUN 15 11/11/2018    BUN 12 " 11/10/2018    BUN 11 11/09/2018      Lab Results   Component Value Date    POTASSIUM 3.5 11/11/2018    POTASSIUM 4.0 11/10/2018    POTASSIUM 3.9 11/09/2018    Lab Results   Component Value Date    CO2 33 11/11/2018    CO2 29 11/10/2018    CO2 30 11/09/2018    Lab Results   Component Value Date    CR 0.94 11/15/2018    CR 0.90 11/12/2018    CR 0.78 11/11/2018             Pending Results:    Unresulted Labs Ordered in the Past 30 Days of this Admission     No orders found from 9/10/2018 to 11/10/2018.           Discharge Instructions and Follow-Up:     Discharge Procedure Orders  Reason for your hospital stay   Order Comments: You were admitted for COPD exacerbation related to possible pneumonia versus bronchitis.  Your treated with steroids and antibiotics as well as breathing treatments.  Very important that you avoid cigarette smoke and if possible allergic triggers.     Activity   Order Comments: Your activity upon discharge: activity as tolerated   Order Specific Question Answer Comments   Is discharge order? Yes      Follow-up and recommended labs and tests    Order Comments: Follow up with primary care provider or provider at your TCU for hospital follow- up.  The following labs/tests are recommended: Please review your recent hospital stay and discuss an ongoing plan for medication refills and discuss timing of a repeat chest x-ray in 2-3 weeks to make sure the small nodules/pneumonia noted here are resolving.     General info for SNF   Order Comments: Length of Stay Estimate: Short Term Care: Estimated # of Days <30  Condition at Discharge: Stable  Level of care:skilled   Rehabilitation Potential: Fair  Admission H&P remains valid and up-to-date: Yes  Recent Chemotherapy: N/A  Use Nursing Home Standing Orders: Yes     Mantoux instructions   Order Comments: Give two-step Mantoux (PPD) Per Facility Policy Yes     Full Code   Order Specific Question Answer Comments   Code status determined by: Discussion with  patient/legal decision maker      Physical Therapy Adult Consult   Order Comments: Evaluate and treat as clinically indicated     Occupational Therapy Adult Consult   Order Comments: Evaluate and treat as clinically indicated.     Oxygen Adult   Order Comments: Renew Home Oxygen Order  Renew previous prescription.  Expected treatment length is indefinite (99 months).    Attending Provider: Oliver Call  Physician signature: See electronic signature associated with these discharge orders  Date of Order: November 13, 2018     Oxygen - Nasal cannula   Order Comments: 2-3 Lpm by nasal cannula to keep O2 sats 92% or greater.     Diet   Order Comments: Follow this diet upon discharge: Orders Placed This Encounter     Snacks/Supplements Adult: Other; Vanilla shake 2 pm + 2 packets Beneprotein; Between Meals     Room Service     Combination Diet Regular Diet Adult; No Caffeine Diet   Order Specific Question Answer Comments   Is discharge order? Yes          Total time spent in face to face contact with the patient and coordinating discharge was:  50 Minutes.         detailed exam

## 2018-11-15 NOTE — PLAN OF CARE
Problem: Patient Care Overview  Goal: Plan of Care/Patient Progress Review  A&O. Complains of back pain. PRN Ultram given Trazodone given for sleep. VSS. LS Clear.  98% on 2 L. Independent with ambulation

## 2018-11-15 NOTE — PLAN OF CARE
Problem: Patient Care Overview  Goal: Plan of Care/Patient Progress Review  OT session attempted - pt receiving hand massage currently.

## 2018-11-16 ENCOUNTER — PATIENT OUTREACH (OUTPATIENT)
Dept: CARE COORDINATION | Facility: CLINIC | Age: 78
End: 2018-11-16

## 2018-11-16 NOTE — PROGRESS NOTES
Clinic Care Coordination Contact  Care Coordination Transition Communication    Referral Source: IP Handoff    Clinical Data: Patient was hospitalized at [facility] from 11/9/18 to 11/15/18 with diagnosis of Pneumonia, Acute COPD exacerbation, Acute on chronic hypoxic respiratory failure, and chronic pain syndrome.     Transition to Facility:              Facility Name: [facility]              Contact name and phone number/fax: [phone], fax [phone]    Plan: RN/SW Care Coordinator will await notification from facility staff informing RN/SW Care Coordinator of patient's discharge plans/needs. RN/SW Care Coordinator will review chart and outreach to facility staff every 4 weeks and as needed.     Emily Hinojosa [facility]  Clinic Care Coordinator - [facility], and [facility]  Ph: [phone]  [email or web address]  (Today's date: 11/16/18)

## 2018-11-16 NOTE — LETTER
[facility]   To:             Please give to facility    From:   Emily Hinojosa  [facility]  Care Coordinator   [facility]   P: [phone]  [email or web address]   Patient Name:  Chuck Lopez YOB: 1940   Admit date: 11/15/18      *Information Needed:  Please contact Alma Delia Gunter @ [phone] or [email or web address] when the patient will discharge (or if they will move to long term care)- include the discharge date, disposition, and main diagnosis    - If the patient is discharged with home care services, please provide the name of the agency. Also- Please inform Alma Delia if a care conference is being held.                                 Thank you

## 2018-11-16 NOTE — PROGRESS NOTES
Hand-off for Care Transitions to Next Level of Care Provider  Name: Chuck Lopez  : 1940  MRN #: [id number]  Reason for Hospitalization:  Hyponatremia [E87.1]  Pulmonary nodules [R91.8]  COPD exacerbation (H) [J44.1]  Admit Date/Time: 2018  5:37 PM  Discharge Date: 11/15/18    Reason for Communication Hand-off Referral: Admission diagnoses: PN  Admission diagnoses: COPD    Discharge Plan:  Discharged to: TCU:  [facility]                   Patient agreeable to post-hospital support suggestions:  Yes    Patient is on new medications:   Yes    MTM follow up recommended: No    Tel-Assurance program:  Ineligible    Patient will receive  TCU  at:  discharge to [facility].     Concern for non-adherence with plan of care:  No.     Follow-up specialty is recommended: Yes. Follow up with Occupational & Physical Therapy while at TCU.     Follow-up plan:  No future appointments.    Any outstanding tests or procedures:    Procedures     Future Labs/Procedures    Oxygen - Nasal cannula     Comments:    2-3 Lpm by nasal cannula to keep O2 sats 92% or greater.    Oxygen Adult     Comments:    Renew Home Oxygen Order  Renew previous prescription.  Expected treatment length is indefinite (99 months).    Attending Provider: Oliver Call  Physician signature: See electronic signature associated with these discharge orders  Date of Order: 2018          Referrals     Future Labs/Procedures    Occupational Therapy Adult Consult     Comments:    Evaluate and treat as clinically indicated.    Physical Therapy Adult Consult     Comments:    Evaluate and treat as clinically indicated          Key Recommendations: He was living at [facility] but states he left d/t the fragrance policy at the facility. He is allergic to fragrances and most staff wears perfume or scented lotions. He states his large oxygen concentrator is still there. He has a 4WW in the room with  a portable O2 concentrator on the seat. When CM asked who is his oxygen provider, he states he doesn't know. Later, during his admission, he stated to  that [facility] provides his oxygen. Please assess his progress after continued PT & OT while at TCU.     Communicated handoff via EPIC Comm Mgt to Dr. Pranay Reid's CC at  Care Coord.      Elina Vázquez RN, BSN, [facility]  [phone]

## 2019-01-09 ENCOUNTER — HOSPITAL ENCOUNTER (OUTPATIENT)
Facility: CLINIC | Age: 79
Setting detail: OBSERVATION
Discharge: HOME | End: 2019-01-10
Attending: EMERGENCY MEDICINE | Admitting: HOSPITALIST
Payer: MEDICARE

## 2019-01-09 ENCOUNTER — APPOINTMENT (OUTPATIENT)
Dept: GENERAL RADIOLOGY | Facility: CLINIC | Age: 79
End: 2019-01-09
Payer: MEDICARE

## 2019-01-09 ENCOUNTER — APPOINTMENT (OUTPATIENT)
Dept: CT IMAGING | Facility: CLINIC | Age: 79
End: 2019-01-09
Payer: MEDICARE

## 2019-01-09 DIAGNOSIS — B19.20 HEPATITIS C: ICD-10-CM

## 2019-01-09 DIAGNOSIS — R06.02 SHORTNESS OF BREATH: ICD-10-CM

## 2019-01-09 DIAGNOSIS — R53.81 PHYSICAL DECONDITIONING: ICD-10-CM

## 2019-01-09 DIAGNOSIS — N40.0 BENIGN PROSTATIC HYPERPLASIA, UNSPECIFIED WHETHER LOWER URINARY TRACT SYMPTOMS PRESENT: ICD-10-CM

## 2019-01-09 DIAGNOSIS — E11.00 TYPE 2 DIABETES MELLITUS WITH HYPEROSMOLARITY WITHOUT COMA (H): ICD-10-CM

## 2019-01-09 DIAGNOSIS — R91.8 PULMONARY NODULES: ICD-10-CM

## 2019-01-09 DIAGNOSIS — K21.00 GASTROESOPHAGEAL REFLUX DISEASE WITH ESOPHAGITIS: Primary | ICD-10-CM

## 2019-01-09 DIAGNOSIS — I10 HYPERTENSION GOAL BP (BLOOD PRESSURE) < 140/90: ICD-10-CM

## 2019-01-09 DIAGNOSIS — G47.00 INSOMNIA, UNSPECIFIED TYPE: ICD-10-CM

## 2019-01-09 DIAGNOSIS — J44.1 COPD EXACERBATION (H): ICD-10-CM

## 2019-01-09 DIAGNOSIS — F32.9 MAJOR DEPRESSIVE DISORDER, REMISSION STATUS UNSPECIFIED, UNSPECIFIED WHETHER RECURRENT: ICD-10-CM

## 2019-01-09 DIAGNOSIS — G43.009 MIGRAINE WITHOUT AURA AND WITHOUT STATUS MIGRAINOSUS, NOT INTRACTABLE: ICD-10-CM

## 2019-01-09 DIAGNOSIS — J43.2 CENTRILOBULAR EMPHYSEMA (H): ICD-10-CM

## 2019-01-09 LAB
ANION GAP SERPL CALCULATED.3IONS-SCNC: 9 MMOL/L (ref 3–14)
BASE EXCESS BLDV CALC-SCNC: 4.2 MMOL/L
BASOPHILS # BLD AUTO: 0.1 10E9/L (ref 0–0.2)
BASOPHILS NFR BLD AUTO: 0.8 %
BUN SERPL-MCNC: 9 MG/DL (ref 7–30)
CALCIUM SERPL-MCNC: 8.8 MG/DL (ref 8.5–10.1)
CHLORIDE SERPL-SCNC: 99 MMOL/L (ref 94–109)
CO2 SERPL-SCNC: 29 MMOL/L (ref 20–32)
CREAT SERPL-MCNC: 0.69 MG/DL (ref 0.66–1.25)
D DIMER PPP FEU-MCNC: 1.2 UG/ML FEU (ref 0–0.5)
DIFFERENTIAL METHOD BLD: ABNORMAL
EOSINOPHIL # BLD AUTO: 1 10E9/L (ref 0–0.7)
EOSINOPHIL NFR BLD AUTO: 10.9 %
ERYTHROCYTE [DISTWIDTH] IN BLOOD BY AUTOMATED COUNT: 12.1 % (ref 10–15)
FLUAV+FLUBV AG SPEC QL: NEGATIVE
FLUAV+FLUBV AG SPEC QL: NEGATIVE
GFR SERPL CREATININE-BSD FRML MDRD: >90 ML/MIN/{1.73_M2}
GLUCOSE BLDC GLUCOMTR-MCNC: 98 MG/DL (ref 70–99)
GLUCOSE SERPL-MCNC: 73 MG/DL (ref 70–99)
HCO3 BLDV-SCNC: 32 MMOL/L (ref 21–28)
HCT VFR BLD AUTO: 43.8 % (ref 40–53)
HGB BLD-MCNC: 15 G/DL (ref 13.3–17.7)
IMM GRANULOCYTES # BLD: 0.1 10E9/L (ref 0–0.4)
IMM GRANULOCYTES NFR BLD: 0.6 %
INTERPRETATION ECG - MUSE: NORMAL
INTERPRETATION ECG - MUSE: NORMAL
LACTATE BLD-SCNC: 1.3 MMOL/L (ref 0.7–2)
LYMPHOCYTES # BLD AUTO: 1.1 10E9/L (ref 0.8–5.3)
LYMPHOCYTES NFR BLD AUTO: 12.5 %
MCH RBC QN AUTO: 31.9 PG (ref 26.5–33)
MCHC RBC AUTO-ENTMCNC: 34.2 G/DL (ref 31.5–36.5)
MCV RBC AUTO: 93 FL (ref 78–100)
MONOCYTES # BLD AUTO: 1.2 10E9/L (ref 0–1.3)
MONOCYTES NFR BLD AUTO: 13.8 %
NEUTROPHILS # BLD AUTO: 5.3 10E9/L (ref 1.6–8.3)
NEUTROPHILS NFR BLD AUTO: 61.4 %
NRBC # BLD AUTO: 0 10*3/UL
NRBC BLD AUTO-RTO: 0 /100
O2/TOTAL GAS SETTING VFR VENT: ABNORMAL %
PCO2 BLDV: 58 MM HG (ref 40–50)
PH BLDV: 7.35 PH (ref 7.32–7.43)
PLATELET # BLD AUTO: 186 10E9/L (ref 150–450)
PO2 BLDV: 37 MM HG (ref 25–47)
POTASSIUM SERPL-SCNC: 3.4 MMOL/L (ref 3.4–5.3)
RBC # BLD AUTO: 4.7 10E12/L (ref 4.4–5.9)
SODIUM SERPL-SCNC: 137 MMOL/L (ref 133–144)
SPECIMEN SOURCE: NORMAL
TROPONIN I SERPL-MCNC: <0.015 UG/L (ref 0–0.04)
WBC # BLD AUTO: 8.7 10E9/L (ref 4–11)

## 2019-01-09 PROCEDURE — 00000146 ZZHCL STATISTIC GLUCOSE BY METER IP

## 2019-01-09 PROCEDURE — 25000132 ZZH RX MED GY IP 250 OP 250 PS 637: Performed by: HOSPITALIST

## 2019-01-09 PROCEDURE — 25000125 ZZHC RX 250: Performed by: EMERGENCY MEDICINE

## 2019-01-09 PROCEDURE — 25000128 H RX IP 250 OP 636: Performed by: EMERGENCY MEDICINE

## 2019-01-09 PROCEDURE — 99285 EMERGENCY DEPT VISIT HI MDM: CPT | Mod: 25

## 2019-01-09 PROCEDURE — A9270 NON-COVERED ITEM OR SERVICE: HCPCS | Mod: GY | Performed by: HOSPITALIST

## 2019-01-09 PROCEDURE — 94640 AIRWAY INHALATION TREATMENT: CPT | Mod: 76

## 2019-01-09 PROCEDURE — G0378 HOSPITAL OBSERVATION PER HR: HCPCS

## 2019-01-09 PROCEDURE — 93005 ELECTROCARDIOGRAM TRACING: CPT | Mod: 76

## 2019-01-09 PROCEDURE — 36415 COLL VENOUS BLD VENIPUNCTURE: CPT | Performed by: HOSPITALIST

## 2019-01-09 PROCEDURE — 71260 CT THORAX DX C+: CPT

## 2019-01-09 PROCEDURE — 71046 X-RAY EXAM CHEST 2 VIEWS: CPT

## 2019-01-09 PROCEDURE — 85025 COMPLETE CBC W/AUTO DIFF WBC: CPT | Performed by: EMERGENCY MEDICINE

## 2019-01-09 PROCEDURE — 40000809 ZZH STATISTIC NO DOCUMENTATION TO SUPPORT CHARGE

## 2019-01-09 PROCEDURE — 94640 AIRWAY INHALATION TREATMENT: CPT

## 2019-01-09 PROCEDURE — 99220 ZZC INITIAL OBSERVATION CARE,LEVL III: CPT | Performed by: HOSPITALIST

## 2019-01-09 PROCEDURE — 40000275 ZZH STATISTIC RCP TIME EA 10 MIN

## 2019-01-09 PROCEDURE — 87804 INFLUENZA ASSAY W/OPTIC: CPT | Mod: 91 | Performed by: EMERGENCY MEDICINE

## 2019-01-09 PROCEDURE — 80048 BASIC METABOLIC PNL TOTAL CA: CPT | Performed by: EMERGENCY MEDICINE

## 2019-01-09 PROCEDURE — 36415 COLL VENOUS BLD VENIPUNCTURE: CPT | Performed by: EMERGENCY MEDICINE

## 2019-01-09 PROCEDURE — 93005 ELECTROCARDIOGRAM TRACING: CPT

## 2019-01-09 PROCEDURE — 85379 FIBRIN DEGRADATION QUANT: CPT | Performed by: EMERGENCY MEDICINE

## 2019-01-09 PROCEDURE — 25000125 ZZHC RX 250: Performed by: HOSPITALIST

## 2019-01-09 PROCEDURE — 40000274 ZZH STATISTIC RCP CONSULT EA 30 MIN

## 2019-01-09 PROCEDURE — 96374 THER/PROPH/DIAG INJ IV PUSH: CPT | Mod: 59

## 2019-01-09 PROCEDURE — 84484 ASSAY OF TROPONIN QUANT: CPT | Performed by: EMERGENCY MEDICINE

## 2019-01-09 PROCEDURE — 83605 ASSAY OF LACTIC ACID: CPT | Performed by: HOSPITALIST

## 2019-01-09 PROCEDURE — 82803 BLOOD GASES ANY COMBINATION: CPT | Performed by: EMERGENCY MEDICINE

## 2019-01-09 RX ORDER — ATENOLOL 25 MG/1
25 TABLET ORAL DAILY
Status: DISCONTINUED | OUTPATIENT
Start: 2019-01-09 | End: 2019-01-10 | Stop reason: HOSPADM

## 2019-01-09 RX ORDER — ACETAMINOPHEN 650 MG/1
650 SUPPOSITORY RECTAL EVERY 4 HOURS PRN
Status: DISCONTINUED | OUTPATIENT
Start: 2019-01-09 | End: 2019-01-10 | Stop reason: HOSPADM

## 2019-01-09 RX ORDER — BUSPIRONE HYDROCHLORIDE 15 MG/1
15 TABLET ORAL 3 TIMES DAILY
Status: DISCONTINUED | OUTPATIENT
Start: 2019-01-09 | End: 2019-01-10 | Stop reason: HOSPADM

## 2019-01-09 RX ORDER — IOPAMIDOL 755 MG/ML
500 INJECTION, SOLUTION INTRAVASCULAR ONCE
Status: COMPLETED | OUTPATIENT
Start: 2019-01-09 | End: 2019-01-09

## 2019-01-09 RX ORDER — IPRATROPIUM BROMIDE AND ALBUTEROL SULFATE 2.5; .5 MG/3ML; MG/3ML
3 SOLUTION RESPIRATORY (INHALATION) ONCE
Status: COMPLETED | OUTPATIENT
Start: 2019-01-09 | End: 2019-01-09

## 2019-01-09 RX ORDER — ONDANSETRON 4 MG/1
4 TABLET, ORALLY DISINTEGRATING ORAL EVERY 6 HOURS PRN
Status: DISCONTINUED | OUTPATIENT
Start: 2019-01-09 | End: 2019-01-10 | Stop reason: HOSPADM

## 2019-01-09 RX ORDER — ACETAMINOPHEN 325 MG/1
650 TABLET ORAL EVERY 4 HOURS PRN
Status: DISCONTINUED | OUTPATIENT
Start: 2019-01-09 | End: 2019-01-10 | Stop reason: HOSPADM

## 2019-01-09 RX ORDER — METHYLPREDNISOLONE SODIUM SUCCINATE 125 MG/2ML
125 INJECTION, POWDER, LYOPHILIZED, FOR SOLUTION INTRAMUSCULAR; INTRAVENOUS ONCE
Status: COMPLETED | OUTPATIENT
Start: 2019-01-09 | End: 2019-01-09

## 2019-01-09 RX ORDER — POTASSIUM CHLORIDE 1500 MG/1
20 TABLET, EXTENDED RELEASE ORAL EVERY MORNING
Status: DISCONTINUED | OUTPATIENT
Start: 2019-01-10 | End: 2019-01-10 | Stop reason: HOSPADM

## 2019-01-09 RX ORDER — BUTALBITAL, ACETAMINOPHEN AND CAFFEINE 50; 325; 40 MG/1; MG/1; MG/1
1 TABLET ORAL EVERY 6 HOURS PRN
Status: DISCONTINUED | OUTPATIENT
Start: 2019-01-09 | End: 2019-01-10 | Stop reason: HOSPADM

## 2019-01-09 RX ORDER — TAMSULOSIN HYDROCHLORIDE 0.4 MG/1
0.4 CAPSULE ORAL DAILY
Status: DISCONTINUED | OUTPATIENT
Start: 2019-01-09 | End: 2019-01-10 | Stop reason: HOSPADM

## 2019-01-09 RX ORDER — ALBUTEROL SULFATE 90 UG/1
1-2 AEROSOL, METERED RESPIRATORY (INHALATION) EVERY 4 HOURS PRN
Status: ON HOLD | COMMUNITY
End: 2019-01-10

## 2019-01-09 RX ORDER — IPRATROPIUM BROMIDE AND ALBUTEROL SULFATE 2.5; .5 MG/3ML; MG/3ML
6 SOLUTION RESPIRATORY (INHALATION) ONCE
Status: COMPLETED | OUTPATIENT
Start: 2019-01-09 | End: 2019-01-09

## 2019-01-09 RX ORDER — ALBUTEROL SULFATE 0.83 MG/ML
2.5 SOLUTION RESPIRATORY (INHALATION)
Status: DISCONTINUED | OUTPATIENT
Start: 2019-01-09 | End: 2019-01-10 | Stop reason: HOSPADM

## 2019-01-09 RX ORDER — NALOXONE HYDROCHLORIDE 0.4 MG/ML
.1-.4 INJECTION, SOLUTION INTRAMUSCULAR; INTRAVENOUS; SUBCUTANEOUS
Status: DISCONTINUED | OUTPATIENT
Start: 2019-01-09 | End: 2019-01-10 | Stop reason: HOSPADM

## 2019-01-09 RX ORDER — ESCITALOPRAM OXALATE 5 MG/1
5 TABLET ORAL DAILY
Status: DISCONTINUED | OUTPATIENT
Start: 2019-01-09 | End: 2019-01-10 | Stop reason: HOSPADM

## 2019-01-09 RX ORDER — METAXALONE 800 MG/1
800 TABLET ORAL 3 TIMES DAILY
Status: DISCONTINUED | OUTPATIENT
Start: 2019-01-09 | End: 2019-01-09

## 2019-01-09 RX ORDER — ONDANSETRON 2 MG/ML
4 INJECTION INTRAMUSCULAR; INTRAVENOUS EVERY 6 HOURS PRN
Status: DISCONTINUED | OUTPATIENT
Start: 2019-01-09 | End: 2019-01-10 | Stop reason: HOSPADM

## 2019-01-09 RX ORDER — IPRATROPIUM BROMIDE AND ALBUTEROL SULFATE 2.5; .5 MG/3ML; MG/3ML
3 SOLUTION RESPIRATORY (INHALATION)
Status: DISCONTINUED | OUTPATIENT
Start: 2019-01-09 | End: 2019-01-10 | Stop reason: HOSPADM

## 2019-01-09 RX ORDER — HYDROCHLOROTHIAZIDE 25 MG/1
25 TABLET ORAL DAILY
Status: DISCONTINUED | OUTPATIENT
Start: 2019-01-09 | End: 2019-01-10 | Stop reason: HOSPADM

## 2019-01-09 RX ADMIN — Medication 1 MG: at 22:11

## 2019-01-09 RX ADMIN — ATENOLOL 25 MG: 25 TABLET ORAL at 16:25

## 2019-01-09 RX ADMIN — FEXOFENADINE HYDROCHLORIDE 30 MG: 30 TABLET, ORALLY DISINTEGRATING ORAL at 19:05

## 2019-01-09 RX ADMIN — IPRATROPIUM BROMIDE AND ALBUTEROL SULFATE 6 ML: .5; 3 SOLUTION RESPIRATORY (INHALATION) at 09:26

## 2019-01-09 RX ADMIN — IOPAMIDOL 56 ML: 755 INJECTION, SOLUTION INTRAVENOUS at 11:30

## 2019-01-09 RX ADMIN — IPRATROPIUM BROMIDE AND ALBUTEROL SULFATE 3 ML: .5; 3 SOLUTION RESPIRATORY (INHALATION) at 10:33

## 2019-01-09 RX ADMIN — BUSPIRONE HYDROCHLORIDE 15 MG: 15 TABLET ORAL at 16:26

## 2019-01-09 RX ADMIN — HYDROCHLOROTHIAZIDE 25 MG: 25 TABLET ORAL at 16:25

## 2019-01-09 RX ADMIN — METHYLPREDNISOLONE SODIUM SUCCINATE 125 MG: 125 INJECTION, POWDER, FOR SOLUTION INTRAMUSCULAR; INTRAVENOUS at 08:51

## 2019-01-09 RX ADMIN — IPRATROPIUM BROMIDE AND ALBUTEROL SULFATE 3 ML: .5; 3 SOLUTION RESPIRATORY (INHALATION) at 16:49

## 2019-01-09 RX ADMIN — SODIUM CHLORIDE 73 ML: 9 INJECTION, SOLUTION INTRAVENOUS at 11:30

## 2019-01-09 RX ADMIN — RANITIDINE 75 MG: 75 TABLET, FILM COATED ORAL at 22:05

## 2019-01-09 RX ADMIN — IPRATROPIUM BROMIDE AND ALBUTEROL SULFATE 3 ML: .5; 3 SOLUTION RESPIRATORY (INHALATION) at 19:43

## 2019-01-09 RX ADMIN — BUSPIRONE HYDROCHLORIDE 15 MG: 15 TABLET ORAL at 20:13

## 2019-01-09 RX ADMIN — BUTALBITAL, ACETAMINOPHEN AND CAFFEINE 1 TABLET: 50; 325; 40 TABLET ORAL at 17:06

## 2019-01-09 RX ADMIN — ESCITALOPRAM 5 MG: 5 TABLET, FILM COATED ORAL at 16:25

## 2019-01-09 RX ADMIN — TAMSULOSIN HYDROCHLORIDE 0.4 MG: 0.4 CAPSULE ORAL at 16:26

## 2019-01-09 RX ADMIN — TRAMADOL HYDROCHLORIDE 25 MG: 50 TABLET ORAL at 20:33

## 2019-01-09 ASSESSMENT — ENCOUNTER SYMPTOMS
COUGH: 1
FEVER: 1
SHORTNESS OF BREATH: 1

## 2019-01-09 NOTE — LETTER
Transition Communication Hand-off for Care Transitions to Next Level of Care Provider    Name: Chuck Lopez  : 1940  MRN #: [id number]  Primary Care Provider: Pranay Reid  Primary Care MD Name: Dr. Pranay Reid  Primary Clinic: [address] [facility] [address]  Primary Care Clinic Name: [facility]  Reason for Hospitalization:  Shortness of breath [R06.02]  Pulmonary nodules [R91.8]  COPD exacerbation (H) [J44.1]  Admit Date/Time: 2019  8:24 AM  Discharge Date: 1/10/19  Payor Source: Payor: MEDICARE / Plan: MEDICARE / Product Type: Medicare /     Readmission Assessment Measure (TETO) Risk Score/category: Average         Reason for Communication Hand-off Referral: Admission diagnoses: COPD    Discharge Plan:       Concern for non-adherence with plan of care:   Yes  Discharge Needs Assessment:  Needs      Most Recent Value   Current Discharge Risk  chronically ill, homeless, lack of support system/caregiver          Already enrolled in Tele-monitoring program and name of program:  NA  Follow-up specialty is recommended: No    Follow-up plan:  No future appointments.    Any outstanding tests or procedures:    Procedures     Future Labs/Procedures    Oxygen Adult     Comments:    Renew Home Oxygen Order  Renew previous prescription.  3 liters of oxygen per nasal cannula.  Expected treatment length is indefinite (99 months).    Attending Provider: Rosy Caba M.D.  Physician signature: See electronic signature associated with these discharge orders  Date of Order: January 10, 2019          Radiology & Cardiology Orders     Future Labs/Procedures Complete By Expires    CT Chest w & w/o contrast  2019 (Approximate) 2019    Process Instructions:    Administration of IV contrast (contrast agent, dose, and amount) will be tailored to this examination per the appropriate written protocol listed in the Protocol E-Book, or by the supervising imaging provider.         Referrals      Future Labs/Procedures    MED THERAPY MANAGE REFERRAL     Comments:    Patient is not adhering to their medication regimen.             Key Recommendations:  Patient admitted with COPD, on home O2 through [place]. Patient has a history of CKD, DM, Hep C, and HTN. Patient has not followed with a pulmonologist. Patient states he has smoked for his entire adult life, but has only had one cigarette in the last 6 weeks. Per chart review, patient had not been taking medications for 5 days prior to admission. Pharmacy liaison was consulted to determine if there was a financial barrier to filling prescriptions and none was found. RN staff following up with respiratory therapy to determine that O2 is functioning properly. COPD action plan given and reviewed with patient. MTM consult placed for outpatient follow up.   Patient had been staying with a friend prior to admission, but states that this is not an option at discharge. SW was able to find another friend of pt's that he is able to stay with Didi, who lives in [place]. Pt still plans to follow w/ Dr. Reid and has friends that can drive him to appointments.     Patient would benefit from close clinic follow up and SW coordination for housing resources and may benefit from mental health consult.    Kandy Menendez/Anais Ulloa RN CTS    AVS/Discharge Summary is the source of truth; this is a helpful guide for improved communication of patient story

## 2019-01-09 NOTE — H&P
[facility]    History and Physical - Hospitalist Service       Date of Admission:  1/9/2019    Assessment & Plan   Chuck Lopez is a 78 year old male with history COPD (on home oxygen at 3L), CKD, DM, Hep C (untreated), HTN admitted on 1/9/2019 with COPD excerbation    COPD exacerbation    - has been off meds/nebs for 5 days    - meds have been reconciled, re-order    - hold home inhalers     - duonebs, albuterol nebs    - solumedrol 60 Q12 starting tomorrow (had 125mg already today)    - have respiratory check his tank to be sure it is working    - flu swab negative    Nodular opacities on CT    - needs repeat CT in 3 mo    - placed order and instructions in discharge navigator    HTN    - meds have been reconciled    - cont atenolol, hydrochlorothiazide    Depression    - cont meds    Chronic pain    - uses ultram    CKD    - listed in history, Cr normal    Hep C    - not treated    Homeless    - lives with friends    - we will need to fill all his meds when he is discharged    DNR/DNI - I discussed this with patient    He has not been in contact with family for >40yrs     Diet: regular  DVT Prophylaxis: Low Risk/Ambulatory with no VTE prophylaxis indicated  Tracy Catheter: not present  Code Status: DNR/DNI- discussed with patient    Disposition Plan   Expected discharge: possibly tomorrow, recommended to prior living arrangement once symptoms improved.  Entered: Ruperto Banks MD 01/09/2019, 1:30 PM     The patient's care was discussed with the Bedside Nurse, Patient and ED physician Team.    Ruperto Banks MD  [facility]    ______________________________________________________________________    Chief Complaint   Shortness of breath    History is obtained from the patient    History of Present Illness   Chuck Lopez is a 78 year old male with history COPD (on home oxygen at 3L), CKD, DM, Hep C (untreated), HTN admitted on 1/9/2019 with COPD excerbation. Patient states  "he was discharged from a TCU about 5 days ago. He states he was sent home without any of his medications (so he has not had any meds for the past 5 days). He lives with 4 roommates (all who smoke). He has been using his home oxygen but last night it started to not work intermittently. He would \"whack it\" and it started to work again but then it stopped. He may have fallen and his roommates called EMS. He is feeling better after meds/nebs in ED. He denies current chest pain. Has had cough. No sputum. No abdo pain, n/v/d. No fevers. Has had some chills. No other complaints.    Review of Systems    The 10 point Review of Systems is negative other than noted in the HPI or here.     Past Medical History    I have reviewed this patient's medical history and updated it with pertinent information if needed.   Past Medical History:   Diagnosis Date     Chronic neck pain      COPD (chronic obstructive pulmonary disease) (H)      Diabetes (H)      Hepatitis C      Hypertension        Past Surgical History   I have reviewed this patient's surgical history and updated it with pertinent information if needed.  Past Surgical History:   Procedure Laterality Date     APPENDECTOMY       ESOPHAGOSCOPY, GASTROSCOPY, DUODENOSCOPY (EGD), COMBINED  4/16/2014    Procedure: ESOPHAGOSCOPY, GASTROSCOPY, DUODENOSCOPY (EGD) & COLONOSCOPY with polypectomy by hot snare;  Surgeon: Dillon Vázquez MD;  Location:  GI       Social History   I have reviewed this patient's social history and updated it with pertinent information if needed.  Social History     Tobacco Use     Smoking status: Current Every Day Smoker     Packs/day: 0.25     Types: Cigarettes     Smokeless tobacco: Never Used   Substance Use Topics     Alcohol use: No     Alcohol/week: 0.0 oz     Drug use: No     Comment: quit araceli 2010       Family History   I have reviewed this patient's family history and updated it with pertinent information if needed.   States his " parents  of old age, does not know more    Prior to Admission Medications   Prior to Admission Medications   Prescriptions Last Dose Informant Patient Reported? Taking?   Multiple Vitamins-Minerals (V-C FORTE) CAPS 2019  No No   Sig: TAKE ONE CAPSULE BY MOUTH EVERY DAY   RaNITidine HCl (ZANTAC PO) 2019  Yes No   Sig: Take 75 mg by mouth At Bedtime   atenolol (TENORMIN) 25 MG tablet 2019  No No   Sig: Take 1 tablet (25 mg) by mouth daily   busPIRone (BUSPAR) 15 MG tablet 2019  No No   Sig: Take 1 tablet (15 mg) by mouth 3 times daily   butalbital-acetaminophen-caffeine (FIORICET/ESGIC) -40 MG per tablet 2019  No No   Sig: Take 1 tablet by mouth every 6 hours as needed for headaches   escitalopram (LEXAPRO) 5 MG tablet 2019  No No   Sig: Take 1 tablet (5 mg) by mouth daily   fexofenadine (ALLEGRA) 30 MG ODT tab 2019  No No   Sig: Take 1 tablet (30 mg) by mouth 2 times daily as needed for allergies (congestion)   fluticasone-salmeterol (ADVAIR) 500-50 MCG/DOSE diskus inhaler 2019  No No   Sig: Inhale 1 puff into the lungs 2 times daily   guaiFENesin 400 MG TABS 2019  Yes No   Sig: Take 400 mg by mouth 3 times daily   hydrochlorothiazide (HYDRODIURIL) 25 MG tablet 2019  No No   Sig: TAKE ONE TABLET BY MOUTH EVERY DAY   ipratropium - albuterol 0.5 mg/2.5 mg/3 mL (DUONEB) 0.5-2.5 (3) MG/3ML neb solution 2019  No No   Sig: Take 1 vial (3 mLs) by nebulization 4 times daily   ipratropium - albuterol 0.5 mg/2.5 mg/3 mL (DUONEB) 0.5-2.5 (3) MG/3ML neb solution 2019  No No   Sig: Take 1 vial (3 mLs) by nebulization every 4 hours as needed for shortness of breath / dyspnea or wheezing   ketoconazole (NIZORAL) 2 % shampoo Past Month at Unknown time  No Yes   Sig: Apply to the affected area and wash off after 5 minutes.   loratadine (CLARITIN) 10 MG tablet 2019  No No   Sig: Take 1 tablet (10 mg) by mouth daily   melatonin 3 MG tablet 2019  No No   Sig: Take 1  "tablet (3 mg) by mouth nightly as needed   metaxalone (SKELAXIN) 800 MG tablet 1/4/2019  Yes No   Sig: Take 800 mg by mouth 3 times daily   potassium chloride SA (K-DUR/KLOR-CON M) 20 MEQ CR tablet 1/4/2019  Yes No   Sig: Take 20 mEq by mouth every morning   tamsulosin (FLOMAX) 0.4 MG capsule 1/4/2019  Yes No   Sig: Take 0.4 mg by mouth daily   tiotropium (SPIRIVA HANDIHALER) 18 MCG capsule 1/4/2019  No No   Sig: INHALE ONE CAPSULE BY MOUTH EVERY DAY   traMADol (ULTRAM) 50 MG tablet 1/4/2019  No No   Sig: Take 0.5 tablets (25 mg) by mouth every 6 hours as needed for moderate pain or severe pain      Facility-Administered Medications: None     Allergies   Allergies   Allergen Reactions     Ammonium Lactate Other (See Comments)     Burning to skin     Doxycycline Visual Disturbance     Monosodium Glutamate Other (See Comments)     Pt states it feels like his \"head swells, visual disturbances, and he can't think striaght\"     Penicillins Rash       Physical Exam   Vital Signs: Temp: 98.5  F (36.9  C) Temp src: Oral BP: 169/84 Pulse: 101 Heart Rate: 103 Resp: 23 SpO2: 96 % O2 Device: Nasal cannula Oxygen Delivery: 3 LPM  Weight: 0 lbs 0 oz    Constitutional: awake, alert, cooperative, no apparent distress, and appears stated age  Respiratory: mod air movement. I do not appreciate any wheezing right now  Cardiovascular: Normal apical impulse, regular rate and rhythm, normal S1 and S2, no S3 or S4, and no murmur noted  GI: No scars, normal bowel sounds, soft, non-distended, non-tender, no masses palpated, no hepatosplenomegally    Data   Data reviewed today: I reviewed all medications, new labs and imaging results over the last 24 hours. I personally reviewed the chest x-ray image(s) showing clear.    Most Recent 3 CBC's:  Recent Labs   Lab Test 01/09/19  0850 11/15/18  0648 11/12/18  0711 11/10/18  0717 11/09/18  1916   WBC 8.7  --   --  8.1 11.7*   HGB 15.0  --   --  12.4* 13.5   MCV 93  --   --  99 99    205 210 " 183 208     Most Recent 3 BMP's:  Recent Labs   Lab Test 01/09/19  0850 11/15/18  0648 11/12/18  0711 11/11/18  0723 11/10/18  0717     --   --  139 135   POTASSIUM 3.4  --   --  3.5 4.0   CHLORIDE 99  --   --  102 100   CO2 29  --   --  33* 29   BUN 9  --   --  15 12   CR 0.69 0.94 0.90 0.78 0.71   ANIONGAP 9  --   --  4 6   MATTHEW 8.8  --   --  8.5 8.4*   GLC 73  --   --  99 100*     Recent Results (from the past 24 hour(s))   XR Chest 2 Views    Narrative    CHEST TWO VIEWS  1/9/2019 9:50 AM     HISTORY: Shortness of breath.    COMPARISON: 11/9/2018.      Impression    IMPRESSION: Hyperinflated lungs. No acute airspace disease. Stable  cardiac silhouette. Previously noted nodular opacities at the left mid  chest appear less prominent by radiograph, but may persist and CT  could provide further assessment.    JACKIE ESPINOZA MD   Chest CT, IV contrast only - PE protocol    Narrative    CT CHEST PULMONARY EMBOLISM WITH CONTRAST  1/9/2019 11:40 AM    HISTORY: PE suspected, intermediate probability, positive D-dimer.    TECHNIQUE: Scans obtained from the apices through the diaphragm with  IV contrast. 56 mL Isovue-370 injected. Radiation dose for this scan  was reduced using automated exposure control, adjustment of the mA  and/or kV according to patient size, or iterative reconstruction  technique.    COMPARISON: CT chest 9/8/2017, 11/22/2016.    FINDINGS:  Vascular: No acute thoracic aortic abnormality. No evidence for  pulmonary embolism. Scattered atherosclerotic disease throughout the  thoracic aorta. Coronary artery calcifications. Focal atherosclerotic  plaque again noted along the medial wall of the descending thoracic  aorta that is focal on series 4 image 77.    Lungs and pleura: There is a new spiculated pulmonary nodule at the  left lower lobe measuring 1.6 x 0.9 cm series 8 image 140. Superiorly  at the left lower lobe there is an elongated new nodular opacity with  2 components, largest measuring 0.7 cm  series 8 image 125. Adjacent  small new nodules also seen on image 116. A new prominent irregular  nodule noted at the superior segment left lower lobe with tethering of  the adjacent fissure that is 3.1 x 1.4 cm image 82. New nodule left  upper lobe that is approximately 0.7 cm image 59. Stable small  nodularity right middle lobe adjacent to the minor fissure measuring  0.6 cm image 188. New lateral right minor fissure nodule that is 0.3  cm image 130. Cluster of new nodules lateral right upper lobe, with an  example measuring 0.4 cm image 55.    Lymph nodes: Stable small lymph nodes.    Additional findings: Upper abdomen stable lobulated appearance of the  bilateral kidneys. Multiple gallstones again noted. Upper abdomen  images are limited in assessment. Stable nodular thickening of the  left adrenal. No convincing destructive bony lesion identified.      Impression    IMPRESSION:   1. Multifocal new irregular pulmonary nodular opacities as detailed  above. The largest lesion is seen at the superior segment left lower  lobe, but there are other new nodular opacities, some with peripheral  spiculation. As such, malignancy is a possibility and further  oncologic workup is recommended. Recommend follow-up short interval CT  chest in 3 months.  2. No pulmonary embolism or acute thoracic aortic abnormality.  3. No enlarged lymph nodes are seen.  4. Diffuse vascular calcifications.    JACKIE ESPINOZA MD
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
## 2019-01-09 NOTE — LETTER
Key Recommendations:  Patient admitted with COPD, on home O2 through [facility]. Patient has a history of CKD, DM, Hep C, and HTN. Patient has not followed with a pulmonologist. Patient states he has smoked for his entire adult life, but has only had one cigarette in the last 6 weeks. Per chart review, patient had not been taking medications for 5 days prior to admission. Pharmacy liaison was consulted to determine if there was a financial barrier to filling prescriptions and none was found. RN staff following up with respiratory therapy to determine that O2 is functioning properly. COPD action plan given and reviewed with patient. MTM consult placed for outpatient follow up.   Patient had been staying with a friend prior to admission, but states that this is not an option at discharge. Patient at this time does not know where he will go at discharge. SW will follow up with resources for patient.   Patient would benefit from close clinic follow up and SW coordination for housing resources.   Patient may benefit from mental health consult.   Other recommendations at discharge include ***        Kandy Menendez    AVS/Discharge Summary is the source of truth; this is a helpful guide for improved communication of patient story

## 2019-01-09 NOTE — ED NOTES
"[facility]  ED Nurse Handoff Report    Chuck Lopez is a 78 year old male   ED Chief complaint: Shortness of Breath  . ED Diagnosis:   Final diagnoses:   COPD exacerbation (H)   Shortness of breath   Pulmonary nodules     Allergies:   Allergies   Allergen Reactions     Ammonium Lactate Other (See Comments)     Burning to skin     Doxycycline Visual Disturbance     Monosodium Glutamate Other (See Comments)     Pt states it feels like his \"head swells, visual disturbances, and he can't think striaght\"     Penicillins Rash       Code Status: Full Code  Activity level - Baseline/Home:  Independent. Activity Level - Current:   Stand with Assist of 2. Lift room needed: No. Bariatric: No   Needed: No   Isolation: No. Infection: Not Applicable.     Vital Signs:   Vitals:    01/09/19 0926 01/09/19 0930 01/09/19 1000 01/09/19 1033   BP:  159/82 171/79    Pulse:  87 102    Resp:  27     Temp:       TempSrc:       SpO2: 97% 97% 98% 98%       Cardiac Rhythm:  ,      Pain level:    Patient confused: No. Patient Falls Risk: Yes.   Elimination Status: Has voided   Patient Report - Initial Complaint: SOB. Focused Assessment: Respiratory - Respiratory WDL: -WDL except; cough; all Breath Sounds: All Fields (diminished) Cough Frequency: frequent Cough Type: congested; productive Secretions Amount: small  Respiratory Secretions Assessment - Secretions Amount: small  Tests Performed: lab/imaging. Abnormal Results:   Labs Ordered and Resulted from Time of ED Arrival Up to the Time of Departure from the ED   CBC WITH PLATELETS DIFFERENTIAL - Abnormal; Notable for the following components:       Result Value    Absolute Eosinophils 1.0 (*)     All other components within normal limits   BLOOD GAS VENOUS - Abnormal; Notable for the following components:    PCO2 Venous 58 (*)     Bicarbonate Venous 32 (*)     All other components within normal limits   D DIMER QUANTITATIVE - Abnormal; Notable for the following " components:    D Dimer 1.2 (*)     All other components within normal limits   TROPONIN I   BASIC METABOLIC PANEL   INFLUENZA A/B ANTIGEN     .   Treatments provided: MAR  Family Comments: none  OBS brochure/video discussed/provided to patient:  N/A  ED Medications:   Medications   ipratropium - albuterol 0.5 mg/2.5 mg/3 mL (DUONEB) neb solution 6 mL (6 mLs Nebulization Given 1/9/19 0926)   methylPREDNISolone sodium succinate (solu-MEDROL) injection 125 mg (125 mg Intravenous Given 1/9/19 0862)   ipratropium - albuterol 0.5 mg/2.5 mg/3 mL (DUONEB) neb solution 3 mL (3 mLs Nebulization Given 1/9/19 1033)   iopamidol (ISOVUE-370) solution 500 mL (56 mLs Intravenous Given 1/9/19 1130)   0.9% sodium chloride BOLUS (0 mLs Intravenous Stopped 1/9/19 1140)     Drips infusing:  No  For the majority of the shift, the patient's behavior Green. Interventions performed were .     Severe Sepsis OR Septic Shock Diagnosis Present: No      ED Nurse Name/Phone Number: Gypsy Johnson,   12:24 PM    RECEIVING UNIT ED HANDOFF REVIEW    Above ED Nurse Handoff Report was reviewed: Yes  Reviewed by: Michelle Pemberton on January 9, 2019 at 1:12 PM

## 2019-01-09 NOTE — ED NOTES
Bed: ED11  Expected date: 1/9/19  Expected time:   Means of arrival: Ambulance  Comments:  Kan Deluna

## 2019-01-09 NOTE — PROGRESS NOTES
ROOM # 206-1    Living Situation (if not independent, order SW consult): Homeless, been staying with a friend  Facility name: N/A  :     Activity level at baseline: Independent with walker  Activity level on admit: A1 walker      Patient registered to observation; given Patient Bill of Rights; given the opportunity to ask questions about observation status and their plan of care.  Patient has been oriented to the observation room, bathroom and call light is in place.    Discussed discharge goals and expectations with patient/family.

## 2019-01-09 NOTE — PHARMACY-ADMISSION MEDICATION HISTORY
Admission medication history interview status for this patient is complete. See [facility] admission navigator for allergy information, prior to admission medications and immunization status.     Medication history interview source(s):Patient  Medication history resources (including written lists, pill bottles, clinic record):None  Primary pharmacy:[facility]    Changes made to PTA medication list:  Added: albuterol inh  Deleted: -  Changed: -    Actions taken by pharmacist (provider contacted, etc):None     Additional medication history information:None    Medication reconciliation/reorder completed by provider prior to medication history? No    Do you take OTC medications (eg tylenol, ibuprofen, fish oil, eye/ear drops, etc)? yes(Y/N)    For patients on insulin therapy: no (Y/N)  Lantus/levemir/NPH/Mix 70/30 dose:   (Y/N) (see Med list for doses)   Sliding scale Novolog Y/N  If Yes, do you have a baseline novolog pre-meal dose:  units with meals  Patients eat three meals a day:   Y/N    How many episodes of hypoglycemia do you have per week: _______  How many missed doses do you have per week: ______  How many times do you check your blood glucose per day: _______  Do you have a Continuous glucose monitor (CGM)   Y/N (remind pt that not approved for hospital use)   Any Barriers to therapy - Be specific :  cost of medications, comfortable with giving injections (if applicable), comfortable and confident with current diabetes regimen: Y/N ______________      Prior to Admission medications    Medication Sig Last Dose Taking? Auth Provider   albuterol (PROAIR HFA/PROVENTIL HFA/VENTOLIN HFA) 108 (90 Base) MCG/ACT inhaler Inhale 1-2 puffs into the lungs every 4 hours as needed for shortness of breath / dyspnea or wheezing  Yes Unknown, Entered By History   atenolol (TENORMIN) 25 MG tablet Take 1 tablet (25 mg) by mouth daily Past Week at Unknown time Yes Pranay Reid MD   busPIRone (BUSPAR) 15 MG tablet Take 1 tablet (15  mg) by mouth 3 times daily Past Week at Unknown time Yes Oliver Call MD   butalbital-acetaminophen-caffeine (FIORICET/ESGIC) -40 MG per tablet Take 1 tablet by mouth every 6 hours as needed for headaches Past Week at Unknown time Yes Oliver Call MD   escitalopram (LEXAPRO) 5 MG tablet Take 1 tablet (5 mg) by mouth daily Past Week at Unknown time Yes Oliver Call MD   fexofenadine (ALLEGRA) 30 MG ODT tab Take 1 tablet (30 mg) by mouth 2 times daily as needed for allergies (congestion) Past Week at Unknown time Yes Jori Doe MD   fluticasone-salmeterol (ADVAIR) 500-50 MCG/DOSE diskus inhaler Inhale 1 puff into the lungs 2 times daily Past Week at Unknown time Yes Erik Fish MD   guaiFENesin 400 MG TABS Take 400 mg by mouth 3 times daily Past Week at Unknown time Yes Unknown, Entered By History   hydrochlorothiazide (HYDRODIURIL) 25 MG tablet TAKE ONE TABLET BY MOUTH EVERY DAY Past Week at Unknown time Yes Pranay Reid MD   ipratropium - albuterol 0.5 mg/2.5 mg/3 mL (DUONEB) 0.5-2.5 (3) MG/3ML neb solution Take 1 vial (3 mLs) by nebulization 4 times daily Past Week at Unknown time Yes Jori Doe MD   ipratropium - albuterol 0.5 mg/2.5 mg/3 mL (DUONEB) 0.5-2.5 (3) MG/3ML neb solution Take 1 vial (3 mLs) by nebulization every 4 hours as needed for shortness of breath / dyspnea or wheezing Past Week at Unknown time Yes Jori Doe MD   ketoconazole (NIZORAL) 2 % shampoo Apply to the affected area and wash off after 5 minutes. Past Month at Unknown time Yes Pranay Reid MD   loratadine (CLARITIN) 10 MG tablet Take 1 tablet (10 mg) by mouth daily Past Week at Unknown time Yes Pranay Reid MD   melatonin 3 MG tablet Take 1 tablet (3 mg) by mouth nightly as needed Past Week at Unknown time Yes Oneyda Mcdonald MD   metaxalone (SKELAXIN) 800 MG tablet Take 800 mg by mouth 3 times daily Past  Week at Unknown time Yes Unknown, Entered By History   Multiple Vitamins-Minerals (V-C FORTE) CAPS TAKE ONE CAPSULE BY MOUTH EVERY DAY Past Week at Unknown time Yes Pranay Reid MD   potassium chloride SA (K-DUR/KLOR-CON M) 20 MEQ CR tablet Take 20 mEq by mouth every morning Past Week at Unknown time Yes Unknown, Entered By History   RaNITidine HCl (ZANTAC PO) Take 75 mg by mouth At Bedtime Past Week at Unknown time Yes Unknown, Entered By History   tamsulosin (FLOMAX) 0.4 MG capsule Take 0.4 mg by mouth daily Past Week at Unknown time Yes Unknown, Entered By History   tiotropium (SPIRIVA HANDIHALER) 18 MCG capsule INHALE ONE CAPSULE BY MOUTH EVERY DAY Past Week at Unknown time Yes Erik Fish MD   traMADol (ULTRAM) 50 MG tablet Take 0.5 tablets (25 mg) by mouth every 6 hours as needed for moderate pain or severe pain Past Week at Unknown time Yes Oliver Call MD

## 2019-01-09 NOTE — ED PROVIDER NOTES
History     Chief Complaint:  Shortness of Breath     HPI   Chuck Lopez is a 78 year old male with a history of COPD with chronic oxygen dependence (on 3L), diabetes, and hypertension who presents to the emergency department today via EMS for evaluation of shortness of breath. Patient reports that around 0000 last night, he began experiencing shortness of breath with associated cough starting today. He has been living with a friend after leaving his TCU, and has not taken any of his medications in the last 5 days, including his nebulizer's. He has had his oxygen, but says this stopped working last night. He is on 3L, and had O2 sats in the mid-upper 90's for EMS. Patient also endorses fever and an episode of chest pain 30 minutes ago that lasted only a few seconds.     Allergies:  Ammonium lactate   Doxycycline   Penicillins  Monosodium glutamate      Medications:    Tenormin   Buspar  Lexapro   Hydrodiuril   Flomax     Past Medical History:    COPD (chronic obstructive pulmonary disease)  Chronic kidney disease, stage 2  Diabetes   Hepatitis C  Hypertension     Past Surgical History:    Appendectomy    Family History:    Family history reviewed. No pertinent family history.     Social History:.  Smoking Status: Former Smoker    Packs/day: 0.25   Types: Cigarettes    Quit last month.   Smokeless Tobacco: Never Used  Alcohol Use: Negative  Drug Use: Negative   Marital Status:       Review of Systems   Constitutional: Positive for fever.   Respiratory: Positive for cough and shortness of breath.    Cardiovascular: Positive for chest pain (resolved).   All other systems reviewed and are negative.    Physical Exam     Patient Vitals for the past 24 hrs:   BP Temp Temp src Pulse Heart Rate Resp SpO2   01/09/19 1033 -- -- -- -- -- -- 98 %   01/09/19 1000 171/79 -- -- 102 -- -- 98 %   01/09/19 0930 159/82 -- -- 87 87 27 97 %   01/09/19 0926 -- -- -- -- -- -- 97 %   01/09/19 0915 154/86 -- -- 87 87 (!) 33 --    01/09/19 0900 (!) 162/92 -- -- 93 93 (!) 32 97 %   01/09/19 0845 (!) 161/96 -- -- 96 93 25 98 %   01/09/19 0833 (!) 185/98 98.5  F (36.9  C) Oral 92 -- 20 98 %   01/09/19 0830 (!) 185/98 -- -- -- -- -- 99 %     Physical Exam  General:              Well-nourished              Speaking in full sentences   Limited historian  Eyes:              Conjunctiva without injection or scleral icterus  ENT:              Moist mucous membranes              Nares patent              Pinnae normal  Neck:              Full ROM              No stiffness appreciated  Resp:              Diminished air movement throughout              No focal crackles              Speaking in 10 word sentences  CV:                    Normal rate, regular rhythm              S1 and S2 present              No murmur, gallop or rub  GI:              BS present              Abdomen soft without distention              Non-tender to light and deep palpation              No guarding or rebound tenderness  Skin:              Warm, dry, well perfused              No rashes or open wounds on exposed skin  MSK:              Moves all extremities              No focal deformities or swelling              No calf tenderness  Neuro:              Alert              Answers questions appropriately              Moves all extremities equally              Gait stable  Psych:              Normal affect, normal mood    Emergency Department Course     ECG:  ECG taken at 0829, ECG read at 0832  Normal sinus rhythm  Left axis deviation   Nonspecific ST abnormality   Abnormal ECG  No significant change compared to EKG taken as noted above. Dated 11/9/18.   Rate 94 bpm. OK interval 150 ms. QRS duration 76 ms. QT/QTc 350/437 ms. P-R-T axes 85 -76 51.    ECG taken at 1031, ECG read at 1055  Sinus rhythm with premature atrial complexes  Left axis deviation  Abnormal ECG   Rate 99 bpm. OK interval 144. QRS duration 78. QT/QTc 356/456. P-R-T axes 84 -79 56.    Imaging:  Radiology  findings were communicated with the patient who voiced understanding of the findings.    XR Chest 2 Views  Hyperinflated lungs. No acute airspace disease. Stable cardiac silhouette. Previously noted nodular opacities at the left mid chest appear less prominent.  Reading per radiology     Chest CT, IV contrast only - PE protocol   1. Multifocal new irregular pulmonary nodular opacities as detailed above. The largest lesion is seen at the superior segment left lower lobe, but there are other new nodular opacities, some with peripheral spiculation. As such, malignancy is a possibility and further oncologic workup is recommended. Recommend follow-up short interval CT chest in 3 months.  2. No pulmonary embolism or acute thoracic aortic abnormality.  3. No enlarged lymph nodes are seen.  4. Diffuse vascular calcifications.  Reading per radiology     Laboratory:  Laboratory findings were communicated with the patient who voiced understanding of the findings.    CBC: WBC 8.7, HGB 15.0,   BMP: WNL (Creatinine 0.69)  Troponin (Collected 0850): <0.015   D dimer quantitative: 1.2(H)   Blood gas venous: pH 7.35, PCO2 58(H), PO2 37, Bicarbonate 32(H), Base Excess 4.2, FIO2 3L  Influenza A/B Antigen (Specimen: Nasopharyngeal): Negative     Interventions:  0836 solu-medrol 125 mg IV  0926 Duoneb 6 mL Neb  1033 Duoneb 3 mL Neb    Emergency Department Course:    0824 Nursing notes and vitals reviewed.    0829 I performed an exam of the patient as documented above. EKG taken as noted above.     0850 IV was inserted and blood was drawn for laboratory testing, results above.    0942 The patient was sent for a XR while in the emergency department, results above.     1020 Rechecked and updated.      1031 EKG taken as noted above.     1120 Rechecked and updated.      1130 The patient was sent for a CT while in the emergency department, results above.     1227 I spoke with Sara Giordano PA-C of the hospitalist service from  [facility] regarding patient's presentation, findings, and plan of care.    1315 I personally reviewed the lab and imaging results with the patient and answered all related questions prior to admit.    Impression & Plan      Medical Decision Making:  Chuck Lopez is a 78 year old male who presents to the emergency department today for evaluation of shortness of breath via EMS.  Vital signs on presentation are notable for elevated blood pressure though otherwise are unremarkable, including stable oxygen saturation on patient's home oxygen requirements.  Overall, his clinical presentation is suspicious for recurrent COPD exacerbation.  He reports having been out of his home medications/nebulizer therapy for the past 5 days since leaving his TCU.  Additionally, his oxygen tank reportedly malfunctioned as of last night, likely contributing to worsening symptoms.  On exam, he demonstrates diminished air movement throughout.  He was provided IV Solu-Medrol, as well as DuoNeb therapy.  VBG demonstrates elevated PCO2, though with appropriate metabolic compensation.  Influenza testing returned negative.  He was maintained on supplemental oxygen via nasal cannula and did not require escalation of therapy to include BiPAP nor intubation.  On serial examinations, as he did not significantly change with regards to his pulmonary exam, a d-dimer was obtained to evaluate for underlying PE.  This returned elevated, thus prompting CT of the chest.  Fortunately, this is without evidence of acute pulmonary embolism, or evidence of pulmonary infiltrate.  Note is made however of multiple pulmonary nodules, with peripheral spiculation.  Concern is for possible underlying malignancy, and certainly, patient will require further workup.  These findings were discussed with the patient.  He does have risk factors including chronic tobacco use, and continues to smoke.  Additionally, he reports mild weight loss.  During patient's  emergency department course, he does report atypical chest pain.  His initial EKG demonstrates sinus rhythm without findings of acute ischemia.  Repeat EKG is without evidence of significant dynamic changes.  His troponin is within normal limits.  Results and clinical impression were discussed with the patient.  He will be admitted to the hospitalist service for further treatment and care.  All of his questions were answered prior to admission.    Diagnosis:    ICD-10-CM    1. COPD exacerbation (H) J44.1    2. Shortness of breath R06.02    3. Pulmonary nodules R91.8      Disposition:   The patient is admitted into the care of Dr. Banks.    Scribe Disclosure:  Reena FLORES, am serving as a scribe at 8:29 AM on 1/9/2019 to document services personally performed by Nayan Stallings MD based on my observations and the provider's statements to me.      [facility] EMERGENCY DEPARTMENT       Nayan Stallings MD  01/09/19 1831       Nayan Stallings MD  01/09/19 1385

## 2019-01-10 VITALS
HEART RATE: 101 BPM | TEMPERATURE: 97.7 F | RESPIRATION RATE: 20 BRPM | OXYGEN SATURATION: 96 % | SYSTOLIC BLOOD PRESSURE: 126 MMHG | DIASTOLIC BLOOD PRESSURE: 88 MMHG

## 2019-01-10 LAB
INTERPRETATION ECG - MUSE: NORMAL
TROPONIN I SERPL-MCNC: <0.015 UG/L (ref 0–0.04)

## 2019-01-10 PROCEDURE — 93010 ELECTROCARDIOGRAM REPORT: CPT | Performed by: INTERNAL MEDICINE

## 2019-01-10 PROCEDURE — 94640 AIRWAY INHALATION TREATMENT: CPT

## 2019-01-10 PROCEDURE — 93005 ELECTROCARDIOGRAM TRACING: CPT

## 2019-01-10 PROCEDURE — G0378 HOSPITAL OBSERVATION PER HR: HCPCS

## 2019-01-10 PROCEDURE — 99217 ZZC OBSERVATION CARE DISCHARGE: CPT | Performed by: PHYSICIAN ASSISTANT

## 2019-01-10 PROCEDURE — 36415 COLL VENOUS BLD VENIPUNCTURE: CPT | Performed by: PHYSICIAN ASSISTANT

## 2019-01-10 PROCEDURE — 84484 ASSAY OF TROPONIN QUANT: CPT | Performed by: PHYSICIAN ASSISTANT

## 2019-01-10 PROCEDURE — 25000132 ZZH RX MED GY IP 250 OP 250 PS 637: Mod: GY | Performed by: HOSPITALIST

## 2019-01-10 PROCEDURE — A9270 NON-COVERED ITEM OR SERVICE: HCPCS | Mod: GY | Performed by: HOSPITALIST

## 2019-01-10 PROCEDURE — 94640 AIRWAY INHALATION TREATMENT: CPT | Mod: 76

## 2019-01-10 PROCEDURE — 25000125 ZZHC RX 250: Performed by: PHYSICIAN ASSISTANT

## 2019-01-10 PROCEDURE — 25000125 ZZHC RX 250: Performed by: HOSPITALIST

## 2019-01-10 PROCEDURE — 40000275 ZZH STATISTIC RCP TIME EA 10 MIN

## 2019-01-10 RX ORDER — GUAIFENESIN 400 MG/1
400 TABLET ORAL 3 TIMES DAILY
Qty: 90 TABLET | Refills: 0 | Status: SHIPPED | OUTPATIENT
Start: 2019-01-10 | End: 2019-02-09

## 2019-01-10 RX ORDER — IPRATROPIUM BROMIDE AND ALBUTEROL SULFATE 2.5; .5 MG/3ML; MG/3ML
1 SOLUTION RESPIRATORY (INHALATION) EVERY 4 HOURS PRN
Qty: 60 VIAL | Refills: 0 | Status: SHIPPED | OUTPATIENT
Start: 2019-01-10 | End: 2019-02-09

## 2019-01-10 RX ORDER — ATENOLOL 25 MG/1
25 TABLET ORAL DAILY
Qty: 30 TABLET | Refills: 0 | Status: SHIPPED | OUTPATIENT
Start: 2019-01-10 | End: 2019-02-09

## 2019-01-10 RX ORDER — POTASSIUM CHLORIDE 1500 MG/1
20 TABLET, EXTENDED RELEASE ORAL EVERY MORNING
Qty: 30 TABLET | Refills: 0 | Status: SHIPPED | OUTPATIENT
Start: 2019-01-10 | End: 2019-02-09

## 2019-01-10 RX ORDER — BUSPIRONE HYDROCHLORIDE 15 MG/1
15 TABLET ORAL 3 TIMES DAILY
Qty: 90 TABLET | Refills: 0 | Status: SHIPPED | OUTPATIENT
Start: 2019-01-10 | End: 2019-02-09

## 2019-01-10 RX ORDER — ALBUTEROL SULFATE 90 UG/1
1-2 AEROSOL, METERED RESPIRATORY (INHALATION) EVERY 4 HOURS PRN
Qty: 1 INHALER | Refills: 0 | Status: SHIPPED | OUTPATIENT
Start: 2019-01-10 | End: 2019-02-09

## 2019-01-10 RX ORDER — TAMSULOSIN HYDROCHLORIDE 0.4 MG/1
0.4 CAPSULE ORAL DAILY
Qty: 30 CAPSULE | Refills: 0 | Status: SHIPPED | OUTPATIENT
Start: 2019-01-10 | End: 2019-02-09

## 2019-01-10 RX ORDER — LANOLIN ALCOHOL/MO/W.PET/CERES
3 CREAM (GRAM) TOPICAL
Qty: 30 TABLET | Refills: 0 | Status: SHIPPED | OUTPATIENT
Start: 2019-01-10 | End: 2019-02-09

## 2019-01-10 RX ORDER — PREDNISONE 20 MG/1
40 TABLET ORAL DAILY
Qty: 8 TABLET | Refills: 0 | Status: SHIPPED | OUTPATIENT
Start: 2019-01-11 | End: 2019-01-15

## 2019-01-10 RX ORDER — BUTALBITAL, ACETAMINOPHEN AND CAFFEINE 50; 325; 40 MG/1; MG/1; MG/1
1 TABLET ORAL EVERY 6 HOURS PRN
Qty: 10 TABLET | Refills: 0 | Status: SHIPPED | OUTPATIENT
Start: 2019-01-10 | End: 2019-02-09

## 2019-01-10 RX ORDER — HYDROCHLOROTHIAZIDE 25 MG/1
25 TABLET ORAL DAILY
Qty: 30 TABLET | Refills: 0 | Status: SHIPPED | OUTPATIENT
Start: 2019-01-10 | End: 2019-02-09

## 2019-01-10 RX ORDER — TIOTROPIUM BROMIDE 18 UG/1
CAPSULE ORAL; RESPIRATORY (INHALATION)
Qty: 30 CAPSULE | Refills: 0 | Status: SHIPPED | OUTPATIENT
Start: 2019-01-10

## 2019-01-10 RX ORDER — MULTIVIT-MINS NO.7/FOLIC ACID 1 MG
1 CAPSULE ORAL DAILY
Qty: 30 CAPSULE | Refills: 0 | Status: SHIPPED | OUTPATIENT
Start: 2019-01-10 | End: 2019-02-09

## 2019-01-10 RX ORDER — PREDNISONE 20 MG/1
40 TABLET ORAL DAILY
Status: DISCONTINUED | OUTPATIENT
Start: 2019-01-10 | End: 2019-01-10 | Stop reason: HOSPADM

## 2019-01-10 RX ADMIN — BUSPIRONE HYDROCHLORIDE 15 MG: 15 TABLET ORAL at 14:53

## 2019-01-10 RX ADMIN — TAMSULOSIN HYDROCHLORIDE 0.4 MG: 0.4 CAPSULE ORAL at 09:04

## 2019-01-10 RX ADMIN — ESCITALOPRAM 5 MG: 5 TABLET, FILM COATED ORAL at 09:04

## 2019-01-10 RX ADMIN — HYDROCHLOROTHIAZIDE 25 MG: 25 TABLET ORAL at 09:04

## 2019-01-10 RX ADMIN — IPRATROPIUM BROMIDE AND ALBUTEROL SULFATE 3 ML: .5; 3 SOLUTION RESPIRATORY (INHALATION) at 07:32

## 2019-01-10 RX ADMIN — ATENOLOL 25 MG: 25 TABLET ORAL at 09:04

## 2019-01-10 RX ADMIN — IPRATROPIUM BROMIDE AND ALBUTEROL SULFATE 3 ML: .5; 3 SOLUTION RESPIRATORY (INHALATION) at 15:05

## 2019-01-10 RX ADMIN — POTASSIUM CHLORIDE 20 MEQ: 1500 TABLET, EXTENDED RELEASE ORAL at 09:04

## 2019-01-10 RX ADMIN — BUSPIRONE HYDROCHLORIDE 15 MG: 15 TABLET ORAL at 09:04

## 2019-01-10 RX ADMIN — IPRATROPIUM BROMIDE AND ALBUTEROL SULFATE 3 ML: .5; 3 SOLUTION RESPIRATORY (INHALATION) at 11:27

## 2019-01-10 RX ADMIN — PREDNISONE 40 MG: 20 TABLET ORAL at 12:02

## 2019-01-10 NOTE — CONSULTS
.Care Transition Initial Assessment -   Reason For Consult: discharge planning, community resources. Chart reviewed noting status with anticipation of pt's discharge today, reports of having no permanent address and unable to return back to the home of his friend, Jose.       Met with: Patient    Active Problems:    COPD exacerbation (H)       DATA  Lives With: (staying with friend, no permanent residence)      Quality of Family Relationships: (pt identifies no family involvement)     Who is your support system?: (Friends)  Support Assessment: Limited social contact and support, Minimal outside structure and leisure time activities.     With chart review it was noted that pt was hospitalized in November for a COPD exacerbation, upon discharge at that time her transferred  To [facility] for rehab stay. Pt had been residing at [facility], but he was evicted due to non-payment of rent, after several month of receiving the eviction notice.     Quality of Family Relationships: (pt identifies no family involvement)  Transportation Anticipated: family or friend will provide    INTERVENTION    Met with to address his homeless status and to provide resources for him and assist with finding a temporary residency. Pt affirms that he did go to [facility], but he left there.. pt did not clarify the reason(s) why he left. He had no residence to go to so he went to stay with his friend, Jose noting today that he is unable to return there now because there are a couple other guys living there,  there is no bedroom for him and they don't want him sleeping on the cot in the living room any longer.       Discussed with pt the options of him staying at a motel, or going to a shelter until he can determine what his longer term plan is for residency. He notes of income that he receives which he could use for housing but he doesn't want to use that money for a motel. Inquired of other friends that pt could call upon  to stay with.. he noted of a friend, Didi who he could try, but he didn't have her phone number. SW able to assist pt with obtaining Didi's mother ( Ruperto, [phone]) who pt called and it happened that Didi was at the home with her mother. Per discussion with Didi, pt will be able to stay with her at her residence in [place].  ARNOL inquired about the follow-up medical care that he will need, he notes that he will follow-up with his primary care MD, Dr Reid with [facility] noting he would have friends who could drive him there when needed.     ASSESSMENT  Cognitive Status: alert and oriented     PLAN  Patient given options and choices for discharge: Yes  Patient/family is agreeable to the plan?  Yes  Patient Goals and Preferences: independence with activity and ADLs  Patient anticipates discharging to: friend's home.

## 2019-01-10 NOTE — CONSULTS
Care Transition Initial Assessment - RN        Met with: Patient.  DATA   Active Problems:    COPD exacerbation (H)       Cognitive Status: awake and alert.  Primary Care Clinic Name: [facility]  Primary Care MD Name: Dr. Pranay Reid  Contact information and PCP information verified: Yes         Quality of Family Relationships: non-existent      Who is your support system?: None   Support Assessment: Limited social contact and support   Insurance concerns: No Insurance issues identified  ASSESSMENT  Patient currently receives the following services:  None        Identified issues/concerns regarding health management: Patient admitted with COPD, on home O2 through [facility]. Patient has a history of CKD, DM, Hep C, and HTN. Patient has not followed with a pulmonologist. Patient states he has smoked for his entire adult life, but has only had one cigarette in the last 6 weeks. Per chart review, patient had not been taking medications for 5 days prior to admission. Pharmacy liaison was consulted to determione if there was a financial barrier to filling prescriptions. RN following up with respiratory therapy to determine that O2 is functioning properly. COPD action plan given and reviewed with patient. MTM consult placed for outpatient follow up.   Patient had been staying with a friend prior to admission, but states that this is not an option at discharge. Patient at this time does not know where he will go at discharge. SW will follow up with resources for patient.     PLAN  Financial costs for the patient not discussed .  Patient given options and choices for discharge Yes .  Patient/family is agreeable to the plan?  Yes: pending medical stability and given housing resources.   Patient anticipates discharging to Unknown .        Patient anticipates needs for home equipment: No  Plan/Disposition: Unknown, pending housing resources.    Appointments: None made at this time as patient prefers to wait until  he knows his discharge disposition.     Handoff will be given to Clinic CTS for further coordination of care.     Care  (CTS) will continue to follow as needed.      Kandy JOEL  Care Transition Services  [phone]

## 2019-01-10 NOTE — PLAN OF CARE
PRIMARY DIAGNOSIS: COPD Exacerbation  OUTPATIENT/OBSERVATION GOALS TO BE MET BEFORE DISCHARGE:  ADLs back to baseline: Yes    Activity and level of assistance: Ambulating independently.    Pain status: Denies pain    Return to near baseline physical activity: Yes     Discharge Planner Nurse   Safe discharge environment identified: Yes  Barriers to discharge: No  Patient alert, anxious. (+) CSM BUE/BLE. No respiratory or cardiac distress voiced or noted. Patient is discharging home this shift.all medications & personal effects signed & returned to patient.   Please review provider order for any additional goals.   Nurse to notify provider when observation goals have been met and patient is ready for discharge.

## 2019-01-10 NOTE — PROGRESS NOTES
"[facility] RCAT     Date: 1/9/2019  Admission Dx: COPD  Pulmonary History: COPD  Home Nebulizer/MDI Use: Albuterol HFA 1-2 puffs Q4 hours prn, Advair BID, Duoneb QID, Duoneb Q4 hours prn, Spiriva daily.  Home Oxygen: 2-3 Lpm NC  Acuity Level (RCAT flow sheet): 3  Aerosol Therapy initiated: Continue Duoneb QID with Albuterol Q2 hours prn.      Pulmonary Hygiene initiated: Aerobika prn, deep breath snd cough TID.      Volume Expansion initiated: IS TID.      Current Oxygen Requirements: 3Lpm NC  Current SpO2: 96%    Re-evaluation date: 1/12/2019    Patient Education:  Informed Pt of RCAT.  Gave instruction in the use of an IS and Aerobika.      See \"RT Assessments\" flow sheet for patient assessment scoring and Acuity Level Details.     Jayden Bunn  January 9, 2019.4:49 PM              "

## 2019-01-10 NOTE — DISCHARGE SUMMARY
"[facility] Observation Unit Discharge Summary          Chuck Lopez MRN# [id number]   Age: 78 year old YOB: 1940     Date of Admission:  1/9/2019  Date of Discharge::  1/10/2019  Admitting Physician:  Ruperto Banks MD  Discharge Physician:  Sara Giordano PA-C  Primary Physician: Pranay Reid     Primary Discharge Diagnoses:   COPD Exacerbation on home oxygen chronically.  Nodular Opacities on CT     Secondary Discharge Diagnoses:   HTN  Depression  Chronic Pain  CKD  Hepatitis C  Tobacco Abuse    Hospital Course:   For detail history, please refer to H & P from 1/9/2019. In brief, this is a 78 year old male \"with history COPD (on home oxygen at 3L), CKD, DM, Hep C (untreated), HTN admitted on 1/9/2019 with COPD excerbation. Patient states he was discharged from a TCU about 5 days ago. He states he was sent home without any of his medications (so he has not had any meds for the past 5 days). He lives with 4 roommates (all who smoke). He has been using his home oxygen but last night it started to not work intermittently. He would \"whack it\" and it started to work again but then it stopped. He may have fallen and his roommates called EMS. He is feeling better after meds/nebs in ED. He denies current chest pain. Has had cough. No sputum. No abdo pain, n/v/d. No fevers. Has had some chills. No other complaints.\"    Patient was admitted to the observation unit.  He remained stable on his home oxygen requirement of 3 liters.  RT was consulted and patient's home oxygen was functioning well.  Social Work was consulted and patient was discharged to live with friends and medications were refilled.  Patient was discharged to complete a 5 day course of Prednisone.  Patient was informed of multifocal new irregular pulmonary nodular opacities noted on a CT concerning for possible malignancy.  Patient was instructed to follow up with PCP within one week and recommended further " Oncology evaluation and repeat CT scan within 3 months.    Procedures/Imaging:     Results for orders placed or performed during the hospital encounter of 01/09/19   XR Chest 2 Views    Narrative    CHEST TWO VIEWS  1/9/2019 9:50 AM     HISTORY: Shortness of breath.    COMPARISON: 11/9/2018.      Impression    IMPRESSION: Hyperinflated lungs. No acute airspace disease. Stable  cardiac silhouette. Previously noted nodular opacities at the left mid  chest appear less prominent by radiograph, but may persist and CT  could provide further assessment.    JACKIE ESPINOZA MD   Chest CT, IV contrast only - PE protocol    Narrative    CT CHEST PULMONARY EMBOLISM WITH CONTRAST  1/9/2019 11:40 AM    HISTORY: PE suspected, intermediate probability, positive D-dimer.    TECHNIQUE: Scans obtained from the apices through the diaphragm with  IV contrast. 56 mL Isovue-370 injected. Radiation dose for this scan  was reduced using automated exposure control, adjustment of the mA  and/or kV according to patient size, or iterative reconstruction  technique.    COMPARISON: CT chest 9/8/2017, 11/22/2016.    FINDINGS:  Vascular: No acute thoracic aortic abnormality. No evidence for  pulmonary embolism. Scattered atherosclerotic disease throughout the  thoracic aorta. Coronary artery calcifications. Focal atherosclerotic  plaque again noted along the medial wall of the descending thoracic  aorta that is focal on series 4 image 77.    Lungs and pleura: There is a new spiculated pulmonary nodule at the  left lower lobe measuring 1.6 x 0.9 cm series 8 image 140. Superiorly  at the left lower lobe there is an elongated new nodular opacity with  2 components, largest measuring 0.7 cm series 8 image 125. Adjacent  small new nodules also seen on image 116. A new prominent irregular  nodule noted at the superior segment left lower lobe with tethering of  the adjacent fissure that is 3.1 x 1.4 cm image 82. New nodule left  upper lobe that is  "approximately 0.7 cm image 59. Stable small  nodularity right middle lobe adjacent to the minor fissure measuring  0.6 cm image 188. New lateral right minor fissure nodule that is 0.3  cm image 130. Cluster of new nodules lateral right upper lobe, with an  example measuring 0.4 cm image 55.    Lymph nodes: Stable small lymph nodes.    Additional findings: Upper abdomen stable lobulated appearance of the  bilateral kidneys. Multiple gallstones again noted. Upper abdomen  images are limited in assessment. Stable nodular thickening of the  left adrenal. No convincing destructive bony lesion identified.      Impression    IMPRESSION:   1. Multifocal new irregular pulmonary nodular opacities as detailed  above. The largest lesion is seen at the superior segment left lower  lobe, but there are other new nodular opacities, some with peripheral  spiculation. As such, malignancy is a possibility and further  oncologic workup is recommended. Recommend follow-up short interval CT  chest in 3 months.  2. No pulmonary embolism or acute thoracic aortic abnormality.  3. No enlarged lymph nodes are seen.  4. Diffuse vascular calcifications.    JACKIE ESPINOZA MD       Consultations:   Social Work  Respiratory Therapy    Allergies:     Allergies   Allergen Reactions     Ammonium Lactate Other (See Comments)     Burning to skin     Doxycycline Visual Disturbance     Monosodium Glutamate Other (See Comments)     Pt states it feels like his \"head swells, visual disturbances, and he can't think striaght\"     Penicillins Rash        Subjective:   Patient denies shortness of breath.  Has some chest tightness.  On 2-3 liters of oxygen at baseline.  Denies abdominal pain.  Tolerating po well.  Patient is currently homeless and living with friends    Physical Exam:   Blood pressure 114/50, pulse 101, temperature 97.6  F (36.4  C), temperature source Oral, resp. rate 18, SpO2 98 %.  General: Alert, interactive, NAD, sitting up in bed  HEENT: " AT/NC, sclera anicteric, PERRL, EOMI  Resp: clear to auscultation bilaterally, no wheezes  Cardiac: regular rate and rhythm, no murmur  Extremities: No LE edema  Neuro: Alert & oriented x 3, no focal deficits, moves all extremities equally   Discharge Medicatios:        Discharge Medication List as of 1/10/2019  4:51 PM      START taking these medications    Details   predniSONE (DELTASONE) 20 MG tablet Take 40 mg by mouth daily for 4 days., Disp-8 tablet, R-0, E-Prescribe         CONTINUE these medications which have CHANGED    Details   albuterol (PROAIR HFA/PROVENTIL HFA/VENTOLIN HFA) 108 (90 Base) MCG/ACT inhaler Inhale 1-2 puffs into the lungs every 4 hours as needed for shortness of breath / dyspnea or wheezing, Disp-1 Inhaler, R-0, E-Prescribe      atenolol (TENORMIN) 25 MG tablet Take 1 tablet (25 mg) by mouth daily, Disp-30 tablet, R-0, E-Prescribe      busPIRone (BUSPAR) 15 MG tablet Take 1 tablet (15 mg) by mouth 3 times daily, Disp-90 tablet, R-0, E-Prescribe      butalbital-acetaminophen-caffeine (FIORICET/ESGIC) -40 MG tablet Take 1 tablet by mouth every 6 hours as needed for headaches, Disp-10 tablet, R-0, Local Print      fluticasone-salmeterol (ADVAIR) 500-50 MCG/DOSE inhaler Inhale 1 puff into the lungs 2 times daily, Disp-1 Inhaler, R-0, E-Prescribe      guaiFENesin 400 MG TABS Take 1 tablet (400 mg) by mouth 3 times daily, Disp-90 tablet, R-0, E-Prescribe      hydrochlorothiazide (HYDRODIURIL) 25 MG tablet Take 1 tablet (25 mg) by mouth daily, Disp-30 tablet, R-0, E-Prescribe      ipratropium - albuterol 0.5 mg/2.5 mg/3 mL (DUONEB) 0.5-2.5 (3) MG/3ML neb solution Take 1 vial (3 mLs) by nebulization every 4 hours as needed for shortness of breath / dyspnea or wheezing, Disp-60 vial, R-0, E-Prescribe      melatonin 3 MG tablet Take 1 tablet (3 mg) by mouth nightly as needed for sleep, Disp-30 tablet, R-0, E-Prescribe      Multiple Vitamins-Minerals (V-C FORTE) CAPS Take 1 capsule by mouth  daily, Disp-30 capsule, R-0, E-Prescribe      potassium chloride ER (K-DUR/KLOR-CON M) 20 MEQ CR tablet Take 1 tablet (20 mEq) by mouth every morning, Disp-30 tablet, R-0, E-Prescribe      ranitidine (ZANTAC) 75 MG tablet Take 1 tablet (75 mg) by mouth At Bedtime, Disp-30 tablet, R-0, E-Prescribe      tamsulosin (FLOMAX) 0.4 MG capsule Take 1 capsule (0.4 mg) by mouth daily, Disp-30 capsule, R-0, E-Prescribe      tiotropium (SPIRIVA HANDIHALER) 18 MCG inhaled capsule INHALE ONE CAPSULE BY MOUTH EVERY DAY, Disp-30 capsule, R-0, E-Prescribe         CONTINUE these medications which have NOT CHANGED    Details   escitalopram (LEXAPRO) 5 MG tablet Take 1 tablet (5 mg) by mouth daily, Disp-30 tablet, R-0, E-Prescribe      loratadine (CLARITIN) 10 MG tablet Take 1 tablet (10 mg) by mouth daily, Disp-30 tablet, R-1, E-Prescribe      traMADol (ULTRAM) 50 MG tablet Take 0.5 tablets (25 mg) by mouth every 6 hours as needed for moderate pain or severe pain, Disp-10 tablet, R-0, Local Print         STOP taking these medications       fexofenadine (ALLEGRA) 30 MG ODT tab Comments:   Reason for Stopping:         ketoconazole (NIZORAL) 2 % shampoo Comments:   Reason for Stopping:         metaxalone (SKELAXIN) 800 MG tablet Comments:   Reason for Stopping:               Instructions Given to Patient as Discharge:     Discharge Procedure Orders   CT Chest w & w/o contrast   Standing Status: Future Standing Exp. Date: 04/09/19     Order Specific Question Answer Comments   Priority Routine      MED THERAPY MANAGE REFERRAL   Referral Type: Med Therapy Management   Number of Visits Requested: 1     Discharge Instructions   Order Comments: You need a repeat CT in 3 months to follow-up on opacities seen on the CT scan done here. An order has been placed.     Reason for your hospital stay   Order Comments: You were hospitalized due to a COPD exacerbation.     Follow-up and recommended labs and tests    Order Comments: Please follow up with  your PCP within one week and follow up with a repeat CT of your chest as ordered.     Activity   Order Comments: Your activity upon discharge: activity as tolerated     Order Specific Question Answer Comments   Is discharge order? Yes      When to contact your care team   Order Comments: Notify for fever >101.5; black or bloody stools; severe, persistent, or worsening nausea, vomiting, abdominal pain or distention, diarrhea, constipation; chest pain, difficulty breathing, swelling; dizziness, weakness, lightheadedness, fainting.  Proceed to the nearest emergency room for any urgent concerns.     Oxygen Adult   Order Comments: Renew Home Oxygen Order  Renew previous prescription.  3 liters of oxygen per nasal cannula.  Expected treatment length is indefinite (99 months).    Attending Provider: Rosy Caba M.D.  Physician signature: See electronic signature associated with these discharge orders  Date of Order: January 10, 2019     Diet   Order Comments: Follow this diet upon discharge: Orders Placed This Encounter      Regular Diet Adult     Order Specific Question Answer Comments   Is discharge order? Yes        Pending Tests at Discharge:   none    Discharge Disposition:   Discharge home with a friend     Sara HIDALGOC  Hospitalist Service  Pager [phone]    >30 minutes was spent in discharge planning, care coordination, physical examination and medication reconciliation on the date of discharge, 1/10/2019

## 2019-01-10 NOTE — PLAN OF CARE
PRIMARY DIAGNOSIS: COPD  OUTPATIENT/OBSERVATION GOALS TO BE MET BEFORE DISCHARGE:  1. Vital signs stable: Yes, Temp: 98.3  F (36.8  C) Temp src: Oral BP: 117/51 Pulse: 101 Heart Rate: 66 Resp: 16 SpO2: 98 % O2 Device: Nasal cannula Oxygen Delivery: 3 LPM     2. Improvement of peak flow to greater than 70% sustained off nebulizer for 4 hours: N/A    3. Dyspnea improved and O2 sats >88% at RA or at prior home O2 therapy level: Yes      SpO2: 98 %, O2 Device: Nasal cannula    4. Short term supplemental O2 needed for use with activity at home: Yes    5. Tolerating adequate PO diet and medications: Yes    6. Return to near baseline physical activity: Yes    Pt is A&Ox4. VSS. Pt does report mild headache. Will give tramadol with bedtime meds per pt request. Pt is on 3L of O2. Lung sounds diminished. Continue solumedrol and nebs. SBA with walker. Regular diet. Will continue to monitor.     Discharge Planner Nurse   Safe discharge environment identified: Yes  Barriers to discharge: Yes       Entered by: Laura Anderson 01/10/2019 12:34 AM     Please review provider order for any additional goals.   Nurse to notify provider when observation goals have been met and patient is ready for discharge.

## 2019-01-10 NOTE — PROGRESS NOTES
RT- asked by RN to verify home oxygen was working.    Patient's home tank has a back up battery that was 3/4 charged and operable and also a on/off button that was also functioning.    Writer turned machine on without problem, verified set flow was 3 LPM and verified that flow was coming from machine.    Turned machine off and back on three times without any problems.    Cayla Shaikh, RT  1/10/2019 11:37 AM

## 2019-01-10 NOTE — PROGRESS NOTES
SPIRITUAL HEALTH SERVICES Progress Note   [facility] Advance Directive Education    Responded to a consult order for Advance Care Planning (ACP) education for Chuck Lopez.  However, Chuck declined an ACP conversation and information.    This author and other  remain available per pt's request as needs arise.     Eligio Becker M.Div., [facility]  Staff   Pager [phone]

## 2019-01-10 NOTE — PLAN OF CARE
PRIMARY DIAGNOSIS: COPD   OUTPATIENT/OBSERVATION GOALS TO BE MET BEFORE DISCHARGE:  Dyspnea improved and O2 sats >88% on RA or back to baseline O2 levels: Yes   SpO2: 100 %, O2 Device: Nasal cannula    Tolerating oral abx or appropriate plans made outpatient infusion: N/A      Vitals signs normal or return to baseline: Yes    Short term supplemental O2 needed with activity at home: 3L Oxygen at baseline      Tolerate oral intake to maintain hydration: Yes    Return to near baseline physical activity: Yes    Discharge Planner Nurse   Safe discharge environment identified: Unknown at this time. Pt unsure if he wants to return to his friends house.    Barriers to discharge: Yes       Entered by: Kandice Watson 01/10/2019 10:07 AM   Pt alert and orientated with mild confusion at times. Denied SOB. Oxygen at 3L via NC. VSS. Tolerating PO meds and diet. SW and ACP Consults still pending. Respiratory will evaluate the patient's home oxygen. Will continue to monitor.   Please review provider order for any additional goals.   Nurse to notify provider when observation goals have been met and patient is ready for discharge.

## 2019-01-10 NOTE — PLAN OF CARE
PRIMARY DIAGNOSIS: COPD   OUTPATIENT/OBSERVATION GOALS TO BE MET BEFORE DISCHARGE:  Dyspnea improved and O2 sats >88% on RA or back to baseline O2 levels: Yes   SpO2: 98 %, O2 Device: Nasal cannula    Tolerating oral abx or appropriate plans made outpatient infusion: NA     Vitals signs normal or return to baseline: Yes    Short term supplemental O2 needed with activity at home: Yes, 3L at baseline     Tolerate oral intake to maintain hydration: Yes    Return to near baseline physical activity: Yes    Discharge Planner Nurse   Safe discharge environment identified: Yes  Barriers to discharge: No       Entered by: Kandice Watson 01/10/2019 1:27 PM   Pt alert and orientated. Complained of chest pain on shift. EKG and Trop negative. SW consulted for discharge planning. Oxygen tank working properly per RT. Up SBA. Tolerating PO meds and diet. Will discharge today.   Please review provider order for any additional goals.   Nurse to notify provider when observation goals have been met and patient is ready for discharge.

## 2019-01-10 NOTE — PLAN OF CARE
PRIMARY DIAGNOSIS: COPD  OUTPATIENT/OBSERVATION GOALS TO BE MET BEFORE DISCHARGE:  1. Vital signs stable: Yes, Temp: 98.3  F (36.8  C) Temp src: Oral BP: 117/51 Pulse: 101 Heart Rate: 66 Resp: 16 SpO2: 98 % O2 Device: Nasal cannula Oxygen Delivery: 3 LPM     2. Improvement of peak flow to greater than 70% sustained off nebulizer for 4 hours: N/A    3. Dyspnea improved and O2 sats >88% at RA or at prior home O2 therapy level: Yes      SpO2: 98 %, O2 Device: Nasal cannula    4. Short term supplemental O2 needed for use with activity at home: Yes    5. Tolerating adequate PO diet and medications: Yes    6. Return to near baseline physical activity: Yes    Pt is A&Ox4. VSS. Pt given tramadol for headache. Pt is on 3L of O2. Lung sounds diminished. Continue solumedrol and nebs. SBA with walker. Regular diet. Will continue to monitor.     Discharge Planner Nurse   Safe discharge environment identified: Yes  Barriers to discharge: Yes       Entered by: Laura Anderson 01/10/2019 12:38 AM     Please review provider order for any additional goals.   Nurse to notify provider when observation goals have been met and patient is ready for discharge.

## 2019-01-10 NOTE — PLAN OF CARE
PRIMARY DIAGNOSIS: COPD  OUTPATIENT/OBSERVATION GOALS TO BE MET BEFORE DISCHARGE:  1. Vital signs stable: Yes, Temp: 96.2  F (35.7  C) Temp src: Oral BP: 134/52 Pulse: 101 Heart Rate: 64 Resp: 20 SpO2: 99 % O2 Device: Nasal cannula Oxygen Delivery: 3 LPM     2. Improvement of peak flow to greater than 70% sustained off nebulizer for 4 hours: N/A    3. Dyspnea improved and O2 sats >88% at RA or at prior home O2 therapy level: Yes      SpO2: 98 %, O2 Device: Nasal cannula    4. Short term supplemental O2 needed for use with activity at home: Yes    5. Tolerating adequate PO diet and medications: Yes    6. Return to near baseline physical activity: Yes    Pt is A&Ox4. VSS. Pt given tramadol for headache. Pt is on 3L of O2. Lung sounds diminished. Continue solumedrol and nebs. SBA with walker. Regular diet. Will continue to monitor.     Discharge Planner Nurse   Safe discharge environment identified: Yes  Barriers to discharge: Yes       Entered by: Laura Anderson 01/10/2019 4:44 AM     Please review provider order for any additional goals.   Nurse to notify provider when observation goals have been met and patient is ready for discharge.

## 2019-01-11 ENCOUNTER — PATIENT OUTREACH (OUTPATIENT)
Dept: CARE COORDINATION | Facility: CLINIC | Age: 79
End: 2019-01-11

## 2019-01-11 ENCOUNTER — TELEPHONE (OUTPATIENT)
Dept: FAMILY MEDICINE | Facility: CLINIC | Age: 79
End: 2019-01-11

## 2019-01-11 NOTE — TELEPHONE ENCOUNTER
[facility] ER for Gastroesophageal Reflux Disease With Esophagitis, Copd Exacerbation (H)    No ER follow up scheduled with PCP     Barb Borjas/PATRICIA

## 2019-01-11 NOTE — PROGRESS NOTES
Clinic Care Coordination Contact  [facility]/Voicemail    Referral Source: IP Report    Clinical Data: Care Coordinator Outreach  Outreach attempted x 1.  Left message on voicemail with call back information and requested return call. Pt was hospitalized at  [facility] from 1/9/19 - 1/10/19 for COPD Exacerbation. Per chart review prior to hospitalization Pt had not taken his medication for five days.      Plan: Care Coordinator will attempt to call Pt again if don't here back from him in the next 1-2 business days.  He is currently living in [place] with his friend, Didi, and need to ask if he plans to keep [facility] as his PCC.    Didi Chavez   Care Coordination Team  [phone]

## 2019-01-11 NOTE — PLAN OF CARE
Patient's After Visit Summary was reviewed with patient.   Patient verbalized understanding of After Visit Summary, recommended follow up and was given an opportunity to ask questions.   Discharge medications sent home with patient/family: YES   Discharged with other:friend     OBSERVATION patient END time: [id number]

## 2019-01-11 NOTE — PROGRESS NOTES
Transition Communication Hand-off for Care Transitions to Next Level of Care Provider    Name: Chuck Lopez  : 1940  MRN #: [id number]  Primary Care Provider: Pranay Reid  Primary Care MD Name: Dr. Pranay Reid  Primary Clinic: [address] [facility] [address]  Primary Care Clinic Name: [facility]  Reason for Hospitalization:  Shortness of breath [R06.02]  Pulmonary nodules [R91.8]  COPD exacerbation (H) [J44.1]  Admit Date/Time: 2019  8:24 AM  Discharge Date: 1/10/19  Payor Source: Payor: MEDICARE / Plan: MEDICARE / Product Type: Medicare /     Readmission Assessment Measure (TETO) Risk Score/category: Average         Reason for Communication Hand-off Referral: Admission diagnoses: COPD    Discharge Plan:       Concern for non-adherence with plan of care:   Yes  Discharge Needs Assessment:  Needs      Most Recent Value   Current Discharge Risk  chronically ill, homeless, lack of support system/caregiver          Already enrolled in Tele-monitoring program and name of program:  NA  Follow-up specialty is recommended: No    Follow-up plan:  No future appointments.    Any outstanding tests or procedures:    Procedures     Future Labs/Procedures    Oxygen Adult     Comments:    Renew Home Oxygen Order  Renew previous prescription.  3 liters of oxygen per nasal cannula.  Expected treatment length is indefinite (99 months).    Attending Provider: Rosy Caba M.D.  Physician signature: See electronic signature associated with these discharge orders  Date of Order: January 10, 2019          Radiology & Cardiology Orders     Future Labs/Procedures Complete By Expires    CT Chest w & w/o contrast  2019 (Approximate) 2019    Process Instructions:    Administration of IV contrast (contrast agent, dose, and amount) will be tailored to this examination per the appropriate written protocol listed in the Protocol E-Book, or by the supervising imaging provider.         Referrals      Future Labs/Procedures    MED THERAPY MANAGE REFERRAL     Comments:    Patient is not adhering to their medication regimen.             Key Recommendations:  Patient admitted with COPD, on home O2 through [place]. Patient has a history of CKD, DM, Hep C, and HTN. Patient has not followed with a pulmonologist. Patient states he has smoked for his entire adult life, but has only had one cigarette in the last 6 weeks. Per chart review, patient had not been taking medications for 5 days prior to admission. Pharmacy liaison was consulted to determine if there was a financial barrier to filling prescriptions and none was found. RN staff following up with respiratory therapy to determine that O2 is functioning properly. COPD action plan given and reviewed with patient. MTM consult placed for outpatient follow up.   Patient had been staying with a friend prior to admission, but states that this is not an option at discharge. SW was able to find another friend of pt's that he is able to stay with Didi, who lives in [place]. Pt still plans to follow w/ Dr. Reid and has friends that can drive him to appointments.     Patient would benefit from close clinic follow up and SW coordination for housing resources and may benefit from mental health consult.    Kandy Mneendez/Anais Ulloa RN CTS    AVS/Discharge Summary is the source of truth; this is a helpful guide for improved communication of patient story

## 2019-01-14 ENCOUNTER — PATIENT OUTREACH (OUTPATIENT)
Dept: CARE COORDINATION | Facility: CLINIC | Age: 79
End: 2019-01-14

## 2019-01-14 NOTE — PROGRESS NOTES
Clinic Care Coordination Contact    Clinic Care Coordination Contact  OUTREACH    Referral Information:  Referral Source: IP Handoff         Chief Complaint   Patient presents with     Clinic Care Coordination - Follow-up        Universal Utilization: Pt was in hospital from 1/9/19 - 1/15/19 for COPD. He had not been taking his medication 5 days prior to being hospitalized. He had been staying with a friend prior to being hospitalized and was not able to return there post discharge. Per pearson review  Pt went to stay with a different friend (Didi) after being discharged.         Utilization    Last refreshed: 1/12/2019  7:27 AM:  Hospital Admissions 2           Last refreshed: 1/12/2019  7:27 AM:  ED Visits 0           Last refreshed: 1/12/2019  7:27 AM:  No Show Count (past year) 1              Current as of: 1/12/2019  7:27 AM              Clinical Concerns:  Current Medical Concerns:  Pt has COPD and has unstable living situation at this time.         Living Situation:  Type of residence:: Homeless       Resources and Interventions:  Current Resources:                Patient/Caregiver understanding: Not able to reach parent.           Plan: CC will mail a letter to Pt's  most recent address.    Didi AMBROSE Care Coordination Team  [phone]

## 2019-01-14 NOTE — LETTER
[place] CARE COORDINATION  January 14, 2019    Chuck Lopez  [address] [place] [id number]      Dear Chuck,    I am a clinic care coordinator who works with Pranay Reid MD at [facility]. I wanted to introduce myself and provide you with my contact information for you to be able to call me with any questions or concerns. Below is a description of clinic care coordination and how I can further assist you.     The clinic care coordinator is a registered nurse and/or  who understand the health care system. The goal of clinic care coordination is to help you manage your health and improve access to the [facility] system in the most efficient manner. The registered nurse can assist you in meeting your health care goals by providing education, coordinating services, and strengthening the communication among your providers. The  can assist you with financial, behavioral, psychosocial, chemical dependency, counseling, and/or psychiatric resources.    Please feel free to contact me at [phone] with any questions or concerns. We at [facility] are focused on providing you with the highest-quality healthcare experience possible and that all starts with you.     Sincerely,     Didi Chavez    Enclosed:

## 2019-01-15 ENCOUNTER — TELEPHONE (OUTPATIENT)
Dept: FAMILY MEDICINE | Facility: CLINIC | Age: 79
End: 2019-01-15

## 2019-01-15 NOTE — TELEPHONE ENCOUNTER
MTM referral from: Transitions of Care- [place] (recent hospital discharge or ED visit)    MTM referral outreach attempt #2 on January 15, 2019 at 11:40 AM      Outcome: Patient not reachable after several attempts, will route to MTM Pharmacist/Provider as an FYI. Thank you for the referral.    Mckayla Larson, MTM Coordinator    MTM Pharmacist at clinic where patient receives primary care-yes  Referred by a specialist-yes, MTM at speciality clinic-na  Patient covered for MTM-yes [facility]OME[facility]    Blocked MTM provider schedule-no

## 2019-04-11 NOTE — PROGRESS NOTES
"[facility] RCAT     Date: 09/05/17  Admission Dx: COPD exacerbation  Pulmonary History: COPD, current smoker of 1 ppd  Home Nebulizer/MDI Use: Albuterol Q4 prn, Albuterol MDI Q6 prn, Advair BID, QVAR BID  Home Oxygen: None  Acuity Level (RCAT flow sheet): 3   Aerosol Therapy initiated: Albuterol Q2 prn, Arnuity QD, Breo QD, Duoneb QID      Pulmonary Hygiene initiated: Coughing techniques      Volume Expansion initiated: Incentive Spirometry      Current Oxygen Requirements: 2 LPM nasal cannula  Current SpO2: 95%    Re-evaluation date: 09/08/17    Patient Education: Discussed use of respiratory medications and oxygen use.      See \"RT Assessments\" flow sheet for patient assessment scoring and Acuity Level Details.           " no respiratory distress/no anaphylaxis/no photosensitivity/no urticaria

## 2020-01-17 NOTE — TELEPHONE ENCOUNTER
Assessment/Plan:       Diagnoses and all orders for this visit:    Essential hypertension    History of BPH    Visual disturbances    Skin cancer      Do not suspect the visual images at night to be of concern right now - suspect it is from dreaming, or waking from sleep  His issue with the lights though may be related to his vision, - continue with eye doctor next month for an exam   BP within range today  Subjective:      Patient ID: Erika Lopez is a 79 y o  male  Here today for a follow-up  Reports he is starting a new cancer treatment for his skin cancer - Valchlor - starting it next week  States recently since starting treatments for his skin issues he has noticed some blurriness to lights in his bedroom - has an appt with the eye doctor in February  He also states when awakening from sleep at night he sees images, but reports feeling rested from sleep  Areas of eczema responding to treatment from dermatology, areas of Skin CA remain but are slowly improving  Denies any issues with voiding, notes he sits down to urinating but has a good stream and no blood in his urine - prostate removal almost one year ago  The following portions of the patient's history were reviewed and updated as appropriate: allergies, current medications, past family history, past medical history, past social history, past surgical history and problem list     Review of Systems   Constitutional: Negative  Eyes: Positive for visual disturbance  Respiratory: Negative  Cardiovascular: Negative  Skin:        Please see HPI  Neurological: Negative  All other systems reviewed and are negative  Objective:      /76 (BP Location: Left arm, Patient Position: Sitting, Cuff Size: Large)   Pulse 58   Ht 5' 10" (1 778 m)   Wt 102 kg (225 lb 8 5 oz)   SpO2 98%   BMI 32 36 kg/m²          Physical Exam   Constitutional: He is oriented to person, place, and time   He appears well-developed and ED / Discharge Outreach Protocol    Patient Contact    Attempt # 1    Was call answered?  No.  No VM set up. Phone continued to ring.    Shaina Rosenberg, RN, BSN, PHN       well-nourished  HENT:   Head: Normocephalic and atraumatic  Eyes: Conjunctivae and EOM are normal    Neck: Neck supple  Cardiovascular: Normal rate, regular rhythm and normal heart sounds  Exam reveals no gallop and no friction rub  No murmur heard  Pulmonary/Chest: Effort normal and breath sounds normal  No stridor  No respiratory distress  He has no wheezes  He has no rales  Neurological: He is alert and oriented to person, place, and time  No cranial nerve deficit or sensory deficit  He exhibits normal muscle tone  Skin: Skin is warm and dry  Scattered patches on right forearm of eczema and skin ca

## 2021-06-13 NOTE — PROGRESS NOTES
[facility]    Facility:   [facility] [[id number]]   Code Status: POLST AVAILABLE      76-year-old male with chronic tobacco habituation, hypertension, chronic headache, peripheral neuropathy, hepatitis C, admitted to hospital with acute hypoxic respiratory failure and pneumonia, stabilized in hospital, transferred to TCU on oral antibiotic, nebulizer, not requiring supplemental 02.    Review of systems: patient reports his breathing status is improved. He continues to smoke on a regular basis. Denies fever sweats or chills. Finds nebulizer to be beneficial. No active G.I. symptoms. Energy level remains diminished. Peripheral neuropathic pain involving lower extremities and hand digits. Concerned regarding chronicity of headaches. Headaches involve the superior and lateral head, present throughout the day, has been on Fioricet for a number of years, is willing to attempt alternate approach to headaches. Additionally has never been treated for ADD, questions this potential diagnosis. Concerns regarding long-term prognosis. Remainder of 12 point review of systems obtained negative.    Exam: patient sitting upright, appears younger than stated age. Blood pressure 145/82, 02 saturation 95%, pulse 74, respiratory 20, temperature 97.4. Alert and oriented, no focal tenderness to palpation of scalp, no TMJ tenderness. No facial asymmetry. Pharynx without erythema. S1 and S2 regular. Pulmonary exam with diminished air movement bases,adventious sounds rt base, no rhonchi or wheezes. Abdomen without organomegaly or masses. Periphery without edema. Decreased sensation digital tips. Pedal pulses minus one and symmetrical. Joints without acute inflammatory change.    Impression and plan: pneumonia, pulmonary status satisfactory, oxygenation remains stable, continue nebulizers. Chronic tobacco habituation, patient has no desire for smoking cessation. Hepatitis C, patient has been  evaluated by GI in past, has not received definitive treatment. Chronic headaches with Fioricet use daily, reviewed rebound headaches secondary to Fioricet, begin Skelaxin 800 mg b.i.d., Fioricet to be continued PRN, patient will attempt to limit use of Fioricet, understands rebound phenomena. Disposition, patient is homeless,  working with patient. Total time of evaluation greater than 35 minutes, greater than 50% of time spent in coordination of care and counseling.    Electronically signed by: Araceli Subramanian MD

## 2021-06-13 NOTE — PROGRESS NOTES
[facility]    Facility:   [facility] [[id number]]   Code Status: POLST AVAILABLE      Admission evaluation to TCU of 76 year old male. History is taken from patient who is an excellent historian and from accompanying medical notes. History significant for COPD, ongoing tobacco habituation, diabetes mellitus 2, hepatitis C, chronic neck pain, hypertension, homelessness, presented to hospital with shortness of breath, diagnosed with COPD exacerbation with pneumonia. 02 saturation 80% on admission, chest x-ray with infiltrate right lung base, temperature 100, received Levaquin, methylprednisolone, DuoNebs, albuterol. No evidence of acute cardiac process. Stabilized in hospital and transferred to TCU for rehabilitation, observation of clinical status and management of multiple medical problems.    Past medical history, social history, family history, drug allergies, code status reviewed in Epic.    Review of systems: chronic sense of dyspnea, rapidly progressive course of dyspnea prior to hospitalization. Profound fatigue remains present, denies wheezing, no current need for supplemental 02. Was considered for treatment of hepatitis C, however due to homelessness was thought to not be a candidate by his report. Ongoing chronic tobacco habituation, no desire for smoking cessation. Diabetes mellitus has not required insulin, diet controlled. Chronic reflux, no active reflux symptomatology. Chronic headaches involving the frontal and superior aspect of head, takes Fioricet throughout the day and has been on this for some time. Describes neuropathic pain of the digits of hands which he relates to diagnosis of hepatitis C. Believes he may have ADD although has never been treated for this. Remainder of 12 point review of systems obtained negative.    Exam: patient pleasant, alert, appears younger than stated age, cognitively intact and well groomed. Blood pressure 112/60,  blood glucose 80, 65, 02 95% on room air, pulse 76, respiratory 18, temperature 97, weight 127.8. No use of accessory muscles at rest, no cervical adenopathy. S1 and S2 regular with faint systolic murmur. Pulmonary exam with rhonchi right lung base, no wheezes, delay in inspiratory flow, no rales. Abdomen without hepatosplenomegaly masses or tenderness. Periphery without edema. Decreased sensation digital tips. Muscle tone and strength -2. Skin turgor intact.    Impression and plan: right lower lobe pneumonia, oxygenation satisfactory on room air, continuing Levaquin, nebulizers available, stable respiratory status. Chronic tobacco habituation with ongoing use, no desire for smoking cessation. Chronic Fioricet use for headache, rebound headaches from chronic Fioricet, discussion regarding potential change to alternate medication. Hepatitis C chronic, per patient report he was declined intervention due to his homelessness and potential lack of responsibility. Chronic hypertension with adequate control of blood pressure. Multiple medical issues are reviewed including chronic headache chronic pain medication, total time of evaluation greater than 45 minutes, greater than 50% of time spent in coordination of care and counseling.    Electronically signed by: Araceli Subramanian MD

## 2021-06-13 NOTE — PROGRESS NOTES
[facility]    Facility:   [facility] [[id number]]   Code Status: POLST AVAILABLE    Reevaluation of 76-year-old male admitted to hospital with hypoxic respiratory failure, pneumonia, treated with antibiotic/ prednisone/ nebulizer, history of chronic tobacco habituation, hepatitis C with neuropathy, hypertension, chronic headaches on Fioricet daily, COPD, ongoing tobacco habituation, chronic pain on both tramadol and Fioricet. Transferred to TCU post hospitalization stabilization.    Review of systems: patient reports improvement in pulmonary status. Not requiring supplemental 02 on a regular basis, however using it PRN. Finds nebulizer to be beneficial. Has started Skelaxin 800 mg TID for chronic headache, is attempting to limit his use of Fioricet, however is taking it in a.m. and p.m. Frustrated that his insurance will end in 72 hours, believes he may leave facility prior to stabilization. Chronic peripheral neuropathy remains symptomatic. Generalized fatigue is present. Finds staying in an enclosed facility to be difficult. Does not desire to transfer to [facility].  have recommended this transfer. Personal concerns expressed. Remainder of 12 point review of systems obtained negative.    Exam: patient pleasant, talkative, blood pressure 115/69, 02 92% on room air, pulse 81, temperature 97.5, weight 124.5. Gait is mildly wide based, stable. Cognitively intact. No facial asymmetry. No use of accessory muscles at rest. No pharyngeal erythema. No carotid bruits or HJR. S1 and S2 regular. Pulmonary exam with delayed inspiratory flow, diminished air movement lung bases, no rales or wheezes. Abdomen without hepatosplenomegaly masses or tenderness. Periphery without edema. Venous stasis changes lower extremities. Pedal pulses diminished. Extreme erythema of derm of hands. Sensation diminished.    Impression and plan: COPD, ongoing tobacco use, no desire for  smoking cessation. Hepatitis C with peripheral neuropathy, hyperemia of hands related to hepatitis C. No treatment in past for hepatitis C. Homelessness, patient does not desire transfer to [facility]. Hyper tension on beta blocker with satisfactory control of blood pressure. Recent pneumonia, patient remains afebrile, pulmonary exam and oxygenation stable, continuing nebulizer. No evidence of ongoing infection. Chronic headaches, Fiorlcet use with suspected rebound headache secondary to Fioricet, recent initiation of Skelaxin, no improvement of headaches, continue trial of Skelaxin. Total time of evaluation greater than 35 minutes, greater than 50% of time spent in coordination of care and counseling. Discussion with social work.      Electronically signed by: Araceli Subramanian MD

## 2021-06-13 NOTE — PROGRESS NOTES
[facility]    Facility:   [facility] [[id number]]   Code Status: POLST AVAILABLE       Reevaluation of patient who is homeless, chronic tobacco habituation, COPD, admitted to hospital with progressive dyspnea, diagnosed with pneumonia, stabilized in hospital and transferred to TCU on antibiotic, supplemental 02, nebulizers. Recent addition of Skelaxin 800 mg b.i.d. for chronic headaches. Poorly tolerated trial of one tablet of Neurontin. Continues on Fioricet with codeine.    Review of systems: patient reports he has ongoing significant difficulty with breathing. Prednisone 10 mg Q day initiated 72 hours ago for exacerbation of COPD, believes this is helped his breathing capacity. Continues to use supplemental 02. Concerned that oxygen compressor will be returned to [place], patient has three oxygen apparatus in his room including a large tank and a smaller tank. Concerned with dryness of his scalp, uses Suave shampoo, wonders what shampoo would benefit his scalp. Has chronically sensitive skin, Vanicream is being applied, questions what soap should be used for his skin. Concerned that he will require home 02, believes he will need a seated four wheeled walker to transport his 02, repeatedly states concern that compressed 02 will be returned to [place]. Denies chest pain or palpitations. Concerned that Skelaxin may make him fatigued on awakening in the morning, however is finding it to be beneficial, wishes to continue Skelaxin at decreased dose. Increasing fatigue. Remainder of 12 point review of systems obtained in entirety.    Exam: vital signs reviewed over past 72 hours. Patient appears younger than stated age, oriented times three, thin stature. Dryness of scalp. No facial asymmetry, no use of accessory muscles at rest. Pharynx without erythema. S1 and S2 regular with faint systolic murmur. Pulmonary exam with delay in inspiratory flow, no rales or wheezes,  prolonged expiratory flow. Abdomen without masses tenderness organomegaly. Periphery without edema. No scalp tenderness. Diffuse erythema of hands, hypersensitivity to touch. Strength and muscle tone -1 and symmetrical. No hand drift.    Impression and plan: COPD, patient continues to smoke, continues to require supplemental 02, completing five day course of 10 mg prednisone Q day, improved status. Continue nebulizers. Chronic hypertension with adequate control of blood pressure. Chronic kidney disease, creatinine remains stable. Dry skin and scalp, tea tree shampoo, Basis soap, Vanicream post bathing. Chronic headache, chronic Fiorinal with codeine use, Skelaxin beneficial in decreasing in intensity of headache, decrease Skelaxin to 400 mg b.i.d. Hepatitis C not treated. Neuropathy presumed secondary to hepatitis C. Total time of evaluation greater than 35 minutes, greater than 50% of time spent in coordination of care and counseling.        Electronically signed by: Araceli Subramanian MD

## 2021-06-13 NOTE — PROGRESS NOTES
[facility]    Facility:   [facility] [[id number]]   Code Status: POLST AVAILABLE  Reevaluation of 76-year-old male with hepatitis C, homeless, COPD, chronic tobacco habituation, chronic pain syndrome, admitted to hospital with progressive cough and dyspnea, diagnosed with community acquired pneumonia, treated with prednisone, nebulizer, supplemental 02, antibiotic, stabilized and transferred to TCU for rehabilitation, management of medical problems which additionally include hypertension. Since transfer to TCU Skelaxin 800 mg TI D has been initiated for chronic headaches.    Review of systems: denies cough or sputum production. Continues to check oxygen level with digital oximeter and finds it to be low, nurse checks suggest satisfactory oxygenation. Sense of dyspnea  following prolonged ambulation. Denies orthopnea or PND. Believes Skelaxin is aiding  his chronic headaches. Does not wish to increase the dose further. Continues additionally on Fioricet which is taken chronically. No desire for smoking cessation. Generalized fatigue present at all times. Did note night before last that he awakened feeling refreshed, arms were above his head, reminded him of the sleep in childhood. No focal neurologic deficits. Remainder of 12 point review of systems obtained negative.    Exam: patient alert and oriented, ambulating in hallway frequently, in no apparent distress. Blood pressure 127/78, 02 93% on room air, pulse 80, weight 123.7. No focal muscular tenderness of skull, no TMJ tenderness. No facial asymmetry. No carotid bruits or HJR. S1 and S2 regular. Pulmonary exam with diminished air movement bases, delay in inspiratory flow, no rales or wheezes. Abdomen without masses tenderness organomegaly. Periphery with out edema, venous stasis changes present. Extreme erythema of hands and digits, no cyanotic changes, digital tips cold, no ulceration.    Impression and plan:  chronic hypertension on ace inhibitor, satisfactory control of blood pressure. Hepatitis C, untreated, peripheral neuropathy secondary to Hep C. COPD, oxygenation satisfactory, recent pneumonia with resolution of symptoms, patient remains afebrile and without cough. Chronic headaches, mild improvement with routine use of Skelexin, continue encouraged to attempt to decrease Fioricet dose. Homelessness, referral to [facility] has been recommended, patient is not interested in [facility] placement, continues in TCU setting. Chronic tobacco habituation, patient is not interested in smoking cessation. Care plan and medications are reviewed, personal concerns reviewed.        Electronically signed by: Araceli Subramanian MD

## 2021-06-13 NOTE — PROGRESS NOTES
[facility]    Facility:   [facility] [[id number]]   Code Status: POLST AVAILABLE       Reevaluation of patient with COPD, chronic tobacco habituation, hypertension, hepatitis C, peripheral neuropathy, admitted to hospital with acute hypoxic respiratory failure and pneumonia. Transferred to TCU for rehabilitation, observation of clinical status and management of medical problems which additionally include chronic headache.    Review of systems: finds Skelaxin to be beneficial for chronic headaches. Took one tablet of Neurontin 100 mg at HS, this caused drowsiness, requested to discontinue medication. Has a handheld oximeter, continues to note his oxygenation to be diminished. Chronically cold digital tips, chronic peripheral neuropathy. Fatigue continues at all times. Denies nausea or vomiting. Continues to smoke on a regular basis. Took tramadol two tablets which were offered to him, found he had resolution of headache for the first time in a number of months. Believes potentially he should increase his pain medication. Remainder of 12 point review of systems obtained negative.    Exam: patient alert, appears younger than stated age, frequently walking in hallways and going outside, anxious. Blood pressure 128/68, 02 92 - 95% on room air, pulse 84, respiratory 20, temperature 98.4, weight 125, increased 2.3 pounds since admission. No use of accessory muscles at rest, pharynx without erythema. No focal tenderness of scalp musculature, no TMJ tenderness. No cervical adenopathy or HJR. S1 and S2 regular. Pulmonary exam with delay in inspiratory flow, no rhonchi, no wheezes or rales, delayed expiratory flow. Abdomen without hepatosplenomegaly or masses. Periphery without edema. Extreme erythema and coolness of hands and feet.    Impression and plan: hepatitis C, chronic peripheral neuropathy, hyper anemia of hands, reviewed with patient that digital oximeter will be  inaccurate with cold digits. COPD, continued tobacco habituation, recent pneumonia, antibiotic course completed, oxygenation off 02 remain satisfactory. Hypertension with adequate control of blood pressure. Chronic pain syndrome, chronic use of Fioricet, patient continues to use the Fioricet on a routine basis, reviewed potential of rebound headache, Skelaxin beneficial, Neurontin poorly tolerated by patient. To consider increase in Skelaxin frequency. Ideally patient would be off Fioricet. Continued tramadol use, to avoid opiates. Homelessness, patient remains in TCU, has declined possibility of moving to retirement. Total time of evaluation greater than 30 minutes, more than 50% of time spent in coordination of care and counseling.        Electronically signed by: Araceli Subramanian MD

## 2021-06-13 NOTE — PROGRESS NOTES
[facility]    Facility:   [facility] [[id number]]   Code Status: POLST AVAILABLE    Reevaluation of 77-year-old male with hepatitis C untreated, hypertension, COPD, ongoing tobacco habituation, admitted to hospital with pneumonia, transferred to TCU for rehabilitation, management of medical problems which also include chronic pain on tramadol and Fiorinal with codeine. Recent initiation of BuSpar.    Review of systems: patient believes BuSpar has been helpful, he awakened today feeling that worries had been lifted from him. Believes he might tolerate a higher dose of BuSpar, will consider 5 mg BID. Has found Skelaxin to be highly beneficial. Believes pulmonary status is improved, continues to smoke on a regular basis. Concerned regarding long-term housing, he is been turned down by an [facility] as he does not require services. Concerns regarding administration of medications. Suspicious of certain caregivers. Spends majority much time out side, continues with supplemental 02. Pleased with his breathing capacity. Recent significant increase in nasal congestion, this associated with eosinophilia, slowly resolving. Received flu immunization prior to onset of respiratory and sinus congestion. Unaware of wheezing. Multiple personal concerns are reviewed. Remainder of 12 point review of systems obtained negative.    Exam: patient oriented, talkative, supplemental 02 in place, no evidence of respiratory distress. Blood pressure 106/71, O2 90 - 91% on supplemental 02, pulse 78, respiratory 18, temperature 98. Darkening of skin of hands and face. Mucous membranes moist. No sinus tenderness. No cervical adenopathy. S1 and S2 regular. Pulmonary exam with delayed inspiratory flow, no rales rhonchi or wheezes. Abdomen without masses or tenderness. Periphery without edema. Strength and muscle tone -1.    Impression and plan: chronic myofascial headaches, chronic use of tramadol and  Fiorinal with codeine, encouraged limited use. COPD, continues to require supplemental 02, recent pneumonia resolved, ongoing tobacco habituation. Chronic anxiety, tolerating BuSpar, will consider slow increase in dose. Homelessness, issues related to potential placement reviewed. Hypertension with adequate control of blood pressure. Total time of evaluation greater than 40 minutes, greater than 50% of time spent in coordination of care and counseling. Additional discussion with nursing staff.      Electronically signed by: Araceli Subramanian MD

## 2021-06-13 NOTE — PROGRESS NOTES
[facility]    Facility:   [facility] [[id number]]   Code Status: POLST AVAILABLE    Reevaluation of 76-year-old male, homeless, hepatitis C, COPD with ongoing tobacco habituation, chronic headache, peripheral neuropathy, hypertension, admitted to hospital with increasing weakness and cough, diagnosed with pneumonia, stabilized in hospital and transferred to TCU for rehabilitation, observation of clinical status. Skelaxin 400 mg b.i.d. in use for chronic headaches, continues  with regular use of Fioricet with codeine, supplemental 02 used on a PRN basis, nebulizer in use.    Review of systems: believes headaches are mildly improved with Skelaxin. Continues to use Fioricet on a regular basis. Notes energy level increasing. When outside oxygenation satisfactory, believes the air quality inside is poor, requires oxygenation when indoors. Sleep intermittently with impairment. Generalized muscle aching improved. Continued neuropathic symptoms. Chronic dry skin, has begun use of Vanicream. Contemplating shampoos for his scalp. Has not used soap, contemplating this. Has visited an [facility], is unsure as to whether it will be comfortable for him to live in an enclosed setting. No confusion. Generalized fatigue present at all times. Remainder of 12 point review of systems obtained negative.    Exam: vital signs reviewed over past four days. Patient alert, oriented, without supplemental 02. No facial asymmetry. No scalp tenderness. Dryness of scalp and dryness of skin hands. Digits are cool, no ulcerations. S1 and S2 regular. Pulmonary exam without rales rhonchi or wheezes, decreased air movement lung bases. Abdomen without masses tenderness organomegaly. Periphery without edema. Strength and muscle tone -1.    Impression and plan: chronic pain syndrome, chronic headaches, patient attempting to decrease amount of  Fiorinal with codeine without success, increase Skelaxin to 400 mg  TID. Chronic hepatitis C untreated. Hyper tension on  atenolol with satisfactory control of blood pressure. COPD, no desire for smoking cessation. Recent pneumonia with resolution, patient remains afebrile, nebulizers helpful. Homelessness, considering move to [facility], is unclear as to whether this will be satisfactory for his needs. Multiple medical and social issues reviewed viewed, total time of evaluation greater than 35 minutes, greater than 50% of time spent in coordination of care and counseling.      Electronically signed by: Araceli Subramanian MD

## 2021-06-13 NOTE — PROGRESS NOTES
[facility]    Facility:   [facility] [[id number]]   Code Status: POLST AVAILABLE     Reevaluation of patient admitted to hospital with acute hypoxic respiratory failure and pneumonia. Stabilized in hospital and transferred to TCU for rehabilitation, management of medical problems which include hepatitis C untreated, chronic headache, hypertension, peripheral neuropathy, homelessness. Patient continues in TCU, is working with , plans visit to [facility] in near future.    Review of systems: patient received Fioricet without codeine, how this occurred is not clear, patient however was told that the medication did contain codeine, is angry that he was misinformed. Found plain Fioricet to be useless. Chronically takes Fiorinal for headaches, does not believe he can decrease the frequency of use. Initially found Skelaxin to be beneficial, now questioning if indeed it is, believes it may cause excessive fatigue in a.m. Generalized fatigue is present. Dug in someone's garden picking baby tomatoes for another patient, felt well at the time, soon thereafter developed increased sense of respiratory distress. Continues to require supplemental 02, believes his pulmonary function is worsening. Has three tanks of oxygen in his room, states that two belong to [place], he was told by social work to return these. Requests a walker with seat so that he can carry his oxygen. He has no gait impairment. Fatigue present at all times. Reluctant to visit an [facility], concerned that his monthly financial allotment will be consumed with retirement setting. No confusion. Remainder of 12 point of systems obtained negative.    Exam: patient oriented times three, supplemental 02 in use. Blood pressure 131/66, 02 92% on room air, pulse 75, weight 124.5. No tremor. No facial asymmetry. S1 and S2 regular. Pulmonary exam with increased congestion mid lung field, rare end expiratory wheeze, delayed  inspiratory flow. Abdomen without masses or tenderness. Periphery with diffuse erythema, decreased sensation digital tips, all digits are cold, no ulceration. No peripheral edema.    Impression and plan: chronic hypertension with satisfactory control of blood pressure. COPD, acute exacerbation following digging in dirt picking tomatoes, no suggestion of consolidation, recent pneumonia resolved, begin prednisone 10 mg Q day times seven days, continue nebulizer, continue supplemental 02 as necessary. Chronic headaches, order for Fiorinal with codeine written, reviewed administration of Fiorinal without codeine. Questionable benefit of Skelaxin, patient will continue trial. Hepatitis C untreated. Homelessness, concerns regarding long-term placement reviewed. Total time of evaluation greater than 35 minutes, greater than 50% of time spent in coordination of care and counseling.        Electronically signed by: Araceli Subramanian MD

## 2021-06-13 NOTE — PROGRESS NOTES
[facility]    Facility:   [facility] [[id number]]   Code Status: POLST AVAILABLE    Reevaluation of 77-year-old male with COPD, continued tobacco use, hepatitis C, hypertension, chronic pain syndrome with headaches, admitted to hospital with dyspnea, diagnosed with pneumonia, transferred to TCU for rehabilitation, management of medical problems. Homeless, disposition an issue.    Review of systems: continued use of supplemental 02. Finding BuSpar to be beneficial in controlling headaches and anxiety, additionally finding Skelaxin to be beneficial. Would like to increase Skelaxin from 800/400/400/400 to 800 mg TID. Additionally would like to increase BuSpar, currently on 5 mg p.m., 2.5 mg a.m. Sense of dyspnea with activities. Continues to smoke on a regular basis. Denies fever sweats or chills. Energy level gradually improving. Appetite adequate. Malodorous urine, recent urine culture negative. Remainder of 12 point review of systems obtained negative.    Exam: patient alert, supplemental 02 in place, weight 120.8, pulse 72. Somewhat vague in presentation, oriented times three. No facial asymmetry. No use of accessory muscles at rest. Pharynx without erythema. No frontal muscle tenderness. S1 and S2 regular. Pulmonary exam with diminished air movement bases, delayed inspiratory flow, no rales or wheezes. Periphery without edema. No tremor.    Impression and plan: COPD, continued tobacco habituation, pulmonary status stable, continues nebulizer on a regular basis. Recent pneumonia resolved in entirety. Hepatitis C, untreated. Hypertension with adequate control of blood pressure. Chronic anxiety, increase BuSpar to 5 mg b.i.d., further increased to 10 mg b.i.d. as tolerated. Chronic headaches, Skelaxin with decreased symptomatology, increase Skelaxin to 800mg TID with 800 mg PRN. Complaints of malodorous urine, CBC CMP next lab day. Recent urine culture  negative.      Electronically signed by: Araceli Subramanian MD

## 2021-06-13 NOTE — PROGRESS NOTES
[facility]    Facility:   [facility] [[id number]]   Code Status: POLST AVAILABLE    Reevaluation of 76-year-old homeless male admitted to hospital with pneumonia, treated with antibiotic, required supplemental 02, transferred to TCU for rehabilitation, observation of pulmonary status, management of medical problems which include hypertension, chronic headache, neuropathy, anxiety.    Review of systems: patient reports Skelaxin 400 mg TID has been beneficial for control of chronic headaches. Attempting to limit his use of Fiorinal with codeine. Continues with a sense of dyspnea intermittently, notes variable oxygenation level, uses oximeter frequently. Believes his breathing is better outside, after smoking a cigarette notes improvement in oxygenation. Contemplating moving to an prison, is unsure if he wishes to do so. Sense of tightness of muscles always present. Denies anterior chest discomfort. Aware of sense of anxiety always being present. Difficulty concentrating. Remainder of 12 point review of systems obtained negative.    Exam: patient alert, oriented times three,  without supplemental 02 in place. Blood pressure 123/75, 02 93%, pulse 80, temperature 98.4, weight 123.8. No use of accessory muscles at rest. Pharynx without erythema. S1 and S2 regular. Pulmonary exam with trace delay in inspiratory flow, no rales or wheezes, decreased air movement lung bases. Abdomen without masses tenderness organomegaly. Posterior cervical musculature tense, tenseness extends into upper thoracic region. Abdomen without masses tenderness organomegaly. Diffuse erythema of hands and digits, no ulceration. Periphery without edema. Muscle tone and strength minimally diminished.    Impression and plan: recent pneumonia, has completed course of Levaquin, remains afebrile, intermittently requiring supplemental 02 for COPD, continues to smoke on a regular basis, no desire for smoking  cessation. Hepatitis C untreated. Chronic hypertension with adequate control of blood pressure. Chronic headaches, continues use of Fiorinal with codeine, Skelaxin 400 mg t.i.d. beneficial. Chronic anxiety, reviewed options of treatment, begin BuSpar 5 mg BID, further increase in dose as necessary. Care plan and medications and multiple medical issues are reviewed.      Electronically signed by: Araceli Subramanian MD

## 2021-06-13 NOTE — PROGRESS NOTES
[facility]    Facility:   [facility] [[id number]]   Code Status: POLST AVAILABLE      Reevaluation of patient with chronic hepatitis C, COPD, chronic tobacco habituation, chronic headaches, initially admitted to hospital with pneumonia, transferred to TCU for rehabilitation and management of medical problems.    Review of systems: patient requests increase in a.m. Skelaxin to 800 mg, initially reported that 800 mg dose caused excessive fatigue, is tolerating 400 mg TID well, headaches however remain symptomatic. Continues use of Fiorinal with codeine on a regular basis and Ultram on a regular basis. Sense of dyspnea continues. Has received flu vaccination. Low-grade cough, no sputum production. Continues with use of supplemental 02. Fatigue present at all times. Remainder of 12 point review of systems obtained negative.    Exam: patient drowsy, states he has just awakened, it is approximately 11 AM. Cooperative and oriented. Blood pressure 125/60, 02 95%, pulse 89, weight 123.1. No facial asymmetry. Mucous membranes moist. Infrequent cough. No carotid bruits or cervical adenopathy. S1 and S2 regular. Pulmonary exam with limited respiratory excursion, decreased air movement lung bases, no rales or wheezes. Abdomen without masses or tenderness. Periphery without edema.    Impression and plan: hepatitis C untreated. COPD, continues need for supplemental 02, recent pneumonia, no evidence of infectious process related to lung. Chronic pain syndrome with chronic headache, positive response to Skelaxin, increase a.m. Skelaxin to 800 mg Q day, then 400 mg TID, continue to attempt limitation of Fiorinal use. Hypertension with adequate control of blood pressure. Homelessness, patient is contemplating move to an [facility]. Care plan and medications are reviewed.      Electronically signed by: Araceli Subramanian MD

## 2021-06-13 NOTE — PROGRESS NOTES
[facility]    Facility:   [facility] [[id number]]   Code Status: POLST AVAILABLE       Reevaluation of patient who is homeless, history of hepatitis C, hypertension, COPD, chronic tobacco habituation, admitted to hospital with dyspnea and pneumonia, transferred to TCU for rehabilitation, management of medical problems.    Review of systems: continued requirement for supplemental 02. Variable sense of dyspnea, dyspnea increases when inside facility, feels improved outside facility. Denies fever sweats or chills. Recent initiation of BuSpar at 5 mg b.i.d., patient questions if this medication causes him to feel lightheaded, is unclear as to what side effect he may have from medication, however believes it has helped with his anxiety. Refused BuSpar over past two days due to ill-defined side effects. On contemplating his anxiety patient would like to resume BuSpar at a lower dose and monitor response. Continues to have significant headaches, continues on tramadol Fiorinal with codeine and Skelaxin. Remainder of 12 point review of systems obtained negative.    Exam: patient somewhat vague in presentation, oriented, states he just awakened. Vital signs reviewed over past 72 hours. Supplemental 02 is in place, no evidence of respiratory distress or use of accessory muscles. No pharyngeal erythema. No carotid bruits or HJR. S1 and S2 regular. Pulmonary exam with diminished air movement lung bases, no wheezes or rales. Scant rhonchi which clear with deep breathing mid lung field. Abdomen without masses or tenderness. Periphery with nonpitting edema trace, venous stasis changes, diffuse erythema without coolness of hands.    Laboratory study review shows significant eosinophilia. Review of hospital notes shows normal eosinophil count.    Impression and plan: COPD, continues to require supplemental 02, oxygenation satisfactory, continue nebulizer, continued tobacco use  without desire for smoking cessation. No evidence of acute infectious process. Increased eosinophil count on differential, patient recently received flu immunization, no previous history of eosinophilia, nothing to suggest acute infectious process. Hypertension with adequate control of blood pressure. Chronic intractable anxiety, repeat trial of BuSpar beginning at 2.5 mg Q HS. Homelessness, patient continues to review custodial placement, is unsure as to his tolerance of such places. Care plan and medications are reviewed, multiple medical issues are reviewed with total time of evaluation being greater than 35 minutes, greater than 50% of time spent in coordination of care and counseling.        Electronically signed by: Araceli Subramanian MD

## 2021-06-13 NOTE — PROGRESS NOTES
[facility]    Facility:   [facility] [[id number]]   Code Status: POLST AVAILABLE       Reevaluation of 76-year-old male with chronic tobacco habituation, COPD, hypertension, hepatitis C, peripheral neuropathy, chronic headache, admitted to hospital with progressive cough, shortness of breath, diagnosed with exacerbation of COPD and pneumonia. Stabilized in hospital and transferred to TCU for rehabilitation, management of respiratory status, completion of Levaquin course. Patient anticipated leaving facility today, had decided to stay homeless,  have recommended transfer to [facility], patient declines desire to reside in [facility]. Recent initiation of Skelaxin 800 b.i.d. for chronic headache, chronic Fioricet use.    Review of systems: pulmonary status improved. Continues to note dyspnea with activity, recently obtained an oximeter, reports desaturation into the high 80s. Finds nebulizer to be beneficial. Chronic cough, no sputum production. Believes headaches may be mildly improved with Skelaxin, continues use of Fioricet. Poor sleep quality. Continued difficulty with attention, believes he has  ADD and is willing to have this treated. Generalized fatigue present at all times. No focal neurologic deficits. Remainder of 12 point review of systems obtained negative.    Exam: patient appears younger than stated age, gait stable, no evidence of respiratory distress at rest. Vital signs reviewed over past 72 hours. No facial asymmetry, no temporal artery tenderness. Mucous membranes moist. Pharynx without erythema. Thyroid without nodularity. S1 and S2 regular. Pulmonary exam with rare rhonchi right greater than left lung base, full respiratory excursion, trace delay in inspiratory flow. Abdomen without organomegaly or masses. Increased erythema digits, decreased sensation. Strength and muscle tone symmetrical. No hand drift or tremor.    Impression and plan: chronic  headache, chronic use of Fioricet, recent initiation of Celexa, mild improvement in frequency and intensity of headache, begin Neurontin 100 mg QHS. COPD, continued tobacco use, no desire for smoking cessation. Pneumonia, Levaquin course completed, continues with nebulizer, intermittent desaturation, reviewed deep breathing. Hypertension with adequate control of blood pressure. Hepatitis C with peripheral neuropathy. Homelessness, issues reviewed with patient and social work. Multiple issues are reviewed, prolonged evaluation time, additional discussion with social work and nursing staff regarding status.        Electronically signed by: Araceli Subramanian MD

## 2021-06-13 NOTE — PROGRESS NOTES
[facility]    Facility:   [facility] [[id number]]   Code Status: POLST AVAILABLE    Reevaluation of 77-year-old male admitted to hospital with pneumonia, stabilized and transferred to TCU for rehabilitation, management of medical problems which include chronic hepatitis, anxiety, chronic headache, chronic pain syndrome, tobacco habituation, hypertension.    Review of systems: continued fatigue, poor appetite, believes he had ulcer in the distant past. Notes urinary frequency, no history of prostatitis or UTI, nocturia new in onset. Continued need for supplemental 02, continues to smoke on a regular basis. Believes certain foods cause nausea, distant past history of ulcer. No melena or hematochezia. Generalized fatigue increasing. Prior to the past 24 hours felt well. Remainder of 24 remainder of 12 point review of systems obtained negative.    Exam: blood pressure 118/64, 02 87% on room air, 92% with 02, pulse 86, temperature 97, weight 121.5. Patient alert and oriented, supplemental 02 in use. Erythema of cheeks and hands. Cognitively intact. No pharyngeal erythema. No cough. No HJR. S1 and S2 regular. Pulmonary exam without rales rhonchi or wheezes. Periphery without edema. Strength and muscle tone symmetrical upper and lower extremities, no hand drift.    Impression and plan: chronic anxiety, tolerating low-dose BuSpar and finding it to be beneficial, continue. COPD, recent pneumonia, continues to require supplemental 02, continues to smoke. Chronic pain syndrome with headaches, remains on tramadol and Fiorinal with codeine. Nausea, PPI in place, no evidence of acute abdomen. Chronic hepatitis C untreated. Homelessness, attempts at finding permanent housing are followed with social work. Hypertension with adequate control of blood pressure. New onset urinary frequency, no previous history of BPH, urine culture ordered, should symptoms persist consider trial of  Flomax.      Electronically signed by: Araceli Subramanian MD

## 2021-06-14 NOTE — PROGRESS NOTES
[facility]    Facility:   [facility] [[id number]]   Code Status: POLST AVAILABLE       Reevaluation of homeless male with COPD 02 dependent, hepatitis C, hypertension, originally admitted to hospital with pneumonia. Transferred to TCU for rehabilitation and management of medical problems which include chronic pain headache and anxiety.    Review of systems: tolerating BuSpar at 2.5 mg Q day, desires to further increase BuSpar to 5 mg b.i.d. Denies cardiac symptoms. Continues to require supplemental 02. No cough or sputum production. No side effects with recent initiation of Flomax for urinary frequency secondary to BPH. Denies confusion. Appetite poor. Remainder of 12 point review of systems obtained negative.     Exam: patient alert and oriented, mildly anxious. Blood pressure 112/70, 02 92%, pulse 72, respiratory 18, 7.4, weight 117.6. Mildly anxious, some delay in response to questions, pleasant and cooperative. Mucous membranes moist. No pharyngeal erythema. No focal muscular tenderness. S1 and S2 regular. Pulmonary exam with diminished air movement lung bases, no rales or wheezes. Abdomen without masses tenderness organomegaly. Periphery without edema. Supplemental 02 in place. Strength and muscle tone diminished and symmetrical.    Impression and plan: COPD 02 dependent, oxygenation satisfactory was supplemental 02, no acute respiratory distress. Chronic tobacco habituation, patient without desire for smoking cessation. Hypertension with adequate control of blood pressure. BPH without obstruction and with urinary frequency, Flomax recently initiated. Chronic anxiety, increase BuSpar to 5 mg b.i.d. Care plan and medications are reviewed.        Electronically signed by: Araceli Subramanian MD

## 2021-06-14 NOTE — PROGRESS NOTES
[facility]    Facility:   [facility] [[id number]]   Code Status: POLST AVAILABLE       Reevaluation of patient admitted to hospital with pneumonia, transferred to TCU on supplemental 02. Remains in TCU with lack of suitable housing in community, patient homeless. Recent increase in BuSpar to 5 mg b.i.d. for persistent anxiety.    Review of systems: patient notes improvement in anxiety with BuSpar, would like to further increase dose, anxiety is lifelong, finds relief with medication. Continues with chronic headache involving frontal head region, continues on Fiorinal with codeine and Ultram. Denies cardiac symptomatology. No desire for smoking cessation. Sense of dyspnea with activity. No exertional chest discomfort. No side effects from Flomax, continues with nocturia. Remainder of 12 point review of systems obtained negative.    Exam: patient alert and oriented, ambulates with a walker, supplemental 02 in place, ambulates slowly, no evidence of dyspnea. Blood pressure 127/75, blood glucose 88, 02 92%, pulse 74, respiratory 16, temperature 98.1, weight 119.2. No use of accessory muscles at rest. No carotid bruits. S1 and S2 regular. Pulmonary exam with delay in inspiratory flow, no rales or wheezes. Abdomen without masses or tenderness. Periphery with venous stasis changes, no pedal edema. No hand drift or tremor.    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 Chronic tobacco habituation, no desire for smoking cessation. Chronic hepatitis C, untreated, mildly abnormal liver function studies. Chronic and anxiety, increase BuSpar to 7.5 mg b.i.d., monitor for any side effects. Lack of suitable housing in community, working with social work and has an upcoming visit to an [facility]. Care plan and medications are reviewed.        Electronically signed by: Araceli Subramanian MD

## 2021-06-14 NOTE — PROGRESS NOTES
[facility]    Facility:   [facility] [place] [place] [facility] [[id number]]   Code Status: POLST AVAILABLE      77-year-old male is seen for follow up evaluation and discharge planning. History of hepatitis C, chronic headache, chronic pain syndrome, COPD with ongoing  tobacco habituation, admitted to hospital 9/5 with complaints of progressive dyspnea and cough, diagnosed with exacerbation of COPD and pneumonia, completed course of Levaquin, transferred to TCU for rehabilitation, management of medical problems. Patient homeless, prolonged stay in TCU  due to need to find suitable housing, patient has been accepted into [facility]. Course in TCU relatively uncomplicated. Chronic headaches addressed by in initiating Skelaxin and Neurontin, patient poorly  tolerated Neurontin 100 mg QHS, has tolerated Skelaxin which has gradually been increased to 800 mg TI D and 800 mg at HS. Patient however has continued to take Ultram and Fiorinal with codeine on a regular basis, he has been on these two medications for a number of years. Headaches myofascial in nature. Patient's anxiety addressed with initiation of BuSpar, patient has tolerated BuSpar, his anxiety has improved with decreased  symptomatology, currently on BuSpar 7.5 mg Q6 hours routinely.  Additionally with symptoms of BPH without obstruction, on 11/20 patient was started on Flomax 0.4 mg Q day, improvement in symptomatology of nocturia on Flomax.    Review of systems: significant improvement in symptoms of anxiety, finds BuSpar to be beneficial. Notes post void dribbling, nocturia frequency has diminished. Has chronic sense of dyspnea, attempts at tapering supplemental 02 have been unsuccessful. Denies cough or sputum production. Continues to smoke. Frequently has a sense of dyspnea, no exertional chest discomfort. No peripheral edema, chronic intermittent numbing sensation of feet and hands, no neuropathic pain of lower extremities. No  complaints of memory deficit. Remainder of 12 point review of systems obtained negative.    Exam: patient alert, supplemental 02 in place, pleasant and cooperative. 02 saturation 99% on 2 L. Mucous membranes moist, no use of accessory muscles at rest, no carotid bruits or HJR. S1 and S2 regular with faint systolic murmur. Pulmonary exam with diminished air movement bases, no rales or wheezes. Abdomen without hepatosplenomegaly masses or tenderness. Periphery without edema. Diffuse erythema of hands bilaterally.    Laboratory studies while in TCU last obtained 10/24, hemoglobin 15, white count 8.0, platelet 196, potassium 3.7, sodium 141, albumin 3.3, AST 32.    Impression and plan: COPD, 02 supplemental required, not steroid dependent, patient continues to smoke on a regular basis. Chronic anxiety, BuSpar beneficial in controlling symptoms of anxiety. Chronic headache, chronic pain medication use, continues Fiorinal with codeine daily, continues Ultram daily. Myofascial pain, this has responded positively to Skelaxin currently 800 mg TID and 800 mg at HS PRN. BPH without obstruction, symptoms diminished on tamsulosin 0.4 mg Q day. Hypertension on  atenolol with satisfactory control of blood pressure. Change of medications from those at admission, BuSpar 7.5 mg QID, Skelaxin 800 mg TID and PRN, Flomax 0.4 mg Q day.Hepatitis C untreated. Follow up will be with house physician at assisted living facility. Discharge  medication list has been sent to facility. Total time of discharge evaluation greater then 35 minutes, greater than 50% of time spent in coordination of care and counseling.    Electronically signed by: Araceli Subramanian MD

## 2021-06-14 NOTE — PROGRESS NOTES
[facility]    Facility:   [facility] [[id number]]   Code Status: POLST AVAILABLE    Reevaluation of patient with COPD, chronic tobacco habituation, hepatitis C, hypertension, admitted to hospital with dyspnea and cough, diagnosed with pneumonia, treated with prednisone and Levaquin, transferred to TCU for rehabilitation and management of medical problems.    Review of systems: patient states he is having a bad day. Fiorinal not available for 24 hours, feels poorly. Describes nonspecific pain in the right upper quadrant, states he gets this periodically, has known gallstones. No sputum production. No focal neurologic symptoms. No fever sweats or chills. Remainder of 12 point review of systems obtained negative.    Exam: weight 120.7 and stable, pulse 72, respiratory 16. Supplemental 02 in place, no use of accessory muscles at rest. No pharyngeal erythema. No carotid bruits. S1 and S2 regular. Pulmonary exam with delayed inspiratory flow, decreased air movement lung bases, no wheezes or rales. Nonspecific tenderness right lower costal margin and superficial musculature right upper quadrant, liver and spleen nonpalpable, no withdrawal to palpation, no hepatic tenderness. Periphery without edema. Muscle tone and strength diminished. No hand drift or tremor.    Impression and plan: 02 dependent COPD, tobacco use. Oxygenation remains satisfactory was supplemental 02. Hypertension on beta blocker was satisfactory control of blood pressure. Chronic headaches, Fiorinal and tramadol in use, patient desires to continue these medications. Nonspecific right upper quadrant pain, no evidence of intra-abdominal process, pain is reproduced with superficial palpation of the abdominal and chest wall. Care plan and medications are reviewed and without change.      Electronically signed by: Araceli Subramanian MD

## 2021-06-14 NOTE — PROGRESS NOTES
[facility]    Facility:   [facility] [[id number]]   Code Status: POLST AVAILABLE    Reevaluation of 77-year-old male, originally admitted to hospital 9/5 with complaints of escalating dyspnea and cough, diagnosed with exacerbation of COPD, possible pneumonia, acute hypoxic respiratory failure, O2 sats 80% on room air, received nebulizer treatment in hospital, completed course of antibiotic, stabilized and transferred to TCU for rehabilitation, management of medical problems including hyper tension on atenolol, HCTZ placed on hold, diabetes mellitus two diet controlled, chronic pain syndrome with headache on tramadol and Fiorinol with codeine. Patient is completing course in TCU, will be transferring to [facility].    Review of systems: notes improvement in general health status. In particular notes  decreased level of anxiety with recent increase of BuSpar to 7.5 mg Q6 hours. Chronic sense of dyspnea continues, continues to require supplemental 02 at all times. Denies cough or sputum production, no fever sweats or chills. Unaware of active wheezing. Feels improved when he is outside. Intensity of headaches has decreased from those in past. Concerns regarding transfer to alternate facility. Concerns regarding long term nature of multiple medical illnesses. Remainder of 12 point review of systems obtained negative.    Exam: patient alert, oriented, /85, O2 93%, R 16, T 98.3. Mood concerned, pleasant and cooperative, cognitively intact. No facial asymmetry. Pharynx without erythema. No carotid  bruits or HJR. S1 and S2 regular. Pulmonary exam with diminished air movement bases, no rales rhonchi or wheezes, trace delay in inspiratory flow. Abdomen without masses tenderness organomegaly. Periphery without edema. Gait is stable.    Impression and plan: COPD, 02 dependent, attempts to taper 02 unsuccessful, continue 02 at 2L. Chronic pain syndrome, continues  tramadol and Fiorinol with codeine, attempts to decrease frequency of these medications unsuccessful. Chronic anxiety, BuSpar well-tolerated and beneficial, patient would like to increase BuSpar dose further, continue current dose at present. Hepatitis C currently asymptomatic. Hypertension on beta blocker with satisfactory control of blood pressure. Myofascial headaches, Skelexin continued, homelessness, patient now has acceptance in prison setting. Multiple concerns are reviewed in detail including long-term prognosis management of current problems, total time of evaluation greater than 35 minutes, greater than 50% of time spent in coordination of care and counseling.      Electronically signed by: Araceli Subramanian MD

## 2021-06-14 NOTE — PROGRESS NOTES
[facility]    Facility:   [facility] [[id number]]   Code Status: POLST AVAILABLE  Reevaluation of 77-year-old male admitted to hospital with pneumonia. History of homelessness, COPD, hepatitis C, hypertension. Continues in TCU as he is homeless. Recent increase in BuSpar to 5 mg b.i.d., Skelaxin 800 mg TID.    Review of systems: improvement in general health status. Fatigue remains present at all times. Continues supplemental 02. No cough or sputum production. Denies fever sweats or chills. No abdominal pain. Headaches decreased in intensity and frequency. Mood improved, less anxiety then in preceding years. Finds BuSpar to be beneficial. Reports glucose intolerance, states Accu checks were followed while in hospital. Has been checking his own of blood glucose and finds it to be normal. Remainder of 12 point review of systems obtained negative.    Exam: patient cordial, appears stated age, supplemental 02 in place. Blood pressure 112/71, 02 92%, pulse 80, respiratory 20, temperature 98, weight 122.8. No use of accessory muscles at rest. Pharynx without erythema. Thyroid without nodularity. S1 and S2 regular. Pulmonary exam with diminished air movement bases, no rales or wheezes. Abdomen without organomegaly or masses. Periphery without edema. Strength and muscle tone diminished. No hand drift.    Impression and plan: COPD, chronic hypoxic respiratory failure, continues to require supplemental 02, continues to smoke. Chronic hepatitis C, liver function studies remain normal. Hypertension with adequate control of blood pressure. History of glucose intolerance, Accu checks to be obtained twice weekly. Homelessness, continues with out  permanent residence. Chronic anxiety, symptoms diminished with BuSpar. Chronic headache, continues on Ultram, Celexa beneficial, Fiorinal with codeine on a regular basis. Care plan and medications are reviewed, issues of concern  reviewed.            Electronically signed by: Araceli Subramanian MD

## 2021-06-14 NOTE — PROGRESS NOTES
[facility]    Facility:   [facility] [[id number]]   Code Status: POLST AVAILABLE    Reevaluation of 77-year-old male admitted to hospital with complaints of shortness of breath and dyspnea. On 9/5 diagnosed with pneumonia and COPD exacerbation, one positive blood culture for gram positive cocci, treated with Levaquin, prednisone, fluticasone, DuoNeb's, albuterol PRN, stabilized in transferred to TCU for rehabilitation, management of medical problems which include hypertension on atenolol, diabetes mellitus diet controlled with recent hemoglobin A1c of 5.5, chronic GERD on Zantac and deconditioning.    Review of systems: patient remains without a home. Attempts at finding adequate housing for patient have not been successful. States he over all feels quite well. Sleep is improved. Anxiety improved. Chronic headaches improved. Continues however to require tramadol and Fioricet for pain management. Tolerating Skelaxin well. Recent in increase in BuSpar to 5 mg b.i.d., patient states this should be decreased to 2.5 mg Q day, unable to tolerate higher dose. Continued cough, intermittent sense of dyspnea. Continues to smoke on a regular basis. Remainder of 12 point review of systems obtained negative.    Exam: patient alert and oriented, appears younger than stated age. Blood pressure 158/80, 02 90% with 02 at 2 L, pulse 86, respiratory 18, temperature 98.4. No facial asymmetry. No pharyngeal erythema. No use of accessory muscles at rest. No carotid bruits. S1 and S2 regular. Pulmonary exam with diminished air movement bases. Periphery without edema. No hand drift.    Impression and plan: chronic tobacco habituation with COPD, patient has no desire for smoking cessation. Chronic anxiety, increase in BuSpar to 5 mg b.i.d., patient poorly tolerating this amount, decrease BuSpar to 2.5 mg b.i.d. Chronic headaches, continues with Fioricet and tramadol, has not diminished use  of medication. Hypertension with adequate control of blood pressure. Homelessness, social work continue to attempt patient placement. Care plan and medications are reviewed, multiple medical concerns are reviewed.    Electronically signed by: Araceli Subramanian MD

## 2021-06-14 NOTE — PROGRESS NOTES
[facility]    Facility:   [facility] [[id number]]   Code Status: POLST AVAILABLE       Patient is seen for reevaluation prior to discharge which is to occur today. Patient with homelessness, hypertension, hepatitis C untreated, admitted to hospital with exacerbation of COPD and pneumonia, treated with Levaquin, transferred to TCU for rehabilitation and management of medical problems. Prolonged stay in TCU, difficulty finding housing, patient moving to [facility] today.    Review of systems: multiple concerns regarding transfer to [facility]. Concerned that his oxygen will be available, concerned that his medications will be in place. Breathing status overall stable, continues to require continuous 02 supplemental. Anxiety greatly improved with BuSpar, anticipates that BuSpar may need to be increased in future. Chronic headaches continue daily, uses Ultram and Fiorinal on a regular basis. No focal neurologic deficits. Ability to attend to details and calmness improved since initiation of BuSpar. Remainder of 12 point review of systems obtained negative.    Exam: vital signs reviewed over past four days. Patient alert, oriented, 02 supplemental in place. No use of accessory muscles at rest, no pharyngeal erythema, no cervical adenopathy. S1 and S2 regular. Pulmonary exam with diminished air movement bases, no rales rhonchi or wheezes. Abdomen without masses or tenderness. Periphery without edema, trace venous stasis changes present. Strength and muscle tone symmetrical upper and lower extremities.    Impression and plan: COPD, 02 dependent, continues to smoke on a regular basis, no desire for smoking cessation, no evidence of acute pulmonary process. Hypertension on low-dose beta blocker with adequate control of blood pressure. Hepatitis C untreated. No current symptoms. Chronic intractable anxiety, reviewed use of BuSpar, potential adjustment of medication in future. Anxiety  greatly improved on BuSpar. Chronic pain syndrome with use of Ultram and Fiorinal, attempts to decrease these medications should be considered in future. Multiple concerns reviewed in detail, total time of evaluation greater then 35 minutes, greater than 50% of time spent in coordination of care and counseling. Additional discussion with social work and nursing staff regarding transfers and complications thereof.        Electronically signed by: Araceli Subramanian MD

## 2021-06-14 NOTE — PROGRESS NOTES
[facility]    Facility:   [facility] [[id number]]   Code Status: POLST AVAILABLE    Reevaluation of 77-year-old male with prolonged stay in TCU, accommodations have been made  in [facility], anticipate discharge on 12/15 . Patient continues to require supplemental 02, history of COPD, recent increase in BuSpar for anxiety.    Review of systems: patient finds BuSpar to be beneficial, would like to increase dose to QID to be given every six hours around-the-clock. Does believe he finds some immediate relief from the BuSpar and the BuSpar there after wears off. Notes improvement in bladder pressure during HS, continues to have minimal dribbling post void. Urinary frequency has diminished. No dysuria. Generalized fatigue remains present. Describes headache behind the right eye, this frequently associated with bi frontal headache, this consistent with his chronic myofascial headaches for which he takes Ultram and Fiorinal with codeine, is unwilling to further decrease dose of Fiorinal with codeine or Ultram. Denies cardiac symptomatology. Mood remains stable. No active G.I. symptoms. No palpitations. Generalized fatigue present much of time.    Social work reports patient requires order for four wheeled Walker,  required due to weakness, gait impairment, in coordination, has used a walker throughout his stay in TCU. Additionally will require home 02 at 2 L per nasal cannula continuous, patient desires  eclipse, attempts to taper off 02 have been unsuccessful, diagnosis COPD, chronic respiratory failure with hypoxia. A refillable portable is in use at this time, patient does not desire this device.    Exam: patient oriented times three, mildly anxious, supplemental 02 in place. Blood pressure 129/70, 02 91% on room air, pulse 93, respiratory 16, temperature 98.7, weight 199.2. No facial asymmetry. Mild dryness bilateral eyes. Extraocular movements intact. Mucous membranes  moist. No credit bruits or HJR. S1 and S2 regular without murmur or rub. Pulmonary exam with diminished air movement bases, no rales or wheezes. Abdomen without hepatosplenomegaly masses or tenderness. Periphery without edema, venous stasis changes present. Diffuse erythema of skin of hands on a chronic basis, no embolic phenomena. Strength symmetrical upper and lower extremities. No tremor.    Impression and plan: COPD, continues to require supplemental 02, oxygenation satisfactory with 02 at 2 L, continues to smoke on a regular basis, use of nebulizer on a PRN basis. Hypertension on  atenolol with satisfactory control of blood pressure. Chronic headaches, continuing on Ultram and Fiorinal, continued myofascial symptoms. Chronic anxiety, increase BuSpar to 7.5 mg Q6 hours, anxiety contributing to HA symptomatology. Gait instability, documentation regarding need for walker due to weakness and gait abnormality. Chronic tobacco habituation without desire for smoking cessation. BPH without obstruction on Flomax with improve symptoms. Total time of evaluation greater than 35 minutes, greater than 50% of time spent in coordination of care and counseling.      Electronically signed by: Araceli Subramanian MD

## 2021-06-14 NOTE — PROGRESS NOTES
[facility]    Facility:   [facility] [[id number]]   Code Status: POLST AVAILABLE    Reevaluation of 77-year-old male with hepatitis C, homelessness, COPD, ongoing tobacco habituation, admitted to hospital 9/917 with acute on chronic hypoxic respiratory failure with pneumonia, one positive blood culture suspected contaminant. Treated with antibiotic, nebulizer, stabilized and transferred to TCU for rehabilitation, management of medical problems which include hypertension on atenolol, diabetes mellitus diet controlled, ongoing chronic tobacco habituation, chronic pain syndrome. Patient remains in TCU, housing has been obtained, anticipated discharge will be 12/15.    Review of systems: social work reports patient will need rolled walker. Patient finding BuSpar to be beneficial in controlling anxiety, recent increase dose of BuSpar to 7.5 mg TI D, notes mild side effects from increased dose. Desires to continue current regimen. Fatigue present. Patient's oxygen tank from [place] has been re-collected, is reluctant to use portable 02 which has to be refilled. Has signed majority of papers for group home, no side effects from Flomax. No cardiac symptomatology. Generalized fatigue remains present. Remainder 12 point review of systems obtained negative.    Exam: patient pleasant, cooperative, oriented times three, mild anxiety present. Supplemental 02 in place. No facial asymmetry. Mucous membranes moist. S1 and S2 regular without extra sound. Pulmonary exam with diminished air movement bases, no rales or wheezes. Abdomen without organomegaly or masses. Periphery without edema. Venous stasis changes lower extremities. Weight 119.9, pulse 72, respiratory 16.    Impression and plan: COPD, continued requirement for supplemental 02, trials off 02 with saturation less than 90%. Recent pneumonia, no evidence of current infection. Chronic anxiety, tolerating BuSpar at 7.5 mg TID,  continue current dose. BPH without obstruction, recent initiation of Flomax. Hypertension on  atenolol with satisfactory control of blood pressure. Chronic pain syndrome continuing on Fiorinal with codeine and Ultram, patient is not interested in decrease in medication. Care plan and medications are reviewed and without change.      Electronically signed by: Araceli Subramanian MD

## 2021-06-14 NOTE — PROGRESS NOTES
[facility]    Facility:   [facility] [[id number]]   Code Status: POLST AVAILABLE    Reevaluation of 77-year-old male, homeless, hepatitis C, COPD, chronic tobacco habituation, hypertension, chronic pain syndrome, admitted to hospital with increasing cough and fatigue, diagnosed with pneumonia, transferred to TCU for rehabilitation, management of medical problems. Ongoing treatment for anxiety, recent adjustment of BuSpar, previously 5 mg b.i.d., now 2.5 mg b.i.d.    Review of systems: continues to require supplemental 02. Rare cough. Continues to smoke on a regular basis. Has found BuSpar to be beneficial, would like to increase dose to 5 mg b.i.d. Believes previous side effects which he assumed to be from BuSpar may have been unrelated. Complains of increasing nocturia, sense of incomplete bladder emptying. No dysuria. Recent urine culture negative. Has been aware of nocturia for a number of years, now increasing in frequency. No known prostate abnormalities. Vague sense of fullness in lower abdominal region. Energy level continues to gradually improve. No housing available to date. Remainder of 12 point review of systems obtained negative.    Exam: patient alert and oriented, supplemental 02 in place, blood pressure 152/72, 02 89 - 91%, pulse 70, respiratory 20, temperature 98.4, weight 120.6. Pleasant and cooperative. No facial asymmetry. Mucous membranes moist. S1 and S2 regular. Pulmonary exam with decreased air movement bases. No rales rhonchi or wheezes. Abdomen without hepatosplenomegaly or masses. Periphery without edema. Strength and muscle tone symmetrical. Gait stable, ambulates with a walker due to need for transporting oxygen tank.    Impression and plan: COPD, continued need for supplemental 02, using nebulizer on a regular basis, continues to smoke on a regular basis. Hypertension with adequate control of blood pressure. Chronic headache, continues  on Skelaxin Ultram and Fiorinal with codeine. No desire to decrease pain medication. Chronic anxiety, BuSpar beneficial, increase BuSpar to 5 mg b.i.d. BPH without obstruction, increasing symptoms, recent negative urine culture, begin Flomax 0.4 mg QHS. Homelessness, continues to seek out hosing per social work. Care plan and medications are reviewed.      Electronically signed by: Araceli Subramanian MD

## 2022-01-04 NOTE — PLAN OF CARE
Problem: Goal Outcome Summary  Goal: Goal Outcome Summary  Outcome: Improving  Afebrile. Tramadol given for chronic pain per pt request. Diminished lung sounds, LEVINE, occasional productive cough. Weaned to 1 LNC. Blood sugars 90 and 166.good appetite. States he feels better today. Possible discharge tomorrow. Independently up in the room. Uses the urinal.         This is a 67M with history of recent CVA with L arm weakness/numbness, s/p R ICA stent (11/17/2021), CAD s/p stents (2011) and CABG (2014), HTN, CHF, HLD and ?DM2 (pt states that he used to be on metformin but no is not on any DM2 medications, reports being told both he has and does not have DM2) who presents to the hospital with complaints of epigastric abdominal pain since [place] night. Said that the pain started suddenly while he was sleeping on [place] night, was associated with belching, diaphoresis, and pleurisy but no fevers/chills, nausea/emesis, or diarrhea. States that he did notice that his urine was more austen than usual but no dysuria or hematuria. His pain was worsening and therefore came to the hospital for evaluation. Denies any cardiac pain, no palpitations/syncope, no SOB or pulm symptoms. Said that the current pain is not similar to his prior cardiac pain. Reports having an episode of austen urine while he was hospitalized for his CVA in [place] in November and was told he had gall stones but unclear if he was recommended to have any treatment or not. Currently states that his abd pain is now radiating to his upper abdomen b/l. No other complaints at present.     On arrival to the hospital his vitals were T 98, P 89, /84, RR 18, O2 sat 98% RA. His labs here were mild leukocytosis with neutrophilia, mild coagulopathy, hyponatremia, lactic acidosis with elevated AG, transaminitis with elevated TBili, elevated lipase, and elevated proBNP. His trops were negative x2. His imaging showed dilated CBD with concern for acute cholecystitis and pancreatitis. He was evaluated by general surgery who GI consult and possible eventual cholecystectomy. Patient was given PO tylenol, PO maalox, IVP pepcid, and IVF in the ED. He was admitted to medicine.  This is a 67M with history of recent CVA with L arm weakness/numbness, s/p R ICA stent (11/17/2021), CAD s/p stents (2011) and CABG (2014), HTN, CHF, HLD and ?DM2 (pt states that he used to be on metformin but no is not on any DM2 medications, reports being told both he has and does not have DM2) who presents to the hospital with complaints of epigastric abdominal pain since [place] night. Said that the pain started suddenly while he was sleeping on [place] night, was associated with belching, diaphoresis, and pleurisy but no fevers/chills, nausea/emesis, or diarrhea. States that he did notice that his urine was more austen than usual but no dysuria or hematuria. His pain was worsening and therefore came to the hospital for evaluation. Denies any cardiac pain, no palpitations/syncope, no SOB or pulm symptoms. Said that the current pain is not similar to his prior cardiac pain. Reports having an episode of austen urine while he was hospitalized for his CVA in [place] in November and was told he had gall stones but unclear if he was recommended to have any treatment or not. Currently states that his abd pain is now radiating to his upper abdomen b/l. No other complaints at present.     On arrival to the hospital his vitals were T 98, P 89, /84, RR 18, O2 sat 98% RA. His labs here were mild leukocytosis with neutrophilia, mild coagulopathy, hyponatremia, lactic acidosis with elevated AG, transaminitis with elevated TBili, elevated lipase, and elevated proBNP. His trops were negative x2. His imaging showed dilated biliary ducts with concern for acute cholecystitis and pancreatitis. He was evaluated by general surgery who GI consult and possible eventual cholecystectomy. Patient was given PO tylenol, PO maalox, IVP pepcid, and IVF in the ED. He was admitted to medicine.

## 2023-03-16 NOTE — PLAN OF CARE
Problem: Goal Outcome Summary  Goal: Goal Outcome Summary  Physical Therapy Discharge Summary     Reason for therapy discharge:    Discharged to home with outpatient therapy.     Progress towards therapy goal(s). See goals on Care Plan in [facility] health record for goal details.  Goals not met.  Barriers to achieving goals:   discharge from facility.     Therapy recommendation(s):    Continued therapy is recommended.  Rationale/Recommendations:  OPPT to progress mobility.Pt will have assist for mobility at home.              [Neck] : neck [Gradual] : gradual [0] : 0 [Radiating] : radiating [Household chores] : household chores [Leisure] : leisure [Rest] : rest [Full time] : Work status: full time [de-identified] : The patient is feeling a little better since last visit.  He has mild pain left neck region was stiffness.  Occasional pain radiating down his left arm with numbness and tingling.  No weakness.  No chest pain or shortness of breath.  Taking Tylenol p.r.n.  He had MRI cervical spine.  Seen today with his wife, Huma [] : Post Surgical Visit: no [[email or web address]] : L Arm  [de-identified] : Leaning back  [de-identified] : 1/12/2023 [de-identified] : Dr. 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

## 2023-10-02 NOTE — ED TRIAGE NOTES
Pt presents via EMS for having c/o SOB since midnight. Pt is on home O2 and apparently had been having problems with his oxygen. Pt also states he's homeless but has been staying with a friend. Pt has not been taking any of his meds X5 days. Pt is A&O, ABC's intact.    No